# Patient Record
Sex: FEMALE | Race: WHITE | URBAN - METROPOLITAN AREA
[De-identification: names, ages, dates, MRNs, and addresses within clinical notes are randomized per-mention and may not be internally consistent; named-entity substitution may affect disease eponyms.]

---

## 2019-07-24 PROBLEM — E78.00 PURE HYPERCHOLESTEROLEMIA: Status: ACTIVE | Noted: 2018-05-03

## 2019-11-15 PROBLEM — M25.562 ACUTE PAIN OF LEFT KNEE: Status: ACTIVE | Noted: 2019-11-15

## 2020-07-15 PROBLEM — C80.1 MALIGNANT NEOPLASM (HCC): Status: ACTIVE | Noted: 2020-07-15

## 2020-07-15 PROBLEM — E78.5 HYPERLIPIDEMIA: Status: ACTIVE | Noted: 2020-04-15

## 2020-07-15 PROBLEM — E55.9 VITAMIN D DEFICIENCY: Status: ACTIVE | Noted: 2020-04-15

## 2020-07-15 PROBLEM — C54.1 ENDOMETRIAL CARCINOMA (HCC): Status: ACTIVE | Noted: 2020-04-15

## 2020-07-15 PROBLEM — C64.9 RENAL CELL CARCINOMA (HCC): Status: ACTIVE | Noted: 2020-04-15

## 2020-07-15 PROBLEM — E87.6 HYPOKALEMIA: Status: ACTIVE | Noted: 2020-04-15

## 2020-07-15 PROBLEM — Z90.710 HX OF HYSTERECTOMY: Status: ACTIVE | Noted: 2020-07-15

## 2020-07-15 PROBLEM — E87.20 METABOLIC ACIDOSIS: Status: ACTIVE | Noted: 2020-04-15

## 2020-07-15 PROBLEM — K94.13 COLOSTOMY AND ENTEROSTOMY MALFUNCTION (HCC): Status: ACTIVE | Noted: 2020-07-15

## 2020-07-15 PROBLEM — N18.9 ANEMIA IN CHRONIC KIDNEY DISEASE: Status: ACTIVE | Noted: 2020-04-15

## 2020-07-15 PROBLEM — C18.9 CARCINOMA OF COLON (HCC): Status: ACTIVE | Noted: 2020-04-15

## 2020-07-15 PROBLEM — D63.1 ANEMIA IN CHRONIC KIDNEY DISEASE: Status: ACTIVE | Noted: 2020-04-15

## 2020-07-15 PROBLEM — K94.03 COLOSTOMY AND ENTEROSTOMY MALFUNCTION (HCC): Status: ACTIVE | Noted: 2020-07-15

## 2021-01-29 PROBLEM — M85.80 OSTEOPENIA: Status: ACTIVE | Noted: 2020-04-15

## 2021-07-30 PROBLEM — K94.13 COLOSTOMY AND ENTEROSTOMY MALFUNCTION (HCC): Status: RESOLVED | Noted: 2020-07-15 | Resolved: 2021-07-30

## 2021-07-30 PROBLEM — K94.03 COLOSTOMY AND ENTEROSTOMY MALFUNCTION (HCC): Status: RESOLVED | Noted: 2020-07-15 | Resolved: 2021-07-30

## 2021-07-30 PROBLEM — N18.32 STAGE 3B CHRONIC KIDNEY DISEASE (HCC): Status: ACTIVE | Noted: 2020-04-15

## 2021-07-30 PROBLEM — C64.9 RENAL CELL CARCINOMA (HCC): Status: RESOLVED | Noted: 2020-04-15 | Resolved: 2021-07-30

## 2021-07-30 PROBLEM — C18.9 CARCINOMA OF COLON (HCC): Status: RESOLVED | Noted: 2020-04-15 | Resolved: 2021-07-30

## 2021-07-30 PROBLEM — C64.2 MALIGNANT NEOPLASM OF LEFT KIDNEY, EXCEPT RENAL PELVIS (HCC): Status: ACTIVE | Noted: 2020-04-15

## 2021-07-30 PROBLEM — C64.2 MALIGNANT NEOPLASM OF LEFT KIDNEY, EXCEPT RENAL PELVIS (HCC): Status: RESOLVED | Noted: 2020-04-15 | Resolved: 2021-07-30

## 2022-03-01 PROBLEM — H92.09 OTALGIA: Status: ACTIVE | Noted: 2022-03-01

## 2022-03-01 PROBLEM — H90.3 SENSORINEURAL HEARING LOSS, BILATERAL: Status: ACTIVE | Noted: 2022-03-01

## 2022-03-02 PROBLEM — E46 PROTEIN-CALORIE MALNUTRITION, UNSPECIFIED SEVERITY (HCC): Status: ACTIVE | Noted: 2022-03-02

## 2023-07-05 ENCOUNTER — OFFICE VISIT (OUTPATIENT)
Dept: NEPHROLOGY | Facility: CLINIC | Age: 88
End: 2023-07-05
Payer: MEDICARE

## 2023-07-05 VITALS — DIASTOLIC BLOOD PRESSURE: 54 MMHG | SYSTOLIC BLOOD PRESSURE: 96 MMHG | WEIGHT: 83.25 LBS | BODY MASS INDEX: 16 KG/M2

## 2023-07-05 DIAGNOSIS — Z90.5 H/O LEFT NEPHRECTOMY: ICD-10-CM

## 2023-07-05 DIAGNOSIS — N18.30 STAGE 3 CHRONIC KIDNEY DISEASE, UNSPECIFIED WHETHER STAGE 3A OR 3B CKD (HCC): Primary | ICD-10-CM

## 2023-07-05 PROCEDURE — 99204 OFFICE O/P NEW MOD 45 MIN: CPT | Performed by: INTERNAL MEDICINE

## 2024-01-29 ENCOUNTER — HOSPITAL ENCOUNTER (INPATIENT)
Facility: HOSPITAL | Age: 89
LOS: 4 days | Discharge: HOME HEALTH | End: 2024-02-02
Attending: EMERGENCY MEDICINE | Admitting: HOSPITALIST
Payer: MEDICARE

## 2024-01-29 ENCOUNTER — APPOINTMENT (OUTPATIENT)
Dept: ULTRASOUND IMAGING | Facility: HOSPITAL | Age: 89
End: 2024-01-29
Attending: EMERGENCY MEDICINE
Payer: MEDICARE

## 2024-01-29 ENCOUNTER — HOSPITAL ENCOUNTER (INPATIENT)
Facility: HOSPITAL | Age: 89
LOS: 4 days | Discharge: HOME | End: 2024-02-02
Attending: EMERGENCY MEDICINE | Admitting: HOSPITALIST
Payer: MEDICARE

## 2024-01-29 DIAGNOSIS — E87.0 HYPERNATREMIA: ICD-10-CM

## 2024-01-29 DIAGNOSIS — N17.9 AKI (ACUTE KIDNEY INJURY) (HCC): Primary | ICD-10-CM

## 2024-01-29 LAB
ALBUMIN SERPL-MCNC: 2.8 G/DL (ref 3.4–5)
ALBUMIN/GLOB SERPL: 0.7 {RATIO} (ref 1–2)
ALP LIVER SERPL-CCNC: 48 U/L
ANION GAP SERPL CALC-SCNC: 10 MMOL/L (ref 0–18)
AST SERPL-CCNC: 20 U/L (ref 15–37)
BASOPHILS # BLD AUTO: 0.01 X10(3) UL (ref 0–0.2)
BASOPHILS NFR BLD AUTO: 0.1 %
BILIRUB SERPL-MCNC: 0.2 MG/DL (ref 0.1–2)
BUN BLD-MCNC: 82 MG/DL (ref 9–23)
CALCIUM BLD-MCNC: 8.2 MG/DL (ref 8.5–10.1)
CHLORIDE SERPL-SCNC: 122 MMOL/L (ref 98–112)
CO2 SERPL-SCNC: 18 MMOL/L (ref 21–32)
CREAT BLD-MCNC: 4.51 MG/DL
EGFRCR SERPLBLD CKD-EPI 2021: 9 ML/MIN/1.73M2 (ref 60–?)
EOSINOPHIL # BLD AUTO: 0.09 X10(3) UL (ref 0–0.7)
EOSINOPHIL NFR BLD AUTO: 1.2 %
ERYTHROCYTE [DISTWIDTH] IN BLOOD BY AUTOMATED COUNT: 17.9 %
GLOBULIN PLAS-MCNC: 4 G/DL (ref 2.8–4.4)
GLUCOSE BLD-MCNC: 78 MG/DL (ref 70–99)
HCT VFR BLD AUTO: 26.9 %
HGB BLD-MCNC: 8.3 G/DL
IMM GRANULOCYTES # BLD AUTO: 0.05 X10(3) UL (ref 0–1)
IMM GRANULOCYTES NFR BLD: 0.7 %
LYMPHOCYTES # BLD AUTO: 1.67 X10(3) UL (ref 1–4)
LYMPHOCYTES NFR BLD AUTO: 22.5 %
MCH RBC QN AUTO: 26.9 PG (ref 26–34)
MCHC RBC AUTO-ENTMCNC: 30.9 G/DL (ref 31–37)
MCV RBC AUTO: 87.1 FL
MONOCYTES # BLD AUTO: 0.79 X10(3) UL (ref 0.1–1)
MONOCYTES NFR BLD AUTO: 10.6 %
NEUTROPHILS # BLD AUTO: 4.82 X10 (3) UL (ref 1.5–7.7)
NEUTROPHILS # BLD AUTO: 4.82 X10(3) UL (ref 1.5–7.7)
NEUTROPHILS NFR BLD AUTO: 64.9 %
OSMOLALITY SERPL CALC.SUM OF ELEC: 334 MOSM/KG (ref 275–295)
PLATELET # BLD AUTO: 414 10(3)UL (ref 150–450)
POTASSIUM SERPL-SCNC: 3.6 MMOL/L (ref 3.5–5.1)
PROT SERPL-MCNC: 6.8 G/DL (ref 6.4–8.2)
RBC # BLD AUTO: 3.09 X10(6)UL
SODIUM SERPL-SCNC: 150 MMOL/L (ref 136–145)
WBC # BLD AUTO: 7.4 X10(3) UL (ref 4–11)

## 2024-01-29 PROCEDURE — 76770 US EXAM ABDO BACK WALL COMP: CPT | Performed by: EMERGENCY MEDICINE

## 2024-01-29 RX ORDER — MELATONIN
3 NIGHTLY PRN
Status: DISCONTINUED | OUTPATIENT
Start: 2024-01-29 | End: 2024-02-02

## 2024-01-29 RX ORDER — PANTOPRAZOLE SODIUM 40 MG/1
40 TABLET, DELAYED RELEASE ORAL
Status: DISCONTINUED | OUTPATIENT
Start: 2024-01-30 | End: 2024-02-02

## 2024-01-29 RX ORDER — ONDANSETRON 2 MG/ML
4 INJECTION INTRAMUSCULAR; INTRAVENOUS EVERY 6 HOURS PRN
Status: DISCONTINUED | OUTPATIENT
Start: 2024-01-29 | End: 2024-02-02

## 2024-01-29 RX ORDER — POTASSIUM CHLORIDE 20 MEQ/1
20 TABLET, EXTENDED RELEASE ORAL ONCE
Status: COMPLETED | OUTPATIENT
Start: 2024-01-29 | End: 2024-01-29

## 2024-01-29 RX ORDER — ASPIRIN 81 MG/1
81 TABLET, CHEWABLE ORAL DAILY
Status: DISCONTINUED | OUTPATIENT
Start: 2024-01-30 | End: 2024-02-02

## 2024-01-29 RX ORDER — DILTIAZEM HYDROCHLORIDE 120 MG/1
120 CAPSULE, EXTENDED RELEASE ORAL DAILY
Status: DISCONTINUED | OUTPATIENT
Start: 2024-01-30 | End: 2024-02-02

## 2024-01-29 RX ORDER — AMIODARONE HYDROCHLORIDE 200 MG/1
200 TABLET ORAL DAILY
COMMUNITY

## 2024-01-29 RX ORDER — HYDRALAZINE HYDROCHLORIDE 20 MG/ML
10 INJECTION INTRAMUSCULAR; INTRAVENOUS EVERY 6 HOURS PRN
Status: DISCONTINUED | OUTPATIENT
Start: 2024-01-29 | End: 2024-02-02

## 2024-01-29 RX ORDER — AMIODARONE HYDROCHLORIDE 100 MG/1
100 TABLET ORAL DAILY
Status: DISCONTINUED | OUTPATIENT
Start: 2024-01-30 | End: 2024-02-02

## 2024-01-29 RX ORDER — HYDROCHLOROTHIAZIDE 25 MG/1
25 TABLET ORAL DAILY
Status: ON HOLD | COMMUNITY
End: 2024-01-30

## 2024-01-29 RX ORDER — ACETAMINOPHEN 500 MG
500 TABLET ORAL EVERY 4 HOURS PRN
Status: DISCONTINUED | OUTPATIENT
Start: 2024-01-29 | End: 2024-02-02

## 2024-01-29 RX ORDER — METOCLOPRAMIDE HYDROCHLORIDE 5 MG/ML
5 INJECTION INTRAMUSCULAR; INTRAVENOUS EVERY 8 HOURS PRN
Status: DISCONTINUED | OUTPATIENT
Start: 2024-01-29 | End: 2024-02-02

## 2024-01-29 RX ORDER — DILTIAZEM HYDROCHLORIDE 120 MG/1
120 CAPSULE, EXTENDED RELEASE ORAL DAILY
COMMUNITY

## 2024-01-29 NOTE — ED INITIAL ASSESSMENT (HPI)
Pt to ER with walker accompanied by daughter in law. States had labs drawn today at 1000 with Creatinine level 4.19. Pt has hx of kidney disease, has 1 kidney. States generalized fatigue recently. Denies any pain at this time.

## 2024-01-30 ENCOUNTER — APPOINTMENT (OUTPATIENT)
Dept: CT IMAGING | Facility: HOSPITAL | Age: 89
End: 2024-01-30
Attending: PHYSICIAN ASSISTANT
Payer: MEDICARE

## 2024-01-30 LAB
ALBUMIN SERPL-MCNC: 2.2 G/DL (ref 3.4–5)
ALBUMIN/GLOB SERPL: 0.7 {RATIO} (ref 1–2)
ALP LIVER SERPL-CCNC: 41 U/L
ANION GAP SERPL CALC-SCNC: 11 MMOL/L (ref 0–18)
AST SERPL-CCNC: 13 U/L (ref 15–37)
BASOPHILS # BLD AUTO: 0.02 X10(3) UL (ref 0–0.2)
BASOPHILS NFR BLD AUTO: 0.3 %
BILIRUB SERPL-MCNC: 0.2 MG/DL (ref 0.1–2)
BILIRUB UR QL STRIP.AUTO: NEGATIVE
BUN BLD-MCNC: 76 MG/DL (ref 9–23)
CALCIUM BLD-MCNC: 7.5 MG/DL (ref 8.5–10.1)
CHLORIDE SERPL-SCNC: 118 MMOL/L (ref 98–112)
CLARITY UR REFRACT.AUTO: CLEAR
CO2 SERPL-SCNC: 18 MMOL/L (ref 21–32)
COLOR UR AUTO: COLORLESS
CREAT BLD-MCNC: 4.03 MG/DL
CREAT UR-SCNC: 32 MG/DL
DEPRECATED HBV CORE AB SER IA-ACNC: 42.8 NG/ML
EGFRCR SERPLBLD CKD-EPI 2021: 10 ML/MIN/1.73M2 (ref 60–?)
EOSINOPHIL # BLD AUTO: 0.16 X10(3) UL (ref 0–0.7)
EOSINOPHIL NFR BLD AUTO: 2.1 %
ERYTHROCYTE [DISTWIDTH] IN BLOOD BY AUTOMATED COUNT: 17.9 %
GLOBULIN PLAS-MCNC: 3 G/DL (ref 2.8–4.4)
GLUCOSE BLD-MCNC: 105 MG/DL (ref 70–99)
GLUCOSE UR STRIP.AUTO-MCNC: NORMAL MG/DL
HCT VFR BLD AUTO: 21.6 %
HGB BLD-MCNC: 7.1 G/DL
IMM GRANULOCYTES # BLD AUTO: 0.04 X10(3) UL (ref 0–1)
IMM GRANULOCYTES NFR BLD: 0.5 %
IRON SATN MFR SERPL: 16 %
IRON SERPL-MCNC: 42 UG/DL
KETONES UR STRIP.AUTO-MCNC: NEGATIVE MG/DL
LEUKOCYTE ESTERASE UR QL STRIP.AUTO: NEGATIVE
LYMPHOCYTES # BLD AUTO: 1.78 X10(3) UL (ref 1–4)
LYMPHOCYTES NFR BLD AUTO: 23.8 %
MAGNESIUM SERPL-MCNC: 1 MG/DL (ref 1.6–2.6)
MAGNESIUM SERPL-MCNC: 1 MG/DL (ref 1.6–2.6)
MCH RBC QN AUTO: 27.1 PG (ref 26–34)
MCHC RBC AUTO-ENTMCNC: 32.9 G/DL (ref 31–37)
MCV RBC AUTO: 82.4 FL
MONOCYTES # BLD AUTO: 0.79 X10(3) UL (ref 0.1–1)
MONOCYTES NFR BLD AUTO: 10.6 %
NEUTROPHILS # BLD AUTO: 4.68 X10 (3) UL (ref 1.5–7.7)
NEUTROPHILS # BLD AUTO: 4.68 X10(3) UL (ref 1.5–7.7)
NEUTROPHILS NFR BLD AUTO: 62.7 %
NITRITE UR QL STRIP.AUTO: NEGATIVE
OSMOLALITY SERPL CALC.SUM OF ELEC: 327 MOSM/KG (ref 275–295)
OSMOLALITY UR: 213 MOSM/KG (ref 300–1300)
PH UR STRIP.AUTO: 5 [PH] (ref 5–8)
PHOSPHATE SERPL-MCNC: 5 MG/DL (ref 2.5–4.9)
PLATELET # BLD AUTO: 326 10(3)UL (ref 150–450)
POTASSIUM SERPL-SCNC: 2.9 MMOL/L (ref 3.5–5.1)
PROT SERPL-MCNC: 5.2 G/DL (ref 6.4–8.2)
PROT UR STRIP.AUTO-MCNC: NEGATIVE MG/DL
RBC # BLD AUTO: 2.62 X10(6)UL
RBC UR QL AUTO: NEGATIVE
SODIUM SERPL-SCNC: 147 MMOL/L (ref 136–145)
SODIUM SERPL-SCNC: 31 MMOL/L
SP GR UR STRIP.AUTO: 1.01 (ref 1–1.03)
TIBC SERPL-MCNC: 265 UG/DL (ref 240–450)
TRANSFERRIN SERPL-MCNC: 178 MG/DL (ref 200–360)
UROBILINOGEN UR STRIP.AUTO-MCNC: NORMAL MG/DL
WBC # BLD AUTO: 7.5 X10(3) UL (ref 4–11)

## 2024-01-30 PROCEDURE — 74176 CT ABD & PELVIS W/O CONTRAST: CPT | Performed by: PHYSICIAN ASSISTANT

## 2024-01-30 PROCEDURE — 99223 1ST HOSP IP/OBS HIGH 75: CPT | Performed by: INTERNAL MEDICINE

## 2024-01-30 RX ORDER — RUFINAMIDE 40 MG/ML
1 SUSPENSION ORAL DAILY
Status: DISCONTINUED | OUTPATIENT
Start: 2024-01-30 | End: 2024-02-02

## 2024-01-30 RX ORDER — FUROSEMIDE 20 MG/1
20 TABLET ORAL DAILY
COMMUNITY
Start: 2023-06-25 | End: 2024-02-02

## 2024-01-30 RX ORDER — POTASSIUM CHLORIDE 29.8 MG/ML
40 INJECTION INTRAVENOUS ONCE
Status: DISCONTINUED | OUTPATIENT
Start: 2024-01-30 | End: 2024-01-30 | Stop reason: SDUPTHER

## 2024-01-30 RX ORDER — MAGNESIUM SULFATE 1 G/100ML
1 INJECTION INTRAVENOUS ONCE
Status: COMPLETED | OUTPATIENT
Start: 2024-01-30 | End: 2024-01-30

## 2024-01-30 NOTE — CONSULTS
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     Patient presented with abnormal labs including ZOHREH, hypernatremia, metabolic acidosis.      Serum creat 4.51 > 4.03 (was 1.55 on 23)    UA 24: negative    Natalie/bladder US 24:  Minimal right-sided hydronephrosis. Left-sided nephrectomy.  Bladder is empty.     Nephrology following - ZOHREH likely prerenal azotemia given modest PO intake, ostomy losses, recent addition of furosemide.       Urology was consulted.    No dysuria or gross hematuria.  Voiding ok.  No nausea, vomiting, fever, or chills. Some lower back discomfort.      Daughter-in-law at bedside.    History:  Past Medical History:   Diagnosis Date    Atrial fibrillation (HCC)     Colon cancer (HCC)     Colon cancer (HCC)     Endometrial cancer (HCC)     UTERINE, DX ABOUT 30 YEARS    Endometrial cancer (HCC)     UTERINE, DX ABOUT 30 YEARS    Kidney disease     Bishop syndrome      Past Surgical History:   Procedure Laterality Date    COLECTOMY      NEPHRECTOMY Left      Family History   Problem Relation Age of Onset    Heart Disorder Father     Heart Disorder Mother     Heart Disorder Sister     Breast Cancer Sister     Diabetes Brother     Breast Cancer Sister     Cancer Sister     Breast Cancer Sister       reports that she has never smoked. She has never used smokeless tobacco. She reports current alcohol use. She reports that she does not use  drugs.    Allergies:  Allergies   Allergen Reactions    Ciprofloxacin RASH    Penicillins RASH       Medications:    Current Facility-Administered Medications:     amiodarone (Pacerone) tab 100 mg, 100 mg, Oral, Daily    apixaban (Eliquis) tab 2.5 mg, 2.5 mg, Oral, BID    aspirin chewable tab 81 mg, 81 mg, Oral, Daily    dilTIAZem ER (Dilacor XR) 24 hr cap 120 mg, 120 mg, Oral, Daily    pantoprazole (Protonix) DR tab 40 mg, 40 mg, Oral, Before breakfast    acetaminophen (Tylenol Extra Strength) tab 500 mg, 500 mg, Oral, Q4H PRN    melatonin tab 3 mg, 3 mg, Oral, Nightly PRN    ondansetron (Zofran) 4 MG/2ML injection 4 mg, 4 mg, Intravenous, Q6H PRN    metoclopramide (Reglan) 5 mg/mL injection 5 mg, 5 mg, Intravenous, Q8H PRN    sodium bicarbonate 50 mEq in dextrose 5% 1,050 mL infusion, 50 mEq, Intravenous, Continuous    hydrALAzine (Apresoline) 20 mg/mL injection 10 mg, 10 mg, Intravenous, Q6H PRN    Review of Systems:  Pertinent items are noted in HPI.    Physical Exam:  /58 (BP Location: Right arm)   Pulse 62   Temp 97.4 °F (36.3 °C) (Oral)   Resp 16   Ht 5' (1.524 m)   Wt 85 lb (38.6 kg)   SpO2 98%   BMI 16.60 kg/m²   GENERAL: the patient is resting in bed in no acute distress.    HEENT: Unremarkable.  NECK: Supple.   LUNGS: non-labored respirations  ABDOMEN: The abdomen is soft and nontender without rebound or guarding.    BACK: Without CVA tenderness.     Laboratory Data:  Lab Results   Component Value Date    WBC 7.5 01/30/2024    HGB 7.1 01/30/2024    HCT 21.6 01/30/2024    .0 01/30/2024    CREATSERUM 4.03 01/30/2024    BUN 76 01/30/2024     01/30/2024    K 2.9 01/30/2024     01/30/2024    CO2 18.0 01/30/2024     01/30/2024    CA 7.5 01/30/2024    ALB 2.2 01/30/2024    ALKPHO 41 01/30/2024    BILT 0.2 01/30/2024    TP 5.2 01/30/2024    AST 13 01/30/2024    ALT  01/30/2024      Comment:      Due to  backorder we are temporarily unable to offer hospital-based  ALT testing at [facility].   If urgently needed, please order ALT test code [id number].   The new order will need a new venipuncture and will be sent to [facility] for testing.   The expected turnaround time will be within 24 hours.     MG 1.0 01/30/2024    PHOS 5.0 01/30/2024            Imaging:    US KIDNEY/BLADDER (CPT=76770)    Result Date: 1/29/2024  CONCLUSION:  Minimal right-sided hydronephrosis.  Left-sided nephrectomy.   LOCATION:  [id number]     Dictated by (CST): Stromberg, LeRoy, MD on 1/29/2024 at 8:45 PM     Finalized by (CST): Stromberg, LeRoy, MD on 1/29/2024 at 8:46 PM       Impression:  Patient Active Problem List   Diagnosis    DJD (degenerative joint disease), cervical    Asymptomatic postmenopausal status    Benign essential hypertension    Chronic kidney disease, stage III (moderate) (HCC)    Chronic rhinitis    Disorder of bone and cartilage    Esophageal reflux    Generalized osteoarthrosis, involving multiple sites    Malignant neoplasm of uterus (HCC)    Other vitamin B12 deficiency anemia    Pure hypercholesterolemia    Renal failure    Acute pain of left knee    Vitamin D deficiency    Metabolic acidosis    Malignant neoplasm (HCC)    Hypokalemia    Hyperlipidemia    Endometrial carcinoma (HCC)    Anemia in chronic kidney disease    Hx of hysterectomy    Osteopenia    Stage 3b chronic kidney disease (HCC)    Otalgia    Sensorineural hearing loss, bilateral    ZOHREH (acute kidney injury) (HCC)    Hypernatremia       ZOHREH/CKD  H/O nephrectomy 20 years ago for RCC  Renal/bladder US: Minimal right-sided hydronephrosis. Left-sided nephrectomy.  Bladder is empty.  Afebrile  No leukocytosis  Serum creat 4.51 > 4.03 (was 1.55 on 6/25/23)  UA 1/30/24: negative    Recommendations:  Check abdominal/pelvic CT scan without contrast for further evaluation.  Nephrology consultation appreciated    Above discussed with patient, daughter-in-law, nurse  Will update Dr. Willett    Thank you for allowing me to  participate in the care of your patient.    Daksha Chou PA-C  [facility] [place] of Urology  1/30/2024  9:49 AM

## 2024-01-30 NOTE — H&P
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  She went to establish care with her new PCP and when her labs resulted she was found to have a creatinine of 4.19.  She has been feeling slightly weak overall she has been okay.  Does report feeling lightheaded.  Given the abnormal labs she was asked to go to the ER.  In the ER she had acute renal injury, hypernatremia and metabolic acidosis.  Vitals are stable.  She was started on IV fluids and admitted for further evaluation and treatment.    Past Medical History:   Diagnosis Date    Atrial fibrillation (HCC)     Colon cancer (HCC) 2003    Colon cancer (HCC)     Endometrial cancer (HCC)     UTERINE, DX ABOUT 30 YEARS    Endometrial cancer (HCC)     UTERINE, DX ABOUT 30 YEARS    Kidney disease     Bishop syndrome       Past Surgical History:   Procedure Laterality Date    COLECTOMY      NEPHRECTOMY Left         ALL:  Allergies   Allergen Reactions    Ciprofloxacin RASH    Penicillins RASH        No current outpatient medications on file.       Social History     Tobacco Use    Smoking status: Never    Smokeless tobacco: Never   Substance Use Topics    Alcohol use: Yes        Fam Hx  Family History   Problem Relation Age of Onset    Heart Disorder Father     Heart Disorder Mother     Heart Disorder Sister     Breast Cancer Sister     Diabetes Brother     Breast Cancer Sister     Cancer Sister     Breast Cancer Sister        Review of Systems  Comprehensive ROS reviewed and negative except for what is stated in HPI.       OBJECTIVE:  /51 (BP Location: Right arm)   Pulse 71   Temp 97.4 °F (36.3 °C) (Oral)   Resp 16   Ht 5' (1.524 m)   Wt 85 lb (38.6 kg)   SpO2 98%   BMI 16.60 kg/m²   General:  Alert, no distress, appears stated age.    Head:  Normocephalic, without obvious abnormality, atraumatic.   Eyes:  Sclera anicteric, No conjunctival pallor, EOMs intact.    Nose: Nares normal. Septum midline. Mucosa normal. No drainage.   Throat: Lips, mucosa, and tongue normal. Teeth and gums normal.   Neck: Supple, symmetrical, trachea midline, no cervical or supraclavicular lymph adenopathy, thyroid: no enlargment/tenderness/nodules appreciated   Lungs:   Clear to auscultation bilaterally. Normal effort   Chest wall:  No tenderness or deformity.   Heart:  Regular rate and rhythm, S1, S2 normal, no murmur, rub or gallop appreciated   Abdomen:   Soft, non-tender. Bowel sounds normal. No masses,  No organomegaly. Non distended   Extremities: Extremities normal, atraumatic, no cyanosis or edema.   Skin: Skin color, texture, turgor normal. No rashes or lesions.    Neurologic: Normal strength, no focal deficit appreciated     Data Review:    LABS:   Lab Results   Component Value Date    WBC 7.5 01/30/2024    HGB 7.1 01/30/2024    HCT 21.6 01/30/2024    .0 01/30/2024    CREATSERUM 4.03 01/30/2024    BUN 76 01/30/2024     01/30/2024    K 2.9 01/30/2024     01/30/2024    CO2 18.0 01/30/2024     01/30/2024    CA 7.5 01/30/2024    ALB 2.2 01/30/2024    ALKPHO 41 01/30/2024    BILT 0.2 01/30/2024    TP 5.2 01/30/2024    AST 13 01/30/2024    ALT  01/30/2024      Comment:      Due to  backorder we are temporarily unable to offer hospital-based ALT testing at [facility].   If urgently needed, please order ALT test code [id number].   The new order will need a new venipuncture and will be sent to [facility] for testing.   The expected turnaround time will be within 24 hours.     MG 1.0 01/30/2024    PHOS  5.0 01/30/2024       CXR: All imaging personally reviewed.      Radiology: US KIDNEY/BLADDER (CPT=76770)    Result Date: 1/29/2024  PROCEDURE:  US KIDNEY/BLADDER (CPT=76770)  COMPARISON:  None.  INDICATIONS:  s/p left nephrectomy remotely  TECHNIQUE:  Transabdominal gray scale ultrasound imaging of the bilateral kidneys and bladder was performed.  Routine technique was utilized.   PATIENT STATED HISTORY: (As transcribed by Technologist)     FINDINGS:   RIGHT KIDNEY MEASUREMENTS:  10.4 x 3.9 x 4.2 cm ECHOGENICITY:  Normal. HYDRONEPHROSIS:  Minimal right-sided hydronephrosis. CYSTS/STONES/MASSES:  None.  LEFT KIDNEY Surgically absent.  BLADDER:  Bladder is empty and not well assessed.  (Patient voided right before the examination). OTHER:  Negative.            CONCLUSION:  Minimal right-sided hydronephrosis.  Left-sided nephrectomy.   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    Acute Renal failure on CKD3  Hypernatremia   - likely prerenal from poor po intake and increased ostomy losses  - recent lasix addition, will dc  - cont IVF per nephrology  - monitor I/Os, Cr fxn    Atrial fibrillation  - amio, diltiazem  - eliquis    Hx Bishop  - Colon/uterine ca s/p resection  - Colostomy    FEN: regular diet, PT/OT  Proph: SCDs, eliquis    Outpatient records or previous hospital records reviewed.   [facility] hospitalist to continue to follow patient while in house      Aggie Iqbal MD  [facility]  Hospitalist  Message over xiao qu wu you/[employer]  Pager: [phone]        **Certification      PHYSICIAN Certification of Need for Inpatient  Hospitalization - Initial Certification    Patient will require inpatient services that will reasonably be expected to span two midnight's based on the clinical documentation in H+P.   Based on patients current state of illness, I anticipate that, after discharge, patient will require TBD.

## 2024-01-30 NOTE — PLAN OF CARE
Pt is alert to self, disoriented to year, situation, and place. Forgetful. Room air, O2 sating WNL. Tele-NSR/SB when sleeping. Orthostatic vitals (+). Pt reports dizziness with ambulation. Step Daughter and son at bedside and updated on POC. Urology consulted. Urine sent. LLQ ostomy. Pt reported pain in L hip which is chronic arthritis pain, declined pain medications. Pt denied shortness of breath and nausea. Critical K and Mg, Mds paged, replaced per verbal orders. Pt updated on POC, all questions and concerns addressed, pt verbalized understanding. Safety precautions in place, no further needs at this time.    Problem: Patient/Family Goals  Goal: Patient/Family Long Term Goal  Description: Patient's Long Term Goal: Discharge with adequate resources      Interventions:  - Identify barriers to discharge w/pt and caregiver  - Include patient/family/discharge partner in discharge planning  - Arrange for needed discharge resources and transportation as appropriate  - Identify discharge learning needs   - Consider post-discharge preferences of patient/family/discharge partner  - Assess patient's ability to be responsible for managing their own health  - Refer to Case Management Department for coordinating discharge planning if the patient needs post-hospital services  - See additional Care Plan goals for specific interventions  Outcome: Progressing  Goal: Patient/Family Short Term Goal  Description: Patient's Short Term Goal:  1/30: remain safe    Interventions:   - bed alarm  - medications per MAR  - See additional Care Plan goals for specific interventions  Outcome: Progressing     Problem: SAFETY ADULT - FALL  Goal: Free from fall injury  Description: INTERVENTIONS:  - Assess pt frequently for physical needs  - Identify cognitive and physical deficits and behaviors that affect risk of falls.  - [place] fall precautions as indicated by assessment.  - Educate pt/family on patient safety including physical  limitations  - Instruct pt to call for assistance with activity based on assessment  - Modify environment to reduce risk of injury  - Provide assistive devices as appropriate  - Consider OT/PT consult to assist with strengthening/mobility  - Encourage toileting schedule  Outcome: Progressing

## 2024-01-30 NOTE — DIETARY NOTE
[facility]  NUTRITION ASSESSMENT    Pt does not meet malnutrition criteria at this time.    NUTRITION INTERVENTION:    Meal and Snacks - Continue Regular Diet as tolerated; monitor patient po intake. Encourage adequate po of appropriate diet.  Medical Food Supplements - RD added Magic Cup butter pecan BID. Rationale/use for oral supplements discussed.  Vitamin and Mineral Supplements - Recommend adding Multivitamin with minerals    PATIENT STATUS: 89 year old female admitted on 1/29 presents with ZOHREH. Pt screened d/t low BMI. Visited pt at bedside with family member at bedside. Pt reports her appetite is pretty good but does note she has never been a big eater. Eats 3 meals/day and snacks sometimes. She reports having  nausea a couple days ago which has since resolved. No issues with her colostomy. No chewing or swallowing difficulties and NKFA. Pt does report taking daily multivitamin and drinks [employer] ONS daily/every other day at home. Offered ONS during admit and pt agreeable to try Magic Cup butter pecan. Continued to encourage PO and ONS intake. All questions answered at this time.    PMH: Atrial fibrillation, Colon cancer s/p colostomy, Endometrial cancer, Kidney disease, and Bishop syndrome.    ANTHROPOMETRICS:  Ht: 152.4 cm (5')  Wt: 38.6 kg (85 lb).   BMI: Body mass index is 16.6 kg/m².  IBW: 45.5 kg    WEIGHT HISTORY: Per chart, pt with ~17 lb wt loss x 2.5 years (wt stable over past 6 months).  Patient Weight(s) for the past 336 hrs:   Weight   01/29/24 2256 38.6 kg (85 lb)   01/29/24 1722 38.6 kg (85 lb)       Wt Readings from Last 10 Encounters:   01/29/24 38.6 kg (85 lb)   07/05/23 37.8 kg (83 lb 4 oz)   03/02/22 43.5 kg (96 lb)   07/30/21 46.3 kg (102 lb)   05/06/21 46.3 kg (102 lb)   04/29/21 46.3 kg (102 lb)   01/29/21 46.4 kg (102 lb 3.2 oz)   09/02/20 42.6 kg (94 lb)   08/03/20 42.6 kg (94 lb)   07/29/20 43 kg (94 lb 12.8 oz)        NUTRITION:  Diet:       Procedures    Regular/General  diet Is Patient on Accuchecks? No        Percent Meals Eaten (last 3 days)       Date/Time Percent Meals Eaten (%)    01/30/24 1000 100 %    01/30/24 1300 75 %            Food Allergies: No  Cultural/Ethnic/Church Preferences Addressed: Yes    GI SYSTEM REVIEW: WNL; +colostomy  Skin/Wounds: WNL    NUTRITION RELATED PHYSICAL FINDINGS:     1. Body Fat/Muscle Mass:  pt with age-related sarcopenia     2. Fluid Accumulation: none per RN documentation     NUTRITION PRESCRIPTION: 38.6 kg  Calories: 8504-5145 calories/day (30-35 kcal/kg)  Protein: 46-58 grams protein/day (1.2-1.5 gm/kg)  Fluid: ~1 ml/kcal or per MD discretion    NUTRITION DIAGNOSIS/PROBLEM:  Underweight related to physiological causes as evidenced by low BMI    MONITOR AND EVALUATE/NUTRITION GOALS:  PO intake of 75% of meals TID - New  PO intake of 75% of oral nutrition supplement/s - New  Gradual weight gain of at least .5 lbs per week - New      MEDICATIONS:  Protonix  Gtt: Na bicarb    LABS:  Na 147, K+ 2.9, Mg 1, Phos 5    Pt is at Moderate nutrition risk    Joan Borden RD, LDN, CNSC  Clinical Dietitian  Spectra: [phone]

## 2024-01-30 NOTE — PHYSICAL THERAPY NOTE
PHYSICAL THERAPY EVALUATION - INPATIENT     Room Number: 516/516-A  Evaluation Date: 1/30/2024  Type of Evaluation: Initial  Physician Order: PT Eval and Treat    Presenting Problem: ZOHREH  Co-Morbidities : L kidney nephrectomy, kidney disease, atrial fibrillation  Reason for Therapy: Mobility Dysfunction and Discharge Planning      ASSESSMENT   Pt is a 89 year old female admitted on 1/29/2024 for acute kidney injury. Functional outcome measures completed include [facility].  The AM-PAC '6-Clicks' Inpatient Basic Mobility Short Form was completed and this patient is demonstrating a Approx Degree of Impairment: 46.58%  degree of impairment in mobility. Research supports that patients with this level of impairment may benefit from HHPT.  PT Discharge Recommendations: Home with home health PT      PLAN  Patient has been evaluated and presents with no skilled Physical Therapy needs at this time.  Patient discharged from Physical Therapy services.  Please re-order if a new functional limitation presents during this admission.    GOALS  Patient was able to achieve the following goals ...    Patient was able to transfer Safely with supervision   Patient able to ambulate on level surfaces Safely with supervision         HOME SITUATION  Type of Home: Independent living facility   Home Layout:  (pt lives on 2nd level, takes elevator)  Stairs to Enter : 0             Lives With: Daughter  Drives: No (Son or daughter in law drives pt)  Patient Owned Equipment: Rolling walker  Patient Regularly Uses: Glasses    Prior Level of [place]: Pt reports she performs functional tasks at home independently. Pt ambulates with a walker. Pt lives with daughter who has down syndrome. Pt's son present and states pt is at times forgetful to take medication and is considering transition to assisted living.     SUBJECTIVE  \"I take a lot of walks\"       OBJECTIVE  Precautions: Bed/chair alarm  Fall Risk: Standard fall risk    WEIGHT BEARING  RESTRICTION  Weight Bearing Restriction: None                PAIN ASSESSMENT  Rating: Unable to rate  Location: low back  Management Techniques: Activity promotion;Body mechanics;Relaxation;Repositioning    COGNITION  Overall Cognitive Status:  WFL - within functional limits  Memory:  pt's son reports declining memory  Pt able to state she was in the hospital but did not know why she was here and did not know the month or year     RANGE OF MOTION AND STRENGTH ASSESSMENT  Upper extremity ROM and strength are within functional limits     Lower extremity ROM is within functional limits     Lower extremity strength is within functional limits       BALANCE  Static Sitting: Fair +  Dynamic Sitting: Fair +  Static Standing: Fair -  Dynamic Standing: Fair -    ADDITIONAL TESTS                                    ACTIVITY TOLERANCE   Pt reports slight dizziness when sitting EOB, vitals stable. RN notified.                       O2 WALK       NEUROLOGICAL FINDINGS                        AM-PAC '6-Clicks' INPATIENT SHORT FORM - BASIC MOBILITY  How much difficulty does the patient currently have...  Patient Difficulty: Turning over in bed (including adjusting bedclothes, sheets and blankets)?: A Little   Patient Difficulty: Sitting down on and standing up from a chair with arms (e.g., wheelchair, bedside commode, etc.): A Little   Patient Difficulty: Moving from lying on back to sitting on the side of the bed?: A Little   How much help from another person does the patient currently need...   Help from Another: Moving to and from a bed to a chair (including a wheelchair)?: A Little   Help from Another: Need to walk in hospital room?: A Little   Help from Another: Climbing 3-5 steps with a railing?: A Little       AM-PAC Score:  Raw Score: 18   Approx Degree of Impairment: 46.58%   Standardized Score (AM-PAC Scale): 43.63   CMS Modifier (G-Code): CK    FUNCTIONAL ABILITY STATUS  Gait Assessment   Functional Mobility/Gait  Assessment  Gait Assistance: Supervision  Distance (ft): 150  Assistive Device: Rolling walker  Pattern: Within Functional Limits (decreased gait speed)    Skilled Therapy Provided Per RNosmani for therapy. Pt received in supine and agreeable for PT session.    Bed Mobility:  Rolling: NT  Supine to sit: supervision   Sit to supine: supervision     Transfer Mobility:  Sit to stand: supervision   Stand to sit: supervision  Gait = supervision with RW    Therapist's comments: Pt educated on role of therapy, goals for session, safety, and fall prevention. Pt and son in agreement with POC.     Exercise/Education Provided:  Bed mobility  Body mechanics  Energy conservation  Functional activity tolerated  Gait training  Posture  ROM  Strengthening    Patient End of Session: In bed;Needs met;Call light within reach;RN aware of session/findings;All patient questions and concerns addressed;Alarm set;Family present    Patient Evaluation Complexity Level:  History Moderate - 1 or 2 personal factors and/or co-morbidities   Examination of body systems Low - addressing 1-2 elements   Clinical Presentation Low - Stable   Clinical Decision Making Low Complexity       PT Session Time: 23 minutes  Gait Training: 15 minutes

## 2024-01-30 NOTE — CONSULTS
[facility]  Report of Consultation    Emmie Loza Patient Status:  Inpatient    1932 MRN [id number]   Location [facility] 5NW-A Attending Farida, Yuki Marcial*   Hosp Day # 1 PCP Janell Powell MD       Assessment / Plan:    1) ZOHREH- likely due to prerenal azotemia given modest PO intake, ostomy losses and recent addition of furosemide (was not on this when I saw her ). US findings (mild hydro) unlikely to be clinically significant. 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  Very pleasant 89-year-old female whom I have seen in the office who presents with abnormal labs including acute kidney injury, hypernatremia metabolic acidosis.  She has some component of dementia and does not know why she was sent to the hospital.  She denies any specific complaints; no nausea vomiting fevers chills cough shortness of breath or other usual symptoms.  No recent signs of infection.    History:  Past Medical History:   Diagnosis Date    Atrial fibrillation (HCC)     Colon cancer (HCC)     Colon cancer (HCC)     Endometrial cancer (HCC)     UTERINE, DX ABOUT 30 YEARS    Endometrial cancer (HCC)     UTERINE, DX ABOUT 30 YEARS    Kidney disease     Bishop syndrome      Past Surgical History:   Procedure Laterality Date    COLECTOMY      NEPHRECTOMY Left      Family History   Problem Relation Age of Onset    Heart Disorder Father     Heart Disorder Mother     Heart Disorder Sister     Breast Cancer Sister      Diabetes Brother     Breast Cancer Sister     Cancer Sister     Breast Cancer Sister      Denies family history of kidney disease.    reports that she has never smoked. She has never used smokeless tobacco. She reports current alcohol use. She reports that she does not use drugs.    Allergies:  Allergies   Allergen Reactions    Ciprofloxacin RASH    Penicillins RASH       Medications:    Current Facility-Administered Medications:     amiodarone (Pacerone) tab 100 mg, 100 mg, Oral, Daily    apixaban (Eliquis) tab 2.5 mg, 2.5 mg, Oral, BID    aspirin chewable tab 81 mg, 81 mg, Oral, Daily    dilTIAZem ER (Dilacor XR) 24 hr cap 120 mg, 120 mg, Oral, Daily    pantoprazole (Protonix) DR tab 40 mg, 40 mg, Oral, Before breakfast    acetaminophen (Tylenol Extra Strength) tab 500 mg, 500 mg, Oral, Q4H PRN    melatonin tab 3 mg, 3 mg, Oral, Nightly PRN    ondansetron (Zofran) 4 MG/2ML injection 4 mg, 4 mg, Intravenous, Q6H PRN    metoclopramide (Reglan) 5 mg/mL injection 5 mg, 5 mg, Intravenous, Q8H PRN    sodium bicarbonate 50 mEq in dextrose 5% 1,050 mL infusion, 50 mEq, Intravenous, Continuous    hydrALAzine (Apresoline) 20 mg/mL injection 10 mg, 10 mg, Intravenous, Q6H PRN  No current outpatient medications on file.       Review of Systems:  Please see HPI for pertinent positives. 10 point review of systems otherwise reviewed and negative.     Physical Exam:  /58 (BP Location: Right arm)   Pulse 62   Temp 97.4 °F (36.3 °C) (Oral)   Resp 16   Ht 5' (1.524 m)   Wt 85 lb (38.6 kg)   SpO2 98%   BMI 16.60 kg/m²   Temp (24hrs), Av.8 °F (36.6 °C), Min:97.4 °F (36.3 °C), Max:98.2 °F (36.8 °C)       Intake/Output Summary (Last 24 hours) at 2024 0842  Last data filed at 2024 0832  Gross per 24 hour   Intake 1850 ml   Output 1050 ml   Net 800 ml     Wt Readings from Last 3 Encounters:   24 85 lb (38.6 kg)   23 83 lb 4 oz (37.8 kg)   22 96 lb (43.5 kg)     General: awake alert  HEENT:  No scleral icterus, MMM  Neck: Supple, no MARIA FERNANDA or thyromegaly  Cardiac: Regular rate and rhythm, S1, S2 normal, no murmur, rub, or gallop  Lungs: Decreased breath sounds at the bases bilaterally.   Abdomen: Soft, non-tender. + bowel sounds, no palpable organomegaly  Extremities: Without clubbing, cyanosis; no edema  Neurologic: Cranial nerves grossly intact, moving all extremities  Skin: Warm and dry, no rashes      Laboratory Data:  Lab Results   Component Value Date    WBC 7.5 01/30/2024    HGB 7.1 01/30/2024    HCT 21.6 01/30/2024    .0 01/30/2024    CREATSERUM 4.51 01/29/2024    BUN 82 01/29/2024     01/29/2024    K 3.6 01/29/2024     01/29/2024    CO2 18.0 01/29/2024    GLU 78 01/29/2024    CA 8.2 01/29/2024    ALB 2.8 01/29/2024    ALKPHO 48 01/29/2024    BILT 0.2 01/29/2024    TP 6.8 01/29/2024    AST 20 01/29/2024    ALT  01/29/2024      Comment:      Due to  backorder we are temporarily unable to offer hospital-based ALT testing at [facility].   If urgently needed, please order ALT test code [id number].   The new order will need a new venipuncture and will be sent to [facility] for testing.   The expected turnaround time will be within 24 hours.        Imaging:  All imaging studies reviewed.      Thank you for allowing me to participate in the care of your patient.    Fabiana Miller MD  1/30/2024  8:42 AM

## 2024-01-30 NOTE — PROGRESS NOTES
NURSING ADMISSION NOTE      Patient admitted via Cart  Oriented to room.  Safety precautions initiated.  Bed in low position.  Call light in reach.    Patient is alert and oriented x 4, afebrile, denies pain. On RA, no shortness of breath. NSR while awake, SB while sleeping, hx of Afib, on Eliquis. Regular diet, no dysphagia. Colostomy bag, patient empties herself. Voids, continent. Ambulates with SBA and a walker, PT/OT eval pending. On D5 with sodium bicarbonate at 100 mL/Hr. Patient aware of current POC, agreeable.

## 2024-01-30 NOTE — ED PROVIDER NOTES
Patient Seen in: [facility] Emergency Department      History     Chief Complaint   Patient presents with    Abnormal Labs     abnormal labs kidney failure, daughter in law states abnormal creatinine 4.19- per PCP     Stated Complaint: abnormal labs kidney failure, daughter in law states abnormal creatinine 4.19- *    Subjective:   HPI    Sent by PCP for abnormal labs.    Medical history as noted below.  Notable for hypertension on numerous medications, history of renal cell carcinoma status post left nephrectomy.    Labs were done, essentially as a matter routine and she was found to have a significant ZOHREH and hyponatremia.    Patient herself reports normal p.o. intake though family notes that she might have been missing some meals.  She does report feeling a bit lightheaded.    Does not endorse any urinary symptoms.  No fevers or chills.  No back pain or abdominal pain    Objective:   Past Medical History:   Diagnosis Date    Atrial fibrillation (HCC)     Colon cancer (HCC) 2003    Colon cancer (HCC)     Endometrial cancer (HCC)     UTERINE, DX ABOUT 30 YEARS    Endometrial cancer (HCC)     UTERINE, DX ABOUT 30 YEARS    Kidney disease     Bishop syndrome               Past Surgical History:   Procedure Laterality Date    COLECTOMY      NEPHRECTOMY Left                 Social History     Socioeconomic History    Marital status:    Tobacco Use    Smoking status: Never    Smokeless tobacco: Never   Vaping Use    Vaping Use: Never used   Substance and Sexual Activity    Alcohol use: Yes    Drug use: Never              Review of Systems    Positive for stated complaint: abnormal labs kidney failure, daughter in law states abnormal creatinine 4.19- *  Other systems are as noted in HPI.  Constitutional and vital signs reviewed.      All other systems reviewed and negative except as noted above.    Physical Exam     ED Triage Vitals   BP 01/29/24 1721 (!) 176/67   Pulse 01/29/24 1721 96   Resp 01/29/24 1722  18   Temp 01/29/24 1722 98.2 °F (36.8 °C)   Temp src 01/29/24 1722 Temporal   SpO2 01/29/24 1722 95 %   O2 Device 01/29/24 1722 None (Room air)       Current:/60   Pulse 66   Temp 98.2 °F (36.8 °C) (Temporal)   Resp 18   Ht 152.4 cm (5')   Wt 38.6 kg   SpO2 100%   BMI 16.60 kg/m²         Physical Exam    Physical Exam   Constitutional: Awake, alert, well appearing  Head: Normocephalic and atraumatic.   Eyes: Conjunctivae are normal. Pupils are equal, round, and reactive to light.   Neck: Normal range of motion. Neck supple.   Cardiovascular: Normal rate, regular rhythm  Pulmonary/Chest: Normal effort.  No accessory muscle use.  No clubbing, no cyanosis.  Abdominal: Soft. Bowel sounds are normal.   Neurological: Pt is alert and oriented to person, place, and time. no cranial nerve deficits  Skin: Skin is warm and dry.        ED Course     Labs Reviewed   COMP METABOLIC PANEL (14) - Abnormal; Notable for the following components:       Result Value    Sodium 150 (*)     Chloride 122 (*)     CO2 18.0 (*)     BUN 82 (*)     Creatinine 4.51 (*)     Calcium, Total 8.2 (*)     Calculated Osmolality 334 (*)     eGFR-Cr 9 (*)     Alkaline Phosphatase 48 (*)     Albumin 2.8 (*)     A/G Ratio 0.7 (*)     All other components within normal limits   CBC W/ DIFFERENTIAL - Abnormal; Notable for the following components:    RBC 3.09 (*)     HGB 8.3 (*)     HCT 26.9 (*)     MCHC 30.9 (*)     All other components within normal limits   CBC WITH DIFFERENTIAL WITH PLATELET    Narrative:     The following orders were created for panel order CBC With Differential With Platelet.  Procedure                               Abnormality         Status                     ---------                               -----------         ------                     CBC W/ DIFFERENTIAL[[id number]]          Abnormal            Final result                 Please view results for these tests on the individual orders.   URINALYSIS, ROUTINE    SODIUM, URINE, RANDOM   OSMOLALITY, URINE   CREATININE, URINE, RANDOM   RAINBOW DRAW BLUE             Blood reviewed.  Numerous issues.  ZOHREH, elevated BUN, low bicarb, hyponatremia.    Medications   sodium chloride 0.9 % IV bolus 500 mL (500 mL Intravenous New Bag 1/29/24 1925)   sodium bicarbonate 50 mEq in dextrose 5% 1,050 mL infusion (has no administration in time range)   potassium chloride (K-Dur) tab 20 mEq (20 mEq Oral Given 1/29/24 1925)              MDM          Differential diagnoses considered: ZOHREH due to volume depletion, less likely obstruction, less likely intrinsic kidney issues.    Patient appears nearly euvolemic on exam perhaps little bit dry.  Discussed with Dr. Ríos.  Medications as noted above.  Suggest maintenance fluids of D5 with 1 amp of sodium bicarb.  This has been ordered.  As well as oral potassium.      Patient will be admitted primarily to the [facility] hospitalist.    Care has been discussed with the admitting physician.        *Discussion of ongoing management of this patient's care included: Admitting physician, nephrology  *Comorbidities contributing to the complexity of decision making: n/a  *External charts reviewed: n/a  *Additional sources of history: n/a    Shared decision making was done by: patient, myself.    Admission disposition: 1/29/2024  7:52 PM                                        Medical Decision Making      Disposition and Plan     Clinical Impression:  1. ZOHREH (acute kidney injury) (HCC)    2. Hypernatremia         Disposition:  Admit  1/29/2024  7:52 pm    Follow-up:  No follow-up provider specified.        Medications Prescribed:  Current Discharge Medication List                            Hospital Problems       Present on Admission  Date Reviewed: 7/5/2023            ICD-10-CM Noted POA    * (Principal) ZOHREH (acute kidney injury) (HCC) N17.9 1/29/2024 Unknown

## 2024-01-31 PROBLEM — E83.42 HYPOMAGNESEMIA: Status: ACTIVE | Noted: 2024-01-31

## 2024-01-31 LAB
ANION GAP SERPL CALC-SCNC: 8 MMOL/L (ref 0–18)
BUN BLD-MCNC: 62 MG/DL (ref 9–23)
CALCIUM BLD-MCNC: 7.8 MG/DL (ref 8.5–10.1)
CHLORIDE SERPL-SCNC: 115 MMOL/L (ref 98–112)
CO2 SERPL-SCNC: 21 MMOL/L (ref 21–32)
CREAT BLD-MCNC: 3.31 MG/DL
EGFRCR SERPLBLD CKD-EPI 2021: 13 ML/MIN/1.73M2 (ref 60–?)
ERYTHROCYTE [DISTWIDTH] IN BLOOD BY AUTOMATED COUNT: 18.2 %
GLUCOSE BLD-MCNC: 96 MG/DL (ref 70–99)
HCT VFR BLD AUTO: 22.9 %
HGB BLD-MCNC: 7.3 G/DL
MCH RBC QN AUTO: 26.7 PG (ref 26–34)
MCHC RBC AUTO-ENTMCNC: 31.9 G/DL (ref 31–37)
MCV RBC AUTO: 83.9 FL
OSMOLALITY SERPL CALC.SUM OF ELEC: 315 MOSM/KG (ref 275–295)
PLATELET # BLD AUTO: 342 10(3)UL (ref 150–450)
POTASSIUM SERPL-SCNC: 3.4 MMOL/L (ref 3.5–5.1)
POTASSIUM SERPL-SCNC: 4.2 MMOL/L (ref 3.5–5.1)
RBC # BLD AUTO: 2.73 X10(6)UL
SODIUM SERPL-SCNC: 144 MMOL/L (ref 136–145)
WBC # BLD AUTO: 8.5 X10(3) UL (ref 4–11)

## 2024-01-31 PROCEDURE — 99233 SBSQ HOSP IP/OBS HIGH 50: CPT | Performed by: INTERNAL MEDICINE

## 2024-01-31 RX ORDER — POTASSIUM CHLORIDE 20 MEQ/1
20 TABLET, EXTENDED RELEASE ORAL ONCE
Status: COMPLETED | OUTPATIENT
Start: 2024-01-31 | End: 2024-01-31

## 2024-01-31 NOTE — PROGRESS NOTES
[facility]  Nephrology Progress Note    Emmie Loza Attending:  Yuki Iqbal*       Assessment and Plan:    1) ZOHREH- likely due to prerenal azotemia given modest PO intake, ostomy losses and recent addition of furosemide (was not on this when I saw her ). US OK- ho hydro by CT. 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 Possible DC Thurs      Subjective:  Awake alert in good spirits sitting up eating breakfast. Recognized me (sort of)    Physical Exam:   /70 (BP Location: Left arm)   Pulse 58   Temp 97.7 °F (36.5 °C) (Oral)   Resp 16   Ht 5' (1.524 m)   Wt 89 lb 11.6 oz (40.7 kg)   SpO2 99%   BMI 17.52 kg/m²   Temp (24hrs), Av.8 °F (36.6 °C), Min:97.5 °F (36.4 °C), Max:98.2 °F (36.8 °C)       Intake/Output Summary (Last 24 hours) at 2024 1058  Last data filed at 2024 1900  Gross per 24 hour   Intake 1452 ml   Output 250 ml   Net 1202 ml     Wt Readings from Last 3 Encounters:   24 89 lb 11.6 oz (40.7 kg)   23 83 lb 4 oz (37.8 kg)   22 96 lb (43.5 kg)     General: awake alert  HEENT: No scleral icterus, MMM  Neck: Supple, no MARIA FERNANDA or thyromegaly  Cardiac: Regular rate and rhythm, S1, S2 normal, no murmur or tub  Lungs: Decreased BS at bases bilaterally   Abdomen: Soft, non-tender. + bowel sounds, no palpable organomegaly  Extremities: Without clubbing, cyanosis; no edema  Neurologic: Cranial nerves grossly intact, moving all extremities  Skin: Warm and dry, no rashes       Labs:   Lab Results    Component Value Date    WBC 8.5 01/31/2024    HGB 7.3 01/31/2024    HCT 22.9 01/31/2024    .0 01/31/2024    CREATSERUM 3.31 01/31/2024    BUN 62 01/31/2024     01/31/2024    K 3.4 01/31/2024     01/31/2024    CO2 21.0 01/31/2024    GLU 96 01/31/2024    CA 7.8 01/31/2024       Imaging:  All imaging studies reviewed.    Meds:   Current Facility-Administered Medications   Medication Dose Route Frequency    multivitamin (Centrum) chewable tab (Adult) 1 tablet  1 tablet Oral Daily    amiodarone (Pacerone) tab 100 mg  100 mg Oral Daily    apixaban (Eliquis) tab 2.5 mg  2.5 mg Oral BID    aspirin chewable tab 81 mg  81 mg Oral Daily    dilTIAZem ER (Dilacor XR) 24 hr cap 120 mg  120 mg Oral Daily    pantoprazole (Protonix) DR tab 40 mg  40 mg Oral Before breakfast    acetaminophen (Tylenol Extra Strength) tab 500 mg  500 mg Oral Q4H PRN    melatonin tab 3 mg  3 mg Oral Nightly PRN    ondansetron (Zofran) 4 MG/2ML injection 4 mg  4 mg Intravenous Q6H PRN    metoclopramide (Reglan) 5 mg/mL injection 5 mg  5 mg Intravenous Q8H PRN    sodium bicarbonate 50 mEq in dextrose 5% 1,050 mL infusion  50 mEq Intravenous Continuous    hydrALAzine (Apresoline) 20 mg/mL injection 10 mg  10 mg Intravenous Q6H PRN         Questions/concerns were discussed with patient and/or family by bedside.          Fabiana Miller MD  1/31/2024  10:58 AM

## 2024-01-31 NOTE — PLAN OF CARE
Patient alert and oriented x 2-3 overnight, confused at times, impulsive. Afebirle, no c/o pain. On RA, NSR to SB, on Eliquis. Regular diet, poor appetite. Colostomy, soft to loose BM, empties an cleans herself. Voids frequently. Ambulates with 1 person assist and a walker. Continuous Na bicarb 50 mEq in D5.     Problem: Patient/Family Goals  Goal: Patient/Family Long Term Goal  Description: Patient's Long Term Goal: Discharge with adequate resources      Interventions:  - Identify barriers to discharge w/pt and caregiver  - Include patient/family/discharge partner in discharge planning  - Arrange for needed discharge resources and transportation as appropriate  - Identify discharge learning needs   - Consider post-discharge preferences of patient/family/discharge partner  - Assess patient's ability to be responsible for managing their own health  - Refer to Case Management Department for coordinating discharge planning if the patient needs post-hospital services  - See additional Care Plan goals for specific interventions  Outcome: Progressing  Goal: Patient/Family Short Term Goal  Description: Patient's Short Term Goal:  1/30: remain safe    Interventions:   - bed alarm  - medications per MAR  - See additional Care Plan goals for specific interventions  Outcome: Progressing     Problem: SAFETY ADULT - FALL  Goal: Free from fall injury  Description: INTERVENTIONS:  - Assess pt frequently for physical needs  - Identify cognitive and physical deficits and behaviors that affect risk of falls.  - [place] fall precautions as indicated by assessment.  - Educate pt/family on patient safety including physical limitations  - Instruct pt to call for assistance with activity based on assessment  - Modify environment to reduce risk of injury  - Provide assistive devices as appropriate  - Consider OT/PT consult to assist with strengthening/mobility  - Encourage toileting schedule  Outcome: Progressing     Problem:  Delirium  Goal: Minimize duration of delirium  Description: Interventions:  - Encourage use of hearing aids, eye glasses  - Promote highest level of mobility daily  - Provide frequent reorientation  - Promote wakefulness i.e. lights on, blinds open  - Promote sleep, encourage patient's normal rest cycle i.e. lights off, TV off, minimize noise and interruptions  - Encourage family to assist in orientation and promotion of home routines  Outcome: Progressing

## 2024-01-31 NOTE — CDS QUERY
DOCUMENTATION CLARIFICATION FORM    Dear Dr. Iqbal  Clinical information, provided below, indicates a BMI of BMI 16.60 kg/m²  Can you please further clarify in the medical record the diagnosis associated with these findings:    (  x ) Underweight  (   ) Other condition (please specify)  (   ) None of the above / Not applicable    Documentation from the Medical Record:   Risk Factors: Advanced age, HTN, history of renal cell carcinoma status post left nephrectomy, Colon cancer s/p colostomy, Endometrial cancer, Kidneydisease, and Bishop syndrome.  Clinical Indicators:   >Ht: 152.4 cm (5')   Wt: 38.6 kg (85 lb).   BMI: Body mass index is 16.6 kg/m².  >01/30/24 Dietary note   Pt reports her appetite is pretty good but does note she has never been a big eater. Eats 3 meals/day and snacks sometimes. She reports having nausea a couple days ago which has since resolved. No issues with her colostomy. No chewing or swallowing difficulties and NKFA.  Wt Readings from Last 10 Encounters:  01/29/24 38.6 kg (85 lb)  07/05/23 37.8 kg (83 lb 4 oz)  03/02/22 43.5 kg (96 lb)  07/30/21 46.3 kg (102 lb)  05/06/21 46.3 kg (102 lb)  04/29/21 46.3 kg (102 lb)  01/29/21 46.4 kg (102 lb 3.2 oz)  09/02/20 42.6 kg (94 lb)  08/03/20 42.6 kg (94 lb)  07/29/20 43 kg (94 lb 12.8 oz)    Treatment: Dietician consult, Continue Regular Diet as tolerated, added Magic Cup butter pecan BID, adding Multivitamin with minerals              For questions regarding this query, please contact Clinical : Susan Leonard RN/BSN @[phone]  Thank you    THIS FORM IS A PERMANENT PART OF THE MEDICAL RECORD

## 2024-01-31 NOTE — PROGRESS NOTES
[facility]  Hospitalist Progress Note                                                                     [facility] [place]        Emmie Loza  6/19/1932    SUBJECTIVE:  Patient seen and examined.  Family bedside.  Feeling better, making urine.  Denies CP/SOB.  NAD.       OBJECTIVE:  Temp:  [97.7 °F (36.5 °C)-98.3 °F (36.8 °C)] 98.3 °F (36.8 °C)  Pulse:  [58-71] 64  Resp:  [16-18] 16  BP: (133-159)/(54-70) 133/68  SpO2:  [97 %-100 %] 99 %  Exam  Gen: No acute distress, alert and oriented x3, no focal neurologic deficits  Pulm: Lungs clear bilaterally, normal respiratory effort  CV: Heart with regular rate and rhythm, no murmur.  Normal PMI.    Abd: Abdomen soft, nontender, nondistended, no organomegaly, bowel sounds present  MSK: Full range of motion in extremities, no clubbing, no cyanosis  Skin: no rashes or lesions    Labs:   Recent Labs   Lab 01/29/24 1730 01/30/24  0738 01/31/24  0736   WBC 7.4 7.5 8.5   HGB 8.3* 7.1* 7.3*   MCV 87.1 82.4 83.9   .0 326.0 342.0       Recent Labs   Lab 01/29/24 1730 01/30/24  0738 01/31/24  0736   * 147* 144   K 3.6 2.9* 3.4*   * 118* 115*   CO2 18.0* 18.0* 21.0   BUN 82* 76* 62*   CREATSERUM 4.51* 4.03* 3.31*   CA 8.2* 7.5* 7.8*   MG 1.0* 1.0*  --    PHOS  --  5.0*  --    GLU 78 105* 96       Recent Labs   Lab 01/29/24 1730 01/30/24  0738   AST 20 13*   ALB 2.8* 2.2*       No results for input(s): \"PGLU\" in the last 168 hours.    Meds:   Scheduled:    potassium chloride  20 mEq Oral Once    multivitamin  1 tablet Oral Daily    amiodarone  100 mg Oral Daily    apixaban  2.5 mg Oral BID    aspirin  81 mg Oral Daily    dilTIAZem ER  120 mg Oral Daily    pantoprazole  40 mg Oral Before breakfast     Continuous Infusions:    sodium bicarbonate in D5W infusion 50 mEq (01/31/24 1115)     PRN: acetaminophen, melatonin, ondansetron, metoclopramide, hydrALAzine    Assessment/Plan:  Principal Problem:    ZOHREH  (acute kidney injury) (HCC)  Active Problems:    Hypernatremia    Hypomagnesemia    89 year old female with PMH sig for hypertension, atrial fibrillation on Eliquis, chronic anemia, CKD 3, RCC status post nephrectomy, 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     Acute Renal failure on CKD3  Hypernatremia        - likely prerenal from poor po intake and increased ostomy losses  - recent lasix addition, will dc  - IVF per nephrology  - monitor I/Os, Cr fxn    R kidney collecting system prominence  - due to solitary kidney  - urology f/u      Atrial fibrillation  - amio, diltiazem  - eliquis     Hx Bishop  - Colon/uterine ca s/p resection  - Colostomy     FEN: regular diet, PT/OT  Proph: SCDs, eliquis     Outpatient records or previous hospital records reviewed.   [facility] hospitalist to continue to follow patient while in house     Dispo - hopefully dc tomorrow      Aggie Iqbal MD  [facility]  Hospitalist  Message over [employer]  Pager: [phone]

## 2024-01-31 NOTE — OCCUPATIONAL THERAPY NOTE
OCCUPATIONAL THERAPY EVALUATION - INPATIENT    Room Number: 516/516-A  Evaluation Date: 1/31/2024     Type of Evaluation: Initial  Presenting Problem: ZOHREH    Physician Order: IP Consult to Occupational Therapy  Reason for Therapy:  ADL/IADL Dysfunction and Discharge Planning    History: Patient is a 89 year old female admitted on 1/29/2024 with Presenting Problem: ZOHREH.  Co-Morbidities : L kidney nephrectomy, kidney disease, atrial fibrillation    ASSESSMENT   Patient met all OT goals at or close to baseline level. Patient reports no further questions/concerns at this time.     The AM-PAC ' '6-Clicks' Inpatient Daily Activity Short Form was completed and this patient is demonstrating an Approx Degree of Impairment: 0% in activities of daily living. Research supports that patients with this level of impairment often benefit from discharge home.       OT Discharge Recommendations: Home  OT Device Recommendations: None    WEIGHT BEARING RESTRICTION  Weight Bearing Restriction: None                Recommendations for nursing staff:   Transfers: MOD I with RW  Toileting location: Toilet    EVALUATION SESSION:  Patient at start of session: semi-supine in bed  FUNCTIONAL TRANSFER ASSESSMENT  Sit to Stand: Edge of Bed  Edge of Bed: Modified Independent  Toilet Transfer: Modified Independent    BED MOBILITY  Supine to Sit : Independent  Scooting: IND    BALANCE ASSESSMENT  Static Sitting: Independent  Sitting Bilateral: Independent  Static Standing: Independent  Standing Unilateral: Independent    FUNCTIONAL ADL ASSESSMENT  LB Dressing Seated: Independent  LB Dressing Standing: Independent  Toileting Seated: Independent      ACTIVITY TOLERANCE: WFL                         O2 SATURATIONS       COGNITION  Overall Cognitive Status:  WFL - within functional limits  COGNITION ASSESSMENTS       Upper Extremity:   ROM: within functional limits   Strength: is within functional limits   Coordination:  Gross motor: WFL  Fine motor:  WFL  Sensation: Light touch:  intact    EDUCATION PROVIDED  Patient : Role of Occupational Therapy; Plan of Care  Patient's Response to Education: Verbalized Understanding    Equipment used: RW  Demonstrates functional use    Therapist comments: Pt is pleasant and cooperative throughout session.       Patient End of Session: In bed;Needs met;Call light within reach;RN aware of session/findings;All patient questions and concerns addressed    OCCUPATIONAL PROFILE    HOME SITUATION  Type of Home: Independent living facility  Home Layout:  (pt lives on 2nd level, takes elevator)  Lives With: Daughter    Toilet and Equipment: Standard height toilet;Comfort height toilet  Shower/Tub and Equipment: Walk-in shower;Shower chair;Grab bar       Occupation/Status: read, watches TV, does puzzles  Hand Dominance: Right  Drives: No (Son or daughter in law drives pt)  Patient Regularly Uses: Glasses    Prior Level of Function: Pt is IND with BADLs, IADLs. Pt's dtr supports pt as needed and pt assists dtr with things as well as needed.     SUBJECTIVE  \"I love to walk.\"    PAIN ASSESSMENT  Ratin  Location: pt denies       OBJECTIVE  Precautions: Bed/chair alarm  Fall Risk: Standard fall risk    WEIGHT BEARING RESTRICTION  Weight Bearing Restriction: None                AM-PAC ‘6-Clicks’ Inpatient Daily Activity Short Form  -   Putting on and taking off regular lower body clothing?: None  -   Bathing (including washing, rinsing, drying)?: None  -   Toileting, which includes using toilet, bedpan or urinal? : None  -   Putting on and taking off regular upper body clothing?: None  -   Taking care of personal grooming such as brushing teeth?: None  -   Eating meals?: None    AM-PAC Score:  Score: 24  Approx Degree of Impairment: 0%  Standardized Score (AM-PAC Scale): 57.54      ADDITIONAL TESTS     NEUROLOGICAL FINDINGS        PLAN   Patient has been evaluated and presents with no skilled Occupational Therapy needs at this time.   Patient discharged from Occupational Therapy services.  Please re-order if a new functional limitation presents during this admission.      Patient Evaluation Complexity Level:   Occupational Profile/Medical History LOW - Brief history including review of medical or therapy records    Specific performance deficits impacting engagement in ADL/IADL LOW  1 - 3 performance deficits    Client Assessment/Performance Deficits MODERATE - Comorbidities and min to mod modifications of tasks    Clinical Decision Making LOW - Analysis of occupational profile, problem-focused assessments, limited treatment options    Overall Complexity LOW     OT Session Time: 16 minutes  Self-Care Home Management: 8 minutes  Therapeutic Activity: 6 minutes

## 2024-01-31 NOTE — CM/SW NOTE
01/31/24 1300   CM/SW Referral Data   Referral Source Social Work (self-referral)   Reason for Referral Discharge planning   Informant Patient;EMR   Patient Info   Patient's Current Mental Status at Time of Assessment Alert;Oriented   Patient's Home Environment Independent Living   Patient lives with Daughter   Discharge Needs   Anticipated D/C needs Home health care     HOME SITUATION  Type of Home: Independent living facility   Home Layout:  (pt lives on 2nd level, takes elevator)  Stairs to Enter : 0     Lives With: Daughter  Drives: No (Son or daughter in law drives pt)  Patient Owned Equipment: Rolling walker  Patient Regularly Uses: Glasses     Prior Level of [place]: Pt reports she performs functional tasks at home independently. Pt ambulates with a walker. Pt lives with daughter who has down syndrome. Pt's son present and states pt is at times forgetful to take medication and is considering transition to assisted living.   (Per PT evaluation)      Patient is a 89 y/o female who admitted with Hypernatremia and ZOHREH. PT recommending , OT recommending home.    Met with patient, who was alert and oriented, to discuss discharge planning. She has lived at [facility] for about 6 months. Her daughter (Olga) lives with her, she has down syndrome. They usually eat breakfast/lunch in their apartment and go to dining center for dinner. She usually uses a RW to ambulate. She does not drive, her daughter-in-law (Nathalie) usually provides transport. She has 4 adult children. Discussed PT rec of , she was agreeable with [facility].    Sent referral in aidin. Await response.       SW/CM to remain available for support and/or discharge planning.    Addendum 4:25pm: Spoke with Mare from [facility] who confirmed they are able to accept patient. Reserved in aidin.         Elysia Silva FAUSTINO  Discharge Planner  [phone]

## 2024-01-31 NOTE — PROGRESS NOTES
[facility]  Urology Progress Note    Emmie Loza Patient Status:  Inpatient    1932 MRN [id number]   Location [facility] 5NW-A Attending Yuki Iqbal*   Hosp Day # 2 PCP Janell Powell MD     Subjective:  Emmie Loza is a(n) 89 year old female.    Current complaints: No pain.  Voiding ok.  No dysuria or hematuria.     Objective:  General appearance: alert, appears stated age, and cooperative  Blood pressure 149/70, pulse 58, temperature 97.7 °F (36.5 °C), temperature source Oral, resp. rate 16, height 5' (1.524 m), weight 89 lb 11.6 oz (40.7 kg), SpO2 99%.  Lungs: non-labored respirations  Abdomen: soft, non-tender       No results found for this visit on 24.     CT ABDOMEN+PELVIS KIDNEYSTONE 2D RNDR(NO IV,NO ORAL)(CPT=74176)    Result Date: 2024  CONCLUSION:   1. There is no evidence of hydronephrosis in the solitary right kidney.  2. No acute abdominal pelvic process.  3. Left peristomal hernia containing colon and periumbilical hernia containing anterior wall of transverse colon without obstruction.    LOCATION:  [facility]   Dictated by (CST): Troy Newsome MD on 2024 at 1:04 PM     Finalized by (CST): Troy Newsome MD on 2024 at 1:17 PM       US KIDNEY/BLADDER (CPT=76770)    Result Date: 2024  CONCLUSION:  Minimal right-sided hydronephrosis.  Left-sided nephrectomy.   LOCATION:  [id number]     Dictated by (CST): Stromberg, LeRoy, MD on 2024 at 8:45 PM     Finalized by (CST): Stromberg, LeRoy, MD on 2024 at 8:46 PM         Assessment:    ZOHREH/CKD  H/O nephrectomy 20 years ago for RCC  Renal/bladder US 24: Minimal right-sided hydronephrosis. Left-sided nephrectomy.  Bladder is empty.  CT A/P without contrast 24: There is no hydronephrosis involving the right kidney.  The right kidney collecting system is mildly prominent but this is likely due to being a solitary kidney.  There is no evidence of obstruction   Afebrile  No leukocytosis  Serum  creat 4.51 > 4.03 (was 1.55 on 6/25/23)  Repeat BMP pending  UA 1/30/24: negative    Plan:    No acute urological intervention needed  Defer to nephrology management.    Will sign off; please call urology if any questions/concerns arise during this admission.      Above discussed with patient, nurse    Daksha Chou PA-C  [facility]  Department of Urology  1/31/2024  7:58 AM

## 2024-01-31 NOTE — PLAN OF CARE
1/31 AM: Pt is A/O x 3-4, hard of hearing, family at bedside. Lung sounds are clear, on room air. BP and HR are WNL, Controlled Afib to NSR. Voids in bathroom, colostomy. Fluids discontinued. Potassium replaced. Up SBA with walker.       Problem: Patient/Family Goals  Goal: Patient/Family Long Term Goal  Description: Patient's Long Term Goal: Discharge with adequate resources      Interventions:  - Identify barriers to discharge w/pt and caregiver  - Include patient/family/discharge partner in discharge planning  - Arrange for needed discharge resources and transportation as appropriate  - Identify discharge learning needs   - Consider post-discharge preferences of patient/family/discharge partner  - Assess patient's ability to be responsible for managing their own health  - Refer to Case Management Department for coordinating discharge planning if the patient needs post-hospital services  - See additional Care Plan goals for specific interventions  Outcome: Progressing  Goal: Patient/Family Short Term Goal  Description: Patient's Short Term Goal:  1/30: remain safe    Interventions:   - bed alarm  - medications per MAR  - See additional Care Plan goals for specific interventions  Outcome: Progressing

## 2024-02-01 PROBLEM — N17.0 ATN (ACUTE TUBULAR NECROSIS): Status: ACTIVE | Noted: 2024-02-01

## 2024-02-01 PROBLEM — N17.0 ATN (ACUTE TUBULAR NECROSIS) (HCC): Status: ACTIVE | Noted: 2024-02-01

## 2024-02-01 PROBLEM — E86.0 DEHYDRATION: Status: ACTIVE | Noted: 2024-02-01

## 2024-02-01 LAB
ANION GAP SERPL CALC-SCNC: 6 MMOL/L (ref 0–18)
BUN BLD-MCNC: 55 MG/DL (ref 9–23)
CALCIUM BLD-MCNC: 8 MG/DL (ref 8.5–10.1)
CHLORIDE SERPL-SCNC: 118 MMOL/L (ref 98–112)
CO2 SERPL-SCNC: 17 MMOL/L (ref 21–32)
CREAT BLD-MCNC: 3.19 MG/DL
EGFRCR SERPLBLD CKD-EPI 2021: 13 ML/MIN/1.73M2 (ref 60–?)
GLUCOSE BLD-MCNC: 94 MG/DL (ref 70–99)
MAGNESIUM SERPL-MCNC: 1.1 MG/DL (ref 1.6–2.6)
OSMOLALITY SERPL CALC.SUM OF ELEC: 307 MOSM/KG (ref 275–295)
PHOSPHATE SERPL-MCNC: 3.4 MG/DL (ref 2.5–4.9)
POTASSIUM SERPL-SCNC: 4.2 MMOL/L (ref 3.5–5.1)
SODIUM SERPL-SCNC: 141 MMOL/L (ref 136–145)

## 2024-02-01 RX ORDER — MAGNESIUM SULFATE HEPTAHYDRATE 40 MG/ML
2 INJECTION, SOLUTION INTRAVENOUS ONCE
Status: COMPLETED | OUTPATIENT
Start: 2024-02-01 | End: 2024-02-01

## 2024-02-01 NOTE — PROGRESS NOTES
[facility]  Nephrology Progress Note    Emmie Loza Attending:  Yuki Iqbal*       Assessment and Plan:    1) ZOHREH- likely due to prerenal azotemia given modest PO intake, ostomy losses and recent addition of furosemide (was not on this when I saw her ). US OK- ho hydro by CT. 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 Possible DC home Friday.       Subjective:  Awake alert in good spirits sitting up eating breakfast.     Physical Exam:   /82 (BP Location: Left arm)   Pulse 72   Temp 98 °F (36.7 °C) (Oral)   Resp 19   Ht 5' (1.524 m)   Wt 89 lb 11.6 oz (40.7 kg)   SpO2 95%   BMI 17.52 kg/m²   Temp (24hrs), Av.3 °F (36.8 °C), Min:98 °F (36.7 °C), Max:98.8 °F (37.1 °C)     No intake or output data in the 24 hours ending 24 0857    Wt Readings from Last 3 Encounters:   24 89 lb 11.6 oz (40.7 kg)   23 83 lb 4 oz (37.8 kg)   22 96 lb (43.5 kg)     General: awake alert  HEENT: No scleral icterus, MMM  Neck: Supple, no MARIA FERNANDA or thyromegaly  Cardiac: Regular rate and rhythm, S1, S2 normal, no murmur or tub  Lungs: Decreased BS at bases bilaterally   Abdomen: Soft, non-tender. + bowel sounds, no palpable organomegaly  Extremities: Without clubbing, cyanosis; no edema  Neurologic: Cranial nerves grossly intact, moving all extremities  Skin: Warm and dry, no rashes       Labs:   Lab Results   Component Value Date    K 4.2 2024        Imaging:  All imaging studies reviewed.    Meds:           Questions/concerns were discussed with patient and/or family by bedside.          Fabiana Miller MD  2/1/2024  857 AM

## 2024-02-01 NOTE — PLAN OF CARE
Pt A&O X 3. Can be forgetful. Room air. NSR on Tele. Eliquis. Colostomy. Sodium bicarb gtt. Denies any pain. Regular diet. Takes pills whole. Up SBA with walker. Possible discharge in AM. No further needs at this time.     Problem: Patient/Family Goals  Goal: Patient/Family Long Term Goal  Description: Patient's Long Term Goal: Discharge with adequate resources      Interventions:  - Identify barriers to discharge w/pt and caregiver  - Include patient/family/discharge partner in discharge planning  - Arrange for needed discharge resources and transportation as appropriate  - Identify discharge learning needs   - Consider post-discharge preferences of patient/family/discharge partner  - Assess patient's ability to be responsible for managing their own health  - Refer to Case Management Department for coordinating discharge planning if the patient needs post-hospital services  - See additional Care Plan goals for specific interventions  Outcome: Progressing  Goal: Patient/Family Short Term Goal  Description: Patient's Short Term Goal:  1/30: remain safe    Interventions:   - bed alarm  - medications per MAR  - See additional Care Plan goals for specific interventions  Outcome: Progressing     Problem: SAFETY ADULT - FALL  Goal: Free from fall injury  Description: INTERVENTIONS:  - Assess pt frequently for physical needs  - Identify cognitive and physical deficits and behaviors that affect risk of falls.  - [place] fall precautions as indicated by assessment.  - Educate pt/family on patient safety including physical limitations  - Instruct pt to call for assistance with activity based on assessment  - Modify environment to reduce risk of injury  - Provide assistive devices as appropriate  - Consider OT/PT consult to assist with strengthening/mobility  - Encourage toileting schedule  Outcome: Progressing     Problem: Delirium  Goal: Minimize duration of delirium  Description: Interventions:  - Encourage use of hearing  aids, eye glasses  - Promote highest level of mobility daily  - Provide frequent reorientation  - Promote wakefulness i.e. lights on, blinds open  - Promote sleep, encourage patient's normal rest cycle i.e. lights off, TV off, minimize noise and interruptions  - Encourage family to assist in orientation and promotion of home routines  Outcome: Progressing

## 2024-02-01 NOTE — PROGRESS NOTES
[facility]  Nephrology Progress Note    Emmie Loza Attending:  Yuki Iqbal*       Assessment and Plan:    1) ZOHREH- likely due to prerenal azotemia given modest PO intake, ostomy losses and recent addition of furosemide (was not on this when I saw her ). US OK- ho hydro by CT. 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    Physical Exam:   /82 (BP Location: Left arm)   Pulse 72   Temp 98 °F (36.7 °C) (Oral)   Resp 19   Ht 5' (1.524 m)   Wt 89 lb 11.6 oz (40.7 kg)   SpO2 95%   BMI 17.52 kg/m²   Temp (24hrs), Av.3 °F (36.8 °C), Min:98 °F (36.7 °C), Max:98.8 °F (37.1 °C)     No intake or output data in the 24 hours ending 24 1002    Wt Readings from Last 3 Encounters:   24 89 lb 11.6 oz (40.7 kg)   23 83 lb 4 oz (37.8 kg)   22 96 lb (43.5 kg)     General: awake alert  HEENT: No scleral icterus, MMM  Neck: Supple, no MARIA FERNANDA or thyromegaly  Cardiac: Regular rate and rhythm, S1, S2 normal, no murmur or tub  Lungs: Decreased BS at bases bilaterally   Abdomen: Soft, non-tender. + bowel sounds, no palpable organomegaly  Extremities: Without clubbing, cyanosis; no edema  Neurologic: Cranial nerves grossly intact, moving all extremities  Skin: Warm and dry, no rashes       Labs:   Lab Results   Component Value Date    CREATSERUM 3.19 2024    BUN 55 2024      02/01/2024    K 4.2 02/01/2024     02/01/2024    CO2 17.0 02/01/2024    GLU 94 02/01/2024    CA 8.0 02/01/2024    MG 1.1 02/01/2024    PHOS 3.4 02/01/2024       Imaging:  All imaging studies reviewed.    Meds:           Questions/concerns were discussed with patient and/or family by bedside.          Fabiana Miller MD  2/1/2024  1002 AM

## 2024-02-01 NOTE — PLAN OF CARE
2/1 AM: Pt is A/O x 3-4, hard of hearing, family at bedside. Lung sounds are clear, on room air. BP and HR are WNL, Controlled Afib to NSR. Voids in bathroom, colostomy. Sodium Bicarb going at 83 mL/hr. Magnesium replaced. Up SBA with walker.       Problem: Patient/Family Goals  Goal: Patient/Family Long Term Goal  Description: Patient's Long Term Goal: Discharge with adequate resources      Interventions:  - Identify barriers to discharge w/pt and caregiver  - Include patient/family/discharge partner in discharge planning  - Arrange for needed discharge resources and transportation as appropriate  - Identify discharge learning needs   - Consider post-discharge preferences of patient/family/discharge partner  - Assess patient's ability to be responsible for managing their own health  - Refer to Case Management Department for coordinating discharge planning if the patient needs post-hospital services  - See additional Care Plan goals for specific interventions  Outcome: Progressing  Goal: Patient/Family Short Term Goal  Description: Patient's Short Term Goal:  1/30: remain safe  2/1: Replace electrolytes     Interventions:   - bed alarm  - medications per MAR  - See additional Care Plan goals for specific interventions  Outcome: Progressing

## 2024-02-01 NOTE — PROGRESS NOTES
[facility]  Hospitalist Progress Note                                                                     [facility]        Emmie Loza  6/19/1932    SUBJECTIVE:  Patient seen and examined.  Feeling better, making urine.  Denies CP/SOB.  NAD.       OBJECTIVE:  Temp:  [98 °F (36.7 °C)-98.8 °F (37.1 °C)] 98.2 °F (36.8 °C)  Pulse:  [63-72] 63  Resp:  [16-19] 19  BP: (133-155)/(53-89) 144/72  SpO2:  [95 %-100 %] 100 %  Exam  Gen: No acute distress, alert and oriented x3, no focal neurologic deficits  Pulm: Lungs clear bilaterally, normal respiratory effort  CV: Heart with regular rate and rhythm, no murmur.  Normal PMI.    Abd: Abdomen soft, nontender, nondistended, no organomegaly, bowel sounds present  MSK: Full range of motion in extremities, no clubbing, no cyanosis  Skin: no rashes or lesions    Labs:   Recent Labs   Lab 01/29/24 1730 01/30/24  0738 01/31/24  0736   WBC 7.4 7.5 8.5   HGB 8.3* 7.1* 7.3*   MCV 87.1 82.4 83.9   .0 326.0 342.0       Recent Labs   Lab 01/29/24 1730 01/30/24  0738 01/31/24  0736 01/31/24  1308 02/01/24  0800   * 147* 144  --  141   K 3.6 2.9* 3.4* 4.2 4.2   * 118* 115*  --  118*   CO2 18.0* 18.0* 21.0  --  17.0*   BUN 82* 76* 62*  --  55*   CREATSERUM 4.51* 4.03* 3.31*  --  3.19*   CA 8.2* 7.5* 7.8*  --  8.0*   MG 1.0* 1.0*  --   --  1.1*   PHOS  --  5.0*  --   --  3.4   GLU 78 105* 96  --  94       Recent Labs   Lab 01/29/24 1730 01/30/24  0738   AST 20 13*   ALB 2.8* 2.2*       No results for input(s): \"PGLU\" in the last 168 hours.    Meds:   Scheduled:    iron sucrose  200 mg Intravenous Daily    multivitamin  1 tablet Oral Daily    amiodarone  100 mg Oral Daily    apixaban  2.5 mg Oral BID    aspirin  81 mg Oral Daily    dilTIAZem ER  120 mg Oral Daily    pantoprazole  40 mg Oral Before breakfast     Continuous Infusions:    sodium bicarbonate in D5W infusion 83 mL/hr at 02/01/24 1015     PRN:  acetaminophen, melatonin, ondansetron, metoclopramide, hydrALAzine    Assessment/Plan:  Principal Problem:    ZOHREH (acute kidney injury) (HCC)  Active Problems:    Hypernatremia    Hypomagnesemia    Dehydration    ATN (acute tubular necrosis) (HCC)    89 year old female with PMH sig for hypertension, atrial fibrillation on Eliquis, chronic anemia, CKD 3, RCC status post nephrectomy, 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     Acute Renal failure on CKD3  Hypernatremia        - likely prerenal from poor po intake and increased ostomy losses  - recent lasix addition, will dc  - IVF per nephrology - resume bicarb infusion today   - monitor I/Os, Cr fxn    R kidney collecting system prominence  - due to solitary kidney  - urology f/u      Atrial fibrillation  - amio, diltiazem  - eliquis     Hx Bishop  - Colon/uterine ca s/p resection  - Colostomy     FEN: regular diet, PT/OT  Proph: SCDs, eliquis     Outpatient records or previous hospital records reviewed.   [facility] hospitalist to continue to follow patient while in house     Dispo - hopefully dc tomorrow pending renal recovery      Aggie Iqbal MD  [facility]  Hospitalist  Message over [email or web address]  Pager: [phone]

## 2024-02-02 VITALS
HEIGHT: 60 IN | BODY MASS INDEX: 18.14 KG/M2 | TEMPERATURE: 98 F | OXYGEN SATURATION: 98 % | SYSTOLIC BLOOD PRESSURE: 131 MMHG | WEIGHT: 92.38 LBS | RESPIRATION RATE: 16 BRPM | DIASTOLIC BLOOD PRESSURE: 53 MMHG | HEART RATE: 76 BPM

## 2024-02-02 LAB
ANION GAP SERPL CALC-SCNC: 8 MMOL/L (ref 0–18)
BUN BLD-MCNC: 58 MG/DL (ref 9–23)
CALCIUM BLD-MCNC: 8.4 MG/DL (ref 8.5–10.1)
CHLORIDE SERPL-SCNC: 112 MMOL/L (ref 98–112)
CO2 SERPL-SCNC: 22 MMOL/L (ref 21–32)
CREAT BLD-MCNC: 3.09 MG/DL
EGFRCR SERPLBLD CKD-EPI 2021: 14 ML/MIN/1.73M2 (ref 60–?)
GLUCOSE BLD-MCNC: 100 MG/DL (ref 70–99)
MAGNESIUM SERPL-MCNC: 1.8 MG/DL (ref 1.6–2.6)
OSMOLALITY SERPL CALC.SUM OF ELEC: 310 MOSM/KG (ref 275–295)
POTASSIUM SERPL-SCNC: 3.5 MMOL/L (ref 3.5–5.1)
SODIUM SERPL-SCNC: 142 MMOL/L (ref 136–145)

## 2024-02-02 RX ORDER — FENOFIBRATE 48 MG/1
48 TABLET, COATED ORAL DAILY
COMMUNITY

## 2024-02-02 NOTE — CM/SW NOTE
Informed Dev REAL of anticipated discharge today. Will send AVS once available.       SW/CM to remain available for support and/or discharge planning.    SUDHEER Garcia  Discharge Planner  [phone]

## 2024-02-02 NOTE — DISCHARGE SUMMARY
[facility] Hospitalist Discharge Summary     Patient ID:  Emmie Loza  89 year old  6/19/1932    Admit date: 1/29/2024    Discharge date and time: 02/02/24     Attending Physician: Yuki Iqbal*     Primary Care Physician: Janell Powell MD     Discharge Diagnoses: Hypernatremia [E87.0]  ZOHREH (acute kidney injury) (HCC) [N17.9]    Please note that only IHP DMG and EMG patients enrolled in the Medicare ACO, BCBS ACO and [facility] HMOs will be handled by the hospitals Care Management team.  For all other patients, please follow usual protocol for discharge care transition.    Discharge Condition: stable    Disposition:  sofía kiran     Important Follow up:  - PCP within 2 weeks       Follow-up Information       Fernando Bunch, DO Follow up in 2 week(s).    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  She went to establish care with her new PCP and when her labs resulted she was found to have a creatinine of 4.19.  She has been feeling slightly weak overall she has been okay.  Does report feeling lightheaded.  Given the abnormal labs she was asked to go to the ER.  In the ER she had acute renal injury, hypernatremia and metabolic acidosis.  Vitals are stable.  She was started on IV fluids and admitted for further evaluation and treatment.    Acute Renal failure on CKD3  Hypernatremia        - likely prerenal from lasix, poor po intake and increased ostomy losses  - recent lasix addition, will discontinue on dc  - IVF per nephrology - s/p bicarb infusion - likely some ATN so  recovery will be follow as OP   - monitor I/Os, Cr fxn     R kidney collecting system prominence  - due to solitary kidney  - urology f/u       Atrial fibrillation  - amio, diltiazem  - eliquis     Hx Bishop  - Colon/uterine ca s/p resection  - Colostomy      Consults: IP CONSULT TO HOSPITALIST  IP CONSULT TO NEPHROLOGY  IP CONSULT TO UROLOGY  IP CONSULT TO SOCIAL WORK    Operative Procedures:        Patient instructions:      I as the attending physician reconciled the current and discharge medications on day of discharge.     Current Discharge Medication List        CONTINUE these medications which have NOT CHANGED    Details   amiodarone 100 MG Oral Tab Take 1 tablet (100 mg total) by mouth daily.      apixaban 2.5 MG Oral Tab Take 1 tablet (2.5 mg total) by mouth 2 (two) times daily.      dilTIAZem  MG Oral Capsule SR 24 Hr Take 1 capsule (120 mg total) by mouth daily.      FENOFIBRATE 54 MG Oral Tab TAKE ONE TABLET BY MOUTH ONE TIME DAILY      PANTOPRAZOLE 40 MG Oral Tab EC TAKE ONE TABLET BY MOUTH ONE TIME DAILY      Calcium Carbonate-Vitamin D (OYSTER SHELL CALCIUM/D) 500-200 MG-UNIT Oral Tab Take 1 tablet by mouth daily.      aspirin 81 MG Oral Tab Take by mouth.      cyanocobalamin 1000 MCG Oral Tab Take by mouth.      Ferrous Sulfate 325 (65 Fe) MG Oral Tab Take by mouth.      Multiple Vitamin (MULTIVITAMINS OR)       acetaminophen 500 MG Oral Tab Take by mouth.           STOP taking these medications       furosemide 20 MG Oral Tab        POTASSIUM CHLORIDE ER 10 MEQ Oral Tab CR              Activity: activity as tolerated  Diet: regular diet  Wound Care: as directed  Code Status: No Order      Discharge Exam:     General: no acute distress, alert and oriented x 3  Heart: RRR  Lungs: clear bilaterally, no active wheezing  Abdomen: nontender, nondistended, intact BS  Extremities: no pedal edema   Neuro: CN inact, no focal deficits      Total time coordinating care for discharge: Greater than 30  minutes    Aggie Iqbal MD  [facility]

## 2024-02-02 NOTE — PROGRESS NOTES
NURSING DISCHARGE NOTE    Discharged Nursing home via Wheelchair.  Accompanied by Family member  Belongings Taken by patient/family.     Patient discharged in stable condition to [facility].   IV removed. Tele removed.   Paperwork reviewed with patient and daughter in law at the bedside.

## 2024-02-02 NOTE — PROGRESS NOTES
[facility]  Nephrology Progress Note    Emmie Loza Attending:  Yuki Iqbal*       Assessment and Plan:    1) ZOHREH- likely due to prerenal azotemia given modest PO intake, ostomy losses and recent addition of furosemide (was not on this when I saw her ). US OK- ho hydro by CT. 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       Subjective:  Awake alert in good spirits sitting up eating breakfast.    Physical Exam:   /57   Pulse 98   Temp 98.4 °F (36.9 °C) (Oral)   Resp 16   Ht 5' (1.524 m)   Wt 92 lb 6 oz (41.9 kg)   SpO2 96%   BMI 18.04 kg/m²   Temp (24hrs), Av.4 °F (36.9 °C), Min:98.2 °F (36.8 °C), Max:98.6 °F (37 °C)       Intake/Output Summary (Last 24 hours) at 2024 0924  Last data filed at 2024 0544  Gross per 24 hour   Intake 996 ml   Output --   Net 996 ml       Wt Readings from Last 3 Encounters:   24 92 lb 6 oz (41.9 kg)   23 83 lb 4 oz (37.8 kg)   22 96 lb (43.5 kg)     General: awake alert  HEENT: No scleral icterus, MMM  Neck: Supple, no MARIA FERNANDA or thyromegaly  Cardiac: Regular rate and rhythm, S1, S2 normal, no murmur or tub  Lungs: Decreased BS at bases bilaterally   Abdomen: Soft, non-tender. + bowel sounds, no palpable organomegaly  Extremities: Without clubbing, cyanosis; no edema  Neurologic: Cranial nerves grossly intact, moving all extremities  Skin: Warm and dry, no rashes       Labs:   Lab Results   Component Value Date    CREATSERUM 3.09  02/02/2024    BUN 58 02/02/2024     02/02/2024    K 3.5 02/02/2024     02/02/2024    CO2 22.0 02/02/2024     02/02/2024    CA 8.4 02/02/2024    MG 1.8 02/02/2024       Imaging:  All imaging studies reviewed.    Meds:           Questions/concerns were discussed with patient and/or family by bedside.          Fabiana Miller MD  2/2/2024  924 AM

## 2024-02-02 NOTE — PLAN OF CARE
Assumed care at 0730. No acute distress noted.   Pt A&Ox3-4. Can be confused at times.   Room air.   NSR/SB on tele.   Continent of bladder.   Colostomy- bag changed yesterday.   Regular diet. OOB for breakfast.  Ambulatory x1 w/ walker. BRP.   Meds per MAR.  Daughter updated on POC. Probable discharge today, back to [place]. SW following.   Safety precautions in place.    Needs met.     Problem: Patient/Family Goals  Goal: Patient/Family Long Term Goal  Description: Patient's Long Term Goal: Discharge with adequate resources      Interventions:  - Identify barriers to discharge w/pt and caregiver  - Include patient/family/discharge partner in discharge planning  - Arrange for needed discharge resources and transportation as appropriate  - Identify discharge learning needs   - Consider post-discharge preferences of patient/family/discharge partner  - Assess patient's ability to be responsible for managing their own health  - Refer to Case Management Department for coordinating discharge planning if the patient needs post-hospital services  - See additional Care Plan goals for specific interventions  Outcome: Progressing  Goal: Patient/Family Short Term Goal  Description: Patient's Short Term Goal:  1/30: remain safe  2/1: Replace electrolytes     Interventions:   - bed alarm  - medications per MAR  - See additional Care Plan goals for specific interventions  Outcome: Progressing     Problem: SAFETY ADULT - FALL  Goal: Free from fall injury  Description: INTERVENTIONS:  - Assess pt frequently for physical needs  - Identify cognitive and physical deficits and behaviors that affect risk of falls.  - [place] fall precautions as indicated by assessment.  - Educate pt/family on patient safety including physical limitations  - Instruct pt to call for assistance with activity based on assessment  - Modify environment to reduce risk of injury  - Provide assistive devices as appropriate  - Consider OT/PT consult to assist  with strengthening/mobility  - Encourage toileting schedule  Outcome: Progressing     Problem: Delirium  Goal: Minimize duration of delirium  Description: Interventions:  - Encourage use of hearing aids, eye glasses  - Promote highest level of mobility daily  - Provide frequent reorientation  - Promote wakefulness i.e. lights on, blinds open  - Promote sleep, encourage patient's normal rest cycle i.e. lights off, TV off, minimize noise and interruptions  - Encourage family to assist in orientation and promotion of home routines  Outcome: Progressing

## 2024-02-02 NOTE — PLAN OF CARE
Pt Aox3-4. Bilateral HA, glasses.Forgetful. VSS,afebrile. Spo2 >90% on RA. Afib/NSR on tele, eliquis. BM per colostomy. Up x1 w walker. IVF. Pt updated on poc. Denies pain. Call light within reach, no further needs at this time.     Report given to oncoming RN about @0100    Problem: Patient/Family Goals  Goal: Patient/Family Long Term Goal  Description: Patient's Long Term Goal: Discharge with adequate resources      Interventions:  - Identify barriers to discharge w/pt and caregiver  - Include patient/family/discharge partner in discharge planning  - Arrange for needed discharge resources and transportation as appropriate  - Identify discharge learning needs   - Consider post-discharge preferences of patient/family/discharge partner  - Assess patient's ability to be responsible for managing their own health  - Refer to Case Management Department for coordinating discharge planning if the patient needs post-hospital services  - See additional Care Plan goals for specific interventions  Outcome: Progressing  Goal: Patient/Family Short Term Goal  Description: Patient's Short Term Goal:  1/30: remain safe  2/1: Replace electrolytes     Interventions:   - bed alarm  - medications per MAR  - See additional Care Plan goals for specific interventions  Outcome: Progressing     Problem: SAFETY ADULT - FALL  Goal: Free from fall injury  Description: INTERVENTIONS:  - Assess pt frequently for physical needs  - Identify cognitive and physical deficits and behaviors that affect risk of falls.  - [place] fall precautions as indicated by assessment.  - Educate pt/family on patient safety including physical limitations  - Instruct pt to call for assistance with activity based on assessment  - Modify environment to reduce risk of injury  - Provide assistive devices as appropriate  - Consider OT/PT consult to assist with strengthening/mobility  - Encourage toileting schedule  Outcome: Progressing     Problem: Delirium  Goal:  Minimize duration of delirium  Description: Interventions:  - Encourage use of hearing aids, eye glasses  - Promote highest level of mobility daily  - Provide frequent reorientation  - Promote wakefulness i.e. lights on, blinds open  - Promote sleep, encourage patient's normal rest cycle i.e. lights off, TV off, minimize noise and interruptions  - Encourage family to assist in orientation and promotion of home routines  Outcome: Progressing

## 2024-02-13 ENCOUNTER — APPOINTMENT (OUTPATIENT)
Dept: GENERAL RADIOLOGY | Facility: HOSPITAL | Age: 89
End: 2024-02-13
Attending: EMERGENCY MEDICINE
Payer: MEDICARE

## 2024-02-13 ENCOUNTER — HOSPITAL ENCOUNTER (EMERGENCY)
Facility: HOSPITAL | Age: 89
Discharge: HOME | End: 2024-02-13
Attending: EMERGENCY MEDICINE
Payer: MEDICARE

## 2024-02-13 VITALS
WEIGHT: 98 LBS | DIASTOLIC BLOOD PRESSURE: 73 MMHG | OXYGEN SATURATION: 98 % | HEIGHT: 60 IN | TEMPERATURE: 97 F | RESPIRATION RATE: 26 BRPM | BODY MASS INDEX: 19.24 KG/M2 | SYSTOLIC BLOOD PRESSURE: 148 MMHG | HEART RATE: 73 BPM

## 2024-02-13 DIAGNOSIS — R60.0 PEDAL EDEMA: ICD-10-CM

## 2024-02-13 DIAGNOSIS — N18.9 ACUTE RENAL FAILURE SUPERIMPOSED ON CHRONIC KIDNEY DISEASE, UNSPECIFIED ACUTE RENAL FAILURE TYPE, UNSPECIFIED CKD STAGE (HCC): Primary | ICD-10-CM

## 2024-02-13 DIAGNOSIS — N17.9 ACUTE RENAL FAILURE SUPERIMPOSED ON CHRONIC KIDNEY DISEASE, UNSPECIFIED ACUTE RENAL FAILURE TYPE, UNSPECIFIED CKD STAGE (HCC): Primary | ICD-10-CM

## 2024-02-13 LAB
ALBUMIN SERPL-MCNC: 2.6 G/DL (ref 3.4–5)
ALBUMIN/GLOB SERPL: 0.7 {RATIO} (ref 1–2)
ALP LIVER SERPL-CCNC: 71 U/L
ALT SERPL-CCNC: 16 U/L
ANION GAP SERPL CALC-SCNC: 10 MMOL/L (ref 0–18)
AST SERPL-CCNC: 13 U/L (ref 15–37)
BASOPHILS # BLD AUTO: 0.04 X10(3) UL (ref 0–0.2)
BASOPHILS NFR BLD AUTO: 0.5 %
BILIRUB SERPL-MCNC: 0.2 MG/DL (ref 0.1–2)
BUN BLD-MCNC: 60 MG/DL (ref 9–23)
CALCIUM BLD-MCNC: 7.7 MG/DL (ref 8.5–10.1)
CHLORIDE SERPL-SCNC: 119 MMOL/L (ref 98–112)
CO2 SERPL-SCNC: 16 MMOL/L (ref 21–32)
CREAT BLD-MCNC: 3.44 MG/DL
EGFRCR SERPLBLD CKD-EPI 2021: 12 ML/MIN/1.73M2 (ref 60–?)
EOSINOPHIL # BLD AUTO: 0.2 X10(3) UL (ref 0–0.7)
EOSINOPHIL NFR BLD AUTO: 2.7 %
ERYTHROCYTE [DISTWIDTH] IN BLOOD BY AUTOMATED COUNT: 20 %
GLOBULIN PLAS-MCNC: 4 G/DL (ref 2.8–4.4)
GLUCOSE BLD-MCNC: 128 MG/DL (ref 70–99)
HCT VFR BLD AUTO: 25.1 %
HGB BLD-MCNC: 7.3 G/DL
IMM GRANULOCYTES # BLD AUTO: 0.04 X10(3) UL (ref 0–1)
IMM GRANULOCYTES NFR BLD: 0.5 %
LYMPHOCYTES # BLD AUTO: 1.52 X10(3) UL (ref 1–4)
LYMPHOCYTES NFR BLD AUTO: 20.4 %
MCH RBC QN AUTO: 27.2 PG (ref 26–34)
MCHC RBC AUTO-ENTMCNC: 29.1 G/DL (ref 31–37)
MCV RBC AUTO: 93.7 FL
MONOCYTES # BLD AUTO: 0.78 X10(3) UL (ref 0.1–1)
MONOCYTES NFR BLD AUTO: 10.5 %
NEUTROPHILS # BLD AUTO: 4.88 X10 (3) UL (ref 1.5–7.7)
NEUTROPHILS # BLD AUTO: 4.88 X10(3) UL (ref 1.5–7.7)
NEUTROPHILS NFR BLD AUTO: 65.4 %
NT-PROBNP SERPL-MCNC: 5885 PG/ML (ref ?–450)
OSMOLALITY SERPL CALC.SUM OF ELEC: 319 MOSM/KG (ref 275–295)
PLATELET # BLD AUTO: 327 10(3)UL (ref 150–450)
POTASSIUM SERPL-SCNC: 4.2 MMOL/L (ref 3.5–5.1)
PROT SERPL-MCNC: 6.6 G/DL (ref 6.4–8.2)
RBC # BLD AUTO: 2.68 X10(6)UL
SODIUM SERPL-SCNC: 145 MMOL/L (ref 136–145)
WBC # BLD AUTO: 7.5 X10(3) UL (ref 4–11)

## 2024-02-13 PROCEDURE — 71045 X-RAY EXAM CHEST 1 VIEW: CPT | Performed by: EMERGENCY MEDICINE

## 2024-02-13 PROCEDURE — 93005 ELECTROCARDIOGRAM TRACING: CPT

## 2024-02-13 PROCEDURE — 99284 EMERGENCY DEPT VISIT MOD MDM: CPT

## 2024-02-13 PROCEDURE — 85025 COMPLETE CBC W/AUTO DIFF WBC: CPT | Performed by: EMERGENCY MEDICINE

## 2024-02-13 PROCEDURE — 80053 COMPREHEN METABOLIC PANEL: CPT

## 2024-02-13 PROCEDURE — 83880 ASSAY OF NATRIURETIC PEPTIDE: CPT | Performed by: EMERGENCY MEDICINE

## 2024-02-13 PROCEDURE — 36415 COLL VENOUS BLD VENIPUNCTURE: CPT

## 2024-02-13 PROCEDURE — 99285 EMERGENCY DEPT VISIT HI MDM: CPT

## 2024-02-13 PROCEDURE — 85025 COMPLETE CBC W/AUTO DIFF WBC: CPT

## 2024-02-13 PROCEDURE — 80053 COMPREHEN METABOLIC PANEL: CPT | Performed by: EMERGENCY MEDICINE

## 2024-02-13 PROCEDURE — 93010 ELECTROCARDIOGRAM REPORT: CPT

## 2024-02-13 RX ORDER — FUROSEMIDE 40 MG/1
40 TABLET ORAL 2 TIMES DAILY
COMMUNITY

## 2024-02-13 NOTE — ED INITIAL ASSESSMENT (HPI)
Pt arrives ambulatory, recently dx with ZOHREH-discharged on 2/2. Pt has bloodwork drawn today, noted GFR of 12.2 and creatinine of 3.40. Pt also having BLE swelling x3 days. Had US done today for this. A&O x3

## 2024-02-14 ENCOUNTER — VIRTUAL PHONE E/M (OUTPATIENT)
Dept: NEPHROLOGY | Facility: CLINIC | Age: 89
End: 2024-02-14
Payer: MEDICARE

## 2024-02-14 DIAGNOSIS — N18.30 STAGE 3 CHRONIC KIDNEY DISEASE, UNSPECIFIED WHETHER STAGE 3A OR 3B CKD (HCC): ICD-10-CM

## 2024-02-14 DIAGNOSIS — N17.9 AKI (ACUTE KIDNEY INJURY) (HCC): Primary | ICD-10-CM

## 2024-02-14 LAB
ATRIAL RATE: 76 BPM
P-R INTERVAL: 128 MS
Q-T INTERVAL: 444 MS
QRS DURATION: 66 MS
QTC CALCULATION (BEZET): 499 MS
R AXIS: 35 DEGREES
T AXIS: 48 DEGREES
VENTRICULAR RATE: 76 BPM

## 2024-02-14 PROCEDURE — 99214 OFFICE O/P EST MOD 30 MIN: CPT | Performed by: INTERNAL MEDICINE

## 2024-02-14 PROCEDURE — 1111F DSCHRG MED/CURRENT MED MERGE: CPT | Performed by: INTERNAL MEDICINE

## 2024-02-14 NOTE — DISCHARGE INSTRUCTIONS
Elevate the legs follow-up with Dr. Miller in the office this week.  This was discussed with Dr. mcallister

## 2024-02-14 NOTE — ED PROVIDER NOTES
Patient Seen in: [facility] Emergency Department      History     Chief Complaint   Patient presents with    Abnormal Labs     Stated Complaint: seen 10 days ago for elevated creatinine; back for ongoing  symptoms.    Subjective:   HPI    89-year-old female here for abnormal labs the patient was hospitalized couple weeks ago with increased swelling in the legs and had a creatinine of 4.0 when she was discharged 3.09 patient was seen by new physician started back on her Lasix 4 times a week and because of increased swelling in the legs and had labs today which showed a creatinine 3.44 the patient otherwise feels well time she has some mild fatigue but no shortness of breath chest pain she has had intermittent swelling in her legs and it did increase somewhat recently.    Objective:   Past Medical History:   Diagnosis Date    Atrial fibrillation (HCC)     Colon cancer (HCC) 2003    Colon cancer (HCC)     Congestive heart disease (HCC)     Endometrial cancer (HCC)     UTERINE, DX ABOUT 30 YEARS    Endometrial cancer (HCC)     UTERINE, DX ABOUT 30 YEARS    Kidney disease     Bishop syndrome     Renal disorder               Past Surgical History:   Procedure Laterality Date    COLECTOMY      NEPHRECTOMY Left                 Social History     Socioeconomic History    Marital status:    Tobacco Use    Smoking status: Never    Smokeless tobacco: Never   Vaping Use    Vaping Use: Never used   Substance and Sexual Activity    Alcohol use: Yes    Drug use: Never     Social Determinants of Health     Food Insecurity: No Food Insecurity (1/29/2024)    Food Insecurity     Food Insecurity: Never true   Transportation Needs: No Transportation Needs (1/29/2024)    Transportation Needs     Lack of Transportation: No   Housing Stability: Low Risk  (1/29/2024)    Housing Stability     Housing Instability: No              Review of Systems    Positive for stated complaint: seen 10 days ago for elevated creatinine; back for  ongoing  symptoms.  Other systems are as noted in HPI.  Constitutional and vital signs reviewed.      All other systems reviewed and negative except as noted above.    Physical Exam     ED Triage Vitals [02/13/24 1645]   BP (!) 164/64   Pulse 71   Resp 18   Temp 96.7 °F (35.9 °C)   Temp src Temporal   SpO2 99 %   O2 Device None (Room air)       Current:/74   Pulse 68   Temp 96.7 °F (35.9 °C) (Temporal)   Resp 23   Ht 152.4 cm (5')   Wt 44.5 kg   SpO2 100%   BMI 19.14 kg/m²         Physical Exam    The patient is alert orient x 3 no acute distress HEENT exam within normal limits neck there is no lymphadenopathy or JVD lungs are clear cardiovascular exam shows regular rate and rhythm without murmurs abdomen soft and nontender extremities there is 2+ pitting edema neurologic exam is normal skin is no rash.    ED Course     Labs Reviewed   COMP METABOLIC PANEL (14) - Abnormal; Notable for the following components:       Result Value    Glucose 128 (*)     Chloride 119 (*)     CO2 16.0 (*)     BUN 60 (*)     Creatinine 3.44 (*)     Calcium, Total 7.7 (*)     Calculated Osmolality 319 (*)     eGFR-Cr 12 (*)     AST 13 (*)     Albumin 2.6 (*)     A/G Ratio 0.7 (*)     All other components within normal limits   PRO BETA NATRIURETIC PEPTIDE - Abnormal; Notable for the following components:    Pro-Beta Natriuretic Peptide 5,885 (*)     All other components within normal limits   CBC W/ DIFFERENTIAL - Abnormal; Notable for the following components:    RBC 2.68 (*)     HGB 7.3 (*)     HCT 25.1 (*)     MCHC 29.1 (*)     All other components within normal limits   CBC WITH DIFFERENTIAL WITH PLATELET    Narrative:     The following orders were created for panel order CBC With Differential With Platelet.  Procedure                               Abnormality         Status                     ---------                               -----------         ------                     CBC W/ DIFFERENTIAL[[id number]]           Abnormal            Final result                 Please view results for these tests on the individual orders.   RAINBOW DRAW LAVENDER   RAINBOW DRAW LIGHT GREEN     EKG    Rate, intervals and axes as noted on EKG Report.  Rate: 76  Rhythm: Sinus Rhythm  Reading: Sinus rhythm with PACs otherwise normal EKG           Abnormal Labs Reviewed   COMP METABOLIC PANEL (14) - Abnormal; Notable for the following components:       Result Value    Glucose 128 (*)     Chloride 119 (*)     CO2 16.0 (*)     BUN 60 (*)     Creatinine 3.44 (*)     Calcium, Total 7.7 (*)     Calculated Osmolality 319 (*)     eGFR-Cr 12 (*)     AST 13 (*)     Albumin 2.6 (*)     A/G Ratio 0.7 (*)     All other components within normal limits   PRO BETA NATRIURETIC PEPTIDE - Abnormal; Notable for the following components:    Pro-Beta Natriuretic Peptide 5,885 (*)     All other components within normal limits   CBC W/ DIFFERENTIAL - Abnormal; Notable for the following components:    RBC 2.68 (*)     HGB 7.3 (*)     HCT 25.1 (*)     MCHC 29.1 (*)     All other components within normal limits       Hemoglobin 7.3 is unchanged creatinine went from 3.09-3.44     XR CHEST AP PORTABLE  (CPT=71045)    Result Date: 2/13/2024  CONCLUSION:  No lobar pneumonia or overt congestive failure.  Mild scarring/atelectasis in the lower lungs.   LOCATION:  [facility]      Dictated by (CST): Brandon Tobin MD on 2/13/2024 at 9:07 PM     Finalized by (CST): Brandon Tobin MD on 2/13/2024 at 9:09 PM       Images independently reviewed there is no CHF         MDM      Initial differential diagnosis considered but not limited to includes DVT CHF venous stasis dermatitis dependent edema worsening renal insufficiency    Patient has slight worsening renal insufficiency but otherwise appears well Case was discussed with nephrology on call for Dr. Miller.  Dr. mcallister felt the patient could go home to follow-up in the office this week.  No change for medication at this point until  evaluated by Dr. Miller                                   Medical Decision Making      Disposition and Plan     Clinical Impression:  1. Acute renal failure superimposed on chronic kidney disease, unspecified acute renal failure type, unspecified CKD stage  (HCC)    2. Pedal edema         Disposition:  Discharge  2/13/2024 10:14 pm    Follow-up:  Fabiana Miller MD  [address] [place] [address] [place] [id number]  [phone]    Follow up in 2 day(s)            Medications Prescribed:  Current Discharge Medication List

## 2024-02-15 RX ORDER — TORSEMIDE 10 MG/1
10 TABLET ORAL DAILY
Qty: 30 TABLET | Refills: 11 | Status: SHIPPED | OUTPATIENT
Start: 2024-02-15

## 2024-02-15 NOTE — PROGRESS NOTES
Nephrology Progress Note      ASSESSMENT/PLAN:      1) ZOHREH- admitted with Cr approx 5 mg/dl presumably due to modest PO intake / ostomy losses; no hydro by CT. UA bland. No other clear insult. 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 May need EPO- follow    7) Edema- onset 6/23, started on furosemide. EF 60% with mild-mod TR / AI + mild MR; RV WNL. Transition to torsemide 10 mg daily    D/W daughter / son by phone       HPI:   Emmie Loza is a 89 year old female who presents for follow-up of No chief complaint on file.      Pt presents for f/u ZOHREH / CKD with recent hospitalization. Feeling pretty well, getting around but has BANKS above baseline. No nausea / vomiting / abd pain / CP, etc. No med changes.     HISTORY:  Past Medical History:   Diagnosis Date    Atrial fibrillation (HCC)     Colon cancer (HCC) 2003    Colon cancer (HCC)     Congestive heart disease (HCC)     Endometrial cancer (HCC)     UTERINE, DX ABOUT 30 YEARS    Endometrial cancer (HCC)     UTERINE, DX ABOUT 30 YEARS    Kidney disease     Bishop syndrome     Renal disorder       Past Surgical History:   Procedure Laterality Date    COLECTOMY      NEPHRECTOMY Left       Family History   Problem Relation Age of Onset    Heart Disorder Father     Heart Disorder Mother     Heart Disorder Sister     Breast Cancer Sister     Diabetes Brother     Breast Cancer Sister     Cancer Sister     Breast Cancer Sister       Social History:   Social History     Socioeconomic History    Marital status:    Tobacco Use    Smoking status: Never    Smokeless tobacco: Never   Vaping Use    Vaping Use: Never used   Substance and Sexual  Activity    Alcohol use: Yes    Drug use: Never     Social Determinants of Health     Food Insecurity: No Food Insecurity (1/29/2024)    Food Insecurity     Food Insecurity: Never true   Transportation Needs: No Transportation Needs (1/29/2024)    Transportation Needs     Lack of Transportation: No   Housing Stability: Low Risk  (1/29/2024)    Housing Stability     Housing Instability: No        Medications (Active prior to today's visit):  Current Outpatient Medications   Medication Sig Dispense Refill    torsemide 10 MG Oral Tab Take 1 tablet (10 mg total) by mouth daily. 30 tablet 11    furosemide 40 MG Oral Tab Take 1 tablet (40 mg total) by mouth 2 (two) times daily. Taken 4x/week      fenofibrate 48 MG Oral Tab Take 1 tablet (48 mg total) by mouth daily.      amiodarone 200 MG Oral Tab Take 1 tablet (200 mg total) by mouth daily.      apixaban 2.5 MG Oral Tab Take 1 tablet (2.5 mg total) by mouth 2 (two) times daily.      dilTIAZem  MG Oral Capsule SR 24 Hr Take 1 capsule (120 mg total) by mouth daily.      PANTOPRAZOLE 40 MG Oral Tab EC TAKE ONE TABLET BY MOUTH ONE TIME DAILY 90 tablet 0    Calcium Carbonate-Vitamin D (OYSTER SHELL CALCIUM/D) 500-200 MG-UNIT Oral Tab Take 1 tablet by mouth daily.      aspirin 81 MG Oral Tab Take by mouth.      cyanocobalamin 1000 MCG Oral Tab Take by mouth.      Ferrous Sulfate 325 (65 Fe) MG Oral Tab Take by mouth.      Multiple Vitamin (MULTIVITAMINS OR)       acetaminophen 500 MG Oral Tab Take by mouth.         Allergies:  Allergies   Allergen Reactions    Ciprofloxacin RASH    Penicillins RASH       ROS:     Denies fever/chills  Denies wt loss/gain  Denies HA or visual changes  Denies CP or palpitations  Denies SOB/cough/hemoptysis  Denies abd or flank pain  Denies N/V/D  Denies change in urinary habits or gross hematuria  Denies LE edema  Denies skin rashes/myalgias/arthralgias      PHYSICAL EXAM:   There were no vitals taken for this visit.  Wt Readings from Last  3 Encounters:   02/13/24 98 lb (44.5 kg)   02/02/24 92 lb 6 oz (41.9 kg)   07/05/23 83 lb 4 oz (37.8 kg)     General: Alert and oriented in no apparent distress.  HEENT: No scleral icterus, MMM  Neck: Supple, no MARIA FERNANDA or thyromegaly  Cardiac: Regular rate and rhythm, S1, S2 normal, no murmur or rub  Lungs: Clear without wheezes, rales, rhonchi.    Abdomen: Soft, non-tender. + bowel sounds, no palpable organomegaly  Extremities: Without clubbing, cyanosis or edema.  Neurologic: Alert and oriented, normal affect, cranial nerves grossly intact, moving all extremities  Skin: Warm and dry, no rashes      Fabiana Miller MD  2/15/2024  7:28 AM

## 2024-02-22 ENCOUNTER — MED REC SCAN ONLY (OUTPATIENT)
Dept: NEPHROLOGY | Facility: CLINIC | Age: 89
End: 2024-02-22

## 2024-02-23 ENCOUNTER — PATIENT MESSAGE (OUTPATIENT)
Dept: NEPHROLOGY | Facility: CLINIC | Age: 89
End: 2024-02-23

## 2024-02-23 ENCOUNTER — MED REC SCAN ONLY (OUTPATIENT)
Dept: NEPHROLOGY | Facility: CLINIC | Age: 89
End: 2024-02-23

## 2024-02-23 DIAGNOSIS — D63.1 ANEMIA DUE TO STAGE 4 CHRONIC KIDNEY DISEASE (HCC): ICD-10-CM

## 2024-02-23 DIAGNOSIS — N18.4 ANEMIA DUE TO STAGE 4 CHRONIC KIDNEY DISEASE (HCC): ICD-10-CM

## 2024-02-23 DIAGNOSIS — N18.4 CKD (CHRONIC KIDNEY DISEASE) STAGE 4, GFR 15-29 ML/MIN (HCC): Primary | ICD-10-CM

## 2024-02-27 ENCOUNTER — LAB ENCOUNTER (OUTPATIENT)
Dept: LAB | Age: 89
End: 2024-02-27
Attending: INTERNAL MEDICINE
Payer: MEDICARE

## 2024-02-27 ENCOUNTER — TELEPHONE (OUTPATIENT)
Dept: NEPHROLOGY | Facility: CLINIC | Age: 89
End: 2024-02-27

## 2024-02-27 ENCOUNTER — NURSE ONLY (OUTPATIENT)
Dept: NEPHROLOGY | Facility: CLINIC | Age: 89
End: 2024-02-27
Payer: MEDICARE

## 2024-02-27 VITALS — SYSTOLIC BLOOD PRESSURE: 116 MMHG | DIASTOLIC BLOOD PRESSURE: 48 MMHG | WEIGHT: 89.25 LBS | BODY MASS INDEX: 17 KG/M2

## 2024-02-27 DIAGNOSIS — N18.30 STAGE 3 CHRONIC KIDNEY DISEASE, UNSPECIFIED WHETHER STAGE 3A OR 3B CKD (HCC): ICD-10-CM

## 2024-02-27 DIAGNOSIS — N18.5 ANEMIA DUE TO STAGE 5 CHRONIC KIDNEY DISEASE, NOT ON CHRONIC DIALYSIS (HCC): ICD-10-CM

## 2024-02-27 DIAGNOSIS — N18.5 STAGE 5 CHRONIC KIDNEY DISEASE NOT ON CHRONIC DIALYSIS (HCC): Primary | ICD-10-CM

## 2024-02-27 DIAGNOSIS — N17.9 AKI (ACUTE KIDNEY INJURY) (HCC): ICD-10-CM

## 2024-02-27 DIAGNOSIS — D63.1 ANEMIA DUE TO STAGE 5 CHRONIC KIDNEY DISEASE, NOT ON CHRONIC DIALYSIS (HCC): ICD-10-CM

## 2024-02-27 LAB
ANION GAP SERPL CALC-SCNC: 10 MMOL/L (ref 0–18)
BASOPHILS # BLD AUTO: 0.06 X10(3) UL (ref 0–0.2)
BASOPHILS NFR BLD AUTO: 0.6 %
BUN BLD-MCNC: 57 MG/DL (ref 9–23)
CALCIUM BLD-MCNC: 7.4 MG/DL (ref 8.5–10.1)
CHLORIDE SERPL-SCNC: 120 MMOL/L (ref 98–112)
CO2 SERPL-SCNC: 14 MMOL/L (ref 21–32)
CREAT BLD-MCNC: 3.57 MG/DL
EGFRCR SERPLBLD CKD-EPI 2021: 12 ML/MIN/1.73M2 (ref 60–?)
EOSINOPHIL # BLD AUTO: 0.16 X10(3) UL (ref 0–0.7)
EOSINOPHIL NFR BLD AUTO: 1.5 %
ERYTHROCYTE [DISTWIDTH] IN BLOOD BY AUTOMATED COUNT: 22.4 %
FASTING STATUS PATIENT QL REPORTED: NO
GLUCOSE BLD-MCNC: 78 MG/DL (ref 70–99)
HCT VFR BLD AUTO: 25.8 %
HGB BLD-MCNC: 8.1 G/DL
IMM GRANULOCYTES # BLD AUTO: 0.05 X10(3) UL (ref 0–1)
IMM GRANULOCYTES NFR BLD: 0.5 %
LYMPHOCYTES # BLD AUTO: 1.52 X10(3) UL (ref 1–4)
LYMPHOCYTES NFR BLD AUTO: 14.5 %
MCH RBC QN AUTO: 28.3 PG (ref 26–34)
MCHC RBC AUTO-ENTMCNC: 31.4 G/DL (ref 31–37)
MCV RBC AUTO: 90.2 FL
MONOCYTES # BLD AUTO: 1.06 X10(3) UL (ref 0.1–1)
MONOCYTES NFR BLD AUTO: 10.1 %
NEUTROPHILS # BLD AUTO: 7.65 X10 (3) UL (ref 1.5–7.7)
NEUTROPHILS # BLD AUTO: 7.65 X10(3) UL (ref 1.5–7.7)
NEUTROPHILS NFR BLD AUTO: 72.8 %
OSMOLALITY SERPL CALC.SUM OF ELEC: 313 MOSM/KG (ref 275–295)
PLATELET # BLD AUTO: 309 10(3)UL (ref 150–450)
PLATELET MORPHOLOGY: NORMAL
POTASSIUM SERPL-SCNC: 2.9 MMOL/L (ref 3.5–5.1)
RBC # BLD AUTO: 2.86 X10(6)UL
SODIUM SERPL-SCNC: 144 MMOL/L (ref 136–145)
WBC # BLD AUTO: 10.5 X10(3) UL (ref 4–11)

## 2024-02-27 PROCEDURE — 36415 COLL VENOUS BLD VENIPUNCTURE: CPT

## 2024-02-27 PROCEDURE — 83516 IMMUNOASSAY NONANTIBODY: CPT

## 2024-02-27 PROCEDURE — 86334 IMMUNOFIX E-PHORESIS SERUM: CPT

## 2024-02-27 PROCEDURE — 83521 IG LIGHT CHAINS FREE EACH: CPT

## 2024-02-27 PROCEDURE — 96372 THER/PROPH/DIAG INJ SC/IM: CPT | Performed by: INTERNAL MEDICINE

## 2024-02-27 PROCEDURE — 80048 BASIC METABOLIC PNL TOTAL CA: CPT

## 2024-02-27 PROCEDURE — 84165 PROTEIN E-PHORESIS SERUM: CPT

## 2024-02-27 PROCEDURE — 86037 ANCA TITER EACH ANTIBODY: CPT

## 2024-02-27 PROCEDURE — 85025 COMPLETE CBC W/AUTO DIFF WBC: CPT

## 2024-02-27 PROCEDURE — 86160 COMPLEMENT ANTIGEN: CPT

## 2024-02-27 RX ORDER — TORSEMIDE 20 MG/1
20 TABLET ORAL DAILY
Qty: 30 TABLET | Refills: 3 | Status: SHIPPED | OUTPATIENT
Start: 2024-02-27

## 2024-02-28 LAB
ANTI-MPO ANTIBODIES: <0.2 UNITS
ANTI-PR3 ANTIBODIES: <0.2 UNITS
C3 SERPL-MCNC: 123 MG/DL (ref 90–180)
C4 SERPL-MCNC: 29.4 MG/DL (ref 10–40)

## 2024-03-07 LAB
ALBUMIN SERPL ELPH-MCNC: 3.04 G/DL (ref 3.75–5.21)
ALBUMIN/GLOB SERPL: 1.03 {RATIO} (ref 1–2)
ALPHA1 GLOB SERPL ELPH-MCNC: 0.42 G/DL (ref 0.19–0.46)
ALPHA2 GLOB SERPL ELPH-MCNC: 0.99 G/DL (ref 0.48–1.05)
B-GLOBULIN SERPL ELPH-MCNC: 0.64 G/DL (ref 0.68–1.23)
GAMMA GLOB SERPL ELPH-MCNC: 0.91 G/DL (ref 0.62–1.7)
KAPPA LC FREE SER-MCNC: 10.3 MG/DL (ref 0.33–1.94)
KAPPA LC FREE/LAMBDA FREE SER NEPH: 1.81 {RATIO} (ref 0.26–1.65)
LAMBDA LC FREE SERPL-MCNC: 5.68 MG/DL (ref 0.57–2.63)
PROT SERPL-MCNC: 6 G/DL (ref 5.7–8.2)

## 2024-03-26 ENCOUNTER — APPOINTMENT (OUTPATIENT)
Dept: CT IMAGING | Facility: HOSPITAL | Age: 89
End: 2024-03-26
Attending: EMERGENCY MEDICINE
Payer: MEDICARE

## 2024-03-26 ENCOUNTER — HOSPITAL ENCOUNTER (OUTPATIENT)
Facility: HOSPITAL | Age: 89
Setting detail: OBSERVATION
LOS: 1 days | Discharge: HOME | End: 2024-03-27
Attending: EMERGENCY MEDICINE | Admitting: INTERNAL MEDICINE
Payer: MEDICARE

## 2024-03-26 DIAGNOSIS — R10.84 ABDOMINAL PAIN, GENERALIZED: ICD-10-CM

## 2024-03-26 DIAGNOSIS — N18.9 CHRONIC KIDNEY DISEASE, UNSPECIFIED CKD STAGE: ICD-10-CM

## 2024-03-26 DIAGNOSIS — R19.00 ABDOMINAL MASS, UNSPECIFIED ABDOMINAL LOCATION: Primary | ICD-10-CM

## 2024-03-26 DIAGNOSIS — N18.5 STAGE 5 CHRONIC KIDNEY DISEASE NOT ON CHRONIC DIALYSIS (HCC): ICD-10-CM

## 2024-03-26 DIAGNOSIS — D63.1 ANEMIA DUE TO STAGE 5 CHRONIC KIDNEY DISEASE, NOT ON CHRONIC DIALYSIS (HCC): ICD-10-CM

## 2024-03-26 DIAGNOSIS — N18.5 ANEMIA DUE TO STAGE 5 CHRONIC KIDNEY DISEASE, NOT ON CHRONIC DIALYSIS (HCC): ICD-10-CM

## 2024-03-26 PROBLEM — D64.9 ANEMIA: Status: ACTIVE | Noted: 2024-03-26

## 2024-03-26 PROBLEM — N17.9 ACUTE KIDNEY INJURY (HCC): Status: ACTIVE | Noted: 2024-03-26

## 2024-03-26 PROBLEM — R79.89 AZOTEMIA: Status: ACTIVE | Noted: 2024-03-26

## 2024-03-26 PROBLEM — I48.91 ATRIAL FIBRILLATION (HCC): Status: ACTIVE | Noted: 2023-06-23

## 2024-03-26 PROBLEM — I10 HYPERTENSION: Status: ACTIVE | Noted: 2023-06-25

## 2024-03-26 PROBLEM — R73.9 HYPERGLYCEMIA: Status: ACTIVE | Noted: 2024-03-26

## 2024-03-26 PROBLEM — E78.2 HYPERLIPIDEMIA, MIXED: Status: ACTIVE | Noted: 2020-04-15

## 2024-03-26 PROBLEM — N17.9 ACUTE KIDNEY INJURY: Status: ACTIVE | Noted: 2024-03-26

## 2024-03-26 PROBLEM — N18.32 CHRONIC KIDNEY DISEASE, STAGE 3B (HCC): Status: ACTIVE | Noted: 2020-04-15

## 2024-03-26 PROBLEM — M17.12 PRIMARY OSTEOARTHRITIS OF LEFT KNEE: Status: ACTIVE | Noted: 2024-01-24

## 2024-03-26 PROBLEM — R60.0 BILATERAL LEG EDEMA: Status: ACTIVE | Noted: 2023-06-22

## 2024-03-26 LAB
ALBUMIN SERPL-MCNC: 2.9 G/DL (ref 3.4–5)
ALBUMIN/GLOB SERPL: 0.7 {RATIO} (ref 1–2)
ALP LIVER SERPL-CCNC: 92 U/L
ALT SERPL-CCNC: 19 U/L
ANION GAP SERPL CALC-SCNC: 12 MMOL/L (ref 0–18)
AST SERPL-CCNC: 22 U/L (ref 15–37)
BASOPHILS # BLD AUTO: 0.04 X10(3) UL (ref 0–0.2)
BASOPHILS NFR BLD AUTO: 0.6 %
BILIRUB SERPL-MCNC: 0.2 MG/DL (ref 0.1–2)
BILIRUB UR QL STRIP.AUTO: NEGATIVE
BUN BLD-MCNC: 76 MG/DL (ref 9–23)
CALCIUM BLD-MCNC: 8 MG/DL (ref 8.5–10.1)
CHLORIDE SERPL-SCNC: 117 MMOL/L (ref 98–112)
CLARITY UR REFRACT.AUTO: CLEAR
CO2 SERPL-SCNC: 14 MMOL/L (ref 21–32)
CREAT BLD-MCNC: 3.62 MG/DL
EGFRCR SERPLBLD CKD-EPI 2021: 11 ML/MIN/1.73M2 (ref 60–?)
EOSINOPHIL # BLD AUTO: 0.11 X10(3) UL (ref 0–0.7)
EOSINOPHIL NFR BLD AUTO: 1.7 %
ERYTHROCYTE [DISTWIDTH] IN BLOOD BY AUTOMATED COUNT: 19.5 %
GLOBULIN PLAS-MCNC: 4 G/DL (ref 2.8–4.4)
GLUCOSE BLD-MCNC: 105 MG/DL (ref 70–99)
GLUCOSE UR STRIP.AUTO-MCNC: NORMAL MG/DL
HCT VFR BLD AUTO: 35.1 %
HGB BLD-MCNC: 10.6 G/DL
HYALINE CASTS #/AREA URNS AUTO: PRESENT /LPF
IMM GRANULOCYTES # BLD AUTO: 0.04 X10(3) UL (ref 0–1)
IMM GRANULOCYTES NFR BLD: 0.6 %
KETONES UR STRIP.AUTO-MCNC: NEGATIVE MG/DL
LEUKOCYTE ESTERASE UR QL STRIP.AUTO: 250
LIPASE SERPL-CCNC: 76 U/L (ref 13–75)
LYMPHOCYTES # BLD AUTO: 1.51 X10(3) UL (ref 1–4)
LYMPHOCYTES NFR BLD AUTO: 23.1 %
MCH RBC QN AUTO: 28.5 PG (ref 26–34)
MCHC RBC AUTO-ENTMCNC: 30.2 G/DL (ref 31–37)
MCV RBC AUTO: 94.4 FL
MONOCYTES # BLD AUTO: 0.49 X10(3) UL (ref 0.1–1)
MONOCYTES NFR BLD AUTO: 7.5 %
NEUTROPHILS # BLD AUTO: 4.35 X10 (3) UL (ref 1.5–7.7)
NEUTROPHILS # BLD AUTO: 4.35 X10(3) UL (ref 1.5–7.7)
NEUTROPHILS NFR BLD AUTO: 66.5 %
NITRITE UR QL STRIP.AUTO: NEGATIVE
OSMOLALITY SERPL CALC.SUM OF ELEC: 319 MOSM/KG (ref 275–295)
PH UR STRIP.AUTO: 5 [PH] (ref 5–8)
PLATELET # BLD AUTO: 297 10(3)UL (ref 150–450)
POTASSIUM SERPL-SCNC: 3.5 MMOL/L (ref 3.5–5.1)
PROT SERPL-MCNC: 6.9 G/DL (ref 6.4–8.2)
RBC # BLD AUTO: 3.72 X10(6)UL
RBC UR QL AUTO: NEGATIVE
SODIUM SERPL-SCNC: 143 MMOL/L (ref 136–145)
SP GR UR STRIP.AUTO: 1.01 (ref 1–1.03)
UROBILINOGEN UR STRIP.AUTO-MCNC: NORMAL MG/DL
WBC # BLD AUTO: 6.5 X10(3) UL (ref 4–11)

## 2024-03-26 PROCEDURE — 85025 COMPLETE CBC W/AUTO DIFF WBC: CPT | Performed by: EMERGENCY MEDICINE

## 2024-03-26 PROCEDURE — 99285 EMERGENCY DEPT VISIT HI MDM: CPT

## 2024-03-26 PROCEDURE — 74176 CT ABD & PELVIS W/O CONTRAST: CPT | Performed by: EMERGENCY MEDICINE

## 2024-03-26 PROCEDURE — 81001 URINALYSIS AUTO W/SCOPE: CPT | Performed by: EMERGENCY MEDICINE

## 2024-03-26 PROCEDURE — 80053 COMPREHEN METABOLIC PANEL: CPT | Performed by: EMERGENCY MEDICINE

## 2024-03-26 PROCEDURE — 83690 ASSAY OF LIPASE: CPT | Performed by: EMERGENCY MEDICINE

## 2024-03-26 PROCEDURE — 96360 HYDRATION IV INFUSION INIT: CPT

## 2024-03-26 RX ORDER — SODIUM CHLORIDE 9 MG/ML
INJECTION, SOLUTION INTRAVENOUS CONTINUOUS
Status: DISCONTINUED | OUTPATIENT
Start: 2024-03-26 | End: 2024-03-26

## 2024-03-26 RX ORDER — ONDANSETRON 2 MG/ML
4 INJECTION INTRAMUSCULAR; INTRAVENOUS EVERY 4 HOURS PRN
Status: DISCONTINUED | OUTPATIENT
Start: 2024-03-26 | End: 2024-03-26

## 2024-03-26 RX ORDER — POTASSIUM CHLORIDE 14.9 MG/ML
20 INJECTION INTRAVENOUS ONCE
Status: COMPLETED | OUTPATIENT
Start: 2024-03-26 | End: 2024-03-26

## 2024-03-26 RX ORDER — HYDROMORPHONE HYDROCHLORIDE 1 MG/ML
0.5 INJECTION, SOLUTION INTRAMUSCULAR; INTRAVENOUS; SUBCUTANEOUS EVERY 30 MIN PRN
Status: DISCONTINUED | OUTPATIENT
Start: 2024-03-26 | End: 2024-03-26

## 2024-03-26 RX ORDER — TORSEMIDE 20 MG/1
20 TABLET ORAL DAILY
Status: DISCONTINUED | OUTPATIENT
Start: 2024-03-27 | End: 2024-03-27

## 2024-03-26 RX ORDER — HEPARIN SODIUM 5000 [USP'U]/ML
5000 INJECTION, SOLUTION INTRAVENOUS; SUBCUTANEOUS EVERY 12 HOURS SCHEDULED
Status: DISCONTINUED | OUTPATIENT
Start: 2024-03-27 | End: 2024-03-27

## 2024-03-26 RX ORDER — DILTIAZEM HYDROCHLORIDE 120 MG/1
120 CAPSULE, EXTENDED RELEASE ORAL DAILY
Status: DISCONTINUED | OUTPATIENT
Start: 2024-03-27 | End: 2024-03-27

## 2024-03-26 RX ORDER — ONDANSETRON 2 MG/ML
4 INJECTION INTRAMUSCULAR; INTRAVENOUS EVERY 6 HOURS PRN
Status: DISCONTINUED | OUTPATIENT
Start: 2024-03-26 | End: 2024-03-27

## 2024-03-26 RX ORDER — AMIODARONE HYDROCHLORIDE 100 MG/1
200 TABLET ORAL DAILY
Status: DISCONTINUED | OUTPATIENT
Start: 2024-03-27 | End: 2024-03-27

## 2024-03-26 RX ORDER — ACETAMINOPHEN 500 MG
500 TABLET ORAL EVERY 4 HOURS PRN
Status: DISCONTINUED | OUTPATIENT
Start: 2024-03-26 | End: 2024-03-27

## 2024-03-26 NOTE — ED INITIAL ASSESSMENT (HPI)
Patient A&Ox4 here with daughter sent by [facility] for ostomy concerns. Patient has had ostomy for 21 years, per their visit it has an asymmetrical growth, is bloody, and patient has a weight loss. Hx of teresa syndrome. Patient was sent to have a specialist evaluate ostomy.  Patient denies any pain.

## 2024-03-26 NOTE — ED PROVIDER NOTES
Patient Seen in: [facility] Emergency Department      History     Chief Complaint   Patient presents with    Other     Ostomy redness, growths, bloody     Stated Complaint: Ostomy issue- sent over from GI    Subjective:   HPI    Patient is a 89-year-old female who history obtained primarily from daughter as well as patient.  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  Patient had some abnormalities on the ostomy site noticed couple months ago.  Ostomy has been in for about 21 years according to the daughter.  Patient followed up with GI today was found to have asymmetric swelling masses on the ostomy concerning for malignancy and was sent to the emergency department for further evaluation and admission.  Patient states he does have some mild abdominal discomfort, no vomiting, no significant weight loss over the past few months.  Remainder of review of systems negative.    Objective:   Past Medical History:   Diagnosis Date    Arthritis     Atrial fibrillation (HCC)     Back pain     Colon cancer (HCC) 2003    Colon cancer (HCC)     Congestive heart disease (HCC)     Easy bruising     Endometrial cancer (HCC)     UTERINE, DX ABOUT 30 YEARS    Endometrial cancer (HCC)     UTERINE, DX ABOUT 30 YEARS    Hearing loss     Heart palpitations 6/23    Afib    History of cardiac murmur 6/23    AFib    Kidney disease     Kidney failure     Leg swelling Feb 2024    Bishop syndrome     Pain in joints     Renal disorder     Wears glasses     Weight loss 4/23              Past Surgical History:   Procedure Laterality Date    COLECTOMY      COLONOSCOPY      NEPHRECTOMY Left     PART REMOVAL COLON W END COLOSTOMY                  Social History     Socioeconomic History    Marital status:    Tobacco Use    Smoking status: Never    Smokeless tobacco: Never   Vaping Use    Vaping Use: Never used   Substance and Sexual Activity    Alcohol use: Not Currently    Drug use: Never     Social  Determinants of Health     Food Insecurity: No Food Insecurity (3/26/2024)    Food Insecurity     Food Insecurity: Never true   Transportation Needs: No Transportation Needs (3/26/2024)    Transportation Needs     Lack of Transportation: No   Housing Stability: Low Risk  (3/26/2024)    Housing Stability     Housing Instability: No              Review of Systems    Positive for stated complaint: Ostomy issue- sent over from GI  Other systems are as noted in HPI.  Constitutional and vital signs reviewed.      All other systems reviewed and negative except as noted above.    Physical Exam     ED Triage Vitals [03/26/24 1315]   /55   Pulse 78   Resp 16   Temp 96.8 °F (36 °C)   Temp src Temporal   SpO2 100 %   O2 Device None (Room air)       Current:/59 (BP Location: Right arm)   Pulse 67   Temp 97.6 °F (36.4 °C) (Oral)   Resp 16   Ht 152.4 cm (5')   Wt 37.7 kg   SpO2 100%   BMI 16.23 kg/m²         Physical Exam  GENERAL: Patient resting on the cart in no acute distress.  HEENT: Extraocular muscles intact, pupils equal round reactive to light, neck supple, no meningismus.  LUNGS: Lungs clear to auscultation bilaterally.  CARDIOVASCULAR: + S1-S2, regular rate and rhythm, no murmurs.  BACK: No CVA tenderness, no midline bony tenderness.  ABDOMEN: + Bowel sounds, soft, mild tenderness periumbilical and around the ostomy site.  Abnormal masses on the ostomy, picture at the beginning of this chart., nondistended.  No rebound, no guarding, no hepatosplenomegaly.  EXTREMITIES: Full range of motion, no tenderness, good capillary refill.  SKIN: No rash, good turgor.  NEURO: Patient answers questions appropriately.  No focal deficits appreciated.           ED Course     Labs Reviewed   COMP METABOLIC PANEL (14) - Abnormal; Notable for the following components:       Result Value    Glucose 105 (*)     Chloride 117 (*)     CO2 14.0 (*)     BUN 76 (*)     Creatinine 3.62 (*)     Calcium, Total 8.0 (*)      Calculated Osmolality 319 (*)     eGFR-Cr 11 (*)     Albumin 2.9 (*)     A/G Ratio 0.7 (*)     All other components within normal limits   LIPASE - Abnormal; Notable for the following components:    Lipase 76 (*)     All other components within normal limits   URINALYSIS, ROUTINE - Abnormal; Notable for the following components:    Protein Urine Trace (*)     Leukocyte Esterase Urine 250 (*)     WBC Urine 11-20 (*)     Bacteria Urine Rare (*)     Squamous Epi. Cells Few (*)     Hyaline Casts Present (*)     All other components within normal limits   CBC W/ DIFFERENTIAL - Abnormal; Notable for the following components:    RBC 3.72 (*)     HGB 10.6 (*)     MCHC 30.2 (*)     All other components within normal limits   CBC WITH DIFFERENTIAL WITH PLATELET    Narrative:     The following orders were created for panel order CBC With Differential With Platelet.  Procedure                               Abnormality         Status                     ---------                               -----------         ------                     CBC W/ DIFFERENTIAL[[id number]]          Abnormal            Final result                 Please view results for these tests on the individual orders.             CT abdomen pelvis1. Large peristomal hernia again identified containing nonobstructed loop of transverse colon.   2. Periumbilical ventral abdominal wall hernia is also again noted containing the anterior wall of the mid transverse colon as well as some omental fat.    3. New transverse process fracture deformities on the left at L4 and L5.  Correlate with history for any recent trauma.  The osseous structures are demineralized.   4. There is a 3 mm noncalcified subpleural left lower lobe lung nodule.  Surveillance imaging recommended given the patient's history of colon cancer.   5. There are other incidental findings noted as described above.       Independent reviewed by myself, no free air         MDM      Picture is attached at  the beginning of this chart.  Patient was stable during her stay in the emergency department.  Patient does have chronic renal failure.  Patient CT showed  parastomal hernia nonobstructed loop of colon.  Patient will be admitted for further evaluation.  Did speak with the hospitalist.  I did speak with general surgery Dr. Gilbert.  I did consider colon cancer, renal failure, anemia.  Admission disposition: 3/26/2024  8:50 PM                                        Medical Decision Making      Disposition and Plan     Clinical Impression:  1. Abdominal mass, unspecified abdominal location    2. Abdominal pain, generalized    3. Chronic kidney disease, unspecified CKD stage         Disposition:  Admit  3/26/2024  8:50 pm    Follow-up:  No follow-up provider specified.        Medications Prescribed:  Current Discharge Medication List                            Hospital Problems       Present on Admission  Date Reviewed: 3/26/2024            ICD-10-CM Noted POA    * (Principal) Abdominal mass, unspecified abdominal location R19.00 3/26/2024 Unknown    Abdominal pain, generalized R10.84 3/26/2024 Unknown    Acute kidney injury (HCC) N17.9 3/26/2024 Yes    Anemia D64.9 3/26/2024 Yes    Azotemia R79.89 3/26/2024 Yes    Chronic kidney disease, unspecified CKD stage N18.9 3/26/2024 Unknown    Hyperglycemia R73.9 3/26/2024 Yes

## 2024-03-26 NOTE — ED QUICK NOTES
Orders for admission, patient is aware of plan and ready to go upstairs. Any questions, please call ED RN Patricia at extension [phone].     Patient Covid vaccination status: Fully vaccinated     COVID Test Ordered in ED: None    COVID Suspicion at Admission: N/A    Running Infusions:  None    Mental Status/LOC at time of transport: A/Ox4, family at bedside     Other pertinent information:   CIWA score: N/A   NIH score:  N/A

## 2024-03-27 VITALS
WEIGHT: 83.13 LBS | HEIGHT: 60 IN | DIASTOLIC BLOOD PRESSURE: 49 MMHG | RESPIRATION RATE: 16 BRPM | BODY MASS INDEX: 16.32 KG/M2 | OXYGEN SATURATION: 100 % | TEMPERATURE: 99 F | HEART RATE: 56 BPM | SYSTOLIC BLOOD PRESSURE: 130 MMHG

## 2024-03-27 LAB
ANION GAP SERPL CALC-SCNC: 12 MMOL/L (ref 0–18)
BASOPHILS # BLD AUTO: 0.05 X10(3) UL (ref 0–0.2)
BASOPHILS NFR BLD AUTO: 0.7 %
BUN BLD-MCNC: 70 MG/DL (ref 9–23)
CALCIUM BLD-MCNC: 7.7 MG/DL (ref 8.5–10.1)
CHLORIDE SERPL-SCNC: 125 MMOL/L (ref 98–112)
CO2 SERPL-SCNC: 10 MMOL/L (ref 21–32)
CREAT BLD-MCNC: 3.32 MG/DL
EGFRCR SERPLBLD CKD-EPI 2021: 13 ML/MIN/1.73M2 (ref 60–?)
EOSINOPHIL # BLD AUTO: 0.22 X10(3) UL (ref 0–0.7)
EOSINOPHIL NFR BLD AUTO: 3.2 %
ERYTHROCYTE [DISTWIDTH] IN BLOOD BY AUTOMATED COUNT: 19.9 %
GLUCOSE BLD-MCNC: 83 MG/DL (ref 70–99)
HCT VFR BLD AUTO: 30.4 %
HGB BLD-MCNC: 9.1 G/DL
IMM GRANULOCYTES # BLD AUTO: 0.02 X10(3) UL (ref 0–1)
IMM GRANULOCYTES NFR BLD: 0.3 %
LYMPHOCYTES # BLD AUTO: 1.78 X10(3) UL (ref 1–4)
LYMPHOCYTES NFR BLD AUTO: 25.7 %
MCH RBC QN AUTO: 28.5 PG (ref 26–34)
MCHC RBC AUTO-ENTMCNC: 29.9 G/DL (ref 31–37)
MCV RBC AUTO: 95.3 FL
MONOCYTES # BLD AUTO: 0.62 X10(3) UL (ref 0.1–1)
MONOCYTES NFR BLD AUTO: 9 %
NEUTROPHILS # BLD AUTO: 4.23 X10 (3) UL (ref 1.5–7.7)
NEUTROPHILS # BLD AUTO: 4.23 X10(3) UL (ref 1.5–7.7)
NEUTROPHILS NFR BLD AUTO: 61.1 %
OSMOLALITY SERPL CALC.SUM OF ELEC: 324 MOSM/KG (ref 275–295)
PLATELET # BLD AUTO: 259 10(3)UL (ref 150–450)
POTASSIUM SERPL-SCNC: 3.7 MMOL/L (ref 3.5–5.1)
POTASSIUM SERPL-SCNC: 3.7 MMOL/L (ref 3.5–5.1)
RBC # BLD AUTO: 3.19 X10(6)UL
SODIUM SERPL-SCNC: 147 MMOL/L (ref 136–145)
WBC # BLD AUTO: 6.9 X10(3) UL (ref 4–11)

## 2024-03-27 PROCEDURE — 84132 ASSAY OF SERUM POTASSIUM: CPT | Performed by: HOSPITALIST

## 2024-03-27 PROCEDURE — 80048 BASIC METABOLIC PNL TOTAL CA: CPT | Performed by: HOSPITALIST

## 2024-03-27 PROCEDURE — 85025 COMPLETE CBC W/AUTO DIFF WBC: CPT | Performed by: HOSPITALIST

## 2024-03-27 RX ORDER — SODIUM CHLORIDE 9 MG/ML
INJECTION, SOLUTION INTRAVENOUS CONTINUOUS
Status: DISCONTINUED | OUTPATIENT
Start: 2024-03-27 | End: 2024-03-27

## 2024-03-27 RX ORDER — POTASSIUM CHLORIDE 14.9 MG/ML
20 INJECTION INTRAVENOUS ONCE
Status: COMPLETED | OUTPATIENT
Start: 2024-03-27 | End: 2024-03-27

## 2024-03-27 RX ORDER — AMIODARONE HYDROCHLORIDE 100 MG/1
100 TABLET ORAL DAILY
Status: DISCONTINUED | OUTPATIENT
Start: 2024-03-27 | End: 2024-03-27

## 2024-03-27 NOTE — CM/SW NOTE
03/27/24 1100   CM/SW Referral Data   Referral Source Social Work (self-referral)   Reason for Referral Discharge planning   Informant Patient;Other  (Daughter-in-law Nathalie)   Patient Info   Patient's Current Mental Status at Time of Assessment Alert;Oriented   Patient's Home Environment Independent Living  ([place])   Patient lives with   (Daughter who has Gregorio Syndrome)   Patient Status Prior to Admission   Independent with ADLs and Mobility No   Pt. requires assistance with Housework;Driving;Meals   Services in place prior to admission DME/Supplies at home   Type of DME/Supplies Rollator Walker     Sw met with pt and her d-in-law Nathalie to assess for dc needs.  Pt admitted due to growths around her colostomy site.  Pt will need surgery but needs to be off Eliquis.  Plan is to dc home today and return next Wednesday for surgery.    Pt is normally ambulatory with her walker.   Pt had HHC but it recently ended.    No dc needs at this time. Daughter-in-law acknowledged that pt may have needs after her next admission following surgery.    Maeve Castillo LCSW  /Discharge Planner

## 2024-03-27 NOTE — DIETARY NOTE
[facility]   part of [facility]   CLINICAL NUTRITION    Emmie Loza     Admitting diagnosis:  Abdominal pain, generalized [R10.84]  Abdominal mass, unspecified abdominal location [R19.00]  Chronic kidney disease, unspecified CKD stage [N18.9]    Ht: 152.4 cm (5')  Wt: 37.7 kg (83 lb 1.6 oz).   Body mass index is 16.23 kg/m².  IBW: 45kg    Wt Readings from Last 20 Encounters:   03/26/24 37.7 kg (83 lb 1.6 oz)   03/26/24 39 kg (86 lb)   02/27/24 40.5 kg (89 lb 4 oz)   02/13/24 44.5 kg (98 lb)   02/02/24 41.9 kg (92 lb 6 oz)   07/05/23 37.8 kg (83 lb 4 oz)     Labs/Meds reviewed    Diet:       Procedures    Regular/General diet Is Patient on Accuchecks? No     Percent Meals Eaten (last 3 days)       None          Pt chart reviewed d/t low BMI.  89 year old female presented with intermittent bleeding from her colostomy. Per MD, probable recurrent colon cancer involving portion of colon at colostomy site and large parastomal hernia noted. Plan for subtotal colectomy.  Patient reports good appetite at this time. Just finished lunch - about 50%. Quantity at meals has been normal. No intolerances.  Nursing notes reports   intake for last meal.  Tolerating po diet without diarrhea, emesis, or constipation.   No significant weight changes noted per family member. Per EPIC down about 6# (6.7%) x 1 month.     Patient is at low nutrition risk at this time.    Please consult if patient status changes or nutrition issues arise.    Sunni Pacheco RD, LDN  Clinical Nutrition  [phone]

## 2024-03-27 NOTE — H&P (VIEW-ONLY)
[facility]  Report of Consultation    Emmie Loza Patient Status:  Inpatient    1932 MRN [id number]   Location [facility] 3NW-A Attending Herminia Hirsch MD   Hosp Day # 1 PCP Carmen Tapia MD     Reason for Consultation:  Colon mass    History of Present Illness:  Emmie Loza is a a(n) 89 year old female.  Patient has a 2-month history of intermittent bleeding from her colostomy.  She was seen by gastroenterology yesterday whom recommended for her to be admitted to the hospital for further evaluation and treatment after assessment of her colostomy.  She has developed growths on the end colostomy which appear to be consistent with malignancy.  She has a history of left hemicolectomy and creation of transverse colostomy a few years ago for colon cancer.  She has a history of Bishop syndrome.  She has had no bleeding in the past several weeks from this.  She is on Eliquis.  She is currently stable.  She has no active acute issues.    History:  Past Medical History:   Diagnosis Date    Arthritis     Atrial fibrillation (HCC)     Back pain     Colon cancer (HCC)     Colon cancer (HCC)     Congestive heart disease (HCC)     Easy bruising     Endometrial cancer (HCC)     UTERINE, DX ABOUT 30 YEARS    Endometrial cancer (HCC)     UTERINE, DX ABOUT 30 YEARS    Hearing loss     Heart palpitations     Afib    History of cardiac murmur     AFib    Kidney disease     Kidney failure     Leg swelling 2024    Bishop syndrome     Pain in joints     Renal disorder     Wears glasses     Weight loss      Past Surgical History:   Procedure Laterality Date    COLECTOMY      COLONOSCOPY      NEPHRECTOMY Left     PART REMOVAL COLON W END COLOSTOMY       Family History   Problem Relation Age of Onset    Heart Disorder Father     Heart Disorder Mother     Colon Cancer Mother         70    Heart Disorder Sister     Breast Cancer Sister     Diabetes Brother     Breast Cancer Sister     Cancer Sister      Breast Cancer Sister     Breast Cancer Daughter       reports that she has never smoked. She has never used smokeless tobacco. She reports that she does not currently use alcohol. She reports that she does not use drugs.C.    Allergies:  Allergies   Allergen Reactions    Ciprofloxacin RASH and HIVES    Penicillins RASH       Medications:    Current Facility-Administered Medications:     sodium chloride 0.9% infusion, , Intravenous, Continuous    potassium chloride 20 mEq/100mL IVPB premix 20 mEq, 20 mEq, Intravenous, Once    amiodarone (Pacerone) tab 100 mg, 100 mg, Oral, Daily    dilTIAZem ER (Dilacor XR) 24 hr cap 120 mg, 120 mg, Oral, Daily    torsemide (Demadex) tab 20 mg, 20 mg, Oral, Daily    heparin (Porcine) 5000 UNIT/ML injection 5,000 Units, 5,000 Units, Subcutaneous, 2 times per day    acetaminophen (Tylenol Extra Strength) tab 500 mg, 500 mg, Oral, Q4H PRN    ondansetron (Zofran) 4 MG/2ML injection 4 mg, 4 mg, Intravenous, Q6H PRN    Review of Systems:    GENERAL HEALTH: otherwise feels well, no weight loss, no fever or chills  SKIN: denies any unusual skin rashes or jaundice  HEENT: denies nasal congestion, sinus pain or sore throat; hearing loss negative  RESPIRATORY: denies shortness of breath, wheezing or cough   CARDIOVASCULAR: denies chest pain or BANKS; no palpitations   GI: denies nausea, vomiting, constipation, diarrhea; no rectal bleeding; no heartburn  GENITAL/: no dysuria, urgency or frequency, no tea colored urine  MUSCULOSKELETAL: no joint complaints upper or lower extremities  HEMATOLOGY: denies hx anemia; denies bruising or excessive bleeding    Physical Exam:  Blood pressure 117/58, pulse 56, temperature 97.7 °F (36.5 °C), temperature source Oral, resp. rate 16, height 5' (1.524 m), weight 83 lb 1.6 oz (37.7 kg), SpO2 100%.    General: Alert, orientated x3.  Cooperative.  No apparent distress.  HEENT: Exam is unremarkable.  Without scleral icterus.  Mucous membranes are moist. Oropharynx  is clear.  Lungs: Clear to auscultation bilaterally.  Cardiac: Regular rate and rhythm. No murmur.  Abdomen: Soft, nondistended nontender.  Colostomy noted to be markedly abnormal with malignant growth on end of colostomy invading the skin.  Extremities:  No lower extremity edema noted.  Without clubbing or cyanosis.    Skin: Normal texture and turgor.  Neurologic: Cranial nerves are grossly intact.  Motor strength and sensory examination is grossly normal.  No focal neurologic deficit.    Laboratory Data:  Lab Results   Component Value Date    WBC 6.9 03/27/2024    HGB 9.1 03/27/2024    HCT 30.4 03/27/2024    .0 03/27/2024    CREATSERUM 3.32 03/27/2024    BUN 70 03/27/2024     03/27/2024    K 3.7 03/27/2024    K 3.7 03/27/2024     03/27/2024    CO2 10.0 03/27/2024    GLU 83 03/27/2024    CA 7.7 03/27/2024    ALB 2.9 03/26/2024    ALKPHO 92 03/26/2024    BILT 0.2 03/26/2024    TP 6.9 03/26/2024    AST 22 03/26/2024    ALT 19 03/26/2024    LIP 76 03/26/2024       CT scan independently reviewed and interpreted    Impression and Plan:  Probable recurrent colon cancer involving portion of colon at colostomy site.  Large parastomal hernia noted.  I have recommended subtotal colectomy in light of her recurrent cancer and Bishop syndrome.  This would include an ileostomy.  The rationale risk benefits and alternatives were discussed with her and her daughter.  She is currently clinically stable and on Eliquis.  Will discharge home and plan on bringing back next week Wednesday off Eliquis to perform her semi-elective surgery.  May discharge home today.  All their questions were answered.  They voiced understanding      Renny Gilbert MD  3/27/2024  10:20 AM

## 2024-03-27 NOTE — PROGRESS NOTES
NURSING DISCHARGE NOTE    Discharged Home via Wheelchair.  Accompanied by Family member  Belongings Taken by patient/family.      Pt discharged with daughter in law, pt updated on care plan - will be back for surgery next Wednesday.  Will stop taking eliquis on Monday in prep for surgery.  All other questions and concerns addressed.

## 2024-03-27 NOTE — PROGRESS NOTES
A&Ox4. Forgetful at times VSS. RA. .  GI: Abdomen soft, nondistended.   Denies nausea.  : Voids.  Pain controlled with PRN pain medications  Up with one assist  Drains: colostomy to LLQ  Diet: NPO sips with meds, ice chips  IVF running per order.  All appropriate safety measures in place. Patient updated on plan of care. All questions and concerns addressed.

## 2024-03-27 NOTE — CM/SW NOTE
Patient failed Inpatient criteria. Second level of review completed and supports Observation. UR committee in agreement. Discussed with Dr Magaña, approves.   Jia SIM RN, 03/27/24, 12:45 PM

## 2024-03-27 NOTE — CONSULTS
[facility]  Report of Consultation    Emmie Loza Patient Status:  Inpatient    1932 MRN [id number]   Location [facility] 3NW-A Attending Herminia Hirsch MD   Hosp Day # 1 PCP Carmen Tapia MD     Reason for Consultation:  Colon mass    History of Present Illness:  Emmie Loza is a a(n) 89 year old female.  Patient has a 2-month history of intermittent bleeding from her colostomy.  She was seen by gastroenterology yesterday whom recommended for her to be admitted to the hospital for further evaluation and treatment after assessment of her colostomy.  She has developed growths on the end colostomy which appear to be consistent with malignancy.  She has a history of left hemicolectomy and creation of transverse colostomy a few years ago for colon cancer.  She has a history of Bishop syndrome.  She has had no bleeding in the past several weeks from this.  She is on Eliquis.  She is currently stable.  She has no active acute issues.    History:  Past Medical History:   Diagnosis Date    Arthritis     Atrial fibrillation (HCC)     Back pain     Colon cancer (HCC)     Colon cancer (HCC)     Congestive heart disease (HCC)     Easy bruising     Endometrial cancer (HCC)     UTERINE, DX ABOUT 30 YEARS    Endometrial cancer (HCC)     UTERINE, DX ABOUT 30 YEARS    Hearing loss     Heart palpitations     Afib    History of cardiac murmur     AFib    Kidney disease     Kidney failure     Leg swelling 2024    Bishop syndrome     Pain in joints     Renal disorder     Wears glasses     Weight loss      Past Surgical History:   Procedure Laterality Date    COLECTOMY      COLONOSCOPY      NEPHRECTOMY Left     PART REMOVAL COLON W END COLOSTOMY       Family History   Problem Relation Age of Onset    Heart Disorder Father     Heart Disorder Mother     Colon Cancer Mother         70    Heart Disorder Sister     Breast Cancer Sister     Diabetes Brother     Breast Cancer Sister     Cancer Sister      Breast Cancer Sister     Breast Cancer Daughter       reports that she has never smoked. She has never used smokeless tobacco. She reports that she does not currently use alcohol. She reports that she does not use drugs.C.    Allergies:  Allergies   Allergen Reactions    Ciprofloxacin RASH and HIVES    Penicillins RASH       Medications:    Current Facility-Administered Medications:     sodium chloride 0.9% infusion, , Intravenous, Continuous    potassium chloride 20 mEq/100mL IVPB premix 20 mEq, 20 mEq, Intravenous, Once    amiodarone (Pacerone) tab 100 mg, 100 mg, Oral, Daily    dilTIAZem ER (Dilacor XR) 24 hr cap 120 mg, 120 mg, Oral, Daily    torsemide (Demadex) tab 20 mg, 20 mg, Oral, Daily    heparin (Porcine) 5000 UNIT/ML injection 5,000 Units, 5,000 Units, Subcutaneous, 2 times per day    acetaminophen (Tylenol Extra Strength) tab 500 mg, 500 mg, Oral, Q4H PRN    ondansetron (Zofran) 4 MG/2ML injection 4 mg, 4 mg, Intravenous, Q6H PRN    Review of Systems:    GENERAL HEALTH: otherwise feels well, no weight loss, no fever or chills  SKIN: denies any unusual skin rashes or jaundice  HEENT: denies nasal congestion, sinus pain or sore throat; hearing loss negative  RESPIRATORY: denies shortness of breath, wheezing or cough   CARDIOVASCULAR: denies chest pain or BANKS; no palpitations   GI: denies nausea, vomiting, constipation, diarrhea; no rectal bleeding; no heartburn  GENITAL/: no dysuria, urgency or frequency, no tea colored urine  MUSCULOSKELETAL: no joint complaints upper or lower extremities  HEMATOLOGY: denies hx anemia; denies bruising or excessive bleeding    Physical Exam:  Blood pressure 117/58, pulse 56, temperature 97.7 °F (36.5 °C), temperature source Oral, resp. rate 16, height 5' (1.524 m), weight 83 lb 1.6 oz (37.7 kg), SpO2 100%.    General: Alert, orientated x3.  Cooperative.  No apparent distress.  HEENT: Exam is unremarkable.  Without scleral icterus.  Mucous membranes are moist. Oropharynx  is clear.  Lungs: Clear to auscultation bilaterally.  Cardiac: Regular rate and rhythm. No murmur.  Abdomen: Soft, nondistended nontender.  Colostomy noted to be markedly abnormal with malignant growth on end of colostomy invading the skin.  Extremities:  No lower extremity edema noted.  Without clubbing or cyanosis.    Skin: Normal texture and turgor.  Neurologic: Cranial nerves are grossly intact.  Motor strength and sensory examination is grossly normal.  No focal neurologic deficit.    Laboratory Data:  Lab Results   Component Value Date    WBC 6.9 03/27/2024    HGB 9.1 03/27/2024    HCT 30.4 03/27/2024    .0 03/27/2024    CREATSERUM 3.32 03/27/2024    BUN 70 03/27/2024     03/27/2024    K 3.7 03/27/2024    K 3.7 03/27/2024     03/27/2024    CO2 10.0 03/27/2024    GLU 83 03/27/2024    CA 7.7 03/27/2024    ALB 2.9 03/26/2024    ALKPHO 92 03/26/2024    BILT 0.2 03/26/2024    TP 6.9 03/26/2024    AST 22 03/26/2024    ALT 19 03/26/2024    LIP 76 03/26/2024       CT scan independently reviewed and interpreted    Impression and Plan:  Probable recurrent colon cancer involving portion of colon at colostomy site.  Large parastomal hernia noted.  I have recommended subtotal colectomy in light of her recurrent cancer and Bishop syndrome.  This would include an ileostomy.  The rationale risk benefits and alternatives were discussed with her and her daughter.  She is currently clinically stable and on Eliquis.  Will discharge home and plan on bringing back next week Wednesday off Eliquis to perform her semi-elective surgery.  May discharge home today.  All their questions were answered.  They voiced understanding      Renny Gilbert MD  3/27/2024  10:20 AM

## 2024-03-27 NOTE — DISCHARGE INSTRUCTIONS
HOLD eliquis starting Monday 4/1, plan for surgery on Wednesday 4/3.    Surgery scheduler to call you with preop instructions and time.

## 2024-03-27 NOTE — H&P
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 Concern for recurrence at stoma. Was seen by GI and was noted to have asym swelling/masses on the ostomy concerning for malignancy and was sent to the ED for eval and admission. Pt states she has had abdominal discomfort, mild, no nausea or vomiting, no wt loss, no fevers or chills. CT a/p showed parastomal hernia, non obstr loops of colon. Routine labs showed creatinine 3.6, hgb 10.6, Surgery was consulted and pt was admitted for further care and management.     Past Medical History:  Past Medical History:   Diagnosis Date    Arthritis     Atrial fibrillation (HCC)     Back pain     Colon cancer (HCC)     Colon cancer (HCC)     Congestive heart disease (HCC)     Easy bruising     Endometrial cancer (HCC)     UTERINE, DX ABOUT 30 YEARS    Endometrial cancer (HCC)     UTERINE, DX ABOUT 30 YEARS    Hearing loss     Heart palpitations     Afib    History of cardiac murmur     AFib    Kidney disease     Kidney failure     Leg swelling 2024    Bishop syndrome     Pain in joints     Renal disorder     Wears glasses     Weight loss         Past Surgical History:   Past Surgical History:   Procedure Laterality Date    COLECTOMY      COLONOSCOPY      NEPHRECTOMY Left     PART REMOVAL COLON W END COLOSTOMY         Social History:  reports that she has never smoked. She has never used smokeless tobacco. She reports that she does not currently use alcohol. She reports that she does not use drugs.    Family  History:   Family History   Problem Relation Age of Onset    Heart Disorder Father     Heart Disorder Mother     Colon Cancer Mother         70    Heart Disorder Sister     Breast Cancer Sister     Diabetes Brother     Breast Cancer Sister     Cancer Sister     Breast Cancer Sister     Breast Cancer Daughter         Allergies:   Allergies   Allergen Reactions    Ciprofloxacin RASH and HIVES    Penicillins RASH       Medications:    Current Facility-Administered Medications on File Prior to Encounter   Medication Dose Route Frequency Provider Last Rate Last Admin    [COMPLETED] magnesium sulfate in sterile water for injection 2 g/50mL IVPB premix 2 g  2 g Intravenous Once Fabiana Miller MD 50 mL/hr at 02/01/24 1030 2 g at 02/01/24 1030    [COMPLETED] potassium chloride (K-Dur) tab 20 mEq  20 mEq Oral Once Yuki Iqbal MD   20 mEq at 01/31/24 1000    [COMPLETED] potassium chloride (K-Dur) tab 20 mEq  20 mEq Oral Once Fabiana Miller MD   20 mEq at 01/31/24 1832    [COMPLETED] magnesium sulfate in dextrose 5% 1 g/100mL infusion premix 1 g  1 g Intravenous Once Yuki Iqbal  mL/hr at 01/30/24 1048 1 g at 01/30/24 1048    [COMPLETED] potassium chloride 40 mEq in 250mL sodium chloride 0.9% IVPB premix  40 mEq Intravenous Once Fabiana Miller MD 62.5 mL/hr at 01/30/24 1313 40 mEq at 01/30/24 1313    [COMPLETED] sodium chloride 0.9 % IV bolus 500 mL  500 mL Intravenous Once Josie Tirado MD   Stopped at 01/29/24 2038    [COMPLETED] potassium chloride (K-Dur) tab 20 mEq  20 mEq Oral Once Josie Tirado MD   20 mEq at 01/29/24 1925     Current Outpatient Medications on File Prior to Encounter   Medication Sig Dispense Refill    amiodarone 100 MG Oral Tab Take 1 tablet (100 mg total) by mouth daily.      torsemide 20 MG Oral Tab Take 1 tablet (20 mg total) by mouth daily. 30 tablet 3    apixaban 2.5 MG Oral Tab Take 1 tablet (2.5 mg total) by mouth 2 (two) times daily.      dilTIAZem  MG  Oral Capsule SR 24 Hr Take 1 capsule (120 mg total) by mouth daily.      PANTOPRAZOLE 40 MG Oral Tab EC TAKE ONE TABLET BY MOUTH ONE TIME DAILY (Patient not taking: Reported on 3/26/2024) 90 tablet 0    Calcium Carbonate-Vitamin D (OYSTER SHELL CALCIUM/D) 500-200 MG-UNIT Oral Tab Take 1 tablet by mouth daily.      aspirin 81 MG Oral Tab Take by mouth.      cyanocobalamin 1000 MCG Oral Tab Take by mouth.      Ferrous Sulfate 325 (65 Fe) MG Oral Tab Take by mouth. (Patient not taking: Reported on 3/26/2024)      Multiple Vitamin (MULTIVITAMINS OR)       acetaminophen 500 MG Oral Tab Take by mouth.         Review of Systems:   A comprehensive 14 point review of systems was completed.    Pertinent positives and negatives noted in the HPI.    Physical Exam:    /58 (BP Location: Right arm)   Pulse 56   Temp 97.7 °F (36.5 °C) (Oral)   Resp 16   Ht 5' (1.524 m)   Wt 83 lb 1.6 oz (37.7 kg)   SpO2 100%   BMI 16.23 kg/m²   General: No acute distress. Alert and oriented x 3.  HEENT: Normocephalic atraumatic. Moist mucous membranes. EOM-I. PERRLA. Anicteric.  Neck: No lymphadenopathy. No JVD. No carotid bruits.  Respiratory: Clear to auscultation bilaterally. No wheezes. No rhonchi.  Cardiovascular: S1, S2. Regular rate and rhythm. No murmurs, rubs or gallops. Equal pulses.   Chest and Back: No tenderness or deformity.  Abdomen: Soft, nontender, nondistended.  Positive bowel sounds. No rebound, guarding or organomegaly.  Neurologic: No focal neurological deficits. CNII-XII grossly intact.  Musculoskeletal: Moves all extremities.  Extremities: No edema or cyanosis.  Integument: No rashes or lesions.   Psychiatric: Appropriate mood and affect.      Diagnostic Data:      Labs:  Recent Labs   Lab 03/26/24  1725 03/27/24  0510   WBC 6.5 6.9   HGB 10.6* 9.1*   MCV 94.4 95.3   .0 259.0       Recent Labs   Lab 03/26/24  1725 03/27/24  0510   * 83   BUN 76* 70*   CREATSERUM 3.62* 3.32*   CA 8.0* 7.7*   ALB 2.9*   --     147*   K 3.5 3.7  3.7   * 125*   CO2 14.0* 10.0*   ALKPHO 92  --    AST 22  --    ALT 19  --    BILT 0.2  --    TP 6.9  --        Estimated Creatinine Clearance: 6.6 mL/min (A) (based on SCr of 3.32 mg/dL (H)).    No results for input(s): \"PTP\", \"INR\" in the last 168 hours.    COVID-19 Lab Results    COVID-19  No results found for: \"COVID19\"    Pro-Calcitonin  No results for input(s): \"PCT\" in the last 168 hours.    Cardiac  No results for input(s): \"TROP\", \"PBNP\" in the last 168 hours.    Creatinine Kinase  No results for input(s): \"CK\" in the last 168 hours.    Inflammatory Markers  No results for input(s): \"CRP\", \"BRANDYN\", \"LDH\", \"DDIMER\" in the last 168 hours.    No results for input(s): \"TROP\", \"TROPHS\", \"CK\" in the last 168 hours.    Imaging: Imaging data reviewed in Epic.      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    Bloody output and growths at stomal site - c/f malignancy  Bishop syndrome with hx of colon ca s/p colostomy  -admit  -pain control  -CT reviewed  -Surgery consulted >> apprec recs  -supportive care  -hold eliquis in case intervention is needed    Anemia - of chronic disease  -monitor bloody output from stoma  -recheck labs in the morning  -transfuse to maintain hgb >7    Afib on eliquis  -eliquis on hold    HTN  -resume home meds    DL  -statin    GERD  -PPI    CKD III  -consider nephro consult  -monitor renal function        Quality:  DVT Prophylaxis: heparin, holding eliquis  CODE status: full code  Huizar: no  If COVID testing is negative, may discontinue isolation: yes     Plan of care discussed with patient and all questions answered.        Herminia Hirsch MD  [facility]  Pager [phone]  Answering Service number: [phone]            **Certification      PHYSICIAN Certification of  Need for Inpatient Hospitalization - Initial Certification    Patient will require inpatient services that will reasonably be expected to span two midnight's based on the clinical documentation in H+P.   Based on patients current state of illness, I anticipate that, after discharge, patient will require TBD.

## 2024-03-28 RX ORDER — MAGNESIUM OXIDE 400 MG (241.3 MG MAGNESIUM) TABLET
325 TABLET
Status: ON HOLD | COMMUNITY

## 2024-03-28 RX ORDER — POTASSIUM CHLORIDE 1.5 G/1.58G
20 POWDER, FOR SOLUTION ORAL DAILY
Status: ON HOLD | COMMUNITY
End: 2024-04-03 | Stop reason: CLARIF

## 2024-03-28 RX ORDER — SODIUM CHLORIDE, SODIUM LACTATE, POTASSIUM CHLORIDE, CALCIUM CHLORIDE 600; 310; 30; 20 MG/100ML; MG/100ML; MG/100ML; MG/100ML
INJECTION, SOLUTION INTRAVENOUS CONTINUOUS
Status: CANCELLED | OUTPATIENT
Start: 2024-03-28

## 2024-03-29 ENCOUNTER — ANESTHESIA EVENT (OUTPATIENT)
Dept: SURGERY | Facility: HOSPITAL | Age: 89
End: 2024-03-29
Payer: MEDICARE

## 2024-04-03 ENCOUNTER — HOSPITAL ENCOUNTER (INPATIENT)
Facility: HOSPITAL | Age: 89
LOS: 5 days | Discharge: NURSING, REHAB OR LONG-TERM CARE | End: 2024-04-08
Attending: SURGERY | Admitting: SURGERY

## 2024-04-03 ENCOUNTER — HOSPITAL ENCOUNTER (INPATIENT)
Facility: HOSPITAL | Age: 89
LOS: 5 days | Discharge: NURSING, REHAB OR LONG-TERM CARE | DRG: 329 | End: 2024-04-08
Attending: SURGERY | Admitting: SURGERY

## 2024-04-03 ENCOUNTER — ANESTHESIA (OUTPATIENT)
Dept: SURGERY | Facility: HOSPITAL | Age: 89
End: 2024-04-03
Payer: MEDICARE

## 2024-04-03 DIAGNOSIS — C18.9 COLON CANCER (HCC): Primary | ICD-10-CM

## 2024-04-03 PROBLEM — Z90.710 HX OF HYSTERECTOMY: Status: RESOLVED | Noted: 2020-07-15 | Resolved: 2024-04-03

## 2024-04-03 PROBLEM — N18.4 CKD (CHRONIC KIDNEY DISEASE) STAGE 4, GFR 15-29 ML/MIN (HCC): Status: ACTIVE | Noted: 2024-03-26

## 2024-04-03 PROBLEM — R57.9 SHOCK (HCC): Status: ACTIVE | Noted: 2024-04-03

## 2024-04-03 LAB
ALBUMIN SERPL-MCNC: 2.3 G/DL (ref 3.4–5)
ALBUMIN/GLOB SERPL: 0.8 {RATIO} (ref 1–2)
ALP LIVER SERPL-CCNC: 73 U/L
ALT SERPL-CCNC: 18 U/L
ANION GAP SERPL CALC-SCNC: 15 MMOL/L (ref 0–18)
AST SERPL-CCNC: 20 U/L (ref 15–37)
BASE EXCESS BLD CALC-SCNC: -13 MMOL/L
BASE EXCESS BLD CALC-SCNC: -18 MMOL/L
BASE EXCESS BLDA CALC-SCNC: -23 MMOL/L (ref ?–30)
BASOPHILS # BLD AUTO: 0.01 X10(3) UL (ref 0–0.2)
BASOPHILS NFR BLD AUTO: 0.1 %
BILIRUB SERPL-MCNC: 0.1 MG/DL (ref 0.1–2)
BUN BLD-MCNC: 65 MG/DL (ref 9–23)
CA-I BLD-SCNC: 1.02 MMOL/L (ref 1.12–1.32)
CA-I BLD-SCNC: 1.08 MMOL/L (ref 1.12–1.32)
CA-I BLDA-SCNC: 1.14 MMOL/L (ref 1.12–1.32)
CALCIUM BLD-MCNC: 7.2 MG/DL (ref 8.5–10.1)
CHLORIDE SERPL-SCNC: 118 MMOL/L (ref 98–112)
CO2 BLD-SCNC: 12 MMOL/L (ref 22–32)
CO2 BLD-SCNC: 15 MMOL/L (ref 22–32)
CO2 BLDA-SCNC: 9 MMOL/L (ref 22–32)
CO2 SERPL-SCNC: 15 MMOL/L (ref 21–32)
CREAT BLD-MCNC: 4.31 MG/DL
EGFRCR SERPLBLD CKD-EPI 2021: 9 ML/MIN/1.73M2 (ref 60–?)
EOSINOPHIL # BLD AUTO: 0.01 X10(3) UL (ref 0–0.7)
EOSINOPHIL NFR BLD AUTO: 0.1 %
ERYTHROCYTE [DISTWIDTH] IN BLOOD BY AUTOMATED COUNT: 19 %
GLOBULIN PLAS-MCNC: 2.8 G/DL (ref 2.8–4.4)
GLUCOSE BLD-MCNC: 183 MG/DL (ref 70–99)
GLUCOSE BLD-MCNC: 211 MG/DL (ref 70–99)
GLUCOSE BLD-MCNC: 213 MG/DL (ref 70–99)
GLUCOSE BLDA-MCNC: 204 MG/DL (ref 70–99)
HCO3 BLD-SCNC: 11.1 MEQ/L
HCO3 BLD-SCNC: 13.9 MEQ/L
HCO3 BLDA-SCNC: 7.9 MEQ/L (ref 22–26)
HCT VFR BLD AUTO: 23.7 %
HCT VFR BLD CALC: 22 %
HCT VFR BLD CALC: 24 %
HCT VFR BLDA CALC: 25 %
HGB BLD-MCNC: 7.6 G/DL
IMM GRANULOCYTES # BLD AUTO: 0.05 X10(3) UL (ref 0–1)
IMM GRANULOCYTES NFR BLD: 0.5 %
INR BLD: 1.19 (ref 0.8–1.2)
LYMPHOCYTES # BLD AUTO: 0.2 X10(3) UL (ref 1–4)
LYMPHOCYTES NFR BLD AUTO: 2 %
MCH RBC QN AUTO: 28.6 PG (ref 26–34)
MCHC RBC AUTO-ENTMCNC: 32.1 G/DL (ref 31–37)
MCV RBC AUTO: 89.1 FL
MONOCYTES # BLD AUTO: 0.03 X10(3) UL (ref 0.1–1)
MONOCYTES NFR BLD AUTO: 0.3 %
MRSA DNA SPEC QL NAA+PROBE: NEGATIVE
NEUTROPHILS # BLD AUTO: 9.59 X10 (3) UL (ref 1.5–7.7)
NEUTROPHILS # BLD AUTO: 9.59 X10(3) UL (ref 1.5–7.7)
NEUTROPHILS NFR BLD AUTO: 97 %
OSMOLALITY SERPL CALC.SUM OF ELEC: 329 MOSM/KG (ref 275–295)
PCO2 BLD: 31.5 MMHG
PCO2 BLD: 32.7 MMHG
PCO2 BLDA: 28.9 MMHG (ref 35–45)
PH BLD: 7.14 [PH]
PH BLD: 7.25 [PH]
PH BLDA: 7.04 [PH] (ref 7.35–7.45)
PLATELET # BLD AUTO: 208 10(3)UL (ref 150–450)
PO2 BLD: 375 MMHG
PO2 BLD: 378 MMHG
PO2 BLDA: 299 MMHG (ref 80–105)
POTASSIUM BLD-SCNC: 2.8 MMOL/L (ref 3.6–5.1)
POTASSIUM BLD-SCNC: 2.8 MMOL/L (ref 3.6–5.1)
POTASSIUM SERPL-SCNC: 3.2 MMOL/L (ref 3.5–5.1)
POTASSIUM SERPL-SCNC: 4.2 MMOL/L (ref 3.5–5.1)
PROT SERPL-MCNC: 5.1 G/DL (ref 6.4–8.2)
PROTHROMBIN TIME: 15.1 SECONDS (ref 11.6–14.8)
RBC # BLD AUTO: 2.66 X10(6)UL
SAO2 % BLD: 100 %
SAO2 % BLD: 100 %
SAO2 % BLDA: 100 % (ref 92–100)
SODIUM BLD-SCNC: 143 MMOL/L (ref 136–145)
SODIUM BLD-SCNC: 144 MMOL/L (ref 136–145)
SODIUM BLDA-SCNC: 142 MMOL/L (ref 136–145)
SODIUM BLDA-SCNC: 2.8 MMOL/L (ref 3.6–5.1)
SODIUM SERPL-SCNC: 148 MMOL/L (ref 136–145)
WBC # BLD AUTO: 9.9 X10(3) UL (ref 4–11)

## 2024-04-03 PROCEDURE — 0DBG0ZZ EXCISION OF LEFT LARGE INTESTINE, OPEN APPROACH: ICD-10-PCS | Performed by: SURGERY

## 2024-04-03 PROCEDURE — 99223 1ST HOSP IP/OBS HIGH 75: CPT | Performed by: INTERNAL MEDICINE

## 2024-04-03 PROCEDURE — 0DN80ZZ RELEASE SMALL INTESTINE, OPEN APPROACH: ICD-10-PCS | Performed by: SURGERY

## 2024-04-03 PROCEDURE — 0D1L074 BYPASS TRANSVERSE COLON TO CUTANEOUS WITH AUTOLOGOUS TISSUE SUBSTITUTE, OPEN APPROACH: ICD-10-PCS | Performed by: SURGERY

## 2024-04-03 PROCEDURE — 99291 CRITICAL CARE FIRST HOUR: CPT | Performed by: INTERNAL MEDICINE

## 2024-04-03 RX ORDER — POLYETHYLENE GLYCOL 3350 17 G/17G
17 POWDER, FOR SOLUTION ORAL DAILY PRN
Status: DISCONTINUED | OUTPATIENT
Start: 2024-04-03 | End: 2024-04-08

## 2024-04-03 RX ORDER — LABETALOL HYDROCHLORIDE 5 MG/ML
5 INJECTION, SOLUTION INTRAVENOUS EVERY 5 MIN PRN
Status: ACTIVE | OUTPATIENT
Start: 2024-04-03 | End: 2024-04-03

## 2024-04-03 RX ORDER — DIPHENHYDRAMINE HYDROCHLORIDE 50 MG/ML
12.5 INJECTION INTRAMUSCULAR; INTRAVENOUS AS NEEDED
Status: ACTIVE | OUTPATIENT
Start: 2024-04-03 | End: 2024-04-03

## 2024-04-03 RX ORDER — HYDROMORPHONE HYDROCHLORIDE 1 MG/ML
0.2 INJECTION, SOLUTION INTRAMUSCULAR; INTRAVENOUS; SUBCUTANEOUS EVERY 5 MIN PRN
Status: ACTIVE | OUTPATIENT
Start: 2024-04-03 | End: 2024-04-03

## 2024-04-03 RX ORDER — OXYCODONE HYDROCHLORIDE 5 MG/1
5 TABLET ORAL EVERY 4 HOURS PRN
Status: DISCONTINUED | OUTPATIENT
Start: 2024-04-03 | End: 2024-04-08

## 2024-04-03 RX ORDER — SENNOSIDES 8.6 MG
17.2 TABLET ORAL NIGHTLY PRN
Status: DISCONTINUED | OUTPATIENT
Start: 2024-04-03 | End: 2024-04-08

## 2024-04-03 RX ORDER — ENEMA 19; 7 G/133ML; G/133ML
1 ENEMA RECTAL ONCE AS NEEDED
Status: DISCONTINUED | OUTPATIENT
Start: 2024-04-03 | End: 2024-04-08

## 2024-04-03 RX ORDER — POTASSIUM CHLORIDE 750 MG/1
20 TABLET, EXTENDED RELEASE ORAL DAILY
Status: ON HOLD | COMMUNITY

## 2024-04-03 RX ORDER — BISACODYL 10 MG
10 SUPPOSITORY, RECTAL RECTAL
Status: DISCONTINUED | OUTPATIENT
Start: 2024-04-03 | End: 2024-04-08

## 2024-04-03 RX ORDER — MEPERIDINE HYDROCHLORIDE 25 MG/ML
12.5 INJECTION INTRAMUSCULAR; INTRAVENOUS; SUBCUTANEOUS AS NEEDED
Status: DISCONTINUED | OUTPATIENT
Start: 2024-04-03 | End: 2024-04-08

## 2024-04-03 RX ORDER — FAMOTIDINE 20 MG/1
20 TABLET, FILM COATED ORAL DAILY
Status: DISCONTINUED | OUTPATIENT
Start: 2024-04-03 | End: 2024-04-08

## 2024-04-03 RX ORDER — HYDROMORPHONE HYDROCHLORIDE 1 MG/ML
0.2 INJECTION, SOLUTION INTRAMUSCULAR; INTRAVENOUS; SUBCUTANEOUS EVERY 2 HOUR PRN
Status: DISCONTINUED | OUTPATIENT
Start: 2024-04-03 | End: 2024-04-08

## 2024-04-03 RX ORDER — OXYCODONE HYDROCHLORIDE 5 MG/1
2.5 TABLET ORAL EVERY 4 HOURS PRN
Status: DISCONTINUED | OUTPATIENT
Start: 2024-04-03 | End: 2024-04-08

## 2024-04-03 RX ORDER — METRONIDAZOLE 500 MG/100ML
500 INJECTION, SOLUTION INTRAVENOUS ONCE
Status: COMPLETED | OUTPATIENT
Start: 2024-04-03 | End: 2024-04-03

## 2024-04-03 RX ORDER — SODIUM CHLORIDE, SODIUM LACTATE, POTASSIUM CHLORIDE, CALCIUM CHLORIDE 600; 310; 30; 20 MG/100ML; MG/100ML; MG/100ML; MG/100ML
INJECTION, SOLUTION INTRAVENOUS CONTINUOUS
Status: DISCONTINUED | OUTPATIENT
Start: 2024-04-03 | End: 2024-04-03

## 2024-04-03 RX ORDER — HYDROMORPHONE HYDROCHLORIDE 1 MG/ML
0.4 INJECTION, SOLUTION INTRAMUSCULAR; INTRAVENOUS; SUBCUTANEOUS EVERY 2 HOUR PRN
Status: DISCONTINUED | OUTPATIENT
Start: 2024-04-03 | End: 2024-04-08

## 2024-04-03 RX ORDER — METOCLOPRAMIDE HYDROCHLORIDE 5 MG/ML
10 INJECTION INTRAMUSCULAR; INTRAVENOUS EVERY 8 HOURS PRN
Status: DISCONTINUED | OUTPATIENT
Start: 2024-04-03 | End: 2024-04-08

## 2024-04-03 RX ORDER — ENOXAPARIN SODIUM 100 MG/ML
40 INJECTION SUBCUTANEOUS DAILY
Status: DISCONTINUED | OUTPATIENT
Start: 2024-04-03 | End: 2024-04-03

## 2024-04-03 RX ORDER — SODIUM CHLORIDE 9 MG/ML
INJECTION, SOLUTION INTRAVENOUS CONTINUOUS
Status: DISCONTINUED | OUTPATIENT
Start: 2024-04-03 | End: 2024-04-03

## 2024-04-03 RX ORDER — HYDROMORPHONE HYDROCHLORIDE 1 MG/ML
0.4 INJECTION, SOLUTION INTRAMUSCULAR; INTRAVENOUS; SUBCUTANEOUS EVERY 5 MIN PRN
Status: ACTIVE | OUTPATIENT
Start: 2024-04-03 | End: 2024-04-03

## 2024-04-03 RX ORDER — METOCLOPRAMIDE HYDROCHLORIDE 5 MG/ML
10 INJECTION INTRAMUSCULAR; INTRAVENOUS EVERY 8 HOURS PRN
Status: DISCONTINUED | OUTPATIENT
Start: 2024-04-03 | End: 2024-04-03

## 2024-04-03 RX ORDER — GLYCOPYRROLATE 0.2 MG/ML
INJECTION, SOLUTION INTRAMUSCULAR; INTRAVENOUS AS NEEDED
Status: DISCONTINUED | OUTPATIENT
Start: 2024-04-03 | End: 2024-04-03 | Stop reason: SURG

## 2024-04-03 RX ORDER — ACETAMINOPHEN 500 MG
500 TABLET ORAL ONCE
Status: DISCONTINUED | OUTPATIENT
Start: 2024-04-03 | End: 2024-04-03 | Stop reason: HOSPADM

## 2024-04-03 RX ORDER — ROCURONIUM BROMIDE 10 MG/ML
INJECTION, SOLUTION INTRAVENOUS AS NEEDED
Status: DISCONTINUED | OUTPATIENT
Start: 2024-04-03 | End: 2024-04-03 | Stop reason: SURG

## 2024-04-03 RX ORDER — METOCLOPRAMIDE HYDROCHLORIDE 5 MG/ML
INJECTION INTRAMUSCULAR; INTRAVENOUS AS NEEDED
Status: DISCONTINUED | OUTPATIENT
Start: 2024-04-03 | End: 2024-04-03 | Stop reason: SURG

## 2024-04-03 RX ORDER — ONDANSETRON 2 MG/ML
4 INJECTION INTRAMUSCULAR; INTRAVENOUS EVERY 6 HOURS PRN
Status: DISCONTINUED | OUTPATIENT
Start: 2024-04-03 | End: 2024-04-08

## 2024-04-03 RX ORDER — ACETAMINOPHEN 10 MG/ML
INJECTION, SOLUTION INTRAVENOUS AS NEEDED
Status: DISCONTINUED | OUTPATIENT
Start: 2024-04-03 | End: 2024-04-03 | Stop reason: SURG

## 2024-04-03 RX ORDER — BUPIVACAINE HYDROCHLORIDE 5 MG/ML
INJECTION, SOLUTION EPIDURAL; INTRACAUDAL AS NEEDED
Status: DISCONTINUED | OUTPATIENT
Start: 2024-04-03 | End: 2024-04-03 | Stop reason: HOSPADM

## 2024-04-03 RX ORDER — LIDOCAINE HYDROCHLORIDE 10 MG/ML
INJECTION, SOLUTION INFILTRATION; PERINEURAL AS NEEDED
Status: DISCONTINUED | OUTPATIENT
Start: 2024-04-03 | End: 2024-04-03 | Stop reason: HOSPADM

## 2024-04-03 RX ORDER — LIDOCAINE HYDROCHLORIDE ANHYDROUS AND DEXTROSE MONOHYDRATE .8; 5 G/100ML; G/100ML
INJECTION, SOLUTION INTRAVENOUS CONTINUOUS PRN
Status: DISCONTINUED | OUTPATIENT
Start: 2024-04-03 | End: 2024-04-03 | Stop reason: SURG

## 2024-04-03 RX ORDER — HEPARIN SODIUM 5000 [USP'U]/ML
5000 INJECTION, SOLUTION INTRAVENOUS; SUBCUTANEOUS EVERY 12 HOURS
Status: DISCONTINUED | OUTPATIENT
Start: 2024-04-03 | End: 2024-04-08

## 2024-04-03 RX ORDER — NALOXONE HYDROCHLORIDE 0.4 MG/ML
80 INJECTION, SOLUTION INTRAMUSCULAR; INTRAVENOUS; SUBCUTANEOUS AS NEEDED
Status: ACTIVE | OUTPATIENT
Start: 2024-04-03 | End: 2024-04-03

## 2024-04-03 RX ORDER — TRANEXAMIC ACID 10 MG/ML
INJECTION, SOLUTION INTRAVENOUS AS NEEDED
Status: DISCONTINUED | OUTPATIENT
Start: 2024-04-03 | End: 2024-04-03 | Stop reason: SURG

## 2024-04-03 RX ORDER — FAMOTIDINE 10 MG/ML
20 INJECTION, SOLUTION INTRAVENOUS DAILY
Status: DISCONTINUED | OUTPATIENT
Start: 2024-04-03 | End: 2024-04-08

## 2024-04-03 RX ORDER — HYDROMORPHONE HYDROCHLORIDE 1 MG/ML
0.6 INJECTION, SOLUTION INTRAMUSCULAR; INTRAVENOUS; SUBCUTANEOUS EVERY 5 MIN PRN
Status: ACTIVE | OUTPATIENT
Start: 2024-04-03 | End: 2024-04-03

## 2024-04-03 RX ORDER — HEPARIN SODIUM 5000 [USP'U]/ML
5000 INJECTION, SOLUTION INTRAVENOUS; SUBCUTANEOUS ONCE
Status: COMPLETED | OUTPATIENT
Start: 2024-04-03 | End: 2024-04-03

## 2024-04-03 RX ORDER — ONDANSETRON 2 MG/ML
4 INJECTION INTRAMUSCULAR; INTRAVENOUS EVERY 6 HOURS PRN
Status: DISCONTINUED | OUTPATIENT
Start: 2024-04-03 | End: 2024-04-03

## 2024-04-03 RX ADMIN — TRANEXAMIC ACID 1000 MG: 10 INJECTION, SOLUTION INTRAVENOUS at 08:46:00

## 2024-04-03 RX ADMIN — ACETAMINOPHEN 1000 MG: 10 INJECTION, SOLUTION INTRAVENOUS at 09:11:00

## 2024-04-03 RX ADMIN — METOCLOPRAMIDE HYDROCHLORIDE 10 MG: 5 INJECTION INTRAMUSCULAR; INTRAVENOUS at 07:39:00

## 2024-04-03 RX ADMIN — SODIUM CHLORIDE: 9 INJECTION, SOLUTION INTRAVENOUS at 09:49:00

## 2024-04-03 RX ADMIN — SODIUM CHLORIDE: 9 INJECTION, SOLUTION INTRAVENOUS at 08:24:00

## 2024-04-03 RX ADMIN — LIDOCAINE HYDROCHLORIDE ANHYDROUS AND DEXTROSE MONOHYDRATE 2 MG/KG/HR: .8; 5 INJECTION, SOLUTION INTRAVENOUS at 07:46:00

## 2024-04-03 RX ADMIN — ROCURONIUM BROMIDE 30 MG: 10 INJECTION, SOLUTION INTRAVENOUS at 07:45:00

## 2024-04-03 RX ADMIN — LIDOCAINE HYDROCHLORIDE ANHYDROUS AND DEXTROSE MONOHYDRATE 56 MG: .8; 5 INJECTION, SOLUTION INTRAVENOUS at 07:45:00

## 2024-04-03 RX ADMIN — SODIUM CHLORIDE: 9 INJECTION, SOLUTION INTRAVENOUS at 07:33:00

## 2024-04-03 RX ADMIN — ROCURONIUM BROMIDE 10 MG: 10 INJECTION, SOLUTION INTRAVENOUS at 09:00:00

## 2024-04-03 RX ADMIN — Medication 50 ML: at 09:40:00

## 2024-04-03 RX ADMIN — Medication 50 ML: at 09:02:00

## 2024-04-03 RX ADMIN — METRONIDAZOLE 500 MG: 500 INJECTION, SOLUTION INTRAVENOUS at 07:49:00

## 2024-04-03 RX ADMIN — GLYCOPYRROLATE 0.3 MG: 0.2 INJECTION, SOLUTION INTRAMUSCULAR; INTRAVENOUS at 08:18:00

## 2024-04-03 RX ADMIN — Medication 50 ML: at 09:20:00

## 2024-04-03 NOTE — ANESTHESIA PROCEDURE NOTES
Arterial Line    Date/Time: 4/3/2024 7:53 AM    Performed by: Jermain Mandel MD  Authorized by: Jermain Mandel MD    General Information and Staff    Procedure Start:  4/3/2024 7:53 AM  Procedure End:  4/3/2024 7:53 AM  Anesthesiologist:  Jermain Mandel MD  Performed By:  Anesthesiologist  Patient Location:  OR  Indication: continuous blood pressure monitoring and blood sampling needed    Site Identification: surface landmarks    Preanesthetic Checklist: 2 patient identifiers, IV checked, risks and benefits discussed, monitors and equipment checked, pre-op evaluation, timeout performed, anesthesia consent and sterile technique used    Procedure Details    Catheter Size:  20 G  Catheter Length:  1 and 3/4 inch  Catheter Type:  Arrow  Seldinger Technique?: Yes    Laterality:  Left  Site:  Radial artery  Site Prep: chlorhexidine    Line Secured:  Tape and Tegaderm    Assessment    Events: patient tolerated procedure well with no complications      Medications  4/3/2024 7:53 AM      Additional Comments

## 2024-04-03 NOTE — ANESTHESIA PROCEDURE NOTES
Airway  Date/Time: 4/3/2024 7:45 AM  Urgency: elective    Airway not difficult    General Information and Staff    Patient location during procedure: OR  Anesthesiologist: Jermain Mandel MD  Performed: anesthesiologist   Performed by: Jermain Mandel MD  Authorized by: Jermain Mandel MD      Indications and Patient Condition  Indications for airway management: anesthesia  Spontaneous Ventilation: absent  Sedation level: deep  Preoxygenated: yes  Patient position: sniffing  Mask difficulty assessment: 1 - vent by mask    Final Airway Details  Final airway type: endotracheal airway      Successful airway: ETT  Cuffed: yes   Successful intubation technique: direct laryngoscopy  Endotracheal tube insertion site: oral  Blade: Zari  Blade size: #3  ETT size (mm): 7.0    Cormack-Lehane Classification: grade IIA - partial view of glottis  Placement verified by: capnometry   Cuff volume (mL): 8  Measured from: lips  ETT to lips (cm): 22  Number of attempts at approach: 1  Number of other approaches attempted: 0

## 2024-04-03 NOTE — PLAN OF CARE
Assumed care of patient after RN report. Patient alert and oriented x3-4, forgetful. On RA. SR on monitor. Levo titrated off see MAR. [place] removed. Abdomen soft, tender. PRN for pain and nausea post op. NG to LIS with bile output. Colostomy to LLQ. Huiazr with yellow clear urine output. Renal c/s. Family updated on POC. See flowsheet for full assessment.

## 2024-04-03 NOTE — CONSULTS
Pulmonary / Critical Care H&P/Consult       NAME[facility] Sunita Lo[facility] - MRN: [id number] - Age: 89 year old - :  1932    Date of Admission: 4/3/2024  5:39 AM  Admission Diagnosis: COLON CANCER    Reason for consult: icu management       History of Present Illness: 89 yo F with h/o colon ca, prior colostomy. Noted to have intermittent bleeding. Noted to have growth c/w malignancy. She is now s/p ex lap with takedown of colostomy and partial colon resection, new colostomy placement. Postop hypotensive, on levo, admitted to icu. Pt reports pain     Past Medical History:   Diagnosis Date    Arthritis     Atrial fibrillation (HCC)     Back pain     CKD (chronic kidney disease)     and S/P nephrectomy    Colon cancer (HCC)     Congestive heart disease (HCC)     Easy bruising     Endometrial cancer (HCC)     UTERINE, DX ABOUT 30 YEARS    Hearing impairment     AIDS    Heart palpitations     Afib    High blood pressure     Kidney failure     Leg swelling 2024    Bishop syndrome     hereditary colorectal cancer    Muscle weakness     WALKER    Pain in joints     Wears glasses     Weight loss       Past Surgical History:   Procedure Laterality Date    COLECTOMY      NEPHRECTOMY Left     PART REMOVAL COLON W END COLOSTOMY          Allergies:  Allergies   Allergen Reactions    Ciprofloxacin RASH and HIVES    Penicillins RASH       Social History:  Social History     Socioeconomic History    Marital status:      Spouse name: Not on file    Number of children: Not on file    Years of education: Not on file    Highest education level: Not on file   Occupational History    Not on file   Tobacco Use    Smoking status: Never    Smokeless tobacco: Never   Vaping Use    Vaping Use: Never used   Substance and Sexual Activity    Alcohol use: Not Currently    Drug use: Never    Sexual activity: Not on file   Other Topics Concern    Not on file   Social History Narrative    Not on file     Social  Determinants of Health     Financial Resource Strain: Not on file   Food Insecurity: No Food Insecurity (3/26/2024)    Food Insecurity     Food Insecurity: Never true   Transportation Needs: No Transportation Needs (3/26/2024)    Transportation Needs     Lack of Transportation: No   Physical Activity: Not on file   Stress: Not on file   Social Connections: Not on file   Housing Stability: Low Risk  (3/26/2024)    Housing Stability     Housing Instability: No     Housing Instability Emergency: Not on file          Family History:  Family History   Problem Relation Age of Onset    Heart Disorder Father     Heart Disorder Mother     Colon Cancer Mother         70    Heart Disorder Sister     Breast Cancer Sister     Diabetes Brother     Breast Cancer Sister     Cancer Sister     Breast Cancer Sister     Breast Cancer Daughter         Home Medications:  Current Facility-Administered Medications for the 4/3/24 encounter (Hospital Encounter)   Medication Dose Route Frequency Provider Last Rate Last Admin    darbepoetin chris (Aranesp) 300 MCG/0.6ML injection 300 mcg  300 mcg Subcutaneous Once Herminia Hirsch MD        [COMPLETED] darbepoetin chris (Aranesp) 300 MCG/0.6ML injection 300 mcg  300 mcg Subcutaneous Once Fabiana Miller MD   300 mcg at 02/27/24 1343     Outpatient Medications Marked as Taking for the 4/3/24 encounter (Hospital Encounter)   Medication Sig Dispense Refill    potassium chloride 10 MEQ Oral Tab CR Take 2 tablets (20 mEq total) by mouth daily.      Glucosamine-Chondroit-Biofl-Mn (GLUCOSAMINE CHONDROIT,BIOFLAV, OR) Take 1 tablet by mouth daily.      ferrous sulfate 325 (65 FE) MG Oral Tab EC Take 1 tablet (325 mg total) by mouth daily with breakfast.      amiodarone 100 MG Oral Tab Take 1 tablet (100 mg total) by mouth daily.      torsemide 20 MG Oral Tab Take 1 tablet (20 mg total) by mouth daily. 30 tablet 3    apixaban 2.5 MG Oral Tab Take 1 tablet (2.5 mg total) by mouth 2 (two) times daily.       dilTIAZem  MG Oral Capsule SR 24 Hr Take 1 capsule (120 mg total) by mouth daily.      Calcium Carbonate-Vitamin D (OYSTER SHELL CALCIUM/D) 500-200 MG-UNIT Oral Tab Take 1 tablet by mouth daily.      aspirin 81 MG Oral Tab Take 1 tablet (81 mg total) by mouth daily.      cyanocobalamin 1000 MCG Oral Tab Take 100 mcg by mouth daily.      Multiple Vitamin (MULTIVITAMINS OR) Take 1 tablet by mouth daily.      acetaminophen 500 MG Oral Tab Take 1 tablet (500 mg total) by mouth in the morning and 1 tablet (500 mg total) before bedtime.         Scheduled Medication:   cefOXitin  2 g Intravenous Q8H    enoxaparin  40 mg Subcutaneous Daily    famotidine  20 mg Oral BID    Or    famotidine  20 mg Intravenous BID     Continuous Infusing Medication:   sodium chloride Stopped (04/03/24 0942)    lactated ringers      sodium chloride      norepinephrine 3 mcg/min (04/03/24 1046)     PRN Medication:oxyCODONE **OR** oxyCODONE, HYDROmorphone **OR** HYDROmorphone, polyethylene glycol (PEG 3350), sennosides, bisacodyl, fleet enema, ondansetron, metoclopramide, atropine, labetalol, naloxone, diphenhydrAMINE, meperidine, HYDROmorphone **OR** HYDROmorphone **OR** HYDROmorphone, fentaNYL **OR** fentaNYL     REVIEW OF SYSTEMS:   Reviewed and negative except per HPI    OBJECTIVE:  Vitals:    03/28/24 1510 04/03/24 0613 04/03/24 1030   BP:  126/53    Pulse:  70 71   Resp:  16 19   Temp:  98.2 °F (36.8 °C) 97.5 °F (36.4 °C)   TempSrc:  Temporal Temporal   SpO2:  100% 100%   Weight: 83 lb (37.6 kg) 82 lb 1.5 oz (37.2 kg)    Height: 5' (1.524 m) 5' (1.524 m)      O2: 3L nc               Wt Readings from Last 3 Encounters:   04/03/24 82 lb 1.5 oz (37.2 kg)   03/26/24 83 lb 1.6 oz (37.7 kg)   03/26/24 86 lb (39 kg)         Intake/Output Summary (Last 24 hours) at 4/3/2024 1057  Last data filed at 4/3/2024 0949  Gross per 24 hour   Intake 1400 ml   Output 300 ml   Net 1100 ml       /53   Pulse 71   Temp 97.5 °F (36.4 °C) (Temporal)    Resp 19   Ht 5' (1.524 m)   Wt 82 lb 1.5 oz (37.2 kg)   SpO2 100%   BMI 16.03 kg/m²     General Appearance:    Alert, cooperative, no distress, appears stated age   Head:    Normocephalic, without obvious abnormality, atraumatic   Eyes:    PERRL, conjunctiva/corneas clear   Neck:   Supple, symmetrical, trachea midline, no adenopathy;        thyroid:  No enlargement/tenderness/nodules; no carotid    bruit or JVD   Back:     Symmetric, no curvature, ROM normal, no CVA tenderness   Lungs:     Clear to auscultation bilaterally, respirations unlabored   Chest wall:    No tenderness or deformity   Heart:    Regular rate and rhythm, S1 and S2 normal, no murmur, rub   or gallop   Abdomen:     Soft, postop changes   Extremities:   Extremities normal, atraumatic, no cyanosis or edema   Pulses:   2+ and symmetric all extremities   Skin:   Skin color, texture, turgor normal, no rashes or lesions       No results for input(s): \"WBC\", \"HGB\", \"HCT\", \"PLT\" in the last 168 hours.  No results for input(s): \"INR\" in the last 168 hours.    No results for input(s): \"NA\", \"K\", \"CL\", \"CO2\", \"BUN\", \"CREATSERUM\", \"GFRAA\", \"GFRNAA\", \"GLU\", \"CA\", \"AST\", \"ALT\", \"ALKPHO\", \"BILT\", \"ALB\", \"TP\" in the last 168 hours.      CREATININE (no units)   Date Value   02/05/2020 1.81   07/15/2019 1.8     Creatinine (mg/dL)   Date Value   03/27/2024 3.32 (H)   03/26/2024 3.62 (H)   02/27/2024 3.57 (H)   03/02/2022 1.49 (H)   07/27/2021 1.47 (H)   01/29/2021 1.29 (H)   ]        ASSESSMENT/PLAN:    Shock: suspect due to fluid shifting, pain meds / anesthesia. Doubt sepsis   - IVFs  - wean pressors as able, on low dose levo   H/o colon ca: with prior colostomy, now s/p partial colon resection with colostomy   - postop management per surgery   H/o a fib  - was on eliquis, on hold due to recent gi bleeding and now surgery. Resume per surgery   HTN:   - hold home meds   Fen: diet per gi   Prophy: lmwh  Dispo: full code. Icu monitoring.     Critical Care Time  greater than: 30 Minutes            López Ocampo M.D.  [facility]  Pulmonary / Critical care  4/3/2024

## 2024-04-03 NOTE — INTERVAL H&P NOTE
Pre-op Diagnosis: COLON CANCER    The above referenced H&P was reviewed by Renny Gilbert MD on 4/3/2024, the patient was examined and no significant changes have occurred in the patient's condition since the H&P was performed.  I discussed with the patient and/or legal representative the potential benefits, risks and side effects of this procedure; the likelihood of the patient achieving goals; and potential problems that might occur during recuperation.  I discussed reasonable alternatives to the procedure, including risks, benefits and side effects related to the alternatives and risks related to not receiving this procedure.  We will proceed with procedure as planned.

## 2024-04-03 NOTE — BRIEF OP NOTE
Pre-Operative Diagnosis: COLON CANCER     Post-Operative Diagnosis: * No post-op diagnosis entered *      Procedure Performed:   Exp lap. Lysis adhesions, partial colon resectiom, takedown of colostomy and creation of colostomy    Surgeon(s) and Role:     * Renny Gilbert MD - Primary    Assistant(s):  Surgical Assistant.: Klaudia Rojas, JOSE     Surgical Findings: see dictation     Specimen: portion of colon     Estimated Blood Loss: No data recorded    Dictation Number:      Renny Gilbert MD  4/3/2024  9:46 AM

## 2024-04-03 NOTE — ANESTHESIA PREPROCEDURE EVALUATION
PRE-OP EVALUATION    Patient Name: Sunita Loza    Admit Diagnosis: COLON CANCER    Pre-op Diagnosis: COLON CANCER    EXPLORATORY LAPAROTOMY SUBTOTAL COLECTOMY WITH ILEOSTOMY CREATION    Anesthesia Procedure: EXPLORATORY LAPAROTOMY SUBTOTAL COLECTOMY WITH ILEOSTOMY CREATION    Surgeon(s) and Role:     * Renny Gilbert MD - Primary    Pre-op vitals reviewed.  Temp: 98.2 °F (36.8 °C)  Pulse: 70  Resp: 16  BP: 126/53  SpO2: 100 %  Body mass index is 7.42 kg/m².    Current medications reviewed.  Hospital Medications:   acetaminophen (Tylenol Extra Strength) tab 500 mg  500 mg Oral Once    heparin (Porcine) 5000 UNIT/ML injection 5,000 Units  5,000 Units Subcutaneous Once    cefTRIAXone (Rocephin) 2 g in D5W 100 mL IVPB-ADD  2 g Intravenous Once    metRONIDAZOLE in sodium chloride 0.79% (Flagyl) 5 mg/mL IVPB premix 500 mg  500 mg Intravenous Once    sodium chloride 0.9% infusion   Intravenous Continuous       Outpatient Medications:     Facility-Administered Medications Prior to Admission   Medication Dose Route Frequency Provider Last Rate Last Admin    darbepoetin chris (Aranesp) 300 MCG/0.6ML injection 300 mcg  300 mcg Subcutaneous Once Herminia Hirsch MD        [COMPLETED] darbepoetin chris (Aranesp) 300 MCG/0.6ML injection 300 mcg  300 mcg Subcutaneous Once Fabiana Miller MD   300 mcg at 02/27/24 1343     Medications Prior to Admission   Medication Sig Dispense Refill Last Dose    potassium chloride 20 MEQ Oral Powd Pack Take 20 mEq by mouth daily.   4/3/2024    Glucosamine-Chondroit-Biofl-Mn (GLUCOSAMINE CHONDROIT,BIOFLAV, OR) Take by mouth.   3/29/2024    ferrous sulfate 325 (65 FE) MG Oral Tab EC Take 1 tablet (325 mg total) by mouth daily with breakfast.   4/3/2024    amiodarone 100 MG Oral Tab Take 1 tablet (100 mg total) by mouth daily.   4/3/2024 at 0330    torsemide 20 MG Oral Tab Take 1 tablet (20 mg total) by mouth daily. 30 tablet 3 4/3/2024    apixaban 2.5 MG Oral Tab Take 1 tablet (2.5 mg total) by mouth  2 (two) times daily.   3/31/2024 at HS    dilTIAZem  MG Oral Capsule SR 24 Hr Take 1 capsule (120 mg total) by mouth daily.   4/3/2024    Calcium Carbonate-Vitamin D (OYSTER SHELL CALCIUM/D) 500-200 MG-UNIT Oral Tab Take 1 tablet by mouth daily.   3/29/2024    aspirin 81 MG Oral Tab Take by mouth.   3/29/2024    cyanocobalamin 1000 MCG Oral Tab Take by mouth.   3/29/2024    Multiple Vitamin (MULTIVITAMINS OR)    3/29/2024    acetaminophen 500 MG Oral Tab Take 1 tablet (500 mg total) by mouth in the morning and 1 tablet (500 mg total) before bedtime.   Past Week       Allergies: Ciprofloxacin and Penicillins      Anesthesia Evaluation    Patient summary reviewed.    Anesthetic Complications  (-) history of anesthetic complications         GI/Hepatic/Renal      (+) GERD       (+) chronic renal disease and CRI      (+) colon cancer             Cardiovascular      ECG reviewed.  Exercise tolerance: poor     MET: <=4      (+) hypertension       (-) past MI           (+) dysrhythmias and atrial fibrillation  (+) CHF (diastolic dysfxn, occ. peripheral edema)                Endo/Other      (-) diabetes         (+) anemia            (+) arthritis       Pulmonary    Negative pulmonary ROS.           (-) shortness of breath  (-) recent URI          Neuro/Psych    Negative neuro/psych ROS.                                  Past Surgical History:   Procedure Laterality Date    COLECTOMY      COLONOSCOPY      NEPHRECTOMY Left     PART REMOVAL COLON W END COLOSTOMY       Social History     Socioeconomic History    Marital status:    Tobacco Use    Smoking status: Never    Smokeless tobacco: Never   Vaping Use    Vaping Use: Never used   Substance and Sexual Activity    Alcohol use: Not Currently    Drug use: Never     History   Drug Use Unknown     Available pre-op labs reviewed.  Lab Results   Component Value Date    WBC 6.9 03/27/2024    RBC 3.19 (L) 03/27/2024    HGB 9.1 (L) 03/27/2024    HCT 30.4 (L) 03/27/2024     MCV 95.3 2024    MCH 28.5 2024    MCHC 29.9 (L) 2024    RDW 19.9 2024    .0 2024     Lab Results   Component Value Date     (H) 2024    K 3.7 2024    K 3.7 2024     (H) 2024    CO2 10.0 (LL) 2024    BUN 70 (H) 2024    CREATSERUM 3.32 (H) 2024    GLU 83 2024    CA 7.7 (L) 2024              Airway      Mallampati: I  Mouth opening: 3 FB  TM distance: 4 - 6 cm  Neck ROM: full Cardiovascular    Cardiovascular exam normal.  Rhythm: regular  Rate: normal  (-) murmur   Dental    Dentition appears grossly intact         Pulmonary    Pulmonary exam normal.  Breath sounds clear to auscultation bilaterally.        (-) wheezes  (-) rales     Other findings              Diagnostics:  [id number]  Sinus rhythm with short UT with premature supraventricular complexes   Otherwise normal ECG      Echo: 2023  -The left ventricle is normal in size. There is no left ventricular hypertrophy. Left ventricular systolic function is normal. EF = 60% (2D biplane) Grade II diastolic dysfunction (pseudonormal) is present.  -The right ventricle is normal in size. Right ventricular systolic function is normal.  -The left atrial size is mildly enlarged.  -There is mild mitral valve regurgitation.  -There is mild to moderate tricuspid valve regurgitation.  -There is mild to moderate aortic valve regurgitation.  -There is mild pulmonic valve regurgitation.        Stress Test: N/A     Coronary Angiogram:N/A        ASSESSMENT:  ? Afib  Edema  Dyspnea on exertion  HTN  Acute diastolic heart faiure     PLAN BY PROBLEM  Afib  -Converted twice while here with dilt gtt  -Continue dilt CD 120mg qday  -Continue apixaban 2.5mg bid  -Will start amio 200mg bid for 2 weeks, then 200mg qday      Edema  Dyspnea on exertion  Acute diastolic heart faiure  -Suspect diastolic heart failure  -CXR actually clear  -Switch to lasix 20mg qday. Discharge dose.      HTN  -Stop home amlopdine 5mg bid as can cause edema  -Start HCTZ 25mg   -BP better     Reasonable for discharge. Brief paroxysms of Afib are asymptomatic and now on AC. Will take days for amio to kick in fully.  Electronically signed by Luis Sotelo MD at 06/25/2023  7:42 AM CDT            Electronically signed by Luis Sotelo MD at 6/25/2023  7:42 AM       ASA: 4   Plan: general  NPO status verified and patient meets guidelines.  Patient has not taken beta blockers in last 24 hours.        Plan/risks discussed with: patient and child/children  Use of blood product(s) discussed with: patient and child/children            We discussed GA w/ETT and possible scratchy throat and rarely dental damage.  We discussed placement of arterial line.  We discussed possible PRBC transfusion.  We discussed possible ICU admission.  We discussed analgesic plan and PONV prophylaxis.  The patient's questions were answered and consent was attained.

## 2024-04-03 NOTE — CONSULTS
[facility] H&P/Consult note       CC: post op med management  Asked to see pt by dr Gilbert     PCP: Carmen Tapia MD    History of Present Illness: Patient is a 89 year old female with PMH sig for htn, pAF, bishop syndome with recurrent cancer, hx of colon resection, colostomy, recent GIB noted to have growth around stoma concerning for maligancy, here for recurrent colon surgery    Post op noted to be hypotensive and started on pressors, transferred to ICU. Main complain is feelign thirsty    PMH  Past Medical History:   Diagnosis Date    Arthritis     Atrial fibrillation (HCC)     Back pain     CKD (chronic kidney disease)     and S/P nephrectomy    Colon cancer (HCC) 2003    Congestive heart disease (HCC)     Easy bruising     Endometrial cancer (HCC)     UTERINE, DX ABOUT 30 YEARS    Hearing impairment     AIDS    Heart palpitations 6/23    Afib    High blood pressure     Kidney failure     Leg swelling Feb 2024    Bishop syndrome     hereditary colorectal cancer    Muscle weakness     WALKER    Pain in joints     Wears glasses     Weight loss 4/23        PSH  Past Surgical History:   Procedure Laterality Date    COLECTOMY      NEPHRECTOMY Left     PART REMOVAL COLON W END COLOSTOMY          ALL:  Allergies   Allergen Reactions    Ciprofloxacin RASH and HIVES    Penicillins RASH        Home Medications:  Current Facility-Administered Medications for the 4/3/24 encounter (Hospital Encounter)   Medication Dose Route Frequency Provider Last Rate Last Admin    darbepoetin chris (Aranesp) 300 MCG/0.6ML injection 300 mcg  300 mcg Subcutaneous Once Herminia Hirsch MD        [COMPLETED] darbepoetin chris (Aranesp) 300 MCG/0.6ML injection 300 mcg  300 mcg Subcutaneous Once Fabiana Miller MD   300 mcg at 02/27/24 1343     Outpatient Medications Marked as Taking for the 4/3/24 encounter (Hospital Encounter)   Medication Sig Dispense Refill    potassium chloride 10 MEQ Oral Tab CR Take 2 tablets (20 mEq total) by  mouth daily.      Glucosamine-Chondroit-Biofl-Mn (GLUCOSAMINE CHONDROIT,BIOFLAV, OR) Take 1 tablet by mouth daily.      ferrous sulfate 325 (65 FE) MG Oral Tab EC Take 1 tablet (325 mg total) by mouth daily with breakfast.      amiodarone 100 MG Oral Tab Take 1 tablet (100 mg total) by mouth daily.      torsemide 20 MG Oral Tab Take 1 tablet (20 mg total) by mouth daily. 30 tablet 3    apixaban 2.5 MG Oral Tab Take 1 tablet (2.5 mg total) by mouth 2 (two) times daily.      dilTIAZem  MG Oral Capsule SR 24 Hr Take 1 capsule (120 mg total) by mouth daily.      Calcium Carbonate-Vitamin D (OYSTER SHELL CALCIUM/D) 500-200 MG-UNIT Oral Tab Take 1 tablet by mouth daily.      aspirin 81 MG Oral Tab Take 1 tablet (81 mg total) by mouth daily.      cyanocobalamin 1000 MCG Oral Tab Take 100 mcg by mouth daily.      Multiple Vitamin (MULTIVITAMINS OR) Take 1 tablet by mouth daily.      acetaminophen 500 MG Oral Tab Take 1 tablet (500 mg total) by mouth in the morning and 1 tablet (500 mg total) before bedtime.           Soc Hx  Social History     Tobacco Use    Smoking status: Never    Smokeless tobacco: Never   Substance Use Topics    Alcohol use: Not Currently        Fam Hx  Family History   Problem Relation Age of Onset    Heart Disorder Father     Heart Disorder Mother     Colon Cancer Mother         70    Heart Disorder Sister     Breast Cancer Sister     Diabetes Brother     Breast Cancer Sister     Cancer Sister     Breast Cancer Sister     Breast Cancer Daughter        Review of Systems  Comprehensive ROS reviewed and negative except for what's stated above.  Including negative for chest pain, shortness of breath, syncope.       OBJECTIVE:  /54 (BP Location: Left arm)   Pulse 71   Temp 97.6 °F (36.4 °C) (Temporal)   Resp 19   Ht 5' (1.524 m)   Wt 82 lb 1.5 oz (37.2 kg)   SpO2 100%   BMI 16.03 kg/m²    Nad  Rrr   Ctab anteriory  Abd with mid incsion, colostomy  No edema    Diagnostic Data:     CBC/Chem  Recent Labs   Lab 04/03/24  1140   WBC 9.9   HGB 7.6*   MCV 89.1   .0   INR 1.19       Recent Labs   Lab 04/03/24  1140   *   K 3.2*   *   CO2 15.0*   BUN 65*   CREATSERUM 4.31*   *   CA 7.2*       Recent Labs   Lab 04/03/24  1140   ALT 18   AST 20   ALB 2.3*       No results for input(s): \"TROP\" in the last 168 hours.         Radiology: CT ABDOMEN+PELVIS(CPT=74176)    Result Date: 3/26/2024  PROCEDURE:  CT ABDOMEN+PELVIS (CPT=74176)  COMPARISON:  [facility] , CT, CT ABDOMEN+PELVIS KIDNEYSTONE 2D RNDR(NO IV,NO ORAL)(CPT=74176), 1/30/2024, 12:17 PM.  INDICATIONS:  Ostomy issue- sent over from GI, ostomy mass, abdominal pain, h/o colon ca  TECHNIQUE:  Unenhanced multislice CT scanning was performed from the dome of the diaphragm to the pubic symphysis.  Dose reduction techniques were used. Dose information is transmitted to the ACR (American College of Radiology) NRDR (National Radiology Data Registry) which includes the Dose Index Registry.  PATIENT STATED HISTORY: (As transcribed by Technologist)  History of colon cancer.    FINDINGS:  LIVER:  Stable 7 mm hypoattenuating lesion within the left lobe of the liver no new hepatic lesion identified.  No hepatic enlargement. BILIARY:  Status post cholecystectomy.  No ductal dilatation. PANCREAS:  No lesion, fluid collection, ductal dilatation, or atrophy.  SPLEEN:  No enlargement or focal lesion.  KIDNEYS:  Status post left nephrectomy.  Parenchymal calcifications seen in the midpole the right kidney.  No right hydronephrosis.  No obstructing ureteral calculi. ADRENALS:  No mass or enlargement.  AORTA/VASCULAR:    There is atherosclerotic calcified plaque within the abdominal aorta, bilateral common iliac, bilateral internal iliac, bilateral external iliac, bilateral common femoral, bilateral superficial femoral and bilateral profunda femoral artery branches.  There is no evidence for aortic aneurysm.  RETROPERITONEUM:  No mass or  adenopathy.  BOWEL/MESENTERY:  /mesentery there are postoperative changes of abdominal perineal resection.  The cecum is positioned very low within the pelvis in the region previously occupied by the rectum.  There are few fluid-filled mildly distended loops of small  bowel seen in the left pelvis.  The focal mechanical obstruction is not identified.  There is a left lower quadrant colostomy.  There is a large peristomal hernia containing a nonobstructed segment of the distal transverse colon.  The hernia measures approximately 9.0 x 3.3 x 6.3 cm.  There is also a periumbilical hernia containing the anterior wall of the mid transverse colon.  There is no colonic obstruction at this site either.  There is also some omental fat herniated adjacent to the mid transverse colon. ABDOMINAL WALL:  Abdominal wall please see above URINARY BLADDER:  No visible focal wall thickening, lesion, or calculus.  PELVIC NODES:  No adenopathy.  PELVIC ORGANS:  Status post hysterectomy. BONES:  There are acute appearing fractures involving the left transverse processes at L4 and L5.  The fractures were not present on the previous CT from 1/30/2024.  Osseous structures are demineralized.  Severe disc space narrowing noted at L3-4 and moderate disc space narrowing noted at L1-2, L4-5 and L5-S1.  There is facet arthropathy in the lumbar spine.  There is bilateral SI joint arthritic change. LUNG BASES:  Fibrotic changes are seen in the periphery of the lingula, right middle lobe and both lower lobes.  There is a 3 mm noncalcified left lower lung nodule which was present previously on the CT from 1/30/2024. OTHER:  Negative.             CONCLUSION:  1. Large peristomal hernia again identified containing nonobstructed loop of transverse colon. 2. Periumbilical ventral abdominal wall hernia is also again noted containing the anterior wall of the mid transverse colon as well as some omental fat.  3. New transverse process fracture deformities on  the left at L4 and L5.  Correlate with history for any recent trauma.  The osseous structures are demineralized. 4. There is a 3 mm noncalcified subpleural left lower lobe lung nodule.  Surveillance imaging recommended given the patient's history of colon cancer. 5. There are other incidental findings noted as described above.    LOCATION:  [facility]   Dictated by (CST): Abhinav Bergeron MD on 3/26/2024 at 7:36 PM     Finalized by (CST): Abhinav Bergeron MD on 3/26/2024 at 7:59 PM          ASSESSMENT / PLAN:     Patient is a 89 year old female with PMH sig for htn, pAF, teresa syndome with recurrent cancer, hx of colon resection, colostomy, recent GIB noted to have growth around stoma concerning for maligancy, here for recurrent colon surgery    Impression    -colon cancer sp ex lap, TIFFANIE, partial colon resection, takedown of colostomy and creation of colostomy 04/03  -post op hypotension / shock  -post op pain    -metabolic acidosis  -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 Likely from fluid shifts, pain meds / anesthesia  -now weaned off levo    *HTN hx   -hold meds    *pAF  -tele  -off eliquis due to recent gib  -hold cardizem due to low bp  -resume amiodarone when able to take po    *ZOHREH on CKD 3   -hx of CKD 3 with baseline Cr < 2 per prior notes but Cr recently has been in 3s and may have progressed to CKD 4  -Cr now in 4s. Will have renal see  -hold diuretics    Chronic conditions  Home meds when able      Sdcs      Further recommendations pending patient's clinical course. [facility] hospitalist to continue to follow patient while in house    Patient and/or patient's family given opportunity to ask questions and note understanding and agreeing with therapeutic plan as outlined    [facility] Hospitalist  [phone]  Answering Service: [phone]

## 2024-04-03 NOTE — ANESTHESIA POSTPROCEDURE EVALUATION
[facility]    Sunita Loza Patient Status:  Inpatient   Age/Gender 89 year old female MRN [id number]   Location [facility] 4SW-A Attending Renny Gilbert MD   Hosp Day # 0 PCP Carmen Tapia MD       Anesthesia Post-op Note    EXPLORATORY LAPAROTOMY, LYSIS OF ADHESIONS, PARTIAL COLON RESECTION WITH TAKEDOWN OF COLOSTOMY AND CREATION OF  COLOSTOMY    Procedure Summary       Date: 04/03/24 Room / Location:  MAIN OR 11 / [facility] MAIN OR    Anesthesia Start: 0733 Anesthesia Stop: 1005    Procedure: EXPLORATORY LAPAROTOMY, LYSIS OF ADHESIONS, PARTIAL COLON RESECTION WITH TAKEDOWN OF COLOSTOMY AND CREATION OF  COLOSTOMY (Abdomen) Diagnosis: (COLON CANCER)    Surgeons: Renny Gilbert MD Anesthesiologist: Jermain Mandel MD    Anesthesia Type: general ASA Status: 4            Anesthesia Type: general    Vitals Value Taken Time   /53 04/03/24 1055   Temp 97.5 °F (36.4 °C) 04/03/24 1030   Pulse 76 04/03/24 1055   Resp 13 04/03/24 1055   SpO2 100 % 04/03/24 1053   Vitals shown include unfiled device data.    Patient Location: ICU    Anesthesia Type: general    Airway Patency: patent and extubated    Postop Pain Control: inadequate, being treated    Mental Status: preanesthetic baseline    Nausea/Vomiting: none    Cardiopulmonary/Hydration status: stable euvolemic    Complications: no apparent anesthesia related complications    Postop vital signs: stable    Dental Exam: Unchanged from Preop    Sign out given to ICU staff.

## 2024-04-03 NOTE — OPERATIVE REPORT
[facility]                                                         Operative Note    Sunita Loza Location: [facility] Perioperative   CSN [id number] MRN [id number]   Admission Date 4/3/2024 Procedure Date 4/3/2024   Attending Physician Renny Gilbert MD Procedure Physician Renny Gilbert MD     Pre-Operative Diagnosis: COLON CANCER     Post-Operative Diagnosis: COLON CANCER    Procedure Performed: EXPLORATORY LAPAROTOMY, LYSIS OF ADHESIONS, PARTIAL COLON RESECTION WITH TAKEDOWN OF COLOSTOMY AND CREATION OF  COLOSTOMY, mobilization of the splenic flexure    Surgeon: Renny Gilbert MD     Assistant(s):  Surgical Assistant.: Klaudia Rojas, JOSE    The surgical Assistant was necessary for this procedure.  The assistant was involved in patient positioning, instrument exchange, improving exposure optimizing visualization of patient's safety, and closure.  The duties of the assistant allowed for reduced risk of the performance of this procedure and care of this patient         Anesthesia: General       Complications: none       Estimated Blood Loss: Blood Output: 50 mL (4/3/2024  9:42 AM)              Operative Summary: Patient was taken to the operating placed in a supine position she was prepped and draped in usual fashion after being placed under general anesthesia.  Midline incision was reopened with a scalpel and dissection was carried down to the fascia.  The fascia was incised and the abdomen was entered.  There were moderate adhesions present between the small bowel colon and anterior abdominal wall.  Approximately 40 minutes was used to lyse these adhesions to gain entry to the abdominal cavity and left upper quadrant.  There was significant adhesive disease present which required time to lyse them to visualize the colon as it exited the abdominal cavity on the left side.  Colon was quite redundant and the colostomy was made with mid left colon.  Colon was divided as it exited out of the abdominal cavity with the CAROLANN  80 stapling device.  The left colon was then mobilized laterally reflecting an needed to and around the splenic flexure to the mid transverse colon.  My original plan was to take out the remainder of the colon but due to its redundancy in the number of adhesions I felt it would be safer to do a partial colon resection back to the mid transverse colon and remove the colostomy where the recurrent tumor was present.  Mesenteric vasculature supplying this abnormal segment of colon was ligated with the LigaSure energy device.  Segment of colon was sent to pathology.  He was able to swing the transverse colon down to the existing colostomy site where it would be matured.  This time hemostasis achieved.  Bilateral transabdominal plane blocks were performed by infusing 20 cc of a mixture of half percent Marcaine 2 and 1% Xylocaine in the transabdominal plane bilaterally.  At this time the colostomy was assessed.  There was a large fungating tumor involving the colon in this location.  I did take a rim of skin around it however there is no growth into the skin or soft tissues.  Colostomy was removed in its entirety all the way down to the fascia.  There was a parastomal hernia present.  The fascial layer was reapproximated both medial and laterally leaving an opening for a colostomy creation.  Proximal colon was then brought up through this.  Abdomen was closed at the fascial layer with a running #1 looped PDS suture followed by closure of the skin with staples.  Lap counts and needle counts were correct prior to this maneuver.  Gowns and gloves were also changed as well as instruments prior to closure.  Skin at the colostomy site was partially reapproximated to make this work.  Colostomy was then matured in the standard fashion with interrupted 3-0 Vicryl sutures.  Dressings were placed.  The patient was awoken taken recovery in satisfactory condition.  There are no complications during the case        Renny Gilbert  MD  4/3/2024  10:00 AM

## 2024-04-04 PROBLEM — R79.0 DECREASED FERRITIN: Status: ACTIVE | Noted: 2024-04-04

## 2024-04-04 LAB
ANION GAP SERPL CALC-SCNC: 12 MMOL/L (ref 0–18)
BASOPHILS # BLD AUTO: 0.03 X10(3) UL (ref 0–0.2)
BASOPHILS NFR BLD AUTO: 0.2 %
BUN BLD-MCNC: 59 MG/DL (ref 9–23)
CALCIUM BLD-MCNC: 7.1 MG/DL (ref 8.5–10.1)
CHLORIDE SERPL-SCNC: 118 MMOL/L (ref 98–112)
CO2 SERPL-SCNC: 20 MMOL/L (ref 21–32)
CREAT BLD-MCNC: 4.13 MG/DL
DEPRECATED HBV CORE AB SER IA-ACNC: 139.6 NG/ML
EGFRCR SERPLBLD CKD-EPI 2021: 10 ML/MIN/1.73M2 (ref 60–?)
EOSINOPHIL # BLD AUTO: 0 X10(3) UL (ref 0–0.7)
EOSINOPHIL NFR BLD AUTO: 0 %
ERYTHROCYTE [DISTWIDTH] IN BLOOD BY AUTOMATED COUNT: 19 %
GLUCOSE BLD-MCNC: 109 MG/DL (ref 70–99)
HCT VFR BLD AUTO: 23.3 %
HGB BLD-MCNC: 7.7 G/DL
IMM GRANULOCYTES # BLD AUTO: 0.08 X10(3) UL (ref 0–1)
IMM GRANULOCYTES NFR BLD: 0.6 %
IRON SATN MFR SERPL: 7 %
IRON SERPL-MCNC: 13 UG/DL
LYMPHOCYTES # BLD AUTO: 0.66 X10(3) UL (ref 1–4)
LYMPHOCYTES NFR BLD AUTO: 5.3 %
MCH RBC QN AUTO: 28.4 PG (ref 26–34)
MCHC RBC AUTO-ENTMCNC: 33 G/DL (ref 31–37)
MCV RBC AUTO: 86 FL
MONOCYTES # BLD AUTO: 0.62 X10(3) UL (ref 0.1–1)
MONOCYTES NFR BLD AUTO: 5 %
NEUTROPHILS # BLD AUTO: 11.06 X10 (3) UL (ref 1.5–7.7)
NEUTROPHILS # BLD AUTO: 11.06 X10(3) UL (ref 1.5–7.7)
NEUTROPHILS NFR BLD AUTO: 88.9 %
OSMOLALITY SERPL CALC.SUM OF ELEC: 327 MOSM/KG (ref 275–295)
PLATELET # BLD AUTO: 202 10(3)UL (ref 150–450)
POTASSIUM SERPL-SCNC: 3.6 MMOL/L (ref 3.5–5.1)
RBC # BLD AUTO: 2.71 X10(6)UL
SODIUM SERPL-SCNC: 150 MMOL/L (ref 136–145)
TIBC SERPL-MCNC: 180 UG/DL (ref 240–450)
TRANSFERRIN SERPL-MCNC: 121 MG/DL (ref 200–360)
WBC # BLD AUTO: 12.5 X10(3) UL (ref 4–11)

## 2024-04-04 PROCEDURE — 99233 SBSQ HOSP IP/OBS HIGH 50: CPT | Performed by: INTERNAL MEDICINE

## 2024-04-04 RX ORDER — POTASSIUM CHLORIDE 14.9 MG/ML
20 INJECTION INTRAVENOUS ONCE
Status: COMPLETED | OUTPATIENT
Start: 2024-04-04 | End: 2024-04-04

## 2024-04-04 RX ORDER — ACETAMINOPHEN 10 MG/ML
15 INJECTION, SOLUTION INTRAVENOUS EVERY 6 HOURS PRN
Status: DISCONTINUED | OUTPATIENT
Start: 2024-04-04 | End: 2024-04-08

## 2024-04-04 NOTE — PLAN OF CARE
Received patient resting in the bed, alert, oriented, follows all commands, moves all extremities equally, complains of pain mild/discomfort, refused pain medications, said will call nurse if needs anything. Complains of dry mouth, asking for water, mouth swabs provided. Ng to LIS with small amount of bile color drainage. Abdomen soft with positive bowel sound, stoma pink, warm with some serosanguinous drainage. Plan of care explained.     During the night patient got confused, said she is at home, knows her name, follows all commands, denies she had surgery, pulled ng out around 3:30 am, [facility] notified.     Problem: GASTROINTESTINAL - ADULT  Goal: Minimal or absence of nausea and vomiting  Description: INTERVENTIONS:  - Maintain adequate hydration with IV or PO as ordered and tolerated  - Nasogastric tube to low intermittent suction as ordered  - Evaluate effectiveness of ordered antiemetic medications  - Provide nonpharmacologic comfort measures as appropriate  - Advance diet as tolerated, if ordered  - Obtain nutritional consult as needed  - Evaluate fluid balance  Outcome: Progressing     Problem: GENITOURINARY - ADULT  Goal: Absence of urinary retention  Description: INTERVENTIONS:  - Assess patient’s ability to void and empty bladder  - Monitor intake/output and perform bladder scan as needed  - Follow urinary retention protocol/standard of care  - Consider collaborating with pharmacy to review patient's medication profile  - Implement strategies to promote bladder emptying  Outcome: Progressing     Problem: METABOLIC/FLUID AND ELECTROLYTES - ADULT  Goal: Glucose maintained within prescribed range  Description: INTERVENTIONS:  - Monitor Blood Glucose as ordered  - Assess for signs and symptoms of hyperglycemia and hypoglycemia  - Administer ordered medications to maintain glucose within target range  - Assess barriers to adequate nutritional intake and initiate nutrition consult as needed  - Instruct  patient on self management of diabetes  Outcome: Progressing     Problem: SKIN/TISSUE INTEGRITY - ADULT  Goal: Skin integrity remains intact  Description: INTERVENTIONS  - Assess and document risk factors for pressure ulcer development  - Assess and document skin integrity  - Monitor for areas of redness and/or skin breakdown  - Initiate interventions, skin care algorithm/standards of care as needed  Outcome: Progressing

## 2024-04-04 NOTE — PROGRESS NOTES
[facility] Progress Note     Sunita Loza Patient Status:  Inpatient    1932 MRN [id number]   [facility] 3NW-A Attending Renny Glibert MD   Hosp Day # 1 PCP Carmen Tapia MD     Chief Complaint:  follow up     Subjective:   S:  sitting up in bed, was transferred out of ICU. Off pressors. Pain minimal. Huizar in place with yellow transparent urine. No n/v/f/c. Family at bedside       Objective:   Vital signs:  Temp:  [98.1 °F (36.7 °C)-99.4 °F (37.4 °C)] 98.1 °F (36.7 °C)  Pulse:  [] 81  Resp:  [10-23] 20  BP: (126-167)/() 167/81  SpO2:  [94 %-100 %] 100 %    Wt Readings from Last 6 Encounters:   24 87 lb 8.4 oz (39.7 kg)   24 83 lb 1.6 oz (37.7 kg)   24 86 lb (39 kg)   24 89 lb 4 oz (40.5 kg)   24 98 lb (44.5 kg)   24 92 lb 6 oz (41.9 kg)         Physical Exam:    Gen: No acute distress, alert and oriented , no accessory m use, [place]  Pulm: Lungs clear, ok respiratory effort  CV: Heart with regular rate and rhythm, no edema  Abd: Abdomen soft, minimally tender, nondistended, bowel sounds hypoactive, no peritoneal signs  MSK:   no clubbing, no cyanosis  Skin: no visible rashes    Psych:   appropriate affect, answering questions ok    Results:   Diagnostic Data:      Labs:       Recent Labs   Lab 24  1140 24  0356   WBC 9.9 12.5*   HGB 7.6* 7.7*   MCV 89.1 86.0   .0 202.0   INR 1.19  --        Recent Labs   Lab 24  1140 24  1638 24  0356   *  --  150*   K 3.2* 4.2 3.6   *  --  118*   CO2 15.0*  --  20.0*   BUN 65*  --  59*   CREATSERUM 4.31*  --  4.13*   CA 7.2*  --  7.1*   *  --  109*       Recent Labs   Lab 24  1140   ALT 18   AST 20   ALB 2.3*        Inflammatory Markers  Recent Labs   Lab 24  0356   BRANDYN 139.6       Imaging: Imaging data reviewed in Epic.    Medications:    famotidine  20 mg Oral Daily    Or    famotidine  20 mg Intravenous Daily     heparin  5,000 Units Subcutaneous q12h      sodium bicarbonate in sterile water infusion 75 mEq (04/04/24 1040)     acetaminophen, oxyCODONE **OR** oxyCODONE, HYDROmorphone **OR** HYDROmorphone, polyethylene glycol (PEG 3350), sennosides, bisacodyl, fleet enema, ondansetron, metoclopramide, meperidine          ASSESSMENT / PLAN:      Patient is a 89 year old female with PMH sig for htn, pAF, teresa syndome with recurrent cancer, hx of colon resection, colostomy, recent GIB noted to have growth around stoma concerning for maligancy, here for recurrent colon surgery     Impression     -colon cancer sp ex lap, TIFFANIE, partial colon resection, takedown of colostomy and creation of colostomy 04/03  -post op hypotension / shock-resolved  -post op pain     -metabolic acidosis  -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 Likely from fluid shifts, pain meds / anesthesia  -now weaned off levo     *HTN hx   -hold meds     *pAF  -tele  -off eliquis due to recent gib  -hold cardizem due to low bp  -resume amiodarone when able to take po     *ZOHREH on CKD 3   Metabolic acidosis  hypernatremia  -hx of CKD 3 with baseline Cr < 2 per prior notes but Cr recently has been in 3s   -Cr now in 4s. Renal on, appreciate  -hold diuretics  -ivf per renal     Chronic conditions  Home meds when able        Scds    Dw pt, family           Thank You,  Sosa Nair MD    [facility]  Internal Medicine  Answering Service number: [phone]    Supplementary Documentation:

## 2024-04-04 NOTE — PROGRESS NOTES
[facility]  Nephrology Progress Note    Sunita Loza Attending:  Renny Gilbert MD       Assessment and Plan:    1) ZOHREH- due to recent diuresis + sean-op fluid shifts / hypotension- improving. PLAN- adjust IVF (hypotonic bicarb gtt)     2) CKD 3- baseline Cr 1.5 mg/dl -3 in part due to remote nephrectomy > 20 yrs ago     3) s/p exp lap / LOS / colon resection / ostomy takedown / creation of colostomy 4/3 (Dr. 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)    7) Anemia- due to CKD +/- malignancy s/p recent IV Fe + EPO    8) Chronic edema- onset  per family; EF 60% with mild-mod TR / AI + mild MR; RV WNL- non issue currently       Subjective:  Awake some confusion very chatty off pressors no c/o other than mild abd discomfort    Physical Exam:   /62 (BP Location: Right arm)   Pulse 84   Temp 99.3 °F (37.4 °C) (Temporal)   Resp 20   Ht 5' (1.524 m)   Wt 87 lb 8.4 oz (39.7 kg)   SpO2 98%   BMI 17.09 kg/m²   Temp (24hrs), Av.6 °F (37 °C), Min:97.5 °F (36.4 °C), Max:99.4 °F (37.4 °C)       Intake/Output Summary (Last 24 hours) at 2024 0940  Last data filed at 2024 0522  Gross per 24 hour   Intake 2504 ml   Output 1310 ml   Net 1194 ml     Wt Readings from Last 3 Encounters:   24 87 lb 8.4 oz (39.7 kg)   24 83 lb 1.6 oz (37.7 kg)   24 86 lb (39 kg)     General: awake alert  HEENT: No scleral icterus, MMM  Neck: Supple, no MARIA FERNANDA or thyromegaly  Cardiac: Regular rate and rhythm, S1, S2 normal, no murmur or tub  Lungs: Decreased BS at bases bilaterally   Abdomen: Soft, non-tender. + bowel sounds, no palpable organomegaly  Extremities: Without clubbing, cyanosis; no edema  Neurologic: Cranial nerves grossly intact, moving all extremities  Skin: Warm and dry, no rashes       Labs:   Lab Results   Component Value Date    WBC 12.5 2024     HGB 7.7 04/04/2024    HCT 23.3 04/04/2024    .0 04/04/2024    CREATSERUM 4.13 04/04/2024    BUN 59 04/04/2024     04/04/2024    K 3.6 04/04/2024     04/04/2024    CO2 20.0 04/04/2024     04/04/2024    CA 7.1 04/04/2024    ALB 2.3 04/03/2024    ALKPHO 73 04/03/2024    BILT 0.1 04/03/2024    TP 5.1 04/03/2024    AST 20 04/03/2024    ALT 18 04/03/2024    INR 1.19 04/03/2024    PTP 15.1 04/03/2024       Imaging:  All imaging studies reviewed.    Meds:   Current Facility-Administered Medications   Medication Dose Route Frequency    oxyCODONE immediate release tab 2.5 mg  2.5 mg Oral Q4H PRN    Or    oxyCODONE immediate release tab 5 mg  5 mg Oral Q4H PRN    HYDROmorphone (Dilaudid) 1 MG/ML injection 0.2 mg  0.2 mg Intravenous Q2H PRN    Or    HYDROmorphone (Dilaudid) 1 MG/ML injection 0.4 mg  0.4 mg Intravenous Q2H PRN    polyethylene glycol (PEG 3350) (Miralax) 17 g oral packet 17 g  17 g Oral Daily PRN    sennosides (Senokot) tab 17.2 mg  17.2 mg Oral Nightly PRN    bisacodyl (Dulcolax) 10 MG rectal suppository 10 mg  10 mg Rectal Daily PRN    fleet enema (Fleet) 7-19 GM/118ML rectal enema 133 mL  1 enema Rectal Once PRN    ondansetron (Zofran) 4 MG/2ML injection 4 mg  4 mg Intravenous Q6H PRN    metoclopramide (Reglan) 5 mg/mL injection 10 mg  10 mg Intravenous Q8H PRN    famotidine (Pepcid) tab 20 mg  20 mg Oral Daily    Or    famotidine (Pepcid) 20 mg/2mL injection 20 mg  20 mg Intravenous Daily    meperidine (Demerol) 25 MG/ML injection 12.5 mg  12.5 mg Intravenous PRN    heparin (Porcine) 5000 UNIT/ML injection 5,000 Units  5,000 Units Subcutaneous q12h    sodium bicarbonate 100 mEq in sterile water 1,100 mL infusion  100 mEq Intravenous Continuous         Questions/concerns were discussed with patient and/or family by bedside.          Fabiana Miller MD  4/4/2024  9:40 AM

## 2024-04-04 NOTE — PLAN OF CARE
Assumed care of patient after RN report. Patient alert, restless and confused. States name and month, year. Disoriented to situation and place. Reoriented. Delirium precautions. On RA. SR on monitor. Abdomen soft, tender. +BS. Midline incision C/D/I. LLQ colostomy with serous drainage. Huizar with clear yellow urine. Report given to SCU RN. Patient transferred with all belongings, hearing aids and glasses. Family updated on POC. See flowsheet for full assessment.

## 2024-04-04 NOTE — CONSULTS
[facility]  Report of Consultation    Sunita Loza Patient Status:  Inpatient    1932 MRN [id number]   Location [facility] 4SW-A Attending Renny Gilbert MD   Hosp Day # 0 PCP Carmen Tapia MD       Assessment / Plan:    1) ZOHREH- in part due to recent diuresis + sean-op fluid shifts / hypotension. 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 EF 60% with mild-mod TR / AI + mild MR; RV WNL. Non-issue currently        Reason for Consultation:  ZOHREH / CKD 3 / met acidosis    History of Present Illness:  Sunita Loza is a a(n) 89 year old female. Dictation to follow    History:  Past Medical History:   Diagnosis Date    Arthritis     Atrial fibrillation (HCC)     Back pain     CKD (chronic kidney disease)     and S/P nephrectomy    Colon cancer (HCC)     Congestive heart disease (HCC)     Easy bruising     Endometrial cancer (HCC)     UTERINE, DX ABOUT 30 YEARS    Hearing impairment     AIDS    Heart palpitations     Afib    High blood pressure     Kidney failure     Leg swelling 2024    Bishop syndrome     hereditary colorectal cancer    Muscle weakness     WALKER    Pain in joints     Wears glasses     Weight loss      Past Surgical History:   Procedure Laterality Date    COLECTOMY      NEPHRECTOMY Left     PART REMOVAL COLON W END COLOSTOMY       Family History   Problem Relation Age of Onset    Heart Disorder Father     Heart Disorder Mother     Colon Cancer Mother         70    Heart Disorder Sister     Breast Cancer Sister     Diabetes Brother     Breast Cancer Sister     Cancer Sister     Breast  Cancer Sister     Breast Cancer Daughter      Denies family history of kidney disease.    reports that she has never smoked. She has never used smokeless tobacco. She reports that she does not currently use alcohol. She reports that she does not use drugs.    Allergies:  Allergies   Allergen Reactions    Ciprofloxacin RASH and HIVES    Penicillins RASH       Medications:    Current Facility-Administered Medications:     cefOXitin (Mefoxin) 2 g in sodium chloride 0.9% 100 mL IVPB-MBP, 2 g, Intravenous, Q8H    oxyCODONE immediate release tab 2.5 mg, 2.5 mg, Oral, Q4H PRN **OR** oxyCODONE immediate release tab 5 mg, 5 mg, Oral, Q4H PRN    HYDROmorphone (Dilaudid) 1 MG/ML injection 0.2 mg, 0.2 mg, Intravenous, Q2H PRN **OR** HYDROmorphone (Dilaudid) 1 MG/ML injection 0.4 mg, 0.4 mg, Intravenous, Q2H PRN    polyethylene glycol (PEG 3350) (Miralax) 17 g oral packet 17 g, 17 g, Oral, Daily PRN    sennosides (Senokot) tab 17.2 mg, 17.2 mg, Oral, Nightly PRN    bisacodyl (Dulcolax) 10 MG rectal suppository 10 mg, 10 mg, Rectal, Daily PRN    fleet enema (Fleet) 7-19 GM/118ML rectal enema 133 mL, 1 enema, Rectal, Once PRN    ondansetron (Zofran) 4 MG/2ML injection 4 mg, 4 mg, Intravenous, Q6H PRN    metoclopramide (Reglan) 5 mg/mL injection 10 mg, 10 mg, Intravenous, Q8H PRN    famotidine (Pepcid) tab 20 mg, 20 mg, Oral, Daily **OR** famotidine (Pepcid) 20 mg/2mL injection 20 mg, 20 mg, Intravenous, Daily    meperidine (Demerol) 25 MG/ML injection 12.5 mg, 12.5 mg, Intravenous, PRN    norepinephrine (Levophed) 4 mg/250mL infusion premix, 0.5-30 mcg/min, Intravenous, Continuous    heparin (Porcine) 5000 UNIT/ML injection 5,000 Units, 5,000 Units, Subcutaneous, q12h    sodium bicarbonate 100 mEq in sterile water 1,100 mL infusion, 100 mEq, Intravenous, Continuous  No current outpatient medications on file.       Review of Systems:  Please see HPI for pertinent positives. 10 point review of systems otherwise reviewed and  negative.     Physical Exam:  /50   Pulse 76   Temp 98.3 °F (36.8 °C) (Temporal)   Resp 17   Ht 5' (1.524 m)   Wt 82 lb 1.5 oz (37.2 kg)   SpO2 99%   BMI 16.03 kg/m²   Temp (24hrs), Av.9 °F (36.6 °C), Min:97.5 °F (36.4 °C), Max:98.3 °F (36.8 °C)       Intake/Output Summary (Last 24 hours) at 4/3/2024 1914  Last data filed at 4/3/2024 1847  Gross per 24 hour   Intake 2430 ml   Output 850 ml   Net 1580 ml     Wt Readings from Last 3 Encounters:   24 82 lb 1.5 oz (37.2 kg)   24 83 lb 1.6 oz (37.7 kg)   24 86 lb (39 kg)     General: awake mild confusion  HEENT: No scleral icterus, MMM  Neck: Supple, no MARIA FERNANDA or thyromegaly  Cardiac: Regular rate and rhythm, S1, S2 normal, no murmur, rub, or gallop  Lungs: Decreased breath sounds at the bases bilaterally.   Abdomen: Soft, non-tender. + bowel sounds, no palpable organomegaly  Extremities: Without clubbing, cyanosis; no edema  Neurologic: Cranial nerves grossly intact, moving all extremities  Skin: Warm and dry, no rashes      Laboratory Data:  Lab Results   Component Value Date    WBC 9.9 2024    HGB 7.6 2024    HCT 23.7 2024    .0 2024    CREATSERUM 4.31 2024    BUN 65 2024     2024    K 4.2 2024     2024    CO2 15.0 2024     2024    CA 7.2 2024    ALB 2.3 2024    ALKPHO 73 2024    BILT 0.1 2024    TP 5.1 2024    AST 20 2024    ALT 18 2024    INR 1.19 2024    PTP 15.1 2024       Imaging:  All imaging studies reviewed.      Thank you for allowing me to participate in the care of your patient.    Fabiana Miller MD  4/3/2024  7:14 PM

## 2024-04-04 NOTE — PROGRESS NOTES
[facility]    Sunita Loza Patient Status:  Inpatient    1932 MRN [id number]   Location [facility] 4SW-A Attending Renny Gilbert MD   Hosp Day # 1 PCP Carmen Tapia MD     Pulm / Critical Care Progress Note     S: pt reports some mild postop pain       Scheduled Medication:   famotidine  20 mg Oral Daily    Or    famotidine  20 mg Intravenous Daily    heparin  5,000 Units Subcutaneous q12h     Continuous Infusing Medication:   norepinephrine Stopped (24 1300)    sodium bicarbonate in sterile water infusion 100 mEq (24 0619)     PRN Medication:oxyCODONE **OR** oxyCODONE, HYDROmorphone **OR** HYDROmorphone, polyethylene glycol (PEG 3350), sennosides, bisacodyl, fleet enema, ondansetron, metoclopramide, meperidine       OBJECTIVE:  Vitals:    24 0400 24 0500 24 0600 24 0700   BP: 143/58 151/60 155/45 (!) 166/59   BP Location: Right arm      Pulse: 84 88 100 87   Resp: 16 15 17 16   Temp: 99.1 °F (37.3 °C)      TempSrc: Temporal      SpO2: 97% 96% 99% 99%   Weight: 87 lb 8.4 oz (39.7 kg)      Height:            O2: ra      Wt Readings from Last 3 Encounters:   24 87 lb 8.4 oz (39.7 kg)   24 83 lb 1.6 oz (37.7 kg)   24 86 lb (39 kg)        I/O last 3 completed shifts:  In: 3404 [I.V.:2654; IV PIGGYBACK:750]  Out: 1310 [Urine:1250; Emesis/NG output:10; Blood:50]  No intake/output data recorded.     Physical Exam:   General: alert, cooperative,  No respiratory distress.   Head: Normocephalic, without obvious abnormality, atraumatic.   Lungs: ctab   Chest wall: No tenderness or deformity.   Heart: Regular rate and rhythm, normal S1S2, no murmur.   Abdomen: soft, postop changes, dec bs    Extremity: no edema       Recent Labs   Lab 24  1140 24  0356   WBC 9.9 12.5*   HGB 7.6* 7.7*   HCT 23.7* 23.3*   .0 202.0     Recent Labs   Lab 24  1140   INR 1.19         Recent Labs   Lab 24  1140 24  1638 24  0356    *  --  150*   K 3.2* 4.2 3.6   *  --  118*   CO2 15.0*  --  20.0*   BUN 65*  --  59*   CREATSERUM 4.31*  --  4.13*   *  --  109*   CA 7.2*  --  7.1*   AST 20  --   --    ALT 18  --   --    ALKPHO 73  --   --    BILT 0.1  --   --    ALB 2.3*  --   --    TP 5.1*  --   --        CREATININE (no units)   Date Value   02/05/2020 1.81   07/15/2019 1.8     Creatinine (mg/dL)   Date Value   04/04/2024 4.13 (H)   04/03/2024 4.31 (H)   03/27/2024 3.32 (H)   03/02/2022 1.49 (H)   07/27/2021 1.47 (H)   01/29/2021 1.29 (H)   ]       ASSESSMENT/PLAN:    Shock: suspect due to fluid shifting, pain meds / anesthesia. Doubt sepsis   - IVFs  - off pressors  H/o colon ca: with prior colostomy, now s/p partial colon resection with colostomy   - postop management per surgery   H/o a fib  - was on eliquis, on hold due to recent gi bleeding and now surgery. Resume per surgery   HTN:   - hold home meds   Ckd: cr recent baseline seems 3-4, cr seems near baseline  - monitor sCr and  UOP  Fen: diet per gi   Prophy:hep sq  Dispo: full code. Ok to floor. Will follow in ICU     López Ocampo M.D.  [facility]  Pulmonary / Critical care  4/4/2024  7:09 AM

## 2024-04-04 NOTE — DIETARY NOTE
[facility]   part of [facility]  NUTRITION ASSESSMENT    Pt does not meet malnutrition criteria at this time.    NUTRITION INTERVENTION:    Meal and Snacks - ADAT per Surg; monitor patient po intake. Encourage adequate po of appropriate diet.  Medical Food Supplements - RD to add Ensure Plus HP chocolate/strawberry daily once diet advanced. Rationale/use for oral supplements discussed.  Vitamin and Mineral Supplements - Recommend adding Multivitamin with minerals once diet advanced.  Coordination of Nutrition Care - Recommend GI/Surgery consult for nutrition support/diet advancement     PATIENT STATUS: 89 year old female admitted on 4/3 presents s/p ex lap, lysis of adhesions, partial colon resection, takedown of colostomy and creation of new colostomy. Pt removed NGT this AM. Pt screened d/t low BMI. Pt known to writer from previous admit in January and noted RD last saw pt 3/27/24. Visited pt at bedside. She reports her appetite has been normal with no changes; not a big eater at baseline. Weight has remained stable since Jan, fluctuating between 83-92 lbs over the past 9 months per chart review. She reports sometimes drinking ONS at home but not consistently. Reports having nausea sometimes but not often and no issues with BM's via colostomy PTA. Per surgery, plan to keep NPO until pt has output from colostomy. No chewing or swallowing difficulties and NKFA. Offered ONS once diet advanced and pt agreeable to chocolate or strawberry Ensure. All questions answered at this time.    PMH: Arthritis, Atrial fibrillation, CKD, Colon cancer, Congestive heart disease, Endometrial cancer, High blood pressure, Kidney failure, Bishop syndrome    ANTHROPOMETRICS:  Ht: 152.4 cm (5')  Wt: 39.7 kg (87 lb 8.4 oz).   BMI: Body mass index is 17.09 kg/m².  IBW: 45.5 kg    WEIGHT HISTORY:   Patient Weight(s) for the past 336 hrs:   Weight   04/04/24 0400 39.7 kg (87 lb 8.4 oz)   04/03/24 0613 37.2 kg (82 lb 1.5 oz)    03/28/24 1510 37.6 kg (83 lb)       Wt Readings from Last 10 Encounters:   04/04/24 39.7 kg (87 lb 8.4 oz)   03/26/24 37.7 kg (83 lb 1.6 oz)   03/26/24 39 kg (86 lb)   02/27/24 40.5 kg (89 lb 4 oz)   02/13/24 44.5 kg (98 lb)   02/02/24 41.9 kg (92 lb 6 oz)   07/05/23 37.8 kg (83 lb 4 oz)   03/02/22 43.5 kg (96 lb)   07/30/21 46.3 kg (102 lb)   05/06/21 46.3 kg (102 lb)        NUTRITION:  Diet:       Procedures    NPO        Percent Meals Eaten (last 3 days)       None            Food Allergies: No  Cultural/Ethnic/Sabianist Preferences Addressed: Yes    GI SYSTEM REVIEW:  +colostomy  Skin/Wounds: WNL    NUTRITION RELATED PHYSICAL FINDINGS:     1. Body Fat/Muscle Mass:  age related sarcopenia noted     2. Fluid Accumulation: lower extremity edema     NUTRITION PRESCRIPTION: 39.7 kg  Calories: 1672-1631 calories/day (30-35 kcal/kg)  Protein: 48-60 grams protein/day (1.2-1.5 gm/kg)  Fluid: ~1 ml/kcal or per MD discretion    NUTRITION DIAGNOSIS/PROBLEM:  Inadequate oral intake related to inability to take or tolerate as evidenced by need for NPO status    MONITOR AND EVALUATE/NUTRITION GOALS:  Weight stable within 1 to 2 lbs during admission - New  Return to PO intake or advance diet in 24-48 hrs - New      MEDICATIONS:  Pepcid  Gtt: NaBicarb at 75 ml/hr    LABS:  Na 150    Pt is at High nutrition risk    Joan Borden, RD, LDN, CNSC  Clinical Dietitian  Spectra: [id number]

## 2024-04-05 LAB
ANION GAP SERPL CALC-SCNC: 16 MMOL/L (ref 0–18)
ANTIBODY SCREEN: NEGATIVE
BASOPHILS # BLD AUTO: 0.03 X10(3) UL (ref 0–0.2)
BASOPHILS NFR BLD AUTO: 0.2 %
BUN BLD-MCNC: 53 MG/DL (ref 9–23)
CALCIUM BLD-MCNC: 7.1 MG/DL (ref 8.5–10.1)
CHLORIDE SERPL-SCNC: 109 MMOL/L (ref 98–112)
CO2 SERPL-SCNC: 19 MMOL/L (ref 21–32)
CREAT BLD-MCNC: 3.23 MG/DL
EGFRCR SERPLBLD CKD-EPI 2021: 13 ML/MIN/1.73M2 (ref 60–?)
EOSINOPHIL # BLD AUTO: 0 X10(3) UL (ref 0–0.7)
EOSINOPHIL NFR BLD AUTO: 0 %
ERYTHROCYTE [DISTWIDTH] IN BLOOD BY AUTOMATED COUNT: 19.2 %
GLUCOSE BLD-MCNC: 115 MG/DL (ref 70–99)
GLUCOSE BLD-MCNC: 144 MG/DL (ref 70–99)
GLUCOSE BLD-MCNC: 254 MG/DL (ref 70–99)
GLUCOSE BLD-MCNC: 64 MG/DL (ref 70–99)
GLUCOSE BLD-MCNC: 76 MG/DL (ref 70–99)
GLUCOSE BLD-MCNC: 90 MG/DL (ref 70–99)
HCT VFR BLD AUTO: 22.4 %
HGB BLD-MCNC: 10 G/DL
HGB BLD-MCNC: 6.9 G/DL
IMM GRANULOCYTES # BLD AUTO: 0.08 X10(3) UL (ref 0–1)
IMM GRANULOCYTES NFR BLD: 0.6 %
LYMPHOCYTES # BLD AUTO: 1.2 X10(3) UL (ref 1–4)
LYMPHOCYTES NFR BLD AUTO: 9.3 %
MAGNESIUM SERPL-MCNC: 1 MG/DL (ref 1.6–2.6)
MCH RBC QN AUTO: 27.7 PG (ref 26–34)
MCHC RBC AUTO-ENTMCNC: 30.8 G/DL (ref 31–37)
MCV RBC AUTO: 90 FL
MONOCYTES # BLD AUTO: 1.18 X10(3) UL (ref 0.1–1)
MONOCYTES NFR BLD AUTO: 9.1 %
NEUTROPHILS # BLD AUTO: 10.48 X10 (3) UL (ref 1.5–7.7)
NEUTROPHILS # BLD AUTO: 10.48 X10(3) UL (ref 1.5–7.7)
NEUTROPHILS NFR BLD AUTO: 80.8 %
OSMOLALITY SERPL CALC.SUM OF ELEC: 310 MOSM/KG (ref 275–295)
PLATELET # BLD AUTO: 173 10(3)UL (ref 150–450)
POTASSIUM SERPL-SCNC: 3.4 MMOL/L (ref 3.5–5.1)
POTASSIUM SERPL-SCNC: 3.7 MMOL/L (ref 3.5–5.1)
RBC # BLD AUTO: 2.49 X10(6)UL
RH BLOOD TYPE: POSITIVE
SODIUM SERPL-SCNC: 144 MMOL/L (ref 136–145)
WBC # BLD AUTO: 13 X10(3) UL (ref 4–11)

## 2024-04-05 PROCEDURE — 3E033XZ INTRODUCTION OF VASOPRESSOR INTO PERIPHERAL VEIN, PERCUTANEOUS APPROACH: ICD-10-PCS | Performed by: PHYSICIAN ASSISTANT

## 2024-04-05 PROCEDURE — 30233N1 TRANSFUSION OF NONAUTOLOGOUS RED BLOOD CELLS INTO PERIPHERAL VEIN, PERCUTANEOUS APPROACH: ICD-10-PCS | Performed by: PHYSICIAN ASSISTANT

## 2024-04-05 PROCEDURE — 99233 SBSQ HOSP IP/OBS HIGH 50: CPT | Performed by: INTERNAL MEDICINE

## 2024-04-05 RX ORDER — POTASSIUM CHLORIDE 14.9 MG/ML
20 INJECTION INTRAVENOUS ONCE
Status: COMPLETED | OUTPATIENT
Start: 2024-04-05 | End: 2024-04-05

## 2024-04-05 RX ORDER — AMIODARONE HYDROCHLORIDE 100 MG/1
100 TABLET ORAL DAILY
Status: DISCONTINUED | OUTPATIENT
Start: 2024-04-05 | End: 2024-04-08

## 2024-04-05 RX ORDER — SODIUM CHLORIDE 9 MG/ML
INJECTION, SOLUTION INTRAVENOUS ONCE
Status: COMPLETED | OUTPATIENT
Start: 2024-04-05 | End: 2024-04-05

## 2024-04-05 RX ORDER — NICOTINE POLACRILEX 4 MG
30 LOZENGE BUCCAL
Status: DISCONTINUED | OUTPATIENT
Start: 2024-04-05 | End: 2024-04-08

## 2024-04-05 RX ORDER — NICOTINE POLACRILEX 4 MG
15 LOZENGE BUCCAL
Status: DISCONTINUED | OUTPATIENT
Start: 2024-04-05 | End: 2024-04-08

## 2024-04-05 RX ORDER — DEXTROSE MONOHYDRATE 25 G/50ML
50 INJECTION, SOLUTION INTRAVENOUS
Status: DISCONTINUED | OUTPATIENT
Start: 2024-04-05 | End: 2024-04-08

## 2024-04-05 NOTE — PROGRESS NOTES
[facility] Progress Note     Sunita Loza Patient Status:  Inpatient    1932 MRN [id number]   [facility] 3NW-A Attending Renny Gilbert MD   Hosp Day # 2 PCP Carmen Tapia MD     Chief Complaint:  follow up     Subjective:   S:  sitting up in chair, some agitation/confusion overnight.  Tolerating CLD. No sob/n/v/f/c. Family at bedside       Objective:   Vital signs:  Temp:  [98.1 °F (36.7 °C)-98.5 °F (36.9 °C)] 98.3 °F (36.8 °C)  Pulse:  [75-86] 75  Resp:  [16-20] 16  BP: (136-167)/(48-93) 139/54  SpO2:  [95 %-100 %] 97 %    Wt Readings from Last 6 Encounters:   24 87 lb 8.4 oz (39.7 kg)   24 83 lb 1.6 oz (37.7 kg)   24 86 lb (39 kg)   24 89 lb 4 oz (40.5 kg)   24 98 lb (44.5 kg)   24 92 lb 6 oz (41.9 kg)         Physical Exam:    Gen: No acute distress, alert and oriented , no accessory m use, [place]  Pulm: Lungs clear, ok respiratory effort  CV: Heart with regular rate and rhythm, no edema  Abd: Abdomen soft, minimally tender, nondistended, bowel sounds hypoactive, no peritoneal signs  MSK:   no clubbing, no cyanosis  Skin: no visible rashes    Psych:   appropriate affect, answering questions ok    Results:   Diagnostic Data:      Labs:       Recent Labs   Lab 24  1140 24  0356 24  0507   WBC 9.9 12.5* 13.0*   HGB 7.6* 7.7* 6.9*   MCV 89.1 86.0 90.0   .0 202.0 173.0   INR 1.19  --   --        Recent Labs   Lab 24  1140 24  1638 24  0356 24  0507   *  --  150* 144   K 3.2* 4.2 3.6 3.4*   *  --  118* 109   CO2 15.0*  --  20.0* 19.0*   BUN 65*  --  59* 53*   CREATSERUM 4.31*  --  4.13* 3.23*   CA 7.2*  --  7.1* 7.1*   MG  --   --   --  1.0*   *  --  109* 64*       Recent Labs   Lab 24  1140   ALT 18   AST 20   ALB 2.3*        Inflammatory Markers  Recent Labs   Lab 24  0356   BRANDYN 139.6          Medications:    potassium chloride  20 mEq Intravenous Once     sodium chloride   Intravenous Once    iron sucrose  200 mg Intravenous Daily    epoetin chris  10,000 Units Subcutaneous Weekly    famotidine  20 mg Oral Daily    Or    famotidine  20 mg Intravenous Daily    heparin  5,000 Units Subcutaneous q12h      sodium bicarbonate in sterile water infusion Stopped (04/05/24 1005)     glucose **OR** glucose **OR** glucose-vitamin C **OR** dextrose **OR** glucose **OR** glucose **OR** glucose-vitamin C, acetaminophen, oxyCODONE **OR** oxyCODONE, HYDROmorphone **OR** HYDROmorphone, polyethylene glycol (PEG 3350), sennosides, bisacodyl, fleet enema, ondansetron, metoclopramide, meperidine          ASSESSMENT / PLAN:      Patient is a 89 year old female with PMH sig for htn, pAF, teresa syndome with recurrent cancer, hx of colon resection, colostomy, recent GIB noted to have growth around stoma concerning for maligancy, here for recurrent colon surgery     Impression     -colon cancer sp ex lap, TIFFANIE, partial colon resection, takedown of colostomy and creation of colostomy 04/03  -post op hypotension / shock-resolved  -post op pain     -metabolic acidosis  -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 Likely from fluid shifts, pain meds / anesthesia  -now weaned off levo     *HTN hx   -hold meds. Bp ok for now     *pAF  -tele  -off eliquis due to recent gib  -hold cardizem due to low bp  -resume amiodarone when able to take po     *ZOHREH on CKD 3 --> improving  Metabolic acidosis  Hypernatremia--> resolved  -hx of CKD 3 with baseline Cr < 2 per prior notes but Cr recently has been in 3s   -Cr now in 4s. Renal on, appreciate  -hold diuretics  -ivf per renal    Hypokalemia, hypomagnesemia- repletion per renal    Acute on chronic anemia- 1U PRBC today     Chronic conditions  Home meds when able        Scds    Dw pt, family   dw RN        Thank  Sosa Arguelles MD    [facility] Emergencyist  Internal Medicine  Answering Service number: [phone]    Supplementary Documentation:

## 2024-04-05 NOTE — PLAN OF CARE
Received pt at 1930. Pt is A&O x 2; family at bedside during shift handoff which included information that pt became very confused and pulled lines and tubes out prior night. Pt again became very confused after family had left for the evening. Despite several attempts to reorient pt, she remained agitated and upset  about her disabled daughter that she is a caregiver for at home. Pt was able to be connected with family via telephone and decision was made by staff to move pt to a working bed with bed alarm as well as moving pt to room closer to nurses station for her safety. Another son of pt arrived at 2200 to stay overnight with pt d/t anxiety without pt present in room. Pt is on RA, VSS, NPO w/ ice chips. Pt has colostomy and plascencia at present; both received HS site care.  Pt denies pain. IV access is patent infusing 0.9 with 75 meq sodium bicarb @ 75 ml/hr. NOC safety plan in place; bed in low position, alarm on, family at bedside, call light and personal items within reach, pt encouraged to call staff for assistance.

## 2024-04-05 NOTE — CDS QUERY
Dear Doctor Joanie,    Can shock be further specified    [  ] Hypovolemic Shock  [ x ] Other shock (please specify): _multifactorial, anesthesia/ pain med related likely___________      Clinical Indicators: 89 Y/O F- s/p ex lap with takedown of colostomy and partial colon resection, new colostomy placement.     SOFA score 7  MAP: 49> 59>67  Cr- 4.31    4/3 Hospitalist PN- shock   -post operative. Likely from fluid shifts, pain meds / anesthesia  -now weaned off levo    4/3 pulmonary PN- Postop hypotensive, on levo, admitted to icu.   Shock: suspect due to fluid shifting, pain meds / anesthesia. Doubt sepsis   - IVFs - wean pressors as able, on low dose levo       Risks: hypotension, anesthesia, pain meds    Treatment: levophed gtt MAP greater than or equal to 60, IVF bolus, IVF, ICU monitoring, pulmonary consult, PRBC       Use of terms such as suspected, possible, or probable (associated with a specific diagnosis that is being evaluated, monitored, or treated as if it exists) are acceptable and can be coded in the inpatient setting, when documented at the time of discharge.     Please add any additional documentation to your progress note and continue to document this through discharge.      For questions, please contact Clinical : Amy Sotelo -[phone]. Thank You.    THIS FORM IS A PERMANENT PART OF THE MEDICAL RECORD

## 2024-04-05 NOTE — CONGREGATE LIVING REVIEW
Pending sale to [facility] Authorization    The [facility] Review Committee has reviewed this case and the patient IS APPROVED for discharge to a facility for Short Term Skilled once the following procedure is followed:     - The physician discharge instructions (contained within the SHANTE note for SNF) must inlcude the below appropriate and approved COVID instructions to the facility    For questions regarding CLRC approval process, please contact the CM assigned to the case.  For questions regarding RN discharge workflow, please contact the unit Clinical Leader.

## 2024-04-05 NOTE — OCCUPATIONAL THERAPY NOTE
OCCUPATIONAL THERAPY EVALUATION - INPATIENT     Room Number: 314/314-A  Evaluation Date: 4/5/2024  Type of Evaluation: Initial  Presenting Problem: s/p partial colon resection, takedown colostomy/colostomy creation 4/3/24    Physician Order: IP Consult to Occupational Therapy  Reason for Therapy: ADL/IADL Dysfunction and Discharge Planning    OCCUPATIONAL THERAPY ASSESSMENT   Patient is currently functioning below baseline with lower body dressing, bed mobility, transfers, stating sitting balance, dynamic sitting balance, maintaining seated position, and energy conservation strategies. Prior to admission, patient's baseline is MOD I/Supervision with BADLs and functional mobility.  Patient is requiring moderate assist as a result of the following impairments: decreased functional reach, decreased endurance, impaired standing balance, and cognitive deficits (orientation, problem solving). Occupational Therapy will continue to follow for duration of hospitalization.    Patient will benefit from continued skilled OT Services to promote return to prior level of function and safety with continuous assistance and gradual rehabilitative therapy       History Related to Current Admission: Patient is a 89 year old female admitted on 4/3/2024 with Presenting Problem: s/p partial colon resection, takedown colostomy/colostomy creation 4/3/24. Co-Morbidities : colon CA, Afib, back pain, CKD s/p nephrectomy, CHF, endometrial CA    WEIGHT BEARING RESTRICTION  Weight Bearing Restriction: None                Recommendations for nursing staff:   Transfers: one person with RW  Toileting location: bedside commode or bed level    EVALUATION SESSION:  Patient Start of Session: Semi-supine in bed  FUNCTIONAL TRANSFER ASSESSMENT  Sit to Stand: Edge of Bed  Edge of Bed: Moderate Assist    BED MOBILITY  Supine to Sit : Minimal Assist  Sit to Supine (OT): Not Tested  Scooting: MAX A    BALANCE ASSESSMENT  Static Sitting: Supervision  Sitting  Unilateral: Contact Guard Assist  Static Standing: Minimal Assist  Standing Unilateral: Minimal Assist    FUNCTIONAL ADL ASSESSMENT       ACTIVITY TOLERANCE: sitting EOB approx 4 minutes in preparation for functional transfer training.                         O2 SATURATIONS       COGNITION  Orientation Level:  oriented to person, disoriented to place, and disoriented to time  Following Commands:  follows one step commands with increased time and follows one step commands with repetition    Upper Extremity   ROM: within functional limits   Strength: within functional limits   Coordination  Gross motor: WFL  Fine motor: WFL  Sensation: Light touch:  intact    EDUCATION PROVIDED  Patient: Role of Occupational Therapy; Plan of Care; Posture/Positioning; Proper Body Mechanics  Patient's Response to Education: Verbalized Understanding; Requires Further Education    Equipment used: RW  Demonstrates functional use, Would benefit from additional trial      Therapist comments: Pt is pleasant and cooperative throughout session.     Patient End of Session: In bed;Needs met;Call light within reach;RN aware of session/findings;All patient questions and concerns addressed;Family present    OCCUPATIONAL PROFILE    HOME SITUATION  Type of Home: Independent living facility  Home Layout: One level  Lives With: Daughter (daughter has Gregorio syndrome)    Toilet and Equipment: Standard height toilet;Comfort height toilet  Shower/Tub and Equipment: Walk-in shower;Shower chair;Grab bar       Occupation/Status: reads, puzzles, TV  Hand Dominance: Right  Drives: No  Patient Regularly Uses: Glasses    Prior Level of Function: Per PT Eval:  Pt lives with daughter at [facility]. Pts daughter has Downs syndrome and patient provides supervision for daughter. Pt ambulates with RW occasionally when outside her apartment. Pt independent with ADL and mobility. Pts family notes patient has been more forgetful and have been talking about transition  to [facility].     SUBJECTIVE   \"My home.\" Re; where are we currently?    PAIN ASSESSMENT  [place]  Location: denies       OBJECTIVE  Precautions: Bed/chair alarm;Hard of hearing;Abdominal protective strategies  Fall Risk: High fall risk      ASSESSMENTS    AM-PAC ‘6-Clicks’ Inpatient Daily Activity Short Form  -   Putting on and taking off regular lower body clothing?: A Lot  -   Bathing (including washing, rinsing, drying)?: A Lot  -   Toileting, which includes using toilet, bedpan or urinal? : A Lot  -   Putting on and taking off regular upper body clothing?: A Little  -   Taking care of personal grooming such as brushing teeth?: None  -   Eating meals?: None    AM-PAC Score:  Score: 17  Approx Degree of Impairment: 50.11%  Standardized Score (AM-PAC Scale): 37.26    ADDITIONAL TESTS     NEUROLOGICAL FINDINGS      COGNITION ASSESSMENTS       PLAN  OT Treatment Plan: Balance activities;Energy conservation/work simplification techniques;ADL training;IADL training;Functional transfer training;UE strengthening/ROM;Endurance training;Patient/Family education;Patient/Family training;Equipment eval/education;Compensatory technique education;Continued evaluation  Rehab Potential : Good  Frequency: 3x/week  Number of Visits to Meet Established Goals: 3    ADL Goals   Patient will perform grooming: with supervision  Patient will perform upper body dressing:  with supervision  Patient will perform lower body dressing:  with supervision  Patient will perform toileting: with supervision    Functional Transfer Goals  Patient will transfer from supine to sit:  with supervision  Patient will transfer from sit to stand:  with supervision  Patient will transfer to bedside commode:  with supervision  Patient will transfer to toilet:  with supervision    UE Exercise Program Goal  Patient will be supervision with bilateral AROM HEP (home exercise program).    Additional Goals  Pt will tolerate standing for 3 minutes utilizing AD as needed  in preparation to perform grooming ADLs at sink level.    Patient Evaluation Complexity Level:   Occupational Profile/Medical History LOW - Brief history including review of medical or therapy records    Specific performance deficits impacting engagement in ADL/IADL LOW  1 - 3 performance deficits    Client Assessment/Performance Deficits MODERATE - Comorbidities and min to mod modifications of tasks    Clinical Decision Making LOW - Analysis of occupational profile, problem-focused assessments, limited treatment options    Overall Complexity LOW     OT Session Time: 15 minutes  Therapeutic Activity: 12 minutes

## 2024-04-05 NOTE — PHYSICAL THERAPY NOTE
PHYSICAL THERAPY EVALUATION - INPATIENT     Room Number: 314/314-A  Evaluation Date: 4/5/2024  Type of Evaluation: Initial  Physician Order: PT Eval and Treat    Presenting Problem: s/p partial colon resection, takedown colostomy/colostomy creation 4/3/24  Co-Morbidities : colon CA, Afib, back pain, CKD s/p nephrectomy, CHF, endometrial CA  Reason for Therapy: Mobility Dysfunction and Discharge Planning    PHYSICAL THERAPY MEDICAL/SOCIAL HISTORY  History related to current admission: Patient is a 89 year old female admitted on 4/3/2024 from home for elective exploratory laparotomy, partial colon resection, colostomy takedown, and colostomy creation. Pt hypotensive on levo postoperatively.     Prior Level of [place]: Pt lives with daughter at [facility]. Pts daughter has Downs syndrome and patient provides supervision for daughter. Pt ambulates with RW occasionally when outside her apartment. Pt independent with ADL and mobility. Pts family notes patient has been more forgetful and have been talking about transition to [facility].     PHYSICAL THERAPY ASSESSMENT   Patient is currently functioning below baseline with bed mobility, transfers, gait, and standing prolonged periods.  Prior to admission, patient's baseline is MOD I.  Patient is requiring moderate assist as a result of the following impairments: decreased functional strength, pain, impaired standing balance, and impaired motor planning.  Physical Therapy will continue to follow for duration of hospitalization.    Patient will benefit from continued skilled PT Services to promote return to prior level of function and safety with continuous assistance and gradual rehabilitative therapy .    PLAN  PT Treatment Plan: Bed mobility;Endurance;Energy conservation;Patient education;Gait training  Rehab Potential : Fair  Frequency (Obs): 3-5x/week  Number of Visits to Meet Established Goals: 5      CURRENT GOALS    Goal #1 Patient is able to demonstrate supine  - sit EOB @ level: supervision     Goal #2 Patient is able to demonstrate transfers EOB to/from [facility] at assistance level: minimum assistance     Goal #3 Patient is able to ambulate 75 feet with assist device: walker - rolling at assistance level: minimum assistance     Goal #4    Goal #5    Goal #6    Goal Comments: Goals established on 2024      HOME SITUATION  Type of Home: Independent living facility   Home Layout: One level                Lives With: Daughter (daughter has Gregorio syndrome)  Drives: No  Patient Owned Equipment: Rolling walker  Patient Regularly Uses: Glasses      SUBJECTIVE  \"Wow I didn't think I would be this weak.\"       OBJECTIVE  Precautions: Bed/chair alarm;Hard of hearing;Abdominal protective strategies  Fall Risk: High fall risk    WEIGHT BEARING RESTRICTION  Weight Bearing Restriction: None                PAIN ASSESSMENT  Rating: Unable to rate  Location: abdomen  Management Techniques: Activity promotion;Repositioning    COGNITION  Orientation Level:  oriented to person and disoriented to time  Memory:  decreased recall of recent events and decreased short term memory  Following Commands:  follows one step commands with repetition  Initiation: cues to initiate tasks  Motor Planning: impaired  Safety Judgement:  decreased awareness of need for assistance and decreased awareness of need for safety  Awareness of Errors:  decreased awareness of errors     RANGE OF MOTION AND STRENGTH ASSESSMENT  Upper extremity ROM and strength are within functional limits     Lower extremity ROM is within functional limits     Lower extremity strength is within functional limits       BALANCE  Static Sitting: Good  Dynamic Sitting: Good  Static Standing: Poor +  Dynamic Standing: Poor    ADDITIONAL TESTS                                    ACTIVITY TOLERANCE         BP sittin/62  BP: 111/54        Patient Position: Standing    BP sitting in chair: 104/81    O2 WALK       NEUROLOGICAL FINDINGS                         AM-PAC '6-Clicks' INPATIENT SHORT FORM - BASIC MOBILITY  How much difficulty does the patient currently have...  Patient Difficulty: Turning over in bed (including adjusting bedclothes, sheets and blankets)?: A Little   Patient Difficulty: Sitting down on and standing up from a chair with arms (e.g., wheelchair, bedside commode, etc.): A Lot   Patient Difficulty: Moving from lying on back to sitting on the side of the bed?: A Lot   How much help from another person does the patient currently need...   Help from Another: Moving to and from a bed to a chair (including a wheelchair)?: A Lot   Help from Another: Need to walk in hospital room?: A Lot   Help from Another: Climbing 3-5 steps with a railing?: Total       AM-PAC Score:  Raw Score: 12   Approx Degree of Impairment: 68.66%   Standardized Score (AM-PAC Scale): 35.33   CMS Modifier (G-Code): CL    FUNCTIONAL ABILITY STATUS  Gait Assessment   Functional Mobility/Gait Assessment  Gait Assistance: Moderate assistance  Distance (ft): 5  Assistive Device: Rolling walker    Skilled Therapy Provided     Bed Mobility:  Rolling: MIN  Supine to sit: MIN   Sit to supine: NT     Transfer Mobility:  Sit to stand: MOD   Stand to sit: MOD  Gait = MOD    There-Act:  VC for UE placement and rolling for bed mobility.   MIN assist for initiation and support of trunk.   Assist with scooting.   VC for midline positioning.   VC for safe transfer set up.   MOD assist for balance with sit-stand.   Retropulsion in standing noted.   VC for anterior weightshift and posture.   Pt transferred to bedside chair with RW.   MOD assist for balance/walker management.   VC for posture and anterior weightshift.     Therapist's Comments: Pt notes dizziness during session. See activity tolerance for BP values.     Exercise/Education Provided:  Bed mobility  Energy conservation  Functional activity tolerated  Gait training  Posture  Strengthening  Lower therapeutic exercise:  Ankle  pumps  Transfer training    Patient End of Session: Up in chair;Needs met;Call light within reach;RN aware of session/findings;All patient questions and concerns addressed;Family present      Patient Evaluation Complexity Level:  History High - 3 or more personal factors and/or co-morbidities   Examination of body systems Moderate - addressing a total of 3 or more elements   Clinical Presentation Moderate - Evolving   Clinical Decision Making Moderate - Evolving       PT Session Time: 25 minutes  Gait Training:  minutes  Therapeutic Activity: 15 minutes  Neuromuscular Re-education:  minutes  Therapeutic Exercise:  minutes

## 2024-04-05 NOTE — PLAN OF CARE
Alert and oriented to person and place. Family at bedside helpful in reorientation. Following commands. NSR on tele. Ambulates with 1 assist and walker, worked with PT. Huizar catheter removed, voiding in bathroom. Ostomy intact with gas and small amount of liquid stool. Stoma intact but swollen, surgery PA aware. Midline incision with aquacel dressing. Denies pain and nausea. Advanced to clear liquids. Son and daughter at bedside and updated on plan of care.    1215: Blood transfusion initiated.

## 2024-04-05 NOTE — PROGRESS NOTES
[facility]  Progress Note    Sunita Loza Patient Status:  Inpatient    1932 MRN [id number]   Location [facility] 3NW-A Attending Renny Gilbert MD   Hosp Day # 2 PCP Carmen Tapia MD     Subjective:    Patient still with some confusion this morning  Sons x 2 at bedside  Gas and scant liquid stool in bag    Objective/Physical Exam:    Vital Signs:  Blood pressure 139/54, pulse 77, temperature 98.3 °F (36.8 °C), temperature source Oral, resp. rate 16, height 5' (1.524 m), weight 87 lb 8.4 oz (39.7 kg), SpO2 95%.    General:  Alert, orientated to self.  Cooperative.  No apparent distress.  Abdomen:  aquacel intact, stoma with gas/stool present, soft, non distended, minimal tenderness present  Labs:  Reviewed    Lab Results   Component Value Date    WBC 13.0 2024    HGB 6.9 2024    HCT 22.4 2024    .0 2024    CREATSERUM 3.23 2024    BUN 53 2024     2024    K 3.4 2024     2024    CO2 19.0 2024    GLU 64 2024    CA 7.1 2024    MG 1.0 2024    PGLU 254 2024       Problem List:  Patient Active Problem List   Diagnosis    DJD (degenerative joint disease), cervical    Asymptomatic postmenopausal status    Benign essential HTN    Chronic kidney disease, stage III (moderate) (HCC)    Chronic rhinitis    Disorder of bone and cartilage    Esophageal reflux    Generalized osteoarthrosis, involving multiple sites    Malignant neoplasm of uterus (HCC)    Other vitamin B12 deficiency anemia    Pure hypercholesterolemia    Renal failure    Acute pain of left knee    Vitamin D deficiency    Metabolic acidosis    Malignant neoplasm (HCC)    Hypokalemia    Hyperlipidemia, mixed    Endometrial carcinoma (HCC)    Anemia in chronic kidney disease    Osteopenia    Chronic kidney disease, stage 3b (HCC)    Otalgia    Sensorineural hearing loss, bilateral    ZOHREH (acute kidney injury) (HCC)    Hypernatremia     Hypomagnesemia    Dehydration    ATN (acute tubular necrosis) (HCC)    Atrial fibrillation (HCC)    Bilateral leg edema    Hypertension    Primary osteoarthritis of left knee    Abdominal mass, unspecified abdominal location    Anemia    Acute kidney injury (HCC)    Azotemia    Hyperglycemia    Abdominal pain, generalized    CKD (chronic kidney disease) stage 4, GFR 15-29 ml/min (HCC)    Shock (HCC)    Decreased ferritin       Impression:     89 y/o S/P  EXPLORATORY LAPAROTOMY, LYSIS OF ADHESIONS, PARTIAL COLON RESECTION WITH TAKEDOWN OF COLOSTOMY AND CREATION OF  COLOSTOMY, mobilization of the splenic flexure   Hgb 6.9   Liquid stool/gas per stoma    Plan:    Will transfuse 1 unit PRBC  Limit narcotics as may contribute to confusion, IV tylenol ordered  Fluid per medical team  Will start clears  OT/PT   Ok to erich plascencia from surgical perspective  All questions answered    CARLOTA Payne  [facility]  General Surgery  4/5/2024

## 2024-04-05 NOTE — CM/SW NOTE
04/05/24 1400   CM/SW Referral Data   Referral Source Social Work (self-referral)   Reason for Referral Discharge planning   Informant Patient;Clinical Staff Member;Daughter;Other   Medical Hx   Does patient have an established PCP? Yes   Patient Info   Patient's Current Mental Status at Time of Assessment Alert   Patient's Home Environment Independent Living   Number of Levels in Home 1   Patient lives with Daughter  (hx Down Syndrome)   Patient Status Prior to Admission   Independent with ADLs and Mobility Yes   Services in place prior to admission Home Health Care;DME/Supplies at home   Discharge Needs   Anticipated D/C needs Subacute rehab   Services Requested   Submitted to [facility] Yes   PASRR Level 1 Submitted Yes   Choice of Post-Acute Provider   Informed patient of right to choose their preferred provider Yes   List of appropriate post-acute services provided to patient/family with quality data No - Declined list   Patient/family choice [facility]     Pt discussed with RN, anticipated therapy need is gradual therapy. SW met with pt, son, and DIL to discuss dc planning needs. Pt is a 89 y/o female admitted for s/p ex lap w/ partial resection, lysis of adhesions, takedown and creation of new colostomy.    Family reports pt lives at [facility], with her special needs daughter. Family reports strong support, and are alternating supervising sister while pt admitted. Family interested in pt dc to [place] for [facility]. [facility] approved. SW sent referral via [facility], confirmed they can accept. PASRR done.     MARY reviewed Medicare criteria for GT to be covered. Family also had concerns regarding their sister being able to stay with pt or visit pt while she is at [facility]. MARY relayed concerns to [place] liaison, requested to follow up with family.    SW/PORSHA to remain available for dc planning, and/or additional need for support.    Freddie Sheffield, SUDHEER  Discharge Planner  [id number]

## 2024-04-05 NOTE — PROGRESS NOTES
[facility]  Nephrology Progress Note    Sunita Loza Attending:  Renny Gilbert MD       Assessment and Plan:    1) ZOHREH- due to recent diuresis + sean-op fluid shifts / hypotension- improving. PLAN- adjust IVF (hypotonic bicarb gtt)     2) CKD 3- baseline Cr 1.5 mg/dl -3 in part due to remote nephrectomy > 20 yrs ago     3) s/p exp lap / LOS / colon resection / ostomy takedown / creation of colostomy 4/3 (Dr. 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 Fe stores noted -> IV FE + EPO     8) Chronic edema- onset  per family; EF 60% with mild-mod TR / AI + mild MR; RV WNL- non issue currently       Subjective:  Awake some confusion very chatty off pressors no c/o other than mild abd discomfort    Physical Exam:   /54 (BP Location: Left arm)   Pulse 77   Temp 98.3 °F (36.8 °C) (Oral)   Resp 16   Ht 5' (1.524 m)   Wt 87 lb 8.4 oz (39.7 kg)   SpO2 95%   BMI 17.09 kg/m²   Temp (24hrs), Av.3 °F (36.8 °C), Min:98.1 °F (36.7 °C), Max:98.5 °F (36.9 °C)       Intake/Output Summary (Last 24 hours) at 2024 0944  Last data filed at 2024 0729  Gross per 24 hour   Intake 1119 ml   Output 875 ml   Net 244 ml     Wt Readings from Last 3 Encounters:   24 87 lb 8.4 oz (39.7 kg)   24 83 lb 1.6 oz (37.7 kg)   24 86 lb (39 kg)     General: awake alert  HEENT: No scleral icterus, MMM  Neck: Supple, no MARIA FERNANDA or thyromegaly  Cardiac: Regular rate and rhythm, S1, S2 normal, no murmur or tub  Lungs: Decreased BS at bases bilaterally   Abdomen: Soft, non-tender. + bowel sounds, no palpable organomegaly  Extremities: Without clubbing, cyanosis; no edema  Neurologic: Cranial nerves grossly intact, moving all extremities  Skin: Warm and dry, no rashes       Labs:   Lab Results   Component Value Date    WBC 13.0  04/05/2024    HGB 6.9 04/05/2024    HCT 22.4 04/05/2024    .0 04/05/2024    CREATSERUM 3.23 04/05/2024    BUN 53 04/05/2024     04/05/2024    K 3.4 04/05/2024     04/05/2024    CO2 19.0 04/05/2024    GLU 64 04/05/2024    CA 7.1 04/05/2024    MG 1.0 04/05/2024    PGLU 115 04/05/2024       Imaging:  All imaging studies reviewed.    Meds:           Questions/concerns were discussed with patient and/or family by bedside.          Fabiana Miller MD  4/5/2024  944 AM

## 2024-04-06 LAB
ANION GAP SERPL CALC-SCNC: 7 MMOL/L (ref 0–18)
BUN BLD-MCNC: 41 MG/DL (ref 9–23)
CALCIUM BLD-MCNC: 7.6 MG/DL (ref 8.5–10.1)
CHLORIDE SERPL-SCNC: 108 MMOL/L (ref 98–112)
CO2 SERPL-SCNC: 28 MMOL/L (ref 21–32)
CREAT BLD-MCNC: 2.7 MG/DL
EGFRCR SERPLBLD CKD-EPI 2021: 16 ML/MIN/1.73M2 (ref 60–?)
ERYTHROCYTE [DISTWIDTH] IN BLOOD BY AUTOMATED COUNT: 18.7 %
GLUCOSE BLD-MCNC: 157 MG/DL (ref 70–99)
GLUCOSE BLD-MCNC: 91 MG/DL (ref 70–99)
GLUCOSE BLD-MCNC: 92 MG/DL (ref 70–99)
GLUCOSE BLD-MCNC: 94 MG/DL (ref 70–99)
HCT VFR BLD AUTO: 27.7 %
HGB BLD-MCNC: 9.1 G/DL
MAGNESIUM SERPL-MCNC: 2.4 MG/DL (ref 1.6–2.6)
MCH RBC QN AUTO: 28.6 PG (ref 26–34)
MCHC RBC AUTO-ENTMCNC: 32.9 G/DL (ref 31–37)
MCV RBC AUTO: 87.1 FL
OSMOLALITY SERPL CALC.SUM OF ELEC: 306 MOSM/KG (ref 275–295)
PLATELET # BLD AUTO: 150 10(3)UL (ref 150–450)
POTASSIUM SERPL-SCNC: 3.3 MMOL/L (ref 3.5–5.1)
RBC # BLD AUTO: 3.18 X10(6)UL
SODIUM SERPL-SCNC: 143 MMOL/L (ref 136–145)
WBC # BLD AUTO: 9.1 X10(3) UL (ref 4–11)

## 2024-04-06 PROCEDURE — 99232 SBSQ HOSP IP/OBS MODERATE 35: CPT | Performed by: INTERNAL MEDICINE

## 2024-04-06 RX ORDER — SODIUM CHLORIDE, SODIUM LACTATE, POTASSIUM CHLORIDE, CALCIUM CHLORIDE 600; 310; 30; 20 MG/100ML; MG/100ML; MG/100ML; MG/100ML
INJECTION, SOLUTION INTRAVENOUS CONTINUOUS
Status: DISCONTINUED | OUTPATIENT
Start: 2024-04-06 | End: 2024-04-08

## 2024-04-06 RX ORDER — POTASSIUM CHLORIDE 20 MEQ/1
40 TABLET, EXTENDED RELEASE ORAL ONCE
Status: COMPLETED | OUTPATIENT
Start: 2024-04-06 | End: 2024-04-06

## 2024-04-06 NOTE — PROGRESS NOTES
[facility] Progress Note     Sunita Loza Patient Status:  Inpatient    1932 MRN [id number]   Formerly [facility] 3NW-A Attending Renny Gilbert MD   Hosp Day # 3 PCP Carmen Tapia MD     Chief Complaint:  follow up     Subjective:   S:   sitting up in bed, no overnight events. Tolerating liquid diet. No n/v/f/c       Objective:   Vital signs:  Temp:  [97.5 °F (36.4 °C)-98.7 °F (37.1 °C)] 97.9 °F (36.6 °C)  Pulse:  [62-96] 62  Resp:  [14-19] 18  BP: (111-161)/(47-77) 144/77  SpO2:  [92 %-97 %] 96 %    Wt Readings from Last 6 Encounters:   24 87 lb 8.4 oz (39.7 kg)   24 83 lb 1.6 oz (37.7 kg)   24 86 lb (39 kg)   24 89 lb 4 oz (40.5 kg)   24 98 lb (44.5 kg)   24 92 lb 6 oz (41.9 kg)         Physical Exam:    Gen: No acute distress, alert and oriented , no accessory m use, [place]  Pulm: Lungs clear, ok respiratory effort  CV: Heart with regular rate and rhythm, no edema  Abd: Abdomen soft, minimally tender, nondistended, bowel sounds hypoactive, no peritoneal signs  MSK:   no clubbing, no cyanosis  Skin: no visible rashes    Psych:   appropriate affect, answering questions ok    Results:   Diagnostic Data:      Labs:       Recent Labs   Lab 24  1140 24  0356 24  0507 24  1617 24  0607   WBC 9.9 12.5* 13.0*  --  9.1   HGB 7.6* 7.7* 6.9* 10.0* 9.1*   MCV 89.1 86.0 90.0  --  87.1   .0 202.0 173.0  --  150.0   INR 1.19  --   --   --   --        Recent Labs   Lab 24  1140 24  1638 24  0356 24  0507 24  1617 24  0607   *  --  150* 144  --  143   K 3.2* 4.2 3.6 3.4* 3.7 3.3*   *  --  118* 109  --  108   CO2 15.0*  --  20.0* 19.0*  --  28.0   BUN 65*  --  59* 53*  --  41*   CREATSERUM 4.31*  --  4.13* 3.23*  --  2.70*   CA 7.2*  --  7.1* 7.1*  --  7.6*   MG  --   --   --  1.0*  --  2.4   *  --  109* 64*  --  92       Recent Labs   Lab 24  1140   ALT 18    AST 20   ALB 2.3*        Inflammatory Markers  Recent Labs   Lab 04/04/24  0356   BRANDYN 139.6          Medications:    iron sucrose  200 mg Intravenous Daily    epoetin chris  10,000 Units Subcutaneous Weekly    amiodarone  100 mg Oral Daily    famotidine  20 mg Oral Daily    Or    famotidine  20 mg Intravenous Daily    heparin  5,000 Units Subcutaneous q12h      lactated ringers       glucose **OR** glucose **OR** glucose-vitamin C **OR** dextrose **OR** glucose **OR** glucose **OR** glucose-vitamin C, acetaminophen, oxyCODONE **OR** oxyCODONE, HYDROmorphone **OR** HYDROmorphone, polyethylene glycol (PEG 3350), sennosides, bisacodyl, fleet enema, ondansetron, metoclopramide, meperidine          ASSESSMENT / PLAN:      Patient is a 89 year old female with PMH sig for htn, pAF, teresa syndome with recurrent cancer, hx of colon resection, colostomy, recent GIB noted to have growth around stoma concerning for maligancy, here for recurrent colon surgery     Impression     -colon cancer sp ex lap, TIFFANIE, partial colon resection, takedown of colostomy and creation of colostomy 04/03  -post op hypotension / shock-resolved  -post op pain     -metabolic acidosis  -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 Likely from fluid shifts, pain meds / anesthesia  -now weaned off levo     *HTN hx   -hold meds. Bp ok for now     *pAF  -tele  -off eliquis due to recent gib  -hold cardizem due to low bp  -resume amiodarone when able to take po     *ZOHREH on CKD 3 --> improving  Metabolic acidosis  Hypernatremia--> resolved  -hx of CKD 3 with baseline Cr < 2 per prior notes but Cr recently has been in 3s   -Cr now in 4s. Renal on, appreciate  -hold diuretics  -ivf per renal    Hypokalemia, hypomagnesemia- repletion per renal    Acute on chronic anemia- sp 1U PRBC with response     Chronic  conditions  Home meds when able        Scds    Dw pt, family   dw RN        Thank You,  Sosa Nair MD    [facility]  Internal Medicine  Answering Service number: [phone]    Supplementary Documentation:

## 2024-04-06 NOTE — PROGRESS NOTES
Two of the patient's sons were at the bedside at the beginning of the shift, and then a third brother came and he has remained at the bedside tonight. Patient did report to me that she had fallen asleep, and when she woke up she was unsure whether is was morning or night (the time was 08:20pm). Patient is calm and pleasant with her family present. She has been getting up with a walker and standby assistance to use the restroom, she is voiding. Ostomy output has been minimal. She is on a clear liquid diet. Her diet is being advanced by the surgeon. She is on IV fluids. Fall precautions maintained.

## 2024-04-06 NOTE — PROGRESS NOTES
[facility]  Progress Note    Sunita Loza Patient Status:  Inpatient    1932 MRN [id number]   Location [facility] 3NW-A Attending Renny Gilbert MD   Hosp Day # 3 PCP Carmen Tapia MD     Subjective:  Doing better this morning.  More lucid.  Tolerating clear liquids.  Ostomy output noted.    Objective/Physical Exam:  General: Alert, orientated x3.  Cooperative.  No apparent distress.  Vital Signs:  Blood pressure 144/77, pulse 62, temperature 97.9 °F (36.6 °C), temperature source Oral, resp. rate 18, height 5' (1.524 m), weight 87 lb 8.4 oz (39.7 kg), SpO2 96%.  HEENT: Exam is unremarkable.  Without scleral icterus.  Mucous membranes are moist. Oropharynx is clear.  Lungs: Clear to auscultation bilaterally.  Cardiac: Regular rate and rhythm. No murmur.  Abdomen: Soft, nondistended wound intact ostomy pink and moist with function  Extremities:  No lower extremity edema noted.  Without clubbing or cyanosis.    Skin: Normal texture and turgor.  Neurologic: Cranial nerves are grossly intact.  Motor strength and sensory examination is grossly normal.  No focal neurologic deficit.    Labs:  Lab Results   Component Value Date    WBC 9.1 2024    HGB 9.1 2024    HCT 27.7 2024    .0 2024    CREATSERUM 2.70 2024    BUN 41 2024     2024    K 3.3 2024     2024    CO2 28.0 2024    GLU 92 2024    CA 7.6 2024    MG 2.4 2024    PGLU 91 2024       Assessment/Plan:  Postop day #3 following partial colon resection and redo of ostomy.  Progressing well.  Will advance diet.    Renny Gilbert MD  2024  8:56 AM

## 2024-04-06 NOTE — PROGRESS NOTES
[facility]  Nephrology Progress Note    Sunita Loza Attending:  Renny Gilbert MD         Subjective:  No acute events      Current Facility-Administered Medications:     glucose (Dex4) 15 GM/59ML oral liquid 15 g, 15 g, Oral, Q15 Min PRN **OR** glucose (Glutose) 40% oral gel 15 g, 15 g, Oral, Q15 Min PRN **OR** glucose-vitamin C (Dex-4) chewable tab 4 tablet, 4 tablet, Oral, Q15 Min PRN **OR** dextrose 50% injection 50 mL, 50 mL, Intravenous, Q15 Min PRN **OR** glucose (Dex4) 15 GM/59ML oral liquid 30 g, 30 g, Oral, Q15 Min PRN **OR** glucose (Glutose) 40% oral gel 30 g, 30 g, Oral, Q15 Min PRN **OR** glucose-vitamin C (Dex-4) chewable tab 8 tablet, 8 tablet, Oral, Q15 Min PRN    iron sucrose (Venofer) 20 mg/mL injection 200 mg, 200 mg, Intravenous, Daily    epoetin chris (Epogen, Procrit) 23316 UNIT/ML injection 10,000 Units, 10,000 Units, Subcutaneous, Weekly    amiodarone (Pacerone) tab 100 mg, 100 mg, Oral, Daily    sodium bicarbonate 75 mEq in sterile water 1,000 mL infusion, 75 mEq, Intravenous, Continuous    acetaminophen (Ofirmev) 10 mg/mL infusion premix 600 mg, 15 mg/kg, Intravenous, Q6H PRN    oxyCODONE immediate release tab 2.5 mg, 2.5 mg, Oral, Q4H PRN **OR** oxyCODONE immediate release tab 5 mg, 5 mg, Oral, Q4H PRN    HYDROmorphone (Dilaudid) 1 MG/ML injection 0.2 mg, 0.2 mg, Intravenous, Q2H PRN **OR** HYDROmorphone (Dilaudid) 1 MG/ML injection 0.4 mg, 0.4 mg, Intravenous, Q2H PRN    polyethylene glycol (PEG 3350) (Miralax) 17 g oral packet 17 g, 17 g, Oral, Daily PRN    sennosides (Senokot) tab 17.2 mg, 17.2 mg, Oral, Nightly PRN    bisacodyl (Dulcolax) 10 MG rectal suppository 10 mg, 10 mg, Rectal, Daily PRN    fleet enema (Fleet) 7-19 GM/118ML rectal enema 133 mL, 1 enema, Rectal, Once PRN    ondansetron (Zofran) 4 MG/2ML injection 4 mg, 4 mg, Intravenous, Q6H PRN    metoclopramide (Reglan) 5 mg/mL injection 10 mg, 10 mg, Intravenous, Q8H PRN    famotidine (Pepcid) tab 20 mg, 20 mg, Oral,  Daily **OR** famotidine (Pepcid) 20 mg/2mL injection 20 mg, 20 mg, Intravenous, Daily    meperidine (Demerol) 25 MG/ML injection 12.5 mg, 12.5 mg, Intravenous, PRN    heparin (Porcine) 5000 UNIT/ML injection 5,000 Units, 5,000 Units, Subcutaneous, q12h      Physical Exam:   /77 (BP Location: Left arm)   Pulse 62   Temp 97.9 °F (36.6 °C) (Oral)   Resp 18   Ht 5' (1.524 m)   Wt 87 lb 8.4 oz (39.7 kg)   SpO2 96%   BMI 17.09 kg/m²   Temp (24hrs), Av.9 °F (36.6 °C), Min:97.5 °F (36.4 °C), Max:98.7 °F (37.1 °C)       Intake/Output Summary (Last 24 hours) at 2024 1006  Last data filed at 2024 0850  Gross per 24 hour   Intake 3286.25 ml   Output 975 ml   Net 2311.25 ml     Wt Readings from Last 3 Encounters:   24 87 lb 8.4 oz (39.7 kg)   24 83 lb 1.6 oz (37.7 kg)   24 86 lb (39 kg)     General: awake alert  HEENT: No scleral icterus, MMM  Neck: Supple, no MARIA FERNANDA or thyromegaly  Cardiac: Regular rate and rhythm, S1, S2 normal, no murmur or tub  Lungs: Decreased BS at bases bilaterally   Abdomen: Soft, non-tender. + bowel sounds, no palpable organomegaly  Extremities: Without clubbing, cyanosis; no edema  Neurologic: Cranial nerves grossly intact, moving all extremities  Skin: Warm and dry, no rashes     Recent Labs     24  1140 24  0356 24  0507 24  1617 24  0607   WBC 9.9 12.5* 13.0*  --  9.1   HGB 7.6* 7.7* 6.9* 10.0* 9.1*   MCV 89.1 86.0 90.0  --  87.1   .0 202.0 173.0  --  150.0   INR 1.19  --   --   --   --        Recent Labs     24  1140 24  1638 24  0356 24  0507 24  1617 24  0607   *  --  150* 144  --  143   K 3.2* 4.2 3.6 3.4* 3.7 3.3*   *  --  118* 109  --  108   CO2 15.0*  --  20.0* 19.0*  --  28.0   BUN 65*  --  59* 53*  --  41*   CREATSERUM 4.31*  --  4.13* 3.23*  --  2.70*   CA 7.2*  --  7.1* 7.1*  --  7.6*   MG  --   --   --  1.0*  --  2.4       Recent Labs     24  1140   ALT 18    AST 20   ALB 2.3*       Assessment and Plan:    1) ZOHREH- due to recent diuresis + sean-op fluid shifts / hypotension- improving. PLAN- adjust IVF (hypotonic bicarb gtt)     2) CKD 3- baseline Cr 1.5 mg/dl 2022-3 in part due to remote nephrectomy > 20 yrs ago     3) s/p exp lap / LOS / colon resection / ostomy takedown / creation of colostomy 4/3 (Dr. 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 Fe stores noted -> IV FE + EPO     8) Chronic edema- onset 6/23 per family; EF 60% with mild-mod TR / AI + mild MR; RV WNL- non issue currently     Dante Marques  4/6/2024

## 2024-04-07 PROBLEM — R60.9 CHRONIC EDEMA: Status: ACTIVE | Noted: 2024-04-07

## 2024-04-07 LAB
ANION GAP SERPL CALC-SCNC: 3 MMOL/L (ref 0–18)
BLOOD TYPE BARCODE: 5100
BUN BLD-MCNC: 31 MG/DL (ref 9–23)
CALCIUM BLD-MCNC: 8.3 MG/DL (ref 8.5–10.1)
CHLORIDE SERPL-SCNC: 112 MMOL/L (ref 98–112)
CO2 SERPL-SCNC: 30 MMOL/L (ref 21–32)
CREAT BLD-MCNC: 2.64 MG/DL
EGFRCR SERPLBLD CKD-EPI 2021: 17 ML/MIN/1.73M2 (ref 60–?)
ERYTHROCYTE [DISTWIDTH] IN BLOOD BY AUTOMATED COUNT: 18.7 %
GLUCOSE BLD-MCNC: 107 MG/DL (ref 70–99)
GLUCOSE BLD-MCNC: 114 MG/DL (ref 70–99)
GLUCOSE BLD-MCNC: 85 MG/DL (ref 70–99)
GLUCOSE BLD-MCNC: 91 MG/DL (ref 70–99)
GLUCOSE BLD-MCNC: 97 MG/DL (ref 70–99)
HCT VFR BLD AUTO: 27.6 %
HGB BLD-MCNC: 8.4 G/DL
MAGNESIUM SERPL-MCNC: 2.3 MG/DL (ref 1.6–2.6)
MCH RBC QN AUTO: 27.5 PG (ref 26–34)
MCHC RBC AUTO-ENTMCNC: 30.4 G/DL (ref 31–37)
MCV RBC AUTO: 90.5 FL
OSMOLALITY SERPL CALC.SUM OF ELEC: 306 MOSM/KG (ref 275–295)
PLATELET # BLD AUTO: 138 10(3)UL (ref 150–450)
POTASSIUM SERPL-SCNC: 3.8 MMOL/L (ref 3.5–5.1)
POTASSIUM SERPL-SCNC: 3.9 MMOL/L (ref 3.5–5.1)
RBC # BLD AUTO: 3.05 X10(6)UL
SODIUM SERPL-SCNC: 145 MMOL/L (ref 136–145)
UNIT VOLUME: 350 ML
WBC # BLD AUTO: 6.6 X10(3) UL (ref 4–11)

## 2024-04-07 PROCEDURE — 99232 SBSQ HOSP IP/OBS MODERATE 35: CPT | Performed by: INTERNAL MEDICINE

## 2024-04-07 NOTE — PLAN OF CARE
Pt A&Ox2-3, resting in bed.  Resp: RA,   Cardiac: NSR  GI/: LLQ colostomy, small output  Activity/Safety: up w/asst  Skin: colostomy site  Pain: none reported at this time  Pt, son and dtr updated on and agreeable to POC; IV fluids, ADAT, resume discharge planning tomorrow.

## 2024-04-07 NOTE — PROGRESS NOTES
[facility]  Progress Note    Sunita Loza Patient Status:  Inpatient    1932 MRN [id number]   Location [facility] 3NW-A Attending Renny Gilbert MD   Hosp Day # 4 PCP Carmen Tapia MD     Subjective:  Doing well.  Tolerating soft diet.  Some ambulation.  Family at bedside.  No further confusion    Objective/Physical Exam:  General: Alert, orientated x3.  Cooperative.  No apparent distress.  Vital Signs:  Blood pressure (!) 162/59, pulse 64, temperature 97.5 °F (36.4 °C), temperature source Oral, resp. rate 20, height 5' (1.524 m), weight 87 lb 8.4 oz (39.7 kg), SpO2 97%.  HEENT: Exam is unremarkable.  Without scleral icterus.  Mucous membranes are moist. Oropharynx is clear.  Lungs: Clear to auscultation bilaterally.  Cardiac: Regular rate and rhythm. No murmur.  Abdomen: Soft, nondistended ostomy pink and functioning  Extremities:  No lower extremity edema noted.  Without clubbing or cyanosis.    Skin: Normal texture and turgor.  Neurologic: Cranial nerves are grossly intact.  Motor strength and sensory examination is grossly normal.  No focal neurologic deficit.    Labs:  Lab Results   Component Value Date    WBC 6.6 2024    HGB 8.4 2024    HCT 27.6 2024    .0 2024    CREATSERUM 2.64 2024    BUN 31 2024     2024    K 3.9 2024     2024    CO2 30.0 2024    GLU 85 2024    CA 8.3 2024    MG 2.3 2024    PGLU 91 2024       Assessment/Plan:  Postop day #4 following partial colon resection.  Patient progressing well.  Probable home tomorrow    Renny Gilbert MD  2024  11:34 AM

## 2024-04-07 NOTE — PROGRESS NOTES
[facility]  Nephrology Progress Note    Sunita Loza Attending:  Renny Gilbert MD         Subjective:  No acute events      Current Facility-Administered Medications:     lactated ringers infusion, , Intravenous, Continuous    glucose (Dex4) 15 GM/59ML oral liquid 15 g, 15 g, Oral, Q15 Min PRN **OR** glucose (Glutose) 40% oral gel 15 g, 15 g, Oral, Q15 Min PRN **OR** glucose-vitamin C (Dex-4) chewable tab 4 tablet, 4 tablet, Oral, Q15 Min PRN **OR** dextrose 50% injection 50 mL, 50 mL, Intravenous, Q15 Min PRN **OR** glucose (Dex4) 15 GM/59ML oral liquid 30 g, 30 g, Oral, Q15 Min PRN **OR** glucose (Glutose) 40% oral gel 30 g, 30 g, Oral, Q15 Min PRN **OR** glucose-vitamin C (Dex-4) chewable tab 8 tablet, 8 tablet, Oral, Q15 Min PRN    iron sucrose (Venofer) 20 mg/mL injection 200 mg, 200 mg, Intravenous, Daily    epoetin chris (Epogen, Procrit) 64016 UNIT/ML injection 10,000 Units, 10,000 Units, Subcutaneous, Weekly    amiodarone (Pacerone) tab 100 mg, 100 mg, Oral, Daily    acetaminophen (Ofirmev) 10 mg/mL infusion premix 600 mg, 15 mg/kg, Intravenous, Q6H PRN    oxyCODONE immediate release tab 2.5 mg, 2.5 mg, Oral, Q4H PRN **OR** oxyCODONE immediate release tab 5 mg, 5 mg, Oral, Q4H PRN    HYDROmorphone (Dilaudid) 1 MG/ML injection 0.2 mg, 0.2 mg, Intravenous, Q2H PRN **OR** HYDROmorphone (Dilaudid) 1 MG/ML injection 0.4 mg, 0.4 mg, Intravenous, Q2H PRN    polyethylene glycol (PEG 3350) (Miralax) 17 g oral packet 17 g, 17 g, Oral, Daily PRN    sennosides (Senokot) tab 17.2 mg, 17.2 mg, Oral, Nightly PRN    bisacodyl (Dulcolax) 10 MG rectal suppository 10 mg, 10 mg, Rectal, Daily PRN    fleet enema (Fleet) 7-19 GM/118ML rectal enema 133 mL, 1 enema, Rectal, Once PRN    ondansetron (Zofran) 4 MG/2ML injection 4 mg, 4 mg, Intravenous, Q6H PRN    metoclopramide (Reglan) 5 mg/mL injection 10 mg, 10 mg, Intravenous, Q8H PRN    famotidine (Pepcid) tab 20 mg, 20 mg, Oral, Daily **OR** famotidine (Pepcid) 20 mg/2mL  injection 20 mg, 20 mg, Intravenous, Daily    meperidine (Demerol) 25 MG/ML injection 12.5 mg, 12.5 mg, Intravenous, PRN    heparin (Porcine) 5000 UNIT/ML injection 5,000 Units, 5,000 Units, Subcutaneous, q12h      Physical Exam:   /53 (BP Location: Left arm)   Pulse 52   Temp 97.8 °F (36.6 °C) (Oral)   Resp 18   Ht 5' (1.524 m)   Wt 87 lb 8.4 oz (39.7 kg)   SpO2 98%   BMI 17.09 kg/m²   Temp (24hrs), Av.8 °F (36.6 °C), Min:97.5 °F (36.4 °C), Max:98 °F (36.7 °C)       Intake/Output Summary (Last 24 hours) at 2024 0815  Last data filed at 2024 0737  Gross per 24 hour   Intake 975 ml   Output 1450 ml   Net -475 ml     Wt Readings from Last 3 Encounters:   24 87 lb 8.4 oz (39.7 kg)   24 83 lb 1.6 oz (37.7 kg)   24 86 lb (39 kg)     General: awake alert  HEENT: No scleral icterus, MMM  Neck: Supple, no MARIA FERNANDA or thyromegaly  Cardiac: Regular rate and rhythm, S1, S2 normal, no murmur or tub  Lungs: Decreased BS at bases bilaterally   Abdomen: Soft, non-tender. + bowel sounds, no palpable organomegaly  Extremities: Without clubbing, cyanosis; no edema  Neurologic: Cranial nerves grossly intact, moving all extremities  Skin: Warm and dry, no rashes     Recent Labs     24  0507 24  1617 24  0607 24  0614   WBC 13.0*  --  9.1 6.6   HGB 6.9* 10.0* 9.1* 8.4*   MCV 90.0  --  87.1 90.5   .0  --  150.0 138.0*       Recent Labs     24  0507 24  1617 24  0607 24  2350 24  0614     --  143  --  145   K 3.4* 3.7 3.3* 3.8 3.9     --  108  --  112   CO2 19.0*  --  28.0  --  30.0   BUN 53*  --  41*  --  31*   CREATSERUM 3.23*  --  2.70*  --  2.64*   CA 7.1*  --  7.6*  --  8.3*   MG 1.0*  --  2.4  --  2.3       No results for input(s): \"ALT\", \"AST\", \"ALB\", \"AMYLASE\", \"LIPASE\", \"LDH\" in the last 72 hours.    Invalid input(s): \"ALPHOS\", \"TBIL\", \"DBIL\", \"TPROT\"      Assessment and Plan:    1.ZOHREH- due to recent diuresis + sean-op  fluid shifts / hypotension- improving.   - monitor; fluids adjusted     2. CKD 3- baseline Cr 1.5 mg/dl 2022-3 in part due to remote nephrectomy > 20 yrs ago     3. s/p exp lap / LOS / colon resection / ostomy takedown / creation of colostomy 4/3 (Dr. Gilbert)    4. Recent onset AF on amio / cardizem / eliquis PTA    5. Anemia- due to CKD +/- malignancy. Fe stores noted -> IV FE + EPO     6. Chronic edema- onset 6/23 per family; EF 60% with mild-mod TR / AI + mild MR; RV WNL- non issue currently     Dante Marques  4/7/2024

## 2024-04-07 NOTE — PROGRESS NOTES
[facility] Progress Note     Sunita Loza Patient Status:  Inpatient    1932 MRN [id number]   [facility] 3NW-A Attending Renny Gilbert MD   Hosp Day # 4 PCP Carmen Tapia MD     Chief Complaint:  follow up     Subjective:   S:   sitting up in bed, no overnight events. Will order breakfast. No complaints    Objective:   Vital signs:  Temp:  [97.5 °F (36.4 °C)-98 °F (36.7 °C)] 97.5 °F (36.4 °C)  Pulse:  [52-79] 64  Resp:  [18-20] 20  BP: (119-162)/(50-63) 162/59  SpO2:  [95 %-98 %] 97 %    Wt Readings from Last 6 Encounters:   24 87 lb 8.4 oz (39.7 kg)   24 83 lb 1.6 oz (37.7 kg)   24 86 lb (39 kg)   24 89 lb 4 oz (40.5 kg)   24 98 lb (44.5 kg)   24 92 lb 6 oz (41.9 kg)         Physical Exam:    Gen: No acute distress, alert and oriented , no accessory m use, [place]  Pulm: Lungs clear, ok respiratory effort  CV: Heart with regular rate and rhythm, no edema  Abd: Abdomen soft, minimally tender, nondistended, bowel sounds hypoactive, no peritoneal signs  MSK:   no clubbing, no cyanosis  Skin: no visible rashes    Psych:   appropriate affect, answering questions ok    Results:   Diagnostic Data:      Labs:       Recent Labs   Lab 24  1140 24  0356 24  0507 24  1617 24  0607 24  0614   WBC 9.9 12.5* 13.0*  --  9.1 6.6   HGB 7.6* 7.7* 6.9* 10.0* 9.1* 8.4*   MCV 89.1 86.0 90.0  --  87.1 90.5   .0 202.0 173.0  --  150.0 138.0*   INR 1.19  --   --   --   --   --        Recent Labs   Lab 24  1140 24  1638 24  0356 24  0507 24  1617 24  0607 24  2350 24  0614   *  --  150* 144  --  143  --  145   K 3.2*   < > 3.6 3.4* 3.7 3.3* 3.8 3.9   *  --  118* 109  --  108  --  112   CO2 15.0*  --  20.0* 19.0*  --  28.0  --  30.0   BUN 65*  --  59* 53*  --  41*  --  31*   CREATSERUM 4.31*  --  4.13* 3.23*  --  2.70*  --  2.64*   CA 7.2*  --  7.1* 7.1*   --  7.6*  --  8.3*   MG  --   --   --  1.0*  --  2.4  --  2.3   *  --  109* 64*  --  92  --  85    < > = values in this interval not displayed.       Recent Labs   Lab 04/03/24  1140   ALT 18   AST 20   ALB 2.3*        Inflammatory Markers  Recent Labs   Lab 04/04/24  0356   BRANDYN 139.6          Medications:    iron sucrose  200 mg Intravenous Daily    epoetin chris  10,000 Units Subcutaneous Weekly    amiodarone  100 mg Oral Daily    famotidine  20 mg Oral Daily    Or    famotidine  20 mg Intravenous Daily    heparin  5,000 Units Subcutaneous q12h      lactated ringers 75 mL/hr at 04/06/24 1020     glucose **OR** glucose **OR** glucose-vitamin C **OR** dextrose **OR** glucose **OR** glucose **OR** glucose-vitamin C, acetaminophen, oxyCODONE **OR** oxyCODONE, HYDROmorphone **OR** HYDROmorphone, polyethylene glycol (PEG 3350), sennosides, bisacodyl, fleet enema, ondansetron, metoclopramide, meperidine          ASSESSMENT / PLAN:      Patient is a 89 year old female with PMH sig for htn, pAF, teresa syndome with recurrent cancer, hx of colon resection, colostomy, recent GIB noted to have growth around stoma concerning for maligancy, here for recurrent colon surgery     Impression     -colon cancer sp ex lap, TIFFANIE, partial colon resection, takedown of colostomy and creation of colostomy 04/03  -post op hypotension / shock-resolved  -post op pain     -metabolic acidosis  -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 Likely from fluid shifts, pain meds / anesthesia  -now weaned off levo     *HTN hx   -hold meds. Bp ok for now     *pAF  -tele  -off eliquis due to recent gib  -hold cardizem due to low bp  -resume amiodarone when able to take po     *ZOHREH on CKD 3 --> improving  Metabolic acidosis  Hypernatremia--> resolved  -hx of CKD 3 with baseline Cr < 2 per prior notes but Cr  recently has been in 3s   -  Renal on, appreciate  -hold diuretics  -ivf per renal    Hypokalemia, hypomagnesemia- repletion per renal    Acute on chronic anemia- sp 1U PRBC with response     Chronic conditions  Home meds when able        Scds    Dw pt,    dw RN Myron    Dispo: pending Cr        Thank You,  Sosa Nair MD    [facility] Hospitalist  Internal Medicine  Answering Service number: [phone]    Supplementary Documentation:

## 2024-04-07 NOTE — PROGRESS NOTES
The patient was concerned about her purse and cell phone at the start of the shift. She thought that they were lost. The patient and I called her daughter-in-law, Nathalie, and she reported that she had taken them home with her. The patient is forgetful and at times confused. Per the dayshift RN the patient placed her hearing aides on her lunch tray and they were almost not noticed. Her hearing aides were returned to the . The patient was also repeating that she is going to be discharged Sunday. The plan is for discharge possibly on Monday or Tuesday pending her kidney function and toleration of her diet. Patient is able to be reoriented, but she does not call for assistance when getting up from her recliner or bed. The PCT and RN have demonstrated the call light and how to use it each time we are present in the room.Fall precautions maintained. Patient is tolerating her diet and having small amounts of output from her stoma. Family is going to visit Sunday.

## 2024-04-07 NOTE — PROGRESS NOTES
Alert and Oriented x 2-3  Forgetful.  VSS   Afebrile.  Denies nausea or emesis.  Tolerating soft diet well.  Denies pain.  Note abdomen with midline incision and adquacel dressing in place.   Note colostomy bag intact draining small amount brown liquid stool and passing gas.  Stoma pink and enlarged/swollen and Dr Gilbert is aware,.  IVF continues per orders.  Up to chair and bathroom with assist and patient tolerated it well.       Negative

## 2024-04-08 VITALS
SYSTOLIC BLOOD PRESSURE: 159 MMHG | BODY MASS INDEX: 17.18 KG/M2 | TEMPERATURE: 98 F | HEART RATE: 83 BPM | RESPIRATION RATE: 18 BRPM | OXYGEN SATURATION: 98 % | DIASTOLIC BLOOD PRESSURE: 57 MMHG | HEIGHT: 60 IN | WEIGHT: 87.5 LBS

## 2024-04-08 LAB
ANION GAP SERPL CALC-SCNC: 6 MMOL/L (ref 0–18)
BUN BLD-MCNC: 25 MG/DL (ref 9–23)
CALCIUM BLD-MCNC: 8.4 MG/DL (ref 8.5–10.1)
CHLORIDE SERPL-SCNC: 113 MMOL/L (ref 98–112)
CO2 SERPL-SCNC: 29 MMOL/L (ref 21–32)
CREAT BLD-MCNC: 2.16 MG/DL
EGFRCR SERPLBLD CKD-EPI 2021: 21 ML/MIN/1.73M2 (ref 60–?)
ERYTHROCYTE [DISTWIDTH] IN BLOOD BY AUTOMATED COUNT: 18.4 %
GLUCOSE BLD-MCNC: 84 MG/DL (ref 70–99)
GLUCOSE BLD-MCNC: 87 MG/DL (ref 70–99)
GLUCOSE BLD-MCNC: 87 MG/DL (ref 70–99)
HCT VFR BLD AUTO: 28.9 %
HGB BLD-MCNC: 8.9 G/DL
MAGNESIUM SERPL-MCNC: 2 MG/DL (ref 1.6–2.6)
MCH RBC QN AUTO: 28.4 PG (ref 26–34)
MCHC RBC AUTO-ENTMCNC: 30.8 G/DL (ref 31–37)
MCV RBC AUTO: 92.3 FL
OSMOLALITY SERPL CALC.SUM OF ELEC: 310 MOSM/KG (ref 275–295)
PLATELET # BLD AUTO: 139 10(3)UL (ref 150–450)
POTASSIUM SERPL-SCNC: 3.7 MMOL/L (ref 3.5–5.1)
RBC # BLD AUTO: 3.13 X10(6)UL
SODIUM SERPL-SCNC: 148 MMOL/L (ref 136–145)
WBC # BLD AUTO: 6 X10(3) UL (ref 4–11)

## 2024-04-08 PROCEDURE — 99233 SBSQ HOSP IP/OBS HIGH 50: CPT | Performed by: INTERNAL MEDICINE

## 2024-04-08 RX ORDER — DILTIAZEM HYDROCHLORIDE 120 MG/1
120 CAPSULE, EXTENDED RELEASE ORAL DAILY
Status: DISCONTINUED | OUTPATIENT
Start: 2024-04-08 | End: 2024-04-08

## 2024-04-08 NOTE — DISCHARGE SUMMARY
[facility] Discharge Summary    Patient ID  Sunita Loza  [id number]  89 year old  6/19/1932    Admit date: 4/3/2024    Discharge date:  04/08/24    Attending: Renny Gilbert MD     Primary Care Physician: Carmen Tapia MD      Reason for admission: scheduled surgery    Discharge condition: stable    Disposition: home    Important follow up:  -PCP within 7 days  -specialists:               Discharge med list     Medication List        ASK your doctor about these medications      acetaminophen 500 MG Tabs  Commonly known as: Tylenol Extra Strength     amiodarone 100 MG Tabs  Commonly known as: Pacerone     apixaban 2.5 MG Tabs  Commonly known as: Eliquis     aspirin 81 MG Tabs     cyanocobalamin 1000 MCG Tabs     dilTIAZem  MG Cp24  Commonly known as: Dilacor XR     ferrous sulfate 325 (65 FE) MG Tbec     GLUCOSAMINE CHONDROIT(BIOFLAV) OR     MULTIVITAMINS OR     Oyster Shell Calcium/D 500-200 MG-UNIT Tabs     potassium chloride 10 MEQ Tbcr  Commonly known as: K-Dur     torsemide 20 MG Tabs  Commonly known as: Demadex  Take 1 tablet (20 mg total) by mouth daily.              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  INDICATIONS:   Ostomy issue- sent over from GI, ostomy mass, abdominal pain, h/o colon ca  TECHNIQUE:  Unenhanced multislice CT scanning was performed from the dome of the diaphragm to the pubic symphysis.  Dose reduction techniques were used. Dose information is transmitted to the ACR (American College of Radiology) NRDR (National Radiology Data Registry) which includes the Dose Index Registry.  PATIENT STATED HISTORY: (As transcribed by Technologist)  History of colon cancer.    FINDINGS:  LIVER:  Stable 7 mm hypoattenuating lesion within the left lobe of the liver no new hepatic lesion identified.  No hepatic enlargement. BILIARY:  Status post cholecystectomy.  No ductal dilatation. PANCREAS:  No lesion, fluid collection, ductal dilatation, or atrophy.  SPLEEN:  No enlargement or focal lesion.  KIDNEYS:  Status post left nephrectomy.  Parenchymal calcifications seen in the midpole the right kidney.  No right hydronephrosis.  No obstructing ureteral calculi. ADRENALS:  No mass or enlargement.  AORTA/VASCULAR:    There is atherosclerotic calcified plaque within the abdominal aorta, bilateral common iliac, bilateral internal iliac, bilateral external iliac, bilateral common femoral, bilateral superficial femoral and bilateral profunda femoral artery branches.  There is no evidence for aortic aneurysm.  RETROPERITONEUM:  No mass or adenopathy.  BOWEL/MESENTERY:  /mesentery there are postoperative changes of abdominal perineal resection.  The cecum is positioned very low within the pelvis in the region previously occupied by the rectum.  There are few fluid-filled mildly distended loops of small  bowel seen in the left pelvis.  The focal mechanical obstruction is not identified.  There is a left lower quadrant colostomy.  There is a large peristomal hernia containing a nonobstructed segment of the distal transverse colon.  The hernia measures approximately 9.0 x 3.3 x 6.3 cm.  There is also a periumbilical hernia containing the  anterior wall of the mid transverse colon.  There is no colonic obstruction at this site either.  There is also some omental fat herniated adjacent to the mid transverse colon. ABDOMINAL WALL:  Abdominal wall please see above URINARY BLADDER:  No visible focal wall thickening, lesion, or calculus.  PELVIC NODES:  No adenopathy.  PELVIC ORGANS:  Status post hysterectomy. BONES:  There are acute appearing fractures involving the left transverse processes at L4 and L5.  The fractures were not present on the previous CT from 1/30/2024.  Osseous structures are demineralized.  Severe disc space narrowing noted at L3-4 and moderate disc space narrowing noted at L1-2, L4-5 and L5-S1.  There is facet arthropathy in the lumbar spine.  There is bilateral SI joint arthritic change. LUNG BASES:  Fibrotic changes are seen in the periphery of the lingula, right middle lobe and both lower lobes.  There is a 3 mm noncalcified left lower lung nodule which was present previously on the CT from 1/30/2024. OTHER:  Negative.             CONCLUSION:  1. Large peristomal hernia again identified containing nonobstructed loop of transverse colon. 2. Periumbilical ventral abdominal wall hernia is also again noted containing the anterior wall of the mid transverse colon as well as some omental fat.  3. New transverse process fracture deformities on the left at L4 and L5.  Correlate with history for any recent trauma.  The osseous structures are demineralized. 4. There is a 3 mm noncalcified subpleural left lower lobe lung nodule.  Surveillance imaging recommended given the patient's history of colon cancer. 5. There are other incidental findings noted as described above.    LOCATION:  [facility]   Dictated by (CST): Abhinav Bergeron MD on 3/26/2024 at 7:36 PM     Finalized by (CST): Abhinav Bergeron MD on 3/26/2024 at 7:59 PM         Operative reports:  Procedure(s) (LRB):  EXPLORATORY LAPAROTOMY, LYSIS OF ADHESIONS, PARTIAL COLON RESECTION  WITH TAKEDOWN OF COLOSTOMY AND CREATION OF  COLOSTOMY (N/A)    Hospital course:    Patient is a 89 year old female with PMH sig for htn, pAF, teresa syndome with recurrent cancer, hx of colon resection, colostomy, recent GIB noted to have growth around stoma concerning for maligancy, here for recurrent colon surgery     *shock -resolved  -post operative, briefly on pressors. Likely from fluid shifts, pain meds / anesthesia  -now htn     *HTN   -  resume cardizem     *pAF  -tele  -off eliquis due to recent gib  -resume amiodarone       *ZOHREH on CKD 3 --> improving  Metabolic acidosis  Hypernatremia--> resolved  -hx of CKD 3 with baseline Cr < 2 per prior notes but Cr recently has been in 3s   -  Renal saw - resume diuretics at home     Hypokalemia, hypomagnesemia- repletion per renal     Acute on chronic anemia- sp 1U PRBC with response           Day of discharge exam:  Vitals:    04/08/24 1044   BP:    Pulse: 55   Resp:    Temp:    Tolerated diet. No nv  Family at   Dw surgery - ok to resume aspirin / elqius    No acute distress, alert and oriented   Lungs clear  Heart regular  Abdomen benign    Total time coordinating care 32 min      Patient and/or family had opportunity to ask questions and expressed understanding and agreement with therapeutic plan as outlined         [facility] Hospitalist  [phone]  Answering Service: [phone]

## 2024-04-08 NOTE — CM/SW NOTE
Pt has been cleared for dc by all MDs.  Pt will go to [facility].  Coordinated dc time with May from [facility].  ALCON to call report to [phone].  Aguila Shearer is scheduled to  pt at 3:30pm today.  Pt/family aware and agreeable to the cost of the medicar.  PCS form completed.  Pt/family aware of pt's dc time.

## 2024-04-08 NOTE — CONSULTS
[facility]  Inpatient Ostomy Care Contact Note    Sunita Loza Patient Status:  Inpatient    1932 MRN [id number]   Location [facility] 3NW-A Attending Renny Gilbert MD   Hosp Day # 5 PCP Carmen Tapia MD     Attempted to see patient for follow up ostomy  care session. Patient declined to do the ostomy teachings and education at this time. Family in the room states \" she has another ostomy before this one for a long time ,she's been taking care of it \".Offered patient some education materials for the ostomy but she refused.RN aware.    Possible discharge today to [place] per RN.    Please call me at [phone]  if you have any questions about this consultation and plan of care.      Thank you,    Virginia Arce RN BSN [facility]  [address] [address]  [phone]  Spectralink: [phone]

## 2024-04-08 NOTE — PROGRESS NOTES
[facility]  Progress Note    Sunita Loza Patient Status:  Inpatient    1932 MRN [id number]   Location [facility] 3NW-A Attending Renny Gilbert MD   Hosp Day # 5 PCP Carmen Tapia MD     Subjective:    Patient tolerating low fiber today  Denies any new complaints    Objective/Physical Exam:    Vital Signs:  Blood pressure (!) 175/75, pulse 60, temperature 97.5 °F (36.4 °C), temperature source Oral, resp. rate 18, height 5' (1.524 m), weight 87 lb 8.4 oz (39.7 kg), SpO2 97%.    General:  Alert, orientated to self  Cooperative.  No apparent distress.  Abdomen:  aquacel intact, stoma pink and viable, stool/gas output, soft, minimal pain     Labs:  Reviewed    Lab Results   Component Value Date    WBC 6.0 2024    HGB 8.9 2024    HCT 28.9 2024    .0 2024    CREATSERUM 2.16 2024    BUN 25 2024     2024    K 3.7 2024     2024    CO2 29.0 2024    GLU 87 2024    CA 8.4 2024    MG 2.0 2024    PGLU 87 2024     Problem List:  Patient Active Problem List   Diagnosis    DJD (degenerative joint disease), cervical    Asymptomatic postmenopausal status    Benign essential HTN    Chronic kidney disease, stage III (moderate) (HCC)    Chronic rhinitis    Disorder of bone and cartilage    Esophageal reflux    Generalized osteoarthrosis, involving multiple sites    Malignant neoplasm of uterus (HCC)    Other vitamin B12 deficiency anemia    Pure hypercholesterolemia    Renal failure    Acute pain of left knee    Vitamin D deficiency    Metabolic acidosis    Malignant neoplasm (HCC)    Hypokalemia    Hyperlipidemia, mixed    Endometrial carcinoma (HCC)    Anemia in chronic kidney disease    Osteopenia    Chronic kidney disease, stage 3b (HCC)    Otalgia    Sensorineural hearing loss, bilateral    ZOHREH (acute kidney injury) ([facility])    Hypernatremia    Hypomagnesemia    Dehydration    ATN (acute tubular necrosis) ([facility])     Atrial fibrillation (HCC)    Bilateral leg edema    Hypertension    Primary osteoarthritis of left knee    Abdominal mass, unspecified abdominal location    Anemia    Acute kidney injury (HCC)    Azotemia    Hyperglycemia    Abdominal pain, generalized    CKD (chronic kidney disease) stage 4, GFR 15-29 ml/min (HCC)    Shock (HCC)    Decreased ferritin    Chronic edema       Impression:     89 y/o S/P:  EXPLORATORY LAPAROTOMY, LYSIS OF ADHESIONS, PARTIAL COLON RESECTION WITH TAKEDOWN OF COLOSTOMY AND CREATION OF  COLOSTOMY, mobilization of the splenic flexure   Tolerating low fiber diet  Good ostomy output,     Plan:    Remove aquacel, clean incision with CHG  Reviewed postop restrictions  Ok for dc today  F/u in office in 1 week  All questions answered    CARLOTA Payne  [facility]  General Surgery  4/8/2024

## 2024-04-08 NOTE — DIETARY NOTE
[facility]   part of [facility]  NUTRITION ASSESSMENT    Pt does not meet malnutrition criteria at this time.    NUTRITION INTERVENTION:    Meal and Snacks - Monitor patient po intake. Encourage adequate po of appropriate diet. Low Fiber/Soft diet.  Medical Food Supplements - Chetan or Strawberry Ensure Plus High Protein at breakfast. Rationale/use for oral supplements discussed.  Nutrition Education- Provided handout on Colostomy Nutrition Therapy.     PATIENT STATUS:     4/8- Diet advanced to Low Fiber/Soft on 4/6. Pt stated she is tolerating diet. Recorded PO intakes:%. Denied any N/V. + output from colostomy. Provided handout on Colostomy Nutrition Therapy. Pt's son present at time of visit and stated they are familiar with a low fiber diet and had no nutrition related questions at this time. Plans are for pt to discharge to SNF today.     4/4-89 year old female admitted on 4/3 presents s/p ex lap, lysis of adhesions, partial colon resection, takedown of colostomy and creation of new colostomy. Pt removed NGT this AM. Pt screened d/t low BMI. Pt known to writer from previous admit in January and noted RD last saw pt 3/27/24. Visited pt at bedside. She reports her appetite has been normal with no changes; not a big eater at baseline. Weight has remained stable since Jan, fluctuating between 83-92 lbs over the past 9 months per chart review. She reports sometimes drinking ONS at home but not consistently. Reports having nausea sometimes but not often and no issues with BM's via colostomy PTA. Per surgery, plan to keep NPO until pt has output from colostomy. No chewing or swallowing difficulties and NKFA. Offered ONS once diet advanced and pt agreeable to chocolate or strawberry Ensure. All questions answered at this time.    PMH: Arthritis, Atrial fibrillation, CKD, Colon cancer, Congestive heart disease, Endometrial cancer, High blood pressure, Kidney failure, Bishop syndrome    ANTHROPOMETRICS:  Ht:  152.4 cm (5')  Wt: 39.7 kg (87 lb 8.4 oz).   BMI: Body mass index is 17.09 kg/m².  IBW: 45.5 kg    WEIGHT HISTORY:   Patient Weight(s) for the past 336 hrs:   Weight   04/04/24 0400 39.7 kg (87 lb 8.4 oz)   04/03/24 0613 37.2 kg (82 lb 1.5 oz)   03/28/24 1510 37.6 kg (83 lb)       Wt Readings from Last 10 Encounters:   04/04/24 39.7 kg (87 lb 8.4 oz)   03/26/24 37.7 kg (83 lb 1.6 oz)   03/26/24 39 kg (86 lb)   02/27/24 40.5 kg (89 lb 4 oz)   02/13/24 44.5 kg (98 lb)   02/02/24 41.9 kg (92 lb 6 oz)   07/05/23 37.8 kg (83 lb 4 oz)   03/02/22 43.5 kg (96 lb)   07/30/21 46.3 kg (102 lb)   05/06/21 46.3 kg (102 lb)        NUTRITION:  Diet:       Procedures    Low Fiber/Soft diet Low Fiber/Soft; Is Patient on Accuchecks? Yes        Percent Meals Eaten (last 3 days)       Date/Time Percent Meals Eaten (%)    04/05/24 2150 0 %    04/05/24 2345 0 %    04/06/24 0530 0 %    04/06/24 1204 50 %    04/06/24 2228 0 %    04/07/24 0018 0 %    04/07/24 0510 0 %    04/07/24 1442 50 %    04/07/24 2059 100 %    04/08/24 0856 50 %    04/08/24 1200 240 %          Food Allergies: No  Cultural/Ethnic/Caodaism Preferences Addressed: Yes    GI SYSTEM REVIEW:  +colostomy  Skin/Wounds: no pressure ulcers per RN documentation    NUTRITION RELATED PHYSICAL FINDINGS:     1. Body Fat/Muscle Mass:  age related sarcopenia noted     2. Fluid Accumulation: lower extremity edema     NUTRITION PRESCRIPTION: 39.7 kg-87 lb (4/4)  Calories: 1741-4909 calories/day (30-35 kcal/kg)  Protein: 48-60 grams protein/day (1.2-1.5 gm/kg)  Fluid: ~1 ml/kcal or per MD discretion    NUTRITION DIAGNOSIS/PROBLEM:  Inadequate oral intake related to inability to take or tolerate as evidenced by documented/reported insufficient oral intake and previous NPO status.    MONITOR AND EVALUATE/NUTRITION GOALS:  PO intake of 75% of meals TID - New  PO intake of 75% of oral nutrition supplement/s - New  Weight stable within 1 to 2 lbs during admission - Ongoing  Return to PO intake  or advance diet in 24-48 hrs - Resolved      MEDICATIONS:  Pepcid    LABS:  Glu:87, Na++:148, K+:3.7, BUN:25, Cr:2.16, GFR:21    Pt is at Moderate nutrition risk    Ju Vazquez MS, RD, LDN  Clinical Dietitian  Ext:[phone]

## 2024-04-08 NOTE — PROGRESS NOTES
NURSING DISCHARGE NOTE    Discharged Home via Ambulance.  Accompanied by Family member  Belongings Taken by patient/family.    Patient given discharge instructions. Instructed to make follow-up appointments with surgery, nephrology, and PCP in 1 week. Patient and family verbalized understanding. IV removed and intact. Report given to Ananth Méndez.

## 2024-04-08 NOTE — PROGRESS NOTES
[facility]  Nephrology Progress Note    Sunita Loza Attending:  Renny Gilbert MD       Assessment and Plan:    1) ZOHREH- due to recent diuresis + sean-op fluid shifts / hypotension- improving daily    2) CKD 3- baseline Cr 1.5 mg/dl -3 in part due to remote nephrectomy > 20 yrs ago     3) s/p exp lap / LOS / colon resection / ostomy takedown / creation of colostomy 4/3 (Dr. 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 Fe stores noted -> IV FE + EPO     8) Chronic edema- onset  per family; EF 60% with mild-mod TR / AI + mild MR; RV WNL. Resume loop diuretic    D/W son at bedside. DC planning to SNF      Subjective:  Awake comfortable looks great    Physical Exam:   BP (!) 175/75 (BP Location: Right leg)   Pulse 60   Temp 97.5 °F (36.4 °C) (Oral)   Resp 18   Ht 5' (1.524 m)   Wt 87 lb 8.4 oz (39.7 kg)   SpO2 97%   BMI 17.09 kg/m²   Temp (24hrs), Av.7 °F (36.5 °C), Min:97.4 °F (36.3 °C), Max:98 °F (36.7 °C)       Intake/Output Summary (Last 24 hours) at 2024 0905  Last data filed at 2024 0856  Gross per 24 hour   Intake 2270 ml   Output 650 ml   Net 1620 ml     Wt Readings from Last 3 Encounters:   24 87 lb 8.4 oz (39.7 kg)   24 83 lb 1.6 oz (37.7 kg)   24 86 lb (39 kg)     General: awake alert  HEENT: No scleral icterus, MMM  Neck: Supple, no MARIA FERNANDA or thyromegaly  Cardiac: Regular rate and rhythm, S1, S2 normal, no murmur or tub  Lungs: Decreased BS at bases bilaterally   Abdomen: Soft, non-tender. + bowel sounds, no palpable organomegaly  Extremities: Without clubbing, cyanosis; no edema  Neurologic: Cranial nerves grossly intact, moving all extremities  Skin: Warm and dry, no rashes       Labs:   Lab Results   Component Value Date    WBC 6.0 2024    HGB 8.9 2024    HCT 28.9  04/08/2024    .0 04/08/2024    CREATSERUM 2.16 04/08/2024    BUN 25 04/08/2024     04/08/2024    K 3.7 04/08/2024     04/08/2024    CO2 29.0 04/08/2024    GLU 87 04/08/2024    CA 8.4 04/08/2024    MG 2.0 04/08/2024    PGLU 87 04/08/2024       Imaging:  All imaging studies reviewed.    Meds:           Questions/concerns were discussed with patient and/or family by bedside.          Fabiana Miller MD  4/8/2024  905 AM

## 2024-04-08 NOTE — PHYSICAL THERAPY NOTE
PHYSICAL THERAPY TREATMENT NOTE - INPATIENT    Room Number: 314/314-A     Session: 1     Number of Visits to Meet Established Goals: 5    Presenting Problem: s/p partial colon resection, takedown colostomy/colostomy creation 4/3/24  Co-Morbidities : colon CA, Afib, back pain, CKD s/p nephrectomy, CHF, endometrial CA    ASSESSMENT   Patient demonstrates good  progress this session, goals  remain in progress.    Patient continues to function below baseline with gait.  Contributing factors to remaining limitations include decreased functional strength and decreased endurance/aerobic capacity.  Next session anticipate patient to progress bed mobility, transfers, and gait.  Physical Therapy will continue to follow patient for duration of hospitalization.    Patient continues to benefit from continued skilled PT services: to promote return to prior level of function and safety with continuous assistance and gradual rehabilitative therapy .    PLAN  PT Treatment Plan: Bed mobility;Endurance;Energy conservation;Patient education;Gait training  Rehab Potential : Fair  Frequency (Obs): 3-5x/week    CURRENT GOALS       Goal #1 Patient is able to demonstrate supine - sit EOB @ level: supervision      Goal #2 Patient is able to demonstrate transfers EOB to/from C at assistance level: minimum assistance      Goal #3 Patient is able to ambulate 75 feet with assist device: walker - rolling at assistance level: minimum assistance      Goal #4     Goal #5     Goal #6     Goal Comments: Goals established on 4/5/2024 4/8/2024 all goals ongoing     SUBJECTIVE  \"This is a different clip than I'm used to\" referring to colostomy bag       OBJECTIVE  Precautions: Bed/chair alarm;Hard of hearing;Abdominal protective strategies    WEIGHT BEARING RESTRICTION  Weight Bearing Restriction: None                PAIN ASSESSMENT   Rating: Unable to rate  Location: L knee, and  around colostomy  Management Techniques: Activity promotion;Body  mechanics;Repositioning    BALANCE                                                                                                                       Static Sitting: Fair +  Dynamic Sitting: Fair +           Static Standing: Fair -  Dynamic Standing: Poor +    ACTIVITY TOLERANCE                         O2 WALK         AM-PAC '6-Clicks' INPATIENT SHORT FORM - BASIC MOBILITY  How much difficulty does the patient currently have...  Patient Difficulty: Turning over in bed (including adjusting bedclothes, sheets and blankets)?: A Little   Patient Difficulty: Sitting down on and standing up from a chair with arms (e.g., wheelchair, bedside commode, etc.): A Little   Patient Difficulty: Moving from lying on back to sitting on the side of the bed?: A Little   How much help from another person does the patient currently need...   Help from Another: Moving to and from a bed to a chair (including a wheelchair)?: A Little   Help from Another: Need to walk in hospital room?: A Little   Help from Another: Climbing 3-5 steps with a railing?: A Lot       AM-PAC Score:  Raw Score: 17   Approx Degree of Impairment: 50.57%   Standardized Score (AM-PAC Scale): 42.13   CMS Modifier (G-Code): CK    FUNCTIONAL ABILITY STATUS  Gait Assessment   Functional Mobility/Gait Assessment  Gait Assistance: Minimum assistance  Distance (ft): 15,100  Assistive Device: Rolling walker  Pattern: Shuffle    Skilled Therapy Provided  RN consulted prior to session   Pt presents in semi sup  Therapist cued pt for sequencing for t/f to EOB   Toilet t/f CGA to low toilet, pt CGA for sean care- increased time for PCT to assist c emptying colostomy bag. Pt able to endorse that she is able to complete task, however, clip on bag is different from pt's norm.   SBA at sink for hand hygiene   Pt gait trained as noted.   Pt left in chair, needs   Bed Mobility:  Rolling: nt   Supine<>Sit: CGA    Sit<>Supine: nt      Transfer Mobility:  Sit<>Stand: CGA     Stand<>Sit: CGA    Gait: min A     Therapist's Comments: pt reports mild LH c mobility. Pt wheeled back to room.         THERAPEUTIC EXERCISES  Lower Extremity Alternating marching  Ankle pumps  Knee extension  LAQ     Upper Extremity      Position Sitting     Repetitions   10-20   Sets   1     Patient End of Session: Up in chair;Needs met;Call light within reach;RN aware of session/findings;All patient questions and concerns addressed;Alarm set    PT Session Time: 30 minutes  Gait Training: 10 minutes  Therapeutic Activity: 10 minutes  Therapeutic Exercise: 10 minutes   Neuromuscular Re-education:  minutes

## 2024-04-08 NOTE — PLAN OF CARE
No acute changes in the patient's status overnight. Anticipating patient to discharge later today once social work has rehab set up for her.

## 2024-04-15 ENCOUNTER — PATIENT MESSAGE (OUTPATIENT)
Dept: NEPHROLOGY | Facility: CLINIC | Age: 89
End: 2024-04-15

## 2024-04-18 ENCOUNTER — MED REC SCAN ONLY (OUTPATIENT)
Dept: NEPHROLOGY | Facility: CLINIC | Age: 89
End: 2024-04-18

## 2024-04-22 ENCOUNTER — APPOINTMENT (OUTPATIENT)
Dept: GENERAL RADIOLOGY | Facility: HOSPITAL | Age: 89
End: 2024-04-22
Payer: MEDICARE

## 2024-04-22 ENCOUNTER — HOSPITAL ENCOUNTER (INPATIENT)
Facility: HOSPITAL | Age: 89
LOS: 5 days | Discharge: HOME HEALTH | End: 2024-04-29
Attending: EMERGENCY MEDICINE | Admitting: INTERNAL MEDICINE
Payer: MEDICARE

## 2024-04-22 ENCOUNTER — APPOINTMENT (OUTPATIENT)
Dept: CT IMAGING | Facility: HOSPITAL | Age: 89
End: 2024-04-22
Attending: EMERGENCY MEDICINE
Payer: MEDICARE

## 2024-04-22 DIAGNOSIS — L03.311 CELLULITIS OF ABDOMINAL WALL: Primary | ICD-10-CM

## 2024-04-22 DIAGNOSIS — N17.9 ACUTE RENAL INJURY (HCC): ICD-10-CM

## 2024-04-22 DIAGNOSIS — N18.5 STAGE 5 CHRONIC KIDNEY DISEASE NOT ON CHRONIC DIALYSIS (HCC): ICD-10-CM

## 2024-04-22 LAB
ALBUMIN SERPL-MCNC: 2.2 G/DL (ref 3.4–5)
ALBUMIN/GLOB SERPL: 0.6 {RATIO} (ref 1–2)
ALP LIVER SERPL-CCNC: 79 U/L
ALT SERPL-CCNC: 14 U/L
ANION GAP SERPL CALC-SCNC: 8 MMOL/L (ref 0–18)
AST SERPL-CCNC: 15 U/L (ref 15–37)
BASOPHILS # BLD AUTO: 0.02 X10(3) UL (ref 0–0.2)
BASOPHILS NFR BLD AUTO: 0.2 %
BILIRUB SERPL-MCNC: 0.3 MG/DL (ref 0.1–2)
BILIRUB UR QL STRIP.AUTO: NEGATIVE
BUN BLD-MCNC: 42 MG/DL (ref 9–23)
CALCIUM BLD-MCNC: 8 MG/DL (ref 8.5–10.1)
CHLORIDE SERPL-SCNC: 111 MMOL/L (ref 98–112)
CLARITY UR REFRACT.AUTO: CLEAR
CO2 SERPL-SCNC: 19 MMOL/L (ref 21–32)
COLOR UR AUTO: YELLOW
CREAT BLD-MCNC: 3.8 MG/DL
EGFRCR SERPLBLD CKD-EPI 2021: 11 ML/MIN/1.73M2 (ref 60–?)
EOSINOPHIL # BLD AUTO: 0.02 X10(3) UL (ref 0–0.7)
EOSINOPHIL NFR BLD AUTO: 0.2 %
ERYTHROCYTE [DISTWIDTH] IN BLOOD BY AUTOMATED COUNT: 18.5 %
GLOBULIN PLAS-MCNC: 3.7 G/DL (ref 2.8–4.4)
GLUCOSE BLD-MCNC: 101 MG/DL (ref 70–99)
GLUCOSE UR STRIP.AUTO-MCNC: NORMAL MG/DL
HCT VFR BLD AUTO: 31.5 %
HGB BLD-MCNC: 9.7 G/DL
IMM GRANULOCYTES # BLD AUTO: 0.05 X10(3) UL (ref 0–1)
IMM GRANULOCYTES NFR BLD: 0.4 %
KETONES UR STRIP.AUTO-MCNC: NEGATIVE MG/DL
LACTATE SERPL-SCNC: 0.9 MMOL/L (ref 0.4–2)
LEUKOCYTE ESTERASE UR QL STRIP.AUTO: 250
LIPASE SERPL-CCNC: 39 U/L (ref 13–75)
LYMPHOCYTES # BLD AUTO: 0.72 X10(3) UL (ref 1–4)
LYMPHOCYTES NFR BLD AUTO: 6.4 %
MCH RBC QN AUTO: 29 PG (ref 26–34)
MCHC RBC AUTO-ENTMCNC: 30.8 G/DL (ref 31–37)
MCV RBC AUTO: 94.3 FL
MONOCYTES # BLD AUTO: 1.05 X10(3) UL (ref 0.1–1)
MONOCYTES NFR BLD AUTO: 9.3 %
NEUTROPHILS # BLD AUTO: 9.42 X10 (3) UL (ref 1.5–7.7)
NEUTROPHILS # BLD AUTO: 9.42 X10(3) UL (ref 1.5–7.7)
NEUTROPHILS NFR BLD AUTO: 83.5 %
NITRITE UR QL STRIP.AUTO: NEGATIVE
OSMOLALITY SERPL CALC.SUM OF ELEC: 297 MOSM/KG (ref 275–295)
PH UR STRIP.AUTO: 5 [PH] (ref 5–8)
PLATELET # BLD AUTO: 226 10(3)UL (ref 150–450)
POTASSIUM SERPL-SCNC: 4.5 MMOL/L (ref 3.5–5.1)
PROT SERPL-MCNC: 5.9 G/DL (ref 6.4–8.2)
RBC # BLD AUTO: 3.34 X10(6)UL
RBC UR QL AUTO: NEGATIVE
SODIUM SERPL-SCNC: 138 MMOL/L (ref 136–145)
SP GR UR STRIP.AUTO: 1.01 (ref 1–1.03)
UROBILINOGEN UR STRIP.AUTO-MCNC: NORMAL MG/DL
WBC # BLD AUTO: 11.3 X10(3) UL (ref 4–11)

## 2024-04-22 PROCEDURE — 74176 CT ABD & PELVIS W/O CONTRAST: CPT | Performed by: EMERGENCY MEDICINE

## 2024-04-22 PROCEDURE — 71045 X-RAY EXAM CHEST 1 VIEW: CPT | Performed by: EMERGENCY MEDICINE

## 2024-04-22 RX ORDER — ACETAMINOPHEN 500 MG
1000 TABLET ORAL ONCE
Status: DISCONTINUED | OUTPATIENT
Start: 2024-04-22 | End: 2024-04-22

## 2024-04-22 RX ORDER — OMEPRAZOLE 20 MG/1
20 CAPSULE, DELAYED RELEASE ORAL
COMMUNITY

## 2024-04-22 RX ORDER — ACETAMINOPHEN 650 MG/1
650 SUPPOSITORY RECTAL ONCE
Status: DISCONTINUED | OUTPATIENT
Start: 2024-04-22 | End: 2024-04-29

## 2024-04-23 ENCOUNTER — APPOINTMENT (OUTPATIENT)
Dept: CT IMAGING | Facility: HOSPITAL | Age: 89
End: 2024-04-23
Attending: PHYSICIAN ASSISTANT
Payer: MEDICARE

## 2024-04-23 PROBLEM — N17.9 ACUTE RENAL INJURY (HCC): Status: ACTIVE | Noted: 2024-04-23

## 2024-04-23 PROBLEM — N17.9 ACUTE RENAL INJURY: Status: ACTIVE | Noted: 2024-04-23

## 2024-04-23 LAB
ATRIAL RATE: 93 BPM
CREAT UR-SCNC: 77 MG/DL
INR BLD: 1.43 (ref 0.8–1.2)
P AXIS: 62 DEGREES
P-R INTERVAL: 108 MS
PROTHROMBIN TIME: 17.5 SECONDS (ref 11.6–14.8)
Q-T INTERVAL: 398 MS
QRS DURATION: 66 MS
QTC CALCULATION (BEZET): 494 MS
R AXIS: 54 DEGREES
SARS-COV-2 RNA RESP QL NAA+PROBE: NOT DETECTED
SODIUM SERPL-SCNC: 45 MMOL/L
T AXIS: 80 DEGREES
URATE SERPL-MCNC: 10.4 MG/DL
VENTRICULAR RATE: 93 BPM

## 2024-04-23 PROCEDURE — 99222 1ST HOSP IP/OBS MODERATE 55: CPT | Performed by: INTERNAL MEDICINE

## 2024-04-23 PROCEDURE — 49406 IMAGE CATH FLUID PERI/RETRO: CPT | Performed by: PHYSICIAN ASSISTANT

## 2024-04-23 PROCEDURE — 0J983ZZ DRAINAGE OF ABDOMEN SUBCUTANEOUS TISSUE AND FASCIA, PERCUTANEOUS APPROACH: ICD-10-PCS | Performed by: RADIOLOGY

## 2024-04-23 RX ORDER — SODIUM CHLORIDE 9 MG/ML
INJECTION, SOLUTION INTRAVENOUS CONTINUOUS
Status: DISCONTINUED | OUTPATIENT
Start: 2024-04-23 | End: 2024-04-23

## 2024-04-23 RX ORDER — HYDROCODONE BITARTRATE AND ACETAMINOPHEN 5; 325 MG/1; MG/1
1 TABLET ORAL EVERY 4 HOURS PRN
Status: DISCONTINUED | OUTPATIENT
Start: 2024-04-23 | End: 2024-04-29

## 2024-04-23 RX ORDER — ACETAMINOPHEN 500 MG
500 TABLET ORAL EVERY 4 HOURS PRN
Status: DISCONTINUED | OUTPATIENT
Start: 2024-04-23 | End: 2024-04-29

## 2024-04-23 RX ORDER — SODIUM CHLORIDE 9 MG/ML
INJECTION, SOLUTION INTRAVENOUS CONTINUOUS
Status: DISCONTINUED | OUTPATIENT
Start: 2024-04-23 | End: 2024-04-23 | Stop reason: HOSPADM

## 2024-04-23 RX ORDER — HEPARIN SODIUM 5000 [USP'U]/ML
5000 INJECTION, SOLUTION INTRAVENOUS; SUBCUTANEOUS EVERY 8 HOURS SCHEDULED
Status: DISCONTINUED | OUTPATIENT
Start: 2024-04-23 | End: 2024-04-24

## 2024-04-23 RX ORDER — ACETAMINOPHEN 325 MG/1
650 TABLET ORAL EVERY 4 HOURS PRN
Status: DISCONTINUED | OUTPATIENT
Start: 2024-04-23 | End: 2024-04-29

## 2024-04-23 RX ORDER — MELATONIN
3 NIGHTLY PRN
Status: DISCONTINUED | OUTPATIENT
Start: 2024-04-23 | End: 2024-04-29

## 2024-04-23 RX ORDER — HYDROCODONE BITARTRATE AND ACETAMINOPHEN 5; 325 MG/1; MG/1
2 TABLET ORAL EVERY 4 HOURS PRN
Status: DISCONTINUED | OUTPATIENT
Start: 2024-04-23 | End: 2024-04-29

## 2024-04-23 RX ORDER — METOCLOPRAMIDE HYDROCHLORIDE 5 MG/ML
5 INJECTION INTRAMUSCULAR; INTRAVENOUS EVERY 8 HOURS PRN
Status: DISCONTINUED | OUTPATIENT
Start: 2024-04-23 | End: 2024-04-29

## 2024-04-23 RX ORDER — ONDANSETRON 2 MG/ML
4 INJECTION INTRAMUSCULAR; INTRAVENOUS EVERY 6 HOURS PRN
Status: DISCONTINUED | OUTPATIENT
Start: 2024-04-23 | End: 2024-04-29

## 2024-04-23 NOTE — PLAN OF CARE
A&Ox4 - [place] (hearing aids left at [facility]). VSS. RA. . Denies chest pain and SOB.   GI: Abdomen soft, nondistended. Passing gas. No belching reported by patient.   Denies nausea.   : Voids.   Pain controlled with PRN pain medications.   Up with standby assist/walker   Drains: L ostomy in place with gas present/stool   Incisions: ML abdominal incision with staples from 04/03 visit   Diet:NPO - plan for CT drain abscess    IVF running per order. All appropriate safety measures in place. All questions and concerns addressed.

## 2024-04-23 NOTE — H&P
.CC:   Chief Complaint   Patient presents with    Postop/Procedure Problem    Post-Op        PCP: Carmen Tapia MD    History of Present Illness: Patient is a 89 year old female with PMH sig for teresa's syndrome, colon cancer s/p ex lap/abdelrahman, partial colon resection, redo colostomy 4/3 who presented with some increased swelling and pain near the ostomy. No fevers/chills. Bowels have been moving in ostomy. No sob or urinary complaints.       PMH  Past Medical History:    Arthritis    Atrial fibrillation (HCC)    Back pain    CKD (chronic kidney disease)    and S/P nephrectomy    Colon cancer (HCC)    Congestive heart disease (HCC)    Easy bruising    Endometrial cancer (HCC)    UTERINE, DX ABOUT 30 YEARS    Hearing impairment    AIDS    Heart palpitations    Afib    High blood pressure    Kidney failure    Leg swelling    Teresa syndrome    hereditary colorectal cancer    Muscle weakness    WALKER    Pain in joints    Wears glasses    Weight loss        PSH  Past Surgical History:   Procedure Laterality Date    Colectomy      Nephrectomy Left     Part removal colon w end colostomy          ALL:  Allergies   Allergen Reactions    Ciprofloxacin RASH and HIVES    Penicillins RASH        Home Medications:  Outpatient Medications Marked as Taking for the 4/22/24 encounter (Hospital Encounter)   Medication Sig Dispense Refill    omeprazole 20 MG Oral Capsule Delayed Release Take 1 capsule (20 mg total) by mouth every morning before breakfast.      potassium chloride 10 MEQ Oral Tab CR Take 2 tablets (20 mEq total) by mouth daily.      Glucosamine-Chondroit-Biofl-Mn (GLUCOSAMINE CHONDROIT,BIOFLAV, OR) Take 1 tablet by mouth daily.      ferrous sulfate 325 (65 FE) MG Oral Tab EC Take 1 tablet (325 mg total) by mouth daily with breakfast.      amiodarone 100 MG Oral Tab Take 1 tablet (100 mg total) by mouth daily.      torsemide 20 MG Oral Tab Take 1 tablet (20 mg total) by mouth daily. 30 tablet 3    apixaban 2.5 MG Oral  Tab Take 1 tablet (2.5 mg total) by mouth 2 (two) times daily.      dilTIAZem  MG Oral Capsule SR 24 Hr Take 1 capsule (120 mg total) by mouth daily.      Calcium Carbonate-Vitamin D (OYSTER SHELL CALCIUM/D) 500-200 MG-UNIT Oral Tab Take 1 tablet by mouth daily.      aspirin 81 MG Oral Tab Take 1 tablet (81 mg total) by mouth daily.      cyanocobalamin 1000 MCG Oral Tab Take 100 mcg by mouth daily.      Multiple Vitamin (MULTIVITAMINS OR) Take 1 tablet by mouth daily.           Soc Hx  Social History     Tobacco Use    Smoking status: Never    Smokeless tobacco: Never   Substance Use Topics    Alcohol use: Not Currently        Fam Hx  Family History   Problem Relation Age of Onset    Heart Disorder Father     Heart Disorder Mother     Colon Cancer Mother         70    Heart Disorder Sister     Breast Cancer Sister     Diabetes Brother     Breast Cancer Sister     Cancer Sister     Breast Cancer Sister     Breast Cancer Daughter        Review of Systems  Comprehensive ROS reviewed and negative except for what's stated above.       OBJECTIVE:  /58 (BP Location: Right arm)   Pulse 81   Temp 98.4 °F (36.9 °C) (Oral)   Resp 18   Ht 5' (1.524 m)   Wt 92 lb 14.4 oz (42.1 kg)   SpO2 100%   BMI 18.14 kg/m²     Gen- NAD, appears stated age  HEENT- NCAT, anicteric sclera, MMM, OP clear  Lymph- no cervical LAD  CV- RRR no murmurs. No HARSHA  Lungs- CTAB, good respiratory effort  Abd- soft, ostomy in place with ttp and bulge lateral to ostomy.  Derm- no rashes  MSK- good muscle strength and tone, no joint swelling  Neuro- A&OX3, no focal deficits      Diagnostic Data:    CBC/Chem  Recent Labs   Lab 04/22/24  1140 04/22/24 2045   WBC 10.3 11.3*   HGB 10.7* 9.7*   MCV 95.6 94.3   .0 226.0       Recent Labs   Lab 04/22/24  1140 04/22/24 2045    138   K 4.3 4.5    111   CO2 20.0* 19.0*   BUN 41* 42*   CREATSERUM 3.25* 3.80*   GLU 75 101*   CA 8.1* 8.0*       Recent Labs   Lab 04/22/24 2045   ALT  14   AST 15   ALB 2.2*       No results for input(s): \"TROP\" in the last 168 hours.    Additional Diagnostics: personally reviewed EKG- nsr, no st/t abn      Radiology: CT ABDOMEN+PELVIS(CPT=74176)    Result Date: 4/22/2024  PROCEDURE:  CT ABDOMEN+PELVIS (CPT=74176)  COMPARISON:  [facility] , CT, CT ABDOMEN+PELVIS(CPT=74176), 3/26/2024, 6:58 PM.  INDICATIONS:  wound check-post abdominal surgery, redness to incision site  TECHNIQUE:  Unenhanced multislice CT scanning was performed from the dome of the diaphragm to the pubic symphysis.  Dose reduction techniques were used. Dose information is transmitted to the ACR (American College of Radiology) NRDR (National Radiology Data Registry) which includes the Dose Index Registry.  PATIENT STATED HISTORY: (As transcribed by Technologist)  POST ABD SURGERY, REDNESS TO INCISION SITE.    FINDINGS:  LIVER:  No enlargement, atrophy, abnormal density, or significant focal lesion.  BILIARY:  Cholecystectomy.  Normal caliber of the bile ducts. PANCREAS:  No lesion, fluid collection, ductal dilatation, or atrophy.  SPLEEN:  No enlargement or focal lesion.  KIDNEYS:  Normal right kidney.  The left kidney is absent. ADRENALS:  No mass or enlargement.  AORTA/VASCULAR:    Extensive calcified atherosclerosis without aneurysm. RETROPERITONEUM:  No mass or adenopathy.  BOWEL/MESENTERY:  Partial colectomy with left mid abdominal ostomy.  No evidence of bowel obstruction.  There is a collection of fluid and gas within the subcutaneous tissues at the ostomy site measuring 8.1 x 5.3 x 8.5 cm. ABDOMINAL WALL:  No mass or hernia.  URINARY BLADDER:  No visible focal wall thickening, lesion, or calculus.  PELVIC NODES:  No adenopathy.  PELVIC ORGANS:  Absent or atrophic. BONES:  No bony lesion or fracture.  Osteoarthritis of the hips and spine.  LUNG BASES:  No visible pulmonary or pleural disease.  OTHER:  Negative.             CONCLUSION:   Encapsulated collection of fluid and gas within the  subcutaneous tissues at the left mid abdominal ostomy site (8.1 x 5.3 x 8.5 cm), possibly a sterile or infected postoperative collection.   LOCATION:  [facility]   Dictated by (CST): Davis Estrada MD on 4/22/2024 at 10:10 PM     Finalized by (CST): Davis Estrada MD on 4/22/2024 at 10:20 PM       XR CHEST AP PORTABLE  (CPT=71045)    Result Date: 4/22/2024            PROCEDURE:  XR CHEST AP PORTABLE  (CPT=71045)  TECHNIQUE:  AP chest radiograph was obtained.  COMPARISON:  [facility] , XR, XR CHEST AP PORTABLE  (CPT=71045), 2/13/2024, 8:58 PM.  INDICATIONS:  wound check-post abdominal surgery, redness to incision site  PATIENT STATED HISTORY: (As transcribed by Technologist)  Patient shared she has abdominal pains.    FINDINGS: Cardiac silhouette is mildly prominent.  Pulmonary vasculature are unremarkable. No consolidation, pleural effusion or pneumothorax. IMPRESSION: Unremarkable portable chest radiograph.   LOCATION:  [place] [id number]      Dictated by (CST): Chris Vela MD on 4/22/2024 at 9:23 PM     Finalized by (CST): Chris Vela MD on 4/22/2024 at 9:23 PM         Available outpatient records reviewed--    ASSESSMENT / PLAN:     89-year-old woman with history of ex lap, partial colon resection, TIFFANIE, revision of colostomy recently who presented with abdominal discomfort and swelling.  Noted on scan to have fluid collection near her ostomy.    Abdominal discomfort/fluid collection  - General surgery following  - Attempting IR drainage of fluid collection, will follow fluid cultures as well as blood cultures  - IV fluids and IV ceftriaxone for now      ZOHREH on CKD 3. Baseline Cr ~1.5 previously but had increased to 4 range during last admit and improved to 2.16 on dc  - Nephrology consulted  - IV fluids and will trend creatinine    SCDs and subcutaneous heparin as feasible      Annette Walker MD  [facility] Hospitalist  Pager [phone]  Answering Service number: [phone]

## 2024-04-23 NOTE — CONSULTS
[facility]  Report of Consultation    Sunita Loza Patient Status:  Observation    1932 MRN [id number]   Location [facility] 3NW-A Attending Annette Walker MD   Hosp Day # 0 PCP Carmen Tapia MD     Reason for Consultation:  ZOHREH/CKD    History of Present Illness:  Sunita Loza is a a(n) 89 year old female with hx of CKD stg 3, recent  bowel surgery- s/p exp-lap, revision of colostomy on 4/3/24 (colon path c/w adenocarcinoma) presents back to the hospital for evaluation of pain/swelling near the ostomy. Patient denies any cp/sob  No n/v  PO intake marginal  Denies any urinary problems  Denies large ostomy ouput    Cr ~ 2 at time of DC on   Cr up to 3.25 on admit    She is on fluids      History:  Past Medical History:    Arthritis    Atrial fibrillation (HCC)    Back pain    CKD (chronic kidney disease)    and S/P nephrectomy    Colon cancer (HCC)    Congestive heart disease (HCC)    Easy bruising    Endometrial cancer (HCC)    UTERINE, DX ABOUT 30 YEARS    Hearing impairment    AIDS    Heart palpitations    Afib    High blood pressure    Kidney failure    Leg swelling    Bishop syndrome    hereditary colorectal cancer    Muscle weakness    WALKER    Pain in joints    Wears glasses    Weight loss     Past Surgical History:   Procedure Laterality Date    Colectomy      Nephrectomy Left     Part removal colon w end colostomy       Family History   Problem Relation Age of Onset    Heart Disorder Father     Heart Disorder Mother     Colon Cancer Mother         70    Heart Disorder Sister     Breast Cancer Sister     Diabetes Brother     Breast Cancer Sister     Cancer Sister     Breast Cancer Sister     Breast Cancer Daughter       reports that she has never smoked. She has never used smokeless tobacco. She reports that she does not currently use alcohol. She reports that she does not use drugs.    Allergies:  Allergies   Allergen Reactions    Ciprofloxacin RASH and HIVES    Penicillins RASH        Current Medications:    Current Facility-Administered Medications:     sodium chloride 0.9% infusion, , Intravenous, Continuous    [Held by provider] heparin (Porcine) 5000 UNIT/ML injection 5,000 Units, 5,000 Units, Subcutaneous, Q8H DK    acetaminophen (Tylenol Extra Strength) tab 500 mg, 500 mg, Oral, Q4H PRN    acetaminophen (Tylenol) tab 650 mg, 650 mg, Oral, Q4H PRN **OR** HYDROcodone-acetaminophen (Norco) 5-325 MG per tab 1 tablet, 1 tablet, Oral, Q4H PRN **OR** HYDROcodone-acetaminophen (Norco) 5-325 MG per tab 2 tablet, 2 tablet, Oral, Q4H PRN    ondansetron (Zofran) 4 MG/2ML injection 4 mg, 4 mg, Intravenous, Q6H PRN    metoclopramide (Reglan) 5 mg/mL injection 5 mg, 5 mg, Intravenous, Q8H PRN    melatonin tab 3 mg, 3 mg, Oral, Nightly PRN    cefTRIAXone (Rocephin) 1 g in D5W 100 mL IVPB-ADD, 1 g, Intravenous, Q24H    acetaminophen (Tylenol) rectal suppository 650 mg, 650 mg, Rectal, Once  Home Medications:  No current outpatient medications on file.       Review of Systems:  See HPI; A total of 12 systems reviewed and otherwise unremarkable.    Physical Exam:  Vital signs: Blood pressure 142/58, pulse 81, temperature 98.4 °F (36.9 °C), temperature source Oral, resp. rate 18, height 5' (1.524 m), weight 92 lb 14.4 oz (42.1 kg), SpO2 100%.  General: No acute distress. Alert and oriented x 3.  HEENT: Moist mucous membranes. EOM-I. PERRL  Neck: No lymphadenopathy.  No JVD. No carotid bruits.  Respiratory: Clear to auscultation bilaterally.  No wheezes. No rhonchi.  Cardiovascular: S1, S2.  Regular rate and rhythm.  No murmurs. Equal pulses   Abdomen: Soft, nontender, nondistended.  Positive bowel sounds. No rebound tenderness   Ostomy bag dry; + swelling/pain to palp  Neurologic: No focal neurological deficits.   Musculoskeletal: Full range of motion of all extremities.  No swelling noted.   Integument: No lesions. No erythema.  Psychiatric: Appropriate mood and affect.    Laboratory:  Lab Results    Component Value Date    WBC 11.3 04/22/2024    HGB 9.7 04/22/2024    HCT 31.5 04/22/2024    .0 04/22/2024    CREATSERUM 3.80 04/22/2024    BUN 42 04/22/2024     04/22/2024    K 4.5 04/22/2024     04/22/2024    CO2 19.0 04/22/2024     04/22/2024    CA 8.0 04/22/2024    ALB 2.2 04/22/2024    ALKPHO 79 04/22/2024    BILT 0.3 04/22/2024    TP 5.9 04/22/2024    AST 15 04/22/2024    ALT 14 04/22/2024    INR 1.43 04/23/2024    PTP 17.5 04/23/2024    LIP 39 04/22/2024     Lab Results   Component Value Date    COLORUR Yellow 04/22/2024    CLARITY Clear 04/22/2024    SPECGRAVITY 1.013 04/22/2024    GLUUR Normal 04/22/2024    BILUR Negative 04/22/2024    KETUR Negative 04/22/2024    BLOODURINE Negative 04/22/2024    PHURINE 5.0 04/22/2024    PROUR Trace 04/22/2024    UROBILINOGEN Normal 04/22/2024    NITRITE Negative 04/22/2024    LEUUR 250 04/22/2024       BUN (mg/dL)   Date Value   04/22/2024 42 (H)   04/22/2024 41 (H)   04/08/2024 25 (H)     Blood Urea Nitrogen (mg/dL)   Date Value   03/02/2022 23.0 (H)   07/27/2021 23.0 (H)   01/29/2021 19.0     CREATININE (no units)   Date Value   02/05/2020 1.81   07/15/2019 1.8     Creatinine (mg/dL)   Date Value   04/22/2024 3.80 (H)   04/22/2024 3.25 (H)   04/08/2024 2.16 (H)   03/02/2022 1.49 (H)   07/27/2021 1.47 (H)   01/29/2021 1.29 (H)         Imaging:  Reviewed ; CT abdomen shows encapsulated fluid/gas within subcutaneous tissue at the lift mid abdominal ostomy       Impression:  ZOHREH/CKD- baseline Cr ~ 1.5. ZOHREH likely pre-renal etiology. Patient w/ marginal PO intake;   - check urine chem  - agree w/ fludis; adjusted  - hold diuretics  S/p exp lap w/ bowel resection and ostomy revision 4/3  Noted to have sean-ostomy abscess/infection  - per surgery; abx - plan for drainage  Anemia - chronic disease; monitor  Hx of AF- AC held for procedure; stable       Thank you for allowing me to participate in this patient's care.  Please feel free to call me with  any questions or concerns.    Dante Marques MD  4/23/2024

## 2024-04-23 NOTE — H&P
[facility]   part of [facility]   History & Physical    Sunita Loza Patient Status:  Observation    1932 MRN [id number]   Location [facility] 3NW-A Attending Annette Walker MD   Hosp Day # 0 PCP Carmen Tapia MD     Admitting Diagnosis:   Abscess subcutaneous    History of Present Illness:   89 yo F subQ abscess in region of stoma in LLQ    History   Past Medical History:  Past Medical History:    Arthritis    Atrial fibrillation (HCC)    Back pain    CKD (chronic kidney disease)    and S/P nephrectomy    Colon cancer (HCC)    Congestive heart disease (HCC)    Easy bruising    Endometrial cancer (HCC)    UTERINE, DX ABOUT 30 YEARS    Hearing impairment    AIDS    Heart palpitations    Afib    High blood pressure    Kidney failure    Leg swelling    Bishop syndrome    hereditary colorectal cancer    Muscle weakness    WALKER    Pain in joints    Wears glasses    Weight loss       Past Surgical History:  Past Surgical History:   Procedure Laterality Date    Colectomy      Nephrectomy Left     Part removal colon w end colostomy         Social History:  Social History     Tobacco Use    Smoking status: Never    Smokeless tobacco: Never   Substance Use Topics    Alcohol use: Not Currently        Family History:  Family History   Problem Relation Age of Onset    Heart Disorder Father     Heart Disorder Mother     Colon Cancer Mother         70    Heart Disorder Sister     Breast Cancer Sister     Diabetes Brother     Breast Cancer Sister     Cancer Sister     Breast Cancer Sister     Breast Cancer Daughter        Allergies/Medications:   Allergies:  Allergies   Allergen Reactions    Ciprofloxacin RASH and HIVES    Penicillins RASH       Medications:  No current outpatient medications on file.    Physical Exam & Review of Systems:   Physical Exam:    /49   Pulse 96   Temp 98.4 °F (36.9 °C) (Oral)   Resp 13   Ht 60\"   Wt 92 lb 14.4 oz   SpO2 95%   BMI 18.14 kg/m²     General:  NAD  Neck: No JVD  Lungs: CTA bilat  Heart: RRR, S1, S2  Abdomen: Soft, NT/ND, BS+x4  Extremities: Warm, dry, no LE edema bilat  Pulses: 2+ bilat DP    Results:   Labs:  Recent Labs   Lab 04/22/24  1140 04/22/24 2045   RBC 3.60* 3.34*   HGB 10.7* 9.7*   HCT 34.4* 31.5*   MCV 95.6 94.3   MCH 29.7 29.0   MCHC 31.1 30.8*   RDW 18.7 18.5   NEPRELIM 7.82* 9.42*   WBC 10.3 11.3*   .0 226.0     Recent Labs   Lab 04/23/24  0924   PTP 17.5*   INR 1.43*     Recent Labs   Lab 04/22/24  1140 04/22/24 2045   GLU 75 101*   BUN 41* 42*   CREATSERUM 3.25* 3.80*   CA 8.1* 8.0*    138   K 4.3 4.5    111   CO2 20.0* 19.0*       Assessment/Plan:   Impression: 89 yo F large LLQ abscess subQ    I have discussed with the patient and/or legal representative the potential benefits, risks, and side effects of this procedure, the likelihood of the patient achieving goals; and the potential problems that might occur during recuperation.  I discussed reasonable alternatives to the procedure, including risks, benefits and side effects related to the alternatives, and risks related to not receiving this procedure.      Recommendations: CT guided drain placement    Uday Jackson MD  4/23/2024  3:14 PM

## 2024-04-23 NOTE — IMAGING NOTE
Sunita is here for image guided abdominal drain placement.    Informed consent obtained by Dr Jackson. Positioned pt on scanner. Obtained biopsy. Specimen sent to Pathology.      Presently denies pain. Tolerated procedure well. Please see flowsheets for vital signs and/or additional information.    No sedation, telephone SBAR report given to Hilario RONDON. Transported pt to  321 accompanied by transport tech.

## 2024-04-23 NOTE — CM/SW NOTE
Pt is a 89 yo female admitted for cellulitis of the abdominal wall.  Pt has a fluid-filled sack which she will have drained.  Pt to get a CT drain abscess today.  Pt came from [facility] where she was for DAWNA.  Pt went to [facility] after her last admission about 2 weeks ago.  According to pt's son Arron, pt was scheduled to be dc'd from [facility] on Tuesday 4/22 but then was sent to [facility] once the abscess was discovered.  Pt usually lives at [facility].  PT/OT to see pt to determine if they think pt still needs DAWNA or if she can return to independent living.  Uncertain if pt will need any kind of wound care.  SW to follow pt for dc plan.     04/23/24 1300   CM/SW Referral Data   Referral Source Social Work (self-referral)   Reason for Referral Discharge planning   Informant Son   Readmission Assessment   Pt's living situation prior to admission? Subacute rehab   Was full assessment completed by SW/CM on prior admission? Yes   Was the recommended discharge plan achieved? Yes   Was pt. discharged w/out services? No   Patient Info   Patient's Home Environment Independent Living   Patient lives with Alone   Discharge Needs   Anticipated D/C needs To be determined

## 2024-04-23 NOTE — ED PROVIDER NOTES
Patient Seen in: [facility] Emergency Department      History     Chief Complaint   Patient presents with    Postop/Procedure Problem    Post-Op     Stated Complaint: wound check-post abdominal surgery    Subjective:   HPI    89-year-old female presenting emerged part for wound check.  She had an ostomy creation back on 4 3 she has been noticing slight increase in pain redness and fevers at home prompting her visit here.  She is still having output from the ostomy she denies any other complaints.  She denies exacerbating leaving factors or associated symptoms    Objective:   Past Medical History:    Arthritis    Atrial fibrillation (HCC)    Back pain    CKD (chronic kidney disease)    and S/P nephrectomy    Colon cancer (HCC)    Congestive heart disease (HCC)    Easy bruising    Endometrial cancer (HCC)    UTERINE, DX ABOUT 30 YEARS    Hearing impairment    AIDS    Heart palpitations    Afib    High blood pressure    Kidney failure    Leg swelling    Bishop syndrome    hereditary colorectal cancer    Muscle weakness    WALKER    Pain in joints    Wears glasses    Weight loss              Past Surgical History:   Procedure Laterality Date    Colectomy      Nephrectomy Left     Part removal colon w end colostomy                  Social History     Socioeconomic History    Marital status:    Tobacco Use    Smoking status: Never    Smokeless tobacco: Never   Vaping Use    Vaping status: Never Used   Substance and Sexual Activity    Alcohol use: Not Currently    Drug use: Never     Social Determinants of Health     Food Insecurity: No Food Insecurity (4/3/2024)    Food Insecurity     Food Insecurity: Never true   Transportation Needs: No Transportation Needs (4/3/2024)    Transportation Needs     Lack of Transportation: No   Housing Stability: Low Risk  (4/3/2024)    Housing Stability     Housing Instability: No              Review of Systems    Positive for stated complaint: wound check-post abdominal  surgery  Other systems are as noted in HPI.  Constitutional and vital signs reviewed.      All other systems reviewed and negative except as noted above.    Physical Exam     ED Triage Vitals [04/22/24 2036]   /60   Pulse 94   Resp 16   Temp 100.2 °F (37.9 °C)   Temp src Oral   SpO2 100 %   O2 Device None (Room air)       Current:/65   Pulse 87   Temp 100.2 °F (37.9 °C) (Oral)   Resp 17   Ht 152.4 cm (5')   Wt 43.1 kg   SpO2 100%   BMI 18.55 kg/m²         Physical Exam  Awake alert patient appears no distress HEENT exam normal lungs are clear cardiovascular exam abdomen lungs are clear cardiovascular exam regular rhythm abdomen ostomy site in place with surrounding erythema along the left lower lateral portion of the abdominal wall no subcutaneous emphysema or crepitance it is tender to palpation extremities no clubbing cyanosis or edema no other breaks in the skin noted back exam is normal no focal neurologic deficits       ED Course     Labs Reviewed   COMP METABOLIC PANEL (14) - Abnormal; Notable for the following components:       Result Value    Glucose 101 (*)     CO2 19.0 (*)     BUN 42 (*)     Creatinine 3.80 (*)     Calcium, Total 8.0 (*)     Calculated Osmolality 297 (*)     eGFR-Cr 11 (*)     Total Protein 5.9 (*)     Albumin 2.2 (*)     A/G Ratio 0.6 (*)     All other components within normal limits   URINALYSIS WITH CULTURE REFLEX - Abnormal; Notable for the following components:    Protein Urine Trace (*)     Leukocyte Esterase Urine 250 (*)     WBC Urine 21-50 (*)     Bacteria Urine Rare (*)     Squamous Epi. Cells Few (*)     All other components within normal limits   CBC W/ DIFFERENTIAL - Abnormal; Notable for the following components:    WBC 11.3 (*)     RBC 3.34 (*)     HGB 9.7 (*)     HCT 31.5 (*)     MCHC 30.8 (*)     Neutrophil Absolute Prelim 9.42 (*)     Neutrophil Absolute 9.42 (*)     Lymphocyte Absolute 0.72 (*)     Monocyte Absolute 1.05 (*)     All other components  within normal limits   LACTIC ACID, PLASMA - Normal   LIPASE - Normal   CBC WITH DIFFERENTIAL WITH PLATELET    Narrative:     The following orders were created for panel order CBC With Differential With Platelet.  Procedure                               Abnormality         Status                     ---------                               -----------         ------                     CBC W/ DIFFERENTIAL[[id number]]          Abnormal            Final result                 Please view results for these tests on the individual orders.   RAINBOW DRAW BLUE   RAINBOW DRAW GOLD   BLOOD CULTURE   BLOOD CULTURE   URINE CULTURE, ROUTINE   RAPID SARS-COV-2 BY PCR     EKG    Rate, intervals and axes as noted on EKG Report.  Rate: 93  Rhythm: Sinus Rhythm  Reading: No areas of acute ST segment elevation or depression                 Differential diagnosis includes abscess, sepsis, hernia incarceration         MDM        Admission disposition: 4/22/2024 10:47 PM                                        Medical Decision Making  89-year-old female presenting emerged part for possible cellulitis IV established cardiac monitor shows a sinus rhythm pulse ox shows no signs of hypoxia CBC shows elevated white cell counts the metabolic panel shows a slight worsening the patient's renal function independent interpretation by ED physician CT scan shows fluid collection along the ostomy site.  Along the ostomy site there is some erythema I am concerned about cellulitis patient was ordered Rocephin in the interim patient was discussed with general surgery and hospitalist and patient will be admitted at this time lactic acid within acceptable limits showing no signs of sepsis she has had no signs of hemodynamic compromise    Problems Addressed:  Cellulitis of abdominal wall: acute illness or injury    Amount and/or Complexity of Data Reviewed  Labs: ordered. Decision-making details documented in ED Course.  Radiology: ordered and independent  interpretation performed. Decision-making details documented in ED Course.  ECG/medicine tests: ordered and independent interpretation performed. Decision-making details documented in ED Course.    Risk  Decision regarding hospitalization.        Disposition and Plan     Clinical Impression:  1. Cellulitis of abdominal wall    2. Acute renal injury (HCC)         Disposition:  Admit  4/22/2024 10:47 pm    Follow-up:  No follow-up provider specified.        Medications Prescribed:  Current Discharge Medication List                            Hospital Problems       Present on Admission  Date Reviewed: 4/22/2024            ICD-10-CM Noted POA    * (Principal) Cellulitis of abdominal wall L03.311 4/22/2024 Unknown

## 2024-04-23 NOTE — CONSULTS
[facility]  Report of Consultation    Sunita Loza Patient Status:  Observation    1932 MRN [id number]   Location [facility] 3NW-A Attending Annette Walker MD   Hosp Day # 0 PCP Carmen Tapia MD     24    Reason for Consultation:    Surgical Consultation     CC:   Chief Complaint   Patient presents with    Postop/Procedure Problem    Post-Op        History of Present Illness:    Sunita Loza is a a(n) 89 year old female. Patient is s/p ex lap, partial colon resection, revision of  colostomy on 4-3-24 path consistent with adenocarcinoma. She was evaluated in the office for post op visit on 4-15-23, doing well. Path was discussed with her at that time.   She reports tolerating diet,having blood ostomy output, though noticed swelling near her ostomy.   CT scan obtained revealing fluid/air collection lateral to her stoma in subcutaneous tissue  Patient admitted, general surgery consulted.   Last dose of eliquis yesterday      Past Medical History:    Past Medical History:    Arthritis    Atrial fibrillation (HCC)    Back pain    CKD (chronic kidney disease)    and S/P nephrectomy    Colon cancer (HCC)    Congestive heart disease (HCC)    Easy bruising    Endometrial cancer (HCC)    UTERINE, DX ABOUT 30 YEARS    Hearing impairment    AIDS    Heart palpitations    Afib    High blood pressure    Kidney failure    Leg swelling    Bishop syndrome    hereditary colorectal cancer    Muscle weakness    WALKER    Pain in joints    Wears glasses    Weight loss       Past Surgical History:    Past Surgical History:   Procedure Laterality Date    Colectomy      Nephrectomy Left     Part removal colon w end colostomy         Family History:    Family History   Problem Relation Age of Onset    Heart Disorder Father     Heart Disorder Mother     Colon Cancer Mother         70    Heart Disorder Sister     Breast Cancer Sister     Diabetes Brother     Breast Cancer Sister     Cancer Sister     Breast Cancer  Sister     Breast Cancer Daughter        Social History:     reports that she has never smoked. She has never used smokeless tobacco. She reports that she does not currently use alcohol. She reports that she does not use drugs.    Allergies:    Allergies   Allergen Reactions    Ciprofloxacin RASH and HIVES    Penicillins RASH       Current Medications:      Current Facility-Administered Medications:     sodium chloride 0.9% infusion, , Intravenous, Continuous    heparin (Porcine) 5000 UNIT/ML injection 5,000 Units, 5,000 Units, Subcutaneous, Q8H DK    acetaminophen (Tylenol Extra Strength) tab 500 mg, 500 mg, Oral, Q4H PRN    acetaminophen (Tylenol) tab 650 mg, 650 mg, Oral, Q4H PRN **OR** HYDROcodone-acetaminophen (Norco) 5-325 MG per tab 1 tablet, 1 tablet, Oral, Q4H PRN **OR** HYDROcodone-acetaminophen (Norco) 5-325 MG per tab 2 tablet, 2 tablet, Oral, Q4H PRN    ondansetron (Zofran) 4 MG/2ML injection 4 mg, 4 mg, Intravenous, Q6H PRN    metoclopramide (Reglan) 5 mg/mL injection 5 mg, 5 mg, Intravenous, Q8H PRN    melatonin tab 3 mg, 3 mg, Oral, Nightly PRN    cefTRIAXone (Rocephin) 1 g in D5W 100 mL IVPB-ADD, 1 g, Intravenous, Q24H    acetaminophen (Tylenol) rectal suppository 650 mg, 650 mg, Rectal, Once    Home Medications:    Current Facility-Administered Medications on File Prior to Encounter   Medication Dose Route Frequency Provider Last Rate Last Admin    [COMPLETED] potassium chloride (K-Dur) tab 40 mEq  40 mEq Oral Once Renny Gilbert MD   40 mEq at 04/06/24 1956    [COMPLETED] potassium chloride 20 mEq/100mL IVPB premix 20 mEq  20 mEq Intravenous Once Sosa Nair MD 50 mL/hr at 04/05/24 1009 20 mEq at 04/05/24 1009    [COMPLETED] magnesium sulfate 4 g/100mL IVPB premix 4 g  4 g Intravenous Once Sosa Nair MD 50 mL/hr at 04/05/24 0733 4 g at 04/05/24 0733    [COMPLETED] sodium chloride 0.9% infusion   Intravenous Once Jolene Brennan PA 10 mL/hr at 04/05/24 0910 New Bag at  24 0910    [COMPLETED] iron sucrose (Venofer) 20 mg/mL injection 200 mg  200 mg Intravenous Daily Fabiana Miller MD   200 mg at 24 1005    [COMPLETED] potassium chloride 20 mEq/100mL IVPB premix 20 mEq  20 mEq Intravenous Once Fabiana Miller MD 50 mL/hr at 24 1020 20 mEq at 24 1020    [COMPLETED] heparin (Porcine) 5000 UNIT/ML injection 5,000 Units  5,000 Units Subcutaneous Once Renny Gilbert MD   5,000 Units at 24 0625    [COMPLETED] cefTRIAXone (Rocephin) 2 g in D5W 100 mL IVPB-ADD  2 g Intravenous Once Renny Gilbert MD   2 g at 24 0741    [COMPLETED] metRONIDAZOLE in sodium chloride 0.79% (Flagyl) 5 mg/mL IVPB premix 500 mg  500 mg Intravenous Once Renny Gilbert MD   500 mg at 24 0749    [COMPLETED] cefOXitin (Mefoxin) 2 g in sodium chloride 0.9% 100 mL IVPB-MBP  2 g Intravenous Q8H Renny Gilbert  mL/hr at 24 1901 2 g at 24 1901    [] atropine 0.1 MG/ML injection 0.5 mg  0.5 mg Intravenous PRN Jermain Mandel MD        [] labetalol (Trandate) 5 mg/mL injection 5 mg  5 mg Intravenous Q5 Min PRN Jermain Mandel MD        [] naloxone (Narcan) 0.4 MG/ML injection 80 mcg  80 mcg Intravenous PRN Jermain Mandel MD        [] diphenhydrAMINE (Benadryl) 50 mg/mL  injection 12.5 mg  12.5 mg Intravenous PRN Jermain Mandel MD        [] HYDROmorphone (Dilaudid) 1 MG/ML injection 0.2 mg  0.2 mg Intravenous Q5 Min PRN Jermain Mandel MD        Or    [] HYDROmorphone (Dilaudid) 1 MG/ML injection 0.4 mg  0.4 mg Intravenous Q5 Min PRN Jermain Mandel MD        Or    [] HYDROmorphone (Dilaudid) 1 MG/ML injection 0.6 mg  0.6 mg Intravenous Q5 Min PRN Jermain Mandel MD        [] fentaNYL (Sublimaze) 50 mcg/mL injection 25 mcg  25 mcg Intravenous Q5 Min PRN Jermain Mandel MD        Or    [] fentaNYL (Sublimaze) 50 mcg/mL injection 50 mcg  50 mcg Intravenous Q5 Min PRN Jermain Mandel,  MD        [COMPLETED] potassium chloride 20 mEq/100mL IVPB premix 20 mEq  20 mEq Intravenous Once Chon Chaves MD   Stopped at 03/27/24 1000    [COMPLETED] potassium chloride 20 mEq/100mL IVPB premix 20 mEq  20 mEq Intravenous Once Chon Chaves MD   Stopped at 03/26/24 2300    [COMPLETED] magnesium sulfate in sterile water for injection 2 g/50mL IVPB premix 2 g  2 g Intravenous Once Fabiana Miller MD 50 mL/hr at 02/01/24 1030 2 g at 02/01/24 1030    [COMPLETED] potassium chloride (K-Dur) tab 20 mEq  20 mEq Oral Once Yuki Iqbal MD   20 mEq at 01/31/24 1000    [COMPLETED] potassium chloride (K-Dur) tab 20 mEq  20 mEq Oral Once Fabiana Miller MD   20 mEq at 01/31/24 1832    [COMPLETED] magnesium sulfate in dextrose 5% 1 g/100mL infusion premix 1 g  1 g Intravenous Once Yuki Iqbal  mL/hr at 01/30/24 1048 1 g at 01/30/24 1048    [COMPLETED] potassium chloride 40 mEq in 250mL sodium chloride 0.9% IVPB premix  40 mEq Intravenous Once Fabiana Miller MD 62.5 mL/hr at 01/30/24 1313 40 mEq at 01/30/24 1313    [COMPLETED] sodium chloride 0.9 % IV bolus 500 mL  500 mL Intravenous Once Josie Tirado MD   Stopped at 01/29/24 2038    [COMPLETED] potassium chloride (K-Dur) tab 20 mEq  20 mEq Oral Once Josie Tirado MD   20 mEq at 01/29/24 1925     Current Outpatient Medications on File Prior to Encounter   Medication Sig Dispense Refill    omeprazole 20 MG Oral Capsule Delayed Release Take 1 capsule (20 mg total) by mouth every morning before breakfast.      potassium chloride 10 MEQ Oral Tab CR Take 2 tablets (20 mEq total) by mouth daily.      Glucosamine-Chondroit-Biofl-Mn (GLUCOSAMINE CHONDROIT,BIOFLAV, OR) Take 1 tablet by mouth daily.      ferrous sulfate 325 (65 FE) MG Oral Tab EC Take 1 tablet (325 mg total) by mouth daily with breakfast.      amiodarone 100 MG Oral Tab Take 1 tablet (100 mg total) by mouth daily.      torsemide 20 MG Oral Tab Take 1 tablet (20 mg total) by mouth  daily. 30 tablet 3    apixaban 2.5 MG Oral Tab Take 1 tablet (2.5 mg total) by mouth 2 (two) times daily.      dilTIAZem  MG Oral Capsule SR 24 Hr Take 1 capsule (120 mg total) by mouth daily.      Calcium Carbonate-Vitamin D (OYSTER SHELL CALCIUM/D) 500-200 MG-UNIT Oral Tab Take 1 tablet by mouth daily.      aspirin 81 MG Oral Tab Take 1 tablet (81 mg total) by mouth daily.      cyanocobalamin 1000 MCG Oral Tab Take 100 mcg by mouth daily.      Multiple Vitamin (MULTIVITAMINS OR) Take 1 tablet by mouth daily.      acetaminophen 500 MG Oral Tab Take 1 tablet (500 mg total) by mouth in the morning and 1 tablet (500 mg total) before bedtime.         Review of Systems:    10 point review performed pertinent positives and negatives per history of present illness.    Physical Exam:    Blood pressure 142/58, pulse 81, temperature 98.4 °F (36.9 °C), temperature source Oral, resp. rate 18, height 5' (1.524 m), weight 92 lb 14.4 oz (42.1 kg), SpO2 100%.    GENERAL: Alert and oriented to self well developed, well nourished female, in no apparent distress    SKIN: anicteric    RESPIRATORY: non labored breathing    CARDIOVASCULAR: RRR    ABDOMEN: stoma pink, stool in appliance with gas, staples in midline, lateral to stoma is large soft mass present consistent with CT findings, no surrounding cellulitis noted, no erythema present     LYMPHATIC: no lymphadenopathy    EXTREMITIES: no cyanosis, clubbing or edema    .    Laboratory Data:  Recent Labs   Lab 04/22/24  1140 04/22/24 2045   WBC 10.3 11.3*   HGB 10.7* 9.7*   MCV 95.6 94.3   .0 226.0       Recent Labs   Lab 04/22/24  1140 04/22/24 2045    138   K 4.3 4.5    111   CO2 20.0* 19.0*   BUN 41* 42*   CREATSERUM 3.25* 3.80*   GLU 75 101*   CA 8.1* 8.0*       Recent Labs   Lab 04/22/24 2045   ALT 14   AST 15   ALB 2.2*       No results for input(s): \"TROP\" in the last 168 hours.          Radiology:    CT ABDOMEN+PELVIS(CPT=74176)    Result Date:  4/22/2024  PROCEDURE:  CT ABDOMEN+PELVIS (CPT=74176)  COMPARISON:  [facility] , CT, CT ABDOMEN+PELVIS(CPT=74176), 3/26/2024, 6:58 PM.  INDICATIONS:  wound check-post abdominal surgery, redness to incision site  TECHNIQUE:  Unenhanced multislice CT scanning was performed from the dome of the diaphragm to the pubic symphysis.  Dose reduction techniques were used. Dose information is transmitted to the ACR (American College of Radiology) NRDR (National Radiology Data Registry) which includes the Dose Index Registry.  PATIENT STATED HISTORY: (As transcribed by Technologist)  POST ABD SURGERY, REDNESS TO INCISION SITE.    FINDINGS:  LIVER:  No enlargement, atrophy, abnormal density, or significant focal lesion.  BILIARY:  Cholecystectomy.  Normal caliber of the bile ducts. PANCREAS:  No lesion, fluid collection, ductal dilatation, or atrophy.  SPLEEN:  No enlargement or focal lesion.  KIDNEYS:  Normal right kidney.  The left kidney is absent. ADRENALS:  No mass or enlargement.  AORTA/VASCULAR:    Extensive calcified atherosclerosis without aneurysm. RETROPERITONEUM:  No mass or adenopathy.  BOWEL/MESENTERY:  Partial colectomy with left mid abdominal ostomy.  No evidence of bowel obstruction.  There is a collection of fluid and gas within the subcutaneous tissues at the ostomy site measuring 8.1 x 5.3 x 8.5 cm. ABDOMINAL WALL:  No mass or hernia.  URINARY BLADDER:  No visible focal wall thickening, lesion, or calculus.  PELVIC NODES:  No adenopathy.  PELVIC ORGANS:  Absent or atrophic. BONES:  No bony lesion or fracture.  Osteoarthritis of the hips and spine.  LUNG BASES:  No visible pulmonary or pleural disease.  OTHER:  Negative.             CONCLUSION:   Encapsulated collection of fluid and gas within the subcutaneous tissues at the left mid abdominal ostomy site (8.1 x 5.3 x 8.5 cm), possibly a sterile or infected postoperative collection.   LOCATION:  [place]   Dictated by (CST): Davis Estrada MD on 4/22/2024 at 10:10 PM      Finalized by (CST): Davis Estrada MD on 4/22/2024 at 10:20 PM       XR CHEST AP PORTABLE  (CPT=71045)    Result Date: 4/22/2024            PROCEDURE:  XR CHEST AP PORTABLE  (CPT=71045)  TECHNIQUE:  AP chest radiograph was obtained.  COMPARISON:  [facility] , XR, XR CHEST AP PORTABLE  (CPT=71045), 2/13/2024, 8:58 PM.  INDICATIONS:  wound check-post abdominal surgery, redness to incision site  PATIENT STATED HISTORY: (As transcribed by Technologist)  Patient shared she has abdominal pains.    FINDINGS: Cardiac silhouette is mildly prominent.  Pulmonary vasculature are unremarkable. No consolidation, pleural effusion or pneumothorax. IMPRESSION: Unremarkable portable chest radiograph.   LOCATION:  [place] [id number]      Dictated by (CST): Chris Vela MD on 4/22/2024 at 9:23 PM     Finalized by (CST): Chris Vela MD on 4/22/2024 at 9:23 PM       CT ABDOMEN+PELVIS(CPT=74176)    Result Date: 3/26/2024  PROCEDURE:  CT ABDOMEN+PELVIS (CPT=74176)  COMPARISON:  [facility] , CT, CT ABDOMEN+PELVIS KIDNEYSTONE 2D RNDR(NO IV,NO ORAL)(CPT=74176), 1/30/2024, 12:17 PM.  INDICATIONS:  Ostomy issue- sent over from GI, ostomy mass, abdominal pain, h/o colon ca  TECHNIQUE:  Unenhanced multislice CT scanning was performed from the dome of the diaphragm to the pubic symphysis.  Dose reduction techniques were used. Dose information is transmitted to the ACR (American College of Radiology) NRDR (National Radiology Data Registry) which includes the Dose Index Registry.  PATIENT STATED HISTORY: (As transcribed by Technologist)  History of colon cancer.    FINDINGS:  LIVER:  Stable 7 mm hypoattenuating lesion within the left lobe of the liver no new hepatic lesion identified.  No hepatic enlargement. BILIARY:  Status post cholecystectomy.  No ductal dilatation. PANCREAS:  No lesion, fluid collection, ductal dilatation, or atrophy.  SPLEEN:  No enlargement or focal lesion.  KIDNEYS:  Status post left nephrectomy.  Parenchymal calcifications seen in  the midpole the right kidney.  No right hydronephrosis.  No obstructing ureteral calculi. ADRENALS:  No mass or enlargement.  AORTA/VASCULAR:    There is atherosclerotic calcified plaque within the abdominal aorta, bilateral common iliac, bilateral internal iliac, bilateral external iliac, bilateral common femoral, bilateral superficial femoral and bilateral profunda femoral artery branches.  There is no evidence for aortic aneurysm.  RETROPERITONEUM:  No mass or adenopathy.  BOWEL/MESENTERY:  /mesentery there are postoperative changes of abdominal perineal resection.  The cecum is positioned very low within the pelvis in the region previously occupied by the rectum.  There are few fluid-filled mildly distended loops of small  bowel seen in the left pelvis.  The focal mechanical obstruction is not identified.  There is a left lower quadrant colostomy.  There is a large peristomal hernia containing a nonobstructed segment of the distal transverse colon.  The hernia measures approximately 9.0 x 3.3 x 6.3 cm.  There is also a periumbilical hernia containing the anterior wall of the mid transverse colon.  There is no colonic obstruction at this site either.  There is also some omental fat herniated adjacent to the mid transverse colon. ABDOMINAL WALL:  Abdominal wall please see above URINARY BLADDER:  No visible focal wall thickening, lesion, or calculus.  PELVIC NODES:  No adenopathy.  PELVIC ORGANS:  Status post hysterectomy. BONES:  There are acute appearing fractures involving the left transverse processes at L4 and L5.  The fractures were not present on the previous CT from 1/30/2024.  Osseous structures are demineralized.  Severe disc space narrowing noted at L3-4 and moderate disc space narrowing noted at L1-2, L4-5 and L5-S1.  There is facet arthropathy in the lumbar spine.  There is bilateral SI joint arthritic change. LUNG BASES:  Fibrotic changes are seen in the periphery of the lingula, right middle lobe and  both lower lobes.  There is a 3 mm noncalcified left lower lung nodule which was present previously on the CT from 1/30/2024. OTHER:  Negative.             CONCLUSION:  1. Large peristomal hernia again identified containing nonobstructed loop of transverse colon. 2. Periumbilical ventral abdominal wall hernia is also again noted containing the anterior wall of the mid transverse colon as well as some omental fat.  3. New transverse process fracture deformities on the left at L4 and L5.  Correlate with history for any recent trauma.  The osseous structures are demineralized. 4. There is a 3 mm noncalcified subpleural left lower lobe lung nodule.  Surveillance imaging recommended given the patient's history of colon cancer. 5. There are other incidental findings noted as described above.    LOCATION:  [facility]   Dictated by (CST): Abhinav Bergeron MD on 3/26/2024 at 7:36 PM     Finalized by (CST): Abhinav Bergeron MD on 3/26/2024 at 7:59 PM          Problem List:    Patient Active Problem List   Diagnosis    DJD (degenerative joint disease), cervical    Asymptomatic postmenopausal status    Benign essential HTN    Chronic kidney disease, stage III (moderate) (HCC)    Chronic rhinitis    Disorder of bone and cartilage    Esophageal reflux    Generalized osteoarthrosis, involving multiple sites    Malignant neoplasm of uterus (HCC)    Other vitamin B12 deficiency anemia    Pure hypercholesterolemia    Renal failure    Acute pain of left knee    Vitamin D deficiency    Metabolic acidosis    Malignant neoplasm (HCC)    Hypokalemia    Hyperlipidemia, mixed    Endometrial carcinoma (HCC)    Anemia in chronic kidney disease    Osteopenia    Chronic kidney disease, stage 3b (HCC)    Otalgia    Sensorineural hearing loss, bilateral    ZOHREH (acute kidney injury) (HCC)    Hypernatremia    Hypomagnesemia    Dehydration    ATN (acute tubular necrosis) (HCC)    Atrial fibrillation (HCC)    Bilateral leg edema    Hypertension     Primary osteoarthritis of left knee    Abdominal mass, unspecified abdominal location    Anemia    Acute kidney injury (HCC)    Azotemia    Hyperglycemia    Abdominal pain, generalized    CKD (chronic kidney disease) stage 4, GFR 15-29 ml/min (HCC)    Shock (HCC)    Decreased ferritin    Chronic edema    Cellulitis of abdominal wall    Acute renal injury (HCC)       Impression:    89 y/o S/P ex lap, partial colon resection, TIFFANIE, revision of colostomy with subcutaneous fluid collection  CT scan as noted above  T max 100.2  Wbc 11  ZOHREH on CKD: cr 3.8 renal following at last hospitalization     Plan:    NPO  Hold subcutaneous heparin and eliquis  IV fluids  IV antibiotics  Consult renal  Reviewed with patient recommendations for IR drainage of fluid collection  Consider clears following IR drain placement  All questions answered      CARLOTA Payne  [facility]  General Surgery  4/23/2024

## 2024-04-23 NOTE — PROCEDURES
[facility]   part of [facility]  Procedure Note    Sunita Loza Patient Status:  Observation    1932 MRN [id number]   Location [facility] 3NW-A Attending Annette Walker MD   Hosp Day # 0 PCP Carmen Tapia MD     Procedure: CT drain placement LLQ    Pre-Procedure Diagnosis:  Abscess    Post-Procedure Diagnosis: Same    Anesthesia:  Local and Sedation    Findings:  10f APD placed to collection in LLQ abd wall    Specimens: 20 ml pus     Blood Loss:  Minimal    Tourniquet Time: None  Complications:  None  Drains:  As above    Secondary Diagnosis:  None    Uday Jackson MD  2024

## 2024-04-23 NOTE — PLAN OF CARE
Patient admitted via cart from ER.   Oriented to room.   Safety precautions initiated.   Bed in low position.  Call light in reach.

## 2024-04-23 NOTE — ED QUICK NOTES
Orders for admission, patient is aware of plan and ready to go upstairs. Any questions, please call ED RN jone at extension a pod nurses station.     Patient Covid vaccination status: Fully vaccinated     COVID Test Ordered in ED: Rapid SARS-CoV-2 by PCR    COVID Suspicion at Admission: N/A    Running Infusions:  None    Mental Status/LOC at time of transport: dementia. Ostomy bag. Infection to left side of abd.    Other pertinent information:   CIWA score: N/A   NIH score:  N/A

## 2024-04-23 NOTE — PLAN OF CARE
A&Ox3-4, forgetful at times. VSS. RA. Denies chest pain and SOB.   GI: Abdomen soft, nondistended. Passing gas and stool into ostomy.   Denies nausea.   : Voids via purewick.   Pt states she gets up with a rolling walker at [place].   Incisions: Midline with 3 staples on top is CDI and CB. No redness or swelling.   Diet: NPO  IVF running per order.   All appropriate safety measures in place. All questions and concerns addressed.     Upon admission, pt states she wears hearing aids but they are not in her ears right now. Pt doesn't know if they were left at [place] or if a nurse has them.     This assigned RN and PCT did skin check upon pts arrival. No wounds seen. Slight redness on bottom of heels. Pillow put under pts legs.   -- A swollen and red area around pts ostomy site is noted. Pt says pain is a 5/10.

## 2024-04-23 NOTE — IMAGING NOTE
Received order for abscess drainage and chart reviewed. Spoke with Hilario RONDON and pt has been kept NPO. Pt is forgetful at times but Hilario will assess ability to comprehend procedure prior to coming to unit. She will obtain PT/INR. Case to be reviewed with Dr Jackson.

## 2024-04-24 LAB
ANION GAP SERPL CALC-SCNC: 7 MMOL/L (ref 0–18)
BASOPHILS # BLD AUTO: 0.04 X10(3) UL (ref 0–0.2)
BASOPHILS NFR BLD AUTO: 0.5 %
BUN BLD-MCNC: 35 MG/DL (ref 9–23)
CALCIUM BLD-MCNC: 7.8 MG/DL (ref 8.5–10.1)
CHLORIDE SERPL-SCNC: 116 MMOL/L (ref 98–112)
CO2 SERPL-SCNC: 21 MMOL/L (ref 21–32)
CREAT BLD-MCNC: 2.88 MG/DL
EGFRCR SERPLBLD CKD-EPI 2021: 15 ML/MIN/1.73M2 (ref 60–?)
EOSINOPHIL # BLD AUTO: 0.36 X10(3) UL (ref 0–0.7)
EOSINOPHIL NFR BLD AUTO: 4.2 %
ERYTHROCYTE [DISTWIDTH] IN BLOOD BY AUTOMATED COUNT: 18.6 %
GLUCOSE BLD-MCNC: 68 MG/DL (ref 70–99)
HCT VFR BLD AUTO: 31.4 %
HGB BLD-MCNC: 9.2 G/DL
IMM GRANULOCYTES # BLD AUTO: 0.08 X10(3) UL (ref 0–1)
IMM GRANULOCYTES NFR BLD: 0.9 %
LYMPHOCYTES # BLD AUTO: 0.69 X10(3) UL (ref 1–4)
LYMPHOCYTES NFR BLD AUTO: 8 %
MAGNESIUM SERPL-MCNC: 1.3 MG/DL (ref 1.6–2.6)
MCH RBC QN AUTO: 28.8 PG (ref 26–34)
MCHC RBC AUTO-ENTMCNC: 29.3 G/DL (ref 31–37)
MCV RBC AUTO: 98.1 FL
MONOCYTES # BLD AUTO: 0.65 X10(3) UL (ref 0.1–1)
MONOCYTES NFR BLD AUTO: 7.6 %
NEUTROPHILS # BLD AUTO: 6.77 X10 (3) UL (ref 1.5–7.7)
NEUTROPHILS # BLD AUTO: 6.77 X10(3) UL (ref 1.5–7.7)
NEUTROPHILS NFR BLD AUTO: 78.8 %
OSMOLALITY SERPL CALC.SUM OF ELEC: 304 MOSM/KG (ref 275–295)
PLATELET # BLD AUTO: 217 10(3)UL (ref 150–450)
POTASSIUM SERPL-SCNC: 3.2 MMOL/L (ref 3.5–5.1)
RBC # BLD AUTO: 3.2 X10(6)UL
SODIUM SERPL-SCNC: 144 MMOL/L (ref 136–145)
WBC # BLD AUTO: 8.6 X10(3) UL (ref 4–11)

## 2024-04-24 RX ORDER — PANTOPRAZOLE SODIUM 20 MG/1
20 TABLET, DELAYED RELEASE ORAL
Status: DISCONTINUED | OUTPATIENT
Start: 2024-04-24 | End: 2024-04-29

## 2024-04-24 RX ORDER — ASPIRIN 81 MG/1
81 TABLET ORAL DAILY
Status: DISCONTINUED | OUTPATIENT
Start: 2024-04-24 | End: 2024-04-29

## 2024-04-24 RX ORDER — POTASSIUM CHLORIDE 20 MEQ/1
40 TABLET, EXTENDED RELEASE ORAL EVERY 4 HOURS
Status: COMPLETED | OUTPATIENT
Start: 2024-04-24 | End: 2024-04-24

## 2024-04-24 RX ORDER — AMIODARONE HYDROCHLORIDE 100 MG/1
100 TABLET ORAL DAILY
Status: DISCONTINUED | OUTPATIENT
Start: 2024-04-24 | End: 2024-04-29

## 2024-04-24 RX ORDER — MAGNESIUM SULFATE HEPTAHYDRATE 40 MG/ML
2 INJECTION, SOLUTION INTRAVENOUS ONCE
Status: COMPLETED | OUTPATIENT
Start: 2024-04-24 | End: 2024-04-24

## 2024-04-24 RX ORDER — DILTIAZEM HYDROCHLORIDE 120 MG/1
120 CAPSULE, EXTENDED RELEASE ORAL DAILY
Status: DISCONTINUED | OUTPATIENT
Start: 2024-04-24 | End: 2024-04-29

## 2024-04-24 RX ORDER — MAGNESIUM OXIDE 400 MG (241.3 MG MAGNESIUM) TABLET
325 TABLET
Status: DISCONTINUED | OUTPATIENT
Start: 2024-04-24 | End: 2024-04-29

## 2024-04-24 NOTE — PHYSICAL THERAPY NOTE
PHYSICAL THERAPY EVALUATION - INPATIENT     Room Number: 321/321-A  Evaluation Date: 4/24/2024  Type of Evaluation: Initial  Physician Order: PT Eval and Treat    Presenting Problem: Cellulitis of abdominal wall/sean-ostomy infection s/p drain placement  Co-Morbidities : Colon CA s/p exploratory lap with bowel resection and ostomy revision 4/3/24, CKDIII, OA,  Reason for Therapy: Mobility Dysfunction and Discharge Planning    PHYSICAL THERAPY ASSESSMENT   Patient is currently functioning near baseline with bed mobility, transfers, and gait.  Prior to admission, patient's baseline is modified independent ambulation with a walker.  Patient is requiring stand-by assist to contact guard assist as a result of the following impairments: decreased functional strength, decreased endurance/aerobic capacity, pain, impaired standing balance, decreased muscular endurance, and impaired integumentary integrity/wound and drain .  Physical therapy will continue to follow for duration of hospitalization.    Patient will benefit from continued skilled PT Services at discharge to promote functional independence in home.  Anticipate patient will return home with home health PT/OT.    PLAN  PT Treatment Plan: Bed mobility;Endurance;Patient education;Gait training;Strengthening;Transfer training;Balance training  Rehab Potential : Good  Frequency (Obs): 3-5x/week  Number of Visits to Meet Established Goals: 3      CURRENT GOALS    Goal #1 Patient is able to demonstrate supine - sit EOB @ level: modified independent     Goal #2 Patient is able to demonstrate transfers Sit to/from Stand at assistance level: modified independent     Goal #3 Patient is able to ambulate 150 feet with assist device: walker - rolling at assistance level: modified independent     Goal #4    Goal #5    Goal #6    Goal Comments: Goals established on 4/24/2024      PHYSICAL THERAPY MEDICAL/SOCIAL HISTORY  History related to current admission: Patient is a 89 year  old female who presented to the ED 4/22/2024 from [facility] for wound redness and fevers.  Pt diagnosed with cellulitis of abdominal wall and TAYE.  Pt is s/p CT guided drain placement on 4/23.    Recent Admits:   4/3/24-4/8/34 for planned exploratory laparotomy with bowel resection and ostomy revision on 4/3/24      HOME SITUATION  Type of Home: Independent living facility ([place])   Home Layout: One level;Elevator                Lives With: Daughter (Olga, who has Down's Syndrome, but is high functioning)  Drives: No  Patient Owned Equipment: Rolling walker  Patient Regularly Uses: Glasses;Hearing aides    Prior Level of [place]: Pt reports she typically ambulates independently in her apartment but uses a walker for longer distances.  Pt is independent with ADL's.    SUBJECTIVE  \"I haven't gotten up yet.\"      OBJECTIVE  Precautions: Drain(s);Hard of hearing  Fall Risk: Standard fall risk    WEIGHT BEARING RESTRICTION  Weight Bearing Restriction: None                PAIN ASSESSMENT  Rating:  (\"low, no pain, just discomfort\")  Location: abdomen, drain site       COGNITION  Overall Cognitive Status:  WNL    RANGE OF MOTION AND STRENGTH ASSESSMENT  Upper extremity ROM and strength are within functional limits     Lower extremity ROM is within functional limits     Lower extremity strength is within functional limits       BALANCE  Static Sitting: Good  Dynamic Sitting: Good  Static Standing: Fair  Dynamic Standing: Fair -         ACTIVITY TOLERANCE  Pulse: 67  Heart Rate Source: Monitor     BP: 152/59  BP Location: Left arm  BP Method: Automatic  Patient Position: Sitting    O2 WALK  Oxygen Therapy  SPO2% on Room Air at Rest: 100          AM-PAC '6-Clicks' INPATIENT SHORT FORM - BASIC MOBILITY  How much difficulty does the patient currently have...  Patient Difficulty: Turning over in bed (including adjusting bedclothes, sheets and blankets)?: A Little   Patient Difficulty: Sitting down on and standing up  from a chair with arms (e.g., wheelchair, bedside commode, etc.): A Little   Patient Difficulty: Moving from lying on back to sitting on the side of the bed?: A Little   How much help from another person does the patient currently need...   Help from Another: Moving to and from a bed to a chair (including a wheelchair)?: A Little   Help from Another: Need to walk in hospital room?: A Little   Help from Another: Climbing 3-5 steps with a railing?: A Little       AM-PAC Score:  Raw Score: 18   Approx Degree of Impairment: 46.58%   Standardized Score (AM-PAC Scale): 43.63   CMS Modifier (G-Code): CK    FUNCTIONAL ABILITY STATUS  Gait Assessment   Functional Mobility/Gait Assessment  Gait Assistance: Contact guard assist (intermittent)  Distance (ft): 200  Assistive Device: Rolling walker  Pattern: Within Functional Limits    Skilled Therapy Provided     Bed Mobility:  Rolling: SBA  Supine to sit: SBA   Sit to supine: NT     Transfer Mobility:  Sit to stand: CGA   Stand to sit: CGA  Gait = CGA    Therapeutic Activity:  Pt doffed/donned brief with Min A.  Pt completed sit<>stand to/from toilet with cues for sequencing and use of grab bar for safety.  Pt completed ostomy and hygiene care with SBA.  Pt washed hands at sink with SBA.    Gait training:  Pt required verbal cues for RW management.  Pt required SBA for straight aways, CGA for safety with turns as pt has a tendency to lift and carry the walker.    Exercise/Education Provided:  Bed mobility  Functional activity tolerated  Gait training  Strengthening  Transfer training    Patient End of Session: Up in chair;Needs met;Call light within reach;RN aware of session/findings;All patient questions and concerns addressed;Alarm set      Patient Evaluation Complexity Level:  History Moderate - 1 or 2 personal factors and/or co-morbidities   Examination of body systems Moderate - addressing a total of 3 or more elements   Clinical Presentation Moderate - Evolving   Clinical  Decision Making Moderate - Evolving       PT Session Time: 45 minutes  Gait Training: 10 minutes  Therapeutic Activity: 15 minutes

## 2024-04-24 NOTE — PROGRESS NOTES
[facility] Progress Note     Sunita Loza Patient Status:  Observation    1932 MRN [id number]   [facility] 3NW-A Attending Sosa Nair, *   Hosp Day # 0 PCP Carmen Tapia MD     Chief Complaint:  follow up abdominal subcutaneous fluid collection    Subjective:   S:  no current complaint. PT at bedside. Pain manageable. No cp/sob/n/v/f/c. Ostomy bag with stool, brown mustard color    Review of Systems:   10 point ROS completed and was negative, except for pertinent positive and negatives stated in subjective.    Objective:   Vital signs:  Temp:  [97.3 °F (36.3 °C)-98.3 °F (36.8 °C)] 97.3 °F (36.3 °C)  Pulse:  [55-96] 67  Resp:  [13-18] 18  BP: (111-162)/(47-93) 152/59  SpO2:  [92 %-100 %] 100 %    Wt Readings from Last 6 Encounters:   24 92 lb 14.4 oz (42.1 kg)   24 87 lb 8.4 oz (39.7 kg)   24 83 lb 1.6 oz (37.7 kg)   24 86 lb (39 kg)   24 89 lb 4 oz (40.5 kg)   24 98 lb (44.5 kg)         Physical Exam:    Gen: No acute distress, alert and oriented , no accessory m use, [place]  Pulm: Lungs clear, ok respiratory effort  CV: Heart with regular rate and rhythm, no edema  Abd: Abdomen soft, slightly tender near drain site, nondistended, bowel sounds present, no peritoneal, signs. Drain with serosanguinous fluid  MSK:   no clubbing, no cyanosis  Skin: no visible rashes    Psych:   appropriate affect,   answering questions ok    Results:   Diagnostic Data:      Labs:       Recent Labs   Lab 24  1140 24  2045 24  0924 24  0824   WBC 10.3 11.3*  --  8.6   HGB 10.7* 9.7*  --  9.2*   MCV 95.6 94.3  --  98.1   .0 226.0  --  217.0   INR  --   --  1.43*  --        Recent Labs   Lab 24  1140 24  2045 24  0824    138 144   K 4.3 4.5 3.2*    111 116*   CO2 20.0* 19.0* 21.0   BUN 41* 42* 35*   CREATSERUM 3.25* 3.80* 2.88*   CA 8.1* 8.0* 7.8*   MG  --   --  1.3*   GLU 75 101* 68*        Recent Labs   Lab 04/22/24 2045   ALT 14   AST 15   ALB 2.2*          COVID-19  Lab Results   Component Value Date    COVID19 Not Detected 04/22/2024          Imaging: Imaging data reviewed in Epic.    Medications:    amiodarone  100 mg Oral Daily    apixaban  2.5 mg Oral BID    aspirin  81 mg Oral Daily    dilTIAZem ER  120 mg Oral Daily    ferrous sulfate  325 mg Oral Daily with breakfast    pantoprazole  20 mg Oral QAM AC    cefTRIAXone  1 g Intravenous Q24H    acetaminophen  650 mg Rectal Once      sodium bicarbonate in 0.45% NS infusion 75 mEq (04/24/24 8803)       acetaminophen    acetaminophen **OR** HYDROcodone-acetaminophen **OR** HYDROcodone-acetaminophen    ondansetron    metoclopramide    melatonin          ASSESSMENT / PLAN:      89-year-old woman with history of ex lap, partial colon resection, TIFFANIE, revision of colostomy recently who presented with abdominal discomfort and swelling.  Noted on scan to have fluid collection near her ostomy.     Abdominal discomfort/fluid collection sp IR drain placement, in setting of  Hx ex lap, partial colon resection, TIFFANIE, revision of colostomy  Hx Bishop syndrome with hx renal/colon/uterine ca  - General surgery following  - sp IR drain placement, monitor  fluid cultures as well as blood cultures  - IV fluids and IV ceftriaxone for now        ZOHREH on CKD 3. Baseline Cr ~1.5 previously but had increased to 4 range during last admit and improved to 2.16 on dc  - Nephrology on, appreciate.  IVF per renal      Hypokalemia, hypomagenesemia-repletion per renal, monitor     stable chronic illnesses:  pAfib- home meds. AC if ok with surgery  Chronic anemia     SCDs  , eliquis if ok with surgery    Dw pt and RN     Thank You,  Sosa Nair MD    [facility]  Internal Medicine  Answering Service number: [phone]    Supplementary Documentation:

## 2024-04-24 NOTE — CONSULTS
[facility]  Progress Note    Sunita Loza Patient Status:  Observation    1932 MRN [id number]   [facility] 3NW-A Attending Annette Walker MD   Hosp Day # 0 PCP Carmen Tapia MD        No acute events  S/p drain placement      Current Facility-Administered Medications:     [Held by provider] heparin (Porcine) 5000 UNIT/ML injection 5,000 Units, 5,000 Units, Subcutaneous, Q8H DK    acetaminophen (Tylenol Extra Strength) tab 500 mg, 500 mg, Oral, Q4H PRN    acetaminophen (Tylenol) tab 650 mg, 650 mg, Oral, Q4H PRN **OR** HYDROcodone-acetaminophen (Norco) 5-325 MG per tab 1 tablet, 1 tablet, Oral, Q4H PRN **OR** HYDROcodone-acetaminophen (Norco) 5-325 MG per tab 2 tablet, 2 tablet, Oral, Q4H PRN    ondansetron (Zofran) 4 MG/2ML injection 4 mg, 4 mg, Intravenous, Q6H PRN    metoclopramide (Reglan) 5 mg/mL injection 5 mg, 5 mg, Intravenous, Q8H PRN    melatonin tab 3 mg, 3 mg, Oral, Nightly PRN    cefTRIAXone (Rocephin) 1 g in D5W 100 mL IVPB-ADD, 1 g, Intravenous, Q24H    sodium bicarbonate 75 mEq in sodium chloride 0.45% 1,075 mL infusion, 75 mEq, Intravenous, Continuous    acetaminophen (Tylenol) rectal suppository 650 mg, 650 mg, Rectal, Once         Review of Systems:  See HPI; A total of 12 systems reviewed and otherwise unremarkable.    Physical Exam:  Vital signs: Blood pressure 139/58, pulse 59, temperature 97.3 °F (36.3 °C), temperature source Oral, resp. rate 18, height 5' (1.524 m), weight 92 lb 14.4 oz (42.1 kg), SpO2 100%.  General: No acute distress. Alert and oriented x 3.  HEENT: Moist mucous membranes. EOM-I. PERRL  Neck: No lymphadenopathy.  No JVD. No carotid bruits.  Respiratory: Clear to auscultation bilaterally.  No wheezes. No rhonchi.  Cardiovascular: S1, S2.  Regular rate and rhythm.  No murmurs. Equal pulses   Abdomen: Soft, nontender, nondistended.  Positive bowel sounds. No rebound tenderness   Ostomy bag with liquid stool; drain noted.   Neurologic: No focal  neurological deficits.   Musculoskeletal: Full range of motion of all extremities.  No swelling noted.   Integument: No lesions. No erythema.  Psychiatric: Appropriate mood and affect.    Recent Labs     04/22/24  1140 04/22/24 2045 04/23/24 0924 04/24/24 0824   WBC 10.3 11.3*  --  8.6   HGB 10.7* 9.7*  --  9.2*   MCV 95.6 94.3  --  98.1   .0 226.0  --  217.0   INR  --   --  1.43*  --        Recent Labs     04/22/24  1140 04/22/24 2045 04/24/24 0824    138 144   K 4.3 4.5 3.2*    111 116*   CO2 20.0* 19.0* 21.0   BUN 41* 42* 35*   CREATSERUM 3.25* 3.80* 2.88*   CA 8.1* 8.0* 7.8*   MG  --   --  1.3*       Recent Labs     04/22/24 2045   ALT 14   AST 15   ALB 2.2*       Imaging:  Reviewed ; CT abdomen shows encapsulated fluid/gas within subcutaneous tissue at the lift mid abdominal ostomy       Impression:  ZOHREH/CKD- baseline Cr ~ 1.5. ZOHREH likely pre-renal etiology. Patient w/ marginal PO intake;    - await labs  - hold diuretics; on fluids   S/p exp lap w/ bowel resection and ostomy revision 4/3  Noted to have sean-ostomy abscess/infection  - per surgery; abx - s/p drain placement 4/23  Anemia - chronic disease; monitor  Hx of AF- AC held for procedure; stable       Thank you for allowing me to participate in this patient's care.  Please feel free to call me with any questions or concerns.    Dante Marques MD  4/24/2024

## 2024-04-24 NOTE — PLAN OF CARE
A&Ox3-4, forgetful. VSS. RA. . Denies chest pain and SOB.   GI: Abdomen soft, nondistended. Passing gas and stool into ostomy.   Denies nausea.   : Voids via purewick. Clear yellow urine.   Pain controlled with heat packs. No pain meds given at this time.   Resting in bed.   Drains: L IR drain to bulb suction with dark red/serosang output. Flush with 10cc and aspirate Q 12 hrs. Done around 2000.   Redness and swelling has significantly improved upon admitting pt yesterday. Tender to touch.   Diet: Tolerating clears.   IVF running per order with IV abx.   All appropriate safety measures in place. All questions and concerns addressed.

## 2024-04-24 NOTE — PLAN OF CARE
A&Ox3-4 forgetful at times. VSS. RA. . Denies chest pain and SOB.   GI: Abdomen soft, nondistended. Both gas and stool present in ostomy bag.    Denies nausea.   : Voids. Pure-wick catheter in place draining clear yellow urine.   Pain controlled with PRN pain medications.   Up with standby assist/walker   Drains: IR drain - flushed 0830 with 10cc, pulled back 10cc.   Incisions: ostomy from recent 04/03/24 procedure   Diet: Clear liquid diet - tolerating well   IVF running per order. All appropriate safety measures in place. All questions and concerns addressed.

## 2024-04-24 NOTE — DISCHARGE INSTRUCTIONS
Sometimes managing your health at home requires assistance.  The [facility] team has recognized your preference to use [facility] "[employer]. They can be reach by phone at [phone]. The fax number for your reference is [phone]. . A representative from the home health agency will contact you or your family to schedule your first visit.      *Flush IR drain with 10 ml Normal saline and aspirate back twice a day .Record amount of IR drainage and bring log when you see   *May change IR drain dressing with transparent dressing as needed  *May shower but cover IR drain site with transparent dressing (Saran WRAP or Press -n- Seal can be used as well) and tape  *Wash hands before and after flushing and emptying drain  *Keep bulb suction on IR drain at all times  *Discard output from IR drain in bathroom and flush (Use own bathroom )    *Should be on Contact Isolation until IR drain is out and antibiotics completed*    *Colostomy Care as instructed before*    *FOR ANY QUESTIONS OR CONCERNS ,PLEASE CALL M.D.*

## 2024-04-24 NOTE — CM/SW NOTE
SW called and spoke with patient's son Arron via phone. He questioned if patient would be able to return to her independent living apartment at [place]. SW confirmed that PT is recommending patient to return there with Home Health services. Arron stated he is agreeable to patient returning to the independent living with [facility].     SW reserved [facility] and updated AVS.     SW will continue to follow for plan of care changes and remain available for any additional DC needs or concerns.     Karey Merida MSW, LSW  Discharge Planner   [id number]

## 2024-04-24 NOTE — PROGRESS NOTES
[facility]   part of [facility]  Progress Note      Sunita Loza Patient Status:  Inpatient    1932 MRN [id number]   Location [facility] 3NW-A Attending Sosa Nair, *   Hosp Day # 0 PCP Carmen Tapia MD       Subjective:   Doing well, no drain related complaints    Objective:   General: NAD  Resp: Non-labored breathing  Abd: Abd is non-distended  Drain: NTTP at drain insertion site.Dressing is C/D/I      Vital Signs:  Blood pressure 140/57, pulse 66, temperature 97.3 °F (36.3 °C), temperature source Oral, resp. rate 18, height 60\", weight 92 lb 14.4 oz, SpO2 100%.    Input/Output:    Intake/Output Summary (Last 24 hours) at 2024 1614  Last data filed at 2024 1203  Gross per 24 hour   Intake 1318.5 ml   Output 904 ml   Net 414.5 ml     24 hour drain output: 200    Results:   Labs:  Lab Results   Component Value Date    WBC 8.6 2024    RBC 3.20 2024    HGB 9.2 2024    HCT 31.4 2024    MCV 98.1 2024    MCH 28.8 2024    MCHC 29.3 2024    RDW 18.6 2024    .0 2024       Microbiology:  Pending    Assessment and Plan:   89 y/o female with LLQ abd wall collection post-op, s/p drain placement on .  - Continue current drain management  - CT Abscessogram when output < 10 cc/day  - Consider drain removal if output is < 10 cc/day x 2 days and no fistulous communication.      Paulette Sotelo MD  2024  4:14 PM

## 2024-04-24 NOTE — PROGRESS NOTES
[facility]  Progress Note    Sunita Loza Patient Status:  Observation    1932 MRN [id number]   Location [facility] 3NW-A Attending Sosa Nair, *   Hosp Day # 0 PCP Carmen Tapia MD     Subjective:    Ir drain placed yesterday  Cx pending  Tolerating liquids    Objective/Physical Exam:    Vital Signs:  Blood pressure 152/59, pulse 67, temperature 97.3 °F (36.3 °C), temperature source Oral, resp. rate 18, height 5' (1.524 m), weight 92 lb 14.4 oz (42.1 kg), SpO2 100%.    General:  Alert, orientated x3.  Cooperative.  No apparent distress.    Lungs:  Non labored breathing    Cardiac:  Regular rate and rhythm.     Abdomen:  ostomy with stool output, soft, IR drain in place with ss output     Extremities:  No lower extremity edema noted.  Without clubbing or cyanosis.        Labs:  Reviewed    Lab Results   Component Value Date    WBC 8.6 2024    HGB 9.2 2024    HCT 31.4 2024    .0 2024    CREATSERUM 2.88 2024    BUN 35 2024     2024    K 3.2 2024     2024    CO2 21.0 2024    GLU 68 2024    CA 7.8 2024    MG 1.3 2024       Xray:  Reviewed    Problem List:  Patient Active Problem List   Diagnosis    DJD (degenerative joint disease), cervical    Asymptomatic postmenopausal status    Benign essential HTN    Chronic kidney disease, stage III (moderate) (HCC)    Chronic rhinitis    Disorder of bone and cartilage    Esophageal reflux    Generalized osteoarthrosis, involving multiple sites    Malignant neoplasm of uterus (HCC)    Other vitamin B12 deficiency anemia    Pure hypercholesterolemia    Renal failure    Acute pain of left knee    Vitamin D deficiency    Metabolic acidosis    Malignant neoplasm (HCC)    Hypokalemia    Hyperlipidemia, mixed    Endometrial carcinoma (HCC)    Anemia in chronic kidney disease    Osteopenia    Chronic kidney disease, stage 3b (HCC)    Otalgia    Sensorineural  hearing loss, bilateral    ZOHREH (acute kidney injury) (HCC)    Hypernatremia    Hypomagnesemia    Dehydration    ATN (acute tubular necrosis) (HCC)    Atrial fibrillation (HCC)    Bilateral leg edema    Hypertension    Primary osteoarthritis of left knee    Abdominal mass, unspecified abdominal location    Anemia    Acute kidney injury (HCC)    Azotemia    Hyperglycemia    Abdominal pain, generalized    CKD (chronic kidney disease) stage 4, GFR 15-29 ml/min (HCC)    Shock (HCC)    Decreased ferritin    Chronic edema    Cellulitis of abdominal wall    Acute renal injury (HCC)           Impression:     89 y/o S/P ex lap, partial colon resection, TIFFANIE, revision of colostomy with subcutaneous fluid collection  S/P IR drain placement  Tolerating diet  Denies any pain     Plan:    Advance diet as tolerated  Encourage ambulation/IS  Continue IV antibiotics  Possible home tomorrow  All questions answered      CARLOTA Payne  [facility]  General Surgery  4/24/2024

## 2024-04-24 NOTE — CM/SW NOTE
SW was informed by PT that they are recommending that patient return to [facility] with home health services instead of returning to [facility].     SW sent referral for HH services to [facility] who patient has a history of using. SW is awaiting response via [employer] and will discuss DC planning with patient once response is received.     SW will continue to follow for plan of care changes and remain available for any additional DC needs or concerns.     Karey Merida MSW, LSW  Discharge Planner   [id number]

## 2024-04-25 LAB
ANION GAP SERPL CALC-SCNC: 8 MMOL/L (ref 0–18)
BUN BLD-MCNC: 30 MG/DL (ref 9–23)
CALCIUM BLD-MCNC: 8 MG/DL (ref 8.5–10.1)
CHLORIDE SERPL-SCNC: 114 MMOL/L (ref 98–112)
CO2 SERPL-SCNC: 21 MMOL/L (ref 21–32)
CREAT BLD-MCNC: 2.42 MG/DL
EGFRCR SERPLBLD CKD-EPI 2021: 18 ML/MIN/1.73M2 (ref 60–?)
ERYTHROCYTE [DISTWIDTH] IN BLOOD BY AUTOMATED COUNT: 18.7 %
GLUCOSE BLD-MCNC: 84 MG/DL (ref 70–99)
HCT VFR BLD AUTO: 32.4 %
HGB BLD-MCNC: 9.8 G/DL
MAGNESIUM SERPL-MCNC: 2.1 MG/DL (ref 1.6–2.6)
MCH RBC QN AUTO: 28.9 PG (ref 26–34)
MCHC RBC AUTO-ENTMCNC: 30.2 G/DL (ref 31–37)
MCV RBC AUTO: 95.6 FL
OSMOLALITY SERPL CALC.SUM OF ELEC: 301 MOSM/KG (ref 275–295)
PLATELET # BLD AUTO: 246 10(3)UL (ref 150–450)
POTASSIUM SERPL-SCNC: 3.8 MMOL/L (ref 3.5–5.1)
RBC # BLD AUTO: 3.39 X10(6)UL
SODIUM SERPL-SCNC: 143 MMOL/L (ref 136–145)
WBC # BLD AUTO: 7 X10(3) UL (ref 4–11)

## 2024-04-25 RX ORDER — TORSEMIDE 10 MG/1
10 TABLET ORAL DAILY
Qty: 30 TABLET | Refills: 0 | Status: SHIPPED | OUTPATIENT
Start: 2024-04-25 | End: 2024-04-29

## 2024-04-25 RX ORDER — POTASSIUM CHLORIDE 20 MEQ/1
20 TABLET, EXTENDED RELEASE ORAL ONCE
Status: COMPLETED | OUTPATIENT
Start: 2024-04-25 | End: 2024-04-25

## 2024-04-25 NOTE — CONSULTS
[facility]  Progress Note    Sunita Loza Patient Status:  Observation    1932 MRN [id number]   [facility] 3NW-A Attending Annette Walker MD   Hosp Day # 1 PCP Carmen Tapia MD        No acute events         Current Facility-Administered Medications:     potassium chloride (Klor-Con M20) tab 20 mEq, 20 mEq, Oral, Once    amiodarone (Pacerone) tab 100 mg, 100 mg, Oral, Daily    apixaban (Eliquis) tab 2.5 mg, 2.5 mg, Oral, BID    aspirin DR tab 81 mg, 81 mg, Oral, Daily    dilTIAZem ER (Dilacor XR) 24 hr cap 120 mg, 120 mg, Oral, Daily    ferrous sulfate DR tab 325 mg, 325 mg, Oral, Daily with breakfast    pantoprazole (Protonix) DR tab 20 mg, 20 mg, Oral, QAM AC    sodium bicarbonate 75 mEq in sodium chloride 0.45% 1,075 mL infusion, 75 mEq, Intravenous, Continuous    acetaminophen (Tylenol Extra Strength) tab 500 mg, 500 mg, Oral, Q4H PRN    acetaminophen (Tylenol) tab 650 mg, 650 mg, Oral, Q4H PRN **OR** HYDROcodone-acetaminophen (Norco) 5-325 MG per tab 1 tablet, 1 tablet, Oral, Q4H PRN **OR** HYDROcodone-acetaminophen (Norco) 5-325 MG per tab 2 tablet, 2 tablet, Oral, Q4H PRN    ondansetron (Zofran) 4 MG/2ML injection 4 mg, 4 mg, Intravenous, Q6H PRN    metoclopramide (Reglan) 5 mg/mL injection 5 mg, 5 mg, Intravenous, Q8H PRN    melatonin tab 3 mg, 3 mg, Oral, Nightly PRN    cefTRIAXone (Rocephin) 1 g in D5W 100 mL IVPB-ADD, 1 g, Intravenous, Q24H    acetaminophen (Tylenol) rectal suppository 650 mg, 650 mg, Rectal, Once         Review of Systems:  See HPI; A total of 12 systems reviewed and otherwise unremarkable.    Physical Exam:  Vital signs: Blood pressure 152/66, pulse 62, temperature 97.1 °F (36.2 °C), temperature source Oral, resp. rate 18, height 5' (1.524 m), weight 92 lb 14.4 oz (42.1 kg), SpO2 100%.  General: No acute distress. Alert and oriented x 3.  HEENT: Moist mucous membranes. EOM-I. PERRL  Neck: No lymphadenopathy.  No JVD. No carotid bruits.  Respiratory: Clear  to auscultation bilaterally.  No wheezes. No rhonchi.  Cardiovascular: S1, S2.  Regular rate and rhythm.  No murmurs. Equal pulses   Abdomen: Soft, nontender, nondistended.  Positive bowel sounds. No rebound tenderness   Ostomy bag with liquid stool; drain noted.   Neurologic: No focal neurological deficits.   Musculoskeletal: Full range of motion of all extremities.  No swelling noted.   Integument: No lesions. No erythema.  Psychiatric: Appropriate mood and affect.  Recent Labs     04/22/24  1140 04/22/24 2045 04/23/24 0924 04/24/24  0824 04/25/24  0752   WBC 10.3 11.3*  --  8.6 7.0   HGB 10.7* 9.7*  --  9.2* 9.8*   MCV 95.6 94.3  --  98.1 95.6   .0 226.0  --  217.0 246.0   INR  --   --  1.43*  --   --        Recent Labs     04/22/24  1140 04/22/24 2045 04/24/24  0824 04/25/24  0752    138 144 143   K 4.3 4.5 3.2* 3.8    111 116* 114*   CO2 20.0* 19.0* 21.0 21.0   BUN 41* 42* 35* 30*   CREATSERUM 3.25* 3.80* 2.88* 2.42*   CA 8.1* 8.0* 7.8* 8.0*   MG  --   --  1.3* 2.1       Recent Labs     04/22/24 2045   ALT 14   AST 15   ALB 2.2*       Imaging:  Reviewed ; CT abdomen shows encapsulated fluid/gas within subcutaneous tissue at the lift mid abdominal ostomy       Impression:  ZOHREH/CKD- baseline Cr ~ 1.5. ZOHREH likely pre-renal etiology. Patient w/ marginal PO intake;   Improving   - continue to hold diuretics; encourage PO intake   S/p exp lap w/ bowel resection and ostomy revision 4/3  Noted to have sean-ostomy abscess/infection  - per surgery; abx - s/p drain placement 4/23  Anemia - chronic disease; monitor  Hx of AF- AC held for procedure; stable       Thank you for allowing me to participate in this patient's care.  Please feel free to call me with any questions or concerns.    Dante Marques MD  4/25/2024

## 2024-04-25 NOTE — PLAN OF CARE
Problem: Patient/Family Goals  Goal: Patient/Family Long Term Goal  Description: Patient's Long Term Goal: Discharge    Interventions:  - Tolerate pain, tolerate regular diet, return of ADLs  - See additional Care Plan goals for specific interventions  Outcome: Progressing  Goal: Patient/Family Short Term Goal  Description: Patient's Short Term Goal: Comfort     Interventions:   - Cluster care, NPO, ostomy care  - See additional Care Plan goals for specific interventions  Outcome: Progressing     Problem: PAIN - ADULT  Goal: Verbalizes/displays adequate comfort level or patient's stated pain goal  Description: INTERVENTIONS:  - Encourage pt to monitor pain and request assistance  - Assess pain using appropriate pain scale  - Administer analgesics based on type and severity of pain and evaluate response  - Implement non-pharmacological measures as appropriate and evaluate response  - Consider cultural and social influences on pain and pain management  - Manage/alleviate anxiety  - Utilize distraction and/or relaxation techniques  - Monitor for opioid side effects  - Notify MD/LIP if interventions unsuccessful or patient reports new pain  - Anticipate increased pain with activity and pre-medicate as appropriate  Outcome: Progressing     Problem: RISK FOR INFECTION - ADULT  Goal: Absence of fever/infection during anticipated neutropenic period  Description: INTERVENTIONS  - Monitor WBC  - Administer growth factors as ordered  - Implement neutropenic guidelines  Outcome: Progressing     Problem: SAFETY ADULT - FALL  Goal: Free from fall injury  Description: INTERVENTIONS:  - Assess pt frequently for physical needs  - Identify cognitive and physical deficits and behaviors that affect risk of falls.  - [place] fall precautions as indicated by assessment.  - Educate pt/family on patient safety including physical limitations  - Instruct pt to call for assistance with activity based on assessment  - Modify environment to  reduce risk of injury  - Provide assistive devices as appropriate  - Consider OT/PT consult to assist with strengthening/mobility  - Encourage toileting schedule  Outcome: Progressing     Problem: DISCHARGE PLANNING  Goal: Discharge to home or other facility with appropriate resources  Description: INTERVENTIONS:  - Identify barriers to discharge w/pt and caregiver  - Include patient/family/discharge partner in discharge planning  - Arrange for needed discharge resources and transportation as appropriate  - Identify discharge learning needs (meds, wound care, etc)  - Arrange for interpreters to assist at discharge as needed  - Consider post-discharge preferences of patient/family/discharge partner  - Complete POLST form as appropriate  - Assess patient's ability to be responsible for managing their own health  - Refer to Case Management Department for coordinating discharge planning if the patient needs post-hospital services based on physician/LIP order or complex needs related to functional status, cognitive ability or social support system  Outcome: Progressing   Alert and orient x 4 , on room air , vitals stable , abdomen soft , tolerated diet well . Colostomy draining  liquid stool , dry and intact . IR drain irrigated well . Dressing dry and intact . Lt side of abdomen tender and hard . Feels like cyst , denies any pain medicine , voids well . Up in chair . Plan of care discussed , answered all questions , verbalized understanding. Will continue to monitor     Physical therapy

## 2024-04-25 NOTE — PROGRESS NOTES
Patient alert and oriented x 4 - forgetful at times. Saturating on room air, denies chest pain and shortness of breath. Denies pain and nausea. Abdomen soft, nontender. Ostomy bag in place, stool and gas output. Voiding in restroom without difficulty. Up with 1x assist and a walker. IR drain flushed and aspirated, Cloudy pink drainage. IV fluids infusing as ordered, IV antibiotics.

## 2024-04-25 NOTE — CDS QUERY
Called patient and left a VM to schedule physical/ medication check.    Melissa Dhillon       DOCUMENTATION CLARIFICATION QUERY  Dear Jolene VAN ,   Using your clinical judgement please specify the etiology of   the Abdominal subcutaneous fluid collection/abscess  [ X   ] The Abdominal subcutaneous fluid collection/abscess is associated with/due to previous abdominal surgery   [    ] The Abdominal subcutaneous fluid collection/abscess is not associated with/due to previous abdominal surgery  [    ] Unable to clinically determine if the Abdominal subcutaneous fluid collection/abscess is associated with/due to previous abdominal surgery  [    ] Other (please specify): ____________________________________    CLINICAL INDICATORS:   -4/22/24 CT Abd: CONCLUSION:  Encapsulated collection of fluid and gas within the subcutaneous  tissues at the left mid abdominal ostomy site (8.1 x 5.3 x 8.5 cm), possibly a  sterile or infected postoperative collection.  -4/22 ED: CT scan shows fluid collection along the ostomy site.  -4/23 IR: Impression:   large LLQ abscess subQ  -4/23 Procedure/CT drain abscess: INDICATIONS:  Postoperative abscess, abdominal wall  Procedure:  CT drain placement LLQ  Pre and Post-Procedure Diagnosis:  Abscess  -An aspiration of 20 cc of purulent material was performed  -4/24 Surgical service note: S/P ex lap, partial colon resection, TIFFANIE, revision of colostomy with  subcutaneous fluid collection S/P IR drain placement  -4/24 IR:  LLQ abd wall collection post-op, s/p drain placement on 4/23.    RISK FACTORS: Recent ex lap, lysis of adhesions, partial colon resection, takedown of colostomy and creation of new colostomy on 4/3. Bishop syndrome with hx renal/colon/uterine ca    TREATMENT : 4/23/24 IR drainage of Abscess LLQ abdominal wall            Use of terms such as suspected, possible, or probable (associated with a specific diagnosis that is being evaluated, monitored, or treated as if it exists) are acceptable and can be coded in the inpatient setting, when documented at the time of  discharge.     Please add any additional documentation to your progress note and continue to document this through discharge.  Dee Dee Doherty RN, BSN, CWOCN [facility]   [phone]                                             THIS FORM IS A PART OF THE PERMANENT MEDICAL RECORD

## 2024-04-25 NOTE — PROGRESS NOTES
[facility] Progress Note     Sunita Loza Patient Status:  Observation    1932 MRN [id number]   [facility] 3NW-A Attending Sosa Nair, *   Hosp Day # 1 PCP Carmen Tapia MD     Chief Complaint:  follow up abdominal subcutaneous fluid collection    Subjective:   S:  no current complaint. Son at bedside. Pain manageable. No cp/sob/n/v/f/c. Ostomy bag with stool, brown mustard color       Objective:   Vital signs:  Temp:  [97.1 °F (36.2 °C)-97.5 °F (36.4 °C)] 97.1 °F (36.2 °C)  Pulse:  [58-74] 62  Resp:  [18] 18  BP: (118-152)/(54-69) 152/66  SpO2:  [99 %-100 %] 100 %    Wt Readings from Last 6 Encounters:   24 92 lb 14.4 oz (42.1 kg)   24 87 lb 8.4 oz (39.7 kg)   24 83 lb 1.6 oz (37.7 kg)   24 86 lb (39 kg)   24 89 lb 4 oz (40.5 kg)   24 98 lb (44.5 kg)         Physical Exam:    Gen: No acute distress, alert and oriented , no accessory m use, [place]  Pulm: Lungs clear, ok respiratory effort  CV: Heart with regular rate and rhythm, no edema  Abd: Abdomen soft, slightly tender near drain site, nondistended, bowel sounds present, no peritoneal, signs. Drain with serosanguinous fluid  MSK:   no clubbing, no cyanosis  Skin: no visible rashes    Psych:   appropriate affect,   answering questions ok    Results:   Diagnostic Data:      Labs:       Recent Labs   Lab 24  1140 24  2045 24  0924 24  0824 24  0752   WBC 10.3 11.3*  --  8.6 7.0   HGB 10.7* 9.7*  --  9.2* 9.8*   MCV 95.6 94.3  --  98.1 95.6   .0 226.0  --  217.0 246.0   INR  --   --  1.43*  --   --        Recent Labs   Lab 24  1140 2424 24  0752    138 144 143   K 4.3 4.5 3.2* 3.8    111 116* 114*   CO2 20.0* 19.0* 21.0 21.0   BUN 41* 42* 35* 30*   CREATSERUM 3.25* 3.80* 2.88* 2.42*   CA 8.1* 8.0* 7.8* 8.0*   MG  --   --  1.3* 2.1   GLU 75 101* 68* 84       Recent Labs   Lab 24    ALT 14   AST 15   ALB 2.2*          COVID-19  Lab Results   Component Value Date    COVID19 Not Detected 04/22/2024          Imaging: Imaging data reviewed in Epic.    Medications:    amiodarone  100 mg Oral Daily    apixaban  2.5 mg Oral BID    aspirin  81 mg Oral Daily    dilTIAZem ER  120 mg Oral Daily    ferrous sulfate  325 mg Oral Daily with breakfast    pantoprazole  20 mg Oral QAM AC    cefTRIAXone  1 g Intravenous Q24H    acetaminophen  650 mg Rectal Once      sodium bicarbonate in 0.45% NS infusion 75 mEq (04/24/24 2142)       acetaminophen    acetaminophen **OR** HYDROcodone-acetaminophen **OR** HYDROcodone-acetaminophen    ondansetron    metoclopramide    melatonin          ASSESSMENT / PLAN:      89-year-old woman with history of ex lap, partial colon resection, TIFFANIE, revision of colostomy recently who presented with abdominal discomfort and swelling.  Noted on scan to have fluid collection near her ostomy.     Abdominal discomfort/fluid collection sp IR drain placement, in setting of  Hx ex lap, partial colon resection, TIFFANIE, revision of colostomy  Hx Bishop syndrome with hx renal/colon/uterine ca  - General surgery following  - sp IR drain placement, monitor  fluid cultures as well as blood cultures  - IV fluids and IV ceftriaxone for now        ZOHREH on CKD 3. Baseline Cr ~1.5 previously but had increased to 4 range during last admit and improved to 2.16 on dc. improving  - Nephrology on, appreciate.  IVF per renal      Hypokalemia, hypomagenesemia-repletion per renal, monitor     stable chronic illnesses:  pAfib- home meds. Eliquis- ok with surgery  Chronic anemia     SCDs  , eliquis     Dw pt, son, and RN Sven     Thank You,  Sosa Nair MD    [facility]  Answering Service number: [phone]    Supplementary Documentation:

## 2024-04-26 ENCOUNTER — APPOINTMENT (OUTPATIENT)
Dept: CV DIAGNOSTICS | Facility: HOSPITAL | Age: 89
End: 2024-04-26
Attending: INTERNAL MEDICINE
Payer: MEDICARE

## 2024-04-26 LAB
ANION GAP SERPL CALC-SCNC: 5 MMOL/L (ref 0–18)
BUN BLD-MCNC: 31 MG/DL (ref 9–23)
CALCIUM BLD-MCNC: 8.1 MG/DL (ref 8.5–10.1)
CHLORIDE SERPL-SCNC: 118 MMOL/L (ref 98–112)
CO2 SERPL-SCNC: 20 MMOL/L (ref 21–32)
CREAT BLD-MCNC: 2.2 MG/DL
EGFRCR SERPLBLD CKD-EPI 2021: 21 ML/MIN/1.73M2 (ref 60–?)
GLUCOSE BLD-MCNC: 89 MG/DL (ref 70–99)
MAGNESIUM SERPL-MCNC: 1.9 MG/DL (ref 1.6–2.6)
OSMOLALITY SERPL CALC.SUM OF ELEC: 302 MOSM/KG (ref 275–295)
POTASSIUM SERPL-SCNC: 4.8 MMOL/L (ref 3.5–5.1)
POTASSIUM SERPL-SCNC: 4.8 MMOL/L (ref 3.5–5.1)
SODIUM SERPL-SCNC: 143 MMOL/L (ref 136–145)

## 2024-04-26 PROCEDURE — 93306 TTE W/DOPPLER COMPLETE: CPT | Performed by: INTERNAL MEDICINE

## 2024-04-26 RX ORDER — SODIUM BICARBONATE 325 MG/1
650 TABLET ORAL 2 TIMES DAILY
Status: DISCONTINUED | OUTPATIENT
Start: 2024-04-26 | End: 2024-04-29

## 2024-04-26 RX ORDER — HYDRALAZINE HYDROCHLORIDE 20 MG/ML
10 INJECTION INTRAMUSCULAR; INTRAVENOUS EVERY 6 HOURS PRN
Status: DISCONTINUED | OUTPATIENT
Start: 2024-04-26 | End: 2024-04-29

## 2024-04-26 NOTE — PROGRESS NOTES
[facility]  Progress Note    Sunita Loza Patient Status:  Observation    1932 MRN [id number]   [facility] 3NW-A Attending Annette Walker MD   Hosp Day # 2 PCP Carmen Tapia MD        No acute events         Current Facility-Administered Medications:     Vancomycin: PHARMACY DOSING, 1 each, Intravenous, See Admin Instructions (RX holding)    amiodarone (Pacerone) tab 100 mg, 100 mg, Oral, Daily    apixaban (Eliquis) tab 2.5 mg, 2.5 mg, Oral, BID    aspirin DR tab 81 mg, 81 mg, Oral, Daily    dilTIAZem ER (Dilacor XR) 24 hr cap 120 mg, 120 mg, Oral, Daily    ferrous sulfate DR tab 325 mg, 325 mg, Oral, Daily with breakfast    pantoprazole (Protonix) DR tab 20 mg, 20 mg, Oral, QAM AC    sodium bicarbonate 75 mEq in sodium chloride 0.45% 1,075 mL infusion, 75 mEq, Intravenous, Continuous    acetaminophen (Tylenol Extra Strength) tab 500 mg, 500 mg, Oral, Q4H PRN    acetaminophen (Tylenol) tab 650 mg, 650 mg, Oral, Q4H PRN **OR** HYDROcodone-acetaminophen (Norco) 5-325 MG per tab 1 tablet, 1 tablet, Oral, Q4H PRN **OR** HYDROcodone-acetaminophen (Norco) 5-325 MG per tab 2 tablet, 2 tablet, Oral, Q4H PRN    ondansetron (Zofran) 4 MG/2ML injection 4 mg, 4 mg, Intravenous, Q6H PRN    metoclopramide (Reglan) 5 mg/mL injection 5 mg, 5 mg, Intravenous, Q8H PRN    melatonin tab 3 mg, 3 mg, Oral, Nightly PRN    cefTRIAXone (Rocephin) 1 g in D5W 100 mL IVPB-ADD, 1 g, Intravenous, Q24H    acetaminophen (Tylenol) rectal suppository 650 mg, 650 mg, Rectal, Once         Review of Systems:  See HPI; A total of 12 systems reviewed and otherwise unremarkable.    Physical Exam:  Vital signs: Blood pressure (!) 168/64, pulse 66, temperature 97.5 °F (36.4 °C), temperature source Oral, resp. rate 18, height 5' (1.524 m), weight 92 lb 14.4 oz (42.1 kg), SpO2 98%.  General: No acute distress. Alert and oriented x 3.  HEENT: Moist mucous membranes. EOM-I. PERRL  Neck: No lymphadenopathy.  No JVD. No carotid  bruits.  Respiratory: Clear to auscultation bilaterally.  No wheezes. No rhonchi.  Cardiovascular: S1, S2.  Regular rate and rhythm.  No murmurs. Equal pulses   Abdomen: Soft, nontender, nondistended.  Positive bowel sounds. No rebound tenderness   Ostomy bag with liquid stool; drain noted.   Neurologic: No focal neurological deficits.   Musculoskeletal: Full range of motion of all extremities.  No swelling noted.   Integument: No lesions. No erythema.  Psychiatric: Appropriate mood and affect.    Recent Labs     04/24/24  0824 04/25/24  0752   WBC 8.6 7.0   HGB 9.2* 9.8*   MCV 98.1 95.6   .0 246.0       Recent Labs     04/24/24  0824 04/25/24 0752 04/26/24  0551    143 143   K 3.2* 3.8 4.8  4.8   * 114* 118*   CO2 21.0 21.0 20.0*   BUN 35* 30* 31*   CREATSERUM 2.88* 2.42* 2.20*   CA 7.8* 8.0* 8.1*   MG 1.3* 2.1 1.9       No results for input(s): \"ALT\", \"AST\", \"ALB\", \"AMYLASE\", \"LIPASE\", \"LDH\" in the last 72 hours.    Invalid input(s): \"ALPHOS\", \"TBIL\", \"DBIL\", \"TPROT\"      Imaging:  Reviewed ; CT abdomen shows encapsulated fluid/gas within subcutaneous tissue at the lift mid abdominal ostomy       Impression:  ZOHREH/CKD- baseline Cr ~ 1.5. ZOHREH likely pre-renal etiology. Patient w/ marginal PO intake;   Improving   - continue to hold diuretics; encourage PO intake   S/p exp lap w/ bowel resection and ostomy revision 4/3  Noted to have sean-ostomy abscess/infection  - per surgery; abx - s/p drain placement 4/23  Anemia - chronic disease; monitor  Hx of AF- AC held for procedure; stable   Acidosis- related to ostomy losses; will continue on bicarb- add oral regimen as well  UTI- on abx      Thank you for allowing me to participate in this patient's care.  Please feel free to call me with any questions or concerns.    Dante Marques MD  4/26/2024

## 2024-04-26 NOTE — CONSULTS
[facility]  Report of Inpatient Ostomy Consultation     Sunita Loza Patient Status:  Inpatient    1932 MRN [id number]   Location [facility] 3NW-A 321/321-A Attending Sosa Nair, *   Hosp Day # 2 PCP Carmen Tapia MD       REASON FOR CONSULT:  Leaking ostomy pouch- changed 3x last night       History of Present Illness:   Sunita Loza is a a(n) 89 year old female.Patient presents with a colostomy.Pt underwent surgery on 4/3/24 for partial colon resection with takedown of colostomy and creation of colostomy.Denies pain on the abdomen/ ostomy site at this time.RN in the room.    History:  Past Medical History:    Arthritis    Atrial fibrillation (HCC)    Back pain    CKD (chronic kidney disease)    and S/P nephrectomy    Colon cancer (HCC)    Congestive heart disease (HCC)    Easy bruising    Endometrial cancer (HCC)    UTERINE, DX ABOUT 30 YEARS    Hearing impairment    AIDS    Heart palpitations    Afib    High blood pressure    Kidney failure    Leg swelling    Bishop syndrome    hereditary colorectal cancer    Muscle weakness    WALKER    Pain in joints    Wears glasses    Weight loss     Past Surgical History:   Procedure Laterality Date    Colectomy      Nephrectomy Left     Part removal colon w end colostomy        Social History     Socioeconomic History    Marital status:    Tobacco Use    Smoking status: Never    Smokeless tobacco: Never   Vaping Use    Vaping status: Never Used   Substance and Sexual Activity    Alcohol use: Not Currently    Drug use: Never     Social Determinants of Health     Food Insecurity: No Food Insecurity (2024)    Food Insecurity     Food Insecurity: Never true   Transportation Needs: No Transportation Needs (2024)    Transportation Needs     Lack of Transportation: No   Housing Stability: Low Risk  (2024)    Housing Stability     Housing Instability: No       ASSESSMENT:    Stoma location: Q  Stoma type: colostomy   Stoma  color: pink  Stoma condition: edematous  Stoma diameter: 1 3/4 \"   Ostomy output: liquid stool  Stoma height: adequate  Peristomal skin: intact, noted induration/thickened skin, hard area  3 o'clock of the stoma.RN aware.  Pouching system:one piece  Patient able to care for stoma: Yes      Ostomy Care Recommendations:   Pouch/Appliance change with a frequency of  3- 7 days  using products: Spencer ileostomy/colostomy pouch #8931 and Instapage adapt barrier ring #8805- use half ring apply from 12 o'clock to   6 o' clock.      Thank you for allowing me to participate in the care of your patient.  Time Spent: 40 minutes.    Thank you.    Virginia Arce RN  Wound/Ostomy care pager [phone]  [facility] [phone]  4/26/2024  9:53 AM

## 2024-04-26 NOTE — PROGRESS NOTES
Patient alert and oriented x 3-4, room air, vital signs stable, denies chest pain and shortness of breath. Denies pain and nausea. Tolerating diet. Ostomy bag needed to be changed x3 overnight, leaking and patient ripped bag. Microbiology resulted in MRSA in urine and tissue sample, MD notified.

## 2024-04-26 NOTE — PHYSICAL THERAPY NOTE
Attempted to see pt for f/u therapy session. Pt currently occupied with ECHO at this time. Will re-attempt to see pt when available and as long as she remains medically appropriate. RN aware.

## 2024-04-26 NOTE — PLAN OF CARE
Problem: Patient/Family Goals  Goal: Patient/Family Long Term Goal  Description: Patient's Long Term Goal: Discharge    Interventions:  - Tolerate pain, tolerate regular diet, return of ADLs  - See additional Care Plan goals for specific interventions  Outcome: Progressing  Goal: Patient/Family Short Term Goal  Description: Patient's Short Term Goal: Comfort     Interventions:   - Cluster care, NPO, ostomy care  - See additional Care Plan goals for specific interventions  Outcome: Progressing     Problem: PAIN - ADULT  Goal: Verbalizes/displays adequate comfort level or patient's stated pain goal  Description: INTERVENTIONS:  - Encourage pt to monitor pain and request assistance  - Assess pain using appropriate pain scale  - Administer analgesics based on type and severity of pain and evaluate response  - Implement non-pharmacological measures as appropriate and evaluate response  - Consider cultural and social influences on pain and pain management  - Manage/alleviate anxiety  - Utilize distraction and/or relaxation techniques  - Monitor for opioid side effects  - Notify MD/LIP if interventions unsuccessful or patient reports new pain  - Anticipate increased pain with activity and pre-medicate as appropriate  Outcome: Progressing     Problem: RISK FOR INFECTION - ADULT  Goal: Absence of fever/infection during anticipated neutropenic period  Description: INTERVENTIONS  - Monitor WBC  - Administer growth factors as ordered  - Implement neutropenic guidelines  Outcome: Progressing     Problem: SAFETY ADULT - FALL  Goal: Free from fall injury  Description: INTERVENTIONS:  - Assess pt frequently for physical needs  - Identify cognitive and physical deficits and behaviors that affect risk of falls.  - [place] fall precautions as indicated by assessment.  - Educate pt/family on patient safety including physical limitations  - Instruct pt to call for assistance with activity based on assessment  - Modify environment to  reduce risk of injury  - Provide assistive devices as appropriate  - Consider OT/PT consult to assist with strengthening/mobility  - Encourage toileting schedule  Outcome: Progressing     Problem: DISCHARGE PLANNING  Goal: Discharge to home or other facility with appropriate resources  Description: INTERVENTIONS:  - Identify barriers to discharge w/pt and caregiver  - Include patient/family/discharge partner in discharge planning  - Arrange for needed discharge resources and transportation as appropriate  - Identify discharge learning needs (meds, wound care, etc)  - Arrange for interpreters to assist at discharge as needed  - Consider post-discharge preferences of patient/family/discharge partner  - Complete POLST form as appropriate  - Assess patient's ability to be responsible for managing their own health  - Refer to Case Management Department for coordinating discharge planning if the patient needs post-hospital services based on physician/LIP order or complex needs related to functional status, cognitive ability or social support system  Outcome: Progressing  The patient is alert and oriented x 4, and her vital signs are stable. She denies having any pain, and she is tolerating her diet with no complaint of nausea. Colostomy pouch changed by Ostomy RN this morning, no leakage noted after change.

## 2024-04-26 NOTE — PROGRESS NOTES
[facility] Progress Note     Sunita Gunns Patient Status:  Observation    1932 MRN [id number]   Formerly [facility] 3NW-A Attending Sosa Nair, *   Hosp Day # 2 PCP Carmen Tapia MD     Chief Complaint:  follow up abdominal subcutaneous fluid collection    Subjective:   S:  no current complaint. No issues overnight.       Objective:   Vital signs:  Temp:  [97.4 °F (36.3 °C)-97.9 °F (36.6 °C)] 97.5 °F (36.4 °C)  Pulse:  [62-76] 66  Resp:  [18] 18  BP: (145-183)/(60-79) 168/64  SpO2:  [98 %-100 %] 98 %    Wt Readings from Last 6 Encounters:   24 92 lb 14.4 oz (42.1 kg)   24 87 lb 8.4 oz (39.7 kg)   24 83 lb 1.6 oz (37.7 kg)   24 86 lb (39 kg)   24 89 lb 4 oz (40.5 kg)   24 98 lb (44.5 kg)         Physical Exam:    Gen: No acute distress, alert and oriented , no accessory m use, [place]  Pulm: Lungs clear, ok respiratory effort  CV: Heart with regular rate and rhythm, no edema  Abd: Abdomen soft, slightly tender near drain site, nondistended, bowel sounds present, no peritoneal, signs. Drain with serosanguinous fluid  MSK:   no clubbing, no cyanosis  Skin: no visible rashes    Psych:   appropriate affect,   answering questions ok    Results:   Diagnostic Data:      Labs:       Recent Labs   Lab 24  1140 24  0924 24  0824 24  0752   WBC 10.3 11.3*  --  8.6 7.0   HGB 10.7* 9.7*  --  9.2* 9.8*   MCV 95.6 94.3  --  98.1 95.6   .0 226.0  --  217.0 246.0   INR  --   --  1.43*  --   --        Recent Labs   Lab 24  1140 04/2424 24  0752 24  0551    138 144 143 143   K 4.3 4.5 3.2* 3.8 4.8  4.8    111 116* 114* 118*   CO2 20.0* 19.0* 21.0 21.0 20.0*   BUN 41* 42* 35* 30* 31*   CREATSERUM 3.25* 3.80* 2.88* 2.42* 2.20*   CA 8.1* 8.0* 7.8* 8.0* 8.1*   MG  --   --  1.3* 2.1 1.9   GLU 75 101* 68* 84 89       Recent Labs   Lab 24   ALT 14   AST  15   ALB 2.2*          COVID-19  Lab Results   Component Value Date    COVID19 Not Detected 04/22/2024          Medications:    Vancomycin IV  1 each Intravenous See Admin Instructions (RX holding)    sodium bicarbonate  650 mg Oral BID    amiodarone  100 mg Oral Daily    apixaban  2.5 mg Oral BID    aspirin  81 mg Oral Daily    dilTIAZem ER  120 mg Oral Daily    ferrous sulfate  325 mg Oral Daily with breakfast    pantoprazole  20 mg Oral QAM AC    cefTRIAXone  1 g Intravenous Q24H    acetaminophen  650 mg Rectal Once      sodium bicarbonate in 0.45% NS infusion 75 mEq (04/25/24 1843)       acetaminophen    acetaminophen **OR** HYDROcodone-acetaminophen **OR** HYDROcodone-acetaminophen    ondansetron    metoclopramide    melatonin          ASSESSMENT / PLAN:      89-year-old woman with history of ex lap, partial colon resection, TIFFANIE, revision of colostomy recently who presented with abdominal discomfort and swelling.  Noted on scan to have fluid collection near her ostomy.     Abdominal discomfort/fluid collection sp IR drain placement, in setting of  Hx ex lap, partial colon resection, TIFFANIE, revision of colostomy  Hx Bishop syndrome with hx renal/colon/uterine ca  - General surgery following  - sp IR drain placement, monitor  fluid cultures as well as blood cultures. Fluid cx with MRSA  - IV fluids and IV ceftriaxone . IV vanco added. ID consulted, appreciate        ZOHREH on CKD 3. Baseline Cr ~1.5 previously but had increased to 4 range during last admit and improved to 2.16 on dc. improving  - Nephrology on, appreciate.  IVF per renal      Hypokalemia, hypomagenesemia-repletion per renal, monitor     stable chronic illnesses:  pAfib- home meds. Eliquis- ok with surgery  Chronic anemia     SCDs  , alan Garcia pt and ALCON Avila     Thank You,  Sosa Nair MD    [facility] Hospitalist  Internal Medicine  Answering Service number: [phone]    Supplementary Documentation:

## 2024-04-26 NOTE — VASCULAR ACCESS
Verified with  whether discharge is anticipated over weekend, Per MD no discharge anticipated and ok to place line on Monday, Order received from MD to change PICC line to Midline. ALCON Avila updated of the above plan. Per ALCON Avila Ok to place PICC or Midline per  from Nephrology.

## 2024-04-26 NOTE — PROGRESS NOTES
[facility]  Progress Note    Sunita Loza Patient Status:  Inpatient    1932 MRN [id number]   Location [facility] 3NW-A Attending Sosa Nair, *   Hosp Day # 2 PCP Carmen Tapia MD     Subjective:  Patient is feeling well.  Tolerating diet.  Ostomy working well.  Drain has serous drainage.    Objective/Physical Exam:  General: Alert, orientated x3.  Cooperative.  No apparent distress.  Vital Signs:  Blood pressure 152/60, pulse 72, temperature 97.9 °F (36.6 °C), temperature source Oral, resp. rate 18, height 5' (1.524 m), weight 92 lb 14.4 oz (42.1 kg), SpO2 100%.  HEENT: Exam is unremarkable.  Without scleral icterus.  Mucous membranes are moist. Oropharynx is clear.  Lungs: Clear to auscultation bilaterally.  Cardiac: Regular rate and rhythm. No murmur.  Abdomen: Soft, nondistended minimally tender  Extremities:  No lower extremity edema noted.  Without clubbing or cyanosis.    Skin: Normal texture and turgor.  Neurologic: Cranial nerves are grossly intact.  Motor strength and sensory examination is grossly normal.  No focal neurologic deficit.    Labs:  Lab Results   Component Value Date    CREATSERUM 2.20 2024    BUN 31 2024     2024    K 4.8 2024    K 4.8 2024     2024    CO2 20.0 2024    GLU 89 2024    CA 8.1 2024    MG 1.9 2024       Assessment/Plan:  Good clinical progress following drain placement for subcutaneous abscess following colostomy creation.  Cultures noted.  ID has been consulted by hospitalist.  Family states there is discussion regarding long-term IV antibiotics.  Patient may be discharged home when okay with other services.  I will remove her drain next week in the office.  We will follow peripherally while she is in the hospital as there is nothing for us to currently add.  These call us back if needed.    Renny Gilbert MD  2024  5:08 PM

## 2024-04-26 NOTE — CONSULTS
[facility]   part of [facility] Infectious Disease Consult    Sunita Loza Patient Status:  Inpatient    1932 MRN [id number]   Location [facility] 3NW-A Attending Sosa Nair, *   Hosp Day # 2 PCP Carmen Tapia MD     Reason for Consultation:  To help with infection and treatment, requested by Dr. Nair,     History of Present Illness:  Sunita Loza is a 89 year old female admitted to [facility] with some increased pain and swelling around colostomy site.  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 She followed up with Surgery on 4/15, path consistent of Focal Intramucosal adenocarcinoma,   Noted swelling lateral to Colostomy in NH, area got erythematous and painful and she was sent to ER,   No fever, chills,   CT on admission with a fluid collection in Subcut area, S/P IR drain placement,   She is started on IV Vanc and Ceftriaxone, ID is now asked to help with infection and treatment,     History:  Past Medical History:    Arthritis    Atrial fibrillation (HCC)    Back pain    CKD (chronic kidney disease)    and S/P nephrectomy    Colon cancer (HCC)    Congestive heart disease (HCC)    Easy bruising    Endometrial cancer (HCC)    UTERINE, DX ABOUT 30 YEARS    Hearing impairment    AIDS    Heart palpitations    Afib    High blood pressure    Kidney failure    Leg swelling    Bishop syndrome    hereditary colorectal cancer    Muscle weakness    WALKER    Pain in joints    Wears glasses    Weight loss     Past Surgical History:   Procedure Laterality Date    Colectomy      Nephrectomy Left     Part removal colon w end colostomy       Family History   Problem Relation Age of Onset    Heart  Disorder Father     Heart Disorder Mother     Colon Cancer Mother         70    Heart Disorder Sister     Breast Cancer Sister     Diabetes Brother     Breast Cancer Sister     Cancer Sister     Breast Cancer Sister     Breast Cancer Daughter       reports that she has never smoked. She has never used smokeless tobacco. She reports that she does not currently use alcohol. She reports that she does not use drugs.    Allergies:  Allergies   Allergen Reactions    Ciprofloxacin RASH and HIVES    Penicillins RASH       Medications:    Current Facility-Administered Medications:     Vancomycin: PHARMACY DOSING, 1 each, Intravenous, See Admin Instructions (RX holding)    sodium bicarbonate tab 650 mg, 650 mg, Oral, BID    amiodarone (Pacerone) tab 100 mg, 100 mg, Oral, Daily    apixaban (Eliquis) tab 2.5 mg, 2.5 mg, Oral, BID    aspirin DR tab 81 mg, 81 mg, Oral, Daily    dilTIAZem ER (Dilacor XR) 24 hr cap 120 mg, 120 mg, Oral, Daily    ferrous sulfate DR tab 325 mg, 325 mg, Oral, Daily with breakfast    pantoprazole (Protonix) DR tab 20 mg, 20 mg, Oral, QAM AC    sodium bicarbonate 75 mEq in sodium chloride 0.45% 1,075 mL infusion, 75 mEq, Intravenous, Continuous    acetaminophen (Tylenol Extra Strength) tab 500 mg, 500 mg, Oral, Q4H PRN    acetaminophen (Tylenol) tab 650 mg, 650 mg, Oral, Q4H PRN **OR** HYDROcodone-acetaminophen (Norco) 5-325 MG per tab 1 tablet, 1 tablet, Oral, Q4H PRN **OR** HYDROcodone-acetaminophen (Norco) 5-325 MG per tab 2 tablet, 2 tablet, Oral, Q4H PRN    ondansetron (Zofran) 4 MG/2ML injection 4 mg, 4 mg, Intravenous, Q6H PRN    metoclopramide (Reglan) 5 mg/mL injection 5 mg, 5 mg, Intravenous, Q8H PRN    melatonin tab 3 mg, 3 mg, Oral, Nightly PRN    cefTRIAXone (Rocephin) 1 g in D5W 100 mL IVPB-ADD, 1 g, Intravenous, Q24H    acetaminophen (Tylenol) rectal suppository 650 mg, 650 mg, Rectal, Once    Review of Systems:   Constitutional: Negative for anorexia, chills, fatigue, fevers, malaise,  night sweats and weight loss.  Eyes: Negative for visual disturbance, irritation and redness.  Ears, nose, mouth, throat, and face:  hearing loss, tinnitus, nasal congestion, snoring, sore throat, hoarseness and voice change.  Respiratory: Negative for cough, sputum, hemoptysis, chest pain, wheezing, dyspnea on exertion, or stridor.  Cardiovascular: Negative for chest pain, palpitations, irregular heart beats, syncope, fatigue, orthopnea, paroxysmal nocturnal dyspnea, lower extremity edema.  Gastrointestinal: Negative for dysphagia, odynophagia, reflux symptoms, nausea, vomiting, change in bowel habits, diarrhea, constipation and abdominal pain.  Integument/breast: Negative for rash, skin lesions, and pruritus.  Hematologic/lymphatic: Negative for easy bruising, bleeding, and lymphadenopathy.  Musculoskeletal: Negative for myalgias, arthralgias, muscle weakness.  Neurological: Negative for headaches, dizziness, seizures, memory problems, trouble swallowing, speech problems, gait problems and weakness.  Behavioral/Psych: Negative for active tobacco use.  Endocrine: No history of of diabetes, thyroid disorder.  All other review of systems are negative.    Vital signs in last 24 hours:  Patient Vitals for the past 24 hrs:   BP Temp Temp src Pulse Resp SpO2   04/26/24 0724 (!) 168/64 97.5 °F (36.4 °C) Oral 66 18 98 %   04/26/24 0519 155/60 97.4 °F (36.3 °C) Oral 62 -- 100 %   04/26/24 0300 (!) 183/79 97.5 °F (36.4 °C) Oral 66 18 100 %   04/25/24 2011 158/61 97.4 °F (36.3 °C) Oral 71 18 100 %   04/25/24 1732 145/60 97.9 °F (36.6 °C) Oral 76 18 100 %   04/25/24 1255 151/68 97.4 °F (36.3 °C) Oral 70 18 100 %       Physical Exam:   General:  No respiratory distress.   Head: Normocephalic, without obvious abnormality, atraumatic.   Eyes: Conjunctivae/corneas clear.  No scleral icterus.  No conjunctival     hemorrhage.   Nose: Nares normal.   Throat: Lips, mucosa, and tongue normal.  No thrush noted.   Neck: Soft, supple  neck; trachea midline, no adenopathy, no thyromegaly.   Lungs: CTAB, normal and equal bilateral chest rise   Chest wall: No tenderness or deformity.   Heart: Regular rate and rhythm, normal S1S2, no murmur.   Abdomen: soft, non-tender, non-distended, no masses, no guarding, no     rebound, positive BS.   Extremity: no edema, no cyanosis   Skin: No rashes or lesions.   Neurological: Alert, interactive, no focal deficits    Labs:  Lab Results   Component Value Date    CREATSERUM 2.20 04/26/2024    BUN 31 04/26/2024     04/26/2024    K 4.8 04/26/2024    K 4.8 04/26/2024     04/26/2024    CO2 20.0 04/26/2024    GLU 89 04/26/2024    CA 8.1 04/26/2024    MG 1.9 04/26/2024       Radiology:  CT- Encapsulated collection of fluid and gas within the subcutaneous tissues at the left mid abdominal ostomy site (8.1 x 5.3 x 8.5 cm), possibly a sterile or infected postoperative collection.       Cultures:  Reviewed,     Assessment and Plan:    1.  Post operative Subcutaneous Abscess after abdominal surgery: tolerating PO.   - S/P exploratory laparotomy, Lysis of adhesions, Partial colon resection, redo Colostomy on 4/03/24.   - Path with Adenocarcinoma, Seen by Surgery on 4/15.  - now admitted with abdominal wall swelling, CT with encapsulated collection in the sub cut tissues, ? Secondary infection of hematoma, drop of Hgb noted post operatively,   - S/P IR drainage on 4/23/24. Cul with MRSA, GRACE noted,   - UA is mildly abn, Cul with MRSA too,   - Blood cul are NGTD  - Concern for Bacteremia in the past ?? with + Urine cul which appears to be secondary infection,   - Will get TTE,  - Will need PICC line and IV abx as OP, possible need of 2-3 weeks of abx, also with subtotal colectomy related absorption,     2.    Hx of Presumed Bishop syndrome with Rectal and now with Colon Ca,   - S/P multiple surgeries in the past,   - Clean surgical site now, staples removed,     3.    Disposition: in house, an elderly female  admitted with post operative infection with abscess in abdominal Wall, S/P drainage by IR, Cul with MRSA, Urine Cul with same,   - Follow pending cul,   - TTE  - PICC line   - Continue IV Vanc, Pharm to dose, Can change to IV Dapto if midline is OK per Nephrology,   - DC IV Ceftriaxone,     Discussed with patient, Son at the bed side, all questions answered, further recommendations to follow, thanks,     Thank you for consulting [facility] for Sunita Loza.  If you have any questions or concerns please call [facility] Infectious Disease at [phone].     Tristan Garvey MD  4/26/2024  11:56 AM

## 2024-04-26 NOTE — CONSULTS
[facility] Pharmacy Dosing Service      Initial Pharmacokinetic Consult for Vancomycin Dosing     Suinta Loza is a 89 year old female who is being initiated on vancomycin therapy for  subdiaphragmatic abscess and Urine Cx w/ MRSA .  Pharmacy has been asked to dose vancomycin by Dr Nair.  The initial treatment and monitoring approach will be non-AUC strategy.        Weight and Temperature:    Wt Readings from Last 1 Encounters:   24 42.1 kg (92 lb 14.4 oz)        Temp Readings from Last 1 Encounters:   24 97.5 °F (36.4 °C) (Oral)      Labs:   Recent Labs   Lab 24  0824 24  0752 24  0551   CREATSERUM 2.88* 2.42* 2.20*      Estimated Creatinine Clearance: 11.1 mL/min (A) (based on SCr of 2.2 mg/dL (H)).     Recent Labs   Lab 24  2045 24  0824 24  0752   WBC 11.3* 8.6 7.0          The Pharmacokinetic Target is:    Trough/random 10-15 mg/L    Renal Dosing Considerations:    ZOHREH/ARF     Assessment/Plan:   Initial/Loading dose: Will receive 1000 mg IV (25 mg/kg, capped at 2250 mg) x 1 initial dose.      Maintenance dose: Pharmacy will dose vancomycin by level.    Monitorin) Plan for vancomycin random level to be obtained in approximately 48 hours w/ AM labs on .    2) Pharmacy will order SCr as clinically indicated to assess renal function.    3) Pharmacy will monitor for toxicity and efficacy, adjust vancomycin dose and/or frequency, and order vancomycin levels as appropriate per the Pharmacy and Therapeutics Committee approved protocol until discontinuation of the medication.       We appreciate the opportunity to assist in the care of this patient.     Yenni Maldonado, PharmD  2024  7:49 AM  [facility]  Pharmacy Extension: [phone]

## 2024-04-27 LAB
ANION GAP SERPL CALC-SCNC: 4 MMOL/L (ref 0–18)
BUN BLD-MCNC: 25 MG/DL (ref 9–23)
CALCIUM BLD-MCNC: 8.6 MG/DL (ref 8.5–10.1)
CHLORIDE SERPL-SCNC: 117 MMOL/L (ref 98–112)
CO2 SERPL-SCNC: 24 MMOL/L (ref 21–32)
CREAT BLD-MCNC: 1.96 MG/DL
CREAT BLD-MCNC: 1.96 MG/DL
CRP SERPL-MCNC: 3.73 MG/DL (ref ?–0.3)
EGFRCR SERPLBLD CKD-EPI 2021: 24 ML/MIN/1.73M2 (ref 60–?)
EGFRCR SERPLBLD CKD-EPI 2021: 24 ML/MIN/1.73M2 (ref 60–?)
GLUCOSE BLD-MCNC: 91 MG/DL (ref 70–99)
MAGNESIUM SERPL-MCNC: 1.7 MG/DL (ref 1.6–2.6)
OSMOLALITY SERPL CALC.SUM OF ELEC: 304 MOSM/KG (ref 275–295)
POTASSIUM SERPL-SCNC: 4.6 MMOL/L (ref 3.5–5.1)
SODIUM SERPL-SCNC: 145 MMOL/L (ref 136–145)

## 2024-04-27 RX ORDER — MAGNESIUM OXIDE 400 MG/1
400 TABLET ORAL ONCE
Status: COMPLETED | OUTPATIENT
Start: 2024-04-27 | End: 2024-04-27

## 2024-04-27 NOTE — PROGRESS NOTES
[facility]  Progress Note    Sunita Loza Patient Status:  Observation    1932 MRN [id number]   [facility] 3NW-A Attending Annette Walker MD   Hosp Day # 3 PCP Carmen Tapia MD        No acute events         Current Facility-Administered Medications:     Vancomycin: PHARMACY DOSING, 1 each, Intravenous, See Admin Instructions (RX holding)    sodium bicarbonate tab 650 mg, 650 mg, Oral, BID    hydrALAzine (Apresoline) 20 mg/mL injection 10 mg, 10 mg, Intravenous, Q6H PRN    amiodarone (Pacerone) tab 100 mg, 100 mg, Oral, Daily    apixaban (Eliquis) tab 2.5 mg, 2.5 mg, Oral, BID    aspirin DR tab 81 mg, 81 mg, Oral, Daily    dilTIAZem ER (Dilacor XR) 24 hr cap 120 mg, 120 mg, Oral, Daily    ferrous sulfate DR tab 325 mg, 325 mg, Oral, Daily with breakfast    pantoprazole (Protonix) DR tab 20 mg, 20 mg, Oral, QAM AC    sodium bicarbonate 75 mEq in sodium chloride 0.45% 1,075 mL infusion, 75 mEq, Intravenous, Continuous    acetaminophen (Tylenol Extra Strength) tab 500 mg, 500 mg, Oral, Q4H PRN    acetaminophen (Tylenol) tab 650 mg, 650 mg, Oral, Q4H PRN **OR** HYDROcodone-acetaminophen (Norco) 5-325 MG per tab 1 tablet, 1 tablet, Oral, Q4H PRN **OR** HYDROcodone-acetaminophen (Norco) 5-325 MG per tab 2 tablet, 2 tablet, Oral, Q4H PRN    ondansetron (Zofran) 4 MG/2ML injection 4 mg, 4 mg, Intravenous, Q6H PRN    metoclopramide (Reglan) 5 mg/mL injection 5 mg, 5 mg, Intravenous, Q8H PRN    melatonin tab 3 mg, 3 mg, Oral, Nightly PRN    acetaminophen (Tylenol) rectal suppository 650 mg, 650 mg, Rectal, Once         Review of Systems:  See HPI; A total of 12 systems reviewed and otherwise unremarkable.    Physical Exam:  Vital signs: Blood pressure 121/56, pulse 83, temperature 97.7 °F (36.5 °C), temperature source Oral, resp. rate 18, height 5' (1.524 m), weight 92 lb 14.4 oz (42.1 kg), SpO2 97%.  General: No acute distress. Alert and oriented x 3.  HEENT: Moist mucous membranes. EOM-I.  PERRL  Neck: No lymphadenopathy.  No JVD. No carotid bruits.  Respiratory: Clear to auscultation bilaterally.  No wheezes. No rhonchi.  Cardiovascular: S1, S2.  Regular rate and rhythm.  No murmurs. Equal pulses   Abdomen: Soft, nontender, nondistended.  Positive bowel sounds. No rebound tenderness   Ostomy bag with liquid stool; drain noted.   Neurologic: No focal neurological deficits.   Musculoskeletal: Full range of motion of all extremities.  No swelling noted.   Integument: No lesions. No erythema.  Psychiatric: Appropriate mood and affect.  Recent Labs     04/25/24  0752   WBC 7.0   HGB 9.8*   MCV 95.6   .0       Recent Labs     04/25/24  0752 04/26/24  0551 04/27/24  0538    143 145   K 3.8 4.8  4.8 4.6   * 118* 117*   CO2 21.0 20.0* 24.0   BUN 30* 31* 25*   CREATSERUM 2.42* 2.20* 1.96*  1.96*   CA 8.0* 8.1* 8.6   MG 2.1 1.9 1.7       No results for input(s): \"ALT\", \"AST\", \"ALB\", \"AMYLASE\", \"LIPASE\", \"LDH\" in the last 72 hours.    Invalid input(s): \"ALPHOS\", \"TBIL\", \"DBIL\", \"TPROT\"        Imaging:  Reviewed ; CT abdomen shows encapsulated fluid/gas within subcutaneous tissue at the lift mid abdominal ostomy       Impression:  ZOHREH/CKD- baseline Cr ~ 1.5. ZOHREH likely pre-renal etiology. Patient w/ marginal PO intake;   Improving   - continue to hold diuretics   S/p exp lap w/ bowel resection and ostomy revision 4/3  Noted to have sean-ostomy abscess/infection  - per surgery; abx - s/p drain placement 4/23  Anemia - chronic disease; monitor  Hx of AF- AC held for procedure; stable   Acidosis- related to ostomy losses; improved; continue bicarbonate   UTI- on abx      Thank you for allowing me to participate in this patient's care.  Please feel free to call me with any questions or concerns.    Dante Marques MD  4/27/2024

## 2024-04-27 NOTE — PROGRESS NOTES
[facility] Progress Note     Sunita COPE Fernandoyayos Patient Status:  Observation    1932 MRN [id number]   [facility] 3NW-A Attending Sosa Nair, *   Hosp Day # 3 PCP Carmen Tapia MD     Chief Complaint:  follow up abdominal subcutaneous fluid collection    Subjective:   S:  son and daughter at bedside. Spoke with another son over phone also. Pt denies complaints. No fevers.    Objective:   Vital signs:  Temp:  [97.7 °F (36.5 °C)-98 °F (36.7 °C)] 98 °F (36.7 °C)  Pulse:  [70-94] 94  Resp:  [16-18] 16  BP: (148-174)/(58-72) 157/72  SpO2:  [94 %-100 %] 99 %    Wt Readings from Last 6 Encounters:   24 92 lb 14.4 oz (42.1 kg)   24 87 lb 8.4 oz (39.7 kg)   24 83 lb 1.6 oz (37.7 kg)   24 86 lb (39 kg)   24 89 lb 4 oz (40.5 kg)   24 98 lb (44.5 kg)         Physical Exam:    Gen: No acute distress, alert and oriented , no accessory m use, [place]  Pulm: Lungs clear, ok respiratory effort  CV: Heart with regular rate and rhythm, no edema  Abd: Abdomen soft, slightly tender near drain site, nondistended, bowel sounds present, no peritoneal, signs. Drain with serosanguinous fluid  MSK:   no clubbing, no cyanosis  Skin: no visible rashes    Psych:   appropriate affect,   answering questions ok    Results:   Diagnostic Data:      Labs:       Recent Labs   Lab 24  1140 24  2045 24  0924 24  0824 24  0752   WBC 10.3 11.3*  --  8.6 7.0   HGB 10.7* 9.7*  --  9.2* 9.8*   MCV 95.6 94.3  --  98.1 95.6   .0 226.0  --  217.0 246.0   INR  --   --  1.43*  --   --        Recent Labs   Lab 24  2045 24  0824 24  0752 24  0551 24  0538    144 143 143 145   K 4.5 3.2* 3.8 4.8  4.8 4.6    116* 114* 118* 117*   CO2 19.0* 21.0 21.0 20.0* 24.0   BUN 42* 35* 30* 31* 25*   CREATSERUM 3.80* 2.88* 2.42* 2.20* 1.96*  1.96*   CA 8.0* 7.8* 8.0* 8.1* 8.6   MG  --  1.3* 2.1 1.9 1.7   * 68*  84 89 91       Recent Labs   Lab 04/22/24 2045   ALT 14   AST 15   ALB 2.2*          COVID-19  Lab Results   Component Value Date    COVID19 Not Detected 04/22/2024          Medications:    magnesium oxide  400 mg Oral Once    Vancomycin IV  1 each Intravenous See Admin Instructions (RX holding)    sodium bicarbonate  650 mg Oral BID    amiodarone  100 mg Oral Daily    apixaban  2.5 mg Oral BID    aspirin  81 mg Oral Daily    dilTIAZem ER  120 mg Oral Daily    ferrous sulfate  325 mg Oral Daily with breakfast    pantoprazole  20 mg Oral QAM AC    acetaminophen  650 mg Rectal Once      sodium bicarbonate in 0.45% NS infusion 75 mEq (04/27/24 0900)       hydrALAzine    acetaminophen    acetaminophen **OR** HYDROcodone-acetaminophen **OR** HYDROcodone-acetaminophen    ondansetron    metoclopramide    melatonin          ASSESSMENT / PLAN:      89-year-old woman with history of ex lap, partial colon resection, TIFFANIE, revision of colostomy recently who presented with abdominal discomfort and swelling.  Noted on scan to have fluid collection near her ostomy.     Abdominal discomfort/fluid collection sp IR drain placement, in setting of  Hx ex lap, partial colon resection, TIFFANIE, revision of colostomy  Hx Bishop syndrome with hx renal/colon/uterine ca  - General surgery following  - sp IR drain placement, monitor  fluid cultures as well as blood cultures. Fluid cx with MRSA  - on iv vanco, plan for picc line        ZOHREH on CKD 3. Baseline Cr ~1.5 previously but had increased to 4 range during last admit and improved to 2.16 on dc. improving  - Nephrology on, appreciate.  IVF per renal      Hypokalemia, hypomagenesemia-repletion per renal, monitor     stable chronic illnesses:  pAfib- home meds. Eliquis- ok with surgery  Chronic anemia     SCDs  , eliquis     Once picc line placed, would need arrangements to be made in accordance with family. ?return to skilled nursing

## 2024-04-27 NOTE — PHYSICAL THERAPY NOTE
PHYSICAL THERAPY TREATMENT NOTE - INPATIENT    Room Number: 321/321-A     Session: 1     Number of Visits to Meet Established Goals: 3    Presenting Problem: Cellulitis of abdominal wall/sean-ostomy infection s/p drain placement  Co-Morbidities : Colon CA s/p exploratory lap with bowel resection and ostomy revision 4/3/24, CKDIII, OA,    ASSESSMENT   Patient demonstrates good  progress this session, goals  remain in progress.    Patient continues to function near baseline with bed mobility, transfers, and gait.  Contributing factors to remaining limitations include pain and impaired standing balance.  Next session anticipate patient to progress bed mobility, transfers, and gait.  Physical Therapy will continue to follow patient for duration of hospitalization.    Patient continues to benefit from continued skilled PT services: at discharge to promote functional independence in home.  Anticipate patient will return home with home health PT.    PLAN  PT Treatment Plan: Bed mobility;Endurance;Patient education;Gait training;Strengthening;Transfer training;Balance training  Rehab Potential : Good  Frequency (Obs): 3-5x/week    CURRENT GOALS     Goal #1 Patient is able to demonstrate supine - sit EOB @ level: modified independent      Goal #2 Patient is able to demonstrate transfers Sit to/from Stand at assistance level: modified independent      Goal #3 Patient is able to ambulate 150 feet with assist device: walker - rolling at assistance level: modified independent      Goal #4     Goal #5     Goal #6     Goal Comments: Goals established on 4/24/2024 4/27/2024 all goals ongoing     SUBJECTIVE  \"I like to walk.\"    OBJECTIVE  Precautions: Drain(s);Hard of hearing    WEIGHT BEARING RESTRICTION  Weight Bearing Restriction: None                PAIN ASSESSMENT   Rating: Unable to rate  Location: surgical site  Management Techniques: Repositioning    BALANCE                                                                                                                        Static Sitting: Good  Dynamic Sitting: Good           Static Standing: Fair + (with RW)  Dynamic Standing: Fair (with RW)    ACTIVITY TOLERANCE                         O2 WALK         AM-PAC '6-Clicks' INPATIENT SHORT FORM - BASIC MOBILITY  How much difficulty does the patient currently have...  Patient Difficulty: Turning over in bed (including adjusting bedclothes, sheets and blankets)?: A Little   Patient Difficulty: Sitting down on and standing up from a chair with arms (e.g., wheelchair, bedside commode, etc.): A Little   Patient Difficulty: Moving from lying on back to sitting on the side of the bed?: A Little   How much help from another person does the patient currently need...   Help from Another: Moving to and from a bed to a chair (including a wheelchair)?: A Little   Help from Another: Need to walk in hospital room?: A Little   Help from Another: Climbing 3-5 steps with a railing?: A Little       AM-PAC Score:  Raw Score: 18   Approx Degree of Impairment: 46.58%   Standardized Score (AM-PAC Scale): 43.63   CMS Modifier (G-Code): CK    FUNCTIONAL ABILITY STATUS  Gait Assessment   Functional Mobility/Gait Assessment  Gait Assistance: Supervision  Distance (ft): 200  Assistive Device: Rolling walker  Pattern: Within Functional Limits    Skilled Therapy Provided    Bed Mobility:  Rolling: not tested   Supine<>Sit: not tested   Sit<>Supine: not tested     Transfer Mobility:  Sit<>Stand: supervision   Stand<>Sit: supervision   Gait: pt amb x 200 feet with RW with supervision, steady gait.    Therapist's Comments: per RN pt ok to be seen.pt received sitting in bedside chair and agreeable to therapy. Pt instructed in and completed B LE ex in sitting. Pt denies significant pain, dizziness and vitals monitored and stable. Pt amb in hallways with RW and returns to room and seated in bedside chair. Pt educated on activity rec, call don't fall, and left in chair,  RN is aware.      THERAPEUTIC EXERCISES  Lower Extremity Alternating marching  Ankle pumps  LAQ     Upper Extremity N/a     Position Sitting     Repetitions   10-20   Sets   1-2     Patient End of Session: Up in chair;Needs met;Call light within reach;RN aware of session/findings    PT Session Time: 30 minutes  Gait Training: 15 minutes  Therapeutic Exercise: 15 minutes

## 2024-04-27 NOTE — PLAN OF CARE
A&Ox4. VSS. RA.  GI: Abdomen soft, nondistended. Passing gas and stool through colostomy.  Denies nausea.  : Voids.  Pain controlled with PRN pain medications  Up with standby assist.  Drains: IR LLQ, flushed and aspirated.  Diet: soft/low fiber  IVF running per order.  All appropriate safety measures in place. All questions and concerns addressed.

## 2024-04-27 NOTE — PROGRESS NOTES
[facility]   part of [facility] Infectious Disease  Progress Note    Sunita Loza Patient Status:  Inpatient    1932 MRN [id number]   Location [facility] 3NW-A Attending Annette Walker MD   Hosp Day # 3 PCP Carmen Tapia MD     Subjective:  Patient seen/examined.  Clinical course reviewed.  Patient with IR drain in place from a fluid collection lateral to her colostomy.  Cultures with MRSA.  IV vancomycin ongoing.    Objective:  Blood pressure 121/56, pulse 83, temperature 97.7 °F (36.5 °C), temperature source Oral, resp. rate 18, height 5' (1.524 m), weight 92 lb 14.4 oz (42.1 kg), SpO2 97%.    Intake/Output:    Intake/Output Summary (Last 24 hours) at 2024 1437  Last data filed at 2024 1238  Gross per 24 hour   Intake 1090 ml   Output 699.5 ml   Net 390.5 ml       Physical Exam:  General: Awake, alert, non-tox, NAD.  HEENT:  Oropharynx clear, trachea ML.  Heart: RRR S1S2 no murmurs.  Lungs: Essentially CTA b/l, no rhonchi, rales, wheezes.  Abdomen: Soft, NT/ND.  BS present.  No organomegaly.  Extremity: No edema.  Neurological: No focal deficits.  Derm:  Warm, dry, free from rashes.    Lab Data Review:  Lab Results   Component Value Date    CREATSERUM 1.96 2024    CREATSERUM 1.96 2024    BUN 25 2024     2024    K 4.6 2024     2024    CO2 24.0 2024    GLU 91 2024    CA 8.6 2024    CRP 3.73 2024    MG 1.7 2024      Cultures:  Blood cultures negative  Urine + MRSA colonization  Drain culture MRSA 24    Radiology:  CONCLUSION:       Encapsulated collection of fluid and gas within the subcutaneous tissues at the left mid abdominal ostomy site (8.1 x 5.3 x 8.5 cm), possibly a sterile or infected postoperative collection.     Antibiotics Reviewed:  Vancomycin    Assessment and Plan:    Post-op subcutaneous abdominal wall abscess in this patient who is s/p exploratory lap with TIFFANIE  and re-do colostomy 4/3/24  - Path with adneocarcinoma, patient with a h/o Bishop syndrome  - s/p IR drainage 4/23/24, cultures with MRSA  - MRSA in urine as well  - Blood cultures negative  - 2D echo without vegetations  - IV vancomycin ongoing with 2-3 week course planned    2.  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  Disposition - inpatient.  Continue IV vancomycin as Rx.  Plan for at least 2-3 weeks of IV Rx pending clinical course.  Possible streamline to daptomycin if nephrology ok with midline placement.  Trending temps and WBCs.  Will follow.    Ada Escamilla DO, [facility] Infectious Disease  [phone]    4/27/2024  2:37 PM

## 2024-04-28 LAB
ANION GAP SERPL CALC-SCNC: 3 MMOL/L (ref 0–18)
BUN BLD-MCNC: 29 MG/DL (ref 9–23)
CALCIUM BLD-MCNC: 8.7 MG/DL (ref 8.5–10.1)
CHLORIDE SERPL-SCNC: 113 MMOL/L (ref 98–112)
CO2 SERPL-SCNC: 26 MMOL/L (ref 21–32)
CREAT BLD-MCNC: 2.05 MG/DL
CREAT BLD-MCNC: 2.05 MG/DL
EGFRCR SERPLBLD CKD-EPI 2021: 22 ML/MIN/1.73M2 (ref 60–?)
EGFRCR SERPLBLD CKD-EPI 2021: 22 ML/MIN/1.73M2 (ref 60–?)
GLUCOSE BLD-MCNC: 92 MG/DL (ref 70–99)
MAGNESIUM SERPL-MCNC: 1.6 MG/DL (ref 1.6–2.6)
OSMOLALITY SERPL CALC.SUM OF ELEC: 299 MOSM/KG (ref 275–295)
POTASSIUM SERPL-SCNC: 4.4 MMOL/L (ref 3.5–5.1)
SODIUM SERPL-SCNC: 142 MMOL/L (ref 136–145)
VANCOMYCIN SERPL-MCNC: 9.7 UG/ML (ref ?–40)

## 2024-04-28 RX ORDER — MAGNESIUM OXIDE 400 MG/1
400 TABLET ORAL ONCE
Status: COMPLETED | OUTPATIENT
Start: 2024-04-28 | End: 2024-04-28

## 2024-04-28 NOTE — PROGRESS NOTES
Patient alert and oriented x4. On room air. Denies shortness of breath and chest pain. Lungs sound clear bilaterally. Vital signs stable. Abdomen soft, non-distended. Midline incision clean dry intact. ABD pad changed. IR drain irrigated and aspirated. Ostomy intact. Tolerating diet. IV antibiotics infusing per order. Up with standby and walker. Up in the chair for breakfast. All questions and concerns addressed. All appropriate safety measures in place.

## 2024-04-28 NOTE — PROGRESS NOTES
[facility]  Progress Note    Sunita Loza Patient Status:  Observation    1932 MRN [id number]   [facility] 3NW-A Attending Annette Walker MD   Hosp Day # 4 PCP Carmen Tapia MD        No acute events         Current Facility-Administered Medications:     Vancomycin: PHARMACY DOSING, 1 each, Intravenous, See Admin Instructions (RX holding)    sodium bicarbonate tab 650 mg, 650 mg, Oral, BID    hydrALAzine (Apresoline) 20 mg/mL injection 10 mg, 10 mg, Intravenous, Q6H PRN    amiodarone (Pacerone) tab 100 mg, 100 mg, Oral, Daily    apixaban (Eliquis) tab 2.5 mg, 2.5 mg, Oral, BID    aspirin DR tab 81 mg, 81 mg, Oral, Daily    dilTIAZem ER (Dilacor XR) 24 hr cap 120 mg, 120 mg, Oral, Daily    ferrous sulfate DR tab 325 mg, 325 mg, Oral, Daily with breakfast    pantoprazole (Protonix) DR tab 20 mg, 20 mg, Oral, QAM AC    acetaminophen (Tylenol Extra Strength) tab 500 mg, 500 mg, Oral, Q4H PRN    acetaminophen (Tylenol) tab 650 mg, 650 mg, Oral, Q4H PRN **OR** HYDROcodone-acetaminophen (Norco) 5-325 MG per tab 1 tablet, 1 tablet, Oral, Q4H PRN **OR** HYDROcodone-acetaminophen (Norco) 5-325 MG per tab 2 tablet, 2 tablet, Oral, Q4H PRN    ondansetron (Zofran) 4 MG/2ML injection 4 mg, 4 mg, Intravenous, Q6H PRN    metoclopramide (Reglan) 5 mg/mL injection 5 mg, 5 mg, Intravenous, Q8H PRN    melatonin tab 3 mg, 3 mg, Oral, Nightly PRN    acetaminophen (Tylenol) rectal suppository 650 mg, 650 mg, Rectal, Once         Review of Systems:  See HPI; A total of 12 systems reviewed and otherwise unremarkable.    Physical Exam:  Vital signs: Blood pressure 112/55, pulse 95, temperature 98.2 °F (36.8 °C), temperature source Oral, resp. rate 16, height 5' (1.524 m), weight 92 lb 14.4 oz (42.1 kg), SpO2 98%.  General: No acute distress. Alert and oriented x 3.  HEENT: Moist mucous membranes. EOM-I. PERRL  Neck: No lymphadenopathy.  No JVD. No carotid bruits.  Respiratory: Clear to auscultation bilaterally.   No wheezes. No rhonchi.  Cardiovascular: S1, S2.  Regular rate and rhythm.  No murmurs. Equal pulses   Abdomen: Soft, nontender, nondistended.  Positive bowel sounds. No rebound tenderness   Ostomy bag with liquid stool; drain noted.   Neurologic: No focal neurological deficits.   Musculoskeletal: Full range of motion of all extremities.  No swelling noted.   Integument: No lesions. No erythema.  Psychiatric: Appropriate mood and affect.    No results for input(s): \"WBC\", \"HGB\", \"MCV\", \"PLT\", \"BAND\", \"INR\" in the last 72 hours.    Invalid input(s): \"LYM#\", \"MONO#\", \"BASOS#\", \"EOSIN#\"    Recent Labs     04/26/24  0551 04/27/24  0538 04/28/24  0641    145 142   K 4.8  4.8 4.6 4.4   * 117* 113*   CO2 20.0* 24.0 26.0   BUN 31* 25* 29*   CREATSERUM 2.20* 1.96*  1.96* 2.05*  2.05*   CA 8.1* 8.6 8.7   MG 1.9 1.7 1.6       No results for input(s): \"ALT\", \"AST\", \"ALB\", \"AMYLASE\", \"LIPASE\", \"LDH\" in the last 72 hours.    Invalid input(s): \"ALPHOS\", \"TBIL\", \"DBIL\", \"TPROT\"        Imaging:  Reviewed ; CT abdomen shows encapsulated fluid/gas within subcutaneous tissue at the lift mid abdominal ostomy       Impression:  ZOHREH/CKD- baseline Cr ~ 1.5. ZOHREH likely pre-renal etiology. Patient w/ marginal PO intake;   Improving   - continue to hold diuretics ; encourage PO intake   S/p exp lap w/ bowel resection and ostomy revision 4/3  Noted to have sean-ostomy abscess/infection  - per surgery; abx - s/p drain placement 4/23  Anemia - chronic disease; monitor  Hx of AF- AC held for procedure; stable   Acidosis- related to ostomy losses; improved; continue bicarbonate   UTI      Thank you for allowing me to participate in this patient's care.  Please feel free to call me with any questions or concerns.    Dante Marques MD  4/28/2024

## 2024-04-28 NOTE — PLAN OF CARE
Received pt at 1930. Pt is A&O x 4; follows commands, up to chair for most of evening. Pt is on RA, VSS, cardiac/thin liquid diet. Pt is here for f/u on cellulitis/fluid collection post lap/ostomy creation in recently in 2024.  Pt denies pain, voids, and ambulates with standby assist. Pt is on contact isolation for MRSA in urine and JOSEFINA drain fluid. IV access is patent currently . Shift assessment performed, HS meds given. NOC safety plan in place; bed in low position, call light and personal items within reach, pt encouraged to call staff for assistance.

## 2024-04-28 NOTE — PROGRESS NOTES
[facility] Progress Note     Sunita Gunns Patient Status:  Observation    1932 MRN [id number]   [facility] 3NW-A Attending Sosa Nair, *   Hosp Day # 4 PCP Carmen Tapia MD     Chief Complaint:  follow up abdominal subcutaneous fluid collection    Subjective:   S:  pt comfortable and sitting up in chair. Some redness, swelling, discomfort in area of IV after vanco infusion earlier.     Objective:   Vital signs:  Temp:  [97.7 °F (36.5 °C)-98.6 °F (37 °C)] 98.6 °F (37 °C)  Pulse:  [79-98] 98  Resp:  [16-18] 18  BP: (121-181)/(56-91) 136/63  SpO2:  [97 %-98 %] 98 %    Wt Readings from Last 6 Encounters:   24 92 lb 14.4 oz (42.1 kg)   24 87 lb 8.4 oz (39.7 kg)   24 83 lb 1.6 oz (37.7 kg)   24 86 lb (39 kg)   24 89 lb 4 oz (40.5 kg)   24 98 lb (44.5 kg)         Physical Exam:    Gen: No acute distress, alert and oriented , no accessory m use, [place]  Pulm: Lungs clear, ok respiratory effort  CV: Heart with regular rate and rhythm, no edema  Abd: Abdomen soft, slightly tender near drain site, nondistended, bowel sounds present, no peritoneal, signs. Drain with serosanguinous fluid  MSK:   no clubbing, no cyanosis  Skin: mild erythema with swelling around R forearm iv   Psych:   appropriate affect,   answering questions ok    Results:   Diagnostic Data:      Labs:       Recent Labs   Lab 24  1140 24  2045 24  0924 24  0824 24  0752   WBC 10.3 11.3*  --  8.6 7.0   HGB 10.7* 9.7*  --  9.2* 9.8*   MCV 95.6 94.3  --  98.1 95.6   .0 226.0  --  217.0 246.0   INR  --   --  1.43*  --   --        Recent Labs   Lab 24  0824 24  0752 24  0551 24  0538 24  0641    143 143 145 142   K 3.2* 3.8 4.8  4.8 4.6 4.4   * 114* 118* 117* 113*   CO2 21.0 21.0 20.0* 24.0 26.0   BUN 35* 30* 31* 25* 29*   CREATSERUM 2.88* 2.42* 2.20* 1.96*  1.96* 2.05*  2.05*   CA 7.8* 8.0*  8.1* 8.6 8.7   MG 1.3* 2.1 1.9 1.7 1.6   GLU 68* 84 89 91 92       Recent Labs   Lab 04/22/24 2045   ALT 14   AST 15   ALB 2.2*          COVID-19  Lab Results   Component Value Date    COVID19 Not Detected 04/22/2024          Medications:    magnesium oxide  400 mg Oral Once    Vancomycin IV  1 each Intravenous See Admin Instructions (RX holding)    sodium bicarbonate  650 mg Oral BID    amiodarone  100 mg Oral Daily    apixaban  2.5 mg Oral BID    aspirin  81 mg Oral Daily    dilTIAZem ER  120 mg Oral Daily    ferrous sulfate  325 mg Oral Daily with breakfast    pantoprazole  20 mg Oral QAM AC    acetaminophen  650 mg Rectal Once           hydrALAzine    acetaminophen    acetaminophen **OR** HYDROcodone-acetaminophen **OR** HYDROcodone-acetaminophen    ondansetron    metoclopramide    melatonin          ASSESSMENT / PLAN:      89-year-old woman with history of ex lap, partial colon resection, TIFFANIE, revision of colostomy recently who presented with abdominal discomfort and swelling.  Noted on scan to have fluid collection near her ostomy.     Abdominal discomfort/fluid collection sp IR drain placement, in setting of  Hx ex lap, partial colon resection, TIFFANIE, revision of colostomy  Hx Bishop syndrome with hx renal/colon/uterine ca  - General surgery following  - sp IR drain placement, monitor  fluid cultures as well as blood cultures. Fluid cx with MRSA  - on iv vanco, may consider dapto on dc. Will need midline placement        ZOHREH on CKD 3. Baseline Cr ~1.5 previously but had increased to 4 range during last admit and improved to 2.16 on dc. improving  - Nephrology on, appreciate.  IVF given per renal. Now just on bicarb for acidosis. Diuretics have been held throughout admission     Hypokalemia, hypomagenesemia-repletion per renal, monitor    stable chronic illnesses:  pAfib- home meds. Eliquis- ok with surgery  Chronic anemia     SCDs, eliquis     Await midline placement and arrangements after dc

## 2024-04-28 NOTE — PROGRESS NOTES
[facility] Pharmacy Dosing Service      Follow Up Pharmacokinetic Consult for Vancomycin Dosing     Sunita Loza is a 89 year old female who is receiving vancomycin therapy for intra-abdominal infection. Patient is on day 3 of vancomycin and is currently receiving 1000 mg IV per levels. The current treatment and monitoring approach is non-AUC strategy.        Weight and Temperature:    Wt Readings from Last 1 Encounters:   24 42.1 kg (92 lb 14.4 oz)         Temp Readings from Last 1 Encounters:   24 98.6 °F (37 °C) (Oral)      Labs:   Recent Labs   Lab 24  0551 24  0538 24  0641   CREATSERUM 2.20* 1.96*  1.96* 2.05*  2.05*      Estimated Creatinine Clearance: 11.9 mL/min (A) (based on SCr of 2.05 mg/dL (H)).     Recent Labs   Lab 24  2045 24  0824 24  0752   WBC 11.3* 8.6 7.0        Vancomycin Levels:  Lab Results   Component Value Date/Time    VANCR 9.7 2024 06:41 AM         The Pharmacokinetic Target is:    Trough/random 10-15 mg/L    Renal Dosing Considerations:    ZOHREH/CKD     Assessment/Plan:   Maintenance Regimen: Continue vancomycin per levels    Monitorin) Plan for vancomycin random level to be obtained in approximately 48 hours    2) Pharmacy will order SCr as clinically indicated to assess renal function.    3) Pharmacy will monitor for toxicity and efficacy, adjust vancomycin dose and/or frequency, and order vancomycin levels as appropriate per the Pharmacy and Therapeutics Committee approved protocol until discontinuation of the medication.       We appreciate the opportunity to assist in the care of this patient.     Rhina Fernandez, PharmD, BCPS, BCOP  Clinical Pharmacist  2024  8:35 AM  [facility]  Pharmacy Extension: [phone]

## 2024-04-28 NOTE — IMAGING NOTE
Ant abd wall/sean stomal abscess s/p perc drain 4/23  Outputs trending down.  50 and 10 ml per day last 2 days.  CT abscessogram when outputs are less than 10 ml per day for at least 2 days, can be as outpatient.  CPM.

## 2024-04-28 NOTE — PROGRESS NOTES
[facility]   part of [facility] Infectious Disease  Progress Note    Sunita Loza Patient Status:  Inpatient    1932 MRN [id number]   Location [facility] 3NW-A Attending Annette Walker MD   Hosp Day # 4 PCP Carmen Tapia MD     Subjective:  Patient seen/examined.  Clinical course reviewed.  I received a call today regarding some possible extravasation of vancomycin this a.m.  Presently no pain in RUE.    Objective:  Blood pressure 136/63, pulse 98, temperature 98.6 °F (37 °C), temperature source Oral, resp. rate 18, height 5' (1.524 m), weight 92 lb 14.4 oz (42.1 kg), SpO2 98%.    Intake/Output:    Intake/Output Summary (Last 24 hours) at 2024 1040  Last data filed at 2024 0845  Gross per 24 hour   Intake 2788 ml   Output 1418.5 ml   Net 1369.5 ml       Physical Exam:  General: Awake, alert, non-tox, NAD.  HEENT:  Oropharynx clear, trachea ML.  Heart: RRR S1S2 no murmurs.  Lungs: Essentially CTA b/l, no rhonchi, rales, wheezes.  Abdomen: Soft, NT/ND.  BS present.  No organomegaly.  Extremity: RUE with some swelling.  Neurological: No focal deficits.  Derm:  Warm, dry, free from rashes.    Lab Data Review:  Lab Results   Component Value Date    CREATSERUM 2.05 2024    CREATSERUM 2.05 2024    BUN 29 2024     2024    K 4.4 2024     2024    CO2 26.0 2024    GLU 92 2024    CA 8.7 2024    MG 1.6 2024      Cultures:  Blood cultures negative  Urine + MRSA colonization  Drain culture MRSA 24     Radiology:  CONCLUSION:       Encapsulated collection of fluid and gas within the subcutaneous tissues at the left mid abdominal ostomy site (8.1 x 5.3 x 8.5 cm), possibly a sterile or infected postoperative collection.      Antibiotics Reviewed:  Vancomycin     Assessment and Plan:     Post-op subcutaneous abdominal wall abscess in this patient who is s/p exploratory lap with TIFFANIE and re-do  colostomy 4/3/24  - Path with adneocarcinoma, patient with a h/o Bishop syndrome  - s/p IR drainage 4/23/24, cultures with MRSA  - MRSA in urine as well  - Blood cultures negative  - 2D echo without vegetations  - IV vancomycin ongoing with 2-3 week course post-drainage planned     2.  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  Disposition - inpatient.  Continue IV vancomycin as Rx.  Plan for at least 2-3 weeks of IV Rx pending clinical course.  Possible streamline to daptomycin if nephrology ok with midline placement.  Trending temps and WBCs.  Will follow.    Ada Escamilla DO, [facility] Infectious Disease  [phone]    4/28/2024  10:40 AM

## 2024-04-29 VITALS
DIASTOLIC BLOOD PRESSURE: 70 MMHG | HEIGHT: 60 IN | HEART RATE: 83 BPM | WEIGHT: 92.88 LBS | BODY MASS INDEX: 18.23 KG/M2 | RESPIRATION RATE: 16 BRPM | TEMPERATURE: 98 F | SYSTOLIC BLOOD PRESSURE: 143 MMHG | OXYGEN SATURATION: 98 %

## 2024-04-29 LAB
ANION GAP SERPL CALC-SCNC: 5 MMOL/L (ref 0–18)
BUN BLD-MCNC: 33 MG/DL (ref 9–23)
CALCIUM BLD-MCNC: 8.8 MG/DL (ref 8.5–10.1)
CHLORIDE SERPL-SCNC: 112 MMOL/L (ref 98–112)
CO2 SERPL-SCNC: 24 MMOL/L (ref 21–32)
CREAT BLD-MCNC: 2.16 MG/DL
CREAT BLD-MCNC: 2.16 MG/DL
EGFRCR SERPLBLD CKD-EPI 2021: 21 ML/MIN/1.73M2 (ref 60–?)
EGFRCR SERPLBLD CKD-EPI 2021: 21 ML/MIN/1.73M2 (ref 60–?)
GLUCOSE BLD-MCNC: 98 MG/DL (ref 70–99)
MAGNESIUM SERPL-MCNC: 1.6 MG/DL (ref 1.6–2.6)
OSMOLALITY SERPL CALC.SUM OF ELEC: 299 MOSM/KG (ref 275–295)
POTASSIUM SERPL-SCNC: 4.9 MMOL/L (ref 3.5–5.1)
SODIUM SERPL-SCNC: 141 MMOL/L (ref 136–145)

## 2024-04-29 RX ORDER — MAGNESIUM OXIDE 400 MG/1
400 TABLET ORAL ONCE
Status: COMPLETED | OUTPATIENT
Start: 2024-04-29 | End: 2024-04-29

## 2024-04-29 RX ORDER — DOXYCYCLINE HYCLATE 100 MG/1
100 CAPSULE ORAL 2 TIMES DAILY
Qty: 16 CAPSULE | Refills: 0 | Status: SHIPPED | OUTPATIENT
Start: 2024-04-29 | End: 2024-05-07

## 2024-04-29 RX ORDER — SODIUM BICARBONATE 650 MG/1
650 TABLET ORAL 2 TIMES DAILY
Qty: 60 TABLET | Refills: 0 | Status: SHIPPED | OUTPATIENT
Start: 2024-04-29

## 2024-04-29 NOTE — PROGRESS NOTES
Alert & oriented x4.Denies pain.Left colostomy and left IR drain intact.Up in chair . Up in room with standby assist.Plan of care discussed with patient.All questions and concerns addressed.

## 2024-04-29 NOTE — PROGRESS NOTES
[facility] Progress Note     Sunita Loza Patient Status:  Observation    1932 MRN [id number]   [facility] 3NW-A Attending Sosa Nair, *   Hosp Day # 5 PCP Carmen Tapia MD     Chief Complaint:  follow up abdominal subcutaneous fluid collection    Subjective:   S:  pt comfortable and sitting up in chair. Some redness, swelling, discomfort in area of IV after vanco infusion earlier.     Objective:   Vital signs:  Temp:  [98 °F (36.7 °C)-98.4 °F (36.9 °C)] 98.4 °F (36.9 °C)  Pulse:  [83-95] 88  Resp:  [16-18] 16  BP: (112-153)/(54-72) 148/61  SpO2:  [97 %-99 %] 99 %    Wt Readings from Last 6 Encounters:   24 92 lb 14.4 oz (42.1 kg)   24 87 lb 8.4 oz (39.7 kg)   24 83 lb 1.6 oz (37.7 kg)   24 86 lb (39 kg)   24 89 lb 4 oz (40.5 kg)   24 98 lb (44.5 kg)       {Results  Index  PMH  PSH  SocHx  FHx  Allergies  ProbList  Imaging  Cardio  Lab  Current Meds  Med   ExpDC :8307}  Physical Exam:    Gen: No acute distress, alert and oriented , no accessory m use, [place]  Pulm: Lungs clear, ok respiratory effort  CV: Heart with regular rate and rhythm, no edema  Abd: Abdomen soft, slightly tender near drain site, nondistended, bowel sounds present, no peritoneal, signs. Drain with serosanguinous fluid  MSK:   no clubbing, no cyanosis  Skin: mild erythema with swelling around R forearm iv   Psych:   appropriate affect,   answering questions ok    Results:   Diagnostic Data:      Labs:       Recent Labs   Lab 24  1140 24  2045 24  0924 24  0824 24  0752   WBC 10.3 11.3*  --  8.6 7.0   HGB 10.7* 9.7*  --  9.2* 9.8*   MCV 95.6 94.3  --  98.1 95.6   .0 226.0  --  217.0 246.0   INR  --   --  1.43*  --   --        Recent Labs   Lab 24  0752 24  0551 24  0538 24  0641 24  0526    143 145 142 141   K 3.8 4.8  4.8 4.6 4.4 4.9   * 118* 117* 113* 112   CO2  21.0 20.0* 24.0 26.0 24.0   BUN 30* 31* 25* 29* 33*   CREATSERUM 2.42* 2.20* 1.96*  1.96* 2.05*  2.05* 2.16*  2.16*   CA 8.0* 8.1* 8.6 8.7 8.8   MG 2.1 1.9 1.7 1.6 1.6   GLU 84 89 91 92 98       Recent Labs   Lab 04/22/24 2045   ALT 14   AST 15   ALB 2.2*          COVID-19  Lab Results   Component Value Date    COVID19 Not Detected 04/22/2024          Medications:    magnesium oxide  400 mg Oral Once    Vancomycin IV  1 each Intravenous See Admin Instructions (RX holding)    sodium bicarbonate  650 mg Oral BID    amiodarone  100 mg Oral Daily    apixaban  2.5 mg Oral BID    aspirin  81 mg Oral Daily    dilTIAZem ER  120 mg Oral Daily    ferrous sulfate  325 mg Oral Daily with breakfast    pantoprazole  20 mg Oral QAM AC    acetaminophen  650 mg Rectal Once           hydrALAzine    acetaminophen    acetaminophen **OR** HYDROcodone-acetaminophen **OR** HYDROcodone-acetaminophen    ondansetron    metoclopramide    melatonin          ASSESSMENT / PLAN:      89-year-old woman with history of ex lap, partial colon resection, TIFFANIE, revision of colostomy recently who presented with abdominal discomfort and swelling.  Noted on scan to have fluid collection near her ostomy.     Abdominal discomfort/fluid collection sp IR drain placement, in setting of  Hx ex lap, partial colon resection, TIFFANIE, revision of colostomy  Hx Bishop syndrome with hx renal/colon/uterine ca  - General surgery following  - sp IR drain placement, monitor  fluid cultures as well as blood cultures. Fluid cx with MRSA  - on iv vanco, may consider dapto on dc. Will need midline placement     ZOHREH on CKD 3. Baseline Cr ~1.5 previously but had increased to 4 range during last admit and improved to 2.16 on dc. improving  - Nephrology on, appreciate.  IVF given per renal. Now just on bicarb for acidosis. Diuretics have been held throughout admission     Hypokalemia, hypomagenesemia-repletion per renal, monitor    stable chronic illnesses:  pAfib- home meds.  Eliquis- ok with surgery  Chronic anemia     SCDs, eliquis     Await midline placement and arrangements after dc

## 2024-04-29 NOTE — PLAN OF CARE
Received pt at 1930. Pt is A&O x 4; follows commands, up to chair for most of evening. Pt is on RA, VSS, cardiac/thin liquid diet. Pt is here for f/u on cellulitis/fluid collection post lap/ostomy creation in recently in 2024.  Pt denies pain, voids, and ambulates with standby assist. Pt is on contact isolation for MRSA in urine and JOSEFINA drain fluid. IV access is patent currently . Shift assessment performed, HS meds given. NOC safety plan in place; bed in low position, call light and personal items within reach, pt encouraged to call staff for assistance.   POC:  Midline placement for outpatient IV ABTs  Discharge Monday

## 2024-04-29 NOTE — PLAN OF CARE
Problem: Patient/Family Goals  Goal: Patient/Family Long Term Goal  Description: Patient's Long Term Goal: Discharge    Interventions:  - Tolerate pain, tolerate regular diet, return of ADLs  - See additional Care Plan goals for specific interventions  Outcome: Progressing  Goal: Patient/Family Short Term Goal  Description: Patient's Short Term Goal: Comfort     Interventions:   - Cluster care, NPO, ostomy care  - See additional Care Plan goals for specific interventions  Outcome: Progressing     Problem: PAIN - ADULT  Goal: Verbalizes/displays adequate comfort level or patient's stated pain goal  Description: INTERVENTIONS:  - Encourage pt to monitor pain and request assistance  - Assess pain using appropriate pain scale  - Administer analgesics based on type and severity of pain and evaluate response  - Implement non-pharmacological measures as appropriate and evaluate response  - Consider cultural and social influences on pain and pain management  - Manage/alleviate anxiety  - Utilize distraction and/or relaxation techniques  - Monitor for opioid side effects  - Notify MD/LIP if interventions unsuccessful or patient reports new pain  - Anticipate increased pain with activity and pre-medicate as appropriate  Outcome: Progressing     Problem: RISK FOR INFECTION - ADULT  Goal: Absence of fever/infection during anticipated neutropenic period  Description: INTERVENTIONS  - Monitor WBC  - Administer growth factors as ordered  - Implement neutropenic guidelines  Outcome: Progressing     Problem: DISCHARGE PLANNING  Goal: Discharge to home or other facility with appropriate resources  Description: INTERVENTIONS:  - Identify barriers to discharge w/pt and caregiver  - Include patient/family/discharge partner in discharge planning  - Arrange for needed discharge resources and transportation as appropriate  - Identify discharge learning needs (meds, wound care, etc)  - Arrange for interpreters to assist at discharge as  needed  - Consider post-discharge preferences of patient/family/discharge partner  - Complete POLST form as appropriate  - Assess patient's ability to be responsible for managing their own health  - Refer to Case Management Department for coordinating discharge planning if the patient needs post-hospital services based on physician/LIP order or complex needs related to functional status, cognitive ability or social support system  Outcome: Progressing

## 2024-04-29 NOTE — PROGRESS NOTES
NURSING DISCHARGE NOTE    Discharged Home via Wheelchair.  Accompanied by Family member and Support staff  Belongings Taken by patient/family.    Verbal and written discharge instructions given to patient and her son Uday.Verbalized understanding Tarun JIMENEZ spoke with patient and her son via phone (on speakerphone)and concerns regarding MRSA Precautions addressed.Writing Rn showed patient how to flush/aspirate and empty IR drain ,dressing supplies,normal saline flushes and 2 colostomy bags given to patient to take home.    1608-noted left hand swollen which wasn't there earlier. notified.Kept left hand elevated on pillow    1720--left hand less swollen ,denies numbness,seen and assessed by Denise Perez.    To home in stable condition.

## 2024-04-29 NOTE — CM/SW NOTE
Pt will dc home today.  Pt will not need IV ABX at dc - she will go home on oral antibiotics.  Pt was set up with [facility].  Spoke with [facility] to let  them know pt will go home today - they will contact pt/family to begin HH services.

## 2024-04-29 NOTE — PROGRESS NOTES
[facility]  Progress Note    Sunita Loza Patient Status:  Observation    1932 MRN [id number]   [facility] 3NW-A Attending Annette Walker MD   Hosp Day # 5 PCP Carmen Tapia MD        No acute events         Current Facility-Administered Medications:     Vancomycin: PHARMACY DOSING, 1 each, Intravenous, See Admin Instructions (RX holding)    sodium bicarbonate tab 650 mg, 650 mg, Oral, BID    hydrALAzine (Apresoline) 20 mg/mL injection 10 mg, 10 mg, Intravenous, Q6H PRN    amiodarone (Pacerone) tab 100 mg, 100 mg, Oral, Daily    apixaban (Eliquis) tab 2.5 mg, 2.5 mg, Oral, BID    aspirin DR tab 81 mg, 81 mg, Oral, Daily    dilTIAZem ER (Dilacor XR) 24 hr cap 120 mg, 120 mg, Oral, Daily    ferrous sulfate DR tab 325 mg, 325 mg, Oral, Daily with breakfast    pantoprazole (Protonix) DR tab 20 mg, 20 mg, Oral, QAM AC    acetaminophen (Tylenol Extra Strength) tab 500 mg, 500 mg, Oral, Q4H PRN    acetaminophen (Tylenol) tab 650 mg, 650 mg, Oral, Q4H PRN **OR** HYDROcodone-acetaminophen (Norco) 5-325 MG per tab 1 tablet, 1 tablet, Oral, Q4H PRN **OR** HYDROcodone-acetaminophen (Norco) 5-325 MG per tab 2 tablet, 2 tablet, Oral, Q4H PRN    ondansetron (Zofran) 4 MG/2ML injection 4 mg, 4 mg, Intravenous, Q6H PRN    metoclopramide (Reglan) 5 mg/mL injection 5 mg, 5 mg, Intravenous, Q8H PRN    melatonin tab 3 mg, 3 mg, Oral, Nightly PRN    acetaminophen (Tylenol) rectal suppository 650 mg, 650 mg, Rectal, Once         Review of Systems:  See HPI; A total of 12 systems reviewed and otherwise unremarkable.    Physical Exam:  Vital signs: Blood pressure 148/61, pulse 88, temperature 98.4 °F (36.9 °C), temperature source Oral, resp. rate 16, height 5' (1.524 m), weight 92 lb 14.4 oz (42.1 kg), SpO2 99%.  General: No acute distress. Alert and oriented x 3.  HEENT: Moist mucous membranes. EOM-I. PERRL  Neck: No lymphadenopathy.  No JVD. No carotid bruits.  Respiratory: Clear to auscultation bilaterally.   No wheezes. No rhonchi.  Cardiovascular: S1, S2.  Regular rate and rhythm.  No murmurs. Equal pulses   Abdomen: Soft, nontender, nondistended.  Positive bowel sounds. No rebound tenderness   Ostomy bag with liquid stool; drain noted.   Neurologic: No focal neurological deficits.   Musculoskeletal: Full range of motion of all extremities.  No swelling noted.   Integument: No lesions. No erythema.  Psychiatric: Appropriate mood and affect.    No results for input(s): \"WBC\", \"HGB\", \"MCV\", \"PLT\", \"BAND\", \"INR\" in the last 72 hours.    Invalid input(s): \"LYM#\", \"MONO#\", \"BASOS#\", \"EOSIN#\"    Recent Labs     04/27/24  0538 04/28/24  0641 04/29/24  0526    142 141   K 4.6 4.4 4.9   * 113* 112   CO2 24.0 26.0 24.0   BUN 25* 29* 33*   CREATSERUM 1.96*  1.96* 2.05*  2.05* 2.16*  2.16*   CA 8.6 8.7 8.8   MG 1.7 1.6 1.6       No results for input(s): \"ALT\", \"AST\", \"ALB\", \"AMYLASE\", \"LIPASE\", \"LDH\" in the last 72 hours.    Invalid input(s): \"ALPHOS\", \"TBIL\", \"DBIL\", \"TPROT\"        Imaging:  Reviewed ; CT abdomen shows encapsulated fluid/gas within subcutaneous tissue at the lift mid abdominal ostomy       Impression:  ZOHREH/CKD- baseline Cr ~ 1.5. ZOHREH likely pre-renal etiology. Patient w/ marginal PO intake - improving   - continue to hold diuretics   S/p exp lap w/ bowel resection and ostomy revision 4/3  Noted to have sean-ostomy abscess/infection  - per surgery; abx - s/p drain placement 4/23  Anemia - chronic disease; monitor  Hx of AF- AC held for procedure; stable   Acidosis- related to ostomy losses; improved; continue bicarbonate   UTI    OK to dc from renal standpoint -will f/u in clinic in 2 wks    Thank you for allowing me to participate in this patient's care.  Please feel free to call me with any questions or concerns.    Dante Marques MD  4/29/2024

## 2024-04-29 NOTE — PROGRESS NOTES
[facility]   part of [facility] Infectious Disease  Progress Note    Sunita Loza Patient Status:  Inpatient    1932 MRN [id number]   Location [facility] 3NW-A Attending Annette Walker MD   Hosp Day # 5 PCP Carmen Tapia MD     Subjective:  Patient seen and examined in bedside chair. Son present at bedside. Reports feeling well today. Tolerating course of antibiotics. Drain remains in place. Otherwise no complaints.     Objective:  Blood pressure 148/61, pulse 88, temperature 98.4 °F (36.9 °C), temperature source Oral, resp. rate 16, height 5' (1.524 m), weight 92 lb 14.4 oz (42.1 kg), SpO2 99%.    Intake/Output:    Intake/Output Summary (Last 24 hours) at 2024 0948  Last data filed at 2024 0819  Gross per 24 hour   Intake 490 ml   Output 1375 ml   Net -885 ml       Physical Exam:  General: Awake, alert, non-tox, NAD.  HEENT:  Oropharynx clear, trachea ML.  Heart: RRR S1S2 no murmurs.  Lungs: Essentially CTA b/l, no rhonchi, rales, wheezes.  Abdomen: Soft, NT/ND.  BS present.  No organomegaly. +drain with scant serous output.  Extremity: No edema.  Neurological: No focal deficits.  Derm:  Warm and dry.    Lab Data Review:  Lab Results   Component Value Date    CREATSERUM 2.16 2024    CREATSERUM 2.16 2024    BUN 33 2024     2024    K 4.9 2024     2024    CO2 24.0 2024    GLU 98 2024    CA 8.8 2024    MG 1.6 2024        Cultures:  Hospital Encounter on 24   1. Blood Culture     Status: None (Preliminary result)    Collection Time: 24  2:46 PM    Specimen: Blood,peripheral   Result Value Ref Range    Blood Culture Result No Growth 2 Days N/A   2. Tissue Aerobic Culture     Status: Abnormal    Collection Time: 24  3:17 PM    Specimen: Subdiaphragmatic abscess; Tissue   Result Value Ref Range    Tissue Culture Result 4+ growth Staphylococcus aureus, MRSA (A) N/A    Tissue  Smear 1+ WBCs seen (A) N/A    Tissue Smear 1+ Gram positive cocci in pairs (A) N/A       Susceptibility    Staphylococcus aureus, MRSA -  (no method available)     Cefazolin  Resistant      Clindamycin <=0.25 Sensitive      Erythromycin <=0.25 Sensitive      Gentamicin <=0.5 Sensitive      Levofloxacin >=8 Resistant      Oxacillin >=4 Resistant      Tetracycline <=1 Sensitive      Trimethoprim/Sulfa <=10 Sensitive      Vancomycin 1 Sensitive    3. Anaerobic Culture     Status: None    Collection Time: 04/23/24  3:17 PM    Specimen: Subdiaphragmatic abscess; Tissue   Result Value Ref Range    Anaerobic Culture No Anaerobes isolated N/A   4. Urine Culture, Routine     Status: Abnormal    Collection Time: 04/22/24  8:45 PM    Specimen: Urine, straight cath   Result Value Ref Range    Urine Culture 10,000 - 50,000 CFU/ML Staphylococcus aureus, MRSA (A) N/A       Susceptibility    Staphylococcus aureus, MRSA -  (no method available)     Nitrofurantoin <=32 Sensitive      Levofloxacin >4 Resistant      Tetracycline <=2 Sensitive      Gentamicin <=1 Sensitive      Vancomycin 2 Sensitive      Oxacillin >2 Resistant      Trimethoprim/Sulfa <=10 Sensitive      Cefazolin  Resistant        Radiology:  CT DRAIN ABSCESS PERITONEAL (CPT=49406)    Result Date: 4/23/2024  CONCLUSION:  CT-guided abscess drainage was performed without complication.   LOCATION:  [facility]   Dictated by (CST): Uday Jackson MD on 4/23/2024 at 4:17 PM     Finalized by (CST): Uday Jackson MD on 4/23/2024 at 4:18 PM       CT ABDOMEN+PELVIS(CPT=74176)    Result Date: 4/22/2024  CONCLUSION:   Encapsulated collection of fluid and gas within the subcutaneous tissues at the left mid abdominal ostomy site (8.1 x 5.3 x 8.5 cm), possibly a sterile or infected postoperative collection.   LOCATION:  [facility]   Dictated by (CST): Davis Estrada MD on 4/22/2024 at 10:10 PM     Finalized by (CST): Davis Estrada MD on 4/22/2024 at 10:20 PM       XR CHEST AP PORTABLE   (CPT=71045)    Result Date: 4/22/2024  PROCEDURE:  XR CHEST AP PORTABLE  (CPT=71045)  TECHNIQUE:  AP chest radiograph was obtained.  COMPARISON:  [facility] , XR, XR CHEST AP PORTABLE  (CPT=71045), 2/13/2024, 8:58 PM.  INDICATIONS:  wound check-post abdominal surgery, redness to incision site  PATIENT STATED HISTORY: (As transcribed by Technologist)  Patient shared she has abdominal pains.    FINDINGS: Cardiac silhouette is mildly prominent.  Pulmonary vasculature are unremarkable. No consolidation, pleural effusion or pneumothorax. IMPRESSION: Unremarkable portable chest radiograph.   LOCATION:  [place] [id number]      Dictated by (CST): Chris Vela MD on 4/22/2024 at 9:23 PM     Finalized by (CST): Chris Vela MD on 4/22/2024 at 9:23 PM          Assessment and Plan:  1. 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  - Blood cultures from 4/22 and 4/26 remain negative to date.  - Urine culture, 4/22, isolating MRSA.   - S/p IR drainage on 4/23/24.  - Cultures isolating MRSA.  - TTE without vegetations.   - IV vancomycin, ongoing.     2. 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    3. Recs  - Continue IV vancomycin while inpatient. Plan to transition to PO doxycycline 100 mg twice daily through 5/7/24. Orders in med rec.   - F/u WBC and fever curve.  - Post-op care as per surgical team.  - Supportive care as per the primary team.  - Patient stable for discharge from an ID perspective pending clearance from other care teams.  - F/u with ID 10 days after discharge or sooner if necessary.   - Discussed plan of care with nursing.  - Discussed plan of care with patient and son at the bedside. All questions addressed and  understanding verbalized.  - Further recommendations pending clinical course.     Discussed case with ID attending/collaborating physician, Dr. Yuki Torrez, who is in agreement with the above plan of care.      Please note that this report has been produced using speech recognition software and may contain errors related to that system including, but not limited to, errors in grammar, punctuation, and spelling, as well as words and phrases that possibly may have been recognized inappropriately.  If there are any questions or concerns, contact the dictating provider for clarification.     The 21st Century Cures Act makes medical notes like these available to patients in the interest of transparency. Please be advised this is a medical document. Medical documents are intended to carry relevant information, facts as evident, and the clinical opinion of the practitioner. The medical note is intended as peer to peer communication and may appear blunt or direct. It is written in medical language and may contain abbreviations or verbiage that are unfamiliar.     If you have any questions or concerns please call [facility] Infectious Disease at [phone].     Bhanu Grady, APRN    4/29/2024  9:48 AM

## 2024-04-30 ENCOUNTER — PATIENT MESSAGE (OUTPATIENT)
Dept: NEPHROLOGY | Facility: CLINIC | Age: 89
End: 2024-04-30

## 2024-04-30 DIAGNOSIS — N17.9 AKI (ACUTE KIDNEY INJURY) (HCC): Primary | ICD-10-CM

## 2024-04-30 DIAGNOSIS — N18.4 ANEMIA DUE TO STAGE 4 CHRONIC KIDNEY DISEASE (HCC): ICD-10-CM

## 2024-04-30 DIAGNOSIS — D63.1 ANEMIA DUE TO STAGE 4 CHRONIC KIDNEY DISEASE (HCC): ICD-10-CM

## 2024-04-30 DIAGNOSIS — N18.4 CKD (CHRONIC KIDNEY DISEASE) STAGE 4, GFR 15-29 ML/MIN (HCC): ICD-10-CM

## 2024-04-30 NOTE — TELEPHONE ENCOUNTER
From: Sunita Loza  To: Fabiana Miller  Sent: 4/30/2024 9:16 AM CDT  Subject: Sunita Veliakelly Loza was released from the hospital yesterday and told to make a follow up appointment. The first available is June 27. I've got to be honest, I don't feel comfortable waiting 2 months to follow up especially when she has been taken off Torosemide and is experiencing swelling in the left hand and both legs again. Please advise.

## 2024-05-02 NOTE — DISCHARGE SUMMARY
.[facility] Discharge Summary    Patient ID:  Sunita Loza  [id number]  89 year old  6/19/1932    Admit date: 4/22/2024  Discharge date and time: 4/29/2024  Attending Physician:Annette Walker MD  Primary Care Physician: Carmen Tapia MD     Admit Dx: Cellulitis of abdominal wall [L03.311]  Acute renal injury (HCC) [N17.9]    Primary Diagnosis at discharge from Hospital: Other: abdominal abscess; no TCM follow-up needed     Secondary Diagnoses:  Zohreh on ckd 3  Paf  Chronic anemia    Risk of readmission: Sunita Loza has Moderate Risk of readmission after discharge from the hospital.    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  Noted on scan to have fluid collection near her ostomy.    Patient with general surgery and IR drain was placed.  Fluid cultures grew out MRSA.  Patient was seen by infectious disease and was given IV vancomycin ultimately once MRSA cultures came back.  Echo neg for vegetations. Patient was switched to doxycycline on dc. Patient was also seen by nephrology for ZOHREH on CKD 3.  IV fluids were given and able to be stopped.  Discharged on bicarb for acidosis.  Other medical issues were stable.    Day of discharge Exam  Vitals:    04/29/24 1150   BP: 143/70   Pulse: 83   Resp: 16   Temp: 98.3 °F (36.8 °C)     Exam on day of discharge:  Gen: No acute distress, alert and oriented  CV: RRR, +s1/s2  Lungs: CTAB, good respiratory effort  Abdomen: s/nt/nd  Ext: Moves all 4 extremities, no c/c/e  Neuro: CN  Intact, no focal deficits    Discharge meds     Medication List        START taking these medications      doxycycline 100 MG Caps  Commonly known as: Vibramycin  Take 1 capsule (100 mg total) by mouth 2 (two) times daily for 8 days.     sodium bicarbonate 650 MG Tabs  Take 1 tablet (650 mg total) by mouth 2 (two) times daily.            CONTINUE taking these medications      acetaminophen 500 MG Tabs  Commonly known as: Tylenol Extra Strength     amiodarone 100 MG Tabs  Commonly known as: Pacerone     apixaban 2.5 MG Tabs  Commonly known as: Eliquis     aspirin 81 MG Tabs     cyanocobalamin 1000 MCG Tabs     dilTIAZem  MG Cp24  Commonly known as: Dilacor XR     ferrous sulfate 325 (65 FE) MG Tbec     GLUCOSAMINE CHONDROIT(BIOFLAV) OR     MULTIVITAMINS OR     omeprazole 20 MG Cpdr  Commonly known as: PriLOSEC     Oyster Shell Calcium/D 500-200 MG-UNIT Tabs     potassium chloride 10 MEQ Tbcr  Commonly known as: Klor-Con M10            STOP taking these medications      torsemide 20 MG Tabs  Commonly known as: Demadex               Where to Get Your Medications        These medications were sent to [id number] - [place], [place] - [facility] [facility] [phone], [phone]  [facility] [address]      Phone: [phone]   doxycycline 100 MG Caps  sodium bicarbonate 650 MG Tabs       Consults: IP CONSULT TO GENERAL SURGERY  IP CONSULT TO NEPHROLOGY  IP CONSULT TO SOCIAL WORK  CONSULT TO WOUND OSTOMY  Radiology: CARD ECHO 2D DOPPLER (CPT=93306)    Result Date: 2024  Transthoracic Echocardiogram Name:Sunita Loza Date: 2024 :  1932 Ht:  (60in)  BP: 148 / 58 MRN:  [id number]    Age:  89years    Wt:  (92lb)  HR: 80bpm Loc:  [facility]       Gndr: F          BSA: 1.34m^2 Sonographer: Arturo DOUGLAS AE PE Ordering:    Tristan Garvey Consulting:  Tristan Garvey ---------------------------------------------------------------------------- History/Indications:   Rule out endocarditis. Cellulitis.  ---------------------------------------------------------------------------- Procedure information:  A transthoracic complete 2D study was performed. Additional evaluation included M-mode, complete spectral Doppler, and color Doppler. Patient room number: 321.  Patient status:  Inpatient.  Location: Bedside.    This was a routine study. Transthoracic echocardiography for ventricular function evaluation, assessment of valvular function, and endocarditis evaluation. Image quality was adequate. ECG rhythm:   Normal sinus ---------------------------------------------------------------------------- Conclusions: 1. Left ventricle: The cavity size was normal. Wall thickness was normal.    Systolic function was normal. The estimated ejection fraction was 65-70%.    Doppler parameters are consistent with abnormal left ventricular    relaxation - grade 1 diastolic dysfunction. 2. Right ventricle: Systolic pressure was mildly increased. 3. Mitral valve: There was mild regurgitation. 4. Pulmonary arteries: Systolic pressure was estimated to be 41mm Hg.    Estimated pulmonary artery diastolic pressure was 12mm Hg. 5. Pericardium, extracardiac: There was no pericardial effusion. Impressions:  No previous study was available for comparison. What appears to be artifact is noted on the apical views of both the tricuspid and mitral valves. No vegetations are identified by this good quality transthoracic echocardiogram; however, this can not \"rule out\" endocaritis. Correlation suggested. * ---------------------------------------------------------------------------- * Findings: Left ventricle:  The cavity size was normal. Wall thickness was normal. Systolic function was normal. The estimated ejection fraction was 65-70%. No diagnostic evidence for diffuse regional wall motion abnormalities. Doppler parameters are consistent with abnormal left ventricular relaxation - grade 1 diastolic dysfunction. Left atrium:  The atrium was normal in  size. Right ventricle:  The cavity size was normal. Systolic function was normal. Systolic pressure was mildly increased. Right atrium:  The atrium was normal in size. Mitral valve:  The annulus was mildly calcified. Leaflet separation was normal.  Doppler:  Transvalvular velocity was within the normal range. There was no evidence for stenosis. There was mild regurgitation. Aortic valve:   The valve was trileaflet. The leaflets were mildly thickened and mildly calcified. Cusp separation was normal.  Doppler:  Transvalvular velocity was within the normal range. There was no evidence for stenosis. There was no significant regurgitation. Tricuspid valve:  The valve is structurally normal. Leaflet separation was normal.  Doppler:  Transvalvular velocity was within the normal range. There was no evidence for stenosis. There was mild regurgitation. Pulmonic valve:   The valve is structurally normal. Cusp separation was normal.  Doppler:  Transvalvular velocity was within the normal range. There was no evidence for stenosis. There was mild regurgitation. Pericardium:   There was no pericardial effusion. Aorta: Aortic root: The aortic root was normal-sized. Ascending aorta: The ascending aorta was normal. Pulmonary arteries: Systolic pressure was estimated to be 41mm Hg. Estimated pulmonary artery diastolic pressure was 12mm Hg. Systemic veins: Inferior vena cava: The IVC was normally collapsible and normal-sized. ---------------------------------------------------------------------------- Measurements  Left ventricle                    Value        Ref  IVS thickness, ED, PLAX           0.7   cm     0.6 -                                                 0.9  LV ID, ED, PLAX                   4.5   cm     3.8 -                                                 5.2  LV ID, ES, PLAX                   2.5   cm     2.2 -                                                 3.5  LV PW thickness, ED, PLAX         0.7   cm     0.6 -                                                  0.9  IVS/LV PW ratio, ED, PLAX         1.03         --------  LV PW/LV ID ratio, ED, PLAX       0.15         --------  LV ejection fraction          (H) 77    %      54 - 74  IVS thickness, ED, MM         (H) 2.8   cm     0.6 -                                                 0.9  LV e', lateral                (L) 5.6   cm/sec >=10.0  LV E/e', lateral              (H) 15           <=13  LV e', medial                 (L) 6.4   cm/sec >=7.0  LV E/e', medial                   13           --------  LV e', average                    6.0   cm/sec --------  LV E/e', average                  14           <=14  Ascending aorta                   Value        Ref  Ascending aorta ID, A-P, ED       3.4   cm     1.9 -                                                 3.5  Left atrium                       Value        Ref  LA ID, A-P, ES                    3.1   cm     2.7 -                                                 3.8  LA volume, ES, 1-p A4C            44    ml     22 - 52  Mitral valve                      Value        Ref  Mitral E-wave peak velocity       0.86  m/sec  --------  Mitral A-wave peak velocity       1.34  m/sec  --------  Mitral peak gradient, D           3     mm Hg  --------  Mitral E/A ratio, peak            0.6          --------  Pulmonary artery                  Value        Ref  PA pressure, S, DP                41    mm Hg  --------  PA pressure, ED, DP               12    mm Hg  --------  Tricuspid valve                   Value        Ref  Tricuspid regurg peak             2.45  m/sec  <=2.8  velocity  Tricuspid peak RV-RA gradient     33    mm Hg  --------  Systemic veins                    Value        Ref  Estimated CVP                     8     mm Hg  --------  Right ventricle                   Value        Ref  RV pressure, S, DP                41    mm Hg  --------  Pulmonic valve                    Value        Ref  Pulmonic regurg velocity, ED       0.99  m/sec  --------  Pulmonic regurg gradient, ED      3     mm Hg  -------- Legend: (L)  and  (H)  jun values outside specified reference range. ---------------------------------------------------------------------------- Prepared and electronically signed by Julien Sung MD 04/26/2024 15:08     CT DRAIN ABSCESS PERITONEAL (CPT=49406)    Result Date: 4/23/2024  PROCEDURE:  CT DRAIN ABSCESS PERITONEAL (CPT=49406)  COMPARISON:  None.  INDICATIONS:  Postoperative abscess, abdominal wall  DESCRIPTION OF PROCEDURE:  Informed consent was obtained.  The risks of the procedure, including but not limited to bleeding, infection, or organ/nerve injury were discussed with the patient who elected to proceed. An IV was checked and maintained by the  radiology nurse.  Preliminary computed tomography was performed.  A suitable skin entry site was marked.  The skin was then sterilely cleansed and draped in standard fashion.   Maximum sterile barrier technique was used for the procedure by all operators.  In accordance with CT protocols and the ALARA principle, radiation dose reduction techniques were utilized for this examination.  Dose information is transmitted to the ACR (American College of Radiology) NRDR (National Radiology Data Registry) which includes the Dose Index Registry.  Limited computed tomography again demonstrated the fluid collection within the left lower quadrant subcutaneous fat.  The preliminary scan is otherwise unchanged from prior exams.  After the administration of local anesthesia, a 18g Esquivel needle was advanced under CT guidance into the subcutaneous fluid collection. Through this access, an Amplatz wire was passed into the collection and serial dilation was performed up to a 10 Georgian self-retaining pigtail catheter. The pigtail was string fixed within the abscess cavity. An aspiration of 20 cc of purulent material was performed. This was sent for testing. CT completion images confirmed successful  placement of the percutaneous drain.  The catheter was flushed and left to suction bulb drainage.  The catheter was secured to the skin with nonabsorbable suture and a sterile dressing.  The patient tolerated the procedure well.  There were no immediate procedure-related complications.    Total dose length product:  97.6 mGy cm            CONCLUSION:  CT-guided abscess drainage was performed without complication.   LOCATION:  [facility]   Dictated by (CST): Uday Jackson MD on 4/23/2024 at 4:17 PM     Finalized by (CST): Uday Jackson MD on 4/23/2024 at 4:18 PM       CT ABDOMEN+PELVIS(CPT=74176)    Result Date: 4/22/2024  PROCEDURE:  CT ABDOMEN+PELVIS (CPT=74176)  COMPARISON:  [facility]AY [facility]AGUNA[facility], CT ABDOMEN+PELVIS(CPT=74176), 3/26/2024, 6:58 PM.  INDICATIONS:  wound check-post abdominal surgery, redness to incision site  TECHNIQUE:  Unenhanced multislice CT scanning was performed from the dome of the diaphragm to the pubic symphysis.  Dose reduction techniques were used. Dose information is transmitted to the ACR (American College of Radiology) NRDR (National Radiology Data Registry) which includes the Dose Index Registry.  PATIENT STATED HISTORY: (As transcribed by Technologist)  POST ABD SURGERY, REDNESS TO INCISION SITE.    FINDINGS:  LIVER:  No enlargement, atrophy, abnormal density, or significant focal lesion.  BILIARY:  Cholecystectomy.  Normal caliber of the bile ducts. PANCREAS:  No lesion, fluid collection, ductal dilatation, or atrophy.  SPLEEN:  No enlargement or focal lesion.  KIDNEYS:  Normal right kidney.  The left kidney is absent. ADRENALS:  No mass or enlargement.  AORTA/VASCULAR:    Extensive calcified atherosclerosis without aneurysm. RETROPERITONEUM:  No mass or adenopathy.  BOWEL/MESENTERY:  Partial colectomy with left mid abdominal ostomy.  No evidence of bowel obstruction.  There is a collection of fluid and gas within the subcutaneous tissues at the ostomy site measuring 8.1 x 5.3 x 8.5 cm. ABDOMINAL WALL:   No mass or hernia.  URINARY BLADDER:  No visible focal wall thickening, lesion, or calculus.  PELVIC NODES:  No adenopathy.  PELVIC ORGANS:  Absent or atrophic. BONES:  No bony lesion or fracture.  Osteoarthritis of the hips and spine.  LUNG BASES:  No visible pulmonary or pleural disease.  OTHER:  Negative.             CONCLUSION:   Encapsulated collection of fluid and gas within the subcutaneous tissues at the left mid abdominal ostomy site (8.1 x 5.3 x 8.5 cm), possibly a sterile or infected postoperative collection.   LOCATION:  [place]   Dictated by (CST): Davis Estrada MD on 4/22/2024 at 10:10 PM     Finalized by (CST): Davis Estrada MD on 4/22/2024 at 10:20 PM       XR CHEST AP PORTABLE  (CPT=71045)    Result Date: 4/22/2024            PROCEDURE:  XR CHEST AP PORTABLE  (CPT=71045)  TECHNIQUE:  AP chest radiograph was obtained.  COMPARISON:  [facility] , ANNA, XR CHEST AP PORTABLE  (CPT=71045), 2/13/2024, 8:58 PM.  INDICATIONS:  wound check-post abdominal surgery, redness to incision site  PATIENT STATED HISTORY: (As transcribed by Technologist)  Patient shared she has abdominal pains.    FINDINGS: Cardiac silhouette is mildly prominent.  Pulmonary vasculature are unremarkable. No consolidation, pleural effusion or pneumothorax. IMPRESSION: Unremarkable portable chest radiograph.   LOCATION:  [id number]      Dictated by (CST): Chris eVla MD on 4/22/2024 at 9:23 PM     Finalized by (CST): Chris Vela MD on 4/22/2024 at 9:23 PM       Operative Procedures:   Activity: activity as tolerated  Diet: regular diet  Wound Care: as directed  Code Status: Full Code  O2: none  Total Time Coordinating Care: 35 minutes Patient had opportunity to ask questions and state understand and agree with therapeutic plan as outlined    I reconciled current and discharge medications on day of discharge.

## 2024-05-21 ENCOUNTER — TELEPHONE (OUTPATIENT)
Dept: NEPHROLOGY | Facility: CLINIC | Age: 89
End: 2024-05-21

## 2024-05-21 NOTE — TELEPHONE ENCOUNTER
The office of Dr. Tapia called to inform you that Dr. Tapia is holding the torsemide until the patient has blood work completed on 5/28 due to renal function being high. Dr. Tapia also wanted to let you know that Sunita had vancomycin while admitted in the hospital 4/22/24.

## 2024-05-23 ENCOUNTER — NURSE ONLY (OUTPATIENT)
Dept: NEPHROLOGY | Facility: CLINIC | Age: 89
End: 2024-05-23

## 2024-05-23 ENCOUNTER — LAB ENCOUNTER (OUTPATIENT)
Dept: LAB | Age: 89
End: 2024-05-23
Attending: INTERNAL MEDICINE

## 2024-05-23 VITALS — WEIGHT: 81 LBS | SYSTOLIC BLOOD PRESSURE: 90 MMHG | DIASTOLIC BLOOD PRESSURE: 58 MMHG | BODY MASS INDEX: 16 KG/M2

## 2024-05-23 DIAGNOSIS — N18.4 CKD (CHRONIC KIDNEY DISEASE) STAGE 4, GFR 15-29 ML/MIN (HCC): ICD-10-CM

## 2024-05-23 DIAGNOSIS — D63.1 ANEMIA DUE TO STAGE 4 CHRONIC KIDNEY DISEASE (HCC): ICD-10-CM

## 2024-05-23 DIAGNOSIS — N18.4 ANEMIA DUE TO STAGE 4 CHRONIC KIDNEY DISEASE (HCC): ICD-10-CM

## 2024-05-23 DIAGNOSIS — N18.4 CKD (CHRONIC KIDNEY DISEASE) STAGE 4, GFR 15-29 ML/MIN (HCC): Primary | ICD-10-CM

## 2024-05-23 LAB
ANION GAP SERPL CALC-SCNC: 8 MMOL/L (ref 0–18)
BASOPHILS # BLD AUTO: 0.04 X10(3) UL (ref 0–0.2)
BASOPHILS NFR BLD AUTO: 0.4 %
BUN BLD-MCNC: 84 MG/DL (ref 9–23)
CALCIUM BLD-MCNC: 7.4 MG/DL (ref 8.5–10.1)
CHLORIDE SERPL-SCNC: 113 MMOL/L (ref 98–112)
CO2 SERPL-SCNC: 18 MMOL/L (ref 21–32)
CREAT BLD-MCNC: 3.69 MG/DL
EGFRCR SERPLBLD CKD-EPI 2021: 11 ML/MIN/1.73M2 (ref 60–?)
EOSINOPHIL # BLD AUTO: 0.38 X10(3) UL (ref 0–0.7)
EOSINOPHIL NFR BLD AUTO: 4 %
ERYTHROCYTE [DISTWIDTH] IN BLOOD BY AUTOMATED COUNT: 19.1 %
FASTING STATUS PATIENT QL REPORTED: YES
GLUCOSE BLD-MCNC: 94 MG/DL (ref 70–99)
HCT VFR BLD AUTO: 31.4 %
HGB BLD-MCNC: 9.9 G/DL
IMM GRANULOCYTES # BLD AUTO: 0.09 X10(3) UL (ref 0–1)
IMM GRANULOCYTES NFR BLD: 1 %
LYMPHOCYTES # BLD AUTO: 1.87 X10(3) UL (ref 1–4)
LYMPHOCYTES NFR BLD AUTO: 19.9 %
MCH RBC QN AUTO: 29.8 PG (ref 26–34)
MCHC RBC AUTO-ENTMCNC: 31.5 G/DL (ref 31–37)
MCV RBC AUTO: 94.6 FL
MONOCYTES # BLD AUTO: 0.84 X10(3) UL (ref 0.1–1)
MONOCYTES NFR BLD AUTO: 8.9 %
NEUTROPHILS # BLD AUTO: 6.2 X10 (3) UL (ref 1.5–7.7)
NEUTROPHILS # BLD AUTO: 6.2 X10(3) UL (ref 1.5–7.7)
NEUTROPHILS NFR BLD AUTO: 65.8 %
OSMOLALITY SERPL CALC.SUM OF ELEC: 313 MOSM/KG (ref 275–295)
PLATELET # BLD AUTO: 316 10(3)UL (ref 150–450)
POTASSIUM SERPL-SCNC: 4.2 MMOL/L (ref 3.5–5.1)
RBC # BLD AUTO: 3.32 X10(6)UL
SODIUM SERPL-SCNC: 139 MMOL/L (ref 136–145)
WBC # BLD AUTO: 9.4 X10(3) UL (ref 4–11)

## 2024-05-23 PROCEDURE — 36415 COLL VENOUS BLD VENIPUNCTURE: CPT

## 2024-05-23 PROCEDURE — 80048 BASIC METABOLIC PNL TOTAL CA: CPT

## 2024-05-23 PROCEDURE — 96372 THER/PROPH/DIAG INJ SC/IM: CPT | Performed by: INTERNAL MEDICINE

## 2024-05-23 PROCEDURE — 85025 COMPLETE CBC W/AUTO DIFF WBC: CPT

## 2024-05-24 ENCOUNTER — APPOINTMENT (OUTPATIENT)
Dept: CT IMAGING | Facility: HOSPITAL | Age: 89
DRG: 371 | End: 2024-05-24
Attending: EMERGENCY MEDICINE
Payer: MEDICARE

## 2024-05-24 ENCOUNTER — HOSPITAL ENCOUNTER (INPATIENT)
Facility: HOSPITAL | Age: 89
LOS: 9 days | Discharge: HOME | DRG: 371 | End: 2024-06-02
Attending: EMERGENCY MEDICINE | Admitting: INTERNAL MEDICINE
Payer: MEDICARE

## 2024-05-24 DIAGNOSIS — E86.0 DEHYDRATION: Primary | ICD-10-CM

## 2024-05-24 DIAGNOSIS — R55 SYNCOPE, NEAR: ICD-10-CM

## 2024-05-24 DIAGNOSIS — N17.9 AKI (ACUTE KIDNEY INJURY) (HCC): ICD-10-CM

## 2024-05-24 PROBLEM — D72.829 LEUKOCYTOSIS: Status: ACTIVE | Noted: 2024-05-24

## 2024-05-24 LAB
ALBUMIN SERPL-MCNC: 2.8 G/DL (ref 3.4–5)
ALBUMIN/GLOB SERPL: 0.6 {RATIO} (ref 1–2)
ALP LIVER SERPL-CCNC: 103 U/L
ALT SERPL-CCNC: 24 U/L
ANION GAP SERPL CALC-SCNC: 10 MMOL/L (ref 0–18)
AST SERPL-CCNC: 23 U/L (ref 15–37)
BASOPHILS # BLD AUTO: 0.06 X10(3) UL (ref 0–0.2)
BASOPHILS NFR BLD AUTO: 0.4 %
BILIRUB SERPL-MCNC: 0.2 MG/DL (ref 0.1–2)
BILIRUB UR QL STRIP.AUTO: NEGATIVE
BUN BLD-MCNC: 82 MG/DL (ref 9–23)
CALCIUM BLD-MCNC: 7.5 MG/DL (ref 8.5–10.1)
CHLORIDE SERPL-SCNC: 112 MMOL/L (ref 98–112)
CO2 SERPL-SCNC: 18 MMOL/L (ref 21–32)
COLOR UR AUTO: YELLOW
CREAT BLD-MCNC: 4.1 MG/DL
EGFRCR SERPLBLD CKD-EPI 2021: 10 ML/MIN/1.73M2 (ref 60–?)
EOSINOPHIL # BLD AUTO: 0.12 X10(3) UL (ref 0–0.7)
EOSINOPHIL NFR BLD AUTO: 0.7 %
ERYTHROCYTE [DISTWIDTH] IN BLOOD BY AUTOMATED COUNT: 19 %
GLOBULIN PLAS-MCNC: 4.5 G/DL (ref 2.8–4.4)
GLUCOSE BLD-MCNC: 100 MG/DL (ref 70–99)
GLUCOSE UR STRIP.AUTO-MCNC: NORMAL MG/DL
HCT VFR BLD AUTO: 31 %
HGB BLD-MCNC: 10.1 G/DL
IMM GRANULOCYTES # BLD AUTO: 0.12 X10(3) UL (ref 0–1)
IMM GRANULOCYTES NFR BLD: 0.7 %
KETONES UR STRIP.AUTO-MCNC: NEGATIVE MG/DL
LEUKOCYTE ESTERASE UR QL STRIP.AUTO: 500
LYMPHOCYTES # BLD AUTO: 1.32 X10(3) UL (ref 1–4)
LYMPHOCYTES NFR BLD AUTO: 8.1 %
MCH RBC QN AUTO: 30.1 PG (ref 26–34)
MCHC RBC AUTO-ENTMCNC: 32.6 G/DL (ref 31–37)
MCV RBC AUTO: 92.3 FL
MONOCYTES # BLD AUTO: 1.68 X10(3) UL (ref 0.1–1)
MONOCYTES NFR BLD AUTO: 10.3 %
NEUTROPHILS # BLD AUTO: 13.01 X10 (3) UL (ref 1.5–7.7)
NEUTROPHILS # BLD AUTO: 13.01 X10(3) UL (ref 1.5–7.7)
NEUTROPHILS NFR BLD AUTO: 79.8 %
NITRITE UR QL STRIP.AUTO: NEGATIVE
OSMOLALITY SERPL CALC.SUM OF ELEC: 315 MOSM/KG (ref 275–295)
PH UR STRIP.AUTO: 5.5 [PH] (ref 5–8)
PLATELET # BLD AUTO: 277 10(3)UL (ref 150–450)
POTASSIUM SERPL-SCNC: 4.2 MMOL/L (ref 3.5–5.1)
PROT SERPL-MCNC: 7.3 G/DL (ref 6.4–8.2)
PROT UR STRIP.AUTO-MCNC: 70 MG/DL
RBC # BLD AUTO: 3.36 X10(6)UL
SODIUM SERPL-SCNC: 140 MMOL/L (ref 136–145)
SP GR UR STRIP.AUTO: 1.02 (ref 1–1.03)
TROPONIN I SERPL HS-MCNC: 11 NG/L
UROBILINOGEN UR STRIP.AUTO-MCNC: NORMAL MG/DL
WBC # BLD AUTO: 16.3 X10(3) UL (ref 4–11)
WBC #/AREA URNS AUTO: >50 /HPF
WBC CLUMPS UR QL AUTO: PRESENT /HPF

## 2024-05-24 PROCEDURE — 70450 CT HEAD/BRAIN W/O DYE: CPT | Performed by: EMERGENCY MEDICINE

## 2024-05-24 RX ORDER — MAGNESIUM OXIDE 400 MG (241.3 MG MAGNESIUM) TABLET
325 TABLET
Status: DISCONTINUED | OUTPATIENT
Start: 2024-05-25 | End: 2024-06-02

## 2024-05-24 RX ORDER — DILTIAZEM HYDROCHLORIDE 120 MG/1
120 CAPSULE, EXTENDED RELEASE ORAL DAILY
Status: DISCONTINUED | OUTPATIENT
Start: 2024-05-25 | End: 2024-06-02

## 2024-05-24 RX ORDER — PANTOPRAZOLE SODIUM 20 MG/1
20 TABLET, DELAYED RELEASE ORAL
Status: DISCONTINUED | OUTPATIENT
Start: 2024-05-25 | End: 2024-06-02

## 2024-05-24 RX ORDER — AMIODARONE HYDROCHLORIDE 100 MG/1
100 TABLET ORAL DAILY
Status: DISCONTINUED | OUTPATIENT
Start: 2024-05-25 | End: 2024-06-02

## 2024-05-24 RX ORDER — ACETAMINOPHEN 500 MG
500 TABLET ORAL EVERY 4 HOURS PRN
Status: DISCONTINUED | OUTPATIENT
Start: 2024-05-24 | End: 2024-05-31

## 2024-05-24 RX ORDER — UBIDECARENONE 75 MG
100 CAPSULE ORAL DAILY
Status: DISCONTINUED | OUTPATIENT
Start: 2024-05-25 | End: 2024-06-02

## 2024-05-24 RX ORDER — CALCIUM CARBONATE-CHOLECALCIFEROL TAB 250 MG-125 UNIT 250-125 MG-UNIT
2 TAB ORAL DAILY
Status: DISCONTINUED | OUTPATIENT
Start: 2024-05-25 | End: 2024-06-02

## 2024-05-24 RX ORDER — ASPIRIN 81 MG/1
81 TABLET ORAL DAILY
Status: DISCONTINUED | OUTPATIENT
Start: 2024-05-25 | End: 2024-06-02

## 2024-05-24 NOTE — ED PROVIDER NOTES
Patient Seen in: [facility] Emergency Department      History     Chief Complaint   Patient presents with    Dizziness     Stated Complaint: Pt from nursing home c/o dizziness/lightheaded x 2 days    Subjective:   HPI    89-year-old woman here for dizziness, intermittent lightheadedness, weight loss of 8 pounds over the past week.  Son is at bedside, states that home health advised patient to get checked out in the ER as her blood pressure seem to be low earlier today unclear what the values were.  Patient denies any specific complaints any recent chest pain difficulty breathing leg swelling fevers vomiting diarrhea.  States she does feel intermittently lightheaded, mainly at rest over the past 2 days.  Denies any other complaints.  Son at bedside reports she has had little oral intake recently.  Patient has a recent history of partial colon resection has an ostomy she denies any change in ostomy output any increased output any bleeding in the ostomy.  She has a solitary kidney, 1 removed for renal cell cancer.  No other complaints or concerns at this time.      Objective:   No pertinent past medical history.            No pertinent past surgical history.              No pertinent social history.            Review of Systems    Positive for stated complaint: Pt from nursing home c/o dizziness/lightheaded x 2 days  Other systems are as noted in HPI.  Constitutional and vital signs reviewed.      All other systems reviewed and negative except as noted above.    Physical Exam     ED Triage Vitals [05/24/24 1542]   /62   Pulse 96   Resp 14   Temp 98 °F (36.7 °C)   Temp src Oral   SpO2 100 %   O2 Device None (Room air)       Current Vitals:   Vital Signs  BP: 93/78  Pulse: 102  Resp: 19  Temp: 98 °F (36.7 °C)  Temp src: Oral  MAP (mmHg): 85    Oxygen Therapy  SpO2: 100 %  O2 Device: None (Room air)            Physical Exam        Physical Exam  Vitals signs and nursing note reviewed.   General: Elderly woman  sitting up in bed in no acute distress  Head: Normocephalic and atraumatic.   HEENT:  Mucous membranes are somewhat dry  Cardiovascular:  Normal rate and regular rhythm.  No Edema  Pulmonary:  Pulmonary effort is normal.  Normal breath sounds. No wheezing, rhonchi or rales.   Abdominal: Soft nontender nondistended, normal bowel sounds, no guarding no rebound tenderness  Skin: Warm and dry  Neurological: Awake alert, speech is normal      ED Course     Labs Reviewed   COMP METABOLIC PANEL (14) - Abnormal; Notable for the following components:       Result Value    Glucose 100 (*)     CO2 18.0 (*)     BUN 82 (*)     Creatinine 4.10 (*)     Calcium, Total 7.5 (*)     Calculated Osmolality 315 (*)     eGFR-Cr 10 (*)     Albumin 2.8 (*)     Globulin  4.5 (*)     A/G Ratio 0.6 (*)     All other components within normal limits   CBC W/ DIFFERENTIAL - Abnormal; Notable for the following components:    WBC 16.3 (*)     RBC 3.36 (*)     HGB 10.1 (*)     HCT 31.0 (*)     Neutrophil Absolute Prelim 13.01 (*)     Neutrophil Absolute 13.01 (*)     Monocyte Absolute 1.68 (*)     All other components within normal limits   TROPONIN I HIGH SENSITIVITY - Normal   CBC WITH DIFFERENTIAL WITH PLATELET    Narrative:     The following orders were created for panel order CBC With Differential With Platelet.  Procedure                               Abnormality         Status                     ---------                               -----------         ------                     CBC W/ DIFFERENTIAL[[id number]]          Abnormal            Final result                 Please view results for these tests on the individual orders.   URINALYSIS WITH CULTURE REFLEX   URINALYSIS WITH CULTURE REFLEX     EKG    Rate, intervals and axes as noted on EKG Report.  Rate: 84  Rhythm: Sinus Rhythm  Reading: No acute ischemic changes                 CT BRAIN OR HEAD (57691)    Result Date: 5/24/2024  PROCEDURE:  CT BRAIN OR HEAD (28282)  COMPARISON:  None.   INDICATIONS:  Pt from nursing home c/o dizziness/lightheaded x 2 days  TECHNIQUE:  Noncontrast CT scanning is performed through the brain. Dose reduction techniques were used. Dose information is transmitted to the ACR (American College of Radiology) NRDR (National Radiology Data Registry) which includes the Dose Index Registry.  PATIENT STATED HISTORY: (As transcribed by Technologist)  Dizziness, lightheadedness    FINDINGS:  There is cerebral atrophy with symmetric prominence of the ventricles. There are patchy areas of low attenuation in the periventricular and deep white matter which are nonspecific but most consistent with small vessel ischemic changes. There is no evidence of hemorrhage, mass, midline shift, or extra-axial fluid collection.  The visualized paranasal sinuses show no significant findings. No evidence of depressed skull fracture.            CONCLUSION: 1. No acute intracranial findings 2. Cerebral atrophy with chronic microvascular ischemic changes.    LOCATION:  [facility]   Dictated by (CST): Deepak Caldwell MD on 2024 at 5:34 PM     Finalized by (CST): Deepak Caldwell MD on 2024 at 5:35 PM       CARD ECHO 2D DOPPLER (CPT=93306)    Result Date: 2024  Transthoracic Echocardiogram Name:Sunita Loza Date: 2024 :  1932 Ht:  (60in)  BP: 148 / 58 MRN:  [id number]    Age:  89years    Wt:  (92lb)  HR: 80bpm Loc:  [facility]       Gndr: F          BSA: 1.34m^2 Sonographer: Arturo DOUGLAS AE, PE Ordering:    Tristan Garvey Consulting:  Tristan Garvey ---------------------------------------------------------------------------- History/Indications:   Rule out endocarditis. Cellulitis. ---------------------------------------------------------------------------- Procedure information:  A transthoracic complete 2D study was performed. Additional evaluation included M-mode, complete spectral Doppler, and color Doppler. Patient room number: 321.  Patient status:  Inpatient.  Location: Bedside.     This was a routine study. Transthoracic echocardiography for ventricular function evaluation, assessment of valvular function, and endocarditis evaluation. Image quality was adequate. ECG rhythm:   Normal sinus ---------------------------------------------------------------------------- Conclusions: 1. Left ventricle: The cavity size was normal. Wall thickness was normal.    Systolic function was normal. The estimated ejection fraction was 65-70%.    Doppler parameters are consistent with abnormal left ventricular    relaxation - grade 1 diastolic dysfunction. 2. Right ventricle: Systolic pressure was mildly increased. 3. Mitral valve: There was mild regurgitation. 4. Pulmonary arteries: Systolic pressure was estimated to be 41mm Hg.    Estimated pulmonary artery diastolic pressure was 12mm Hg. 5. Pericardium, extracardiac: There was no pericardial effusion. Impressions:  No previous study was available for comparison. What appears to be artifact is noted on the apical views of both the tricuspid and mitral valves. No vegetations are identified by this good quality transthoracic echocardiogram; however, this can not \"rule out\" endocaritis. Correlation suggested. * ---------------------------------------------------------------------------- * Findings: Left ventricle:  The cavity size was normal. Wall thickness was normal. Systolic function was normal. The estimated ejection fraction was 65-70%. No diagnostic evidence for diffuse regional wall motion abnormalities. Doppler parameters are consistent with abnormal left ventricular relaxation - grade 1 diastolic dysfunction. Left atrium:  The atrium was normal in size. Right ventricle:  The cavity size was normal. Systolic function was normal. Systolic pressure was mildly increased. Right atrium:  The atrium was normal in size. Mitral valve:  The annulus was mildly calcified. Leaflet separation was normal.  Doppler:  Transvalvular velocity was within the normal range.  There was no evidence for stenosis. There was mild regurgitation. Aortic valve:   The valve was trileaflet. The leaflets were mildly thickened and mildly calcified. Cusp separation was normal.  Doppler:  Transvalvular velocity was within the normal range. There was no evidence for stenosis. There was no significant regurgitation. Tricuspid valve:  The valve is structurally normal. Leaflet separation was normal.  Doppler:  Transvalvular velocity was within the normal range. There was no evidence for stenosis. There was mild regurgitation. Pulmonic valve:   The valve is structurally normal. Cusp separation was normal.  Doppler:  Transvalvular velocity was within the normal range. There was no evidence for stenosis. There was mild regurgitation. Pericardium:   There was no pericardial effusion. Aorta: Aortic root: The aortic root was normal-sized. Ascending aorta: The ascending aorta was normal. Pulmonary arteries: Systolic pressure was estimated to be 41mm Hg. Estimated pulmonary artery diastolic pressure was 12mm Hg. Systemic veins: Inferior vena cava: The IVC was normally collapsible and normal-sized. ---------------------------------------------------------------------------- Measurements  Left ventricle                    Value        Ref  IVS thickness, ED, PLAX           0.7   cm     0.6 -                                                 0.9  LV ID, ED, PLAX                   4.5   cm     3.8 -                                                 5.2  LV ID, ES, PLAX                   2.5   cm     2.2 -                                                 3.5  LV PW thickness, ED, PLAX         0.7   cm     0.6 -                                                 0.9  IVS/LV PW ratio, ED, PLAX         1.03         --------  LV PW/LV ID ratio, ED, PLAX       0.15         --------  LV ejection fraction          (H) 77    %      54 - 74  IVS thickness, ED, MM         (H) 2.8   cm     0.6 -                                                  0.9  LV e', lateral                (L) 5.6   cm/sec >=10.0  LV E/e', lateral              (H) 15           <=13  LV e', medial                 (L) 6.4   cm/sec >=7.0  LV E/e', medial                   13           --------  LV e', average                    6.0   cm/sec --------  LV E/e', average                  14           <=14  Ascending aorta                   Value        Ref  Ascending aorta ID, A-P, ED       3.4   cm     1.9 -                                                 3.5  Left atrium                       Value        Ref  LA ID, A-P, ES                    3.1   cm     2.7 -                                                 3.8  LA volume, ES, 1-p A4C            44    ml     22 - 52  Mitral valve                      Value        Ref  Mitral E-wave peak velocity       0.86  m/sec  --------  Mitral A-wave peak velocity       1.34  m/sec  --------  Mitral peak gradient, D           3     mm Hg  --------  Mitral E/A ratio, peak            0.6          --------  Pulmonary artery                  Value        Ref  PA pressure, S, DP                41    mm Hg  --------  PA pressure, ED, DP               12    mm Hg  --------  Tricuspid valve                   Value        Ref  Tricuspid regurg peak             2.45  m/sec  <=2.8  velocity  Tricuspid peak RV-RA gradient     33    mm Hg  --------  Systemic veins                    Value        Ref  Estimated CVP                     8     mm Hg  --------  Right ventricle                   Value        Ref  RV pressure, S, DP                41    mm Hg  --------  Pulmonic valve                    Value        Ref  Pulmonic regurg velocity, ED      0.99  m/sec  --------  Pulmonic regurg gradient, ED      3     mm Hg  -------- Legend: (L)  and  (H)  jun values outside specified reference range. ---------------------------------------------------------------------------- Prepared and electronically signed by Julien Sung MD 04/26/2024 15:08             MDM       This is a 89-year-old woman here for evaluation of generalized weakness, intermittent lightheadedness.  Differential includes dehydration cardiac dysrhythmia, near syncope, electrolyte abnormality.  Creatinine is 4.1 from baseline around 2.  CT of the head was performed shows no acute abnormalities, patient is afebrile hemodynamically stable.    I reviewed renal consult notes from 4/24, patient is intermittently on diuretics due to lower extremity edema.  Baseline creatinine around 2.  I independently viewed and interpreted the following imaging: CT head no acute intracranial hemorrhage    Case was discussed with nephrology Dr. Miller, agrees with plan for admission will place additional orders Case endorsed to [facility] hospitalist who agrees with plan for admission.  Admission disposition: 5/24/2024  6:04 PM                                        Medical Decision Making      Disposition and Plan     Clinical Impression:  1. Dehydration    2. Syncope, near    3. ZOHREH (acute kidney injury) ([facility])         Disposition:  Admit  5/24/2024  6:04 pm    Follow-up:  No follow-up provider specified.        Medications Prescribed:  Current Discharge Medication List                            Hospital Problems       Present on Admission  Date Reviewed: 4/22/2024            ICD-10-CM Noted POA    * (Principal) Dehydration E86.0 2/1/2024 Unknown    Acute renal failure (ARF) (HCC) N17.9 5/24/2024 Yes    Leukocytosis D72.829 5/24/2024 Yes

## 2024-05-24 NOTE — ED QUICK NOTES
Orders for admission, patient is aware of plan and ready to go upstairs. Any questions, please call ED RN Lakeshia at extension [phone].     Patient Covid vaccination status: Fully vaccinated     COVID Test Ordered in ED: None    COVID Suspicion at Admission: N/A    Running Infusions:    sodium bicarbonate in sterile water infusion          Mental Status/LOC at time of transport: A&Ox4 with intermittent mild confusion/memory lapses. Ambulatory with standby assist. Family at bedside.    Other pertinent information:   CIWA score: N/A   NIH score:  N/A

## 2024-05-24 NOTE — H&P
[facility] H&P/Consult note       CC:   Chief Complaint   Patient presents with    Dizziness        PCP: Janell Powell MD    History of Present Illness: Patient is a 89 year old female with PMH sig for HTN, PAF, teresa syndrome with recurrent cancer, hx of colon resection, colostomy, recent ex lap, TIFFANIE / parital colon resection and creation of colostomy in April, complicated by subcutaneous wall abscess (MRSA), finished abx 05/07, here for weakness, dizziness    Reports that she has had poor appetite but n/v. Complains of abd pain when palpated. No f/c. Denied any changes in stoma outpt at home but here has been having more liquid outpt in stoma and has had to change it in ER.   Was also on diuretic for LE edema but stopped    In ER found tohave worsening Cr, leukocyotsis.   She denid any recent f/c. No cough or sob.   Son at [facility]  Past Medical History:    Arthritis    Atrial fibrillation (HCC)    Back pain    CKD (chronic kidney disease)    and S/P nephrectomy    Colon cancer (HCC)    Congestive heart disease (HCC)    Easy bruising    Endometrial cancer (HCC)    UTERINE, DX ABOUT 30 YEARS    Hearing impairment    AIDS    Heart palpitations    Afib    High blood pressure    Kidney failure    Leg swelling    Teresa syndrome    hereditary colorectal cancer    Muscle weakness    WALKER    Pain in joints    Wears glasses    Weight loss        PSH  Past Surgical History:   Procedure Laterality Date    Colectomy      Nephrectomy Left     Part removal colon w end colostomy          ALL:  Allergies   Allergen Reactions    Ciprofloxacin RASH and HIVES    Penicillins RASH        Home Medications:  Current Facility-Administered Medications for the 5/24/24 encounter (Hospital Encounter)   Medication Dose Route Frequency Provider Last Rate Last Admin    [COMPLETED] darbepoetin chris (Aranesp) 300 MCG/0.6ML injection 300 mcg  300 mcg Subcutaneous Once Fabiana Miller MD   300 mcg at 05/23/24 4814     Outpatient  Medications Marked as Taking for the 5/24/24 encounter (Hospital Encounter)   Medication Sig Dispense Refill    sodium bicarbonate 650 MG Oral Tab Take 1 tablet (650 mg total) by mouth 2 (two) times daily. 60 tablet 0    omeprazole 20 MG Oral Capsule Delayed Release Take 1 capsule (20 mg total) by mouth every morning before breakfast.      potassium chloride 10 MEQ Oral Tab CR Take 2 tablets (20 mEq total) by mouth daily.      Glucosamine-Chondroit-Biofl-Mn (GLUCOSAMINE CHONDROIT,BIOFLAV, OR) Take 1 tablet by mouth daily.      ferrous sulfate 325 (65 FE) MG Oral Tab EC Take 1 tablet (325 mg total) by mouth daily with breakfast.      amiodarone 100 MG Oral Tab Take 1 tablet (100 mg total) by mouth daily.      apixaban 2.5 MG Oral Tab Take 1 tablet (2.5 mg total) by mouth 2 (two) times daily.      dilTIAZem  MG Oral Capsule SR 24 Hr Take 1 capsule (120 mg total) by mouth daily.      Calcium Carbonate-Vitamin D (OYSTER SHELL CALCIUM/D) 500-200 MG-UNIT Oral Tab Take 1 tablet by mouth daily.      aspirin 81 MG Oral Tab Take 1 tablet (81 mg total) by mouth daily.      cyanocobalamin 1000 MCG Oral Tab Take 100 mcg by mouth daily.      Multiple Vitamin (MULTIVITAMINS OR) Take 1 tablet by mouth daily.      acetaminophen 500 MG Oral Tab Take 1 tablet (500 mg total) by mouth in the morning and 1 tablet (500 mg total) before bedtime.           Soc Hx  Social History     Tobacco Use    Smoking status: Never    Smokeless tobacco: Never   Substance Use Topics    Alcohol use: Not Currently        Fam Hx  Family History   Problem Relation Age of Onset    Heart Disorder Father     Heart Disorder Mother     Colon Cancer Mother         70    Heart Disorder Sister     Breast Cancer Sister     Diabetes Brother     Breast Cancer Sister     Cancer Sister     Breast Cancer Sister     Breast Cancer Daughter        Review of Systems  Comprehensive ROS reviewed and negative except for what's stated above.         OBJECTIVE:  BP 93/78    Pulse 102   Temp 98 °F (36.7 °C) (Oral)   Resp 19   Ht 5' 4\" (1.626 m)   Wt 95 lb (43.1 kg)   SpO2 100%   BMI 16.31 kg/m²   General:  Alert, no distress, appears stated age.cachectic appearing   Head:  Normocephalic, without obvious abnormality, atraumatic.   Eyes:  Sclera anicteric, No conjunctival pallor, EOMs intact.    Throat: dry   Neck: Supple    Lungs:   Clear to auscultation bilaterally    Chest wall:  No tenderness or deformity.   Heart:  Regular rate and rhythm    Abdomen:   Soft, NT/ND,  colostomy with luiqid outpt   Extremities: Atraumatic, no cyanosis or edema.   Skin: No visible rashes or lesions.    Neurologic: Normal strength, no focal deficit appreciated  Cn 2-12 grossly normal, no drift, normal ftn     Diagnostic Data:    CBC/Chem  Recent Labs   Lab 05/23/24  0929 05/24/24  1557   WBC 9.4 16.3*   HGB 9.9* 10.1*   MCV 94.6 92.3   .0 277.0       Recent Labs   Lab 05/23/24  0929 05/24/24  1557    140   K 4.2 4.2   * 112   CO2 18.0* 18.0*   BUN 84* 82*   CREATSERUM 3.69* 4.10*   GLU 94 100*   CA 7.4* 7.5*       Recent Labs   Lab 05/24/24  1557   ALT 24   AST 23   ALB 2.8*       No results for input(s): \"TROP\" in the last 168 hours.         Radiology: CT BRAIN OR HEAD (53412)    Result Date: 5/24/2024  PROCEDURE:  CT BRAIN OR HEAD (94605)  COMPARISON:  None.  INDICATIONS:  Pt from nursing home c/o dizziness/lightheaded x 2 days  TECHNIQUE:  Noncontrast CT scanning is performed through the brain. Dose reduction techniques were used. Dose information is transmitted to the ACR (American College of Radiology) NRDR (National Radiology Data Registry) which includes the Dose Index Registry.  PATIENT STATED HISTORY: (As transcribed by Technologist)  Dizziness, lightheadedness    FINDINGS:  There is cerebral atrophy with symmetric prominence of the ventricles. There are patchy areas of low attenuation in the periventricular and deep white matter which are nonspecific but most consistent  with small vessel ischemic changes. There is no evidence of hemorrhage, mass, midline shift, or extra-axial fluid collection.  The visualized paranasal sinuses show no significant findings. No evidence of depressed skull fracture.            CONCLUSION: 1. No acute intracranial findings 2. Cerebral atrophy with chronic microvascular ischemic changes.    LOCATION:  [facility]   Dictated by (CST): Deepak Caldwell MD on 2024 at 5:34 PM     Finalized by (CST): Deepak Caldwell MD on 2024 at 5:35 PM       CARD ECHO 2D DOPPLER (CPT=93306)    Result Date: 2024  Transthoracic Echocardiogram Name:Sunita Loza Date: 2024 :  1932 Ht:  (60in)  BP: 148 / 58 MRN:  [id number]    Age:  89years    Wt:  (92lb)  HR: 80bpm Loc:  [facility]       Gndr: F          BSA: 1.34m^2 Sonographer: Arturo DOUGLAS AE, PE Ordering:    Tristan Garvey Consulting:  Tristan Garvey ---------------------------------------------------------------------------- History/Indications:   Rule out endocarditis. Cellulitis. ---------------------------------------------------------------------------- Procedure information:  A transthoracic complete 2D study was performed. Additional evaluation included M-mode, complete spectral Doppler, and color Doppler. Patient room number: 321.  Patient status:  Inpatient.  Location: Bedside.    This was a routine study. Transthoracic echocardiography for ventricular function evaluation, assessment of valvular function, and endocarditis evaluation. Image quality was adequate. ECG rhythm:   Normal sinus ---------------------------------------------------------------------------- Conclusions: 1. Left ventricle: The cavity size was normal. Wall thickness was normal.    Systolic function was normal. The estimated ejection fraction was 65-70%.    Doppler parameters are consistent with abnormal left ventricular    relaxation - grade 1 diastolic dysfunction. 2. Right ventricle: Systolic pressure was mildly increased. 3.  Mitral valve: There was mild regurgitation. 4. Pulmonary arteries: Systolic pressure was estimated to be 41mm Hg.    Estimated pulmonary artery diastolic pressure was 12mm Hg. 5. Pericardium, extracardiac: There was no pericardial effusion. Impressions:  No previous study was available for comparison. What appears to be artifact is noted on the apical views of both the tricuspid and mitral valves. No vegetations are identified by this good quality transthoracic echocardiogram; however, this can not \"rule out\" endocaritis. Correlation suggested. * ---------------------------------------------------------------------------- * Findings: Left ventricle:  The cavity size was normal. Wall thickness was normal. Systolic function was normal. The estimated ejection fraction was 65-70%. No diagnostic evidence for diffuse regional wall motion abnormalities. Doppler parameters are consistent with abnormal left ventricular relaxation - grade 1 diastolic dysfunction. Left atrium:  The atrium was normal in size. Right ventricle:  The cavity size was normal. Systolic function was normal. Systolic pressure was mildly increased. Right atrium:  The atrium was normal in size. Mitral valve:  The annulus was mildly calcified. Leaflet separation was normal.  Doppler:  Transvalvular velocity was within the normal range. There was no evidence for stenosis. There was mild regurgitation. Aortic valve:   The valve was trileaflet. The leaflets were mildly thickened and mildly calcified. Cusp separation was normal.  Doppler:  Transvalvular velocity was within the normal range. There was no evidence for stenosis. There was no significant regurgitation. Tricuspid valve:  The valve is structurally normal. Leaflet separation was normal.  Doppler:  Transvalvular velocity was within the normal range. There was no evidence for stenosis. There was mild regurgitation. Pulmonic valve:   The valve is structurally normal. Cusp separation was normal.   Doppler:  Transvalvular velocity was within the normal range. There was no evidence for stenosis. There was mild regurgitation. Pericardium:   There was no pericardial effusion. Aorta: Aortic root: The aortic root was normal-sized. Ascending aorta: The ascending aorta was normal. Pulmonary arteries: Systolic pressure was estimated to be 41mm Hg. Estimated pulmonary artery diastolic pressure was 12mm Hg. Systemic veins: Inferior vena cava: The IVC was normally collapsible and normal-sized. ---------------------------------------------------------------------------- Measurements  Left ventricle                    Value        Ref  IVS thickness, ED, PLAX           0.7   cm     0.6 -                                                 0.9  LV ID, ED, PLAX                   4.5   cm     3.8 -                                                 5.2  LV ID, ES, PLAX                   2.5   cm     2.2 -                                                 3.5  LV PW thickness, ED, PLAX         0.7   cm     0.6 -                                                 0.9  IVS/LV PW ratio, ED, PLAX         1.03         --------  LV PW/LV ID ratio, ED, PLAX       0.15         --------  LV ejection fraction          (H) 77    %      54 - 74  IVS thickness, ED, MM         (H) 2.8   cm     0.6 -                                                 0.9  LV e', lateral                (L) 5.6   cm/sec >=10.0  LV E/e', lateral              (H) 15           <=13  LV e', medial                 (L) 6.4   cm/sec >=7.0  LV E/e', medial                   13           --------  LV e', average                    6.0   cm/sec --------  LV E/e', average                  14           <=14  Ascending aorta                   Value        Ref  Ascending aorta ID, A-P, ED       3.4   cm     1.9 -                                                 3.5  Left atrium                       Value        Ref  LA ID, A-P, ES                    3.1   cm     2.7 -                                                  3.8  LA volume, ES, 1-p A4C            44    ml     22 - 52  Mitral valve                      Value        Ref  Mitral E-wave peak velocity       0.86  m/sec  --------  Mitral A-wave peak velocity       1.34  m/sec  --------  Mitral peak gradient, D           3     mm Hg  --------  Mitral E/A ratio, peak            0.6          --------  Pulmonary artery                  Value        Ref  PA pressure, S, DP                41    mm Hg  --------  PA pressure, ED, DP               12    mm Hg  --------  Tricuspid valve                   Value        Ref  Tricuspid regurg peak             2.45  m/sec  <=2.8  velocity  Tricuspid peak RV-RA gradient     33    mm Hg  --------  Systemic veins                    Value        Ref  Estimated CVP                     8     mm Hg  --------  Right ventricle                   Value        Ref  RV pressure, S, DP                41    mm Hg  --------  Pulmonic valve                    Value        Ref  Pulmonic regurg velocity, ED      0.99  m/sec  --------  Pulmonic regurg gradient, ED      3     mm Hg  -------- Legend: (L)  and  (H)  jun values outside specified reference range. ---------------------------------------------------------------------------- Prepared and electronically signed by Julien Sung MD 04/26/2024 15:08        ASSESSMENT / PLAN:     Patient is a 89 year old female with PMH sig for HTN, PAF, teresa syndrome with recurrent cancer, hx of colon resection, colostomy, recent ex lap, TIFFANIE / parital colon resection and creation of colostomy in April, complicated by subcutaneous wall abscess (MRSA), finished abx 05/07, here for weakness, dizziness      Impression     -weakness / leukocytosis  -dehydration  -diarrhea    -colon cancer sp ex lap, TIFFANIE, partial colon resection, takedown of colostomy and creation of colostomy 04/03 complicated by MRSA abdominal wall abscess (drained and treated with abx)      -metabolic acidosis  -ZOHREH on ckd  3-4  -RCC s/p nephrectomy     -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 Has had poor po intake and was also on diuretics  Cont IVFs  -check blood cx   -if worsening wbc or abd exam will obtain CT AP but at this time abd soft / nt /nd        *ZOHREH on CKD 3-4  -IVFs per renal    Chronic conditions  Home meds when able        Sdcs    Full code dw pt /son      Further recommendations pending patient's clinical course. [facility] hospitalist to continue to follow patient while in house    Patient and/or patient's family given opportunity to ask questions and note understanding and agreeing with therapeutic plan as outlined    [facility] Hospitalist  [phone]  Answering Service: [phone]

## 2024-05-24 NOTE — ED QUICK NOTES
Pt assisted to ambulate to the bathroom where she changed her colostomy bag as it had become somewhat detached and leaked a bit. Assisted back to bed and placed on monitor. Call light in reach. Family at bedside.

## 2024-05-24 NOTE — ED INITIAL ASSESSMENT (HPI)
Pt from assisted living facility c/o acute onset dizziness. Denies fall, +mild headache, -nausea.

## 2024-05-24 NOTE — PROGRESS NOTES
Nephrology Note    89 yo s/p recent exp lap / LOS / colon resection / ostomy takedown / creation of ostomy 4/3 (Dr. Gilbert) presents with dehydration / ZOHREH. Ostomy output \"stable\". Meds unchanged- loop diuretic held x 3-4 days due to ZOHREH at [facility]. PLAN- eval urine; IVF / bicarb gtt; re-assess maintenance diuretics.    Fabiana Miller MD  5/24/2024  654 PM

## 2024-05-25 LAB
ANION GAP SERPL CALC-SCNC: 9 MMOL/L (ref 0–18)
ATRIAL RATE: 84 BPM
BUN BLD-MCNC: 72 MG/DL (ref 9–23)
C DIFF GDH + TOXINS A+B STL QL IA.RAPID: DETECTED
C DIFF TOX B STL QL: POSITIVE
CALCIUM BLD-MCNC: 7 MG/DL (ref 8.5–10.1)
CHLORIDE SERPL-SCNC: 112 MMOL/L (ref 98–112)
CO2 SERPL-SCNC: 21 MMOL/L (ref 21–32)
CREAT BLD-MCNC: 3.62 MG/DL
EGFRCR SERPLBLD CKD-EPI 2021: 11 ML/MIN/1.73M2 (ref 60–?)
ERYTHROCYTE [DISTWIDTH] IN BLOOD BY AUTOMATED COUNT: 19 %
GLUCOSE BLD-MCNC: 88 MG/DL (ref 70–99)
HCT VFR BLD AUTO: 27.2 %
HGB BLD-MCNC: 8.5 G/DL
MAGNESIUM SERPL-MCNC: 1 MG/DL (ref 1.6–2.6)
MCH RBC QN AUTO: 29.3 PG (ref 26–34)
MCHC RBC AUTO-ENTMCNC: 31.3 G/DL (ref 31–37)
MCV RBC AUTO: 93.8 FL
OSMOLALITY SERPL CALC.SUM OF ELEC: 315 MOSM/KG (ref 275–295)
P AXIS: 30 DEGREES
P-R INTERVAL: 100 MS
PHOSPHATE SERPL-MCNC: 3.5 MG/DL (ref 2.5–4.9)
PLATELET # BLD AUTO: 241 10(3)UL (ref 150–450)
POTASSIUM SERPL-SCNC: 3.5 MMOL/L (ref 3.5–5.1)
Q-T INTERVAL: 408 MS
QRS DURATION: 76 MS
QTC CALCULATION (BEZET): 482 MS
R AXIS: 52 DEGREES
RBC # BLD AUTO: 2.9 X10(6)UL
SODIUM SERPL-SCNC: 142 MMOL/L (ref 136–145)
T AXIS: 66 DEGREES
VENTRICULAR RATE: 84 BPM
WBC # BLD AUTO: 12.7 X10(3) UL (ref 4–11)

## 2024-05-25 PROCEDURE — 99223 1ST HOSP IP/OBS HIGH 75: CPT | Performed by: INTERNAL MEDICINE

## 2024-05-25 RX ORDER — MAGNESIUM SULFATE HEPTAHYDRATE 40 MG/ML
2 INJECTION, SOLUTION INTRAVENOUS ONCE
Status: COMPLETED | OUTPATIENT
Start: 2024-05-25 | End: 2024-05-25

## 2024-05-25 RX ORDER — VANCOMYCIN HYDROCHLORIDE 125 MG/1
125 CAPSULE ORAL 4 TIMES DAILY
Status: DISCONTINUED | OUTPATIENT
Start: 2024-05-25 | End: 2024-06-02

## 2024-05-25 NOTE — PROGRESS NOTES
NURSING ADMISSION NOTE      Patient admitted via Ambulance  Oriented to room 3619.   Safety precautions initiated.  Bed in low position.  Call light in reach.    Received pt to unit around 2030. A&O X4, RA, VSS, NSR with frequent PACs on tele. Noncardiac electrolyte replacement protocol. Lab draw. PIV in L AC flushed and capped. Dressing CDI. Sodium bicarb gtt running at 83 mL/hr. IV rocephin Q24. On eliquis. Regular diet, pills whole with thin, one at a time. Larger pills broken in half. Continent, SBA to bathroom. Colostomy to LLQ with thin brown/green output. States output is much thinner than normal, r/o cdiff. Contact iso. Denies pain. Plan for nephro to see in AM.     Pt family would like to be contacted when doctors round if family is not present. Instructed to call sherri Frye [phone].

## 2024-05-25 NOTE — PLAN OF CARE
Pt A&Ox3? Very [id number] at times   RA;VSS  Tele- NSR  Eliquis  L Colostomy   Mg replaced   PO Vanco (+) CDIFF  IV rocephin   Bicarb gtt   Tolerating diet   Staff will continue to monitor        Problem: METABOLIC/FLUID AND ELECTROLYTES - ADULT  Goal: Electrolytes maintained within normal limits  Description: INTERVENTIONS:  - Monitor labs and rhythm and assess patient for signs and symptoms of electrolyte imbalances  - Administer electrolyte replacement as ordered  - Monitor response to electrolyte replacements, including rhythm and repeat lab results as appropriate  - Fluid restriction as ordered  - Instruct patient on fluid and nutrition restrictions as appropriate  Outcome: Progressing  Goal: Hemodynamic stability and optimal renal function maintained  Description: INTERVENTIONS:  - Monitor labs and assess for signs and symptoms of volume excess or deficit  - Monitor intake, output and patient weight  - Monitor urine specific gravity, serum osmolarity and serum sodium as indicated or ordered  - Monitor response to interventions for patient's volume status, including labs, urine output, blood pressure (other measures as available)  - Encourage oral intake as appropriate  - Instruct patient on fluid and nutrition restrictions as appropriate  Outcome: Progressing     Problem: RISK FOR INFECTION - ADULT  Goal: Absence of fever/infection during anticipated neutropenic period  Description: INTERVENTIONS  - Monitor WBC  - Administer growth factors as ordered  - Implement neutropenic guidelines  Outcome: Progressing

## 2024-05-25 NOTE — ED QUICK NOTES
Patient ambulated to bathroom. Minimal assist. Urine specimen provided. Cloudy and bright neon yellow color. Patient emptied her own colostomy bag. Reminded patient to keep her left arm straight in order to receive liter NS bolus.

## 2024-05-25 NOTE — PROGRESS NOTES
[facility]   part of [facility]    Progress Note     Sunita Loza Patient Status:  Inpatient    1932 MRN [id number]   Location [facility] 3NE-A Attending Chon Chaves MD   Hosp Day # 1 PCP Janell Powell MD     Follow up for: The primary encounter diagnosis was Dehydration. Diagnoses of Syncope, near and ZOHREH (acute kidney injury) (HCC) were also pertinent to this visit.      Interval History/Subjective:     GERI overnight  Afebrile, HDS  Cr improving     No new or worsening symptoms, hopefuol to go home soon    Vital signs:  Temp:  [98 °F (36.7 °C)-98.2 °F (36.8 °C)] 98 °F (36.7 °C)  Pulse:  [] 72  Resp:  [12-20] 18  BP: ()/(50-82) 132/71  SpO2:  [94 %-100 %] 100 %    Physical Exam:    General: NAD, Comfortable, Nontoxic   Respiratory: CTAB; reg resp rate & effort, no wheezes/crackles  Cardiovascular: S1, S2. Regular rate and rhythm. No murmurs appreciated  Abdomen: Soft, NTND, no guarding/rebound; s/p ostomy with liquid output   Neurologic: No focal neurological deficits.   Extremities: No edema.   Skin: Dry, no rashes, ulcers or lesions     Diagnostic Data:      Labs:  Recent Labs   Lab 24  0929 24  1557   WBC 9.4 16.3*   HGB 9.9* 10.1*   MCV 94.6 92.3   .0 277.0       Recent Labs   Lab 24  0929 24  1557   GLU 94 100*   BUN 84* 82*   CREATSERUM 3.69* 4.10*   CA 7.4* 7.5*   ALB  --  2.8*    140   K 4.2 4.2   * 112   CO2 18.0* 18.0*   ALKPHO  --  103   AST  --  23   ALT  --  24   BILT  --  0.2   TP  --  7.3       No results for input(s): \"PTP\", \"INR\" in the last 168 hours.    No results for input(s): \"TROP\", \"CK\" in the last 168 hours.         Imaging: Imaging data reviewed in Epic.    Medications:    cefTRIAXone  1 g Intravenous Q24H    amiodarone  100 mg Oral Daily    apixaban  2.5 mg Oral BID    aspirin  81 mg Oral Daily    calcium carbonate-vitamin D  2 tablet Oral Daily    cyanocobalamin  100 mcg Oral Daily    dilTIAZem ER  120 mg  Oral Daily    ferrous sulfate  325 mg Oral Daily with breakfast    pantoprazole  20 mg Oral QAM AC       ASSESSMENT / PLAN:     Sunita Loza Is a a 89 year old female who presented with weakness, ZOHREH on CKD3 likely due to dehydration secondary to C Difficile colitis    Problem List / Diagnoses    ZOHREH on CKD3  C Difficile Colitis  Abnormal UA  HTN  pAF    Plan    ZOHREH on CKD3 -- improving  -- 3.7 on admissio, peaked at 4.1 5/24, now 3.6 s/p IVF  -- likely prerenal due to worsening ostomy output  -- cont IVF, goal 1:1 to match ostomy output   -- renaly dose all meds, avoid nephrotoxic agents     C Difficile Colitis  -- C Diff PCR positive, start empiric vancomycin QID  -- continue for minimum 7 days following completion of antibiotics (below)    Abnormal UA  -- negative nitrities & pt without urinary symptoms, but started on rocephin given concomitant ZOHREH & patient's underlying dementia making her an unreliable historian  -- f/u urine cultures, pending    HTN  pAF  -- resume home meds, eliquis for AC   -- tele     DVT Mechanical Prophylaxis:   SCDs,    DVT Pharmacologic Prophylaxis   Medication    apixaban (Eliquis) tab 2.5 mg         DVT Pharmacologic prophylaxis: Aspirin 81 mg    Code Status: Full Code    Dispo: inpatient; ALEXANDRU 1-2 days pending continued clinical improvement, nephrology clearance     Plan of care discussed with patient and/or family at bedside.    GABINO Chaves MD  [facility]   [phone]      This note was created using voice recognition technology. It may include inadvertent transcriptional errors. Any such errors should be contextually interpreted and should not be taken to alter the content or the meaning.     Note to Patient: The 21st Century Cures Act makes medical notes like these available to patients in the interest of transparency. However, be advised this is a medical document. It is intended as peer to peer communication. It is written in medical language and may contain  abbreviations or verbiage that are unfamiliar. It may appear blunt or direct. Medical documents are intended to carry relevant information, facts as evident, and the clinical opinion of the practitioner and not intended to be the primary source of your information.  Please refer directly to myself or clinical staff for information regarding plan of care.

## 2024-05-25 NOTE — PLAN OF CARE
Problem: METABOLIC/FLUID AND ELECTROLYTES - ADULT  Goal: Electrolytes maintained within normal limits  Description: INTERVENTIONS:  - Monitor labs and rhythm and assess patient for signs and symptoms of electrolyte imbalances  - Administer electrolyte replacement as ordered  - Monitor response to electrolyte replacements, including rhythm and repeat lab results as appropriate  - Fluid restriction as ordered  - Instruct patient on fluid and nutrition restrictions as appropriate  Outcome: Progressing  Goal: Hemodynamic stability and optimal renal function maintained  Description: INTERVENTIONS:  - Monitor labs and assess for signs and symptoms of volume excess or deficit  - Monitor intake, output and patient weight  - Monitor urine specific gravity, serum osmolarity and serum sodium as indicated or ordered  - Monitor response to interventions for patient's volume status, including labs, urine output, blood pressure (other measures as available)  - Encourage oral intake as appropriate  - Instruct patient on fluid and nutrition restrictions as appropriate  Outcome: Progressing     Problem: RISK FOR INFECTION - ADULT  Goal: Absence of fever/infection during anticipated neutropenic period  Description: INTERVENTIONS  - Monitor WBC  - Administer growth factors as ordered  - Implement neutropenic guidelines  Outcome: Progressing

## 2024-05-25 NOTE — SPIRITUAL CARE NOTE
Spiritual Care Visit Note    Patient Name: Sunita Loza Date of Spiritual Care Visit: 24   : 1932 Primary Dx: Dehydration       Referred By:      Spiritual Care Taxonomy:    Intended Effects: Establish rapport and connectedness    Methods: Collaborate with care team member;Offer support    Interventions: Active listening;Assist someone with Advance Directives;Identify supportive relationship(s);Explain  role;Prayer for healing    Visit Type/Summary:     - PoA: Other: Provided education regarding PoA for Healthcare. Left PoA information with patient for review.  RN stated that patient would be able to make decisions for PoA at this time.  Patient wished to speak to her family and make a decision about completing PoA.  RN informed.  Provided prayer as patient requested.  Will provided Roman Catholic communion also.    remains available for follow up.    Spiritual Care support can be requested via an Epic consult. For urgent/immediate needs, please contact the On Call  at: Edward: ext [phone]

## 2024-05-25 NOTE — CONSULTS
[facility]  Report of Consultation    Sunita Loza Patient Status:  Inpatient    1932 MRN [id number]   Location [facility] 3NE-A Attending Tree Li MD   Hosp Day # 1 PCP Janell Powell MD       Assessment / Plan:    1) ZOHREH- due to diuresis / poor PO intake / ostomy losses. UA c/w UTI. PLAN- adjust IVF; expect recovery    2) CKD 3- baseline Cr 1.5 mg/dl 2022-3 in part due to remote nephrectomy > 20 yrs ago     3) UTI- culture pending; empiric CTX     4) s/p exp lap / LOS / colon resection / ostomy takedown / creation of colostomy 4/3 (Dr. 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 Fe stores noted -> IV FE + EPO      9) Chronic edema- onset  per family; EF 60% with mild-mod TR / AI + mild MR; RV WNL. Hold diuretic with above    Will d/w daughter.        Reason for Consultation:  ZOHREH / CKD 3    History of Present Illness:  Sunita Loza is a a(n) 89 year old female.  Very pleasant 89-year-old female with a history of hypertension, PAF, recent bowel surgery as above, presents with worsening kidney function.  No changes in ostomy output.  Intake has been relatively poor.  Denies any nausea or vomiting fevers chills cough shortness of breath or other usual symptoms.  Diuretic was discontinued several days ago when her renal function was noted to be worsening.  Found to have leukocytosis and urinary tract infection.  She has no specific complaints other than fatigue.    History:  Past Medical History:    Arthritis    Atrial fibrillation (HCC)    Back pain    CKD (chronic kidney disease)    and S/P nephrectomy    Colon cancer (HCC)    Congestive heart disease (HCC)    Easy bruising    Endometrial cancer (HCC)    UTERINE, DX ABOUT 30 YEARS    Hearing impairment    AIDS    Heart palpitations    Afib    High blood  pressure    Kidney failure    Leg swelling    Bishop syndrome    hereditary colorectal cancer    Muscle weakness    WALKER    Pain in joints    Wears glasses    Weight loss     Past Surgical History:   Procedure Laterality Date    Colectomy      Nephrectomy Left     Part removal colon w end colostomy       Family History   Problem Relation Age of Onset    Heart Disorder Father     Heart Disorder Mother     Colon Cancer Mother         70    Heart Disorder Sister     Breast Cancer Sister     Diabetes Brother     Breast Cancer Sister     Cancer Sister     Breast Cancer Sister     Breast Cancer Daughter      Denies family history of kidney disease.    reports that she has never smoked. She has never used smokeless tobacco. She reports that she does not currently use alcohol. She reports that she does not use drugs.    Allergies:  Allergies   Allergen Reactions    Ciprofloxacin RASH and HIVES    Penicillins RASH       Medications:    Current Facility-Administered Medications:     sodium bicarbonate 100 mEq in sterile water 1,100 mL infusion, 100 mEq, Intravenous, Continuous    amiodarone (Pacerone) tab 100 mg, 100 mg, Oral, Daily    apixaban (Eliquis) tab 2.5 mg, 2.5 mg, Oral, BID    aspirin DR tab 81 mg, 81 mg, Oral, Daily    calcium carbonate-vitamin D (Oyster Shell-D) 250-3.125 MG-MCG per tab 2 tablet, 2 tablet, Oral, Daily    cyanocobalamin (Vitamin B12) tab 100 mcg, 100 mcg, Oral, Daily    dilTIAZem ER (Dilacor XR) 24 hr cap 120 mg, 120 mg, Oral, Daily    ferrous sulfate DR tab 325 mg, 325 mg, Oral, Daily with breakfast    pantoprazole (Protonix) DR tab 20 mg, 20 mg, Oral, QAM AC    acetaminophen (Tylenol Extra Strength) tab 500 mg, 500 mg, Oral, Q4H PRN  No current outpatient medications on file.       Review of Systems:  Please see HPI for pertinent positives. 10 point review of systems otherwise reviewed and negative.     Physical Exam:  /69 (BP Location: Right arm)   Pulse 74   Temp 98.1 °F (36.7 °C)  (Oral)   Resp 18   Ht 5' (1.524 m)   Wt 81 lb 14.4 oz (37.1 kg)   SpO2 100%   BMI 15.99 kg/m²   Temp (24hrs), Av.1 °F (36.7 °C), Min:98 °F (36.7 °C), Max:98.2 °F (36.8 °C)       Intake/Output Summary (Last 24 hours) at 2024 0513  Last data filed at 2024  Gross per 24 hour   Intake 1000 ml   Output --   Net 1000 ml     Wt Readings from Last 3 Encounters:   24 81 lb 14.4 oz (37.1 kg)   24 81 lb (36.7 kg)   24 92 lb 14.4 oz (42.1 kg)     General: awake alert  HEENT: No scleral icterus, MMM  Neck: Supple, no MARIA FERNANDA or thyromegaly  Cardiac: Regular rate and rhythm, S1, S2 normal, no murmur, rub, or gallop  Lungs: Decreased breath sounds at the bases bilaterally.   Abdomen: Soft, non-tender. + bowel sounds, no palpable organomegaly  Extremities: Without clubbing, cyanosis; no edema  Neurologic: Cranial nerves grossly intact, moving all extremities  Skin: Warm and dry, no rashes      Laboratory Data:  Lab Results   Component Value Date    WBC 16.3 2024    HGB 10.1 2024    HCT 31.0 2024    .0 2024    CREATSERUM 4.10 2024    BUN 82 2024     2024    K 4.2 2024     2024    CO2 18.0 2024     2024    CA 7.5 2024    ALB 2.8 2024    ALKPHO 103 2024    BILT 0.2 2024    TP 7.3 2024    AST 23 2024    ALT 24 2024       Imaging:  All imaging studies reviewed.      Thank you for allowing me to participate in the care of your patient.    Fabiana Miller MD  2024  5:13 AM

## 2024-05-26 LAB
ANION GAP SERPL CALC-SCNC: 5 MMOL/L (ref 0–18)
BUN BLD-MCNC: 56 MG/DL (ref 9–23)
CALCIUM BLD-MCNC: 7 MG/DL (ref 8.5–10.1)
CHLORIDE SERPL-SCNC: 108 MMOL/L (ref 98–112)
CO2 SERPL-SCNC: 29 MMOL/L (ref 21–32)
CREAT BLD-MCNC: 2.79 MG/DL
EGFRCR SERPLBLD CKD-EPI 2021: 16 ML/MIN/1.73M2 (ref 60–?)
ERYTHROCYTE [DISTWIDTH] IN BLOOD BY AUTOMATED COUNT: 19 %
GLUCOSE BLD-MCNC: 95 MG/DL (ref 70–99)
HCT VFR BLD AUTO: 28.6 %
HGB BLD-MCNC: 8.9 G/DL
MAGNESIUM SERPL-MCNC: 1.6 MG/DL (ref 1.6–2.6)
MCH RBC QN AUTO: 29.3 PG (ref 26–34)
MCHC RBC AUTO-ENTMCNC: 31.1 G/DL (ref 31–37)
MCV RBC AUTO: 94.1 FL
OSMOLALITY SERPL CALC.SUM OF ELEC: 309 MOSM/KG (ref 275–295)
PLATELET # BLD AUTO: 231 10(3)UL (ref 150–450)
POTASSIUM SERPL-SCNC: 3.2 MMOL/L (ref 3.5–5.1)
RBC # BLD AUTO: 3.04 X10(6)UL
SODIUM SERPL-SCNC: 142 MMOL/L (ref 136–145)
WBC # BLD AUTO: 10.1 X10(3) UL (ref 4–11)

## 2024-05-26 PROCEDURE — 99233 SBSQ HOSP IP/OBS HIGH 50: CPT | Performed by: INTERNAL MEDICINE

## 2024-05-26 RX ORDER — MAGNESIUM SULFATE HEPTAHYDRATE 40 MG/ML
2 INJECTION, SOLUTION INTRAVENOUS ONCE
Status: COMPLETED | OUTPATIENT
Start: 2024-05-26 | End: 2024-05-26

## 2024-05-26 RX ORDER — MELATONIN
3 NIGHTLY
Status: DISCONTINUED | OUTPATIENT
Start: 2024-05-26 | End: 2024-06-02

## 2024-05-26 RX ORDER — SODIUM CHLORIDE 9 MG/ML
INJECTION, SOLUTION INTRAVENOUS CONTINUOUS
Status: DISCONTINUED | OUTPATIENT
Start: 2024-05-26 | End: 2024-05-28

## 2024-05-26 NOTE — PROGRESS NOTES
[facility]  Nephrology Progress Note    Sunita Loza Attending:  Chon Chaves MD       Assessment and Plan:    1) ZOHREH- due to diuresis / poor PO intake / ostomy losses. UA c/w UTI. Improving     2) CKD 3- baseline Cr 1.5 mg/dl -3 in part due to remote nephrectomy > 20 yrs ago      3) GNR UTI- culture pending; empiric CTX     4) s/p exp lap / LOS / colon resection / ostomy takedown / creation of colostomy 4/3 (Dr. 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 Fe stores noted -> IV FE + EPO      9) Chronic edema- onset  per family; EF 60% with mild-mod TR / AI + mild MR; RV WNL. Hold diuretic with above    10) Cdif colitis- on PO vanco    D/W daughter by phone yesterday.      Subjective:  Awake pleasant no c/o     Physical Exam:   /54 (BP Location: Right arm)   Pulse 67   Temp 97.8 °F (36.6 °C) (Oral)   Resp 11   Ht 5' (1.524 m)   Wt 81 lb 14.4 oz (37.1 kg)   SpO2 98%   BMI 15.99 kg/m²   Temp (24hrs), Av.9 °F (36.6 °C), Min:97.7 °F (36.5 °C), Max:98.2 °F (36.8 °C)       Intake/Output Summary (Last 24 hours) at 2024 0850  Last data filed at 2024 0500  Gross per 24 hour   Intake --   Output 5 ml   Net -5 ml     Wt Readings from Last 3 Encounters:   24 81 lb 14.4 oz (37.1 kg)   24 81 lb (36.7 kg)   24 92 lb 14.4 oz (42.1 kg)     General: awake   HEENT: No scleral icterus, MMM  Neck: Supple, no MARIA FERNNADA or thyromegaly  Cardiac: Regular rate and rhythm, S1, S2 normal, no murmur or tub  Lungs: Decreased BS at bases bilaterally   Abdomen: Soft, non-tender. + bowel sounds, no palpable organomegaly  Extremities: Without clubbing, cyanosis; no edema  Neurologic: Cranial nerves grossly intact, moving all extremities  Skin: Warm and dry, no rashes       Labs:        Imaging:  All imaging studies  reviewed.    Meds:   Current Facility-Administered Medications   Medication Dose Route Frequency    melatonin tab 3 mg  3 mg Oral Nightly    cefTRIAXone (Rocephin) 1 g in D5W 100 mL IVPB-ADD  1 g Intravenous Q24H    vancomycin (Vancocin) cap 125 mg  125 mg Oral QID    sodium bicarbonate 100 mEq in sterile water 1,100 mL infusion  100 mEq Intravenous Continuous    amiodarone (Pacerone) tab 100 mg  100 mg Oral Daily    apixaban (Eliquis) tab 2.5 mg  2.5 mg Oral BID    aspirin DR tab 81 mg  81 mg Oral Daily    calcium carbonate-vitamin D (Oyster Shell-D) 250-3.125 MG-MCG per tab 2 tablet  2 tablet Oral Daily    cyanocobalamin (Vitamin B12) tab 100 mcg  100 mcg Oral Daily    dilTIAZem ER (Dilacor XR) 24 hr cap 120 mg  120 mg Oral Daily    ferrous sulfate DR tab 325 mg  325 mg Oral Daily with breakfast    pantoprazole (Protonix) DR tab 20 mg  20 mg Oral QAM AC    acetaminophen (Tylenol Extra Strength) tab 500 mg  500 mg Oral Q4H PRN         Questions/concerns were discussed with patient and/or family by bedside.          Fabiana Miller MD  5/26/2024  8:50 AM

## 2024-05-26 NOTE — PLAN OF CARE
Assumed care at 0730.  Patient is alert and oriented x4.  Room air  NSR on tele.  Non cardiac electrolyte protocol  Eliquis for VTE  LLQ colostomy, self care, pt changed apparatus this AM.  Regular diet.  States has some pain in her left knee, declines need for meds, heat packs provided.  Standby assist to bathroom.   Magnesium replaced today.   Will continue current plan of care.   Problem: METABOLIC/FLUID AND ELECTROLYTES - ADULT  Goal: Electrolytes maintained within normal limits  Description: INTERVENTIONS:  - Monitor labs and rhythm and assess patient for signs and symptoms of electrolyte imbalances  - Administer electrolyte replacement as ordered  - Monitor response to electrolyte replacements, including rhythm and repeat lab results as appropriate  - Fluid restriction as ordered  - Instruct patient on fluid and nutrition restrictions as appropriate  Outcome: Progressing  Goal: Hemodynamic stability and optimal renal function maintained  Description: INTERVENTIONS:  - Monitor labs and assess for signs and symptoms of volume excess or deficit  - Monitor intake, output and patient weight  - Monitor urine specific gravity, serum osmolarity and serum sodium as indicated or ordered  - Monitor response to interventions for patient's volume status, including labs, urine output, blood pressure (other measures as available)  - Encourage oral intake as appropriate  - Instruct patient on fluid and nutrition restrictions as appropriate  Outcome: Progressing     Problem: RISK FOR INFECTION - ADULT  Goal: Absence of fever/infection during anticipated neutropenic period  Description: INTERVENTIONS  - Monitor WBC  - Administer growth factors as ordered  - Implement neutropenic guidelines  Outcome: Progressing

## 2024-05-26 NOTE — PROGRESS NOTES
[facility]   part of [facility]    Progress Note     Sunita Loza Patient Status:  Inpatient    1932 MRN [id number]   Location [facility] 3NE-A Attending Chon Chaves MD   Hosp Day # 2 PCP Janell Powell MD     Follow up for: The primary encounter diagnosis was Dehydration. Diagnoses of Syncope, near and ZOHREH (acute kidney injury) (HCC) were also pertinent to this visit.      Interval History/Subjective:     GERI overnight  Afebrile, HDS  Labs pending draw    No new or worsening symptoms. Ostomy still with liquid output     Vital signs:  Temp:  [97.7 °F (36.5 °C)-98.2 °F (36.8 °C)] 97.8 °F (36.6 °C)  Pulse:  [57-94] 57  Resp:  [9-24] 12  BP: (106-138)/(50-67) 138/58  SpO2:  [97 %-100 %] 98 %    Physical Exam:    General: NAD, Comfortable, Nontoxic   Respiratory: CTAB; reg resp rate & effort, no wheezes/crackles  Cardiovascular: S1, S2. Regular rate and rhythm. No murmurs appreciated  Abdomen: Soft, NTND, no guarding/rebound; s/p ostomy with liquid output   Neurologic: No focal neurological deficits.   Extremities: No edema.   Skin: Dry, no rashes, ulcers or lesions     Diagnostic Data:      Labs:  Recent Labs   Lab 24  0929 24  1557 24  0753   WBC 9.4 16.3* 12.7*   HGB 9.9* 10.1* 8.5*   MCV 94.6 92.3 93.8   .0 277.0 241.0       Recent Labs   Lab 24  0929 24  1557 24  0753   GLU 94 100* 88   BUN 84* 82* 72*   CREATSERUM 3.69* 4.10* 3.62*   CA 7.4* 7.5* 7.0*   ALB  --  2.8*  --     140 142   K 4.2 4.2 3.5   * 112 112   CO2 18.0* 18.0* 21.0   ALKPHO  --  103  --    AST  --  23  --    ALT  --  24  --    BILT  --  0.2  --    TP  --  7.3  --        No results for input(s): \"PTP\", \"INR\" in the last 168 hours.    No results for input(s): \"TROP\", \"CK\" in the last 168 hours.         Imaging: Imaging data reviewed in Epic.    Medications:    melatonin  3 mg Oral Nightly    cefTRIAXone  1 g Intravenous Q24H    vancomycin  125 mg Oral QID     amiodarone  100 mg Oral Daily    apixaban  2.5 mg Oral BID    aspirin  81 mg Oral Daily    calcium carbonate-vitamin D  2 tablet Oral Daily    cyanocobalamin  100 mcg Oral Daily    dilTIAZem ER  120 mg Oral Daily    ferrous sulfate  325 mg Oral Daily with breakfast    pantoprazole  20 mg Oral QAM AC       ASSESSMENT / PLAN:     Sunita Loza Is a a 89 year old female who presented with weakness, ZOHREH on CKD3 likely due to dehydration secondary to C Difficile colitis    Problem List / Diagnoses    ZOHREH on CKD3  C Difficile Colitis  Abnormal UA  HTN  pAF    Plan    ZOHREH on CKD3 -- improving  -- 3.7 on admissio, peaked at 4.1 5/24, now 3.6 s/p IVF, repeat 5/26 pending   -- likely prerenal due to worsening ostomy output  -- cont IVF, goal 1:1 to match ostomy output   -- maintain strict I&Os  -- renaly dose all meds, avoid nephrotoxic agents     C Difficile Colitis  -- C Diff PCR positive, start empiric vancomycin QID  -- continue for minimum 7 days following completion of antibiotics (below)    Abnormal UA  -- negative nitrities & pt without urinary symptoms, but started on rocephin given concomitant ZOHREH & patient's underlying dementia making her an unreliable historian  -- f/u urine cultures, GNRs, speciation & sensitivities pending     HTN  pAF  -- resume home meds, eliquis for AC   -- tele     DVT Mechanical Prophylaxis:   SCDs,    DVT Pharmacologic Prophylaxis   Medication    apixaban (Eliquis) tab 2.5 mg         DVT Pharmacologic prophylaxis: Aspirin 81 mg    Code Status: Full Code    Dispo: inpatient; ALEXANDRU 1-2 days pending continued clinical improvement, nephrology clearance     Plan of care discussed with patient and/or family at bedside.    GABINO Chaves MD  [facility]   [phone]      This note was created using voice recognition technology. It may include inadvertent transcriptional errors. Any such errors should be contextually interpreted and should not be taken to alter the content or the meaning.      Note to Patient: The 21st Century Cures Act makes medical notes like these available to patients in the interest of transparency. However, be advised this is a medical document. It is intended as peer to peer communication. It is written in medical language and may contain abbreviations or verbiage that are unfamiliar. It may appear blunt or direct. Medical documents are intended to carry relevant information, facts as evident, and the clinical opinion of the practitioner and not intended to be the primary source of your information.  Please refer directly to myself or clinical staff for information regarding plan of care.

## 2024-05-26 NOTE — SPIRITUAL CARE NOTE
Spiritual Care Visit Note    Patient Name: Sunita Loza Date of Spiritual Care Visit: 24   : 1932 Primary Dx: Dehydration       Referred By: Referral From: Patient    Spiritual Care Taxonomy:    Intended Effects: Karen affirmation    Methods: Collaborate with care team member;Offer spiritual/Samaritan support    Interventions: [place]    Visit Type/Summary:     - Spiritual Care: Consulted with RN prior to visit. Provided support for Patient's spiritual/Samaritan requests. Provided Communion and verified NPO status. Offered prayer.  remains available for follow up.    Spiritual Care support can be requested via an Epic consult. For urgent/immediate needs, please contact the On Call  at: Edward: ext [phone]

## 2024-05-26 NOTE — PLAN OF CARE
Assumed pt care at 1930  Aox4, RA, VSS  Tele-NSR, ST, Afib  Enteric/Contact PLUS Isolation-Cdiff and MRSA +  Reported pain-administered PRN Tylenol  Reported no n/v/d  Regular diet  Noncardiac electrolyte replacement protocol  Ambulatory  Continent- Colostomy bag in place  Safety precautions in place  Bed in lowest position and call light within reach  Updated with plan of care  All needs met at this time  Will continue plan of care            Problem: METABOLIC/FLUID AND ELECTROLYTES - ADULT  Goal: Electrolytes maintained within normal limits  Description: INTERVENTIONS:  - Monitor labs and rhythm and assess patient for signs and symptoms of electrolyte imbalances  - Administer electrolyte replacement as ordered  - Monitor response to electrolyte replacements, including rhythm and repeat lab results as appropriate  - Fluid restriction as ordered  - Instruct patient on fluid and nutrition restrictions as appropriate  Outcome: Progressing  Goal: Hemodynamic stability and optimal renal function maintained  Description: INTERVENTIONS:  - Monitor labs and assess for signs and symptoms of volume excess or deficit  - Monitor intake, output and patient weight  - Monitor urine specific gravity, serum osmolarity and serum sodium as indicated or ordered  - Monitor response to interventions for patient's volume status, including labs, urine output, blood pressure (other measures as available)  - Encourage oral intake as appropriate  - Instruct patient on fluid and nutrition restrictions as appropriate  Outcome: Progressing     Problem: RISK FOR INFECTION - ADULT  Goal: Absence of fever/infection during anticipated neutropenic period  Description: INTERVENTIONS  - Monitor WBC  - Administer growth factors as ordered  - Implement neutropenic guidelines  Outcome: Progressing

## 2024-05-27 LAB
ANION GAP SERPL CALC-SCNC: 5 MMOL/L (ref 0–18)
BUN BLD-MCNC: 43 MG/DL (ref 9–23)
CALCIUM BLD-MCNC: 7.4 MG/DL (ref 8.5–10.1)
CHLORIDE SERPL-SCNC: 114 MMOL/L (ref 98–112)
CO2 SERPL-SCNC: 25 MMOL/L (ref 21–32)
CREAT BLD-MCNC: 2.57 MG/DL
EGFRCR SERPLBLD CKD-EPI 2021: 17 ML/MIN/1.73M2 (ref 60–?)
GLUCOSE BLD-MCNC: 102 MG/DL (ref 70–99)
MAGNESIUM SERPL-MCNC: 2.2 MG/DL (ref 1.6–2.6)
OSMOLALITY SERPL CALC.SUM OF ELEC: 309 MOSM/KG (ref 275–295)
POTASSIUM SERPL-SCNC: 4.1 MMOL/L (ref 3.5–5.1)
POTASSIUM SERPL-SCNC: 4.1 MMOL/L (ref 3.5–5.1)
SODIUM SERPL-SCNC: 144 MMOL/L (ref 136–145)

## 2024-05-27 PROCEDURE — 99233 SBSQ HOSP IP/OBS HIGH 50: CPT | Performed by: INTERNAL MEDICINE

## 2024-05-27 NOTE — PLAN OF CARE
Assumed pt care at 1930  Aox4, RA, VSS  Tele-NSR, ST, Afib  Enteric/Contact PLUS Isolation-Cdiff and MRSA +  Reported pain  Reported no n/v/d  Regular diet  Noncardiac electrolyte replacement protocol  Ambulatory  Continent- Colostomy bag replaced by pt  Safety precautions in place  Bed in lowest position and call light within reach  Updated with plan of care  All needs met at this time  Will continue plan of care          Problem: METABOLIC/FLUID AND ELECTROLYTES - ADULT  Goal: Electrolytes maintained within normal limits  Description: INTERVENTIONS:  - Monitor labs and rhythm and assess patient for signs and symptoms of electrolyte imbalances  - Administer electrolyte replacement as ordered  - Monitor response to electrolyte replacements, including rhythm and repeat lab results as appropriate  - Fluid restriction as ordered  - Instruct patient on fluid and nutrition restrictions as appropriate  Outcome: Progressing  Goal: Hemodynamic stability and optimal renal function maintained  Description: INTERVENTIONS:  - Monitor labs and assess for signs and symptoms of volume excess or deficit  - Monitor intake, output and patient weight  - Monitor urine specific gravity, serum osmolarity and serum sodium as indicated or ordered  - Monitor response to interventions for patient's volume status, including labs, urine output, blood pressure (other measures as available)  - Encourage oral intake as appropriate  - Instruct patient on fluid and nutrition restrictions as appropriate  Outcome: Progressing     Problem: RISK FOR INFECTION - ADULT  Goal: Absence of fever/infection during anticipated neutropenic period  Description: INTERVENTIONS  - Monitor WBC  - Administer growth factors as ordered  - Implement neutropenic guidelines  Outcome: Progressing

## 2024-05-27 NOTE — PLAN OF CARE
Assumed care at 0730  A/O x 3 - disoriented to time, states its July 2021. At times disoriented to situation as well.   RA  NSR on tele, hx afib. Eliquis. PACs noted as well.  Denies pain  R forearm PIV infusing 0.9 @ 75 ml/hr  Non-cardiac EP, nephro following  Rocephin q24. Discontinued today.  Po Vanco for positive c.diff. colostomy with loose output.   Strict I/O implemented   Urine red - Dr. Anastacio ayala, already aware. No new orders.  Up SBA, walker for longer distances in hallway.  Urine cultures positive for ESBL today - Dr. Anastacio ayala. No new orders.  All needs met. Pt updated. Will continue with plan of care.    1705: report given to ALCON Gamez. Pt transferring to room 410.  1840: pt transferred.  Problem: METABOLIC/FLUID AND ELECTROLYTES - ADULT  Goal: Electrolytes maintained within normal limits  Description: INTERVENTIONS:  - Monitor labs and rhythm and assess patient for signs and symptoms of electrolyte imbalances  - Administer electrolyte replacement as ordered  - Monitor response to electrolyte replacements, including rhythm and repeat lab results as appropriate  - Fluid restriction as ordered  - Instruct patient on fluid and nutrition restrictions as appropriate  Outcome: Progressing  Goal: Hemodynamic stability and optimal renal function maintained  Description: INTERVENTIONS:  - Monitor labs and assess for signs and symptoms of volume excess or deficit  - Monitor intake, output and patient weight  - Monitor urine specific gravity, serum osmolarity and serum sodium as indicated or ordered  - Monitor response to interventions for patient's volume status, including labs, urine output, blood pressure (other measures as available)  - Encourage oral intake as appropriate  - Instruct patient on fluid and nutrition restrictions as appropriate  Outcome: Progressing     Problem: RISK FOR INFECTION - ADULT  Goal: Absence of fever/infection during anticipated neutropenic period  Description:  INTERVENTIONS  - Monitor WBC  - Administer growth factors as ordered  - Implement neutropenic guidelines  Outcome: Progressing

## 2024-05-27 NOTE — PROGRESS NOTES
[facility]   part of [facility]    Progress Note     Sunita Loza Patient Status:  Inpatient    1932 MRN [id number]   Location [facility] 3NE-A Attending Chon Chaves MD   Hosp Day # 3 PCP Janell Powell MD     Follow up for: The primary encounter diagnosis was Dehydration. Diagnoses of Syncope, near and ZOHREH (acute kidney injury) (HCC) were also pertinent to this visit.      Interval History/Subjective:     GERI overnight  Afebrile, HDS, Stool output not being recorded  Labs pending     Reports new hematuria this morning, no clots noted.  No new or worsening symptoms. Ostomy still with liquid output     Vital signs:  Temp:  [97.2 °F (36.2 °C)-97.9 °F (36.6 °C)] 97.2 °F (36.2 °C)  Pulse:  [] 67  Resp:  [11-18] 17  BP: (120-138)/(54-91) 135/91  SpO2:  [94 %-100 %] 99 %    Physical Exam:    General: NAD, Comfortable, Nontoxic   Respiratory: CTAB; reg resp rate & effort, no wheezes/crackles  Cardiovascular: S1, S2. Regular rate and rhythm. No murmurs appreciated  Abdomen: Soft, NTND, no guarding/rebound; s/p ostomy with liquid output   Neurologic: No focal neurological deficits.   Extremities: No edema.   Skin: Dry, no rashes, ulcers or lesions     Diagnostic Data:      Labs:  Recent Labs   Lab 24  0929 24  1557 24  0753 24  0941   WBC 9.4 16.3* 12.7* 10.1   HGB 9.9* 10.1* 8.5* 8.9*   MCV 94.6 92.3 93.8 94.1   .0 277.0 241.0 231.0       Recent Labs   Lab 24  1557 24  0753 24  0942   * 88 95   BUN 82* 72* 56*   CREATSERUM 4.10* 3.62* 2.79*   CA 7.5* 7.0* 7.0*   ALB 2.8*  --   --     142 142   K 4.2 3.5 3.2*    112 108   CO2 18.0* 21.0 29.0   ALKPHO 103  --   --    AST 23  --   --    ALT 24  --   --    BILT 0.2  --   --    TP 7.3  --   --        No results for input(s): \"PTP\", \"INR\" in the last 168 hours.    No results for input(s): \"TROP\", \"CK\" in the last 168 hours.         Imaging: Imaging data reviewed in  Epic.    Medications:    melatonin  3 mg Oral Nightly    cefTRIAXone  1 g Intravenous Q24H    vancomycin  125 mg Oral QID    amiodarone  100 mg Oral Daily    apixaban  2.5 mg Oral BID    aspirin  81 mg Oral Daily    calcium carbonate-vitamin D  2 tablet Oral Daily    cyanocobalamin  100 mcg Oral Daily    dilTIAZem ER  120 mg Oral Daily    ferrous sulfate  325 mg Oral Daily with breakfast    pantoprazole  20 mg Oral QAM AC       ASSESSMENT / PLAN:     Sunita Loza Is a a 89 year old female who presented with weakness, ZOHREH on CKD3 likely due to dehydration secondary to C Difficile colitis    Problem List / Diagnoses    ZOHREH on CKD3  C Difficile Colitis  Abnormal UA  HTN  pAF    Plan    ZOHREH on CKD3 -- improving  -- 3.7 on admissio, peaked at 4.1 5/24, now 3.6 s/p IVF, repeat 5/26 pending   -- likely prerenal due to worsening ostomy output  -- cont IVF, goal 1:1 to match ostomy output   -- maintain strict I&Os  -- renaly dose all meds, avoid nephrotoxic agents     C Difficile Colitis  -- C Diff PCR positive, start empiric vancomycin QID  -- continue for minimum 7 days following completion of antibiotics (below)    Abnormal UA  -- negative nitrities & pt without urinary symptoms, but started on rocephin given concomitant ZOHREH & patient's underlying dementia making her an unreliable historian  -- Urine cultures with ESBL  - Given lack of urinary symptoms, resolution of leukocytosis and improvement in renal function despite an adequate antimicrobial coverage, this likely represents colonization and not true UTI.    -Given her known C. difficile colitis and known resistances, will elect not to treat as risks likely outweigh benefits.  This was discussed with patient and her son, Uday, at bedside and all questions answered    Hematuria  - Noted on admission UA  -No clots, patient and family instructed to inform staff if she develops any urinary symptoms or retention, or notes clots  -Hemoglobin has been stable,  monitor    HTN  pAF  -- resume home meds, eliquis for AC   -- tele     DVT Mechanical Prophylaxis:   SCDs,    DVT Pharmacologic Prophylaxis   Medication    apixaban (Eliquis) tab 2.5 mg         DVT Pharmacologic prophylaxis: Aspirin 81 mg    Code Status: Full Code    Dispo: inpatient; ALEXANDRU 1-2 days pending continued clinical improvement, nephrology clearance     Plan of care discussed with patient and/or family at bedside.    GABINO Chaves MD  [facility]   [phone]      This note was created using voice recognition technology. It may include inadvertent transcriptional errors. Any such errors should be contextually interpreted and should not be taken to alter the content or the meaning.     Note to Patient: The 21st Century Cures Act makes medical notes like these available to patients in the interest of transparency. However, be advised this is a medical document. It is intended as peer to peer communication. It is written in medical language and may contain abbreviations or verbiage that are unfamiliar. It may appear blunt or direct. Medical documents are intended to carry relevant information, facts as evident, and the clinical opinion of the practitioner and not intended to be the primary source of your information.  Please refer directly to myself or clinical staff for information regarding plan of care.

## 2024-05-27 NOTE — PROGRESS NOTES
[facility]  Nephrology Progress Note    Sunita Loza Attending:  Chon Chaves MD       Assessment and Plan:    1) ZOHREH- due to diuresis / poor PO intake / ostomy losses. UA c/w UTI. Improving daily- continue IVF x 24 hrs     2) CKD 3- baseline Cr 1.5 mg/dl 2022-3 in part due to remote nephrectomy > 20 yrs ago      3) GNR UTI- culture pending; empiric CTX     4) s/p exp lap / LOS / colon resection / ostomy takedown / creation of colostomy 4/3 (Dr. 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 Fe stores noted -> IV FE + EPO      9) Chronic edema- onset  per family; EF 60% with mild-mod TR / AI + mild MR; RV WNL. Hold diuretic with above    10) Cdif colitis- on PO vanco      Subjective:  Awake pleasant no c/o     Physical Exam:   /65 (BP Location: Left arm)   Pulse 64   Temp 97.8 °F (36.6 °C) (Oral)   Resp 16   Ht 5' (1.524 m)   Wt 81 lb 14.4 oz (37.1 kg)   SpO2 100%   BMI 15.99 kg/m²   Temp (24hrs), Av.7 °F (36.5 °C), Min:97.2 °F (36.2 °C), Max:97.9 °F (36.6 °C)       Intake/Output Summary (Last 24 hours) at 2024 0757  Last data filed at 2024 0530  Gross per 24 hour   Intake --   Output 300 ml   Net -300 ml     Wt Readings from Last 3 Encounters:   24 81 lb 14.4 oz (37.1 kg)   24 81 lb (36.7 kg)   24 92 lb 14.4 oz (42.1 kg)     General: awake   HEENT: No scleral icterus, MMM  Neck: Supple, no MARIA FERNANDA or thyromegaly  Cardiac: Regular rate and rhythm, S1, S2 normal, no murmur or tub  Lungs: Decreased BS at bases bilaterally   Abdomen: Soft, non-tender. + bowel sounds, no palpable organomegaly  Extremities: Without clubbing, cyanosis; no edema  Neurologic: Cranial nerves grossly intact, moving all extremities  Skin: Warm and dry, no rashes       Labs:   Lab Results   Component Value Date    WBC  10.1 05/26/2024    HGB 8.9 05/26/2024    HCT 28.6 05/26/2024    .0 05/26/2024    CREATSERUM 2.57 05/27/2024    BUN 43 05/27/2024     05/27/2024    K 4.1 05/27/2024    K 4.1 05/27/2024     05/27/2024    CO2 25.0 05/27/2024     05/27/2024    CA 7.4 05/27/2024    MG 2.2 05/27/2024       Imaging:  All imaging studies reviewed.    Meds:   Current Facility-Administered Medications   Medication Dose Route Frequency    melatonin tab 3 mg  3 mg Oral Nightly    sodium chloride 0.9% infusion   Intravenous Continuous    cefTRIAXone (Rocephin) 1 g in D5W 100 mL IVPB-ADD  1 g Intravenous Q24H    vancomycin (Vancocin) cap 125 mg  125 mg Oral QID    amiodarone (Pacerone) tab 100 mg  100 mg Oral Daily    apixaban (Eliquis) tab 2.5 mg  2.5 mg Oral BID    aspirin DR tab 81 mg  81 mg Oral Daily    calcium carbonate-vitamin D (Oyster Shell-D) 250-3.125 MG-MCG per tab 2 tablet  2 tablet Oral Daily    cyanocobalamin (Vitamin B12) tab 100 mcg  100 mcg Oral Daily    dilTIAZem ER (Dilacor XR) 24 hr cap 120 mg  120 mg Oral Daily    ferrous sulfate DR tab 325 mg  325 mg Oral Daily with breakfast    pantoprazole (Protonix) DR tab 20 mg  20 mg Oral QAM AC    acetaminophen (Tylenol Extra Strength) tab 500 mg  500 mg Oral Q4H PRN         Questions/concerns were discussed with patient and/or family by bedside.          Fabiana Miller MD  5/27/2024  753 AM

## 2024-05-28 PROBLEM — I50.32 CHRONIC HEART FAILURE WITH PRESERVED EJECTION FRACTION (HCC): Status: ACTIVE | Noted: 2024-05-28

## 2024-05-28 LAB
ANION GAP SERPL CALC-SCNC: 5 MMOL/L (ref 0–18)
BASOPHILS # BLD AUTO: 0.05 X10(3) UL (ref 0–0.2)
BASOPHILS NFR BLD AUTO: 0.5 %
BUN BLD-MCNC: 31 MG/DL (ref 9–23)
CALCIUM BLD-MCNC: 8.4 MG/DL (ref 8.5–10.1)
CHLORIDE SERPL-SCNC: 117 MMOL/L (ref 98–112)
CO2 SERPL-SCNC: 22 MMOL/L (ref 21–32)
CREAT BLD-MCNC: 1.88 MG/DL
EGFRCR SERPLBLD CKD-EPI 2021: 25 ML/MIN/1.73M2 (ref 60–?)
EOSINOPHIL # BLD AUTO: 0.43 X10(3) UL (ref 0–0.7)
EOSINOPHIL NFR BLD AUTO: 4.6 %
ERYTHROCYTE [DISTWIDTH] IN BLOOD BY AUTOMATED COUNT: 19.3 %
GLUCOSE BLD-MCNC: 94 MG/DL (ref 70–99)
HCT VFR BLD AUTO: 28.8 %
HGB BLD-MCNC: 8.8 G/DL
IMM GRANULOCYTES # BLD AUTO: 0.2 X10(3) UL (ref 0–1)
IMM GRANULOCYTES NFR BLD: 2.1 %
LYMPHOCYTES # BLD AUTO: 1.23 X10(3) UL (ref 1–4)
LYMPHOCYTES NFR BLD AUTO: 13 %
MCH RBC QN AUTO: 30 PG (ref 26–34)
MCHC RBC AUTO-ENTMCNC: 30.6 G/DL (ref 31–37)
MCV RBC AUTO: 98.3 FL
MONOCYTES # BLD AUTO: 0.65 X10(3) UL (ref 0.1–1)
MONOCYTES NFR BLD AUTO: 6.9 %
NEUTROPHILS # BLD AUTO: 6.88 X10 (3) UL (ref 1.5–7.7)
NEUTROPHILS # BLD AUTO: 6.88 X10(3) UL (ref 1.5–7.7)
NEUTROPHILS NFR BLD AUTO: 72.9 %
OSMOLALITY SERPL CALC.SUM OF ELEC: 304 MOSM/KG (ref 275–295)
PLATELET # BLD AUTO: 229 10(3)UL (ref 150–450)
POTASSIUM SERPL-SCNC: 3.9 MMOL/L (ref 3.5–5.1)
RBC # BLD AUTO: 2.93 X10(6)UL
SODIUM SERPL-SCNC: 144 MMOL/L (ref 136–145)
WBC # BLD AUTO: 9.4 X10(3) UL (ref 4–11)

## 2024-05-28 PROCEDURE — 99233 SBSQ HOSP IP/OBS HIGH 50: CPT | Performed by: INTERNAL MEDICINE

## 2024-05-28 RX ORDER — POTASSIUM CHLORIDE 20 MEQ/1
20 TABLET, EXTENDED RELEASE ORAL ONCE
Status: COMPLETED | OUTPATIENT
Start: 2024-05-28 | End: 2024-05-28

## 2024-05-28 NOTE — PROGRESS NOTES
[facility]   part of [facility]    Progress Note     Sunita Loza Patient Status:  Inpatient    1932 MRN [id number]   Location [facility] 3NE-A Attending Chon Chaves MD   Hosp Day # 4 PCP Janell Powell MD     Follow up for: The primary encounter diagnosis was Dehydration. Diagnoses of Syncope, near and ZOHREH (acute kidney injury) (HCC) were also pertinent to this visit.      Interval History/Subjective:     GERI overnight  Afebrile, HDS  Creatinine continues to improve. 1.88 today   24-hour ostomy output approximately 400 mL  Hemoglobin stable    Feels well at baseline, in good spirits. Hopeful to go home soon     Vital signs:  Temp:  [97.4 °F (36.3 °C)-97.7 °F (36.5 °C)] 97.6 °F (36.4 °C)  Pulse:  [49-61] 56  Resp:  [16-18] 17  BP: (134-162)/(54-58) 157/54  SpO2:  [97 %-100 %] 99 %    Physical Exam:    General: NAD, Comfortable, Nontoxic   Respiratory: CTAB; reg resp rate & effort, no wheezes/crackles  Cardiovascular: S1, S2. Regular rate and rhythm. No murmurs appreciated  Abdomen: Soft, NTND, no guarding/rebound; s/p ostomy with soft, forming stools, improved from previous   Neurologic: No focal neurological deficits.   Extremities: No edema.   Skin: Dry, no rashes, ulcers or lesions     Diagnostic Data:      Labs:  Recent Labs   Lab 24  0929 24  1557 24  0753 24  0941 24  0712   WBC 9.4 16.3* 12.7* 10.1 9.4   HGB 9.9* 10.1* 8.5* 8.9* 8.8*   MCV 94.6 92.3 93.8 94.1 98.3   .0 277.0 241.0 231.0 229.0       Recent Labs   Lab 24  1557 24  0753 24  0942 24  0651 24  0712   *   < > 95 102* 94   BUN 82*   < > 56* 43* 31*   CREATSERUM 4.10*   < > 2.79* 2.57* 1.88*   CA 7.5*   < > 7.0* 7.4* 8.4*   ALB 2.8*  --   --   --   --       < > 142 144 144   K 4.2   < > 3.2* 4.1  4.1 3.9      < > 108 114* 117*   CO2 18.0*   < > 29.0 25.0 22.0   ALKPHO 103  --   --   --   --    AST 23  --   --   --   --    ALT 24  --    --   --   --    BILT 0.2  --   --   --   --    TP 7.3  --   --   --   --     < > = values in this interval not displayed.       No results for input(s): \"PTP\", \"INR\" in the last 168 hours.    No results for input(s): \"TROP\", \"CK\" in the last 168 hours.         Imaging: Imaging data reviewed in Epic.    Medications:    melatonin  3 mg Oral Nightly    vancomycin  125 mg Oral QID    amiodarone  100 mg Oral Daily    apixaban  2.5 mg Oral BID    aspirin  81 mg Oral Daily    calcium carbonate-vitamin D  2 tablet Oral Daily    cyanocobalamin  100 mcg Oral Daily    dilTIAZem ER  120 mg Oral Daily    ferrous sulfate  325 mg Oral Daily with breakfast    pantoprazole  20 mg Oral QAM AC       ASSESSMENT / PLAN:     Sunita Loza Is a a 89 year old female who presented with weakness, ZOHREH on CKD3 likely due to dehydration secondary to C Difficile colitis    Problem List / Diagnoses    ZOHREH on CKD3  C Difficile Colitis  Abnormal UA  HTN  pAF    Plan    ZOHREH on CKD3 -- improving  -- 3.7 on admission, peaked at 4.1 5/24, now down to 1.88   -- likely prerenal due to worsening ostomy output  -- IVF stopped 5/28, monitor, encouraged PO intake   -- maintain strict I&Os  -- renaly dose all meds, avoid nephrotoxic agents     C Difficile Colitis  -- C Diff PCR positive, start empiric vancomycin QID  -- continue for minimum 7 days following completion of antibiotics (below)    Abnormal UA  -- negative nitrities & pt without urinary symptoms, but started on rocephin given concomitant ZOHREH & patient's underlying dementia making her an unreliable historian  -- Urine cultures with ESBL  - Given lack of urinary symptoms, resolution of leukocytosis and improvement in renal function despite an adequate antimicrobial coverage, this likely represents colonization and not true UTI.    -Given her known C. difficile colitis and known resistances, will elect not to treat as risks likely outweigh benefits.  This was discussed with patient and her son, Uday,  at bedside and all questions answered    Hematuria  - Noted on admission UA  -No clots, patient and family instructed to inform staff if she develops any urinary symptoms or retention, or notes clots  -Hemoglobin has been stable, monitor    HTN  pAF  -- resume home meds, eliquis for AC   -- tele     DVT Mechanical Prophylaxis:   SCDs,    DVT Pharmacologic Prophylaxis   Medication    apixaban (Eliquis) tab 2.5 mg         DVT Pharmacologic prophylaxis: Aspirin 81 mg    Code Status: Full Code    Dispo: inpatient; ALEXANDRU 1-2 days pending continued clinical improvement, stable Cr off IVF    Plan of care discussed with patient and/or family at bedside.    GABINO Chaves MD  [facility]   [phone]      This note was created using voice recognition technology. It may include inadvertent transcriptional errors. Any such errors should be contextually interpreted and should not be taken to alter the content or the meaning.     Note to Patient: The 21st Century Cures Act makes medical notes like these available to patients in the interest of transparency. However, be advised this is a medical document. It is intended as peer to peer communication. It is written in medical language and may contain abbreviations or verbiage that are unfamiliar. It may appear blunt or direct. Medical documents are intended to carry relevant information, facts as evident, and the clinical opinion of the practitioner and not intended to be the primary source of your information.  Please refer directly to myself or clinical staff for information regarding plan of care.

## 2024-05-28 NOTE — PLAN OF CARE
Pt A/O x4. VSS. Afebrile. Pt denied pain throughout day. Medications admin per MAR. Pt ambulated safely to chair and back to bed w/ assist. Enteric/contact iso precautions maintained. Colostomy in place, CDI. Fall and safety precautions in place. Call light within reach.

## 2024-05-28 NOTE — PLAN OF CARE
Received care at 1800. VSS, remained afebrile. A/o x4, forgetful at times. IVF infusing. Colostomy bag leaked, changed bag. Fall precautions in place. Call light within reach.     Problem: METABOLIC/FLUID AND ELECTROLYTES - ADULT  Goal: Electrolytes maintained within normal limits  Description: INTERVENTIONS:  - Monitor labs and rhythm and assess patient for signs and symptoms of electrolyte imbalances  - Administer electrolyte replacement as ordered  - Monitor response to electrolyte replacements, including rhythm and repeat lab results as appropriate  - Fluid restriction as ordered  - Instruct patient on fluid and nutrition restrictions as appropriate  Outcome: Progressing  Goal: Hemodynamic stability and optimal renal function maintained  Description: INTERVENTIONS:  - Monitor labs and assess for signs and symptoms of volume excess or deficit  - Monitor intake, output and patient weight  - Monitor urine specific gravity, serum osmolarity and serum sodium as indicated or ordered  - Monitor response to interventions for patient's volume status, including labs, urine output, blood pressure (other measures as available)  - Encourage oral intake as appropriate  - Instruct patient on fluid and nutrition restrictions as appropriate  Outcome: Progressing     Problem: RISK FOR INFECTION - ADULT  Goal: Absence of fever/infection during anticipated neutropenic period  Description: INTERVENTIONS  - Monitor WBC  - Administer growth factors as ordered  - Implement neutropenic guidelines  Outcome: Progressing

## 2024-05-28 NOTE — PLAN OF CARE
A&Ox4, VSS and afebrile  Meds given as per MAR  0.9% NS @ 75ml/hr  No c/o pain or discomfort.   Pt has been emptying her colostomy bag.  Contact and safety precautions in place, bed at lowest position, bed alarm on, call light within pt's reach.     Problem: CARDIOVASCULAR - ADULT  Goal: Maintains optimal cardiac output and hemodynamic stability  Description: INTERVENTIONS:  - Monitor vital signs, rhythm, and trends  - Monitor for bleeding, hypotension and signs of decreased cardiac output  - Evaluate effectiveness of vasoactive medications to optimize hemodynamic stability  - Monitor arterial and/or venous puncture sites for bleeding and/or hematoma  - Assess quality of pulses, skin color and temperature  - Assess for signs of decreased coronary artery perfusion - ex. Angina  - Evaluate fluid balance, assess for edema, trend weights  Outcome: Progressing  Goal: Absence of cardiac arrhythmias or at baseline  Description: INTERVENTIONS:  - Continuous cardiac monitoring, monitor vital signs, obtain 12 lead EKG if indicated  - Evaluate effectiveness of antiarrhythmic and heart rate control medications as ordered  - Initiate emergency measures for life threatening arrhythmias  - Monitor electrolytes and administer replacement therapy as ordered  Outcome: Progressing     Problem: CARDIOVASCULAR - ADULT  Goal: Absence of cardiac arrhythmias or at baseline  Description: INTERVENTIONS:  - Continuous cardiac monitoring, monitor vital signs, obtain 12 lead EKG if indicated  - Evaluate effectiveness of antiarrhythmic and heart rate control medications as ordered  - Initiate emergency measures for life threatening arrhythmias  - Monitor electrolytes and administer replacement therapy as ordered  Outcome: Progressing

## 2024-05-29 LAB
ANION GAP SERPL CALC-SCNC: 5 MMOL/L (ref 0–18)
ANION GAP SERPL CALC-SCNC: 6 MMOL/L (ref 0–18)
BASOPHILS # BLD AUTO: 0.05 X10(3) UL (ref 0–0.2)
BASOPHILS NFR BLD AUTO: 0.4 %
BUN BLD-MCNC: 30 MG/DL (ref 9–23)
BUN BLD-MCNC: 32 MG/DL (ref 9–23)
CALCIUM BLD-MCNC: 8.8 MG/DL (ref 8.5–10.1)
CALCIUM BLD-MCNC: 8.9 MG/DL (ref 8.5–10.1)
CHLORIDE SERPL-SCNC: 116 MMOL/L (ref 98–112)
CHLORIDE SERPL-SCNC: 116 MMOL/L (ref 98–112)
CO2 SERPL-SCNC: 21 MMOL/L (ref 21–32)
CO2 SERPL-SCNC: 22 MMOL/L (ref 21–32)
CREAT BLD-MCNC: 2.12 MG/DL
CREAT BLD-MCNC: 2.18 MG/DL
EGFRCR SERPLBLD CKD-EPI 2021: 21 ML/MIN/1.73M2 (ref 60–?)
EGFRCR SERPLBLD CKD-EPI 2021: 22 ML/MIN/1.73M2 (ref 60–?)
EOSINOPHIL # BLD AUTO: 0.51 X10(3) UL (ref 0–0.7)
EOSINOPHIL NFR BLD AUTO: 4.1 %
ERYTHROCYTE [DISTWIDTH] IN BLOOD BY AUTOMATED COUNT: 19.4 %
GLUCOSE BLD-MCNC: 148 MG/DL (ref 70–99)
GLUCOSE BLD-MCNC: 87 MG/DL (ref 70–99)
HCT VFR BLD AUTO: 30.6 %
HGB BLD-MCNC: 9.2 G/DL
IMM GRANULOCYTES # BLD AUTO: 0.16 X10(3) UL (ref 0–1)
IMM GRANULOCYTES NFR BLD: 1.3 %
LYMPHOCYTES # BLD AUTO: 1.65 X10(3) UL (ref 1–4)
LYMPHOCYTES NFR BLD AUTO: 13.3 %
MCH RBC QN AUTO: 29.8 PG (ref 26–34)
MCHC RBC AUTO-ENTMCNC: 30.1 G/DL (ref 31–37)
MCV RBC AUTO: 99 FL
MONOCYTES # BLD AUTO: 0.81 X10(3) UL (ref 0.1–1)
MONOCYTES NFR BLD AUTO: 6.5 %
NEUTROPHILS # BLD AUTO: 9.19 X10 (3) UL (ref 1.5–7.7)
NEUTROPHILS # BLD AUTO: 9.19 X10(3) UL (ref 1.5–7.7)
NEUTROPHILS NFR BLD AUTO: 74.4 %
OSMOLALITY SERPL CALC.SUM OF ELEC: 302 MOSM/KG (ref 275–295)
OSMOLALITY SERPL CALC.SUM OF ELEC: 305 MOSM/KG (ref 275–295)
PLATELET # BLD AUTO: 243 10(3)UL (ref 150–450)
POTASSIUM SERPL-SCNC: 4.3 MMOL/L (ref 3.5–5.1)
POTASSIUM SERPL-SCNC: 4.7 MMOL/L (ref 3.5–5.1)
RBC # BLD AUTO: 3.09 X10(6)UL
SODIUM SERPL-SCNC: 143 MMOL/L (ref 136–145)
SODIUM SERPL-SCNC: 143 MMOL/L (ref 136–145)
WBC # BLD AUTO: 12.4 X10(3) UL (ref 4–11)

## 2024-05-29 PROCEDURE — 99233 SBSQ HOSP IP/OBS HIGH 50: CPT | Performed by: INTERNAL MEDICINE

## 2024-05-29 NOTE — DIETARY MALNUTRITION NOTE
[facility]   part of [facility]    NUTRITION ASSESSMENT    Pt meets severe malnutrition criteria at this time.    CRITERIA FOR MALNUTRITION DIAGNOSIS:  Criteria for severe malnutrition diagnosis: acute illness/injury related to wt loss greater than 5% in 1 month and energy intake less than 50% for greater than 5 days      NUTRITION INTERVENTION:    RD nutrition Care Plan- Encouraged increased PO intake and Initiated ONS (oral nutritional supplements)  Meal and Snacks - Monitor and encourage adequate PO intake.   Medical Food Supplements - Ensure Plus High Protein BID. Rationale/use for oral supplements discussed.      PATIENT STATUS: Poor PO and unintentional weight loss (dry + fluid) PTA    5/29/24 89 yo female. Admitted with dehydration.  Pt reported poor appetite and abdominal pain on admission. Was noted to have liquid stoma output. Cdiff and ESBL +.  Seen today for low BMI, weight loss noted since last admission on April 2024 - dry weight + fluid loss. Pt reports good appetite currently, received breakfast during interview. 100% PO intake documented on previous meals. Pt takes ONS daily at home, reports tolerating them and not noticing changes on ostomy output when taking them. Agreeable to getting ONS  during admission, recommended taking ONS after/between meals.   Pt reports having 3 meals per day PTA and facility providing dinner meal.    Pmhx - HTN, PAF, teresa syndrome with recurrent cancer, colon resenction s/p colostomy, recent ex lap.    ANTHROPOMETRICS:  Ht: 152.4 cm (5')  Wt: 37.1 kg (81 lb 14.4 oz).   BMI: Body mass index is 15.99 kg/m².  IBW: 47.7 kg (105lbs)      WEIGHT HISTORY:   Weight loss: Yes, Severe Wt loss of 11 lbs, 13%, over 4  weeks     Pt admitted dehydrated, some weight loss may be fluid related vs dry weight. Recommend obtaining patient's current weight after hydration, discussed with ALCON Hurley.     Wt Readings from Last 10 Encounters:   05/24/24 37.1 kg (81 lb 14.4 oz)    05/23/24 36.7 kg (81 lb)   04/23/24 42.1 kg (92 lb 14.4 oz)   04/04/24 39.7 kg (87 lb 8.4 oz)   03/26/24 37.7 kg (83 lb 1.6 oz)   03/26/24 39 kg (86 lb)   02/27/24 40.5 kg (89 lb 4 oz)   02/13/24 44.5 kg (98 lb)   02/02/24 41.9 kg (92 lb 6 oz)   07/05/23 37.8 kg (83 lb 4 oz)        NUTRITION:  Diet:       Procedures    Regular/General diet Is Patient on Accuchecks? No      Food Allergies: No  Cultural/Ethnic/Tenriism Preferences Addressed: Yes    Percent Meals Eaten (last 3 days)       Date/Time Percent Meals Eaten (%)    05/27/24 1317 100 %    05/27/24 1844 100 %    05/27/24 2021 0 %    05/28/24 1059 100 %    05/28/24 1230 100 %    05/28/24 1630 100 %            GI system review: WNL Colonostomy. Ostomy output over the past 24 hrs 200 mL   Skin and wounds: WNL    NUTRITION RELATED PHYSICAL FINDINGS:     1. Body Fat/Muscle Mass:  Age related sarcopenia noted.     2. Fluid Accumulation:     NUTRITION PRESCRIPTION: 37.1 kg  Calories: 1113 - 1484 calories/day (30-40 kcal/kg)  Protein: 45 - 56  grams protein/day (1.2-1.5 grams protein per kg)  Fluid: ~1 ml/kcal or per MD discretion    NUTRITION DIAGNOSIS/PROBLEM:  Malnutrition related to inability to take or tolerate as evidenced by documented/reported insufficient oral intake and documented/reported unintentional weight loss prior to admission.       MONITOR AND EVALUATE/NUTRITION GOALS:  PO intake of 75% of meals TID - New  PO intake of 75% of oral nutrition supplement/s - New  Gradual weight gain of at least .5 lbs per week - New      MEDICATIONS:  Calcium carbonate-Vitamin D, Vitamin B12, ferrous sulfate, pantoprazole, abx     LABS:  Reviewed    Pt is at Moderate nutrition risk

## 2024-05-29 NOTE — PLAN OF CARE
Pt A/O x4. VSS. Afebrile. Pt denied pain throughout day. Medications admin per MAR. Contact iso precautions maintained. Pt cleared for DC from neph standpoint. Pt's family at bedside, updated on POC, in agreement. Fall and safety precautions in place. Call light within reach.

## 2024-05-29 NOTE — PROGRESS NOTES
[facility]   part of [facility]    Progress Note     Sunita Loza Patient Status:  Inpatient    1932 MRN [id number]   Location [facility] 3NE-A Attending Chon Chaves MD   Hosp Day # 5 PCP Janell Powell MD     Follow up for: The primary encounter diagnosis was Dehydration. Diagnoses of Syncope, near and ZOHREH (acute kidney injury) (HCC) were also pertinent to this visit.      Interval History/Subjective:     GERI overnight  Afebrile, HDS  Creatinine 1.88->2.12 today  I/Os not being accurately recorded     Feels well at baseline, in good spirits. Hopeful to go home soon     Vital signs:  Temp:  [97.5 °F (36.4 °C)-98 °F (36.7 °C)] 98 °F (36.7 °C)  Pulse:  [53-63] 53  Resp:  [16-18] 16  BP: (153-155)/(51-60) 155/54  SpO2:  [97 %-100 %] 100 %    Physical Exam:    General: NAD, Comfortable, Nontoxic   Respiratory: CTAB; reg resp rate & effort, no wheezes/crackles  Cardiovascular: S1, S2. Regular rate and rhythm. No murmurs appreciated  Abdomen: Soft, NTND, no guarding/rebound; s/p ostomy with soft, forming stools, improved from previous   Neurologic: No focal neurological deficits.   Extremities: No edema.   Skin: Dry, no rashes, ulcers or lesions     Diagnostic Data:      Labs:  Recent Labs   Lab 24  1557 24  0753 24  0941 24  0712 24  0617   WBC 16.3* 12.7* 10.1 9.4 12.4*   HGB 10.1* 8.5* 8.9* 8.8* 9.2*   MCV 92.3 93.8 94.1 98.3 99.0   .0 241.0 231.0 229.0 243.0       Recent Labs   Lab 24  1557 24  0753 24  0651 24  0712 24  0617   *   < > 102* 94 87   BUN 82*   < > 43* 31* 32*   CREATSERUM 4.10*   < > 2.57* 1.88* 2.12*   CA 7.5*   < > 7.4* 8.4* 8.8   ALB 2.8*  --   --   --   --       < > 144 144 143   K 4.2   < > 4.1  4.1 3.9 4.3      < > 114* 117* 116*   CO2 18.0*   < > 25.0 22.0 22.0   ALKPHO 103  --   --   --   --    AST 23  --   --   --   --    ALT 24  --   --   --   --    BILT 0.2  --   --   --   --     TP 7.3  --   --   --   --     < > = values in this interval not displayed.       No results for input(s): \"PTP\", \"INR\" in the last 168 hours.    No results for input(s): \"TROP\", \"CK\" in the last 168 hours.         Imaging: Imaging data reviewed in Epic.    Medications:    melatonin  3 mg Oral Nightly    vancomycin  125 mg Oral QID    amiodarone  100 mg Oral Daily    apixaban  2.5 mg Oral BID    aspirin  81 mg Oral Daily    calcium carbonate-vitamin D  2 tablet Oral Daily    cyanocobalamin  100 mcg Oral Daily    dilTIAZem ER  120 mg Oral Daily    ferrous sulfate  325 mg Oral Daily with breakfast    pantoprazole  20 mg Oral QAM AC       ASSESSMENT / PLAN:     Sunita Loza Is a a 89 year old female who presented with weakness, ZOHREH on CKD3 likely due to dehydration secondary to C Difficile colitis    Problem List / Diagnoses    ZOHREH on CKD3  C Difficile Colitis  Abnormal UA  HTN  pAF    Plan    ZOHREH on CKD3 -- improving  -- 3.7 on admission, peaked at 4.1 5/24,   -- IVF stopped 5/28, creatinine baltazar from 1.88-2.12  -Repeat this afternoon-continue to encourage p.o. intake-- maintain strict I&Os  -- renaly dose all meds, avoid nephrotoxic agents     C Difficile Colitis  -- C Diff PCR positive, start empiric vancomycin QID  -- continue for minimum 7 days following completion of antibiotics (below)    Abnormal UA  -- negative nitrities & pt without urinary symptoms, but started on rocephin given concomitant ZOHREH & patient's underlying dementia making her an unreliable historian  -- Urine cultures with ESBL  - Given lack of urinary symptoms, resolution of leukocytosis and improvement in renal function despite an adequate antimicrobial coverage, this likely represents colonization and not true UTI.    -Given her known C. difficile colitis and known resistances, will elect not to treat as risks likely outweigh benefits.  This was discussed with patient and her son, Uday, at bedside and all questions answered    Hematuria  -  Noted on admission UA  -No clots, patient and family instructed to inform staff if she develops any urinary symptoms or retention, or notes clots  -Hemoglobin has been stable, monitor    HTN  pAF  -- resume home meds, eliquis for AC   -- tele     DVT Mechanical Prophylaxis:   SCDs,    DVT Pharmacologic Prophylaxis   Medication    apixaban (Eliquis) tab 2.5 mg         DVT Pharmacologic prophylaxis: Aspirin 81 mg    Code Status: Full Code    Dispo: inpatient; ALEXANDRU tomorrow pending stable creatinine, ability to match intake with GI and urinary fluid losses    Plan of care discussed with patient and/or family at bedside.    GABINO Chaves MD  [facility]   [phone]      This note was created using voice recognition technology. It may include inadvertent transcriptional errors. Any such errors should be contextually interpreted and should not be taken to alter the content or the meaning.     Note to Patient: The 21st Century Cures Act makes medical notes like these available to patients in the interest of transparency. However, be advised this is a medical document. It is intended as peer to peer communication. It is written in medical language and may contain abbreviations or verbiage that are unfamiliar. It may appear blunt or direct. Medical documents are intended to carry relevant information, facts as evident, and the clinical opinion of the practitioner and not intended to be the primary source of your information.  Please refer directly to myself or clinical staff for information regarding plan of care.

## 2024-05-29 NOTE — PROGRESS NOTES
[facility]  Nephrology Progress Note    Sunita Loza Attending:  Chon Chaves MD       Assessment and Plan:    1) ZOHREH- due to diuresis / poor PO intake / ostomy losses- much improved. Cr slightly higher today off IVF- follow     2) CKD 3- baseline Cr 1.5 mg/dl -3 in part due to remote nephrectomy > 20 yrs ago      3) Abnormal UA- agree Cx may represent colonization; dc abx tyree with Cdif     4) s/p exp lap / LOS / colon resection / ostomy takedown / creation of colostomy 4/3 (Dr. 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 Fe stores noted -> IV FE + EPO      9) Chronic edema- onset  per family; EF 60% with mild-mod TR / AI + mild MR; RV WNL. Hold diuretic with above    10) Cdif colitis- on PO vanco      Subjective:  Awake pleasant no c/o wants to go home    Physical Exam:   /54 (BP Location: Left arm)   Pulse 53   Temp 98 °F (36.7 °C) (Oral)   Resp 16   Ht 5' (1.524 m)   Wt 81 lb 14.4 oz (37.1 kg)   SpO2 100%   BMI 15.99 kg/m²   Temp (24hrs), Av.7 °F (36.5 °C), Min:97.5 °F (36.4 °C), Max:98 °F (36.7 °C)       Intake/Output Summary (Last 24 hours) at 2024 0914  Last data filed at 2024 0020  Gross per 24 hour   Intake 200 ml   Output 200 ml   Net 0 ml     Wt Readings from Last 3 Encounters:   24 81 lb 14.4 oz (37.1 kg)   24 81 lb (36.7 kg)   24 92 lb 14.4 oz (42.1 kg)     General: awake   HEENT: No scleral icterus, MMM  Neck: Supple, no MARIA FERNANDA or thyromegaly  Cardiac: Regular rate and rhythm, S1, S2 normal, no murmur or tub  Lungs: Decreased BS at bases bilaterally   Abdomen: Soft, non-tender. + bowel sounds, no palpable organomegaly  Extremities: Without clubbing, cyanosis; no edema  Neurologic: Cranial nerves grossly intact, moving all extremities  Skin: Warm and dry, no rashes        Labs:   Lab Results   Component Value Date    WBC 12.4 05/29/2024    HGB 9.2 05/29/2024    HCT 30.6 05/29/2024    .0 05/29/2024    CREATSERUM 2.12 05/29/2024    BUN 32 05/29/2024     05/29/2024    K 4.3 05/29/2024     05/29/2024    CO2 22.0 05/29/2024    GLU 87 05/29/2024    CA 8.8 05/29/2024       Imaging:  All imaging studies reviewed.    Meds:   Current Facility-Administered Medications   Medication Dose Route Frequency    melatonin tab 3 mg  3 mg Oral Nightly    vancomycin (Vancocin) cap 125 mg  125 mg Oral QID    amiodarone (Pacerone) tab 100 mg  100 mg Oral Daily    apixaban (Eliquis) tab 2.5 mg  2.5 mg Oral BID    aspirin DR tab 81 mg  81 mg Oral Daily    calcium carbonate-vitamin D (Oyster Shell-D) 250-3.125 MG-MCG per tab 2 tablet  2 tablet Oral Daily    cyanocobalamin (Vitamin B12) tab 100 mcg  100 mcg Oral Daily    dilTIAZem ER (Dilacor XR) 24 hr cap 120 mg  120 mg Oral Daily    ferrous sulfate DR tab 325 mg  325 mg Oral Daily with breakfast    pantoprazole (Protonix) DR tab 20 mg  20 mg Oral QAM AC    acetaminophen (Tylenol Extra Strength) tab 500 mg  500 mg Oral Q4H PRN         Questions/concerns were discussed with patient and/or family by bedside.          Fabiana Miller MD  5/29/2024  914 AM

## 2024-05-29 NOTE — CM/SW NOTE
05/29/24 1000   CM/SW Referral Data   Referral Source Social Work (self-referral)   Reason for Referral Discharge planning   Informant EMR;Other;Clinical Staff Member   Medical Hx   Does patient have an established PCP? Yes   Patient Info   Patient's Home Environment Independent Living  ([facility])   Patient Status Prior to Admission   Independent with ADLs and Mobility Yes   Discharge Needs   Anticipated D/C needs Assisted/Supported living;Transportation services  ([facility])     Pt discussed in rounds with RN. SW completed chart review. Pt is a 89 y/o female admitted for dehydration. Pt lives at [facility], no anticipated therapy needs for DC. SW left message for pt son to further discuss DC planning.    SW/CM to remain available for dc planning, and/or additional need for support.    SUDHEER Carranza  Discharge Planner  [id number]

## 2024-05-29 NOTE — PLAN OF CARE
Patient is alert and oriented, ambulatory to the bathroom. Frequent urination at night. Ostomy bag changed. No complaints overnight, vitals stable. Safety precautions in place, plan of care ongoing.     Problem: RISK FOR INFECTION - ADULT  Goal: Absence of fever/infection during anticipated neutropenic period  Description: INTERVENTIONS  - Monitor WBC  - Administer growth factors as ordered  - Implement neutropenic guidelines  Outcome: Progressing     Problem: CARDIOVASCULAR - ADULT  Goal: Maintains optimal cardiac output and hemodynamic stability  Description: INTERVENTIONS:  - Monitor vital signs, rhythm, and trends  - Monitor for bleeding, hypotension and signs of decreased cardiac output  - Evaluate effectiveness of vasoactive medications to optimize hemodynamic stability  - Monitor arterial and/or venous puncture sites for bleeding and/or hematoma  - Assess quality of pulses, skin color and temperature  - Assess for signs of decreased coronary artery perfusion - ex. Angina  - Evaluate fluid balance, assess for edema, trend weights  Outcome: Progressing  Goal: Absence of cardiac arrhythmias or at baseline  Description: INTERVENTIONS:  - Continuous cardiac monitoring, monitor vital signs, obtain 12 lead EKG if indicated  - Evaluate effectiveness of antiarrhythmic and heart rate control medications as ordered  - Initiate emergency measures for life threatening arrhythmias  - Monitor electrolytes and administer replacement therapy as ordered  Outcome: Progressing

## 2024-05-30 LAB
ANION GAP SERPL CALC-SCNC: 7 MMOL/L (ref 0–18)
BASOPHILS # BLD AUTO: 0.04 X10(3) UL (ref 0–0.2)
BASOPHILS NFR BLD AUTO: 0.3 %
BUN BLD-MCNC: 32 MG/DL (ref 9–23)
CALCIUM BLD-MCNC: 8.7 MG/DL (ref 8.5–10.1)
CHLORIDE SERPL-SCNC: 117 MMOL/L (ref 98–112)
CO2 SERPL-SCNC: 20 MMOL/L (ref 21–32)
CREAT BLD-MCNC: 2.33 MG/DL
EGFRCR SERPLBLD CKD-EPI 2021: 19 ML/MIN/1.73M2 (ref 60–?)
EOSINOPHIL # BLD AUTO: 0.45 X10(3) UL (ref 0–0.7)
EOSINOPHIL NFR BLD AUTO: 3.4 %
ERYTHROCYTE [DISTWIDTH] IN BLOOD BY AUTOMATED COUNT: 19.7 %
GLUCOSE BLD-MCNC: 91 MG/DL (ref 70–99)
HCT VFR BLD AUTO: 28.1 %
HGB BLD-MCNC: 8.7 G/DL
IMM GRANULOCYTES # BLD AUTO: 0.15 X10(3) UL (ref 0–1)
IMM GRANULOCYTES NFR BLD: 1.1 %
LYMPHOCYTES # BLD AUTO: 1.64 X10(3) UL (ref 1–4)
LYMPHOCYTES NFR BLD AUTO: 12.5 %
MCH RBC QN AUTO: 30.5 PG (ref 26–34)
MCHC RBC AUTO-ENTMCNC: 31 G/DL (ref 31–37)
MCV RBC AUTO: 98.6 FL
MONOCYTES # BLD AUTO: 0.88 X10(3) UL (ref 0.1–1)
MONOCYTES NFR BLD AUTO: 6.7 %
NEUTROPHILS # BLD AUTO: 9.98 X10 (3) UL (ref 1.5–7.7)
NEUTROPHILS # BLD AUTO: 9.98 X10(3) UL (ref 1.5–7.7)
NEUTROPHILS NFR BLD AUTO: 76 %
OSMOLALITY SERPL CALC.SUM OF ELEC: 304 MOSM/KG (ref 275–295)
PLATELET # BLD AUTO: 232 10(3)UL (ref 150–450)
POTASSIUM SERPL-SCNC: 4.3 MMOL/L (ref 3.5–5.1)
RBC # BLD AUTO: 2.85 X10(6)UL
SODIUM SERPL-SCNC: 144 MMOL/L (ref 136–145)
WBC # BLD AUTO: 13.1 X10(3) UL (ref 4–11)

## 2024-05-30 PROCEDURE — 99233 SBSQ HOSP IP/OBS HIGH 50: CPT | Performed by: INTERNAL MEDICINE

## 2024-05-30 RX ORDER — SODIUM CHLORIDE 9 MG/ML
INJECTION, SOLUTION INTRAVENOUS CONTINUOUS
Status: ACTIVE | OUTPATIENT
Start: 2024-05-30 | End: 2024-05-30

## 2024-05-30 NOTE — PROGRESS NOTES
[facility]  Nephrology Progress Note    Sunita Loza Attending:  Chon Chaves MD       Assessment and Plan:    1) ZOHREH- due to diuresis / poor PO intake / ostomy losses. Agree with IVF given rising Cr / modest PO intake     2) CKD 3- baseline Cr 1.5 mg/dl -3 in part due to remote nephrectomy > 20 yrs ago      3) Abnormal UA- agree Cx may represent colonization; dc abx tyree with Cdif     4) s/p exp lap / LOS / colon resection / ostomy takedown / creation of colostomy 4/3 (Dr. 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 Fe stores noted -> IV FE + EPO      9) Chronic edema- onset  per family; EF 60% with mild-mod TR / AI + mild MR; RV WNL.     10) Cdif colitis- on PO vanco    No maintenance diuretics on discharge; to use prn only. D/W son at bedside.       Subjective:  Awake pleasant no issues     Physical Exam:   /66 (BP Location: Left arm)   Pulse 73   Temp 98.5 °F (36.9 °C) (Oral)   Resp 16   Ht 5' (1.524 m)   Wt 81 lb 14.4 oz (37.1 kg)   SpO2 98%   BMI 15.99 kg/m²   Temp (24hrs), Av.2 °F (36.8 °C), Min:97.9 °F (36.6 °C), Max:98.5 °F (36.9 °C)       Intake/Output Summary (Last 24 hours) at 2024 0545  Last data filed at 2024 0315  Gross per 24 hour   Intake 560 ml   Output 850 ml   Net -290 ml     Wt Readings from Last 3 Encounters:   24 81 lb 14.4 oz (37.1 kg)   24 81 lb (36.7 kg)   24 92 lb 14.4 oz (42.1 kg)     General: awake   HEENT: No scleral icterus, MMM  Neck: Supple, no MARIA FERNANDA or thyromegaly  Cardiac: Regular rate and rhythm, S1, S2 normal, no murmur or tub  Lungs: Decreased BS at bases bilaterally   Abdomen: Soft, non-tender. + bowel sounds, no palpable organomegaly  Extremities: Without clubbing, cyanosis; no edema  Neurologic: Cranial nerves grossly intact, moving all  extremities  Skin: Warm and dry, no rashes       Labs:   Lab Results   Component Value Date    WBC 12.4 05/29/2024    HGB 9.2 05/29/2024    HCT 30.6 05/29/2024    .0 05/29/2024    CREATSERUM 2.18 05/29/2024    BUN 30 05/29/2024     05/29/2024    K 4.7 05/29/2024     05/29/2024    CO2 21.0 05/29/2024     05/29/2024    CA 8.9 05/29/2024       Imaging:  All imaging studies reviewed.    Meds:   Current Facility-Administered Medications   Medication Dose Route Frequency    melatonin tab 3 mg  3 mg Oral Nightly    vancomycin (Vancocin) cap 125 mg  125 mg Oral QID    amiodarone (Pacerone) tab 100 mg  100 mg Oral Daily    apixaban (Eliquis) tab 2.5 mg  2.5 mg Oral BID    aspirin DR tab 81 mg  81 mg Oral Daily    calcium carbonate-vitamin D (Oyster Shell-D) 250-3.125 MG-MCG per tab 2 tablet  2 tablet Oral Daily    cyanocobalamin (Vitamin B12) tab 100 mcg  100 mcg Oral Daily    dilTIAZem ER (Dilacor XR) 24 hr cap 120 mg  120 mg Oral Daily    ferrous sulfate DR tab 325 mg  325 mg Oral Daily with breakfast    pantoprazole (Protonix) DR tab 20 mg  20 mg Oral QAM AC    acetaminophen (Tylenol Extra Strength) tab 500 mg  500 mg Oral Q4H PRN         Questions/concerns were discussed with patient and/or family by bedside.          Fabiana Miller MD  5/30-/2024  545 AM

## 2024-05-30 NOTE — DISCHARGE INSTRUCTIONS
Sometimes managing your health at home requires assistance.  The [facility] team has recognized your preference to use [facility]. They can be reach by phone at [phone]. The fax number for your reference is [phone].  A representative from the home health agency will contact you or your family to schedule your first visit.

## 2024-05-30 NOTE — PLAN OF CARE
Patient is alert and oriented, ambulatory to the bathroom. No complaints of pain or SOB. Patient states she feels better and is ready to go home. All meds given per MAR. Safety precautions in place, call light within reach, plan of care ongoing.     Problem: RISK FOR INFECTION - ADULT  Goal: Absence of fever/infection during anticipated neutropenic period  Description: INTERVENTIONS  - Monitor WBC  - Administer growth factors as ordered  - Implement neutropenic guidelines  Outcome: Progressing     Problem: CARDIOVASCULAR - ADULT  Goal: Maintains optimal cardiac output and hemodynamic stability  Description: INTERVENTIONS:  - Monitor vital signs, rhythm, and trends  - Monitor for bleeding, hypotension and signs of decreased cardiac output  - Evaluate effectiveness of vasoactive medications to optimize hemodynamic stability  - Monitor arterial and/or venous puncture sites for bleeding and/or hematoma  - Assess quality of pulses, skin color and temperature  - Assess for signs of decreased coronary artery perfusion - ex. Angina  - Evaluate fluid balance, assess for edema, trend weights  Outcome: Progressing  Goal: Absence of cardiac arrhythmias or at baseline  Description: INTERVENTIONS:  - Continuous cardiac monitoring, monitor vital signs, obtain 12 lead EKG if indicated  - Evaluate effectiveness of antiarrhythmic and heart rate control medications as ordered  - Initiate emergency measures for life threatening arrhythmias  - Monitor electrolytes and administer replacement therapy as ordered  Outcome: Progressing

## 2024-05-30 NOTE — PLAN OF CARE
Patient today has had some issues with her ostomy appliance and we have placed new ones three times. Added barrier as well as powder and a wax ring to try and keep the bag adhered. Patient has had visitors today, also fluids were given today as well. Patient has been ambulating with stand by assist and doing ok. No complaints of pain this shift, belongings at bedside, call light in reach. Frequent rounding for patient needs.      Problem: METABOLIC/FLUID AND ELECTROLYTES - ADULT  Goal: Electrolytes maintained within normal limits  Description: INTERVENTIONS:  - Monitor labs and rhythm and assess patient for signs and symptoms of electrolyte imbalances  - Administer electrolyte replacement as ordered  - Monitor response to electrolyte replacements, including rhythm and repeat lab results as appropriate  - Fluid restriction as ordered  - Instruct patient on fluid and nutrition restrictions as appropriate  Outcome: Progressing     Problem: RISK FOR INFECTION - ADULT  Goal: Absence of fever/infection during anticipated neutropenic period  Description: INTERVENTIONS  - Monitor WBC  - Administer growth factors as ordered  - Implement neutropenic guidelines  Outcome: Progressing     Problem: GASTROINTESTINAL - ADULT  Goal: Maintains or returns to baseline bowel function  Description: INTERVENTIONS:  - Assess bowel function  - Maintain adequate hydration with IV or PO as ordered and tolerated  - Evaluate effectiveness of GI medications  - Encourage mobilization and activity  - Obtain nutritional consult as needed  - Establish a toileting routine/schedule  - Consider collaborating with pharmacy to review patient's medication profile  Outcome: Progressing

## 2024-05-30 NOTE — PROGRESS NOTES
[facility]   part of [facility]    Progress Note     Sunita Loza Patient Status:  Inpatient    1932 MRN [id number]   Location [facility] 3NE-A Attending Chon Chaves MD   Hosp Day # 6 PCP Janell Powell MD     Follow up for: The primary encounter diagnosis was Dehydration. Diagnoses of Syncope, near and ZOHREH (acute kidney injury) (HCC) were also pertinent to this visit.      Interval History/Subjective:     GERI overnight  Afebrile, HDS  Creatinine 1.88->2.12->2.33  I/Os not being accurately recorded     Feels well, wants to go home     Vital signs:  Temp:  [97.9 °F (36.6 °C)-98.5 °F (36.9 °C)] 98.5 °F (36.9 °C)  Pulse:  [55-73] 73  Resp:  [14-16] 16  BP: (138-169)/(57-66) 151/66  SpO2:  [97 %-100 %] 98 %    Physical Exam:    General: NAD, Comfortable, Nontoxic   Respiratory: CTAB; reg resp rate & effort, no wheezes/crackles  Cardiovascular: S1, S2. Regular rate and rhythm. No murmurs appreciated  Abdomen: Soft, NTND, no guarding/rebound; s/p ostomy with soft, forming stools, improved from previous   Neurologic: No focal neurological deficits.   Extremities: No edema.   Skin: Dry, no rashes, ulcers or lesions     Diagnostic Data:      Labs:  Recent Labs   Lab 24  0753 24  0941 24  0712 24  0617 24  0711   WBC 12.7* 10.1 9.4 12.4* 13.1*   HGB 8.5* 8.9* 8.8* 9.2* 8.7*   MCV 93.8 94.1 98.3 99.0 98.6   .0 231.0 229.0 243.0 232.0       Recent Labs   Lab 24  1557 24  0753 24  0712 24  0617 24  1453   *   < > 94 87 148*   BUN 82*   < > 31* 32* 30*   CREATSERUM 4.10*   < > 1.88* 2.12* 2.18*   CA 7.5*   < > 8.4* 8.8 8.9   ALB 2.8*  --   --   --   --       < > 144 143 143   K 4.2   < > 3.9 4.3 4.7      < > 117* 116* 116*   CO2 18.0*   < > 22.0 22.0 21.0   ALKPHO 103  --   --   --   --    AST 23  --   --   --   --    ALT 24  --   --   --   --    BILT 0.2  --   --   --   --    TP 7.3  --   --   --   --     < > =  values in this interval not displayed.       No results for input(s): \"PTP\", \"INR\" in the last 168 hours.    No results for input(s): \"TROP\", \"CK\" in the last 168 hours.         Imaging: Imaging data reviewed in Epic.    Medications:    melatonin  3 mg Oral Nightly    vancomycin  125 mg Oral QID    amiodarone  100 mg Oral Daily    apixaban  2.5 mg Oral BID    aspirin  81 mg Oral Daily    calcium carbonate-vitamin D  2 tablet Oral Daily    cyanocobalamin  100 mcg Oral Daily    dilTIAZem ER  120 mg Oral Daily    ferrous sulfate  325 mg Oral Daily with breakfast    pantoprazole  20 mg Oral QAM AC       ASSESSMENT / PLAN:     Sunita Loza Is a a 89 year old female who presented with weakness, ZOHREH on CKD3 likely due to dehydration secondary to C Difficile colitis    Problem List / Diagnoses    ZOHREH on CKD3  C Difficile Colitis  Abnormal UA  HTN  pAF    Plan    ZOHREH on CKD3 -- worsening   -- 3.7 on admission, peaked at 4.1 5/24,   -- IVF stopped 5/28, creatinine rising 1.88->2.12->2.33 off IVF   -- resume IVF @ 150cc hr until 1600, then stop & monitor   - continue to encourage p.o. intake  -- maintain strict I&Os  -- renaly dose all meds, avoid nephrotoxic agents     C Difficile Colitis  -- C Diff PCR positive, start empiric vancomycin QID  -- continue for minimum 7 days following completion of antibiotics (below)    Abnormal UA  -- negative nitrities & pt without urinary symptoms, but started on rocephin given concomitant ZOHREH & patient's underlying dementia making her an unreliable historian  -- Urine cultures with ESBL  - Given lack of urinary symptoms, resolution of leukocytosis and improvement in renal function despite an adequate antimicrobial coverage, this likely represents colonization and not true UTI.    -Given her known C. difficile colitis and known resistances, will elect not to treat as risks likely outweigh benefits.  This was discussed with patient and her son, Uday, at bedside and all questions  answered    Hematuria  - Noted on admission UA  -No clots, patient and family instructed to inform staff if she develops any urinary symptoms or retention, or notes clots  -Hemoglobin has been stable, monitor    HTN  pAF  -- resume home meds, eliquis for AC   -- tele     DVT Mechanical Prophylaxis:   SCDs,    DVT Pharmacologic Prophylaxis   Medication    apixaban (Eliquis) tab 2.5 mg         DVT Pharmacologic prophylaxis: Aspirin 81 mg    Code Status: Full Code    Dispo: inpatient; ALEXANDRU 1-2 days pending improvement in Cr   Plan of care discussed with patient and/or family at bedside.    GABINO Chaves MD  [facility]   [phone]      This note was created using voice recognition technology. It may include inadvertent transcriptional errors. Any such errors should be contextually interpreted and should not be taken to alter the content or the meaning.     Note to Patient: The 21st Century Cures Act makes medical notes like these available to patients in the interest of transparency. However, be advised this is a medical document. It is intended as peer to peer communication. It is written in medical language and may contain abbreviations or verbiage that are unfamiliar. It may appear blunt or direct. Medical documents are intended to carry relevant information, facts as evident, and the clinical opinion of the practitioner and not intended to be the primary source of your information.  Please refer directly to myself or clinical staff for information regarding plan of care.

## 2024-05-31 PROBLEM — Z90.5 SOLITARY KIDNEY, ACQUIRED: Status: ACTIVE | Noted: 2024-05-31

## 2024-05-31 LAB
ANION GAP SERPL CALC-SCNC: 7 MMOL/L (ref 0–18)
ANION GAP SERPL CALC-SCNC: 8 MMOL/L (ref 0–18)
BASOPHILS # BLD AUTO: 0.06 X10(3) UL (ref 0–0.2)
BASOPHILS NFR BLD AUTO: 0.4 %
BUN BLD-MCNC: 29 MG/DL (ref 9–23)
BUN BLD-MCNC: 32 MG/DL (ref 9–23)
CALCIUM BLD-MCNC: 8.4 MG/DL (ref 8.5–10.1)
CALCIUM BLD-MCNC: 8.5 MG/DL (ref 8.5–10.1)
CHLORIDE SERPL-SCNC: 119 MMOL/L (ref 98–112)
CHLORIDE SERPL-SCNC: 120 MMOL/L (ref 98–112)
CO2 SERPL-SCNC: 17 MMOL/L (ref 21–32)
CO2 SERPL-SCNC: 19 MMOL/L (ref 21–32)
CREAT BLD-MCNC: 2.04 MG/DL
CREAT BLD-MCNC: 2.11 MG/DL
EGFRCR SERPLBLD CKD-EPI 2021: 22 ML/MIN/1.73M2 (ref 60–?)
EGFRCR SERPLBLD CKD-EPI 2021: 23 ML/MIN/1.73M2 (ref 60–?)
EOSINOPHIL # BLD AUTO: 0.46 X10(3) UL (ref 0–0.7)
EOSINOPHIL NFR BLD AUTO: 3.4 %
ERYTHROCYTE [DISTWIDTH] IN BLOOD BY AUTOMATED COUNT: 20.2 %
GLUCOSE BLD-MCNC: 89 MG/DL (ref 70–99)
GLUCOSE BLD-MCNC: 91 MG/DL (ref 70–99)
HCT VFR BLD AUTO: 29.6 %
HGB BLD-MCNC: 8.9 G/DL
IMM GRANULOCYTES # BLD AUTO: 0.17 X10(3) UL (ref 0–1)
IMM GRANULOCYTES NFR BLD: 1.3 %
LYMPHOCYTES # BLD AUTO: 1.38 X10(3) UL (ref 1–4)
LYMPHOCYTES NFR BLD AUTO: 10.2 %
MCH RBC QN AUTO: 30.2 PG (ref 26–34)
MCHC RBC AUTO-ENTMCNC: 30.1 G/DL (ref 31–37)
MCV RBC AUTO: 100.3 FL
MONOCYTES # BLD AUTO: 1.08 X10(3) UL (ref 0.1–1)
MONOCYTES NFR BLD AUTO: 8 %
NEUTROPHILS # BLD AUTO: 10.34 X10 (3) UL (ref 1.5–7.7)
NEUTROPHILS # BLD AUTO: 10.34 X10(3) UL (ref 1.5–7.7)
NEUTROPHILS NFR BLD AUTO: 76.7 %
OSMOLALITY SERPL CALC.SUM OF ELEC: 305 MOSM/KG (ref 275–295)
OSMOLALITY SERPL CALC.SUM OF ELEC: 306 MOSM/KG (ref 275–295)
PLATELET # BLD AUTO: 212 10(3)UL (ref 150–450)
POTASSIUM SERPL-SCNC: 4.1 MMOL/L (ref 3.5–5.1)
POTASSIUM SERPL-SCNC: 4.7 MMOL/L (ref 3.5–5.1)
RBC # BLD AUTO: 2.95 X10(6)UL
SODIUM SERPL-SCNC: 145 MMOL/L (ref 136–145)
SODIUM SERPL-SCNC: 145 MMOL/L (ref 136–145)
WBC # BLD AUTO: 13.5 X10(3) UL (ref 4–11)

## 2024-05-31 PROCEDURE — 99233 SBSQ HOSP IP/OBS HIGH 50: CPT | Performed by: INTERNAL MEDICINE

## 2024-05-31 RX ORDER — VANCOMYCIN HYDROCHLORIDE 125 MG/1
125 CAPSULE ORAL 4 TIMES DAILY
Qty: 28 CAPSULE | Refills: 0 | Status: SHIPPED | OUTPATIENT
Start: 2024-05-31 | End: 2024-06-02

## 2024-05-31 RX ORDER — LABETALOL HYDROCHLORIDE 5 MG/ML
20 INJECTION, SOLUTION INTRAVENOUS EVERY 6 HOURS PRN
Status: DISCONTINUED | OUTPATIENT
Start: 2024-05-31 | End: 2024-06-02

## 2024-05-31 RX ORDER — ACETAMINOPHEN 500 MG
500 TABLET ORAL EVERY 4 HOURS PRN
Status: DISCONTINUED | OUTPATIENT
Start: 2024-05-31 | End: 2024-06-02

## 2024-05-31 RX ORDER — AMIODARONE HYDROCHLORIDE 200 MG/1
TABLET ORAL
Status: DISCONTINUED
Start: 2024-05-31 | End: 2024-05-31 | Stop reason: WASHOUT

## 2024-05-31 NOTE — PROGRESS NOTES
[facility]   part of [facility]    Progress Note     Sunita Loza Patient Status:  Inpatient    1932 MRN [id number]   Location [facility] 3NE-A Attending Chon Chaves MD   Hosp Day # 7 PCP Janell Powell MD     Follow up for: The primary encounter diagnosis was Dehydration. Diagnoses of Syncope, near and ZOHREH (acute kidney injury) (HCC) were also pertinent to this visit.      Interval History/Subjective:     GERI overnight  Afebrile, HDS  Cr improved today s/p 1L IVF; Only net +100 since yesterday    Feels well, wants to go home     Vital signs:  Temp:  [97.5 °F (36.4 °C)-98 °F (36.7 °C)] 98 °F (36.7 °C)  Pulse:  [54-63] 63  Resp:  [16] 16  BP: (155-179)/(53-77) 155/75  SpO2:  [98 %-100 %] 100 %    Physical Exam:    General: NAD, Comfortable, Nontoxic   Respiratory: CTAB; reg resp rate & effort, no wheezes/crackles  Cardiovascular: S1, S2. Regular rate and rhythm. No murmurs appreciated  Abdomen: Soft, NTND, no guarding/rebound; s/p ostomy with soft, forming stools, improved from previous   Neurologic: No focal neurological deficits.   Extremities: No edema.   Skin: Dry, no rashes, ulcers or lesions     Diagnostic Data:      Labs:  Recent Labs   Lab 24  0941 24  0712 24  0617 24  0711 24  0637   WBC 10.1 9.4 12.4* 13.1* 13.5*   HGB 8.9* 8.8* 9.2* 8.7* 8.9*   MCV 94.1 98.3 99.0 98.6 100.3*   .0 229.0 243.0 232.0 212.0       Recent Labs   Lab 24  1557 24  0753 24  1453 24  0711 24  0637   *   < > 148* 91 89   BUN 82*   < > 30* 32* 29*   CREATSERUM 4.10*   < > 2.18* 2.33* 2.04*   CA 7.5*   < > 8.9 8.7 8.4*   ALB 2.8*  --   --   --   --       < > 143 144 145   K 4.2   < > 4.7 4.3 4.1      < > 116* 117* 120*   CO2 18.0*   < > 21.0 20.0* 17.0*   ALKPHO 103  --   --   --   --    AST 23  --   --   --   --    ALT 24  --   --   --   --    BILT 0.2  --   --   --   --    TP 7.3  --   --   --   --     < > = values in  this interval not displayed.       No results for input(s): \"PTP\", \"INR\" in the last 168 hours.    No results for input(s): \"TROP\", \"CK\" in the last 168 hours.         Imaging: Imaging data reviewed in Epic.    Medications:    melatonin  3 mg Oral Nightly    vancomycin  125 mg Oral QID    amiodarone  100 mg Oral Daily    apixaban  2.5 mg Oral BID    aspirin  81 mg Oral Daily    calcium carbonate-vitamin D  2 tablet Oral Daily    cyanocobalamin  100 mcg Oral Daily    dilTIAZem ER  120 mg Oral Daily    ferrous sulfate  325 mg Oral Daily with breakfast    pantoprazole  20 mg Oral QAM AC       ASSESSMENT / PLAN:     Sunita Loza Is a a 89 year old female who presented with weakness, ZOHREH on CKD3 likely due to dehydration secondary to C Difficile colitis    Problem List / Diagnoses    ZOHREH on CKD3  C Difficile Colitis  Abnormal UA  HTN  pAF    Plan    ZOHREH on CKD3 -- worsening   -- 3.7 on admission, peaked at 4.1 5/24,   -- IVF stopped 5/28, creatinine rising 1.88->2.12->2.33 off IVF   -- IV 150cc x 8 hours yesterday w/ improvement in Cr to 2.04   -- 5/31: repeat 1600 BMP, if Cr stable to improved 24h off IVF, will plan to discharge with close PCP follow up  - continue to encourage p.o. intake  -- maintain strict I&Os  -- renaly dose all meds, avoid nephrotoxic agents     C Difficile Colitis  -- C Diff PCR positive, start empiric vancomycin QID  -- continue for minimum 7 days following completion of antibiotics (below)    Abnormal UA  -- negative nitrities & pt without urinary symptoms, but started on rocephin given concomitant ZOHREH & patient's underlying dementia making her an unreliable historian  -- Urine cultures with ESBL  - Given lack of urinary symptoms, resolution of leukocytosis and improvement in renal function despite an adequate antimicrobial coverage, this likely represents colonization and not true UTI.    -Given her known C. difficile colitis and known resistances, will elect not to treat as risks likely  outweigh benefits.  This was discussed with patient and her son, Uday, at bedside and all questions answered    Hematuria  - Noted on admission UA  -No clots, patient and family instructed to inform staff if she develops any urinary symptoms or retention, or notes clots  -Hemoglobin has been stable, monitor    HTN  -- resume home diltiazem  -- home torsemide on hold given above, hold for now given Cr   Post Rounds Addendum: BP uncontrolled, start iv labetalol prn SBP > 180 or DBP > 105; monitor, may require additioanl antihypertensives pending improvement in Cr    pAF  -- resume home meds, eliquis for AC   -- tele       DVT Mechanical Prophylaxis:   SCDs,    DVT Pharmacologic Prophylaxis   Medication    apixaban (Eliquis) tab 2.5 mg         DVT Pharmacologic prophylaxis: Aspirin 81 mg    Code Status: Full Code    Dispo: inpatient; ALEXANDRU 1-2 days pending improvement in Cr     Plan of care discussed with patient and/or family at bedside.    GABINO Chaves MD  [facility]   [phone]      This note was created using voice recognition technology. It may include inadvertent transcriptional errors. Any such errors should be contextually interpreted and should not be taken to alter the content or the meaning.     Note to Patient: The 21st Century Cures Act makes medical notes like these available to patients in the interest of transparency. However, be advised this is a medical document. It is intended as peer to peer communication. It is written in medical language and may contain abbreviations or verbiage that are unfamiliar. It may appear blunt or direct. Medical documents are intended to carry relevant information, facts as evident, and the clinical opinion of the practitioner and not intended to be the primary source of your information.  Please refer directly to myself or clinical staff for information regarding plan of care.

## 2024-05-31 NOTE — PROGRESS NOTES
[facility]   part of [facility]     Nephrology Progress Note    Sunita Loza Patient Status:  Inpatient    1932 MRN [id number]   Location [facility] 4NW-A Attending Chon Chaves MD   Hosp Day # 7 PCP Janell Powell MD       SUBJECTIVE:  No c/o this AM        Physical Exam:   /75 (BP Location: Right arm)   Pulse 63   Temp 98 °F (36.7 °C) (Oral)   Resp 16   Ht 5' (1.524 m)   Wt 81 lb 14.4 oz (37.1 kg)   SpO2 100%   BMI 15.99 kg/m²   Temp (24hrs), Av.7 °F (36.5 °C), Min:97.5 °F (36.4 °C), Max:98 °F (36.7 °C)       Intake/Output Summary (Last 24 hours) at 2024 1148  Last data filed at 2024 0620  Gross per 24 hour   Intake 77769 ml   Output --   Net 57042 ml     Last 3 Weights   24 81 lb 14.4 oz (37.1 kg)   24 1542 95 lb (43.1 kg)   24 1017 81 lb (36.7 kg)   24 0158 92 lb 14.4 oz (42.1 kg)   24 2036 95 lb (43.1 kg)   24 0400 87 lb 8.4 oz (39.7 kg)   24 0613 82 lb 1.5 oz (37.2 kg)   24 1510 83 lb (37.6 kg)   24 2133 83 lb 1.6 oz (37.7 kg)   24 2120 83 lb (37.6 kg)   24 1315 86 lb (39 kg)     General: Alert and oriented in no apparent distress.  HEENT: No scleral icterus, MMM  Neck: Supple, no MARIA FERNANDA or thyromegaly  Cardiac: Regular rate and rhythm, S1, S2 normal, no murmur or rub  Lungs: Clear without wheezes, rales, rhonchi.    Abdomen: Soft, non-tender. + bowel sounds, no palpable organomegaly  Extremities: Without clubbing, cyanosis or edema.  Neurologic: Alert and oriented, cranial nerves grossly intact, moving all extremities  Skin: Warm and dry, no rash        Labs:     Recent Labs   Lab 24  0941 24  0712 24  0617 24  0711 24  0637   WBC 10.1 9.4 12.4* 13.1* 13.5*   HGB 8.9* 8.8* 9.2* 8.7* 8.9*   MCV 94.1 98.3 99.0 98.6 100.3*   .0 229.0 243.0 232.0 212.0       Recent Labs   Lab 24  0753 24  0942 24  0651 24  0712 24  0617  05/29/24  1453 05/30/24  0711 05/31/24  0637    142 144 144 143 143 144 145   K 3.5 3.2* 4.1  4.1 3.9 4.3 4.7 4.3 4.1    108 114* 117* 116* 116* 117* 120*   CO2 21.0 29.0 25.0 22.0 22.0 21.0 20.0* 17.0*   BUN 72* 56* 43* 31* 32* 30* 32* 29*   CREATSERUM 3.62* 2.79* 2.57* 1.88* 2.12* 2.18* 2.33* 2.04*   CA 7.0* 7.0* 7.4* 8.4* 8.8 8.9 8.7 8.4*   MG 1.0* 1.6 2.2  --   --   --   --   --    PHOS 3.5  --   --   --   --   --   --   --    GLU 88 95 102* 94 87 148* 91 89       Recent Labs   Lab 05/24/24  1557   ALT 24   AST 23   ALB 2.8*       No results for input(s): \"PGLU\" in the last 168 hours.    Meds:   Current Facility-Administered Medications   Medication Dose Route Frequency    acetaminophen (Tylenol Extra Strength) tab 500 mg  500 mg Oral Q4H PRN    melatonin tab 3 mg  3 mg Oral Nightly    vancomycin (Vancocin) cap 125 mg  125 mg Oral QID    amiodarone (Pacerone) tab 100 mg  100 mg Oral Daily    apixaban (Eliquis) tab 2.5 mg  2.5 mg Oral BID    aspirin DR tab 81 mg  81 mg Oral Daily    calcium carbonate-vitamin D (Oyster Shell-D) 250-3.125 MG-MCG per tab 2 tablet  2 tablet Oral Daily    cyanocobalamin (Vitamin B12) tab 100 mcg  100 mcg Oral Daily    dilTIAZem ER (Dilacor XR) 24 hr cap 120 mg  120 mg Oral Daily    ferrous sulfate DR tab 325 mg  325 mg Oral Daily with breakfast    pantoprazole (Protonix) DR tab 20 mg  20 mg Oral QAM AC         Impression/Plan:      1) ZOHREH- due to diuresis / poor PO intake / ostomy losses. Creat sig better today after IVF, encourage po intake     2) CKD 3- baseline Cr 1.5 mg/dl 2022-3 in part due to remote nephrectomy > 20 yrs ago      3) Abnormal UA- agree Cx may represent colonization; dc abx tyree with Cdif     4) s/p exp lap / LOS / colon resection / ostomy takedown / creation of colostomy 4/3 (Dr. 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 Fe stores noted -> IV FE + EPO      9) Chronic edema- onset 6/23 per family; EF 60% with mild-mod TR / AI + mild MR; RV WNL.  Plan for prn diuretics moving forward- d/w pt and son     10) Cdif colitis- on PO vanco    Questions/concerns were discussed with patient and/or family by bedside.          Talha Hernandez MD  5/31/2024  11:48 AM

## 2024-05-31 NOTE — PLAN OF CARE
Pt A&Ox4, sometimes forgetful. VSS most of the day but /62 this afternoon ; physician informed and IV labetalol ordered and administered. Mild headache pain managed with PO acetaminophen. Pt ambulating to bathroom without assistance or mobility device. Colostomy bag draining large amount of liquid stool. All medications given per MAR. Nephrology and hospitalist continuing to monitor creatinine levels. Pt and family updated on and in agreement with POC and discharge plans. Call light within reach, isolation and safety precautions in place.     Problem: CARDIOVASCULAR - ADULT  Goal: Maintains optimal cardiac output and hemodynamic stability  Description: INTERVENTIONS:  - Monitor vital signs, rhythm, and trends  - Monitor for bleeding, hypotension and signs of decreased cardiac output  - Evaluate effectiveness of vasoactive medications to optimize hemodynamic stability  - Monitor arterial and/or venous puncture sites for bleeding and/or hematoma  - Assess quality of pulses, skin color and temperature  - Assess for signs of decreased coronary artery perfusion - ex. Angina  - Evaluate fluid balance, assess for edema, trend weights  Outcome: Progressing  Goal: Absence of cardiac arrhythmias or at baseline  Description: INTERVENTIONS:  - Continuous cardiac monitoring, monitor vital signs, obtain 12 lead EKG if indicated  - Evaluate effectiveness of antiarrhythmic and heart rate control medications as ordered  - Initiate emergency measures for life threatening arrhythmias  - Monitor electrolytes and administer replacement therapy as ordered  Outcome: Progressing     Problem: GASTROINTESTINAL - ADULT  Goal: Maintains or returns to baseline bowel function  Description: INTERVENTIONS:  - Assess bowel function  - Maintain adequate hydration with IV or PO as ordered and tolerated  - Evaluate effectiveness of GI medications  - Encourage mobilization and activity  - Obtain nutritional consult as needed  - Establish a  toileting routine/schedule  - Consider collaborating with pharmacy to review patient's medication profile  Outcome: Not Progressing     Problem: RISK FOR INFECTION - ADULT  Goal: Absence of fever/infection during anticipated neutropenic period  Description: INTERVENTIONS  - Monitor WBC  - Administer growth factors as ordered  - Implement neutropenic guidelines  Outcome: Progressing

## 2024-05-31 NOTE — PLAN OF CARE
A&Ox4  Pt's BP elevated, Dr. GANESH Trimble notified. Per Dr. Trimble, treat pt's headache and recheck BP again in the morning.   PRN Tylenol given for headache  Meds given as per MAR  Contact precautions and safety precautions in place, bed at lowest position, bed alarm on, and call light within pt's reach.       Plan: Discharge in 1-2 days pending labs    Problem: CARDIOVASCULAR - ADULT  Goal: Maintains optimal cardiac output and hemodynamic stability  Description: INTERVENTIONS:  - Monitor vital signs, rhythm, and trends  - Monitor for bleeding, hypotension and signs of decreased cardiac output  - Evaluate effectiveness of vasoactive medications to optimize hemodynamic stability  - Monitor arterial and/or venous puncture sites for bleeding and/or hematoma  - Assess quality of pulses, skin color and temperature  - Assess for signs of decreased coronary artery perfusion - ex. Angina  - Evaluate fluid balance, assess for edema, trend weights  Outcome: Progressing

## 2024-06-01 PROBLEM — I10 ESSENTIAL HYPERTENSION: Status: ACTIVE | Noted: 2023-06-25

## 2024-06-01 LAB
ANION GAP SERPL CALC-SCNC: 9 MMOL/L (ref 0–18)
BASOPHILS # BLD AUTO: 0.05 X10(3) UL (ref 0–0.2)
BASOPHILS NFR BLD AUTO: 0.4 %
BUN BLD-MCNC: 37 MG/DL (ref 9–23)
CALCIUM BLD-MCNC: 8.6 MG/DL (ref 8.5–10.1)
CHLORIDE SERPL-SCNC: 120 MMOL/L (ref 98–112)
CO2 SERPL-SCNC: 16 MMOL/L (ref 21–32)
CREAT BLD-MCNC: 2.14 MG/DL
EGFRCR SERPLBLD CKD-EPI 2021: 21 ML/MIN/1.73M2 (ref 60–?)
EOSINOPHIL # BLD AUTO: 0.52 X10(3) UL (ref 0–0.7)
EOSINOPHIL NFR BLD AUTO: 3.8 %
ERYTHROCYTE [DISTWIDTH] IN BLOOD BY AUTOMATED COUNT: 20.9 %
GLUCOSE BLD-MCNC: 89 MG/DL (ref 70–99)
HCT VFR BLD AUTO: 30.1 %
HGB BLD-MCNC: 8.8 G/DL
IMM GRANULOCYTES # BLD AUTO: 0.18 X10(3) UL (ref 0–1)
IMM GRANULOCYTES NFR BLD: 1.3 %
LYMPHOCYTES # BLD AUTO: 1.78 X10(3) UL (ref 1–4)
LYMPHOCYTES NFR BLD AUTO: 12.9 %
MCH RBC QN AUTO: 30.4 PG (ref 26–34)
MCHC RBC AUTO-ENTMCNC: 29.2 G/DL (ref 31–37)
MCV RBC AUTO: 104.2 FL
MONOCYTES # BLD AUTO: 1.2 X10(3) UL (ref 0.1–1)
MONOCYTES NFR BLD AUTO: 8.7 %
NEUTROPHILS # BLD AUTO: 10.05 X10 (3) UL (ref 1.5–7.7)
NEUTROPHILS # BLD AUTO: 10.05 X10(3) UL (ref 1.5–7.7)
NEUTROPHILS NFR BLD AUTO: 72.9 %
OSMOLALITY SERPL CALC.SUM OF ELEC: 308 MOSM/KG (ref 275–295)
PLATELET # BLD AUTO: 189 10(3)UL (ref 150–450)
POTASSIUM SERPL-SCNC: 4.6 MMOL/L (ref 3.5–5.1)
RBC # BLD AUTO: 2.89 X10(6)UL
SODIUM SERPL-SCNC: 145 MMOL/L (ref 136–145)
WBC # BLD AUTO: 13.8 X10(3) UL (ref 4–11)

## 2024-06-01 PROCEDURE — 99233 SBSQ HOSP IP/OBS HIGH 50: CPT | Performed by: INTERNAL MEDICINE

## 2024-06-01 RX ORDER — AMLODIPINE BESYLATE 5 MG/1
5 TABLET ORAL DAILY
Qty: 30 TABLET | Refills: 2 | Status: ON HOLD | OUTPATIENT
Start: 2024-06-01

## 2024-06-01 RX ORDER — HYDRALAZINE HYDROCHLORIDE 20 MG/ML
10 INJECTION INTRAMUSCULAR; INTRAVENOUS ONCE
Status: COMPLETED | OUTPATIENT
Start: 2024-06-01 | End: 2024-06-01

## 2024-06-01 RX ORDER — AMLODIPINE BESYLATE 5 MG/1
5 TABLET ORAL DAILY
Status: DISCONTINUED | OUTPATIENT
Start: 2024-06-01 | End: 2024-06-02

## 2024-06-01 NOTE — CONSULTS
[facility]   part of [facility] Infectious Disease  Report of Consultation    Sunita Loza Patient Status:  Inpatient    1932 MRN [id number]   Location [facility] 4NW-A Attending Yonis Camargo,    Hosp Day # 8 PCP Janell Powell MD     Date of Admission:  2024  Date of Consult:  2024    Reason for Consultation:  C.diff    History of Present Illness:  Sunita Loza is a a(n) 89 year old female being seen at your request regarding persistent c.diff colitis.  Patient has been hospitalized since 24.  She has a h/o Bishop syndrome with recurrent cancer diagnoses.  Patient is known to our service after treatment for recent subcutaneous MRSA abdominal wall infection.  She reported more liquid output from her ostomy than previous.    Patient was noted on admission to have ESBL klebsiella in her urine.  She was also noted to have c.diff positive 24.  During this hospital stay she has been on p.o. vancomycin QID.  Crea has been slowly stabilizing.  Patient was not treated for  isolate as it was felt to be more likely a contaminant.    Some loose stools continue.  We are asked to see and assist.    History:  Past Medical History:    Arthritis    Atrial fibrillation (HCC)    Back pain    CKD (chronic kidney disease)    and S/P nephrectomy    Colon cancer (HCC)    Congestive heart disease (HCC)    Easy bruising    Endometrial cancer (HCC)    UTERINE, DX ABOUT 30 YEARS    Hearing impairment    AIDS    Heart palpitations    Afib    High blood pressure    Kidney failure    Leg swelling    Bishop syndrome    hereditary colorectal cancer    Muscle weakness    WALKER    Pain in joints    Wears glasses    Weight loss     Past Surgical History:   Procedure Laterality Date    Colectomy      Nephrectomy Left     Part removal colon w end colostomy       Family History   Problem Relation Age of Onset    Heart Disorder Father     Heart Disorder Mother     Colon Cancer  Mother         70    Heart Disorder Sister     Breast Cancer Sister     Diabetes Brother     Breast Cancer Sister     Cancer Sister     Breast Cancer Sister     Breast Cancer Daughter       reports that she has never smoked. She has never used smokeless tobacco. She reports that she does not currently use alcohol. She reports that she does not use drugs.    Allergies:  Allergies   Allergen Reactions    Ciprofloxacin RASH and HIVES    Penicillins RASH       Medications:    Current Facility-Administered Medications:     amLODIPine (Norvasc) tab 5 mg, 5 mg, Oral, Daily    acetaminophen (Tylenol Extra Strength) tab 500 mg, 500 mg, Oral, Q4H PRN    labetalol (Trandate) 5 mg/mL injection 20 mg, 20 mg, Intravenous, Q6H PRN    melatonin tab 3 mg, 3 mg, Oral, Nightly    vancomycin (Vancocin) cap 125 mg, 125 mg, Oral, QID    amiodarone (Pacerone) tab 100 mg, 100 mg, Oral, Daily    apixaban (Eliquis) tab 2.5 mg, 2.5 mg, Oral, BID    aspirin DR tab 81 mg, 81 mg, Oral, Daily    calcium carbonate-vitamin D (Oyster Shell-D) 250-3.125 MG-MCG per tab 2 tablet, 2 tablet, Oral, Daily    cyanocobalamin (Vitamin B12) tab 100 mcg, 100 mcg, Oral, Daily    dilTIAZem ER (Dilacor XR) 24 hr cap 120 mg, 120 mg, Oral, Daily    ferrous sulfate DR tab 325 mg, 325 mg, Oral, Daily with breakfast    pantoprazole (Protonix) DR tab 20 mg, 20 mg, Oral, QAM AC    Review of Systems:    Constitutional:  No fevers, chills, diaphoresis, weight changes.   HEENT:  No visual changes, oral ulcers, sore throat, difficulty swallowing.   Respiratory: Negative for cough, sputum, hemoptysis, chest pain, wheezing, dyspnea on exertion, or stridor.   Cardiovascular: Negative for chest pain, palpitations, irregular heart beats.   Gastrointestinal:  No abdominal pain, nausea, vomiting, diarrhea, or constipation.   Genitourinary:  No dysuria, hematuria, urine urgency or frequency.   Integument/breast: Negative for rash, skin lesions, and pruritus.   Hematologic/lymphatic:  Negative for easy bruising, bleeding, and lymphadenopathy.   Musculoskeletal: Negative for myalgias, arthralgias, muscle weakness.   Neurological: No focal neurologic deficits, seizures, tremors.   Psych:  No h/o anxiety, depression, other psych d/o.   Endocrine: No history of of diabetes, thyroid disorder.    Remainder of 12 point review of systems otherwise negative.    Vital signs in last 24 hours:  Patient Vitals for the past 24 hrs:   BP Temp Temp src Pulse Resp SpO2   06/01/24 1256 135/63 97.9 °F (36.6 °C) Oral 78 18 96 %   06/01/24 0845 153/65 97.8 °F (36.6 °C) Oral 58 18 97 %   06/01/24 0442 (!) 166/68 -- -- 59 18 100 %   06/01/24 0415 -- -- -- 82 -- --   05/31/24 2344 (!) 161/59 -- -- 58 -- --   05/31/24 2300 (!) 165/68 98.2 °F (36.8 °C) Oral 63 17 99 %   05/31/24 2033 148/68 97.9 °F (36.6 °C) Oral 66 16 97 %   05/31/24 1954 158/61 98.2 °F (36.8 °C) Oral 70 18 99 %       Intake/Output:  No intake/output data recorded.    Physical Exam:   General: Awake, alert, non-tox and in NAD.   Head: Normocephalic, without obvious abnormality, atraumatic.   Eyes: Conjunctivae/corneas clear.  No scleral icterus.  No conjunctival     hemorrhage.   Nose: Nares normal.   Throat:  Oropharynx clear, MMs moist.   Neck: Trachea ML, no masses.   Lungs: CTA b/l no rhonchi, rales, wheezes.   Chest wall: No tenderness or deformity.   Heart: Regular rate and rhythm, normal S1S2, no murmurs.   Abdomen: Soft, ostomy with liquid stool.   Extremity: No edema.   Skin: No rashes or lesions.   Neurological: No focal neurologic deficits.    Lab Data Review:  Lab Results   Component Value Date    WBC 13.8 06/01/2024    HGB 8.8 06/01/2024    HCT 30.1 06/01/2024    .0 06/01/2024    CREATSERUM 2.14 06/01/2024    BUN 37 06/01/2024     06/01/2024    K 4.6 06/01/2024     06/01/2024    CO2 16.0 06/01/2024    GLU 89 06/01/2024    CA 8.6 06/01/2024        Cultures:   ESBL klebsiella in urine    C.diff positive    Assessment and  Plan:    Acute c.diff positive status with diarrhea on admission in this elderly patient with a h/o recent abdominal wall abscess and need for re-do colostomy in April 2024  - Patient s/p course of IV vancomycin for MRSA abdominal wall abscess  - C.diff PCR/EIA+ on 5/25/24  - p.o. vancomycin day #8 ongoing    2  Abnormal urine sediment with ESBL klebsiella on culture  - Patient had been on ceftriaxone earlier this hospital stay  - Was not treated aggressively for this in the absence of  symptoms and in light of acute c.diff    3.  April 2024 admission for post-op subcutaneous abdominal wall abscess s/p exploratory lap with TIFFANIE and re-do colostomy 4/3/24  - Path with adneocarcinoma, patient with a h/o Bishop syndrome  - s/p IR drainage 4/23/24, cultures with MRSA  - Blood cultures negative at that time  - 2D echo without vegetations  - IV vancomycin course complete     4.  ZOHREH on CKD due to the above  - Crea at 2.14, trending    5.  Disposition - inpatient.  Presently patient reports her ostomy output is consistent with baseline.  Continue p.o. vancomycin as Rx and plan for a tapering course of p.o. vancomycin at discharge given significant symptoms earlier this hospital stay.  Agree with deferring additional antibiotics for  isolate, no significant  symptoms.  D/w patient.  Will follow.    Ada Escamilla DO, [facility] Infectious Disease  [phone]    6/1/2024  2:50 PM

## 2024-06-01 NOTE — PLAN OF CARE
Pt A&Ox4, pleasant and cooperative but sometimes forgetful. Hospitalist informed of high SBP readings this morning; hydralazine and amlodipine ordered and administered. SBP decreased. Mild headache pain, pt declining medication.  Pt ambulating to bathroom standby without mobility device. Colostomy bag continuing to drain liquid stool, hospitalist updated. New consult placed to infectious disease, physician informed. Pt and family updated on and in agreement with POC and discharge plans. Call light within reach, isolation and safety precautions in place.    Problem: METABOLIC/FLUID AND ELECTROLYTES - ADULT  Goal: Electrolytes maintained within normal limits  Description: INTERVENTIONS:  - Monitor labs and rhythm and assess patient for signs and symptoms of electrolyte imbalances  - Administer electrolyte replacement as ordered  - Monitor response to electrolyte replacements, including rhythm and repeat lab results as appropriate  - Fluid restriction as ordered  - Instruct patient on fluid and nutrition restrictions as appropriate  Outcome: Progressing  Goal: Hemodynamic stability and optimal renal function maintained  Description: INTERVENTIONS:  - Monitor labs and assess for signs and symptoms of volume excess or deficit  - Monitor intake, output and patient weight  - Monitor urine specific gravity, serum osmolarity and serum sodium as indicated or ordered  - Monitor response to interventions for patient's volume status, including labs, urine output, blood pressure (other measures as available)  - Encourage oral intake as appropriate  - Instruct patient on fluid and nutrition restrictions as appropriate  Outcome: Progressing     Problem: CARDIOVASCULAR - ADULT  Goal: Maintains optimal cardiac output and hemodynamic stability  Description: INTERVENTIONS:  - Monitor vital signs, rhythm, and trends  - Monitor for bleeding, hypotension and signs of decreased cardiac output  - Evaluate effectiveness of vasoactive  medications to optimize hemodynamic stability  - Monitor arterial and/or venous puncture sites for bleeding and/or hematoma  - Assess quality of pulses, skin color and temperature  - Assess for signs of decreased coronary artery perfusion - ex. Angina  - Evaluate fluid balance, assess for edema, trend weights  Outcome: Progressing  Goal: Absence of cardiac arrhythmias or at baseline  Description: INTERVENTIONS:  - Continuous cardiac monitoring, monitor vital signs, obtain 12 lead EKG if indicated  - Evaluate effectiveness of antiarrhythmic and heart rate control medications as ordered  - Initiate emergency measures for life threatening arrhythmias  - Monitor electrolytes and administer replacement therapy as ordered  Outcome: Progressing     Problem: GASTROINTESTINAL - ADULT  Goal: Maintains or returns to baseline bowel function  Description: INTERVENTIONS:  - Assess bowel function  - Maintain adequate hydration with IV or PO as ordered and tolerated  - Evaluate effectiveness of GI medications  - Encourage mobilization and activity  - Obtain nutritional consult as needed  - Establish a toileting routine/schedule  - Consider collaborating with pharmacy to review patient's medication profile  Outcome: Not Progressing     Problem: RISK FOR INFECTION - ADULT  Goal: Absence of fever/infection during anticipated neutropenic period  Description: INTERVENTIONS  - Monitor WBC  - Administer growth factors as ordered  - Implement neutropenic guidelines  Outcome: Progressing

## 2024-06-01 NOTE — PROGRESS NOTES
[facility]   part of [facility]     Nephrology Progress Note    Sunita Loza Patient Status:  Inpatient    1932 MRN [id number]   Location [facility] 4NW-A Attending Chon Chaves MD   Hosp Day # 8 PCP Janell Powell MD       SUBJECTIVE:  No c/o this AM, hoping to go home- discharge held due to elevated BP        Physical Exam:   BP (!) 166/68 (BP Location: Right arm)   Pulse 59   Temp 98.2 °F (36.8 °C) (Oral)   Resp 18   Ht 5' (1.524 m)   Wt 81 lb 14.4 oz (37.1 kg)   SpO2 100%   BMI 15.99 kg/m²   Temp (24hrs), Av.1 °F (36.7 °C), Min:97.9 °F (36.6 °C), Max:98.2 °F (36.8 °C)       Intake/Output Summary (Last 24 hours) at 2024 0833  Last data filed at 2024  Gross per 24 hour   Intake 120 ml   Output --   Net 120 ml     Last 3 Weights   24 81 lb 14.4 oz (37.1 kg)   24 1542 95 lb (43.1 kg)   24 1017 81 lb (36.7 kg)   24 0158 92 lb 14.4 oz (42.1 kg)   24 203 95 lb (43.1 kg)   24 0400 87 lb 8.4 oz (39.7 kg)   24 0613 82 lb 1.5 oz (37.2 kg)   24 1510 83 lb (37.6 kg)   24 2133 83 lb 1.6 oz (37.7 kg)   24 2120 83 lb (37.6 kg)   24 1315 86 lb (39 kg)     General: Alert and oriented in no apparent distress.  HEENT: No scleral icterus, MMM  Neck: Supple, no MARIA FERNANDA or thyromegaly  Cardiac: Regular rate and rhythm, S1, S2 normal, no murmur or rub  Lungs: Clear without wheezes, rales, rhonchi.    Abdomen: Soft, non-tender. + bowel sounds, no palpable organomegaly  Extremities: Without clubbing, cyanosis or edema.  Neurologic: Alert and oriented, cranial nerves grossly intact, moving all extremities  Skin: Warm and dry, no rash        Labs:     Recent Labs   Lab 24  0712 24  0617 24  0711 24  0637 24  0625   WBC 9.4 12.4* 13.1* 13.5* 13.8*   HGB 8.8* 9.2* 8.7* 8.9* 8.8*   MCV 98.3 99.0 98.6 100.3* 104.2*   .0 243.0 232.0 212.0 189.0       Recent Labs   Lab 24  0942  05/27/24  0651 05/28/24  0712 05/29/24  0617 05/29/24  1453 05/30/24  0711 05/31/24  0637 05/31/24  1645    144   < > 143 143 144 145 145   K 3.2* 4.1  4.1   < > 4.3 4.7 4.3 4.1 4.7    114*   < > 116* 116* 117* 120* 119*   CO2 29.0 25.0   < > 22.0 21.0 20.0* 17.0* 19.0*   BUN 56* 43*   < > 32* 30* 32* 29* 32*   CREATSERUM 2.79* 2.57*   < > 2.12* 2.18* 2.33* 2.04* 2.11*   CA 7.0* 7.4*   < > 8.8 8.9 8.7 8.4* 8.5   MG 1.6 2.2  --   --   --   --   --   --    GLU 95 102*   < > 87 148* 91 89 91    < > = values in this interval not displayed.       No results for input(s): \"ALT\", \"AST\", \"ALB\", \"AMYLASE\", \"LIPASE\", \"LDH\" in the last 168 hours.    Invalid input(s): \"ALPHOS\", \"TBIL\", \"DBIL\", \"TPROT\"      No results for input(s): \"PGLU\" in the last 168 hours.    Meds:   Current Facility-Administered Medications   Medication Dose Route Frequency    hydrALAzine (Apresoline) 20 mg/mL injection 10 mg  10 mg Intravenous Once    acetaminophen (Tylenol Extra Strength) tab 500 mg  500 mg Oral Q4H PRN    labetalol (Trandate) 5 mg/mL injection 20 mg  20 mg Intravenous Q6H PRN    melatonin tab 3 mg  3 mg Oral Nightly    vancomycin (Vancocin) cap 125 mg  125 mg Oral QID    amiodarone (Pacerone) tab 100 mg  100 mg Oral Daily    apixaban (Eliquis) tab 2.5 mg  2.5 mg Oral BID    aspirin DR tab 81 mg  81 mg Oral Daily    calcium carbonate-vitamin D (Oyster Shell-D) 250-3.125 MG-MCG per tab 2 tablet  2 tablet Oral Daily    cyanocobalamin (Vitamin B12) tab 100 mcg  100 mcg Oral Daily    dilTIAZem ER (Dilacor XR) 24 hr cap 120 mg  120 mg Oral Daily    ferrous sulfate DR tab 325 mg  325 mg Oral Daily with breakfast    pantoprazole (Protonix) DR tab 20 mg  20 mg Oral QAM AC         Impression/Plan:      1) ZOHREH- due to diuresis / poor PO intake / ostomy losses. Creat sig better overall after IVF, encourage po intake     2) CKD 3- baseline Cr 1.5 mg/dl 2022-3 in part due to remote nephrectomy > 20 yrs ago      3) Abnormal UA- agree Cx  may represent colonization; dc abx tyree with Cdif     4) s/p exp lap / LOS / colon resection / ostomy takedown / creation of colostomy 4/3 (Dr. 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 Fe stores noted -> IV FE + EPO      9) Chronic edema- onset 6/23 per family; EF 60% with mild-mod TR / AI + mild MR; RV WNL.  Plan for prn diuretics moving forward- d/w pt and son     10) Cdif colitis- on PO vanco    11)  HTN- BP modestly elevated, somewhat limited in use of preferred meds- HR precludes BB and with tendency to vol depletion would not start ACE/ARB despite favoring these agents in CKD.  Start amlodipine at low dose.  Would still consider home today and can f/u BP as outpt    Questions/concerns were discussed with patient and/or family by bedside.        Talha Hernandez MD  6/1/2024  8:34 AM

## 2024-06-01 NOTE — PROGRESS NOTES
[facility]   part of [facility]    Progress Note     Sunita Loza Patient Status:  Inpatient    1932 MRN [id number]   Location [facility] 3NE-A Attending Chon Chaves MD   Hosp Day # 8 PCP Janell Powell MD     Follow up for: The primary encounter diagnosis was Dehydration. Diagnoses of Syncope, near and ZOHREH (acute kidney injury) (HCC) were also pertinent to this visit.      Interval History/Subjective:     Poor oral hydration.  Per RN, still having a lot of output through the ostomy, son at bedside confirms this.  Stools are still very watery in nature.  Overall patient states that she is feeling good and wants to go home soon.      Vital signs:  Temp:  [97.8 °F (36.6 °C)-98.2 °F (36.8 °C)] 97.9 °F (36.6 °C)  Pulse:  [58-82] 78  Resp:  [16-18] 18  BP: (135-166)/(59-68) 135/63  SpO2:  [96 %-100 %] 96 %    Physical Exam:    General: NAD, Comfortable, Nontoxic, frail elderly woman  Respiratory: CTAB; reg resp rate & effort, no wheezes/crackles  Cardiovascular: S1, S2. Regular rate and rhythm. No murmurs appreciated  Abdomen: Soft, NTND, no guarding/rebound; s/p ostomy with watery stools  Neurologic: No focal neurological deficits.   Extremities: No edema.   Skin: Dry, no rashes, ulcers or lesions     Diagnostic Data:      Labs:  Recent Labs   Lab 24  0712 24  0617 24  0711 24  0637 24  0625   WBC 9.4 12.4* 13.1* 13.5* 13.8*   HGB 8.8* 9.2* 8.7* 8.9* 8.8*   MCV 98.3 99.0 98.6 100.3* 104.2*   .0 243.0 232.0 212.0 189.0       Recent Labs   Lab 24  0637 24  1645 24  0809   GLU 89 91 89   BUN 29* 32* 37*   CREATSERUM 2.04* 2.11* 2.14*   CA 8.4* 8.5 8.6    145 145   K 4.1 4.7 4.6   * 119* 120*   CO2 17.0* 19.0* 16.0*       No results for input(s): \"PTP\", \"INR\" in the last 168 hours.    No results for input(s): \"TROP\", \"CK\" in the last 168 hours.         Imaging: Imaging data reviewed in Epic.    Medications:    amLODIPine  5 mg  Oral Daily    melatonin  3 mg Oral Nightly    vancomycin  125 mg Oral QID    amiodarone  100 mg Oral Daily    apixaban  2.5 mg Oral BID    aspirin  81 mg Oral Daily    calcium carbonate-vitamin D  2 tablet Oral Daily    cyanocobalamin  100 mcg Oral Daily    dilTIAZem ER  120 mg Oral Daily    ferrous sulfate  325 mg Oral Daily with breakfast    pantoprazole  20 mg Oral QAM AC       ASSESSMENT / PLAN:     Sunita Loza Is a a 89 year old female who presented with weakness, ZOHREH on CKD3 likely due to dehydration secondary to C Difficile colitis    Problem List / Diagnoses    ZOHREH on CKD3  C Difficile Colitis  Abnormal UA  HTN  pAF    Plan    ZOHREH on CKD3 -- worsening   -- 3.7 on admission, peaked at 4.1 5/24,   -- IVF stopped 5/28, creatinine rising 1.88->2.12->2.33 off IVF   -- Will repeat BMP in the a.m. to evaluate  - continue to encourage p.o. intake  -- maintain strict I&Os  -- renaly dose all meds, avoid nephrotoxic agents     C Difficile Colitis  -- C Diff PCR positive, start empiric vancomycin QID  -- Given persistent watery stools despite multiple doses of p.o. vancomycin, will consider ID consult    Abnormal UA  -- negative nitrities & pt without urinary symptoms, but started on rocephin given concomitant ZOHREH & patient's underlying dementia making her an unreliable historian  -- Urine cultures with ESBL  - Given lack of urinary symptoms, resolution of leukocytosis and improvement in renal function despite an adequate antimicrobial coverage, this likely represents colonization and not true UTI.    -Given her known C. difficile colitis and known resistances, will elect not to treat as risks likely outweigh benefits.  This was discussed with patient and her son, Uday, at bedside and all questions answered    Hematuria  - Noted on admission UA  -No clots, patient and family instructed to inform staff if she develops any urinary symptoms or retention, or notes clots  -Hemoglobin has been stable, monitor    HTN  --  resume home diltiazem  -- home torsemide on hold given above, hold for now given Cr   -Amlodipine added    pAF  -- resume home meds, eliquis for AC   -- tele       DVT Mechanical Prophylaxis:   SCDs,    DVT Pharmacologic Prophylaxis   Medication    apixaban (Eliquis) tab 2.5 mg         DVT Pharmacologic prophylaxis: Aspirin 81 mg    Code Status: Full Code    Dispo: inpatient; ALEXANDRU 1-2 days pending improvement in Cr, anticipate discharge tomorrow    Plan of care discussed with patient and/or family at bedside.    Yonis Camargo DO  [facility]        This note was created using voice recognition technology. It may include inadvertent transcriptional errors. Any such errors should be contextually interpreted and should not be taken to alter the content or the meaning.     Note to Patient: The 21st Century Cures Act makes medical notes like these available to patients in the interest of transparency. However, be advised this is a medical document. It is intended as peer to peer communication. It is written in medical language and may contain abbreviations or verbiage that are unfamiliar. It may appear blunt or direct. Medical documents are intended to carry relevant information, facts as evident, and the clinical opinion of the practitioner and not intended to be the primary source of your information.  Please refer directly to myself or clinical staff for information regarding plan of care.

## 2024-06-01 NOTE — PLAN OF CARE
A&Ox4  BP elevated ('s; DBP 60's), no med given d/t parameters not being met.  Pt c/o Headache 4-5/10. PRN tylenol given @ 0441.  Meds given as per MAR  Contact and safety precautions in place, bed at lowest position, bed alarm on and call light within pt's reach.      Contact precautions and safety precautions in place, bed at lowest position, bed alarm on, and call light within pt's reach  Problem: CARDIOVASCULAR - ADULT  Goal: Maintains optimal cardiac output and hemodynamic stability  Description: INTERVENTIONS:  - Monitor vital signs, rhythm, and trends  - Monitor for bleeding, hypotension and signs of decreased cardiac output  - Evaluate effectiveness of vasoactive medications to optimize hemodynamic stability  - Monitor arterial and/or venous puncture sites for bleeding and/or hematoma  - Assess quality of pulses, skin color and temperature  - Assess for signs of decreased coronary artery perfusion - ex. Angina  - Evaluate fluid balance, assess for edema, trend weights  Outcome: Progressing  Goal: Absence of cardiac arrhythmias or at baseline  Description: INTERVENTIONS:  - Continuous cardiac monitoring, monitor vital signs, obtain 12 lead EKG if indicated  - Evaluate effectiveness of antiarrhythmic and heart rate control medications as ordered  - Initiate emergency measures for life threatening arrhythmias  - Monitor electrolytes and administer replacement therapy as ordered  Outcome: Progressing     Problem: RISK FOR INFECTION - ADULT  Goal: Absence of fever/infection during anticipated neutropenic period  Description: INTERVENTIONS  - Monitor WBC  - Administer growth factors as ordered  - Implement neutropenic guidelines  Outcome: Progressing

## 2024-06-02 VITALS
WEIGHT: 81.88 LBS | OXYGEN SATURATION: 96 % | HEIGHT: 60 IN | RESPIRATION RATE: 18 BRPM | DIASTOLIC BLOOD PRESSURE: 65 MMHG | TEMPERATURE: 98 F | SYSTOLIC BLOOD PRESSURE: 153 MMHG | HEART RATE: 79 BPM | BODY MASS INDEX: 16.08 KG/M2

## 2024-06-02 LAB
ANION GAP SERPL CALC-SCNC: 7 MMOL/L (ref 0–18)
BASOPHILS # BLD AUTO: 0.05 X10(3) UL (ref 0–0.2)
BASOPHILS NFR BLD AUTO: 0.4 %
BUN BLD-MCNC: 42 MG/DL (ref 9–23)
CALCIUM BLD-MCNC: 8.4 MG/DL (ref 8.5–10.1)
CHLORIDE SERPL-SCNC: 122 MMOL/L (ref 98–112)
CO2 SERPL-SCNC: 17 MMOL/L (ref 21–32)
CREAT BLD-MCNC: 2.09 MG/DL
EGFRCR SERPLBLD CKD-EPI 2021: 22 ML/MIN/1.73M2 (ref 60–?)
EOSINOPHIL # BLD AUTO: 0.52 X10(3) UL (ref 0–0.7)
EOSINOPHIL NFR BLD AUTO: 4.5 %
ERYTHROCYTE [DISTWIDTH] IN BLOOD BY AUTOMATED COUNT: 21.8 %
GLUCOSE BLD-MCNC: 84 MG/DL (ref 70–99)
HCT VFR BLD AUTO: 30 %
HGB BLD-MCNC: 9.1 G/DL
IMM GRANULOCYTES # BLD AUTO: 0.19 X10(3) UL (ref 0–1)
IMM GRANULOCYTES NFR BLD: 1.6 %
LYMPHOCYTES # BLD AUTO: 1.4 X10(3) UL (ref 1–4)
LYMPHOCYTES NFR BLD AUTO: 12 %
MCH RBC QN AUTO: 30.3 PG (ref 26–34)
MCHC RBC AUTO-ENTMCNC: 30.3 G/DL (ref 31–37)
MCV RBC AUTO: 100 FL
MONOCYTES # BLD AUTO: 0.99 X10(3) UL (ref 0.1–1)
MONOCYTES NFR BLD AUTO: 8.5 %
NEUTROPHILS # BLD AUTO: 8.51 X10 (3) UL (ref 1.5–7.7)
NEUTROPHILS # BLD AUTO: 8.51 X10(3) UL (ref 1.5–7.7)
NEUTROPHILS NFR BLD AUTO: 73 %
OSMOLALITY SERPL CALC.SUM OF ELEC: 312 MOSM/KG (ref 275–295)
PLATELET # BLD AUTO: 215 10(3)UL (ref 150–450)
POTASSIUM SERPL-SCNC: 4.8 MMOL/L (ref 3.5–5.1)
RBC # BLD AUTO: 3 X10(6)UL
SODIUM SERPL-SCNC: 146 MMOL/L (ref 136–145)
WBC # BLD AUTO: 11.7 X10(3) UL (ref 4–11)

## 2024-06-02 PROCEDURE — 99233 SBSQ HOSP IP/OBS HIGH 50: CPT | Performed by: INTERNAL MEDICINE

## 2024-06-02 RX ORDER — VANCOMYCIN HYDROCHLORIDE 125 MG/1
CAPSULE ORAL
Qty: 55 CAPSULE | Refills: 0 | Status: ON HOLD | OUTPATIENT
Start: 2024-06-02 | End: 2024-07-07

## 2024-06-02 NOTE — PROGRESS NOTES
[facility]   part of [facility]     Nephrology Progress Note    Sunita Loza Patient Status:  Inpatient    1932 MRN [id number]   Location [facility] 4NW-A Attending Chon Chaves MD   Hosp Day # 9 PCP Janell Powell MD       SUBJECTIVE:  Up in chair eating breakfast, no c/o, hoping to go home        Physical Exam:   /65 (BP Location: Right arm)   Pulse 79   Temp 97.5 °F (36.4 °C) (Oral)   Resp 18   Ht 5' (1.524 m)   Wt 81 lb 14.4 oz (37.1 kg)   SpO2 96%   BMI 15.99 kg/m²   Temp (24hrs), Av.3 °F (36.8 °C), Min:97.5 °F (36.4 °C), Max:98.9 °F (37.2 °C)       Intake/Output Summary (Last 24 hours) at 2024 0953  Last data filed at 2024 0800  Gross per 24 hour   Intake 480 ml   Output --   Net 480 ml     Last 3 Weights   24 81 lb 14.4 oz (37.1 kg)   24 1542 95 lb (43.1 kg)   24 1017 81 lb (36.7 kg)   24 0158 92 lb 14.4 oz (42.1 kg)   24 95 lb (43.1 kg)   24 0400 87 lb 8.4 oz (39.7 kg)   24 0613 82 lb 1.5 oz (37.2 kg)   24 1510 83 lb (37.6 kg)   24 2133 83 lb 1.6 oz (37.7 kg)   24 2120 83 lb (37.6 kg)   24 1315 86 lb (39 kg)     General: Alert and oriented in no apparent distress.  HEENT: No scleral icterus, MMM  Neck: Supple, no MARIA FERNANDA or thyromegaly  Cardiac: Regular rate and rhythm, S1, S2 normal, no murmur or rub  Lungs: Clear without wheezes, rales, rhonchi.    Abdomen: Soft, non-tender. + bowel sounds, + ostomy  Extremities:  tr BLE edema.  Neurologic: Alert and oriented, cranial nerves grossly intact, moving all extremities  Skin: Warm and dry, no rash        Labs:     Recent Labs   Lab 24  0617 24  0711 24  0637 24  0625 24  0729   WBC 12.4* 13.1* 13.5* 13.8* 11.7*   HGB 9.2* 8.7* 8.9* 8.8* 9.1*   MCV 99.0 98.6 100.3* 104.2* 100.0   .0 232.0 212.0 189.0 215.0       Recent Labs   Lab 24  0651 24  0712 24  0711 24  0637 24  1645  06/01/24  0809 06/02/24  0729      < > 144 145 145 145 146*   K 4.1  4.1   < > 4.3 4.1 4.7 4.6 4.8   *   < > 117* 120* 119* 120* 122*   CO2 25.0   < > 20.0* 17.0* 19.0* 16.0* 17.0*   BUN 43*   < > 32* 29* 32* 37* 42*   CREATSERUM 2.57*   < > 2.33* 2.04* 2.11* 2.14* 2.09*   CA 7.4*   < > 8.7 8.4* 8.5 8.6 8.4*   MG 2.2  --   --   --   --   --   --    *   < > 91 89 91 89 84    < > = values in this interval not displayed.       No results for input(s): \"ALT\", \"AST\", \"ALB\", \"AMYLASE\", \"LIPASE\", \"LDH\" in the last 168 hours.    Invalid input(s): \"ALPHOS\", \"TBIL\", \"DBIL\", \"TPROT\"      No results for input(s): \"PGLU\" in the last 168 hours.    Meds:   Current Facility-Administered Medications   Medication Dose Route Frequency    amLODIPine (Norvasc) tab 5 mg  5 mg Oral Daily    acetaminophen (Tylenol Extra Strength) tab 500 mg  500 mg Oral Q4H PRN    labetalol (Trandate) 5 mg/mL injection 20 mg  20 mg Intravenous Q6H PRN    melatonin tab 3 mg  3 mg Oral Nightly    vancomycin (Vancocin) cap 125 mg  125 mg Oral QID    amiodarone (Pacerone) tab 100 mg  100 mg Oral Daily    apixaban (Eliquis) tab 2.5 mg  2.5 mg Oral BID    aspirin DR tab 81 mg  81 mg Oral Daily    calcium carbonate-vitamin D (Oyster Shell-D) 250-3.125 MG-MCG per tab 2 tablet  2 tablet Oral Daily    cyanocobalamin (Vitamin B12) tab 100 mcg  100 mcg Oral Daily    dilTIAZem ER (Dilacor XR) 24 hr cap 120 mg  120 mg Oral Daily    ferrous sulfate DR tab 325 mg  325 mg Oral Daily with breakfast    pantoprazole (Protonix) DR tab 20 mg  20 mg Oral QAM AC         Impression/Plan:      1) ZOHREH- due to diuresis / poor PO intake / ostomy losses. Creat sig better overall after IVF, encourage po intake     2) CKD 3- baseline Cr 1.5 mg/dl 2022-3 in part due to remote nephrectomy > 20 yrs ago      3)  Recent onset AF on amio / cardizem / eliquis     4) Anemia- due to CKD +/- malignancy. Fe stores noted -> IV FE + EPO      5) Chronic edema- onset 6/23 per  family; EF 60% with mild-mod TR / AI + mild MR; RV WNL.  Plan for prn diuretics moving forward- d/w pt and son     6) Cdif colitis- per ID    7)  HTN- BP modestly elevated, somewhat limited in use of preferred meds- HR precludes BB and with tendency to vol depletion would not start ACE/ARB despite favoring these agents in CKD.  BP improved past 24 hrs.  Cont amlodipine.     Questions/concerns were discussed with patient and/or family by bedside.      Talha Hernandez MD  6/2/2024  9:54 AM

## 2024-06-02 NOTE — PROGRESS NOTES
NURSING DISCHARGE NOTE    Discharged Home via Wheelchair.  Accompanied by Family member  Belongings Taken by patient/family.    Discharge instructions reviewed. Patient and family voiced understanding. Cdiff precautions reviewed.

## 2024-06-02 NOTE — CM/SW NOTE
06/02/24 1600   Discharge disposition   Expected discharge disposition Home or Self   Post Acute Care Provider   ([facility])   Discharge transportation Private car     Radha Rodriguez MBA MSN, RN CTL/  [id number]

## 2024-06-02 NOTE — PROGRESS NOTES
[facility]   part of [facility] Infectious Disease Progress Note    Sunita Loza Patient Status:  Inpatient    1932 MRN [id number]   Location [facility] 4NW-A Attending Yonis Camargo,    Hosp Day # 9 PCP Janell Powell MD     Subjective:  Pt states diarrhea is resolved.     Objective:    Allergies:  Allergies   Allergen Reactions    Ciprofloxacin RASH and HIVES    Penicillins RASH       Medications:    Current Facility-Administered Medications:     amLODIPine (Norvasc) tab 5 mg, 5 mg, Oral, Daily    acetaminophen (Tylenol Extra Strength) tab 500 mg, 500 mg, Oral, Q4H PRN    labetalol (Trandate) 5 mg/mL injection 20 mg, 20 mg, Intravenous, Q6H PRN    melatonin tab 3 mg, 3 mg, Oral, Nightly    vancomycin (Vancocin) cap 125 mg, 125 mg, Oral, QID    amiodarone (Pacerone) tab 100 mg, 100 mg, Oral, Daily    apixaban (Eliquis) tab 2.5 mg, 2.5 mg, Oral, BID    aspirin DR tab 81 mg, 81 mg, Oral, Daily    calcium carbonate-vitamin D (Oyster Shell-D) 250-3.125 MG-MCG per tab 2 tablet, 2 tablet, Oral, Daily    cyanocobalamin (Vitamin B12) tab 100 mcg, 100 mcg, Oral, Daily    dilTIAZem ER (Dilacor XR) 24 hr cap 120 mg, 120 mg, Oral, Daily    ferrous sulfate DR tab 325 mg, 325 mg, Oral, Daily with breakfast    pantoprazole (Protonix) DR tab 20 mg, 20 mg, Oral, QAM AC    Physical Exam:  General: Alert, orientated x3.  Cooperative.  No apparent distress.  Vital Signs:  Blood pressure 153/65, pulse 79, temperature 97.5 °F (36.4 °C), temperature source Oral, resp. rate 18, height 5' (1.524 m), weight 81 lb 14.4 oz (37.1 kg), SpO2 96%.   Temp (24hrs), Av.3 °F (36.8 °C), Min:97.5 °F (36.4 °C), Max:98.9 °F (37.2 °C)      HEENT: Exam is unremarkable.  Without scleral icterus.  Mucous membranes are moist. PERRLA.  Oropharynx is clear.  Neck: No tenderness to palpitation.  Full range of motion to flexion and extension, lateral rotation and lateral flexion of cervical spine.  No JVD. Supple.   Lungs:  Clear to auscultation bilaterally.  Cardiac: Regular rate and rhythm. No murmur.  Abdomen:  Soft, non-distended, non-tender, with no rebound or guarding.   Extremities:  No lower extremity edema noted.  Without clubbing or cyanosis.    Skin: Normal texture and turgor.  Neurologic: Cranial nerves are grossly intact.  Motor strength and sensory examination is grossly normal.  No focal neurologic deficit.    Labs:  Lab Results   Component Value Date    WBC 11.7 06/02/2024    HGB 9.1 06/02/2024    HCT 30.0 06/02/2024    .0 06/02/2024    CREATSERUM 2.09 06/02/2024    BUN 42 06/02/2024     06/02/2024    K 4.8 06/02/2024     06/02/2024    CO2 17.0 06/02/2024    GLU 84 06/02/2024    CA 8.4 06/02/2024         Assessment/Plan:    1.  C diff  -on PO vancomycin d#9  2.  Bacteriuria  -abnormal UA, cultures with ESBL Klebsiella  -was initially started on IV ceftriaxone  -asymptomatic  3.  Hx of abd wall abscess  -path with adenocarcinoma  -hx of teresa syndrome  -s/p IR drainage, cultures with MRSA  -s/p IV vancomycin  4.  ZOHREH  -nephrology on consult  5.  Leukocytosis  -improving at 11k  6.  Dispo  -PO vancomycin qid followed by taper, rx sent to pharmacy  -follow up in 2 weeks    If you have any questions or concerns please call [facility] Infectious Disease at [phone].     BARBARA Santos

## 2024-06-02 NOTE — PLAN OF CARE
Strength and energy improving  Nephrology ok to dc  Awaiting ID clearance  Possible dc today  Patient cares for daughter at home and is eager to return  Takes self care of colostomy  Tolerating diet. Improved appetite  PO vanco  Isolation maintained  IVSL

## 2024-06-02 NOTE — PLAN OF CARE
Received pt a/o x4,forgetful. VSS. Afebrile. Denies pain. Medication admin per MAR. Watery output continued from colostomy. Isolation precautions maintained. Call light within reach. Safety precautions in place.     Problem: CARDIOVASCULAR - ADULT  Goal: Absence of cardiac arrhythmias or at baseline  Description: INTERVENTIONS:  - Continuous cardiac monitoring, monitor vital signs, obtain 12 lead EKG if indicated  - Evaluate effectiveness of antiarrhythmic and heart rate control medications as ordered  - Initiate emergency measures for life threatening arrhythmias  - Monitor electrolytes and administer replacement therapy as ordered  Outcome: Progressing     Problem: RISK FOR INFECTION - ADULT  Goal: Absence of fever/infection during anticipated neutropenic period  Description: INTERVENTIONS  - Monitor WBC  - Administer growth factors as ordered  - Implement neutropenic guidelines  Outcome: Progressing

## 2024-06-03 NOTE — CDS QUERY
DOCUMENTATION CLARIFICATION FORM    Dear Dr. Camargo:  Please provide a nutritional diagnosis, if known, related to the clinical information below:   [   ] Severe Malnutrition  [   ]  Other (please specify) :_____________________________      Documentation from the Medical Record:     Potential Risk Factors/Clinical Indicators    ___ Ht: 152.4 cm (5') Wt: 37.1 kg (81 lb 14.4 oz). BMI: Body mass index is 15.99 kg/m². IBW: 47.7 kg (105lbs)  ___H&P: General: Alert, no distress, appears stated age. cachectic appearing  ___ Dietician Consult Note: NUTRITION ASSESSMENT: Pt meets Pt meets severe malnutrition criteria at this time.   Criteria for severe malnutrition diagnosis: acute illness/injury related to wt loss greater than 5% in 1 month and energy intake less than 50% for greater than 5 days. 5/29/24 89 yo female. Admitted with dehydration. Pt reported poor appetite and abdominal pain on admission. Was noted to have liquid stoma output. Seen today for low BMI, weight loss noted since last admission on April 2024 - dry weight + fluid loss. Pt reports good appetite currently, received breakfast during interview.100% PO intake documented on previous meals. Pt takes ONS daily at home, reports tolerating them and not noticing changes on ostomy output when taking them. Agreeable to getting ONS during admission, recommended taking ONS after/between meals. Pt reports having 3 meals per day PTA and facility providing dinner meal. Pmhx - HTN, PAF, teresa syndrome with recurrent cancer, colon resection s/p colostomy, recent ex lap.      Treatment: Dietician Consult, Medical Food Supplements: Ensure Plus High Protein                 Use of terms such as suspected, possible, or probable (associated with a specific diagnosis that is being evaluated, monitored, or treated as if it exists) are acceptable and can be coded in the inpatient setting, when documented at the time of discharge.     Please add any additional documentation to  your progress note and continue to document this through discharge.    Thank you. For questions regarding this query, please contact Clinical : Zaira Batista [phone]  This form is a permanent part of the medical record.

## 2024-06-10 ENCOUNTER — ANESTHESIA EVENT (OUTPATIENT)
Dept: SURGERY | Facility: HOSPITAL | Age: 89
DRG: 481 | End: 2024-06-10
Payer: MEDICARE

## 2024-06-10 ENCOUNTER — APPOINTMENT (OUTPATIENT)
Dept: GENERAL RADIOLOGY | Facility: HOSPITAL | Age: 89
DRG: 481 | End: 2024-06-10
Attending: EMERGENCY MEDICINE
Payer: MEDICARE

## 2024-06-10 ENCOUNTER — APPOINTMENT (OUTPATIENT)
Dept: ULTRASOUND IMAGING | Facility: HOSPITAL | Age: 89
DRG: 481 | End: 2024-06-10
Attending: EMERGENCY MEDICINE
Payer: MEDICARE

## 2024-06-10 ENCOUNTER — APPOINTMENT (OUTPATIENT)
Dept: GENERAL RADIOLOGY | Facility: HOSPITAL | Age: 89
DRG: 481 | End: 2024-06-10
Attending: HOSPITALIST
Payer: MEDICARE

## 2024-06-10 ENCOUNTER — ANESTHESIA (OUTPATIENT)
Dept: EMERGENCY DEPT | Facility: HOSPITAL | Age: 89
DRG: 481 | End: 2024-06-10
Payer: MEDICARE

## 2024-06-10 ENCOUNTER — APPOINTMENT (OUTPATIENT)
Dept: CT IMAGING | Facility: HOSPITAL | Age: 89
DRG: 481 | End: 2024-06-10
Attending: EMERGENCY MEDICINE
Payer: MEDICARE

## 2024-06-10 ENCOUNTER — HOSPITAL ENCOUNTER (INPATIENT)
Facility: HOSPITAL | Age: 89
LOS: 5 days | Discharge: NURSING, REHAB OR LONG-TERM CARE | DRG: 481 | End: 2024-06-15
Attending: EMERGENCY MEDICINE | Admitting: INTERNAL MEDICINE
Payer: MEDICARE

## 2024-06-10 ENCOUNTER — ANESTHESIA EVENT (OUTPATIENT)
Dept: EMERGENCY DEPT | Facility: HOSPITAL | Age: 89
DRG: 481 | End: 2024-06-10
Payer: MEDICARE

## 2024-06-10 DIAGNOSIS — E87.5 HYPERKALEMIA: ICD-10-CM

## 2024-06-10 DIAGNOSIS — S72.002A CLOSED FRACTURE OF LEFT HIP, INITIAL ENCOUNTER (HCC): Primary | ICD-10-CM

## 2024-06-10 LAB
ALBUMIN SERPL-MCNC: 2.7 G/DL (ref 3.4–5)
ALBUMIN/GLOB SERPL: 0.8 {RATIO} (ref 1–2)
ALP LIVER SERPL-CCNC: 100 U/L
ALT SERPL-CCNC: 19 U/L
ANION GAP SERPL CALC-SCNC: 7 MMOL/L (ref 0–18)
ANION GAP SERPL CALC-SCNC: 8 MMOL/L (ref 0–18)
APTT PPP: 28.3 SECONDS (ref 23–36)
AST SERPL-CCNC: 20 U/L (ref 15–37)
ATRIAL RATE: 72 BPM
BASOPHILS # BLD AUTO: 0.05 X10(3) UL (ref 0–0.2)
BASOPHILS NFR BLD AUTO: 0.5 %
BILIRUB SERPL-MCNC: 0.3 MG/DL (ref 0.1–2)
BUN BLD-MCNC: 36 MG/DL (ref 9–23)
BUN BLD-MCNC: 37 MG/DL (ref 9–23)
CALCIUM BLD-MCNC: 8.7 MG/DL (ref 8.5–10.1)
CALCIUM BLD-MCNC: 9.7 MG/DL (ref 8.5–10.1)
CHLORIDE SERPL-SCNC: 120 MMOL/L (ref 98–112)
CHLORIDE SERPL-SCNC: 122 MMOL/L (ref 98–112)
CO2 SERPL-SCNC: 16 MMOL/L (ref 21–32)
CO2 SERPL-SCNC: 16 MMOL/L (ref 21–32)
CREAT BLD-MCNC: 2.25 MG/DL
CREAT BLD-MCNC: 2.25 MG/DL
EGFRCR SERPLBLD CKD-EPI 2021: 20 ML/MIN/1.73M2 (ref 60–?)
EGFRCR SERPLBLD CKD-EPI 2021: 20 ML/MIN/1.73M2 (ref 60–?)
EOSINOPHIL # BLD AUTO: 0.4 X10(3) UL (ref 0–0.7)
EOSINOPHIL NFR BLD AUTO: 3.8 %
ERYTHROCYTE [DISTWIDTH] IN BLOOD BY AUTOMATED COUNT: 20.5 %
GLOBULIN PLAS-MCNC: 3.6 G/DL (ref 2.8–4.4)
GLUCOSE BLD-MCNC: 121 MG/DL (ref 70–99)
GLUCOSE BLD-MCNC: 180 MG/DL (ref 70–99)
GLUCOSE BLD-MCNC: 83 MG/DL (ref 70–99)
GLUCOSE BLD-MCNC: 91 MG/DL (ref 70–99)
HCT VFR BLD AUTO: 32.8 %
HGB BLD-MCNC: 9.8 G/DL
IMM GRANULOCYTES # BLD AUTO: 0.07 X10(3) UL (ref 0–1)
IMM GRANULOCYTES NFR BLD: 0.7 %
INR BLD: 0.99 (ref 0.8–1.2)
LYMPHOCYTES # BLD AUTO: 0.75 X10(3) UL (ref 1–4)
LYMPHOCYTES NFR BLD AUTO: 7.2 %
MCH RBC QN AUTO: 30.3 PG (ref 26–34)
MCHC RBC AUTO-ENTMCNC: 29.9 G/DL (ref 31–37)
MCV RBC AUTO: 101.5 FL
MONOCYTES # BLD AUTO: 0.73 X10(3) UL (ref 0.1–1)
MONOCYTES NFR BLD AUTO: 7 %
MRSA NASAL: POSITIVE
NEUTROPHILS # BLD AUTO: 8.44 X10 (3) UL (ref 1.5–7.7)
NEUTROPHILS # BLD AUTO: 8.44 X10(3) UL (ref 1.5–7.7)
NEUTROPHILS NFR BLD AUTO: 80.8 %
OSMOLALITY SERPL CALC.SUM OF ELEC: 306 MOSM/KG (ref 275–295)
OSMOLALITY SERPL CALC.SUM OF ELEC: 310 MOSM/KG (ref 275–295)
P AXIS: 69 DEGREES
P-R INTERVAL: 130 MS
PLATELET # BLD AUTO: 288 10(3)UL (ref 150–450)
POTASSIUM SERPL-SCNC: 5.4 MMOL/L (ref 3.5–5.1)
POTASSIUM SERPL-SCNC: 6.1 MMOL/L (ref 3.5–5.1)
POTASSIUM SERPL-SCNC: 6.1 MMOL/L (ref 3.5–5.1)
PROT SERPL-MCNC: 6.3 G/DL (ref 6.4–8.2)
PROTHROMBIN TIME: 13.1 SECONDS (ref 11.6–14.8)
Q-T INTERVAL: 408 MS
QRS DURATION: 68 MS
QTC CALCULATION (BEZET): 446 MS
R AXIS: 47 DEGREES
RBC # BLD AUTO: 3.23 X10(6)UL
SODIUM SERPL-SCNC: 143 MMOL/L (ref 136–145)
SODIUM SERPL-SCNC: 146 MMOL/L (ref 136–145)
STAPH A BY PCR: POSITIVE
T AXIS: 71 DEGREES
VENTRICULAR RATE: 72 BPM
WBC # BLD AUTO: 10.4 X10(3) UL (ref 4–11)

## 2024-06-10 PROCEDURE — 3E0T3BZ INTRODUCTION OF ANESTHETIC AGENT INTO PERIPHERAL NERVES AND PLEXI, PERCUTANEOUS APPROACH: ICD-10-PCS | Performed by: ANESTHESIOLOGY

## 2024-06-10 PROCEDURE — 99223 1ST HOSP IP/OBS HIGH 75: CPT | Performed by: INTERNAL MEDICINE

## 2024-06-10 PROCEDURE — 93970 EXTREMITY STUDY: CPT | Performed by: EMERGENCY MEDICINE

## 2024-06-10 PROCEDURE — 70450 CT HEAD/BRAIN W/O DYE: CPT | Performed by: EMERGENCY MEDICINE

## 2024-06-10 PROCEDURE — 73502 X-RAY EXAM HIP UNI 2-3 VIEWS: CPT | Performed by: EMERGENCY MEDICINE

## 2024-06-10 PROCEDURE — 71045 X-RAY EXAM CHEST 1 VIEW: CPT | Performed by: EMERGENCY MEDICINE

## 2024-06-10 PROCEDURE — 73560 X-RAY EXAM OF KNEE 1 OR 2: CPT | Performed by: HOSPITALIST

## 2024-06-10 PROCEDURE — 73080 X-RAY EXAM OF ELBOW: CPT | Performed by: HOSPITALIST

## 2024-06-10 PROCEDURE — 73060 X-RAY EXAM OF HUMERUS: CPT | Performed by: HOSPITALIST

## 2024-06-10 PROCEDURE — 73090 X-RAY EXAM OF FOREARM: CPT | Performed by: HOSPITALIST

## 2024-06-10 PROCEDURE — 76942 ECHO GUIDE FOR BIOPSY: CPT | Performed by: ANESTHESIOLOGY

## 2024-06-10 RX ORDER — CALCIUM GLUCONATE 20 MG/ML
2 INJECTION, SOLUTION INTRAVENOUS ONCE
Status: COMPLETED | OUTPATIENT
Start: 2024-06-10 | End: 2024-06-10

## 2024-06-10 RX ORDER — VANCOMYCIN HYDROCHLORIDE 125 MG/1
125 CAPSULE ORAL 2 TIMES DAILY
Status: DISCONTINUED | OUTPATIENT
Start: 2024-06-10 | End: 2024-06-15

## 2024-06-10 RX ORDER — VANCOMYCIN HYDROCHLORIDE 125 MG/1
125 CAPSULE ORAL
Status: DISCONTINUED | OUTPATIENT
Start: 2024-06-24 | End: 2024-06-15

## 2024-06-10 RX ORDER — MORPHINE SULFATE 2 MG/ML
1 INJECTION, SOLUTION INTRAMUSCULAR; INTRAVENOUS EVERY 2 HOUR PRN
Status: DISCONTINUED | OUTPATIENT
Start: 2024-06-10 | End: 2024-06-15

## 2024-06-10 RX ORDER — DEXTROSE MONOHYDRATE 25 G/50ML
50 INJECTION, SOLUTION INTRAVENOUS ONCE
Status: COMPLETED | OUTPATIENT
Start: 2024-06-10 | End: 2024-06-10

## 2024-06-10 RX ORDER — AMIODARONE HYDROCHLORIDE 100 MG/1
100 TABLET ORAL DAILY
Status: DISCONTINUED | OUTPATIENT
Start: 2024-06-10 | End: 2024-06-15

## 2024-06-10 RX ORDER — HYDROMORPHONE HYDROCHLORIDE 1 MG/ML
0.2 INJECTION, SOLUTION INTRAMUSCULAR; INTRAVENOUS; SUBCUTANEOUS EVERY 2 HOUR PRN
Status: DISCONTINUED | OUTPATIENT
Start: 2024-06-10 | End: 2024-06-15

## 2024-06-10 RX ORDER — MAGNESIUM OXIDE 400 MG (241.3 MG MAGNESIUM) TABLET
325 TABLET
Status: DISCONTINUED | OUTPATIENT
Start: 2024-06-11 | End: 2024-06-15

## 2024-06-10 RX ORDER — DILTIAZEM HYDROCHLORIDE 120 MG/1
120 CAPSULE, EXTENDED RELEASE ORAL DAILY
Status: DISCONTINUED | OUTPATIENT
Start: 2024-06-10 | End: 2024-06-15

## 2024-06-10 RX ORDER — ROPIVACAINE HYDROCHLORIDE 5 MG/ML
30 INJECTION, SOLUTION EPIDURAL; INFILTRATION; PERINEURAL AS NEEDED
Status: DISCONTINUED | OUTPATIENT
Start: 2024-06-10 | End: 2024-06-15

## 2024-06-10 RX ORDER — BUPIVACAINE HYDROCHLORIDE 5 MG/ML
INJECTION, SOLUTION EPIDURAL; INTRACAUDAL AS NEEDED
Status: DISCONTINUED | OUTPATIENT
Start: 2024-06-10 | End: 2024-06-10

## 2024-06-10 RX ORDER — HYDROMORPHONE HYDROCHLORIDE 1 MG/ML
INJECTION, SOLUTION INTRAMUSCULAR; INTRAVENOUS; SUBCUTANEOUS EVERY 30 MIN PRN
Status: DISCONTINUED | OUTPATIENT
Start: 2024-06-10 | End: 2024-06-10

## 2024-06-10 RX ORDER — MORPHINE SULFATE 2 MG/ML
2 INJECTION, SOLUTION INTRAMUSCULAR; INTRAVENOUS EVERY 2 HOUR PRN
Status: DISCONTINUED | OUTPATIENT
Start: 2024-06-10 | End: 2024-06-15

## 2024-06-10 RX ORDER — PANTOPRAZOLE SODIUM 40 MG/1
40 TABLET, DELAYED RELEASE ORAL
Status: DISCONTINUED | OUTPATIENT
Start: 2024-06-11 | End: 2024-06-15

## 2024-06-10 RX ORDER — ASPIRIN 81 MG/1
81 TABLET ORAL DAILY
Status: DISCONTINUED | OUTPATIENT
Start: 2024-06-10 | End: 2024-06-15

## 2024-06-10 RX ORDER — VANCOMYCIN HYDROCHLORIDE 125 MG/1
125 CAPSULE ORAL DAILY
Status: DISCONTINUED | OUTPATIENT
Start: 2024-06-16 | End: 2024-06-15

## 2024-06-10 RX ORDER — DEXTROSE MONOHYDRATE 25 G/50ML
INJECTION, SOLUTION INTRAVENOUS ONCE
Status: COMPLETED | OUTPATIENT
Start: 2024-06-10 | End: 2024-06-10

## 2024-06-10 RX ORDER — HYDRALAZINE HYDROCHLORIDE 20 MG/ML
10 INJECTION INTRAMUSCULAR; INTRAVENOUS
Status: DISCONTINUED | OUTPATIENT
Start: 2024-06-10 | End: 2024-06-10

## 2024-06-10 RX ORDER — HYDROMORPHONE HYDROCHLORIDE 1 MG/ML
0.8 INJECTION, SOLUTION INTRAMUSCULAR; INTRAVENOUS; SUBCUTANEOUS EVERY 2 HOUR PRN
Status: DISCONTINUED | OUTPATIENT
Start: 2024-06-10 | End: 2024-06-15

## 2024-06-10 RX ORDER — HYDRALAZINE HYDROCHLORIDE 20 MG/ML
5 INJECTION INTRAMUSCULAR; INTRAVENOUS EVERY 4 HOURS PRN
Status: DISCONTINUED | OUTPATIENT
Start: 2024-06-10 | End: 2024-06-15

## 2024-06-10 RX ORDER — SODIUM CHLORIDE 9 MG/ML
10 INJECTION, SOLUTION INTRAMUSCULAR; INTRAVENOUS; SUBCUTANEOUS AS NEEDED
Status: DISCONTINUED | OUTPATIENT
Start: 2024-06-10 | End: 2024-06-15

## 2024-06-10 RX ORDER — DEXTROSE MONOHYDRATE 25 G/50ML
25 INJECTION, SOLUTION INTRAVENOUS
Status: ACTIVE | OUTPATIENT
Start: 2024-06-10 | End: 2024-06-10

## 2024-06-10 RX ORDER — HYDROMORPHONE HYDROCHLORIDE 1 MG/ML
0.4 INJECTION, SOLUTION INTRAMUSCULAR; INTRAVENOUS; SUBCUTANEOUS EVERY 2 HOUR PRN
Status: DISCONTINUED | OUTPATIENT
Start: 2024-06-10 | End: 2024-06-15

## 2024-06-10 RX ORDER — LIDOCAINE HYDROCHLORIDE 10 MG/ML
2 INJECTION, SOLUTION EPIDURAL; INFILTRATION; INTRACAUDAL; PERINEURAL AS NEEDED
Status: DISCONTINUED | OUTPATIENT
Start: 2024-06-10 | End: 2024-06-15

## 2024-06-10 RX ORDER — AMLODIPINE BESYLATE 5 MG/1
5 TABLET ORAL DAILY
Status: DISCONTINUED | OUTPATIENT
Start: 2024-06-10 | End: 2024-06-11

## 2024-06-10 NOTE — ED QUICK NOTES
Called floor and notified that pt is going to them after ultrasound and family is on their way there now with volunteer.

## 2024-06-10 NOTE — ED QUICK NOTES
Verbal consent obtained from pt for femoral block at this time. Dr. Daniels will complete consent form in epic.

## 2024-06-10 NOTE — ED INITIAL ASSESSMENT (HPI)
Pt presents to the ED from independent living facility with c/o fall. Pt reports she was walking and her leg gave out, landing on her back. Ems report that pt's left hip appears shortened. Ems administered fentanyl 50 mcg with some relief. Pt is on eliquis. Pt denies hitting her head. Pt awake and alert, skin w/d,resps appear unlabored. Left leg appears shortened.

## 2024-06-10 NOTE — ED PROVIDER NOTES
Patient Seen in: [facility] Emergency Department      History     Chief Complaint   Patient presents with    Fall    Hip Pain     Stated Complaint: fall/hip shortening    Subjective:   HPI    Patient with history of A-fib on Eliquis, kidney disease, on vancomycin for recently diagnosed C. difficile, Bishop syndrome, partial colectomy with colostomy, here after a fall.  Patient is unable to give details.  She lives in independent living with her daughter who has Down syndrome.  Patient reports left hip pain radiating down the to the left knee.  Left lower extremity shortened    Objective:   Past Medical History:    Arthritis    Atrial fibrillation (HCC)    Back pain    C. difficile colitis    CKD (chronic kidney disease)    and S/P nephrectomy    Colon cancer (HCC)    Congestive heart disease (HCC)    Easy bruising    Endometrial cancer (HCC)    UTERINE, DX ABOUT 30 YEARS    Hearing impairment    AIDS    Heart palpitations    Afib    High blood pressure    Kidney failure    Leg swelling    Bishop syndrome    hereditary colorectal cancer    Muscle weakness    WALKER    Pain in joints    Wears glasses    Weight loss              Past Surgical History:   Procedure Laterality Date    Colectomy      Nephrectomy Left     Part removal colon w end colostomy                  Social History     Socioeconomic History    Marital status:    Tobacco Use    Smoking status: Never    Smokeless tobacco: Never   Vaping Use    Vaping status: Never Used   Substance and Sexual Activity    Alcohol use: Not Currently    Drug use: Never     Social Determinants of Health     Food Insecurity: No Food Insecurity (6/10/2024)    Food Insecurity     Food Insecurity: Never true   Transportation Needs: No Transportation Needs (6/10/2024)    Transportation Needs     Lack of Transportation: No   Housing Stability: Low Risk  (6/10/2024)    Housing Stability     Housing Instability: No              Review of Systems    Positive for stated  complaint: fall/hip shortening  Other systems are as noted in HPI.  Constitutional and vital signs reviewed.      All other systems reviewed and negative except as noted above.    Physical Exam     ED Triage Vitals [06/10/24 0753]   BP (!) 192/94   Pulse 74   Resp 18   Temp 97.9 °F (36.6 °C)   Temp src Oral   SpO2 99 %   O2 Device None (Room air)       Current Vitals:   Vital Signs  BP: (!) 188/74  Pulse: 89  Resp: 14  Temp: 97.9 °F (36.6 °C)  Temp src: Oral  MAP (mmHg): (!) 107    Oxygen Therapy  SpO2: 97 %  O2 Device: None (Room air)            Physical Exam    General: Elderly, frail, pain with movement of the left hip otherwise no significant acute distress  Head and neck: Normocephalic, atraumatic  Cardiovascular: no obvious murmurs, rubs, or gallops   Lungs: Clear to auscultation bilaterally with equal breath sounds, no wheezing, no rhonchi   Abdomen: Positive bowel sounds,  soft, no tenderness, no distension, no rebound, no guarding   Extremities: No cyanosis, no clubbing, no edema in the upper extremities, bilateral pitting edema to the knees  Musculoskeletal: No tenderness, swelling, deformity   Skin: Erythema bilateral lower legs  Neuro: Grossly intact to patient's baseline  Distal pulses intact     ED Course     Labs Reviewed   COMP METABOLIC PANEL (14) - Abnormal; Notable for the following components:       Result Value    Sodium 146 (*)     Potassium 6.1 (*)     Chloride 122 (*)     CO2 16.0 (*)     BUN 37 (*)     Creatinine 2.25 (*)     Calculated Osmolality 310 (*)     eGFR-Cr 20 (*)     Total Protein 6.3 (*)     Albumin 2.7 (*)     A/G Ratio 0.8 (*)     All other components within normal limits   BASIC METABOLIC PANEL (8) - Abnormal; Notable for the following components:    Glucose 121 (*)     Potassium 6.1 (*)     Chloride 120 (*)     CO2 16.0 (*)     BUN 36 (*)     Creatinine 2.25 (*)     Calculated Osmolality 306 (*)     eGFR-Cr 20 (*)     All other components within normal limits   POCT GLUCOSE -  Abnormal; Notable for the following components:    POC Glucose 180 (*)     All other components within normal limits   MRSA/SA SCRN BY PCR:EMERG ORTHO SURG ONLY - Abnormal; Notable for the following components:    Staph Aureus Screen By PCR Positive (*)     MRSA Screen By PCR Positive (*)     All other components within normal limits    Narrative:     A positive result does not necessarily indicate the presence of viable organisms. For confirmation, epidemiological typing and susceptibility testing, appropriate culture is needed.    PLEASE REFER TO MRSA SCREENING PROTOCOL FOR TREATMENT.     CBC W/ DIFFERENTIAL - Abnormal; Notable for the following components:    RBC 3.23 (*)     HGB 9.8 (*)     HCT 32.8 (*)     .5 (*)     MCHC 29.9 (*)     Neutrophil Absolute Prelim 8.44 (*)     Neutrophil Absolute 8.44 (*)     Lymphocyte Absolute 0.75 (*)     All other components within normal limits   PTT, ACTIVATED - Normal   PROTHROMBIN TIME (PT) - Normal   POCT GLUCOSE - Normal   CBC WITH DIFFERENTIAL WITH PLATELET    Narrative:     The following orders were created for panel order CBC With Differential With Platelet.  Procedure                               Abnormality         Status                     ---------                               -----------         ------                     CBC W/ DIFFERENTIAL[[id number]]          Abnormal            Final result                 Please view results for these tests on the individual orders.   URINALYSIS WITH CULTURE REFLEX   URINALYSIS, ROUTINE   POTASSIUM   RAINBOW DRAW LAVENDER   RAINBOW DRAW LIGHT GREEN   RAINBOW DRAW BLUE   RAINBOW DRAW GOLD     EKG    Rate, intervals and axes as noted on EKG Report.  Rate: 72  Rhythm: Sinus Rhythm  Reading: No acute process.         Differential diagnosis includes left hip fracture, fall, cranial bleed, among other processes        CT brain images reviewed by myself show no acute bleed.  Radiology read concurs  Left hip x-ray  intertrochanteric left femoral neck fracture -with varus angulation  Chest x-ray no acute process  Ultrasound venous Doppler bilaterally negative for DVT       MDM      89-year-old from independent living after a fall, history of A-fib on Eliquis, kidney disease, on vancomycin for recently diagnosed C. difficile, Bishop syndrome, partial colectomy with colostomy; complaining of left hip pain, left lower extremity is shortened.  X-ray confirms fracture of the left hip intertrochanteric mildly displaced with angulation.  Patient's potassium 6.2 and a nonhemolyzed specimen.  Patient's GFR baseline is in the low 20s, 20 today.  BUN/creatinine 37 and 2.25 slightly worse than her usual.    Anesthesia here for femoral nerve block after which patient feels much more comfortable.  Dr. Whitehead notified.  [facility] notified.  [facility] hospitalist on page.  Dr. Hernandez at bedside   Patient will be admitted, will need medical optimization,anticipated repair tomorrow.                         Medical Decision Making      Disposition and Plan     Clinical Impression:  1. Closed fracture of left hip, initial encounter ([facility])    2. Hyperkalemia         Disposition:  There is no disposition on file for this visit.  There is no disposition time on file for this visit.    Follow-up:  No follow-up provider specified.        Medications Prescribed:  Current Discharge Medication List

## 2024-06-10 NOTE — CONSULTS
[facility]  Report of Consultation    Sunita Loza Patient Status:  Emergency    1932 MRN [id number]   Location [facility] EMERGENCY DEPARTMENT Attending Emily Hernandez MD   Hosp Day # 0 PCP Janell Powell MD     Reason for Consultation:  CKD IV/hyperkalemia/L hip fx    History of Present Illness:  Sunita Loza is a a(n) 89 year old woman known to our service with mult med probs incl CKD IV- creat close to 2 of late- in setting of remote nephrectomy recently adm for eval of ZOHREH and hyperkalemia.  At time of discharge decision made to tolerate edema and remain off diuretics with tendency to ZOHREH related to prerenal azotemia in setting of high ostomy losses and poor intake.  She was at rehab facility today and suffered a fall.  Imaging notable for L femoral neck fx.  K 6.1 meq/l in ED.  Of note pts admit med list includes bid potassium supplement    History:  Past Medical History:    Arthritis    Atrial fibrillation (HCC)    Back pain    C. difficile colitis    CKD (chronic kidney disease)    and S/P nephrectomy    Colon cancer (HCC)    Congestive heart disease (HCC)    Easy bruising    Endometrial cancer (HCC)    UTERINE, DX ABOUT 30 YEARS    Hearing impairment    AIDS    Heart palpitations    Afib    High blood pressure    Kidney failure    Leg swelling    Bishop syndrome    hereditary colorectal cancer    Muscle weakness    WALKER    Pain in joints    Wears glasses    Weight loss     Past Surgical History:   Procedure Laterality Date    Colectomy      Nephrectomy Left     Part removal colon w end colostomy       Family History   Problem Relation Age of Onset    Heart Disorder Father     Heart Disorder Mother     Colon Cancer Mother         70    Heart Disorder Sister     Breast Cancer Sister     Diabetes Brother     Breast Cancer Sister     Cancer Sister     Breast Cancer Sister     Breast Cancer Daughter       reports that she has never smoked. She has never used smokeless tobacco. She reports  that she does not currently use alcohol. She reports that she does not use drugs.    Allergies:  Allergies   Allergen Reactions    Ciprofloxacin RASH and HIVES    Penicillins RASH       Medications:    Current Facility-Administered Medications:     dextrose 50% injection 25 mL, 25 mL, Intravenous, Q1H PRN    HYDROmorphone (Dilaudid) 1 MG/ML injection 0.5-1 mg, 0.5-1 mg, Intravenous, Q30 Min PRN    lidocaine PF (Xylocaine-MPF) 1% injection, 2 mL, Injection, PRN    ropivacaine (Naropin) 0.5% injection, 30 mL, Injection, PRN    sodium chloride 0.9% PF injection 10 mL, 10 mL, Injection, PRN  Prior to Admission Medications   Medication Sig    vancomycin 125 MG Oral Cap Take 1 capsule (125 mg total) by mouth 4 (four) times daily for 7 days, THEN 1 capsule (125 mg total) 2 (two) times a day for 7 days, THEN 1 capsule (125 mg total) daily for 7 days, THEN 1 capsule (125 mg total) Every Monday, Wednesday, and Friday for 14 days.    amLODIPine 5 MG Oral Tab Take 1 tablet (5 mg total) by mouth daily.    sodium bicarbonate 650 MG Oral Tab Take 1 tablet (650 mg total) by mouth 2 (two) times daily.    omeprazole 20 MG Oral Capsule Delayed Release Take 1 capsule (20 mg total) by mouth every morning before breakfast.    potassium chloride 10 MEQ Oral Tab CR Take 2 tablets (20 mEq total) by mouth daily.    Glucosamine-Chondroit-Biofl-Mn (GLUCOSAMINE CHONDROIT,BIOFLAV, OR) Take 1 tablet by mouth daily.    ferrous sulfate 325 (65 FE) MG Oral Tab EC Take 1 tablet (325 mg total) by mouth daily with breakfast.    amiodarone 100 MG Oral Tab Take 1 tablet (100 mg total) by mouth daily.    apixaban 2.5 MG Oral Tab Take 1 tablet (2.5 mg total) by mouth 2 (two) times daily.    dilTIAZem  MG Oral Capsule SR 24 Hr Take 1 capsule (120 mg total) by mouth daily.    Calcium Carbonate-Vitamin D (OYSTER SHELL CALCIUM/D) 500-200 MG-UNIT Oral Tab Take 1 tablet by mouth daily.    aspirin 81 MG Oral Tab Take 1 tablet (81 mg total) by mouth daily.     cyanocobalamin 1000 MCG Oral Tab Take 100 mcg by mouth daily.    Multiple Vitamin (MULTIVITAMINS OR) Take 1 tablet by mouth daily.    acetaminophen 500 MG Oral Tab Take 1 tablet (500 mg total) by mouth in the morning and 1 tablet (500 mg total) before bedtime.       Review of Systems:  Denies fever/chills  Denies wt loss/gain  Denies HA or visual changes  Denies CP or palpitations  Denies SOB/cough/hemoptysis  Denies abd or flank pain  Denies N/V/D  Denies change in urinary habits or gross hematuria  + LE edema  + L hip pain      Physical Exam:   BP (!) 188/74   Pulse 89   Temp 97.9 °F (36.6 °C) (Oral)   Resp 14   Ht 5' 1\" (1.549 m)   Wt 81 lb 12.7 oz (37.1 kg)   SpO2 97%   BMI 15.45 kg/m²   Temp (24hrs), Av.9 °F (36.6 °C), Min:97.9 °F (36.6 °C), Max:97.9 °F (36.6 °C)       Intake/Output Summary (Last 24 hours) at 6/10/2024 1148  Last data filed at 6/10/2024 1129  Gross per 24 hour   Intake 250 ml   Output --   Net 250 ml     Last 3 Weights   06/10/24 0753 81 lb 12.7 oz (37.1 kg)   24 81 lb 14.4 oz (37.1 kg)   24 1542 95 lb (43.1 kg)   24 1017 81 lb (36.7 kg)   24 0158 92 lb 14.4 oz (42.1 kg)   24 203 95 lb (43.1 kg)   24 0400 87 lb 8.4 oz (39.7 kg)   24 0613 82 lb 1.5 oz (37.2 kg)   24 1510 83 lb (37.6 kg)     General: Alert and oriented , appears uncomfortable.  HEENT: No scleral icterus, MMM  Neck: Supple, no MARIA FERNANDA or thyromegaly  Cardiac: Regular rate and rhythm, S1, S2 normal, no murmur or rub  Lungs: Clear without wheezes, rales, rhonchi.    Abdomen: Soft, non-tender. + bowel sounds, + ostomy  Extremities: 1+ BLE edema.  Neurologic:  moving all extremities  Skin: Warm and dry, no rashes      Laboratory Data:  Lab Results   Component Value Date    WBC 10.4 06/10/2024    HGB 9.8 06/10/2024    HCT 32.8 06/10/2024    .0 06/10/2024    CREATSERUM 2.25 06/10/2024    BUN 37 06/10/2024     06/10/2024    K 6.1 06/10/2024     06/10/2024     CO2 16.0 06/10/2024    GLU 91 06/10/2024    CA 8.7 06/10/2024    ALB 2.7 06/10/2024    ALKPHO 100 06/10/2024    BILT 0.3 06/10/2024    TP 6.3 06/10/2024    AST 20 06/10/2024    ALT 19 06/10/2024    PTT 28.3 06/10/2024    INR 0.99 06/10/2024    PTP 13.1 06/10/2024    PGLU 180 06/10/2024       BUN (mg/dL)   Date Value   06/10/2024 37 (H)   06/02/2024 42 (H)   06/01/2024 37 (H)     Blood Urea Nitrogen (mg/dL)   Date Value   03/02/2022 23.0 (H)   07/27/2021 23.0 (H)   01/29/2021 19.0     CREATININE (no units)   Date Value   02/05/2020 1.81   07/15/2019 1.8     Creatinine (mg/dL)   Date Value   06/10/2024 2.25 (H)   06/02/2024 2.09 (H)   06/01/2024 2.14 (H)   03/02/2022 1.49 (H)   07/27/2021 1.47 (H)   01/29/2021 1.29 (H)       No results found for: \"MICROALBCREA\"    Recent Labs   Lab 06/10/24  0810   WBC 10.4   HGB 9.8*   .5*   .0   INR 0.99       Recent Labs   Lab 06/10/24  0810   *   K 6.1*   *   CO2 16.0*   BUN 37*   CREATSERUM 2.25*   CA 8.7   GLU 91       Recent Labs   Lab 06/10/24  0810   ALT 19   AST 20   ALB 2.7*       Recent Labs   Lab 06/10/24  0944 06/10/24  1104   PGLU 83 180*           Imaging:  Hip xray noted    Impression/Plan:    #1.  ZOHREH/CKD IV- has had longstanding CKD in setting of remote nephrectomy.  B/l creat of late close to 2.0 mg/dl or so.  Gentle IVF today    #2.  Hyperkalemia- in setting of CKD and ongoing potassium supplement.  Treat medically today.  Stop k moving forward    #3.  L hip fx- per ortho    #4.  HTN- in part likely pain mediated.  Cont usual meds and follow    Thank you for allowing me to participate in the care of your patient. Please do not hesitate to call with any questions or concerns.       Talha Hernandez MD  6/10/2024  11:48 AM

## 2024-06-10 NOTE — H&P
[facility] H&P/Consult note       CC:   Chief Complaint   Patient presents with    Fall    Hip Pain        PCP: Janell Powell MD    History of Present Illness: Patient is a 89 year old female with PMH sig for CKD, colon cancer, RCC sp nephrectomy, HTN, PAF, recent admission for cdiff here for fall and hip fx    Unclear circumstances of the fall. Per fmaily has had another fall where she just tripped but otherwise no fall hx. Unclear if passed out. Complains of pian in her left hip and left elbow.   No recent illness aside from the hospitalization. No f/c reported at home but currnetly she is having tremors but also states she feels warm      PMH  Past Medical History:    Arthritis    Atrial fibrillation (HCC)    Back pain    C. difficile colitis    CKD (chronic kidney disease)    and S/P nephrectomy    Colon cancer (HCC)    Congestive heart disease (HCC)    Easy bruising    Endometrial cancer (HCC)    UTERINE, DX ABOUT 30 YEARS    Hearing impairment    AIDS    Heart palpitations    Afib    High blood pressure    Kidney failure    Leg swelling    Bishop syndrome    hereditary colorectal cancer    Muscle weakness    WALKER    Pain in joints    Wears glasses    Weight loss        PSH  Past Surgical History:   Procedure Laterality Date    Colectomy      Nephrectomy Left     Part removal colon w end colostomy          ALL:  Allergies   Allergen Reactions    Ciprofloxacin RASH and HIVES    Penicillins RASH        Home Medications:  Outpatient Medications Marked as Taking for the 6/10/24 encounter (Hospital Encounter)   Medication Sig Dispense Refill    vancomycin 125 MG Oral Cap Take 1 capsule (125 mg total) by mouth 4 (four) times daily for 7 days, THEN 1 capsule (125 mg total) 2 (two) times a day for 7 days, THEN 1 capsule (125 mg total) daily for 7 days, THEN 1 capsule (125 mg total) Every Monday, Wednesday, and Friday for 14 days. 55 capsule 0    amLODIPine 5 MG Oral Tab Take 1 tablet (5 mg total) by mouth  daily. 30 tablet 2    sodium bicarbonate 650 MG Oral Tab Take 1 tablet (650 mg total) by mouth 2 (two) times daily. 60 tablet 0    omeprazole 20 MG Oral Capsule Delayed Release Take 1 capsule (20 mg total) by mouth every morning before breakfast.      potassium chloride 10 MEQ Oral Tab CR Take 2 tablets (20 mEq total) by mouth daily.      Glucosamine-Chondroit-Biofl-Mn (GLUCOSAMINE CHONDROIT,BIOFLAV, OR) Take 1 tablet by mouth daily.      ferrous sulfate 325 (65 FE) MG Oral Tab EC Take 1 tablet (325 mg total) by mouth daily with breakfast.      amiodarone 100 MG Oral Tab Take 1 tablet (100 mg total) by mouth daily.      apixaban 2.5 MG Oral Tab Take 1 tablet (2.5 mg total) by mouth 2 (two) times daily.      dilTIAZem  MG Oral Capsule SR 24 Hr Take 1 capsule (120 mg total) by mouth daily.      Calcium Carbonate-Vitamin D (OYSTER SHELL CALCIUM/D) 500-200 MG-UNIT Oral Tab Take 1 tablet by mouth daily.      aspirin 81 MG Oral Tab Take 1 tablet (81 mg total) by mouth daily.      cyanocobalamin 1000 MCG Oral Tab Take 100 mcg by mouth daily.      Multiple Vitamin (MULTIVITAMINS OR) Take 1 tablet by mouth daily.      acetaminophen 500 MG Oral Tab Take 1 tablet (500 mg total) by mouth in the morning and 1 tablet (500 mg total) before bedtime.           Soc Hx  Social History     Tobacco Use    Smoking status: Never    Smokeless tobacco: Never   Substance Use Topics    Alcohol use: Not Currently        Fam Hx  Family History   Problem Relation Age of Onset    Heart Disorder Father     Heart Disorder Mother     Colon Cancer Mother         70    Heart Disorder Sister     Breast Cancer Sister     Diabetes Brother     Breast Cancer Sister     Cancer Sister     Breast Cancer Sister     Breast Cancer Daughter        Review of Systems  Comprehensive ROS reviewed and negative except for what's stated above.        OBJECTIVE:  BP (!) 188/74   Pulse 89   Temp 97.9 °F (36.6 °C) (Oral)   Resp 14   Ht 5' 1\" (1.549 m)   Wt 81 lb  12.7 oz (37.1 kg)   SpO2 97%   BMI 15.45 kg/m²   General:  Alert, no distress, weak frail appearing   Head:  Normocephalic, without obvious abnormality, atraumatic.   Eyes:  Sclera anicteric, No conjunctival pallor, EOMs intact.    Throat: dry   Neck: Supple    Lungs:   Clear to auscultation bilaterally    Chest wall:  No tenderness or deformity.   Heart:  Regular rate and rhythm    Abdomen:   Soft, NT/ND    Extremities: Mild edema bl   Skin: No visible rashes or lesions.    Neurologic: Left sided nottested, pain with movement. No facial asymmetry, no dysarhtira     Diagnostic Data:    CBC/Chem  Recent Labs   Lab 06/10/24  0810   WBC 10.4   HGB 9.8*   .5*   .0   INR 0.99       Recent Labs   Lab 06/10/24  0810   *   K 6.1*   *   CO2 16.0*   BUN 37*   CREATSERUM 2.25*   GLU 91   CA 8.7       Recent Labs   Lab 06/10/24  0810   ALT 19   AST 20   ALB 2.7*       No results for input(s): \"TROP\" in the last 168 hours.         Radiology: CT BRAIN OR HEAD (69146)    Result Date: 6/10/2024  PROCEDURE:  CT BRAIN OR HEAD (61280)  COMPARISON:  [facility] , CT, CT BRAIN OR HEAD ([id number]), 5/24/2024, 5:23 PM.  INDICATIONS:  fall/hip shortening, pain  TECHNIQUE:  Noncontrast CT scanning is performed through the brain. Dose reduction techniques were used. Dose information is transmitted to the ACR (American College of Radiology) NRDR (National Radiology Data Registry) which includes the Dose Index Registry.  PATIENT STATED HISTORY: (As transcribed by Technologist)  Fall    FINDINGS:  VENTRICLES/SULCI:  Ventricles and sulci are normal in size.  INTRACRANIAL:  There are no abnormal extraaxial fluid collections.  There is no midline shift.  Mild to moderate chronic microvascular ischemic changes are present within the subcortical, deep and periventricular white matter.  There is nothing specific for acute infarct.  There is no hemorrhage or mass lesion.  SINUSES:           No sign of acute sinusitis.  MASTOIDS:           No sign of acute inflammation. SKULL:             No evidence for fracture or osseous abnormality. OTHER:             None.            CONCLUSION:  No significant midline shift or mass effect.  No acute intracranial hemorrhage.  Mild to moderate chronic microvascular ischemic changes are present within the white matter.  If there is persistent clinical concern then consider MRI.     LOCATION:  [id number]   Dictated by (CST): Uday Jackson MD on 6/10/2024 at 11:19 AM     Finalized by (CST): Uday Jackson MD on 6/10/2024 at 11:22 AM       XR HIP W OR WO PELVIS 2 OR 3 VIEWS, LEFT (CPT=73502)    Result Date: 6/10/2024  PROCEDURE:  XR HIP W OR WO PELVIS 2 OR 3 VIEWS, LEFT (CPT=73502)  TECHNIQUE:  Unilateral 2 to 3 views of the hip and pelvis if performed.  COMPARISON:  None.  INDICATIONS:  fall/hip shortening  PATIENT STATED HISTORY: (As transcribed by Technologist)  patient had a fall this morning and has left hip pain.               CONCLUSION:   There is an acute, mildly displaced intertrochanteric left femoral neck fracture with superior migration of the femoral shaft resulting in varus angulation of the hip.  No traumatic dislocation.  Moderate osteoarthritis of bilateral hips.  Obturator rings are intact.  Normal sacroiliac joints and pubic symphysis.  Lower lumbar spondylosis.  Extensive regional vascular calcification.   LOCATION:  [facility]   Dictated by ([facility]): Davis Estrada MD on 6/10/2024 at 10:04 AM     Finalized by (CST): Davis Estrada MD on 6/10/2024 at 10:05 AM       XR CHEST AP PORTABLE  (CPT=71045)    Result Date: 6/10/2024  PROCEDURE:  XR CHEST AP PORTABLE  (CPT=71045)  TECHNIQUE:  AP chest radiograph was obtained.  COMPARISON:  [facility] , XR, XR CHEST AP PORTABLE  (CPT=71045), 4/22/2024, 8:43 PM.  INDICATIONS:  fall/hip shortening  PATIENT STATED HISTORY: (As transcribed by Technologist)  patient had a fall this morning.               CONCLUSION:   Stable cardiac and mediastinal contours.  Chronic interstitial  thickening without pulmonary edema or focal airspace consolidation.  No significant pleural effusion or appreciable pneumothorax.   LOCATION:  [facility]      Dictated by (CST): Davis Estrada MD on 6/10/2024 at 10:04 AM     Finalized by (CST): Davis Estrada MD on 6/10/2024 at 10:04 AM       CT BRAIN OR HEAD ([id number])    Result Date: 5/24/2024  PROCEDURE:  CT BRAIN OR HEAD (38806)  COMPARISON:  None.  INDICATIONS:  Pt from nursing home c/o dizziness/lightheaded x 2 days  TECHNIQUE:  Noncontrast CT scanning is performed through the brain. Dose reduction techniques were used. Dose information is transmitted to the ACR (American College of Radiology) NRDR (National Radiology Data Registry) which includes the Dose Index Registry.  PATIENT STATED HISTORY: (As transcribed by Technologist)  Dizziness, lightheadedness    FINDINGS:  There is cerebral atrophy with symmetric prominence of the ventricles. There are patchy areas of low attenuation in the periventricular and deep white matter which are nonspecific but most consistent with small vessel ischemic changes. There is no evidence of hemorrhage, mass, midline shift, or extra-axial fluid collection.  The visualized paranasal sinuses show no significant findings. No evidence of depressed skull fracture.            CONCLUSION: 1. No acute intracranial findings 2. Cerebral atrophy with chronic microvascular ischemic changes.    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 Mechanical vs syncope  -plan for OR tomorrow- would also rec vacno for periop surg ppx given mrsa hx   -cards consult    -she also has left arm / knee pain - will get xrays too    *zohreh  *hyper K - likely from supplement  -dw renal,will see  -fluids / K management per renal    *Cdfiff  -cont vancomycin  -has generalized shaking will check blood cx but otherwise no other infectious symptoms    Chronic conditions  Home meds as able          Further recommendations pending patient's clinical course. [facility] hospitalist to continue to follow patient while in house    Patient and/or patient's family given opportunity to ask questions and note understanding and agreeing with therapeutic plan as outlined    [facility] Hospitalist  [phone]  Answering Service: [phone]

## 2024-06-10 NOTE — CONSULTS
Cardiology Consultation    Sunita Loza Patient Status:  Emergency    1932 MRN [id number]   [facility] EMERGENCY DEPARTMENT Attending Emily Hernandez MD   Hosp Day # 0 PCP Janell Powell MD     Reason for Consultation:  PAF, preop eval      History of Present Illness:  Sunita Loza is a a(n) 89 year old female. Elderly woman with paroysmal afib, on eliquis.  Also with CRI with edema and challenging to manage volume status.  EF preserved with mild pulm HTN, echo 2024.  Unfortunately she had a mechanical fall this morning and broke her left femoral neck.  In the ER, she ws in sinus rhythm.  Potassium was 6.1, nephrology has seen her.    History:  Past Medical History:    Arthritis    Atrial fibrillation (HCC)    Back pain    C. difficile colitis    CKD (chronic kidney disease)    and S/P nephrectomy    Colon cancer (HCC)    Congestive heart disease (HCC)    Easy bruising    Endometrial cancer (HCC)    UTERINE, DX ABOUT 30 YEARS    Hearing impairment    AIDS    Heart palpitations    Afib    High blood pressure    Kidney failure    Leg swelling    Bishop syndrome    hereditary colorectal cancer    Muscle weakness    WALKER    Pain in joints    Wears glasses    Weight loss     Past Surgical History:   Procedure Laterality Date    Colectomy      Nephrectomy Left     Part removal colon w end colostomy       Family History   Problem Relation Age of Onset    Heart Disorder Father     Heart Disorder Mother     Colon Cancer Mother         70    Heart Disorder Sister     Breast Cancer Sister     Diabetes Brother     Breast Cancer Sister     Cancer Sister     Breast Cancer Sister     Breast Cancer Daughter          Allergies:  Allergies   Allergen Reactions    Ciprofloxacin RASH and HIVES    Penicillins RASH       Medications:   patiromer  8.4 g Oral Once       Continuous Infusions:      Social History:   reports that she has never smoked. She has never used smokeless tobacco. She reports that  she does not currently use alcohol. She reports that she does not use drugs.    Review of Systems:  All systems were reviewed and are negative except as described above in HPI.    Physical Exam:      Temp:  [97.9 °F (36.6 °C)] 97.9 °F (36.6 °C)  Pulse:  [69-89] 89  Resp:  [8-20] 14  BP: (149-192)/(64-95) 188/74  SpO2:  [93 %-100 %] 97 %    Last 3 Weights   06/10/24 0753 81 lb 12.7 oz (37.1 kg)   05/24/24 2036 81 lb 14.4 oz (37.1 kg)   05/24/24 1542 95 lb (43.1 kg)   05/23/24 1017 81 lb (36.7 kg)           General: No apparent distress  HEENT: No focal deficits.  Neck: supple. JVP normal  Cardiac: Regular rhythm, S1, S2 normal,   No rub or gallop.  No murmur.  Lungs: CTA  Abdomen: Soft, non-tender.   Extremities: No edema  Neurologic: no focal deficits  Skin: Warm and dry.          Telemetry: sinus    Laboratories and Data:  Diagnostics:    EKG, 6/10/2024:  reviewed    CXR, 6/10/2024:  clear    Labs:   HEM:  Recent Labs   Lab 06/10/24  0810   WBC 10.4   HGB 9.8*   .0       Chem:  Recent Labs   Lab 06/10/24  0810   *   K 6.1*   *   CO2 16.0*   BUN 37*   CREATSERUM 2.25*   CA 8.7   GLU 91       Recent Labs   Lab 06/10/24  0810   ALT 19   AST 20   ALB 2.7*       Recent Labs   Lab 06/10/24  0810   PTP 13.1   INR 0.99       No results for input(s): \"TROP\", \"CK\" in the last 168 hours.      Impression:   Mechanical fall with left hip fracture.  PAF - on eliquis.  Sinus rhythm at present on amiodarone.  CRI with hyperkalemia, chronic edema.  Tasking oral potassium per home med list.    Plan:  Acceptable CV risk for surgery if indicated.  Same home BP meds, prn IV hydralazine.  Holding eliquis.  Same amio and metoprolol.  Follow tele.    Quan Palacios MD  6/10/2024  12:17 PM  C5

## 2024-06-10 NOTE — PLAN OF CARE
Family at bedside. A&Ox2. BP elevated upon arrival to unit - received IV Hydralazine, BP now stable. On room air. . SCDs. Colostomy in place. Purewick in place. IV pain medication given with relief. Isolation precautions maintained. Potassium elevated - see MAR. Bedrest. Awaiting Ortho MD mcgee. Xrays pending. Patient and family updated and in agreement with plan of care. Safety precautions in place. Instructed patient to call for assistance, call light within reach.

## 2024-06-10 NOTE — HISTORICAL OFFICE NOTE
[facility]  [address] [place] [address], 4[address] [place] [address]  [phone]      Emmie Loza  Progress Note  Demographics:  Name: Emmie Loza YOB: 1932  Age: 89, Female Medical Record No: [id number]  Visited Date/Time: 02/15/2024 11:20 AM    Chief Complaints  ER visit on 2/13 for swelling in legs  History of Present Illness  89-year-old female with newly diagnosed atrial fibrillation.  Her primary symptom was significant lower extremity edema and fatigue.  She went to [facility] and started on amiodarone.  She does not report having a cardioversion.  She does not know all the testing she had    Her edema is much better.  She has more energy according to her family members    No prior stroke or TIA    She has had prior renal cancer and a separate colon cancer.  These were all in the remote past more than 10 years ago    She has chronic kidney disease creatinine is 2.7    Current visit:  Since my last visit the patient has been in the hospital twice.  1 for dehydration and elevated creatinine and then once her diuretic was stopped she came in with volume overload.  Her medications been adjusted by her PCP and then by her nephrologist who put her on torsemide which started today  Cardiac risk factors Never smoked  Past Medical History  1.Unspecified atrial fibrillation  2.Benign essential HTN  3.Hyperlipidemia, mixed  4.Pure hypercholesterolemia  5.Chronic kidney disease, stage 3b  6.Hypokalemia  7.Vitamin D deficiency  Family History  1. Father - Family history of stroke  2. Mother - Family history of stroke  3. Son - History of heart attack  Social History  Smoking status Never smoked  Tobacco usage - No (Non-smoker (finding))  Review of systems  Cardiovascular Edema  No history of Chest pain, BANKS, Palpitations, Syncope, PND, Orthopnea and Claudication  Physical Examination  Vitals Left Arm Sitting  / 50 mmHg, Pulse rate 80 bpm, Height in 5', BMI: 18.7,  Weight in 96 lbs (or) 44 kgs and BSA : 1.35 cc/m²  General Appearance No Acute Distress, Well groomed and Normal Body habitus  Cardiovascular   EKG/Other abnormalities  General - NAD  PEERLA/EOMI  JVD - normal  CTA Bilaterally  CV- RRR, S1/S2, No murmurs  GI- Soft, Nontender  Extremities normal pulses  Mood/Affect Congruent  Skin no lesions  Joint/Musculoskeletal - Normal  Allergies  1.ciprofloxacin - Ingredient(Reaction:rash, Severity:Moderate)  2.penicillin - Ingredient(Reaction:rash, Severity:Moderate)  Medications  1.amiodarone 100 mg tablet, Take 1 tablet orally once a day.  2.aspirin 81 mg tablet,delayed release, Take 1 tablet orally once a day.  3.Calcium 500 mg , daily  4.Centrum Silver Women 8 mg iron-400 mcg-50 mcg tablet, Take 1 tablet orally once a day.  5.cyanocobalamin (vit B-12) 1,000 mcg tablet, Take 1 tablet orally once a day.  6.dilTIAZem 120 mg tablet, Take 1 tablet orally once a day.  7.Eliquis 2.5 mg tablet, Take 1 tablet orally 2 times a day.  8.ferrous sulfate 325 mg (65 mg iron) tablet, Take 1 tablet orally once a day.  9.glucosamine 750 mg-chondroitin 600 mg chewable tablet, Take 1 tablet orally once a day.  10.Klor-Con 10 mEq tablet,extended release, Take 1 tablet orally once a day.  11.torsemide 10 mg tablet, Take 1 tablet orally once a day.  12.TylenoL 325 mg tablet, Take 2 tablets orally 2 times a day.  13.Vitmain D 500mcg, daily  Impression  1.Unspecified atrial fibrillation  2.Benign essential HTN  3.Hyperlipidemia, mixed  4.Pure hypercholesterolemia  5.Chronic kidney disease, stage 3b  6.Hypokalemia  7.Vitamin D deficiency  Assessment & Plan  ===============================  Cardiac Testing Personally Reviewed    ECG Reviewed -normal sinus rhythm ectopic atrial rhythm    Echo Results []    Stress Test Results []    Monitor Results []    EP Procedures: []  ==============================    Problem List:    #Symptomatic persistent atrial fibrillation  - Chest Vascor of 4 for  hypertension, age x2, gender  --On Eliquis 2.5 twice daily  - Continue amiodarone 100 mg a day as well as diltiazem  - LFTs and TSH were checked recently and are normal     #Essential hypertension on diltiazem and hydrochlorothiazide     # []     # []     # []      Continue current meds except as outlined above.  =====================================    Check out Items:  -Labs as noted above  - Follow-up in 6 months  Labs and Diagnostics ordered  1.EKG (electrocardiogram) (Today)  Future appointments  1.Follow up visit - Nayeli Cisneros MD (6 Months)  Miscellaneous  1.Weight monitoring (regime/therapy)  Nurses documentation  Refills: none  Upcoming surgeries: none   Use of assisted devices: walker  EKG: Done  (AR, GM)     Patient instructions  Follow up appointment unscheduled:   Your provider has requested for you to follow up in 6 months We will call you to schedule the appointment.  It is always important to bring all your medications including over the counter medications, vitamins and supplements to your doctor visits. This will assist in providing the best care for your condition. Please bring your insurance cards to your appointment.      CPOE Orders carried out by: Janis JOYA  Care Providers: Nayeli Cisneros MD, Debbie Garcia, Janis Wills [facility] and Asha Padilla [facility]  Electronically Authenticated by  Nayeli Cisneros MD  02/15/2024 12:38:19 PM  Disclaimer: Components of this note were documented using voice recognition system and are subject to errors not corrected at proofreading. Contact the author of this note for any clarifications.

## 2024-06-10 NOTE — ED QUICK NOTES
Orders for admission, patient is aware of plan and ready to go upstairs. Any questions, please call ED RN Chris at extension [phone].     Patient Covid vaccination status: Fully vaccinated     COVID Test Ordered in ED: None    COVID Suspicion at Admission: N/A    Running Infusions:  None    Mental Status/LOC at time of transport: A&O x 3    Other pertinent information:   CIWA score: N/A   NIH score:  N/A     Per Dr. Hernandez, urine is not necessary in the ER. BMP and MRSA swab is pending.

## 2024-06-10 NOTE — ANESTHESIA PREPROCEDURE EVALUATION
PRE-OP EVALUATION    Patient Name: Sunita Loza    Admit Diagnosis: Hyperkalemia [E87.5]  Closed fracture of left hip, initial encounter ([facility]) [S72.002A]    Pre-op Diagnosis: LEFT HIP FRACTURE    LEFT HIP CEPHALOMEDULLARY NAIL    Anesthesia Procedure: LEFT HIP CEPHALOMEDULLARY NAIL (Left)    Surgeons and Role:     * Lokesh Whitehead MD - Primary    Pre-op vitals reviewed.  Temp: 97.9 °F (36.6 °C)  Pulse: 89  Resp: 14  BP: 188/74  SpO2: 97 %  Body mass index is 15.45 kg/m².    Current medications reviewed.  Hospital Medications:   [COMPLETED] calcium gluconate 2g in 100mL iso-NaCl IVPB premix  2 g Intravenous Once    [COMPLETED] insulin regular human (Novolin R, Humulin R) 100 UNIT/ML injection 5 Units  5 Units Intravenous Once    [COMPLETED] dextrose 50% injection  mL   mL Intravenous Once    [] dextrose 50% injection 25 mL  25 mL Intravenous Q1H PRN    [COMPLETED] sodium chloride 0.9 % IV bolus 250 mL  250 mL Intravenous Once    lidocaine PF (Xylocaine-MPF) 1% injection  2 mL Injection PRN    ropivacaine (Naropin) 0.5% injection  30 mL Injection PRN    sodium chloride 0.9% PF injection 10 mL  10 mL Injection PRN    sodium zirconium cyclosilicate (Lokelma) oral packet 10 g  10 g Oral Q8H    [COMPLETED] insulin regular human (Novolin R, Humulin R) 100 UNIT/ML injection 10 Units  10 Units Intravenous Once    [COMPLETED] dextrose 50% injection 50 mL  50 mL Intravenous Once    calcium gluconate 2g in 100mL iso-NaCl IVPB premix  2 g Intravenous Once    amiodarone (Pacerone) tab 100 mg  100 mg Oral Daily    amLODIPine (Norvasc) tab 5 mg  5 mg Oral Daily    aspirin DR tab 81 mg  81 mg Oral Daily    dilTIAZem ER (Dilacor XR) 24 hr cap 120 mg  120 mg Oral Daily    hydrALAzine (Apresoline) 20 mg/mL injection 10 mg  10 mg Intravenous Q3H PRN    morphINE PF 2 MG/ML injection 1 mg  1 mg Intravenous Q2H PRN    Or    morphINE PF 2 MG/ML injection 2 mg  2 mg Intravenous Q2H PRN    [START ON 2024] ferrous  sulfate DR tab 325 mg  325 mg Oral Daily with breakfast    [START ON 6/11/2024] pantoprazole (Protonix) DR tab 40 mg  40 mg Oral QAM AC    vancomycin (Vancocin) cap 125 mg  125 mg Oral BID    Followed by    [START ON 6/16/2024] vancomycin (Vancocin) cap 125 mg  125 mg Oral Daily    Followed by    [START ON 6/24/2024] vancomycin (Vancocin) cap 125 mg  125 mg Oral Q MWF    HYDROmorphone (Dilaudid) 1 MG/ML injection 0.2 mg  0.2 mg Intravenous Q2H PRN    Or    HYDROmorphone (Dilaudid) 1 MG/ML injection 0.4 mg  0.4 mg Intravenous Q2H PRN    Or    HYDROmorphone (Dilaudid) 1 MG/ML injection 0.8 mg  0.8 mg Intravenous Q2H PRN       Outpatient Medications:     Medications Prior to Admission   Medication Sig Dispense Refill Last Dose    vancomycin 125 MG Oral Cap Take 1 capsule (125 mg total) by mouth 4 (four) times daily for 7 days, THEN 1 capsule (125 mg total) 2 (two) times a day for 7 days, THEN 1 capsule (125 mg total) daily for 7 days, THEN 1 capsule (125 mg total) Every Monday, Wednesday, and Friday for 14 days. 55 capsule 0 Taking    amLODIPine 5 MG Oral Tab Take 1 tablet (5 mg total) by mouth daily. 30 tablet 2 Taking    sodium bicarbonate 650 MG Oral Tab Take 1 tablet (650 mg total) by mouth 2 (two) times daily. 60 tablet 0 Taking    omeprazole 20 MG Oral Capsule Delayed Release Take 1 capsule (20 mg total) by mouth every morning before breakfast.   Taking    potassium chloride 10 MEQ Oral Tab CR Take 2 tablets (20 mEq total) by mouth daily.   Taking    Glucosamine-Chondroit-Biofl-Mn (GLUCOSAMINE CHONDROIT,BIOFLAV, OR) Take 1 tablet by mouth daily.   Taking    ferrous sulfate 325 (65 FE) MG Oral Tab EC Take 1 tablet (325 mg total) by mouth daily with breakfast.   Taking    amiodarone 100 MG Oral Tab Take 1 tablet (100 mg total) by mouth daily.   Taking    apixaban 2.5 MG Oral Tab Take 1 tablet (2.5 mg total) by mouth 2 (two) times daily.   Taking    dilTIAZem  MG Oral Capsule SR 24 Hr Take 1 capsule (120 mg  total) by mouth daily.   Taking    Calcium Carbonate-Vitamin D (OYSTER SHELL CALCIUM/D) 500-200 MG-UNIT Oral Tab Take 1 tablet by mouth daily.   Taking    aspirin 81 MG Oral Tab Take 1 tablet (81 mg total) by mouth daily.   Taking    cyanocobalamin 1000 MCG Oral Tab Take 100 mcg by mouth daily.   Taking    Multiple Vitamin (MULTIVITAMINS OR) Take 1 tablet by mouth daily.   Taking    acetaminophen 500 MG Oral Tab Take 1 tablet (500 mg total) by mouth in the morning and 1 tablet (500 mg total) before bedtime.   Taking       Allergies: Ciprofloxacin and Penicillins      Anesthesia Evaluation    Patient summary reviewed.    Anesthetic Complications  (-) history of anesthetic complications         GI/Hepatic/Renal  Comment: Hx of rectal and colon cancer; colostomy in place    (+) GERD       (+) chronic renal disease and CRI  (-) liver disease    (+) colon cancer             Cardiovascular  Comment: TTE 05/2024:  Conclusions:   1. Left ventricle: The cavity size was normal. Wall thickness was normal.      Systolic function was normal. The estimated ejection fraction was 65-70%.      Doppler parameters are consistent with abnormal left ventricular      relaxation - grade 1 diastolic dysfunction.   2. Right ventricle: Systolic pressure was mildly increased.   3. Mitral valve: There was mild regurgitation.   4. Pulmonary arteries: Systolic pressure was estimated to be 41mm Hg.      Estimated pulmonary artery diastolic pressure was 12mm Hg.   5. Pericardium, extracardiac: There was no pericardial effusion.   Impressions:  No previous study was available for comparison. What appears   to be artifact is noted on the apical views of both the tricuspid and mitral   valves. No vegetations are identified by this good quality transthoracic   echocardiogram; however, this can not \"rule out\" endocaritis. Correlation   suggested.         Exercise tolerance: poor     MET: <=4      (+) hypertension                  (+) dysrhythmias and  atrial fibrillation  (+) CHF                Endo/Other      (-) diabetes         (+) anemia            (+) arthritis       Pulmonary    Negative pulmonary ROS.                       Neuro/Psych    Negative neuro/psych ROS.      (-) CVA   (-) TIA  (-) seizures               Mechanical fall with left hip fracture.  PAF - on eliquis.  Sinus rhythm at present on amiodarone.  CRI with hyperkalemia, chronic edema.  Tasking oral potassium per home med list.  S/p fem nerve block on 6/10/24        Past Surgical History:   Procedure Laterality Date    Colectomy      Nephrectomy Left     Part removal colon w end colostomy       Social History     Socioeconomic History    Marital status:    Tobacco Use    Smoking status: Never    Smokeless tobacco: Never   Vaping Use    Vaping status: Never Used   Substance and Sexual Activity    Alcohol use: Not Currently    Drug use: Never     History   Drug Use Unknown     Available pre-op labs reviewed.  Lab Results   Component Value Date    WBC 10.4 06/10/2024    RBC 3.23 (L) 06/10/2024    HGB 9.8 (L) 06/10/2024    HCT 32.8 (L) 06/10/2024    .5 (H) 06/10/2024    MCH 30.3 06/10/2024    MCHC 29.9 (L) 06/10/2024    RDW 20.5 06/10/2024    .0 06/10/2024     Lab Results   Component Value Date     06/10/2024    K 6.1 (HH) 06/10/2024     (H) 06/10/2024    CO2 16.0 (L) 06/10/2024    BUN 36 (H) 06/10/2024    CREATSERUM 2.25 (H) 06/10/2024     (H) 06/10/2024    CA 9.7 06/10/2024     Lab Results   Component Value Date    INR 0.99 06/10/2024         Airway  Comment: Airway 04/2024:    Difficult Airway: No  Final Airway Type: endotracheal airway  Mask Difficulty Assessment: 1 - vent by mask  Final Endotracheal Airway: ETT  Cuffed: Yes  Cormack-Lehane Classification: grade IIA - partial view of glottis  Technique Used For Successful Placement: direct laryngoscopy  Insertion Site: oral  Blade Type: Zari  Blade Size: 3  ETT Size (mm): 7.0  Number of Attempts at  Approach: 1  Number of Other Approaches Attempted: 0        Mallampati: II  Mouth opening: <3 FB  TM distance: > 6 cm  Neck ROM: limited Cardiovascular    Cardiovascular exam normal.         Dental  Comment: All visible teeth chipped or eroded. Will assess more completely post induction pre airway instrumentation, given limitations of bedside exam, for dental vulnerability.           Pulmonary    Pulmonary exam normal.                 Other findings              ASA: 3   Plan: general  NPO status verified and patient meets guidelines.    Post-procedure pain management plan discussed with surgeon and patient.  Surgeon requests: regional block  Comment: I spoke with the patient and discussed the risks of general anesthesia, which include allergic reaction, nausea, vomiting, dental injury, sore throat, awareness, hypotension, as well as more serious cardiac and pulmonary complications. The patient understands and consents to receiving general anesthesia for this procedure.     Per ED doctor's request I explained benefits of femoral nerve block and PEGN block to Sunita Loza including significant but temporary pain relief (unlikely complete), Realistic expectation for block duration was discussed. I also explained possible downsides of peripheral nerve blocks including failed block, prolonged nerve blockade leading to prolonged numbness in lower extremity and possible muscle weakness necessitating knee immobilization and falls precautions. Other very rare complications such as local anesthetic systemic toxicity (LAST), infection, hematoma formation, and permanent nerve damage was also discussed. All questions were answered and patient demonstrated understanding of realistic expectations and risks of peripheral nerve blocks, and wishes to proceed with the procedure. L thigh was marked by me with patient confirmation.     Plan/risks discussed with: patient and child/children  Use of blood product(s) discussed with:  patient    Consented to blood products.      I personally examined Ms Loza and reviewed the plan for her OR anesthesia. I agree with Dr Guadalupe's history and exam.     Clarissa Llamas MD    Present on Admission:  **None**

## 2024-06-10 NOTE — ANESTHESIA PROCEDURE NOTES
Regional Block    Performed by: Leobardo Daniels MD  Authorized by: Leobardo Daniels MD      General Information and Staff    Start Time:   Anesthesiologist:  Leobardo Daniels MD  Performed by:  Anesthesiologist  Patient Location:  OR      Site Identification: real time ultrasound guided and image stored and retrievable    Block site/laterality marked before start: site marked  Reason for Block: at surgeon's request and post-op pain management    Preanesthetic Checklist: 2 patient identifers, IV checked, risks and benefits discussed, monitors and equipment checked, pre-op evaluation, timeout performed, anesthesia consent, sterile technique used, no prohibitive neurological deficits and no local skin infection at insertion site      Procedure Details    Patient Position:   Prep: ChloraPrep    Monitoring:  Cardiac monitor, continuous pulse ox and blood pressure cuff  Block Type:  Femoral  Laterality:  Left  Injection Technique:  Single-shot    Needle    Needle Type:  Short-bevel and echogenic  Needle Localization:  Ultrasound guidance  Reason for Ultrasound Use: appropriate spread of the medication was noted in real time and no ultrasound evidence of intravascular and/or intraneural injection      Muscle Twitch Response: patellar snap      Assessment    Injection Assessment:  Good spread noted, negative resistance, negative aspiration for heme, incremental injection and low pressure  Heart Rate Change: No    - Patient tolerated block procedure well without evidence of immediate block related complications.     Medications      Additional Comments    Medication:  Bupivacaine 0.25% 15mL

## 2024-06-11 ENCOUNTER — APPOINTMENT (OUTPATIENT)
Dept: GENERAL RADIOLOGY | Facility: HOSPITAL | Age: 89
DRG: 481 | End: 2024-06-11
Attending: ORTHOPAEDIC SURGERY
Payer: MEDICARE

## 2024-06-11 ENCOUNTER — ANESTHESIA (OUTPATIENT)
Dept: SURGERY | Facility: HOSPITAL | Age: 89
DRG: 481 | End: 2024-06-11
Payer: MEDICARE

## 2024-06-11 LAB
ANION GAP SERPL CALC-SCNC: 5 MMOL/L (ref 0–18)
BILIRUB UR QL STRIP.AUTO: NEGATIVE
BUN BLD-MCNC: 37 MG/DL (ref 9–23)
CALCIUM BLD-MCNC: 8.8 MG/DL (ref 8.5–10.1)
CHLORIDE SERPL-SCNC: 121 MMOL/L (ref 98–112)
CLARITY UR REFRACT.AUTO: CLEAR
CO2 SERPL-SCNC: 16 MMOL/L (ref 21–32)
COLOR UR AUTO: YELLOW
CREAT BLD-MCNC: 2.33 MG/DL
EGFRCR SERPLBLD CKD-EPI 2021: 19 ML/MIN/1.73M2 (ref 60–?)
ERYTHROCYTE [DISTWIDTH] IN BLOOD BY AUTOMATED COUNT: 20.5 %
GLUCOSE BLD-MCNC: 104 MG/DL (ref 70–99)
GLUCOSE UR STRIP.AUTO-MCNC: NORMAL MG/DL
HCT VFR BLD AUTO: 30.6 %
HGB BLD-MCNC: 9.1 G/DL
KETONES UR STRIP.AUTO-MCNC: NEGATIVE MG/DL
LEUKOCYTE ESTERASE UR QL STRIP.AUTO: NEGATIVE
MCH RBC QN AUTO: 29.9 PG (ref 26–34)
MCHC RBC AUTO-ENTMCNC: 29.7 G/DL (ref 31–37)
MCV RBC AUTO: 100.7 FL
NITRITE UR QL STRIP.AUTO: NEGATIVE
OSMOLALITY SERPL CALC.SUM OF ELEC: 303 MOSM/KG (ref 275–295)
PH UR STRIP.AUTO: 5 [PH] (ref 5–8)
PLATELET # BLD AUTO: 266 10(3)UL (ref 150–450)
POTASSIUM SERPL-SCNC: 5.6 MMOL/L (ref 3.5–5.1)
POTASSIUM SERPL-SCNC: 5.9 MMOL/L (ref 3.5–5.1)
PROT UR STRIP.AUTO-MCNC: 30 MG/DL
RBC # BLD AUTO: 3.04 X10(6)UL
RBC UR QL AUTO: NEGATIVE
SARS-COV-2 RNA RESP QL NAA+PROBE: NOT DETECTED
SODIUM SERPL-SCNC: 142 MMOL/L (ref 136–145)
SP GR UR STRIP.AUTO: 1.02 (ref 1–1.03)
UROBILINOGEN UR STRIP.AUTO-MCNC: NORMAL MG/DL
WBC # BLD AUTO: 10.1 X10(3) UL (ref 4–11)

## 2024-06-11 PROCEDURE — 0QS706Z REPOSITION LEFT UPPER FEMUR WITH INTRAMEDULLARY INTERNAL FIXATION DEVICE, OPEN APPROACH: ICD-10-PCS | Performed by: ORTHOPAEDIC SURGERY

## 2024-06-11 PROCEDURE — 76000 FLUOROSCOPY <1 HR PHYS/QHP: CPT | Performed by: ORTHOPAEDIC SURGERY

## 2024-06-11 PROCEDURE — 73501 X-RAY EXAM HIP UNI 1 VIEW: CPT | Performed by: ORTHOPAEDIC SURGERY

## 2024-06-11 PROCEDURE — 99233 SBSQ HOSP IP/OBS HIGH 50: CPT | Performed by: INTERNAL MEDICINE

## 2024-06-11 DEVICE — IMPLANTABLE DEVICE: Type: IMPLANTABLE DEVICE | Site: HIP | Status: FUNCTIONAL

## 2024-06-11 RX ORDER — CEFAZOLIN SODIUM 1 G/3ML
INJECTION, POWDER, FOR SOLUTION INTRAMUSCULAR; INTRAVENOUS AS NEEDED
Status: DISCONTINUED | OUTPATIENT
Start: 2024-06-11 | End: 2024-06-11 | Stop reason: SURG

## 2024-06-11 RX ORDER — ACETAMINOPHEN 10 MG/ML
INJECTION, SOLUTION INTRAVENOUS AS NEEDED
Status: DISCONTINUED | OUTPATIENT
Start: 2024-06-11 | End: 2024-06-11 | Stop reason: SURG

## 2024-06-11 RX ORDER — HYDROMORPHONE HYDROCHLORIDE 1 MG/ML
0.2 INJECTION, SOLUTION INTRAMUSCULAR; INTRAVENOUS; SUBCUTANEOUS EVERY 5 MIN PRN
Status: DISCONTINUED | OUTPATIENT
Start: 2024-06-11 | End: 2024-06-11 | Stop reason: HOSPADM

## 2024-06-11 RX ORDER — DEXTROSE MONOHYDRATE 25 G/50ML
50 INJECTION, SOLUTION INTRAVENOUS ONCE
Status: COMPLETED | OUTPATIENT
Start: 2024-06-11 | End: 2024-06-11

## 2024-06-11 RX ORDER — ACETAMINOPHEN 500 MG
1000 TABLET ORAL ONCE AS NEEDED
Status: DISCONTINUED | OUTPATIENT
Start: 2024-06-11 | End: 2024-06-11 | Stop reason: HOSPADM

## 2024-06-11 RX ORDER — ONDANSETRON 2 MG/ML
4 INJECTION INTRAMUSCULAR; INTRAVENOUS EVERY 6 HOURS PRN
Status: DISCONTINUED | OUTPATIENT
Start: 2024-06-11 | End: 2024-06-15

## 2024-06-11 RX ORDER — SODIUM CHLORIDE, SODIUM LACTATE, POTASSIUM CHLORIDE, CALCIUM CHLORIDE 600; 310; 30; 20 MG/100ML; MG/100ML; MG/100ML; MG/100ML
INJECTION, SOLUTION INTRAVENOUS CONTINUOUS
Status: DISCONTINUED | OUTPATIENT
Start: 2024-06-11 | End: 2024-06-11 | Stop reason: HOSPADM

## 2024-06-11 RX ORDER — NEOSTIGMINE METHYLSULFATE 1 MG/ML
INJECTION, SOLUTION INTRAVENOUS AS NEEDED
Status: DISCONTINUED | OUTPATIENT
Start: 2024-06-11 | End: 2024-06-11 | Stop reason: SURG

## 2024-06-11 RX ORDER — METOCLOPRAMIDE HYDROCHLORIDE 5 MG/ML
10 INJECTION INTRAMUSCULAR; INTRAVENOUS EVERY 8 HOURS PRN
Status: DISCONTINUED | OUTPATIENT
Start: 2024-06-11 | End: 2024-06-11 | Stop reason: HOSPADM

## 2024-06-11 RX ORDER — DEXAMETHASONE SODIUM PHOSPHATE 4 MG/ML
VIAL (ML) INJECTION AS NEEDED
Status: DISCONTINUED | OUTPATIENT
Start: 2024-06-11 | End: 2024-06-11 | Stop reason: SURG

## 2024-06-11 RX ORDER — HYDROCODONE BITARTRATE AND ACETAMINOPHEN 5; 325 MG/1; MG/1
2 TABLET ORAL ONCE AS NEEDED
Status: DISCONTINUED | OUTPATIENT
Start: 2024-06-11 | End: 2024-06-11 | Stop reason: HOSPADM

## 2024-06-11 RX ORDER — ENOXAPARIN SODIUM 100 MG/ML
40 INJECTION SUBCUTANEOUS DAILY
Status: DISCONTINUED | OUTPATIENT
Start: 2024-06-12 | End: 2024-06-11 | Stop reason: DRUGHIGH

## 2024-06-11 RX ORDER — ONDANSETRON 2 MG/ML
4 INJECTION INTRAMUSCULAR; INTRAVENOUS EVERY 6 HOURS PRN
Status: DISCONTINUED | OUTPATIENT
Start: 2024-06-11 | End: 2024-06-11 | Stop reason: HOSPADM

## 2024-06-11 RX ORDER — ROCURONIUM BROMIDE 10 MG/ML
INJECTION, SOLUTION INTRAVENOUS AS NEEDED
Status: DISCONTINUED | OUTPATIENT
Start: 2024-06-11 | End: 2024-06-11 | Stop reason: SURG

## 2024-06-11 RX ORDER — METOCLOPRAMIDE HYDROCHLORIDE 5 MG/ML
5 INJECTION INTRAMUSCULAR; INTRAVENOUS EVERY 8 HOURS PRN
Status: DISCONTINUED | OUTPATIENT
Start: 2024-06-11 | End: 2024-06-15

## 2024-06-11 RX ORDER — HYDROMORPHONE HYDROCHLORIDE 1 MG/ML
0.6 INJECTION, SOLUTION INTRAMUSCULAR; INTRAVENOUS; SUBCUTANEOUS EVERY 5 MIN PRN
Status: DISCONTINUED | OUTPATIENT
Start: 2024-06-11 | End: 2024-06-11 | Stop reason: HOSPADM

## 2024-06-11 RX ORDER — SODIUM CHLORIDE, SODIUM LACTATE, POTASSIUM CHLORIDE, CALCIUM CHLORIDE 600; 310; 30; 20 MG/100ML; MG/100ML; MG/100ML; MG/100ML
INJECTION, SOLUTION INTRAVENOUS CONTINUOUS PRN
Status: DISCONTINUED | OUTPATIENT
Start: 2024-06-11 | End: 2024-06-11 | Stop reason: SURG

## 2024-06-11 RX ORDER — BUPIVACAINE HYDROCHLORIDE AND EPINEPHRINE 2.5; 5 MG/ML; UG/ML
INJECTION, SOLUTION EPIDURAL; INFILTRATION; INTRACAUDAL; PERINEURAL AS NEEDED
Status: DISCONTINUED | OUTPATIENT
Start: 2024-06-11 | End: 2024-06-11 | Stop reason: HOSPADM

## 2024-06-11 RX ORDER — ONDANSETRON 2 MG/ML
INJECTION INTRAMUSCULAR; INTRAVENOUS AS NEEDED
Status: DISCONTINUED | OUTPATIENT
Start: 2024-06-11 | End: 2024-06-11 | Stop reason: SURG

## 2024-06-11 RX ORDER — HEPARIN SODIUM 5000 [USP'U]/ML
5000 INJECTION, SOLUTION INTRAVENOUS; SUBCUTANEOUS EVERY 8 HOURS SCHEDULED
Status: DISCONTINUED | OUTPATIENT
Start: 2024-06-11 | End: 2024-06-12

## 2024-06-11 RX ORDER — PHENYLEPHRINE HCL 10 MG/ML
VIAL (ML) INJECTION AS NEEDED
Status: DISCONTINUED | OUTPATIENT
Start: 2024-06-11 | End: 2024-06-11 | Stop reason: SURG

## 2024-06-11 RX ORDER — DOXEPIN HYDROCHLORIDE 50 MG/1
1 CAPSULE ORAL DAILY
Status: DISCONTINUED | OUTPATIENT
Start: 2024-06-12 | End: 2024-06-15

## 2024-06-11 RX ORDER — NALOXONE HYDROCHLORIDE 0.4 MG/ML
0.08 INJECTION, SOLUTION INTRAMUSCULAR; INTRAVENOUS; SUBCUTANEOUS AS NEEDED
Status: DISCONTINUED | OUTPATIENT
Start: 2024-06-11 | End: 2024-06-11 | Stop reason: HOSPADM

## 2024-06-11 RX ORDER — GLYCOPYRROLATE 0.2 MG/ML
INJECTION, SOLUTION INTRAMUSCULAR; INTRAVENOUS AS NEEDED
Status: DISCONTINUED | OUTPATIENT
Start: 2024-06-11 | End: 2024-06-11 | Stop reason: SURG

## 2024-06-11 RX ORDER — TRANEXAMIC ACID 10 MG/ML
INJECTION, SOLUTION INTRAVENOUS AS NEEDED
Status: DISCONTINUED | OUTPATIENT
Start: 2024-06-11 | End: 2024-06-11 | Stop reason: SURG

## 2024-06-11 RX ORDER — SODIUM CHLORIDE 9 MG/ML
INJECTION, SOLUTION INTRAVENOUS CONTINUOUS
Status: DISCONTINUED | OUTPATIENT
Start: 2024-06-11 | End: 2024-06-12

## 2024-06-11 RX ORDER — AMLODIPINE BESYLATE 5 MG/1
10 TABLET ORAL DAILY
Status: DISCONTINUED | OUTPATIENT
Start: 2024-06-11 | End: 2024-06-15

## 2024-06-11 RX ORDER — SENNOSIDES 8.6 MG
17.2 TABLET ORAL NIGHTLY
Status: DISCONTINUED | OUTPATIENT
Start: 2024-06-11 | End: 2024-06-15

## 2024-06-11 RX ORDER — CALCIUM GLUCONATE 20 MG/ML
2 INJECTION, SOLUTION INTRAVENOUS ONCE
Status: COMPLETED | OUTPATIENT
Start: 2024-06-11 | End: 2024-06-11

## 2024-06-11 RX ORDER — LIDOCAINE HYDROCHLORIDE 10 MG/ML
INJECTION, SOLUTION EPIDURAL; INFILTRATION; INTRACAUDAL; PERINEURAL AS NEEDED
Status: DISCONTINUED | OUTPATIENT
Start: 2024-06-11 | End: 2024-06-11 | Stop reason: SURG

## 2024-06-11 RX ORDER — HYDROCODONE BITARTRATE AND ACETAMINOPHEN 5; 325 MG/1; MG/1
1 TABLET ORAL ONCE AS NEEDED
Status: DISCONTINUED | OUTPATIENT
Start: 2024-06-11 | End: 2024-06-11 | Stop reason: HOSPADM

## 2024-06-11 RX ORDER — HYDROMORPHONE HYDROCHLORIDE 1 MG/ML
0.4 INJECTION, SOLUTION INTRAMUSCULAR; INTRAVENOUS; SUBCUTANEOUS EVERY 5 MIN PRN
Status: DISCONTINUED | OUTPATIENT
Start: 2024-06-11 | End: 2024-06-11 | Stop reason: HOSPADM

## 2024-06-11 RX ADMIN — PHENYLEPHRINE HCL 100 MCG: 10 MG/ML VIAL (ML) INJECTION at 18:18:00

## 2024-06-11 RX ADMIN — LIDOCAINE HYDROCHLORIDE 25 MG: 10 INJECTION, SOLUTION EPIDURAL; INFILTRATION; INTRACAUDAL; PERINEURAL at 18:12:00

## 2024-06-11 RX ADMIN — NEOSTIGMINE METHYLSULFATE 2 MG: 1 INJECTION, SOLUTION INTRAVENOUS at 19:02:00

## 2024-06-11 RX ADMIN — ROCURONIUM BROMIDE 30 MG: 10 INJECTION, SOLUTION INTRAVENOUS at 18:13:00

## 2024-06-11 RX ADMIN — GLYCOPYRROLATE 0.2 MG: 0.2 INJECTION, SOLUTION INTRAMUSCULAR; INTRAVENOUS at 19:02:00

## 2024-06-11 RX ADMIN — PHENYLEPHRINE HCL 100 MCG: 10 MG/ML VIAL (ML) INJECTION at 18:13:00

## 2024-06-11 RX ADMIN — ACETAMINOPHEN 350 MG: 10 INJECTION, SOLUTION INTRAVENOUS at 18:52:00

## 2024-06-11 RX ADMIN — DEXAMETHASONE SODIUM PHOSPHATE 4 MG: 4 MG/ML VIAL (ML) INJECTION at 18:22:00

## 2024-06-11 RX ADMIN — TRANEXAMIC ACID 1000 MG: 10 INJECTION, SOLUTION INTRAVENOUS at 18:43:00

## 2024-06-11 RX ADMIN — CEFAZOLIN SODIUM 2 G: 1 INJECTION, POWDER, FOR SOLUTION INTRAMUSCULAR; INTRAVENOUS at 18:32:00

## 2024-06-11 RX ADMIN — SODIUM CHLORIDE, SODIUM LACTATE, POTASSIUM CHLORIDE, CALCIUM CHLORIDE: 600; 310; 30; 20 INJECTION, SOLUTION INTRAVENOUS at 18:10:00

## 2024-06-11 RX ADMIN — ONDANSETRON 4 MG: 2 INJECTION INTRAMUSCULAR; INTRAVENOUS at 18:53:00

## 2024-06-11 NOTE — PROGRESS NOTES
[facility]   part of [facility]    Progress Note    Sunita Loza Patient Status:  Inpatient    1932 MRN [id number]   Location [facility] PRE OP HOLDING Attending Tree Li MD   Hosp Day # 1 PCP Janell Powell MD     SUBJECTIVE:  Interval History: Patient has no current complaints.  Pain: better tolerated today.  Patient denies chest pain, shortness of breath and calf pain.    OBJECTIVE:  Blood pressure 148/66, pulse 105, temperature 98.8 °F (37.1 °C), temperature source Oral, resp. rate 17, height 5' 1\" (1.549 m), weight 81 lb 12.7 oz (37.1 kg), SpO2 93%.     Physical Exam:  General: Alert, cooperative, no distress, appears stated age  Extremities: Bilateral lower extremities neurovascularly intact. Bilateral calf soft, non-tender. left lower extremity externally rotated and shortened. Pain any movement and to palpation of left hip. No pain to knee or ankle of left leg. right lower extremity with full ROM and no pain.   Pulses: 2+ and symmetric all extremities.  Skin: Intact over left hip with no ecchymosis or abrasions noted     Diagnostics: Xray of left hip/pelvis demonstrate a displaced intertrochanteric femur fracture.    ASSESSMENT/PLAN:  89 yo F with L hip intertrochanteric femur fracture  - NWB LLE  - Bedrest  - Plan for OR today for L hip CMN  - NPO for OR  - Hold AC for OR  - Further recommendations pending intra-operative findings    Lokesh Whitehead MD  2024  5:02 PM

## 2024-06-11 NOTE — PROGRESS NOTES
CC: follow-up hospital admission hip fx    SUBJECTIVE:  Interval History:     Seen this am  Watching tv  Having pain in hip. Left arm is better - able to raise it more    OBJECTIVE:  Scheduled Meds:    amLODIPine  10 mg Oral Daily    sodium zirconium cyclosilicate  10 g Oral Q8H    amiodarone  100 mg Oral Daily    aspirin  81 mg Oral Daily    dilTIAZem ER  120 mg Oral Daily    ferrous sulfate  325 mg Oral Daily with breakfast    pantoprazole  40 mg Oral QAM AC    vancomycin  125 mg Oral BID    Followed by    [START ON 6/16/2024] vancomycin  125 mg Oral Daily    Followed by    [START ON 6/24/2024] vancomycin  125 mg Oral Q MWF    mupirocin  1 Application Nasal Q12H     Continuous Infusions:    sodium chloride       PRN Meds:   lidocaine PF    ropivacaine    sodium chloride 0.9% PF    morphINE **OR** morphINE    HYDROmorphone **OR** HYDROmorphone **OR** HYDROmorphone    hydrALAzine    PHYSICAL EXAM  Vital signs: Temp:  [98 °F (36.7 °C)-99.2 °F (37.3 °C)] 99 °F (37.2 °C)  Pulse:  [88-97] 89  Resp:  [16-18] 18  BP: (130-165)/(48-65) 147/60  SpO2:  [94 %-95 %] 95 %      GENERAL - NAD, AAO  EYES- sclera anicteric   HENT- normocephalic, OP - dry  NECK - supple  CV- RRR  RESP - CTAB, normal resp effort  ABDOMEN- soft, NT/ND, stoma  EXT- no LE edema        Data Review:   Labs:   Recent Labs   Lab 06/10/24  0810 06/11/24  0433   WBC 10.4 10.1   HGB 9.8* 9.1*   .5* 100.7*   .0 266.0   INR 0.99  --        Recent Labs   Lab 06/10/24  0810 06/10/24  1137 06/10/24  1505 06/11/24  0433 06/11/24  1321   * 143  --  142  --    K 6.1* 6.1* 5.4* 5.9* 5.6*   * 120*  --  121*  --    CO2 16.0* 16.0*  --  16.0*  --    BUN 37* 36*  --  37*  --    CREATSERUM 2.25* 2.25*  --  2.33*  --    CA 8.7 9.7  --  8.8  --    GLU 91 121*  --  104*  --        Recent Labs   Lab 06/10/24  0810   ALT 19   AST 20   ALB 2.7*       Recent Labs   Lab 06/10/24  0944 06/10/24  1104   PGLU 83 180*           ASSESSMENT/PLAN:    Patient is a  89 year old female with PMH sig for CKD, colon cancer, RCC sp nephrectomy, HTN, PAF, recent admission for cdiff here for fall and hip fx     Impression     -L acute, displaced IT left femoral neck fracture      -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 Mechanical vs syncope  -plan for OR  - would also rec vacno for periop surg ppx given mrsa hx   -cards consult     -she also has left arm / knee pain - will get xrays too - neg for fx     *zohreh  *hyper K - likely from supplement  -dw renal,will see  -fluids / K management per renal     *Cdfiff  -cont vancomycin  -has generalized shaking will check blood cx but otherwise no other infectious symptoms - ngtd     Chronic conditions  Home meds as able      Will continue to follow while hospitalized. Please page me or the on-call hospitalist with questions or concerns.    Praful Neves Hospitalist  [phone]  Answering Service: [phone]

## 2024-06-11 NOTE — PROGRESS NOTES
[facility]   part of [facility]     Nephrology Progress Note    Sunita Loza Patient Status:  Inpatient    1932 MRN [id number]   Location [facility] 3SW-A Attending Tree Li MD   Hosp Day # 1 PCP Janell Powell MD       SUBJECTIVE:  L hip pain persists        Physical Exam:   /48 (BP Location: Right arm)   Pulse 88   Temp 99.2 °F (37.3 °C) (Oral)   Resp 17   Ht 5' 1\" (1.549 m)   Wt 81 lb 12.7 oz (37.1 kg)   SpO2 94%   BMI 15.45 kg/m²   Temp (24hrs), Av.5 °F (36.9 °C), Min:98 °F (36.7 °C), Max:99.2 °F (37.3 °C)       Intake/Output Summary (Last 24 hours) at 2024 0901  Last data filed at 2024 0743  Gross per 24 hour   Intake 250 ml   Output 350 ml   Net -100 ml     Last 3 Weights   06/10/24 1423 81 lb 12.7 oz (37.1 kg)   06/10/24 0753 81 lb 12.7 oz (37.1 kg)   24 2036 81 lb 14.4 oz (37.1 kg)   24 1542 95 lb (43.1 kg)   24 1017 81 lb (36.7 kg)   24 0158 92 lb 14.4 oz (42.1 kg)   24 2036 95 lb (43.1 kg)   24 0400 87 lb 8.4 oz (39.7 kg)   24 0613 82 lb 1.5 oz (37.2 kg)   24 1510 83 lb (37.6 kg)     General: Alert and oriented in no apparent distress.  HEENT: No scleral icterus, MMM  Neck: Supple, no MARIA FERNANDA or thyromegaly  Cardiac: Regular rate and rhythm, S1, S2 normal, no murmur or rub  Lungs: Clear without wheezes, rales, rhonchi.    Abdomen: Soft, non-tender. + bowel sounds, no palpable organomegaly  Extremities: 1+ BLE edema.  Neurologic:  moving all extremities  Skin: Warm and dry, no rash        Labs:     Recent Labs   Lab 06/10/24  0810 24  0433   WBC 10.4 10.1   HGB 9.8* 9.1*   .5* 100.7*   .0 266.0   INR 0.99  --        Recent Labs   Lab 06/10/24  0810 06/10/24  1137 06/10/24  1505 24  0433   * 143  --  142   K 6.1* 6.1* 5.4* 5.9*   * 120*  --  121*   CO2 16.0* 16.0*  --  16.0*   BUN 37* 36*  --  37*   CREATSERUM 2.25* 2.25*  --  2.33*   CA 8.7 9.7  --  8.8   GLU 91  121*  --  104*       Recent Labs   Lab 06/10/24  0810   ALT 19   AST 20   ALB 2.7*       Recent Labs   Lab 06/10/24  0944 06/10/24  1104   PGLU 83 180*       Meds:   Current Facility-Administered Medications   Medication Dose Route Frequency    calcium gluconate 2g in 100mL iso-NaCl IVPB premix  2 g Intravenous Once    amLODIPine (Norvasc) tab 10 mg  10 mg Oral Daily    sodium chloride 0.9% infusion   Intravenous Continuous    lidocaine PF (Xylocaine-MPF) 1% injection  2 mL Injection PRN    ropivacaine (Naropin) 0.5% injection  30 mL Injection PRN    sodium chloride 0.9% PF injection 10 mL  10 mL Injection PRN    sodium zirconium cyclosilicate (Lokelma) oral packet 10 g  10 g Oral Q8H    amiodarone (Pacerone) tab 100 mg  100 mg Oral Daily    aspirin DR tab 81 mg  81 mg Oral Daily    dilTIAZem ER (Dilacor XR) 24 hr cap 120 mg  120 mg Oral Daily    morphINE PF 2 MG/ML injection 1 mg  1 mg Intravenous Q2H PRN    Or    morphINE PF 2 MG/ML injection 2 mg  2 mg Intravenous Q2H PRN    ferrous sulfate DR tab 325 mg  325 mg Oral Daily with breakfast    pantoprazole (Protonix) DR tab 40 mg  40 mg Oral QAM AC    vancomycin (Vancocin) cap 125 mg  125 mg Oral BID    Followed by    [START ON 6/16/2024] vancomycin (Vancocin) cap 125 mg  125 mg Oral Daily    Followed by    [START ON 6/24/2024] vancomycin (Vancocin) cap 125 mg  125 mg Oral Q MWF    HYDROmorphone (Dilaudid) 1 MG/ML injection 0.2 mg  0.2 mg Intravenous Q2H PRN    Or    HYDROmorphone (Dilaudid) 1 MG/ML injection 0.4 mg  0.4 mg Intravenous Q2H PRN    Or    HYDROmorphone (Dilaudid) 1 MG/ML injection 0.8 mg  0.8 mg Intravenous Q2H PRN    mupirocin (Bactroban) 2% nasal ointment 1 Application  1 Application Nasal Q12H    hydrALAzine (Apresoline) 20 mg/mL injection 5 mg  5 mg Intravenous Q4H PRN         Impression/Plan:      #1.  ZORHEH/CKD IV- has had longstanding CKD in setting of remote nephrectomy.  B/l creat of late close to 2.0 mg/dl or so.  Gentle IVF today     #2.   Hyperkalemia- in setting of CKD and ongoing potassium supplement.  Treat medically today.  Stop k moving forward     #3.  L hip fx- per ortho     #4.  HTN- BP stable.  Cont usual meds and follow    Questions/concerns were discussed with patient and/or family by bedside.          Talha Hernandez MD  6/11/2024  9:01 AM

## 2024-06-11 NOTE — CM/SW NOTE
06/11/24 1500   CM/SW Referral Data   Referral Source Social Work (self-referral)   Reason for Referral Discharge planning   Informant Patient;Son;Daughter;EMR   Patient Info   Patient's Current Mental Status at Time of Assessment Alert;Memory Impairments   Patient's Home Environment Independent Living  ([facility])   Number of Levels in Home 1   Patient lives with Daughter  (Olga - down's syndrome)   Patient Status Prior to Admission   Independent with ADLs and Mobility No   Pt. requires assistance with Housework;Driving;Meals;Medications;Finances   Services in place prior to admission DME/Supplies at home;Home Health Care   Home Health Provider Info [facility]   Type of DME/Supplies Wheeled Walker;Straight Cane   Discharge Needs   Anticipated D/C needs Subacute rehab;Transportation services   Services Requested   PASRR Level 1 Submitted Yes       Patient is a 89 y/o woman admitted s/p fall with hip fracture.  Plan for OR later today.  Met with pt and her dtr, 3 sons at bedside to discuss DC planning.  Pt is a poor historian and her children provided much of the history.  Pt lives at the [facility] in [place].  Her dtr, Olga who has down's syndrome lives with her.  Pt normally ambulates with a walker outside of her apartment but without device in the apartment.  She has had multiple hospital admissions recently, including as an inpatient from 5/24-6/2.  She is now current with [facility] for RN and PT.  She has previous history of DAWNA at [facility].    Discussed DC planning and anticipated needs.  Plan for post op therapy evals.  Anticipate pt will be appropriate for DAWNA stay at [facility].  Pt's family in agreement and prefer [facility].  They expressed concern that pt's cognition has declined and are considering possible move to [place] after rehab, if appropriate.      Referral sent to [facility] via [employer].  PASRR completed and added to referral.  Await post op MD and therapy recommendations  for further DC planning.  If DAWNA appropriate, pt does not need 3 inpatient night stay for Medicare coverage as she has had one in the past 30 days.  / to remain available for support and/or discharge planning.     Roxanne Mina, [facility]  Discharge Planner  [phone]

## 2024-06-11 NOTE — ANESTHESIA PROCEDURE NOTES
Airway  Date/Time: 6/11/2024 6:16 PM  Urgency: elective      General Information and Staff    Patient location during procedure: OR  Anesthesiologist: Clarissa Llamas MD  Performed: anesthesiologist   Performed by: Clarissa Llamas MD  Authorized by: Clarissa Llamas MD      Indications and Patient Condition  Indications for airway management: anesthesia  Spontaneous Ventilation: absent  Sedation level: deep  Preoxygenated: yes  Patient position: sniffing  Mask difficulty assessment: 2 - vent by mask + OA or adjuvant +/- NMBA    Final Airway Details  Final airway type: endotracheal airway      Successful airway: ETT  Cuffed: yes   Successful intubation technique: direct laryngoscopy  Endotracheal tube insertion site: oral  Blade: Zari  Blade size: #3  ETT size (mm): 6.5    Cormack-Lehane Classification: grade I - full view of glottis  Placement verified by: capnometry   Cuff volume (mL): 6  Measured from: lips  ETT to lips (cm): 21  Number of attempts at approach: 1  Number of other approaches attempted: 0    Additional Comments  Dentition unchanged from pre op exam

## 2024-06-11 NOTE — CONSULTS
[facility]   part of [facility]    Report of Consultation    Sunita Loza Patient Status:  Inpatient    1932 MRN [id number]   Location [facility] 3SW-A Attending Tree Li MD   Hosp Day # 1 PCP Janell Powell MD     Date of Admission:  6/10/2024  Date of Consult:  6/10/24    SUBJECTIVE:    Reason for Consultation: Fall with left hip pain  Consult Requested by: ED    History of Present Illness:  Patient is a(n) 89 year old female who presents with left hip pain. Patient brought in to the ER after a fall. Patient was unable to ambulate after the fall and had pain in the left hip. Patient denies hitting head, LOC, nausea or vommitting. Patient denies any back/neck or opposite leg pain.      Past Medical History:    Arthritis    Atrial fibrillation (HCC)    Back pain    C. difficile colitis    CKD (chronic kidney disease)    and S/P nephrectomy    Colon cancer (HCC)    Congestive heart disease (HCC)    Easy bruising    Endometrial cancer (HCC)    UTERINE, DX ABOUT 30 YEARS    Hearing impairment    AIDS    Heart palpitations    Afib    High blood pressure    Kidney failure    Leg swelling    Bishop syndrome    hereditary colorectal cancer    Muscle weakness    WALKER    Pain in joints    Wears glasses    Weight loss     Past Surgical History:   Procedure Laterality Date    Colectomy      Nephrectomy Left     Part removal colon w end colostomy       Social History     Tobacco Use    Smoking status: Never    Smokeless tobacco: Never   Substance Use Topics    Alcohol use: Not Currently     Family History   Problem Relation Age of Onset    Heart Disorder Father     Heart Disorder Mother     Colon Cancer Mother         70    Heart Disorder Sister     Breast Cancer Sister     Diabetes Brother     Breast Cancer Sister     Cancer Sister     Breast Cancer Sister     Breast Cancer Daughter        Allergies:  Allergies   Allergen Reactions    Ciprofloxacin RASH and HIVES    Penicillins RASH        Review of Systems:  See H&P    OBJECTIVE:    Blood pressure (!) 164/64, pulse 97, temperature 98.9 °F (37.2 °C), temperature source Oral, resp. rate 16, height 5' 1\" (1.549 m), weight 81 lb 12.7 oz (37.1 kg), SpO2 94%.    Intake/Output Summary (Last 24 hours) at 6/11/2024 0700  Last data filed at 6/11/2024 0500  Gross per 24 hour   Intake 250 ml   Output 200 ml   Net 50 ml       Physical Exam:  General: Alert, cooperative, no distress, appears stated age  Extremities: Bilateral lower extremities neurovascularly intact. Bilateral calf soft, non-tender. left lower extremity externally rotated and shortened. Pain any movement and to palpation of left hip. No pain to knee or ankle of left leg. right lower extremity with full ROM and no pain.   Pulses: 2+ and symmetric all extremities.  Skin: Intact over left hip with no ecchymosis or abrasions noted    Diagnostics: Xray of left hip/pelvis demonstrate a displaced intertrochanteric femur fracture.    ASSESSMENT:    left Femoral intertrochanteric fracture, displaced    PLAN:    Patients diagnosis and treatment options are discussed today. Patient will need a left hip CMN. Risks and benefits of the surgery were discussed today and the patient agreed with the plan. Please consent the patient for left hip cephallomedullary nail. Antibiotics have been ordered and are on call to OR. DVT prophylaxis with SCD are in place. Patient should remain NPO. Patient will need medical clearance for surgery. NWCHIQUIS MATAE, bedrest.     Lokesh Whitehead MD  6/10/2024  7:25 PM

## 2024-06-11 NOTE — ANESTHESIA PROCEDURE NOTES
Peripheral IV  Date/Time: 6/11/2024 6:19 PM  Inserted by: Clarissa Llamas MD    Placement  Needle size: 20 G  Laterality: left  Location: arm  Local anesthetic: none  Site prep: alcohol  Technique: anatomical landmarks  Attempts: 1

## 2024-06-11 NOTE — PLAN OF CARE
Alert and Oriented x4. On RA. BP >=165, hydralazine PRN given, see MAR for action, otherwise VSS. Moderate Pain controlled by PRN medications, see MAR for actions. Denies N/T. Voiding freely. NPO. Denies N/V. Call light within reach at this time.    Plan: NPO now for OR today, pain management, PT/OT

## 2024-06-11 NOTE — CDS QUERY
DOCUMENTATION CLARIFICATION FORM    Dear Dr. Clarke :  Clinical information, provided below, indicates a BMI of 15.45. For accurate ICD-10-CM code assignment,   Can you please further clarify in the medical record the diagnosis associated with these findings:  (  x ) Underweight    (   ) Other condition (please specify):      Documentation from the Medical Record:     vitals: 188/74  Pulse 89  Temp 97.9 °F (36.6 °C) (Oral)  Resp 14  Ht 5' 1\" (1.549 m)  Wt 81 lb 12.7 oz (37.1 kg)  SpO2 97%     BMI 15.45 kg/m²    h&p: -L acute, displaced IT left femoral neck fracture; susu on ckd 3-4, hyperkalemia, cdiff colitis; colon cancer; metabolic acidosis; htn; paf      For questions regarding this query, please contact Clinical :  Abida Webb RN, CDS @ [email or web address] [phone] or email: [email or web address]  Thank you    THIS FORM IS A PERMANENT PART OF THE MEDICAL RECORD

## 2024-06-11 NOTE — PROGRESS NOTES
Pharmacy Dosing Service  Initial Pharmacokinetic Consult for Vancomycin Dosing          Sunita Loza is a 89 year old female who is being treated for surgical prophylaxis due to recent Hx of MRSA abd wall abscess.  The initial treatment and monitoring approach will be non-AUC strategy.      Pharmacy has been asked to dose vancomycin by Dr. Clarke.    Est CrCl: <10 mL/min     Renal dosing considerations: CKD IV    Pharmacokinetic target: Trough/random 10-15 mg/L -- if continued beyond surgery          A/P:  1.  Start vancomycin 500mg (~15mg/kg) q48hr based upon actual BW and estimated renal function.    2.  Plan for vancomycin trough to be obtained before the 3rd dose if continued - need to clarify duration of abx ppx with Dr Clarke.    3.  Will monitor SCr daily while on vancomycin to assess renal function.    Pharmacy will continue to follow her and adjust dosing as appropriate.        Shayne Stevenson, PharmD, BCPS  6/11/2024  3:53 PM  [facility]: [phone]

## 2024-06-11 NOTE — PLAN OF CARE
A&Ox2. VSS. On room air. . Refusing SCDs. Colostomy in place. Purewick in place. Denies need for pain medication at this time Isolation precautions maintained. Potassium elevated - see MAR. NPO. Bedrest. Plan is for surgery today. Patient updated and in agreement with plan of care. Safety precautions in place. Instructed patient to call for assistance, call light within reach.

## 2024-06-12 PROBLEM — E87.20 ACIDOSIS: Status: ACTIVE | Noted: 2020-04-15

## 2024-06-12 LAB
ANION GAP SERPL CALC-SCNC: 10 MMOL/L (ref 0–18)
BUN BLD-MCNC: 40 MG/DL (ref 9–23)
CALCIUM BLD-MCNC: 7.8 MG/DL (ref 8.5–10.1)
CHLORIDE SERPL-SCNC: 121 MMOL/L (ref 98–112)
CO2 SERPL-SCNC: 14 MMOL/L (ref 21–32)
CREAT BLD-MCNC: 2.47 MG/DL
EGFRCR SERPLBLD CKD-EPI 2021: 18 ML/MIN/1.73M2 (ref 60–?)
ERYTHROCYTE [DISTWIDTH] IN BLOOD BY AUTOMATED COUNT: 19.9 %
GLUCOSE BLD-MCNC: 101 MG/DL (ref 70–99)
HCT VFR BLD AUTO: 26.4 %
HGB BLD-MCNC: 7.7 G/DL
MCH RBC QN AUTO: 30 PG (ref 26–34)
MCHC RBC AUTO-ENTMCNC: 29.2 G/DL (ref 31–37)
MCV RBC AUTO: 102.7 FL
OSMOLALITY SERPL CALC.SUM OF ELEC: 310 MOSM/KG (ref 275–295)
PLATELET # BLD AUTO: 244 10(3)UL (ref 150–450)
POTASSIUM SERPL-SCNC: 5 MMOL/L (ref 3.5–5.1)
POTASSIUM SERPL-SCNC: 5.6 MMOL/L (ref 3.5–5.1)
RBC # BLD AUTO: 2.57 X10(6)UL
SODIUM SERPL-SCNC: 145 MMOL/L (ref 136–145)
WBC # BLD AUTO: 11.9 X10(3) UL (ref 4–11)

## 2024-06-12 PROCEDURE — 99233 SBSQ HOSP IP/OBS HIGH 50: CPT | Performed by: INTERNAL MEDICINE

## 2024-06-12 NOTE — OPERATIVE REPORT
OPERATIVE REPORT    Facility:  [facility]    Patient Name:  Sunita Loza    Age/Gender:  89 year old female  :  1932    MRN:  [id number]    Date of Operation:  2024    Preoperative Diagnosis:  LEFT INTERTROCHANTERIC FEMUR FRACTURE    Postoperative Diagnosis:  LEFT INTERTROCHANTERIC FEMUR FRACTURE    Procedure Performed:  LEFT FEMUR INTRAMEDULLARY NAILING    Surgeon:  Lokesh Whitehead M.D.    Assistant:  Clive Brooks PA-C    Anesthesia:  GENERAL    Estimated Blood Loss:  100 mL    Drain:  NONE    Complications:  NONE    Implants:   1.  Synthes TFNA short cephalomedullary nail, 130 degree, 12 mm x 190 mm   2.  Lag screw 10.5 mm x 90 mm     Indications for Procedure:  Sunita Loza is a 89 year old female with a left intertrochanteric femur fracture.  Medical clearance was obtained and surgery was scheduled.    Description of Procedure:  The patient was taken to the operating room after the correct surgical site was marked in the preop holding area.  The patient was placed under anesthesia and placed in a supine position on the [place] orthopaedic table.  Preoperative antibiotics were given as well as pre-operative tranexamic acid.  All bony prominences were padded.  A reduction was obtained on the orthopaedic table verified by fluoroscopy.  The left hip was prepped and draped in usual sterile surgical fashion.  An incision was made proximal to the greater trochanter and a guidewire was used to locate the tip of the trochanter.  Once appropriate reduction and guide pin placement was confirmed on intra-operative flouroscopy (AP and lateral views), the guide pin was advanced into the intramedullary canal. The opening reamer was used to open the proximal femur. A short nail was selected given the relatively stable fracture pattern. The nail was passed using imaging and seated to an appropriate depth as visualized on intra-operative flouroscopy.  A guidepin was placed into the femoral head using the insertion  handle guide and verified with flouroscopy (AP and lateral views).  Measurements were taken from the guidewire and an appropraitely sized lag screw was placed after reaming. Position of the screw was verified on AP and lateral views.  The locking screw was placed into the top of the nail. Next using the insertion guide from the handle the distal locking screw was placed through a lateral incision. The depth was measured form the drill guide and appropriate length screw placed and confirmed length and position with fluoroscopy. The insertion handle removed.  Fluoroscopy verified reduction and position of the implants.  Wounds were irrigated and closed with 0 vicryl, 2-0 monocryl and staples. Sterile dressing was placed to all incisions. No drain used.  The surgical assistant was present for the entire procedure and assisted with the approach, exposure, definitive surgery and closure.  The patient left the operating room in stable condition.  There were no complications.    mobilize with PT, WBAT on operative extremity with use of protective walker at all times  pain controlled with meds  Lovenox for DVT prophylaxis for 2 weeks, then transition to ECASA 81mg BID for 4 weeks  24 hours post-operative prophyllactic antibiotics    Lokesh Whitehead MD

## 2024-06-12 NOTE — PHYSICAL THERAPY NOTE
PHYSICAL THERAPY EVALUATION - INPATIENT     Room Number: 352/352-A  Evaluation Date: 6/12/2024  Type of Evaluation: Initial  Physician Order: PT Eval and Treat    Presenting Problem: L hip fracture s/p cephalomedullary nail 6/11/24  Co-Morbidities : CKD, HTN, colon and endometrial cancer  Reason for Therapy: Mobility Dysfunction and Discharge Planning    PHYSICAL THERAPY ASSESSMENT   Patient is currently functioning below baseline with bed mobility, transfers, gait, and standing prolonged periods.  Prior to admission, patient's baseline is supervision.  Patient is requiring maximum assist as a result of the following impairments: decreased functional strength, pain, impaired standing balance, impaired motor planning, decreased muscular endurance, and cognitive deficits (memory impairments and difficulty following complex commands).  Physical Therapy will continue to follow for duration of hospitalization.    Patient will benefit from continued skilled PT Services to promote return to prior level of function and safety with continuous assistance and gradual rehabilitative therapy .    PLAN  PT Treatment Plan: Bed mobility;Endurance;Energy conservation;Patient education;Gait training;Neuromuscular re-educate;Range of motion;Strengthening;Transfer training  Rehab Potential : Fair  Frequency (Obs): 3-5x/week  Number of Visits to Meet Established Goals: 6      CURRENT GOALS    Goal #1 Patient is able to demonstrate supine - sit EOB @ level: moderate assistance     Goal #2 Patient is able to demonstrate transfers Sit to/from Stand at assistance level: moderate assistance     Goal #3 Patient is able to transfer to bedside chair with MOD assist     Goal #4    Goal #5    Goal #6    Goal Comments: Goals established on 6/12/2024      PHYSICAL THERAPY MEDICAL/SOCIAL HISTORY  History related to current admission: Patient is a 89 year old female admitted on 6/10/2024 from home for fall.  Pt diagnosed with L hip fracture s/p  L hip cephalomedullary nail 6/11/24.     Recent admissions:   5/24-6/2/24: dehydration (from home and DC home)  4/23-4/29/24: cellulitis abdominal wall/ sean-ostomy infection (from [facility] and DC home)  4/3-4/8/24: s/p partial colon resection, takedown colostomy/colostomy creation (from home and DC [facility])  3/26-3/27/24: infection colostomy (from home and DC home)    HOME SITUATION  Type of Home: Independent living facility   Home Layout: One level                Lives With: Daughter  Drives: No  Patient Owned Equipment: Rolling walker  Patient Regularly Uses: Glasses    Prior Level of [place]: Pt lives with daughter at [facility]. Pts daughter has Downs Syndrome. Pt ambulates with RW outside of her apartment. Pt has baseline memory/cognitive impairments. Pt is on the waiting list for [facility].     SUBJECTIVE  \"My knee really hurts.\" Pt reports chronic L knee pain.      OBJECTIVE  Precautions: Hard of hearing;Bed/chair alarm  Fall Risk: High fall risk    WEIGHT BEARING RESTRICTION  Weight Bearing Restriction: L lower extremity           L Lower Extremity: Weight Bearing as Tolerated    PAIN ASSESSMENT  Rating: Unable to rate  Location: L hip  Management Techniques: Activity promotion;Repositioning    COGNITION  Memory:  decreased recall of recent events and decreased short term memory  Following Commands:  follows one step commands with increased time and follows one step commands with repetition  Initiation: cues to initiate tasks  Motor Planning: impaired  Safety Judgement:  decreased awareness of need for assistance and decreased awareness of need for safety  Awareness of Errors:  decreased awareness of errors     RANGE OF MOTION AND STRENGTH ASSESSMENT  Upper extremity ROM and strength are within functional limits     Lower extremity ROM is within functional limits     Lower extremity strength is within functional limits except for the following:    Right Knee extension  3+/5  Left Knee extension   2+/5  Right Dorsiflexion  3+/5  Left Dorsiflexion  3+/5      BALANCE  Static Sitting: Fair  Dynamic Sitting: Fair -  Static Standing: Poor -  Dynamic Standing: Poor -    ADDITIONAL TESTS                                    ACTIVITY TOLERANCE                         O2 WALK       NEUROLOGICAL FINDINGS                        AM-PAC '6-Clicks' INPATIENT SHORT FORM - BASIC MOBILITY  How much difficulty does the patient currently have...  Patient Difficulty: Turning over in bed (including adjusting bedclothes, sheets and blankets)?: A Lot   Patient Difficulty: Sitting down on and standing up from a chair with arms (e.g., wheelchair, bedside commode, etc.): A Lot   Patient Difficulty: Moving from lying on back to sitting on the side of the bed?: A Lot   How much help from another person does the patient currently need...   Help from Another: Moving to and from a bed to a chair (including a wheelchair)?: A Lot   Help from Another: Need to walk in hospital room?: Total   Help from Another: Climbing 3-5 steps with a railing?: Total       AM-PAC Score:  Raw Score: 10   Approx Degree of Impairment: 76.75%   Standardized Score (AM-PAC Scale): 32.29   CMS Modifier (G-Code): CL    FUNCTIONAL ABILITY STATUS  Gait Assessment   Functional Mobility/Gait Assessment  Gait Assistance: Not tested    Skilled Therapy Provided   Pt reports significant L hip pain in supine.   Noted offloading L hip in bed.   Minimal initiation for supine-sit.   MAX assist for LE/trunk support for supine-sit.   VC for anterior weightshift at EOB.   Sit-stand x 2 reps with RW and MAX assist for force generation.   VC for posture and LE placement.   Able to achieve hip clearance and upright posture with MAX assist.   Unable to advance feet for ambulation.   Returned to sitting EOB impulsively due to pain.   Pt performed stand pivot transfer to bedside chair with MAX assist for force generation, balance, and guidance to chair.   VC for UE/LE placement and hip  extension.   Assisted with repositioning.     Bed Mobility:  Rolling: MAX  Supine to sit: MAX   Sit to supine: NT     Transfer Mobility:  Sit to stand: MAX   Stand to sit: MAX  Gait = MAX     Therapist's Comments:  Recommend 2 person stand pivot transfer or sit-stand lift use for safe transfer to chair/commode.     Exercise/Education Provided:  Bed mobility  Energy conservation  Functional activity tolerated  Posture  Strengthening  Transfer training    Patient End of Session: Up in chair;Needs met;Call light within reach;RN aware of session/findings;All patient questions and concerns addressed;SCDs in place;Alarm set;Family present      Patient Evaluation Complexity Level:  History High - 3 or more personal factors and/or co-morbidities   Examination of body systems Moderate - addressing a total of 3 or more elements   Clinical Presentation Moderate - Evolving   Clinical Decision Making Moderate - Evolving       PT Session Time: 30 minutes  Gait Training:  minutes  Therapeutic Activity: 15 minutes  Neuromuscular Re-education:  minutes  Therapeutic Exercise:  minutes

## 2024-06-12 NOTE — PROGRESS NOTES
[facility]   part of [facility]     Nephrology Progress Note    Sunita Loza Patient Status:  Inpatient    1932 MRN [id number]   Location [facility] 3SW-A Attending Tree Li MD   Hosp Day # 2 PCP Janell Powell MD       SUBJECTIVE:  S/p OR yest, stable this AM        Physical Exam:   /68 (BP Location: Right arm)   Pulse 96   Temp 99.8 °F (37.7 °C) (Oral)   Resp 12   Ht 5' 1\" (1.549 m)   Wt 81 lb 12.7 oz (37.1 kg)   SpO2 99%   BMI 15.45 kg/m²   Temp (24hrs), Av °F (37.2 °C), Min:98.5 °F (36.9 °C), Max:99.8 °F (37.7 °C)       Intake/Output Summary (Last 24 hours) at 2024 0746  Last data filed at 2024 0648  Gross per 24 hour   Intake 0 ml   Output 885 ml   Net -885 ml     Last 3 Weights   06/10/24 1423 81 lb 12.7 oz (37.1 kg)   06/10/24 0753 81 lb 12.7 oz (37.1 kg)   24 81 lb 14.4 oz (37.1 kg)   24 1542 95 lb (43.1 kg)   24 1017 81 lb (36.7 kg)   24 0158 92 lb 14.4 oz (42.1 kg)   24 95 lb (43.1 kg)   24 0400 87 lb 8.4 oz (39.7 kg)   24 0613 82 lb 1.5 oz (37.2 kg)   24 1510 83 lb (37.6 kg)     General: Alert and oriented in no apparent distress.  HEENT: No scleral icterus, MMM  Neck: Supple, no MARIA FERNANDA or thyromegaly  Cardiac: Regular rate and rhythm, S1, S2 normal, no murmur or rub  Lungs: Clear without wheezes, rales, rhonchi.    Abdomen: Soft, non-tender. + bowel sounds, no palpable organomegaly  Extremities: no sig pitting edema.  Neurologic:  moving all extremities  Skin: Warm and dry, no rash        Labs:     Recent Labs   Lab 06/10/24  0810 24  0433 24  0457   WBC 10.4 10.1 11.9*   HGB 9.8* 9.1* 7.7*   .5* 100.7* 102.7*   .0 266.0 244.0   INR 0.99  --   --        Recent Labs   Lab 06/10/24  0810 06/10/24  1137 06/10/24  1505 24  0433 24  1321 24  0458   * 143  --  142  --  145   K 6.1* 6.1* 5.4* 5.9* 5.6* 5.6*   * 120*  --  121*  --  121*    CO2 16.0* 16.0*  --  16.0*  --  14.0*   BUN 37* 36*  --  37*  --  40*   CREATSERUM 2.25* 2.25*  --  2.33*  --  2.47*   CA 8.7 9.7  --  8.8  --  7.8*   GLU 91 121*  --  104*  --  101*       Recent Labs   Lab 06/10/24  0810   ALT 19   AST 20   ALB 2.7*       Recent Labs   Lab 06/10/24  0944 06/10/24  1104   PGLU 83 180*       Meds:   Current Facility-Administered Medications   Medication Dose Route Frequency    amLODIPine (Norvasc) tab 10 mg  10 mg Oral Daily    sodium chloride 0.9% infusion   Intravenous Continuous    sennosides (Senokot) tab 17.2 mg  17.2 mg Oral Nightly    ondansetron (Zofran) 4 MG/2ML injection 4 mg  4 mg Intravenous Q6H PRN    metoclopramide (Reglan) 5 mg/mL injection 5 mg  5 mg Intravenous Q8H PRN    multivitamin (Tab-A-Juanjo/Beta Carotene) tab 1 tablet  1 tablet Oral Daily    heparin (Porcine) 5000 UNIT/ML injection 5,000 Units  5,000 Units Subcutaneous Q8H DK    [START ON 6/13/2024] vancomycin (Vancocin) 500 mg in sodium chloride 0.9% 150 mL IVPB  15 mg/kg Intravenous Q48H    lidocaine PF (Xylocaine-MPF) 1% injection  2 mL Injection PRN    ropivacaine (Naropin) 0.5% injection  30 mL Injection PRN    sodium chloride 0.9% PF injection 10 mL  10 mL Injection PRN    [Transfer Hold] sodium zirconium cyclosilicate (Lokelma) oral packet 10 g  10 g Oral Q8H    amiodarone (Pacerone) tab 100 mg  100 mg Oral Daily    aspirin DR tab 81 mg  81 mg Oral Daily    dilTIAZem ER (Dilacor XR) 24 hr cap 120 mg  120 mg Oral Daily    morphINE PF 2 MG/ML injection 1 mg  1 mg Intravenous Q2H PRN    Or    morphINE PF 2 MG/ML injection 2 mg  2 mg Intravenous Q2H PRN    ferrous sulfate DR tab 325 mg  325 mg Oral Daily with breakfast    pantoprazole (Protonix) DR tab 40 mg  40 mg Oral QAM AC    vancomycin (Vancocin) cap 125 mg  125 mg Oral BID    Followed by    [START ON 6/16/2024] vancomycin (Vancocin) cap 125 mg  125 mg Oral Daily    Followed by    [START ON 6/24/2024] vancomycin (Vancocin) cap 125 mg  125 mg Oral Q  MWF    HYDROmorphone (Dilaudid) 1 MG/ML injection 0.2 mg  0.2 mg Intravenous Q2H PRN    Or    HYDROmorphone (Dilaudid) 1 MG/ML injection 0.4 mg  0.4 mg Intravenous Q2H PRN    Or    HYDROmorphone (Dilaudid) 1 MG/ML injection 0.8 mg  0.8 mg Intravenous Q2H PRN    mupirocin (Bactroban) 2% nasal ointment 1 Application  1 Application Nasal Q12H    hydrALAzine (Apresoline) 20 mg/mL injection 5 mg  5 mg Intravenous Q4H PRN         Impression/Plan:      #1.  ZOHREH/CKD IV- has had longstanding CKD in setting of remote nephrectomy.  B/l creat of late close to 2.0 mg/dl or so.  Will cont IVF today- incr rate     #2.  Hyperkalemia- in setting of CKD and ongoing potassium supplement.  Exacerbated by acidosis.  Cont to follow     #3.  L hip fx- s/p OR.  per ortho     #4.  HTN- BP stable.  Cont usual meds and follow    #5.  Acidosis- in setting of ZOHREH.  Bicarb gtt today    Questions/concerns were discussed with patient and/or family by bedside.        Talha Hernandez MD  6/12/2024  8:34 AM

## 2024-06-12 NOTE — PLAN OF CARE
Patient alert x2-3. VSS, 2L O2 via NC when sleeping, RA at baseline. Mild pain reported with movement, no pain at rest. Reports normal sensation to BLE. Swelling to all extremities. Tolerating PO intake, no reports of N/V. Voiding small amounts via purewick, straight cath this AM, DTV 1300. IVF infusing per orders. Aquacel x2 and telga/tegaderm dressings to left hip, CDI.       Plan: PT/OT to see, IVF.

## 2024-06-12 NOTE — PLAN OF CARE
Discussed with orthopedics - OK to restart eliquis.  We will follow peripherally from  cardiology perspective. Please follow up with EP two weeks from discharge. Please contact us with further cardiology concerns.

## 2024-06-12 NOTE — CM/SW NOTE
Noted PT/OT worked with patient and Anticipated therapy need: Gradual Rehabilitative Therapy  Pt lives at [facility] and a referral was sent to [facility].  [facility] review requested.  Inpt since 6/10/24    / to remain available for support and/or discharge planning.     Radha Rodriguez MBA MSN, RN CTL/  [id number]

## 2024-06-12 NOTE — DIETARY MALNUTRITION NOTE
[facility]   part of [facility]    NUTRITION ASSESSMENT    Pt meets moderate malnutrition criteria at this time.    CRITERIA FOR MALNUTRITION DIAGNOSIS:  Criteria for non-severe malnutrition diagnosis: chronic illness related to energy intake less than75% for greater than 1 month and moderate fat/muscle wasting, and low BMI:15.45.      NUTRITION INTERVENTION:    RD nutrition Care Plan- See RD nutrition assessment for additional recommendations  Meal and Snacks - Monitor and encourage adequate PO intake. Renal diet. Encouraged smaller more frequent meals, if easier and to consume ONS in between meals.   Medical Food Supplements -van Nepro Daily; at breakfast. Rationale/use for oral supplements discussed.      PATIENT STATUS:     6/12/24- 89 y/o female admitted s/p fall resulting in L intertrochanteric femur fx. S/p IM nailing on 6/11. Pt seen d/t MST score of 2 and low BMI:15.45. Pt's family present at bedside and helped provide wt/diet hx. Pt reported fair appetite. Consumed ~50% of omelette, 50% of toast, and fruit cup for breakfast this AM. Consumed 90% of flatbread pizza, cookie, pudding, and cola for lunch today, which pt's family commented that was a fairly good amount of food for the pt. Pt stated she typically is not a \"big eater.\" Agreeable to ONS daily to help maximize nutrition intakes to aid w/ healing after surgery. RN noted output from colostomy is just gas today. Hyperkalemia resolved. Current K+:5.0. K+:6.1 on admit. S/p Lokelma on 6/10. Receiving bicarb gtt. Per Renal note, pt's admit med list included K+ supplement BID. K+ supplement discontinued.   PMH:recent C-diff, higher output from colostomy, colon CA, RCC s/p remote nephrectomy, endometrial CA, Bishop Syndrome, HTN, CKD4.         ANTHROPOMETRICS:  Ht: 154.9 cm (5' 1\")  Wt: 37.1 kg (81 lb 12.7 oz).   BMI: Body mass index is 15.45 kg/m².  IBW: 47.7 kg      WEIGHT HISTORY:     *Pt's family stated wt has been fairly stable in the low 80  lbs.    *Wts appear to fluctuate around 81 lb-92 lb over the past year.    Wt Readings from Last 11 Encounters:   06/10/24 37.1 kg (81 lb 12.7 oz)   05/24/24 37.1 kg (81 lb 14.4 oz)   05/23/24 36.7 kg (81 lb)   04/23/24 42.1 kg (92 lb 14.4 oz)   04/04/24 39.7 kg (87 lb 8.4 oz)   03/26/24 37.7 kg (83 lb 1.6 oz)   03/26/24 39 kg (86 lb)   02/27/24 40.5 kg (89 lb 4 oz)   02/13/24 44.5 kg (98 lb)   02/02/24 41.9 kg (92 lb 6 oz)   07/05/23 37.8 kg (83 lb 4 oz)       NUTRITION:  Diet:       Procedures    Regular/General diet Is Patient on Accuchecks? Yes; Misc Restriction: Renal      Food Allergies: No  Cultural/Ethnic/Denominational Preferences Addressed: Yes    Percent Meals Eaten (last 3 days)       Date/Time Percent Meals Eaten (%)    06/11/24 0743 0 %     Percent Meals Eaten (%): NPO at 06/11/24 0743    06/11/24 1140 0 %     Percent Meals Eaten (%): NPO at 06/11/24 1140    06/11/24 1634 0 %     Percent Meals Eaten (%): NPO at 06/11/24 1634          GI system review:  colostomy, +flatus    Skin and wounds: no pressure ulcers. L surgical hip incision.    NUTRITION RELATED PHYSICAL FINDINGS:     1. Body Fat/Muscle Mass: moderate depletion body fat Triceps and moderate muscle depletion Temple region and Clavicle region     2. Fluid Accumulation:  non-pitting edema of B/L LE       NUTRITION PRESCRIPTION: 37.1 kg-81 lb (6/10)  Calories: 0106-9418 calories/day (30-35 kcal/kg)  Protein: 37-45 grams protein/day ( 1.0-1.2  grams protein per kg)  Fluid: ~1 ml/kcal or per MD discretion    NUTRITION DIAGNOSIS/PROBLEM:  Malnutrition related to  chronic illness  as evidenced by  energy intake less than75% for greater than 1 month and moderate fat/muscle wasting, and Low BMI:15.45.    Increased nutrient needs related to surgical wound healing as evidenced by L femur fx, s/p IM nailing on 6/11.    MONITOR AND EVALUATE/NUTRITION GOALS:  PO intake of 75% of meals TID - New  PO intake of 75% of oral nutrition supplement/s - New  Weight  stable within 1 to 2 lbs during admission - New      MEDICATIONS:  Bicarb gtt, s/p Lokelma on 6/10, Iron, MVI, abx    LABS:  Glu:101, K+:5.0 (K+:6.1 on 6/10), BUN:40, Cr:2.47, GFR:18    Pt is at Moderate nutrition risk    Ju Vazquez MS, RD, LDN  Clinical Dietitian  Ext:[phone]

## 2024-06-12 NOTE — ADDENDUM NOTE
Addendum  created 06/12/24 0819 by Clarissa Llamas MD    Clinical Note Signed, SmartForm saved

## 2024-06-12 NOTE — BRIEF OP NOTE
Pre-Operative Diagnosis: LEFT HIP FRACTURE     Post-Operative Diagnosis: LEFT HIP FRACTURE      Procedure Performed:   LEFT HIP CEPHALOMEDULLARY NAIL    Surgeons and Role:     * Lokesh Whitehead MD - Primary    Assistant(s):  PA: Clive Brooks PA-C     Surgical Findings: See detailed operative     Specimen: None     Estimated Blood Loss: 100 mL       Lokesh Whitehead MD  6/11/2024  7:10 PM

## 2024-06-12 NOTE — OCCUPATIONAL THERAPY NOTE
OCCUPATIONAL THERAPY EVALUATION - INPATIENT     Room Number: 352/352-A  Evaluation Date: 6/12/2024  Type of Evaluation: Initial  Presenting Problem: L hip fx s/p IMN of L hip on 6/11    Physician Order: IP Consult to Occupational Therapy  Reason for Therapy: ADL/IADL Dysfunction and Discharge Planning    OCCUPATIONAL THERAPY ASSESSMENT   Patient is currently functioning below baseline with toileting, bathing, upper body dressing, lower body dressing, bed mobility, and transfers. Prior to admission, patient's baseline is mod I with the use of a RW as needed.  Patient is requiring moderate assist and maximum assist as a result of the following impairments: decreased functional strength, decreased functional reach, decreased endurance, pain, and impaired standing balance. Occupational Therapy will continue to follow for duration of hospitalization.    Patient will benefit from continued skilled OT Services to promote return to prior level of function and safety with continuous assistance and gradual rehabilitative therapy       History Related to Current Admission: Patient is a 89 year old female admitted on 6/10/2024 with Presenting Problem: L hip fx s/p IMN of L hip on 6/11.     Co-Morbidities : CKD, HTN, colon and endometrial cancer, s/p partial colon resection, takedown colostomy/colostomy creation 4/3/24    Recommendations for nursing staff:   Transfers: sit>stand lift versus squat pivot 2 person A   Toileting location: bed     EVALUATION SESSION  Patient Start of Session: Pt was found in her bed with her son at the bedside.   FUNCTIONAL TRANSFER ASSESSMENT  Sit to Stand: Edge of Bed  Edge of Bed: Dependent (squat pivot T/F)    BED MOBILITY  Supine to Sit : Dependent (max Ax2)    BALANCE ASSESSMENT     FUNCTIONAL ADL ASSESSMENT       ACTIVITY TOLERANCE:                          O2 SATURATIONS       Cognition  Pt is oriented to self and place only   Follows simple one steps commands     Upper extremity  L shoulder  limited     EDUCATION PROVIDED  Patient: Role of Occupational Therapy; Discharge Recommendations; Plan of Care; Fall Prevention; Functional Transfer Techniques; Weight Bear Status; Surgical Precautions  Patient's Response to Education: Requires Further Education  Family/Caregiver: Role of Occupational Therapy; Plan of Care; Discharge Recommendations  Family/Caregiver's Response to Education: Verbalized Understanding      Equipment used: RW, gait belt   Demonstrates functional use, Would benefit from additional trial     Therapist comments: supine>sit EOB with 2 person A with draw sheet A>attempted standing to RW with 2 person A>pt unable to tolerate standing upright and stepping>squat pivot with total A>bed>chair T/F     Patient End of Session: Up in chair;Needs met;Call light within reach;RN aware of session/findings;All patient questions and concerns addressed;SCDs in place;Alarm set;Ice applied    OCCUPATIONAL PROFILE    HOME SITUATION  Type of Home: Independent living facility ([place] [facility])  Home Layout: One level  Lives With: Daughter;Staff 24 hours (daughter has down sydrome)    Toilet and Equipment: Standard height toilet;Comfort height toilet  Shower/Tub and Equipment: Walk-in shower;Shower chair  Other Equipment: Other (Comment) (RW)    Occupation/Status: Retired  Hand Dominance: Right  Drives: No  Patient Regularly Uses: Glasses    Prior Level of Function: Pt lives with daughter at [facility]. Pts daughter has Down syndrome and patient provides supervision for daughter. Pt ambulates with RW occasionally when outside her apartment. Pt independent with ADL and mobility. Pts family notes patient has been more forgetful and have been talking about transition to [facility] and is on waitlist.     SUBJECTIVE   \"I can't stand, I'm sorry!\"     PAIN ASSESSMENT  Rating: Unable to rate  Location: L hip/knee  Management Techniques: Ice;Repositioning;Relaxation    OBJECTIVE  Precautions: Colostomy;Hard of hearing;Bed/chair  alarm  Fall Risk: High fall risk    WEIGHT BEARING RESTRICTION  Weight Bearing Restriction: L lower extremity           L Lower Extremity: Weight Bearing as Tolerated    ASSESSMENTS    AM-PAC ‘6-Clicks’ Inpatient Daily Activity Short Form  -   Putting on and taking off regular lower body clothing?: A Lot  -   Bathing (including washing, rinsing, drying)?: A Lot  -   Toileting, which includes using toilet, bedpan or urinal? : A Lot  -   Putting on and taking off regular upper body clothing?: A Lot  -   Taking care of personal grooming such as brushing teeth?: A Little  -   Eating meals?: A Little    AM-PAC Score:  Score: 14  Approx Degree of Impairment: 59.67%  Standardized Score (AM-PAC Scale): 33.39    PLAN  OT Treatment Plan: Balance activities;Energy conservation/work simplification techniques;ADL training;Functional transfer training;Endurance training;Patient/Family training;Patient/Family education;Equipment eval/education;Compensatory technique education;Continued evaluation  Rehab Potential : Fair  Frequency: 5x/week  Number of Visits to Meet Established Goals: 5    ADL Goals  Patient will perform toileting with mod A and AE PRN  Patient will perform LB dressing with mod A and AE PRN    Functional Transfer Goals  Patient will perform bed mobility supine to sit with mod A  Patient will perform bed mobility sit to supine with mod A  Patient will perform toilet transfer with mod A        Patient Evaluation Complexity Level:   Occupational Profile/Medical History LOW - Brief history including review of medical or therapy records    Specific performance deficits impacting engagement in ADL/IADL LOW  1 - 3 performance deficits    Client Assessment/Performance Deficits LOW - No comorbidities nor modifications of tasks    Clinical Decision Making LOW - Analysis of occupational profile, problem-focused assessments, limited treatment options    Overall Complexity LOW     OT Session Time: 30 minutes  Therapeutic  Activity: 15 minutes

## 2024-06-12 NOTE — PLAN OF CARE
Received pt from PACU drowsy but arousable, pts son at bedside. Oriented to self only. VSS on 2L O2 nc. . Pain well controlled with scheduled medication. WBAT LLE with use of protective walker at all times. PT/OT to see. IV vanco post op. Voids freely via purewick, incontinent at baseline. Ostomy site C/D/I. Bilateral SCDs. Aquacel dressing x2 C/D/I. Ice packs applied to R hip. Reviewed POC, pain management, and fall precautions with pt and son. Bed alarm on w/bed in lowest position. Pt reminded to use call light. Will continue to monitor.

## 2024-06-12 NOTE — PROGRESS NOTES
[facility] Orthopedics  Progress Note    Sunita Loza Patient Status:  Inpatient    1932 MRN [id number]   Location [facility] 3SW-A Attending Tree Li MD   Hosp Day # 2 PCP Janell Powell MD       Subjective:  POD #1 from left hip cephalomedullary nail with Dr. Whitehead on 24.    No overnight events.  The patient's pain has been well-controlled.  Patient was seen resting in bed.  Son is at bedside. Denies fever, chills, chest pain, shortness of breath, calf pain.  Patient does have complaints of new onset tremors.  Was able to work with PT however was not able to put much weight on operative extremity due to pain.        Objective:  Vital signs in last 24 hours:  Patient Vitals for the past 24 hrs:   BP Temp Temp src Pulse Resp SpO2   24 1300 112/89 98 °F (36.7 °C) Oral 104 14 94 %   24 1241 -- -- -- -- -- 94 %   24 0813 144/59 97.3 °F (36.3 °C) Oral 92 17 97 %   24 0300 157/68 99.8 °F (37.7 °C) Oral 96 12 99 %   24 2309 144/60 98.5 °F (36.9 °C) Oral 90 12 98 %   24 146/49 98.8 °F (37.1 °C) Oral 85 14 98 %   24 150/55 99.2 °F (37.3 °C) Temporal 91 12 99 %   24 152/61 -- -- 94 11 99 %   24 -- -- -- 105 15 98 %   24 -- -- -- 115 15 93 %   24 151/79 -- -- 109 18 95 %   24 147/70 -- -- 110 12 92 %   24 (!) 125/97 -- -- 105 11 94 %   24 145/63 99.2 °F (37.3 °C) Temporal 109 25 94 %         General: A&Ox3, NAD  Neuro: No focal deficits.  Sensation intact light touch throughout the lower extremity.  EHL/DF/PF intact.  + tremor  MSK:   Examination of the left lower extremity demonstrates leg lengths are equal.  Aquacel dressings present with less than 25% saturation.  No surrounding erythema.  Minimal postoperative swelling.  Thigh compartment is soft and compressible.  Calf compartment soft compressible without tenderness.  Examination neurovascular intact  with 2+ PT and DP pulses.          Data Review  CBC:   Recent Labs   Lab 06/10/24  0810 06/11/24  0433 06/12/24  0457   RBC 3.23* 3.04* 2.57*   HGB 9.8* 9.1* 7.7*   HCT 32.8* 30.6* 26.4*   .5* 100.7* 102.7*   MCH 30.3 29.9 30.0   MCHC 29.9* 29.7* 29.2*   RDW 20.5 20.5 19.9   NEPRELIM 8.44*  --   --    WBC 10.4 10.1 11.9*   .0 266.0 244.0       Recent Labs   Lab 06/10/24  0810 06/10/24  1137 06/10/24  1505 06/11/24  0433 06/11/24  1321 06/12/24  0458 06/12/24  1402   GLU 91 121*  --  104*  --  101*  --    BUN 37* 36*  --  37*  --  40*  --    CREATSERUM 2.25* 2.25*  --  2.33*  --  2.47*  --    EGFRCR 20* 20*  --  19*  --  18*  --    CA 8.7 9.7  --  8.8  --  7.8*  --    ALB 2.7*  --   --   --   --   --   --    * 143  --  142  --  145  --    K 6.1* 6.1*   < > 5.9* 5.6* 5.6* 5.0   * 120*  --  121*  --  121*  --    CO2 16.0* 16.0*  --  16.0*  --  14.0*  --    ALKPHO 100  --   --   --   --   --   --    AST 20  --   --   --   --   --   --    ALT 19  --   --   --   --   --   --    BILT 0.3  --   --   --   --   --   --    TP 6.3*  --   --   --   --   --   --     < > = values in this interval not displayed.         Assessment/Plan:    POD #1 cephalomedullary nail.  Anemia with hemoglobin 7.7.  Patient does have CKD and has underlying anemia at baseline.  Worsening anemia likely due to acute blood loss from fracture/surgery.    1) maintain Aquacel dressings. may change if dressings become over 50% saturated.  2) PT -weightbearing as tolerated left lower extremity.  3) continue pain control  4) remaining medical care per hospitalist service.  5) DVT prophylaxis with Eliquis.  6) antibiotics complete.  7) Discharge plan: Likely discharge to [facility].  8) okay for discharge from Ortho perspective once cleared by PT and other services. we will continue to follow while inpatient.  9) follow-up as outpatient in 2 weeks for suture removal and repeat x-rays.        Clive Brooks PA-C  [facility] and  [facility]  Orthopedic Surgery  6/12/2024  5:46 PM

## 2024-06-12 NOTE — PROGRESS NOTES
Cardiology Progress Note        Sunita Loza Patient Status:  Inpatient    1932 MRN [id number]   [facility] 3SW-A Attending Tree Li MD   Hosp Day # 2 PCP Janell Powell MD     Subjective:  No cardiac issues, surgery last night.  Remains a bit confused, but clinically stable.    Medications:   amLODIPine  10 mg Oral Daily    sennosides  17.2 mg Oral Nightly    multivitamin  1 tablet Oral Daily    heparin  5,000 Units Subcutaneous Q8H DK    [START ON 2024] vancomycin  15 mg/kg Intravenous Q48H    [Transfer Hold] sodium zirconium cyclosilicate  10 g Oral Q8H    amiodarone  100 mg Oral Daily    aspirin  81 mg Oral Daily    dilTIAZem ER  120 mg Oral Daily    ferrous sulfate  325 mg Oral Daily with breakfast    pantoprazole  40 mg Oral QAM AC    vancomycin  125 mg Oral BID    Followed by    [START ON 2024] vancomycin  125 mg Oral Daily    Followed by    [START ON 2024] vancomycin  125 mg Oral Q MWF    mupirocin  1 Application Nasal Q12H       Continuous Infusions:   sodium chloride           Allergies:  Allergies   Allergen Reactions    Ciprofloxacin RASH and HIVES    Penicillins RASH         Intake/Output:    Intake/Output Summary (Last 24 hours) at 2024 0726  Last data filed at 2024 0648  Gross per 24 hour   Intake 0 ml   Output 1035 ml   Net -1035 ml           Last 3 Weights   06/10/24 1423 81 lb 12.7 oz (37.1 kg)   06/10/24 0753 81 lb 12.7 oz (37.1 kg)   24 2036 81 lb 14.4 oz (37.1 kg)   24 1542 95 lb (43.1 kg)   24 1017 81 lb (36.7 kg)            Physical Exam:    Temp:  [98.5 °F (36.9 °C)-99.8 °F (37.7 °C)] 99.8 °F (37.7 °C)  Pulse:  [] 96  Resp:  [11-25] 12  BP: (125-157)/(48-97) 157/68  SpO2:  [92 %-99 %] 99 %    General: No apparent distress  HEENT: No focal deficits.  Neck: supple. JVP normal  Cardiac: Regular rhythm, S1, S2 normal,  rub or gallop.  No murmur.  Lungs: CTA  Abdomen: Soft, non-tender.   Extremities: No  edema  Neurologic: no focal deficits  Skin: Warm and dry.     Telemetry: sinus    EKG:      Echo:      Cardiac Cath:      Labs:  HEM:  Recent Labs   Lab 06/10/24  0810 06/11/24  0433 06/12/24  0457   WBC 10.4 10.1 11.9*   HGB 9.8* 9.1* 7.7*   .0 266.0 244.0       Chem:  Recent Labs   Lab 06/10/24  0810 06/10/24  1137 06/10/24  1505 06/11/24  0433 06/11/24  1321 06/12/24  0458   * 143  --  142  --  145   K 6.1* 6.1* 5.4* 5.9* 5.6* 5.6*   * 120*  --  121*  --  121*   CO2 16.0* 16.0*  --  16.0*  --  14.0*   BUN 37* 36*  --  37*  --  40*   CREATSERUM 2.25* 2.25*  --  2.33*  --  2.47*   CA 8.7 9.7  --  8.8  --  7.8*   GLU 91 121*  --  104*  --  101*       Recent Labs   Lab 06/10/24  0810   ALT 19   AST 20   ALB 2.7*       No results for input(s): \"TROP\", \"CK\" in the last 168 hours.    Recent Labs   Lab 06/10/24  0810   PTP 13.1   INR 0.99                 Impression:  Mechanical fall with left hip fracture.  Nailing on 6/11.  PAF - on eliquis.  Sinus rhythm at present on amiodarone.  CRI with hyperkalemia, chronic edema.  Taking oral potassium per home med list.  Nephro following, 5.6 today.  HTN -reasonable.          Plan:  CV status is stable.  Resume eliquis on dc if ok with ortho.  2.5 mg BID.        Quan Palacios MD    L2

## 2024-06-12 NOTE — ANESTHESIA POSTPROCEDURE EVALUATION
[facility]    Sunita Loza Patient Status:  Inpatient   Age/Gender 89 year old female MRN [id number]   Location [facility] POST ANESTHESIA CARE UNIT Attending Tree Li MD   Hosp Day # 1 PCP Janell Powell MD       Anesthesia Post-op Note    LEFT HIP CEPHALOMEDULLARY NAIL    Procedure Summary       Date: 06/11/24 Room / Location:  MAIN OR 14 / [facility] MAIN OR    Anesthesia Start: 1808 Anesthesia Stop: 1945    Procedure: LEFT HIP CEPHALOMEDULLARY NAIL (Left: Hip) Diagnosis: (LEFT HIP FRACTURE)    Surgeons: Lokesh Whitehead MD Anesthesiologist: lCarissa Llamas MD    Anesthesia Type: general ASA Status: 3            Anesthesia Type: general    Vitals Value Taken Time   /97 06/11/24 1941   Temp 99.2 °F (37.3 °C) 06/11/24 1936   Pulse 110 06/11/24 1944   Resp 14 06/11/24 1944   SpO2 94 % 06/11/24 1945   Vitals shown include unfiled device data.    Patient Location: Endoscopy    Anesthesia Type: MAC    Airway Patency: patent    Postop Pain Control: adequate    Mental Status: sedated until time of extubation    Nausea/Vomiting: none    Cardiopulmonary/Hydration status: stable euvolemic    Complications: no apparent anesthesia related complications    Postop vital signs: stable    Dental Exam: Unchanged from Preop    Patient to be discharged home when criteria met.

## 2024-06-12 NOTE — PROGRESS NOTES
CC: follow-up hospital admission hip fx    SUBJECTIVE:  Interval History:     No acute issues overnight  Pain controlled  Started to eat, no n/v  No abd pain , stoma wo mirna rosenthal  Son at bs    OBJECTIVE:  Scheduled Meds:    [START ON 6/13/2024] vancomycin  15 mg/kg Intravenous Q48H    amLODIPine  10 mg Oral Daily    sennosides  17.2 mg Oral Nightly    multivitamin  1 tablet Oral Daily    heparin  5,000 Units Subcutaneous Q8H DK    [Transfer Hold] sodium zirconium cyclosilicate  10 g Oral Q8H    amiodarone  100 mg Oral Daily    aspirin  81 mg Oral Daily    dilTIAZem ER  120 mg Oral Daily    ferrous sulfate  325 mg Oral Daily with breakfast    pantoprazole  40 mg Oral QAM AC    vancomycin  125 mg Oral BID    Followed by    [START ON 6/16/2024] vancomycin  125 mg Oral Daily    Followed by    [START ON 6/24/2024] vancomycin  125 mg Oral Q MWF    mupirocin  1 Application Nasal Q12H     Continuous Infusions:    sodium bicarbonate in D5W infusion 150 mEq (06/12/24 0906)     PRN Meds:   ondansetron    metoclopramide    lidocaine PF    ropivacaine    sodium chloride 0.9% PF    morphINE **OR** morphINE    HYDROmorphone **OR** HYDROmorphone **OR** HYDROmorphone    hydrALAzine    PHYSICAL EXAM  Vital signs: Temp:  [97.3 °F (36.3 °C)-99.8 °F (37.7 °C)] 97.3 °F (36.3 °C)  Pulse:  [] 92  Resp:  [11-25] 17  BP: (125-157)/(49-97) 144/59  SpO2:  [92 %-99 %] 97 %      GENERAL - NAD, AAO  EYES- sclera anicteric   HENT- normocephalic, OP - dry  NECK - supple  CV- appears regular   RESP - CTAB, normal resp effort  ABDOMEN- soft, NT/ND, stoma  EXT- no LE edema   Pain with palp over L forearm but able to move arm more than before       Data Review:   Labs:   Recent Labs   Lab 06/10/24  0810 06/11/24  0433 06/12/24  0457   WBC 10.4 10.1 11.9*   HGB 9.8* 9.1* 7.7*   .5* 100.7* 102.7*   .0 266.0 244.0   INR 0.99  --   --        Recent Labs   Lab 06/10/24  0810 06/10/24  1137 06/10/24  1500 06/11/24  8774  06/11/24  1321 06/12/24  0458   * 143  --  142  --  145   K 6.1* 6.1* 5.4* 5.9* 5.6* 5.6*   * 120*  --  121*  --  121*   CO2 16.0* 16.0*  --  16.0*  --  14.0*   BUN 37* 36*  --  37*  --  40*   CREATSERUM 2.25* 2.25*  --  2.33*  --  2.47*   CA 8.7 9.7  --  8.8  --  7.8*   GLU 91 121*  --  104*  --  101*       Recent Labs   Lab 06/10/24  0810   ALT 19   AST 20   ALB 2.7*       Recent Labs   Lab 06/10/24  0944 06/10/24  1104   PGLU 83 180*           ASSESSMENT/PLAN:    Patient is a 89 year old female with PMH sig for CKD, colon cancer, RCC sp nephrectomy, HTN, PAF, recent admission for cdiff here for fall and hip fx     Impression     -L acute, displaced IT left femoral neck fracture      -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 Mechanical vs syncope  -sp OR and chephalomedullary nail 06/11  -pain control  -IS  PT OT      -she also has left arm / knee pain - will get xrays too - neg for fx     *zohreh  *hyper K - likely from supplement  -dw renal,will see  -fluids / K management per renal     *Cdfiff  -cont vancomycin  -has generalized shaking will check blood cx but otherwise no other infectious symptoms - ngtd  -thao RN , will monitor stool outpt. Pt still seems to requiring increasing rate of IVFs.     *anemia  -chronic but worse today, likely fluids / abla from surgery  Daily cbc     Chronic conditions  Home meds as able    Dvt ppx  Scds  Resume eliuqis when ok with ortho    Will continue to follow while hospitalized. Please page me or the on-call hospitalist with questions or concerns.    Praful Neves  Hospitalist  [phone]  Answering Service: [phone]

## 2024-06-13 LAB
ALBUMIN SERPL-MCNC: 1.7 G/DL (ref 3.4–5)
ALBUMIN/GLOB SERPL: 0.5 {RATIO} (ref 1–2)
ALP LIVER SERPL-CCNC: 76 U/L
ALT SERPL-CCNC: 10 U/L
ANION GAP SERPL CALC-SCNC: 8 MMOL/L (ref 0–18)
AST SERPL-CCNC: 18 U/L (ref 15–37)
BILIRUB SERPL-MCNC: 0.3 MG/DL (ref 0.1–2)
BUN BLD-MCNC: 38 MG/DL (ref 9–23)
CALCIUM BLD-MCNC: 6.2 MG/DL (ref 8.5–10.1)
CHLORIDE SERPL-SCNC: 109 MMOL/L (ref 98–112)
CO2 SERPL-SCNC: 24 MMOL/L (ref 21–32)
CREAT BLD-MCNC: 2.19 MG/DL
EGFRCR SERPLBLD CKD-EPI 2021: 21 ML/MIN/1.73M2 (ref 60–?)
ERYTHROCYTE [DISTWIDTH] IN BLOOD BY AUTOMATED COUNT: 18.7 %
GLOBULIN PLAS-MCNC: 3.2 G/DL (ref 2.8–4.4)
GLUCOSE BLD-MCNC: 124 MG/DL (ref 70–99)
HCT VFR BLD AUTO: 23.2 %
HGB BLD-MCNC: 7.2 G/DL
HGB BLD-MCNC: 8.5 G/DL
MCH RBC QN AUTO: 30 PG (ref 26–34)
MCHC RBC AUTO-ENTMCNC: 31 G/DL (ref 31–37)
MCV RBC AUTO: 96.7 FL
OSMOLALITY SERPL CALC.SUM OF ELEC: 302 MOSM/KG (ref 275–295)
PLATELET # BLD AUTO: 215 10(3)UL (ref 150–450)
POTASSIUM SERPL-SCNC: 3.9 MMOL/L (ref 3.5–5.1)
PROT SERPL-MCNC: 4.9 G/DL (ref 6.4–8.2)
RBC # BLD AUTO: 2.4 X10(6)UL
SODIUM SERPL-SCNC: 141 MMOL/L (ref 136–145)
WBC # BLD AUTO: 9.9 X10(3) UL (ref 4–11)

## 2024-06-13 PROCEDURE — 99233 SBSQ HOSP IP/OBS HIGH 50: CPT | Performed by: INTERNAL MEDICINE

## 2024-06-13 RX ORDER — ACETAMINOPHEN 325 MG/1
650 TABLET ORAL EVERY 6 HOURS PRN
Status: DISCONTINUED | OUTPATIENT
Start: 2024-06-13 | End: 2024-06-15

## 2024-06-13 RX ORDER — HYDROCODONE BITARTRATE AND ACETAMINOPHEN 5; 325 MG/1; MG/1
1 TABLET ORAL EVERY 6 HOURS PRN
Status: DISCONTINUED | OUTPATIENT
Start: 2024-06-13 | End: 2024-06-15

## 2024-06-13 NOTE — CONGREGATE LIVING REVIEW
[facility] Authorization    The [facility] Review Committee has reviewed this case and the patient IS APPROVED for discharge to a facility for Short Term Skilled once the following procedure is followed:     - The physician discharge instructions (contained within the SHANTE note for SNF) must inlcude the below appropriate and approved COVID instructions to the facility    For questions regarding CLRC approval process, please contact the CM assigned to the case.  For questions regarding RN discharge workflow, please contact the unit Clinical Leader.

## 2024-06-13 NOTE — CDS QUERY
DOCUMENTATION CLARIFICATION FORM    Dear Dr. Clarke  Please provide a nutritional diagnosis, if known, related to the clinical information below:   [   x] Moderate Malnutrition  [   ] Other (please specify) :_____________________________      Documentation from the Medical Record:       6/12 dietary note:     Pt meets moderate malnutrition criteria at this time.    CRITERIA FOR MALNUTRITION DIAGNOSIS:    Criteria for non-severe malnutrition diagnosis: chronic illness related to  energy intake less than75% for greater than 1 month and moderate fat/muscle wasting, and low BMI:15.45.    Medical Food Supplements -   Nepro Daily; at breakfast. Rationale/use for oral supplements discussed.           Use of terms such as suspected, possible, or probable (associated with a specific diagnosis that is being evaluated, monitored, or treated as if it exists) are acceptable and can be coded in the inpatient setting, when documented at the time of discharge.     Please add any additional documentation to your progress note and continue to document this through discharge.    Thank you. For questions regarding this query, please contact Clinical : Abida Webb RN, CDS at [phone]  This form is a permanent part of the medical record.

## 2024-06-13 NOTE — PLAN OF CARE
Patient alert and oriented x2. VSS on RA. Tele in NSR-ST. Isolation precautions in place. Pain controlled with PO PRN meds. Denies n/t. LLQ ostomy in place. X2 Aquacel dressings to left hip intact with moderate drainage. SCDs in place, ankle pumps encouraged, IS encouraged. Pt able to sit at edge of med, up x2 max assist with the sit to stand. Tolerating diet, no c/o n/v.   Bladder scan at 1018, 457mls. Straight cath at 1100, 600mls. DTV by 1700.     Plan: per hospitalist monitor hgb, pain control, PT/OT

## 2024-06-13 NOTE — PROGRESS NOTES
[facility]   part of [facility]     Nephrology Progress Note    Sunita Loza Patient Status:  Inpatient    1932 MRN [id number]   Location [facility] 3SW-A Attending Tree Li MD   Hosp Day # 3 PCP Janell Powell MD       SUBJECTIVE:  Stable this AM, a bit confused at times.  Family at bedside        Physical Exam:   /66 (BP Location: Right arm)   Pulse 103   Temp 97.5 °F (36.4 °C) (Oral)   Resp 18   Ht 5' 1\" (1.549 m)   Wt 81 lb 12.7 oz (37.1 kg)   SpO2 94%   BMI 15.45 kg/m²   Temp (24hrs), Av.8 °F (36.6 °C), Min:97.3 °F (36.3 °C), Max:98.2 °F (36.8 °C)       Intake/Output Summary (Last 24 hours) at 2024 1012  Last data filed at 2024 0920  Gross per 24 hour   Intake 2052 ml   Output 800 ml   Net 1252 ml     Last 3 Weights   06/10/24 1423 81 lb 12.7 oz (37.1 kg)   06/10/24 0753 81 lb 12.7 oz (37.1 kg)   24 81 lb 14.4 oz (37.1 kg)   24 1542 95 lb (43.1 kg)   24 1017 81 lb (36.7 kg)   24 0158 92 lb 14.4 oz (42.1 kg)   24 95 lb (43.1 kg)   24 0400 87 lb 8.4 oz (39.7 kg)   24 0613 82 lb 1.5 oz (37.2 kg)   24 1510 83 lb (37.6 kg)     General: Alert and in no apparent distress.  HEENT: No scleral icterus, MMM  Neck: Supple, no MARIA FERNANDA or thyromegaly  Cardiac: Regular rate and rhythm, S1, S2 normal, no murmur or rub  Lungs: Clear without wheezes, rales, rhonchi.    Abdomen: Soft, non-tender. + bowel sounds, no palpable organomegaly  Extremities: no sig pitting edema.  Neurologic:  moving all extremities  Skin: Warm and dry, no rash        Labs:     Recent Labs   Lab 06/10/24  0810 24  0433 24  0457 24  0520   WBC 10.4 10.1 11.9* 9.9   HGB 9.8* 9.1* 7.7* 7.2*   .5* 100.7* 102.7* 96.7   .0 266.0 244.0 215.0   INR 0.99  --   --   --        Recent Labs   Lab 06/10/24  0810 06/10/24  1137 06/10/24  1505 24  0433 24  1321 24  0458 24  1402 24  0520   NA  146* 143  --  142  --  145  --  141   K 6.1* 6.1*   < > 5.9* 5.6* 5.6* 5.0 3.9   * 120*  --  121*  --  121*  --  109   CO2 16.0* 16.0*  --  16.0*  --  14.0*  --  24.0   BUN 37* 36*  --  37*  --  40*  --  38*   CREATSERUM 2.25* 2.25*  --  2.33*  --  2.47*  --  2.19*   CA 8.7 9.7  --  8.8  --  7.8*  --  6.2*   GLU 91 121*  --  104*  --  101*  --  124*    < > = values in this interval not displayed.       Recent Labs   Lab 06/10/24  0810 06/13/24  0520   ALT 19 10*   AST 20 18   ALB 2.7* 1.7*       Recent Labs   Lab 06/10/24  0944 06/10/24  1104   PGLU 83 180*       Meds:   Current Facility-Administered Medications   Medication Dose Route Frequency    acetaminophen (Tylenol) tab 650 mg  650 mg Oral Q6H PRN    vancomycin (Vancocin) 500 mg in sodium chloride 0.9% 150 mL IVPB  15 mg/kg Intravenous Q48H    apixaban (Eliquis) tab 2.5 mg  2.5 mg Oral BID    amLODIPine (Norvasc) tab 10 mg  10 mg Oral Daily    sennosides (Senokot) tab 17.2 mg  17.2 mg Oral Nightly    ondansetron (Zofran) 4 MG/2ML injection 4 mg  4 mg Intravenous Q6H PRN    metoclopramide (Reglan) 5 mg/mL injection 5 mg  5 mg Intravenous Q8H PRN    multivitamin (Tab-A-Juanjo/Beta Carotene) tab 1 tablet  1 tablet Oral Daily    lidocaine PF (Xylocaine-MPF) 1% injection  2 mL Injection PRN    ropivacaine (Naropin) 0.5% injection  30 mL Injection PRN    sodium chloride 0.9% PF injection 10 mL  10 mL Injection PRN    amiodarone (Pacerone) tab 100 mg  100 mg Oral Daily    aspirin DR tab 81 mg  81 mg Oral Daily    dilTIAZem ER (Dilacor XR) 24 hr cap 120 mg  120 mg Oral Daily    morphINE PF 2 MG/ML injection 1 mg  1 mg Intravenous Q2H PRN    Or    morphINE PF 2 MG/ML injection 2 mg  2 mg Intravenous Q2H PRN    ferrous sulfate DR tab 325 mg  325 mg Oral Daily with breakfast    pantoprazole (Protonix) DR tab 40 mg  40 mg Oral QAM AC    vancomycin (Vancocin) cap 125 mg  125 mg Oral BID    Followed by    [START ON 6/16/2024] vancomycin (Vancocin) cap 125 mg  125 mg  Oral Daily    Followed by    [START ON 6/24/2024] vancomycin (Vancocin) cap 125 mg  125 mg Oral Q MWF    HYDROmorphone (Dilaudid) 1 MG/ML injection 0.2 mg  0.2 mg Intravenous Q2H PRN    Or    HYDROmorphone (Dilaudid) 1 MG/ML injection 0.4 mg  0.4 mg Intravenous Q2H PRN    Or    HYDROmorphone (Dilaudid) 1 MG/ML injection 0.8 mg  0.8 mg Intravenous Q2H PRN    mupirocin (Bactroban) 2% nasal ointment 1 Application  1 Application Nasal Q12H    hydrALAzine (Apresoline) 20 mg/mL injection 5 mg  5 mg Intravenous Q4H PRN         Impression/Plan:      #1.  ZOHREH/CKD IV- has had longstanding CKD in setting of remote nephrectomy.  B/l creat of late close to 2.0 mg/dl or so.  Creat much better today, stop IVF and encourage po intake     #2.  Hyperkalemia- in setting of CKD and ongoing potassium supplement.  Exacerbated by acidosis.  resolved     #3.  L hip fx- s/p OR.  per ortho     #4.  HTN- BP stable.  Cont usual meds and follow    #5.  Acidosis- in setting of ZOHREH. Resolved and will stop IVF    Questions/concerns were discussed with patient and/or family by bedside.      Talha Hernandez MD  6/13/2024  10:13 AM

## 2024-06-13 NOTE — PLAN OF CARE
Patient is alert and oriented x 2. Vitals stable on room air. Generalized shakiness noted. Pain managed by scheduled meds. Denies numbness & tingling. Dressing x2 to left hip with small amt drainage. Patient not able to void. Straight cath. DTV 0800. Colostomy to LLQ. Tolerating diet. Patient up max assist. Plan to monitor k+, bicarb gtt, pt recs DAWNA. Plan of care discussed with patient.

## 2024-06-14 LAB
ANION GAP SERPL CALC-SCNC: 5 MMOL/L (ref 0–18)
BUN BLD-MCNC: 39 MG/DL (ref 9–23)
CALCIUM BLD-MCNC: 6.1 MG/DL (ref 8.5–10.1)
CHLORIDE SERPL-SCNC: 107 MMOL/L (ref 98–112)
CO2 SERPL-SCNC: 27 MMOL/L (ref 21–32)
CREAT BLD-MCNC: 2.23 MG/DL
EGFRCR SERPLBLD CKD-EPI 2021: 20 ML/MIN/1.73M2 (ref 60–?)
ERYTHROCYTE [DISTWIDTH] IN BLOOD BY AUTOMATED COUNT: 18.6 %
GLUCOSE BLD-MCNC: 102 MG/DL (ref 70–99)
HCT VFR BLD AUTO: 23.2 %
HGB BLD-MCNC: 7.4 G/DL
MCH RBC QN AUTO: 30.1 PG (ref 26–34)
MCHC RBC AUTO-ENTMCNC: 31.9 G/DL (ref 31–37)
MCV RBC AUTO: 94.3 FL
OSMOLALITY SERPL CALC.SUM OF ELEC: 298 MOSM/KG (ref 275–295)
PLATELET # BLD AUTO: 240 10(3)UL (ref 150–450)
POTASSIUM SERPL-SCNC: 4 MMOL/L (ref 3.5–5.1)
RBC # BLD AUTO: 2.46 X10(6)UL
SODIUM SERPL-SCNC: 139 MMOL/L (ref 136–145)
WBC # BLD AUTO: 9.9 X10(3) UL (ref 4–11)

## 2024-06-14 PROCEDURE — 99233 SBSQ HOSP IP/OBS HIGH 50: CPT | Performed by: INTERNAL MEDICINE

## 2024-06-14 NOTE — CM/SW NOTE
Spoke with pt's son and pt's RN.  Awaiting medical clearance for discharge to [facility].  Updated [place] about possible DC tomorrow 6/15.  / to remain available for support and/or discharge planning.     [facility]  [phone]    Roxanne Mina [facility]  Discharge Planner  [phone]

## 2024-06-14 NOTE — PLAN OF CARE
Patient alert and oriented x2. VSS on RA. Tele in NSR. Pain controlled with PO PRN meds. Denies n/t. X2 aquacel dressings to left hip changed per ortho, CDI. SCDs in place, ankle pumps encouraged, IS encouraged. Pt up x2 with alexandra steady. Tolerating diet, no c/o n/v. DTV by 0930.     Plan: monitor urine output, discharge to [facility] when cleared by all services

## 2024-06-14 NOTE — PLAN OF CARE
Patient A&O X2-3 on RA. VSS, /IS/telemetry- NSR. SCDs, Eliquis. Purewick in place, LBM 6/13- colostomy to LLQ. Isolation precautions maintained. Surgical incision to left hip covered with aquacel x2, c/d/i. Denies n/t. Pain controlled with PO medication. Ice as needed. WBAT. PT/OT. Up with sit-stand. Reminded to use call light. Plan to dc to [facility] when cleared.

## 2024-06-14 NOTE — PHYSICAL THERAPY NOTE
PHYSICAL THERAPY TREATMENT NOTE - INPATIENT    Room Number: 352/352-A     Session: 1     Number of Visits to Meet Established Goals: 6    Presenting Problem: L hip fracture s/p cephalomedullary nail 6/11/24  Co-Morbidities : CKD, HTN, colon and endometrial cancer    PHYSICAL THERAPY MEDICAL/SOCIAL HISTORY  History related to current admission: Patient is a 89 year old female admitted on 6/10/2024 from home for fall.  Pt diagnosed with L hip fracture s/p L hip cephalomedullary nail 6/11/24.     ASSESSMENT   Patient demonstrates fair progress this session, goals  remain in progress.    Patient continues to function below baseline with bed mobility, transfers, gait, and standing prolonged periods.  Contributing factors to remaining limitations include decreased functional strength, pain, impaired standing balance, impaired motor planning, and decreased muscular endurance.  Next session anticipate patient to progress transfers and gait.  Physical Therapy will continue to follow patient for duration of hospitalization.    Patient continues to benefit from continued skilled PT services: to promote return to prior level of function and safety with continuous assistance and gradual rehabilitative therapy .    PLAN  PT Treatment Plan: Bed mobility;Endurance;Energy conservation;Patient education;Gait training;Neuromuscular re-educate;Range of motion;Strengthening;Transfer training  Rehab Potential : Fair  Frequency (Obs): 3-5x/week    CURRENT GOALS     Goal #1 Patient is able to demonstrate supine - sit EOB @ level: moderate assistance      Goal #2 Patient is able to demonstrate transfers Sit to/from Stand at assistance level: moderate assistance      Goal #3 Patient is able to transfer to bedside chair with MOD assist      Goal #4     Goal #5     Goal #6     Goal Comments: Goals established on 6/12/2024 6/14/2024 all goals ongoing    SUBJECTIVE  \"It really hurts when I try to stand.\"     OBJECTIVE  Precautions: Hard of  hearing;Bed/chair alarm    WEIGHT BEARING RESTRICTION  Weight Bearing Restriction: L lower extremity           L Lower Extremity: Weight Bearing as Tolerated    PAIN ASSESSMENT   Rating: Unable to rate  Location: L hip  Management Techniques: Activity promotion;Repositioning    BALANCE                                                                                                                       Static Sitting: Fair  Dynamic Sitting: Fair -           Static Standing: Poor  Dynamic Standing: Poor -    ACTIVITY TOLERANCE                         O2 WALK         AM-PAC '6-Clicks' INPATIENT SHORT FORM - BASIC MOBILITY  How much difficulty does the patient currently have...  Patient Difficulty: Turning over in bed (including adjusting bedclothes, sheets and blankets)?: A Lot   Patient Difficulty: Sitting down on and standing up from a chair with arms (e.g., wheelchair, bedside commode, etc.): A Lot   Patient Difficulty: Moving from lying on back to sitting on the side of the bed?: A Lot   How much help from another person does the patient currently need...   Help from Another: Moving to and from a bed to a chair (including a wheelchair)?: A Lot   Help from Another: Need to walk in hospital room?: A Lot   Help from Another: Climbing 3-5 steps with a railing?: Total       AM-PAC Score:  Raw Score: 11   Approx Degree of Impairment: 72.57%   Standardized Score (AM-PAC Scale): 33.86   CMS Modifier (G-Code): CL    FUNCTIONAL ABILITY STATUS  Gait Assessment   Functional Mobility/Gait Assessment  Gait Assistance: Maximum assistance  Distance (ft): 3, 10  Assistive Device: Rolling walker    Skilled Therapy Provided  VC for transfer set up.   MOD assist x 2P for LE/trunk support with supine-sit.   Assisted with scooting to EOB.   VC for midline positioning.   VC for transfer set up.   MOD assist x 2P for force generation and balance to sit-stand to RW.   VC for sequencing side steps towards bedside chair.   MOD assist x 2P  for balance, walker management, and guidance to chair.   Returned to bedside chair.   Chair wheeled into hallway.   VC for transfer setup.   MOD assist x 2P to sit-stand to RW.   Ambulated 10ft with RW and chair follow.   MOD assist x 2P for balance, walker management, and weightshifting.  Ambulates with short stride length, excessive hip flexion, and decreased stance time on L LE.  Constant VC for gait sequencing and limb advancement.   Returned to sitting up in bedside chair in room.     Bed Mobility:  Rolling: MOD   Supine<>Sit: MOD x 2   Sit<>Supine: NT     Transfer Mobility:  Sit<>Stand: MOD x 2  Stand<>Sit: MOD x 2  Gait: MAX x 2    Therapist's Comments:  Reviewed activity recommendations. Recommend [facility] for OOB mobility.       THERAPEUTIC EXERCISES  Lower Extremity Ankle pumps  LAQ     Upper Extremity      Position Sitting     Repetitions   10   Sets   1     Patient End of Session: Up in chair;Needs met;Call light within reach;RN aware of session/findings;All patient questions and concerns addressed;SCDs in place;Alarm set;Family present;Discussed recommendations with /    PT Session Time: 25 minutes  Gait Training: 15 minutes  Therapeutic Activity: 10 minutes  Therapeutic Exercise:  minutes   Neuromuscular Re-education:  minutes

## 2024-06-14 NOTE — OCCUPATIONAL THERAPY NOTE
OCCUPATIONAL THERAPY TREATMENT NOTE - INPATIENT     Room Number: 352/352-A  Session: 1   Number of Visits to Meet Established Goals: 5    Presenting Problem: L hip fx s/p IMN of L hip on 6/11  Prior Level of Function: Pt lives with daughter at [facility]. Pts daughter has Down syndrome and patient provides supervision for daughter. Pt ambulates with RW occasionally when outside her apartment. Pt independent with ADL and mobility. Pts family notes patient has been more forgetful and have been talking about transition to [facility] and is on waitlist.     ASSESSMENT   Patient demonstrates good  progress this session, goals remain in progress.    Patient continues to function below baseline with toileting, lower body dressing, bed mobility, transfers, static standing balance, dynamic standing balance, and functional standing tolerance.   Contributing factors to remaining limitations include decreased functional strength, decreased endurance, pain, impaired standing balance, impaired coordination, decreased muscular endurance, and cognitive deficits (memory impairment).  Next session anticipate patient to progress toileting, lower body dressing, bed mobility, transfers, static standing balance, dynamic standing balance, and functional standing tolerance.  Occupational Therapy will continue to follow patient for duration of hospitalization.    Patient continues to benefit from continued skilled OT services: to promote return to prior level of function and safety with continuous assistance and gradual rehabilitative therapy .          OT Device Recommendations: TBD    History: Patient is a 89 year old female admitted on 6/10/2024 with Presenting Problem: L hip fx s/p IMN of L hip on 6/11.      Co-Morbidities : CKD, HTN, colon and endometrial cancer, s/p partial colon resection, takedown colostomy/colostomy creation 4/3/24    WEIGHT BEARING RESTRICTION  Weight Bearing Restriction: L lower extremity           L Lower Extremity:  Weight Bearing as Tolerated    Recommendations for nursing staff:   Transfers: alexandra morrison  Toileting location: bedside commode    TREATMENT SESSION:  Patient Start of Session: semi supine in bed; son present  FUNCTIONAL TRANSFER ASSESSMENT  Sit to Stand: Edge of Bed; Chair  Edge of Bed: Maximum Assist (mod A x 2)  Chair: Maximum Assist (mod A x 2)    BED MOBILITY  Supine to Sit : Maximum Assist (mod A x 2)  Scooting: max A    BALANCE ASSESSMENT     FUNCTIONAL ADL ASSESSMENT       ACTIVITY TOLERANCE: fair-WFL                         O2 SATURATIONS       EDUCATION PROVIDED  Patient: Role of Occupational Therapy; Plan of Care; Discharge Recommendations; Functional Transfer Techniques; Fall Prevention; Posture/Positioning; Energy Conservation; Proper Body Mechanics  Patient's Response to Education: Verbalized Understanding; Returned Demonstration; Requires Further Education  Family/Caregiver: Role of Occupational Therapy; Plan of Care; Discharge Recommendations  Family/Caregiver's Response to Education: Verbalized Understanding      Equipment used: RW  Demonstrates functional use, Would benefit from additional trial      Therapist comments: Pt received semi supine in bed, pleasant and cooperative for OT session. Pt agreeable for OOB activity/mobility in preparation for bedside commode transfer and improved ADLs. Pt performs bed mobility at mod A x 2 with cues provided for hand placement and sequencing of task. Sit to stand transfer from EOB to RW performed at mod A x 2 with cues provided for hand placement. In standing, pt demos side step transfer to bedside chair with RW requiring mod A x 2. Cues provided for sequencing and RW management. Pt then engages in sit to stand transfer from bedside chair to RW requiring mod A x 2. Pt encouraged to engage in short distance functional mobility with RW in preparation for improved standing based ADLs/functional transfers. Pt requires mod A x 2 and close chair follow. Pt left in  chair with all needs. RN made aware.   Patient End of Session: Up in chair;Needs met;Call light within reach;RN aware of session/findings;All patient questions and concerns addressed;Alarm set;Family present    SUBJECTIVE  Pt pleasant and cooperative.    PAIN ASSESSMENT  Ratin  Location: L hip/knee (increased with activity/mobility)  Management Techniques: Ice;Repositioning;Relaxation     OBJECTIVE  Precautions: Hard of hearing;Bed/chair alarm    AM-PAC ‘6-Clicks’ Inpatient Daily Activity Short Form  -   Putting on and taking off regular lower body clothing?: A Lot  -   Bathing (including washing, rinsing, drying)?: A Lot  -   Toileting, which includes using toilet, bedpan or urinal? : A Lot  -   Putting on and taking off regular upper body clothing?: A Lot  -   Taking care of personal grooming such as brushing teeth?: A Little  -   Eating meals?: A Little    AM-PAC Score:  Score: 14  Approx Degree of Impairment: 59.67%  Standardized Score (AM-PAC Scale): 33.39    PLAN  OT Treatment Plan: Balance activities;Energy conservation/work simplification techniques;ADL training;Functional transfer training;Endurance training;Patient/Family training;Patient/Family education;Equipment eval/education;Compensatory technique education;Continued evaluation  Rehab Potential : Fair  Frequency: 5x/week    OT Goals:     All goals ongoing     ADL Goals  Patient will perform toileting with mod A and AE PRN  Patient will perform LB dressing with mod A and AE PRN     Functional Transfer Goals  Patient will perform bed mobility supine to sit with mod A  Patient will perform bed mobility sit to supine with mod A  Patient will perform toilet transfer with mod A    OT Session Time: 25 minutes  Therapeutic Activity: 25 minutes

## 2024-06-14 NOTE — PROGRESS NOTES
[facility]   part of [facility]     Nephrology Progress Note    Sunita Loza Patient Status:  Inpatient    1932 MRN [id number]   Location [facility] 3SW-A Attending Tree Li MD   Hosp Day # 4 PCP Janell Powell MD       SUBJECTIVE:  Doing well today        Physical Exam:   /65 (BP Location: Right arm)   Pulse 116   Temp 98.3 °F (36.8 °C) (Oral)   Resp 18   Ht 5' 1\" (1.549 m)   Wt 81 lb 12.7 oz (37.1 kg)   SpO2 94%   BMI 15.45 kg/m²   Temp (24hrs), Av.4 °F (36.9 °C), Min:98 °F (36.7 °C), Max:98.8 °F (37.1 °C)       Intake/Output Summary (Last 24 hours) at 2024 1119  Last data filed at 2024 1047  Gross per 24 hour   Intake 460 ml   Output 1975 ml   Net -1515 ml     Last 3 Weights   06/10/24 1423 81 lb 12.7 oz (37.1 kg)   06/10/24 0753 81 lb 12.7 oz (37.1 kg)   24 203 81 lb 14.4 oz (37.1 kg)   24 1542 95 lb (43.1 kg)   24 1017 81 lb (36.7 kg)   24 0158 92 lb 14.4 oz (42.1 kg)   24 2036 95 lb (43.1 kg)   24 0400 87 lb 8.4 oz (39.7 kg)   24 0613 82 lb 1.5 oz (37.2 kg)   24 1510 83 lb (37.6 kg)     General: Alert and in no apparent distress.  HEENT: No scleral icterus, MMM  Neck: Supple, no MARIA FERNANDA or thyromegaly  Cardiac: Regular rate and rhythm, S1, S2 normal, no murmur or rub  Lungs: Clear without wheezes, rales, rhonchi.    Abdomen: Soft, non-tender. + bowel sounds, no palpable organomegaly  Extremities: no sig pitting edema.  Neurologic:  moving all extremities  Skin: Warm and dry, no rash        Labs:     Recent Labs   Lab 06/10/24  0810 24  0433 24  0457 24  0520 24  1615 24  0511   WBC 10.4 10.1 11.9* 9.9  --  9.9   HGB 9.8* 9.1* 7.7* 7.2* 8.5* 7.4*   .5* 100.7* 102.7* 96.7  --  94.3   .0 266.0 244.0 215.0  --  240.0   INR 0.99  --   --   --   --   --        Recent Labs   Lab 06/10/24  1137 06/10/24  1505 24  0433 24  1321 24  0458  06/12/24  1402 06/13/24  0520 06/14/24  0511     --  142  --  145  --  141 139   K 6.1*   < > 5.9* 5.6* 5.6* 5.0 3.9 4.0   *  --  121*  --  121*  --  109 107   CO2 16.0*  --  16.0*  --  14.0*  --  24.0 27.0   BUN 36*  --  37*  --  40*  --  38* 39*   CREATSERUM 2.25*  --  2.33*  --  2.47*  --  2.19* 2.23*   CA 9.7  --  8.8  --  7.8*  --  6.2* 6.1*   *  --  104*  --  101*  --  124* 102*    < > = values in this interval not displayed.       Recent Labs   Lab 06/10/24  0810 06/13/24  0520   ALT 19 10*   AST 20 18   ALB 2.7* 1.7*       Recent Labs   Lab 06/10/24  0944 06/10/24  1104   PGLU 83 180*       Meds:   Current Facility-Administered Medications   Medication Dose Route Frequency    acetaminophen (Tylenol) tab 650 mg  650 mg Oral Q6H PRN    HYDROcodone-acetaminophen (Norco) 5-325 MG per tab 1 tablet  1 tablet Oral Q6H PRN    apixaban (Eliquis) tab 2.5 mg  2.5 mg Oral BID    amLODIPine (Norvasc) tab 10 mg  10 mg Oral Daily    sennosides (Senokot) tab 17.2 mg  17.2 mg Oral Nightly    ondansetron (Zofran) 4 MG/2ML injection 4 mg  4 mg Intravenous Q6H PRN    metoclopramide (Reglan) 5 mg/mL injection 5 mg  5 mg Intravenous Q8H PRN    multivitamin (Tab-A-Juanjo/Beta Carotene) tab 1 tablet  1 tablet Oral Daily    lidocaine PF (Xylocaine-MPF) 1% injection  2 mL Injection PRN    ropivacaine (Naropin) 0.5% injection  30 mL Injection PRN    sodium chloride 0.9% PF injection 10 mL  10 mL Injection PRN    amiodarone (Pacerone) tab 100 mg  100 mg Oral Daily    aspirin DR tab 81 mg  81 mg Oral Daily    dilTIAZem ER (Dilacor XR) 24 hr cap 120 mg  120 mg Oral Daily    morphINE PF 2 MG/ML injection 1 mg  1 mg Intravenous Q2H PRN    Or    morphINE PF 2 MG/ML injection 2 mg  2 mg Intravenous Q2H PRN    ferrous sulfate DR tab 325 mg  325 mg Oral Daily with breakfast    pantoprazole (Protonix) DR tab 40 mg  40 mg Oral QAM AC    vancomycin (Vancocin) cap 125 mg  125 mg Oral BID    Followed by    [START ON 6/16/2024]  vancomycin (Vancocin) cap 125 mg  125 mg Oral Daily    Followed by    [START ON 6/24/2024] vancomycin (Vancocin) cap 125 mg  125 mg Oral Q MWF    HYDROmorphone (Dilaudid) 1 MG/ML injection 0.2 mg  0.2 mg Intravenous Q2H PRN    Or    HYDROmorphone (Dilaudid) 1 MG/ML injection 0.4 mg  0.4 mg Intravenous Q2H PRN    Or    HYDROmorphone (Dilaudid) 1 MG/ML injection 0.8 mg  0.8 mg Intravenous Q2H PRN    mupirocin (Bactroban) 2% nasal ointment 1 Application  1 Application Nasal Q12H    hydrALAzine (Apresoline) 20 mg/mL injection 5 mg  5 mg Intravenous Q4H PRN         Impression/Plan:      #1.  ZOHREH/CKD IV- has had longstanding CKD in setting of remote nephrectomy.  B/l creat of late close to 2.0 mg/dl or so.  Creat remains stable at baseline.  encourage po intake     #2.  Hyperkalemia- in setting of CKD and ongoing potassium supplement.  Exacerbated by acidosis.  Resolved, k supplement discontinued     #3.  L hip fx- s/p OR.  per ortho     #4.  HTN- BP stable.  Cont usual meds and follow      Questions/concerns were discussed with patient and/or family by bedside.        OK for discharge from nephrology standpoint        Talha Hernandez MD  6/14/2024  11:19 AM

## 2024-06-14 NOTE — PROGRESS NOTES
CC: follow-up hospital admission hip fx    SUBJECTIVE:  Interval History:     No acute issues overnight, she is more alert  Famly at bs      OBJECTIVE:  Scheduled Meds:    apixaban  2.5 mg Oral BID    amLODIPine  10 mg Oral Daily    sennosides  17.2 mg Oral Nightly    multivitamin  1 tablet Oral Daily    amiodarone  100 mg Oral Daily    aspirin  81 mg Oral Daily    dilTIAZem ER  120 mg Oral Daily    ferrous sulfate  325 mg Oral Daily with breakfast    pantoprazole  40 mg Oral QAM AC    vancomycin  125 mg Oral BID    Followed by    [START ON 6/16/2024] vancomycin  125 mg Oral Daily    Followed by    [START ON 6/24/2024] vancomycin  125 mg Oral Q MWF    mupirocin  1 Application Nasal Q12H     Continuous Infusions:       PRN Meds:   acetaminophen    HYDROcodone-acetaminophen    ondansetron    metoclopramide    lidocaine PF    ropivacaine    sodium chloride 0.9% PF    morphINE **OR** morphINE    HYDROmorphone **OR** HYDROmorphone **OR** HYDROmorphone    hydrALAzine    PHYSICAL EXAM  Vital signs: Temp:  [98 °F (36.7 °C)-98.8 °F (37.1 °C)] 98.3 °F (36.8 °C)  Pulse:  [] 116  Resp:  [14-22] 18  BP: (119-155)/(49-85) 122/65  SpO2:  [92 %-97 %] 94 %      GENERAL - NAD   EYES- sclera anicteric   HENT- normocephalic, OP - dry  NECK - supple  CV- appears regular   RESP - CTAB, normal resp effort  ABDOMEN- soft, NT/ND, stoma  EXT- mild edema bl       Data Review:   Labs:   Recent Labs   Lab 06/10/24  0810 06/11/24  0433 06/12/24  0457 06/13/24  0520 06/13/24  1615 06/14/24  0511   WBC 10.4 10.1 11.9* 9.9  --  9.9   HGB 9.8* 9.1* 7.7* 7.2* 8.5* 7.4*   .5* 100.7* 102.7* 96.7  --  94.3   .0 266.0 244.0 215.0  --  240.0   INR 0.99  --   --   --   --   --        Recent Labs   Lab 06/10/24  1137 06/10/24  1505 06/11/24  0433 06/11/24  1321 06/12/24  0458 06/12/24  1402 06/13/24  0520 06/14/24  0511     --  142  --  145  --  141 139   K 6.1*   < > 5.9* 5.6* 5.6* 5.0 3.9 4.0   *  --  121*  --  121*  --   109 107   CO2 16.0*  --  16.0*  --  14.0*  --  24.0 27.0   BUN 36*  --  37*  --  40*  --  38* 39*   CREATSERUM 2.25*  --  2.33*  --  2.47*  --  2.19* 2.23*   CA 9.7  --  8.8  --  7.8*  --  6.2* 6.1*   *  --  104*  --  101*  --  124* 102*    < > = values in this interval not displayed.       Recent Labs   Lab 06/10/24  0810 06/13/24  0520   ALT 19 10*   AST 20 18   ALB 2.7* 1.7*       Recent Labs   Lab 06/10/24  0944 06/10/24  1104   PGLU 83 180*           ASSESSMENT/PLAN:    Patient is a 89 year old female with PMH sig for CKD, colon cancer, RCC sp nephrectomy, HTN, PAF, recent admission for cdiff here for fall and hip fx     Impression     -L acute, displaced IT left femoral neck fracture      -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 Mechanical vs syncope  -sp OR and chephalomedullary nail 06/11  -pain control  -IS  PT OT      -she also has left arm / knee pain - will get xrays too - neg for fx     *zohreh  *hyper K - likely from supplement  -fluids / K management per renal - currently off     *Cdfiff  -cont vancomycin  -has generalized shaking will check blood cx but otherwise no other infectious symptoms - ngtd  -thao RN , will monitor stool outpt. About 1.2L yesterday    *anemia  -chronic but worse last couple days, likely fluids / abla from surgery  Daily cbc  Stable on repeat   Resume elqius     Chronic conditions  Home meds as able    Dvt ppx  Scds  Eliquis    Dispo - if hgb stays stable tomorrow while on eliquis and Cr remains stable off fluids, can dc to [facility]    Will continue to follow while hospitalized. Please page me or the on-call  hospitalist with questions or concerns.    Praful [facility] Hospitalist  [phone]  Answering Service: [phone]

## 2024-06-14 NOTE — PROGRESS NOTES
[facility]  Progress Note    Sunita Loza Patient Status:  Inpatient    1932 MRN [id number]   Location [facility] 3SW-A Attending Tree Li MD   Hosp Day # 4 PCP Janell Powell MD       Subjective:  POD #3 from left hip cephalomedullary nail with Dr. Whitehead on 24.      No overnight events. Patient sleeping comfortably, but was easily awakened. States she has pain to her knee and back, but she states they are chronic issues. Denies pain to hip.  Eliquis was held yesterday AM due to patient's anemia. Had dose yesterday evening per RN. Denies fever, chills, chest pain, shortness of breath, calf pain        Objective:  Vital signs in last 24 hours:  Patient Vitals for the past 24 hrs:   BP Temp Temp src Pulse Resp SpO2   24 0809 122/65 98.3 °F (36.8 °C) Oral 95 18 97 %   24 0410 128/49 98.8 °F (37.1 °C) Oral 80 14 93 %   24 2350 135/72 98.1 °F (36.7 °C) Oral 96 16 94 %   24 1915 140/85 98 °F (36.7 °C) Oral 98 20 94 %   24 1601 119/53 98.5 °F (36.9 °C) Oral 98 18 92 %   24 1406 -- -- -- -- -- 94 %   24 1217 155/68 98.7 °F (37.1 °C) Oral 109 22 94 %       General: NAD, Alert.   Neuro: No focal deficits. EHL/DF/PF intact.   MSK: Examination of the left lower extremity demonstrates leg lengths are equal. Aquacel dressings present with greater than 50% saturation. No surrounding erythema. Minimal postoperative swelling. Thigh compartment is soft and compressible. Calf compartment soft compressible without tenderness. Examination neurovascular intact with 2+ PT and DP pulses.           Data Review  CBC:   Recent Labs   Lab 06/10/24  0810 24  0433 24  0457 24  0520 24  1615 24  0511   RBC 3.23*   < > 2.57* 2.40*  --  2.46*   HGB 9.8*   < > 7.7* 7.2* 8.5* 7.4*   HCT 32.8*   < > 26.4* 23.2*  --  23.2*   .5*   < > 102.7* 96.7  --  94.3   MCH 30.3   < > 30.0 30.0  --  30.1   MCHC 29.9*   < > 29.2*  31.0  --  31.9   RDW 20.5   < > 19.9 18.7  --  18.6   NEPRELIM 8.44*  --   --   --   --   --    WBC 10.4   < > 11.9* 9.9  --  9.9   .0   < > 244.0 215.0  --  240.0    < > = values in this interval not displayed.       Recent Labs   Lab 06/10/24  0810 06/10/24  1137 06/12/24  0458 06/12/24  1402 06/13/24  0520 06/14/24  0511   GLU 91   < > 101*  --  124* 102*   BUN 37*   < > 40*  --  38* 39*   CREATSERUM 2.25*   < > 2.47*  --  2.19* 2.23*   EGFRCR 20*   < > 18*  --  21* 20*   CA 8.7   < > 7.8*  --  6.2* 6.1*   ALB 2.7*  --   --   --  1.7*  --    *   < > 145  --  141 139   K 6.1*   < > 5.6* 5.0 3.9 4.0   *   < > 121*  --  109 107   CO2 16.0*   < > 14.0*  --  24.0 27.0   ALKPHO 100  --   --   --  76  --    AST 20  --   --   --  18  --    ALT 19  --   --   --  10*  --    BILT 0.3  --   --   --  0.3  --    TP 6.3*  --   --   --  4.9*  --     < > = values in this interval not displayed.         Assessment/Plan:    POD #3 cephalomedullary nail.  Hgb 7.4 today  Patient does have CKD and has underlying anemia at baseline.  Worsening anemia likely due to acute blood loss from fracture/surgery.     1) maintain Aquacel dressings. may change if dressings become over 50% saturated.  2) PT -weightbearing as tolerated left lower extremity.  3) continue pain control  4) remaining medical care per hospitalist service.  5) DVT prophylaxis with Eliquis.  6) antibiotics complete.  7) Discharge plan: Likely discharge to [facility].  8) okay for discharge from [facility] perspective once cleared by PT and other services. we will continue to follow while inpatient.  9) follow-up as outpatient in 2 weeks for suture removal and repeat x-rays.        Clive Brooks PA-C  [facility]  Orthopedic Surgery  6/14/2024  8:42 AM

## 2024-06-15 VITALS
HEART RATE: 81 BPM | DIASTOLIC BLOOD PRESSURE: 57 MMHG | BODY MASS INDEX: 15.45 KG/M2 | WEIGHT: 81.81 LBS | TEMPERATURE: 98 F | OXYGEN SATURATION: 90 % | SYSTOLIC BLOOD PRESSURE: 131 MMHG | HEIGHT: 61 IN | RESPIRATION RATE: 18 BRPM

## 2024-06-15 LAB
ANION GAP SERPL CALC-SCNC: 7 MMOL/L (ref 0–18)
ANTIBODY SCREEN: NEGATIVE
BUN BLD-MCNC: 41 MG/DL (ref 9–23)
CALCIUM BLD-MCNC: 6.2 MG/DL (ref 8.5–10.1)
CHLORIDE SERPL-SCNC: 107 MMOL/L (ref 98–112)
CO2 SERPL-SCNC: 26 MMOL/L (ref 21–32)
CREAT BLD-MCNC: 2.31 MG/DL
DEPRECATED HBV CORE AB SER IA-ACNC: 247.5 NG/ML
EGFRCR SERPLBLD CKD-EPI 2021: 19 ML/MIN/1.73M2 (ref 60–?)
ERYTHROCYTE [DISTWIDTH] IN BLOOD BY AUTOMATED COUNT: 18.2 %
GLUCOSE BLD-MCNC: 103 MG/DL (ref 70–99)
HCT VFR BLD AUTO: 22.9 %
HGB BLD-MCNC: 7.3 G/DL
IRON SATN MFR SERPL: 5 %
IRON SERPL-MCNC: 8 UG/DL
MCH RBC QN AUTO: 30 PG (ref 26–34)
MCHC RBC AUTO-ENTMCNC: 31.9 G/DL (ref 31–37)
MCV RBC AUTO: 94.2 FL
OSMOLALITY SERPL CALC.SUM OF ELEC: 300 MOSM/KG (ref 275–295)
PLATELET # BLD AUTO: 248 10(3)UL (ref 150–450)
POTASSIUM SERPL-SCNC: 3.9 MMOL/L (ref 3.5–5.1)
RBC # BLD AUTO: 2.43 X10(6)UL
RH BLOOD TYPE: POSITIVE
SODIUM SERPL-SCNC: 140 MMOL/L (ref 136–145)
TIBC SERPL-MCNC: 149 UG/DL (ref 240–450)
TRANSFERRIN SERPL-MCNC: 100 MG/DL (ref 200–360)
WBC # BLD AUTO: 9 X10(3) UL (ref 4–11)

## 2024-06-15 PROCEDURE — 30233N1 TRANSFUSION OF NONAUTOLOGOUS RED BLOOD CELLS INTO PERIPHERAL VEIN, PERCUTANEOUS APPROACH: ICD-10-PCS | Performed by: HOSPITALIST

## 2024-06-15 PROCEDURE — 99233 SBSQ HOSP IP/OBS HIGH 50: CPT | Performed by: INTERNAL MEDICINE

## 2024-06-15 RX ORDER — SODIUM CHLORIDE 9 MG/ML
INJECTION, SOLUTION INTRAVENOUS ONCE
Status: DISCONTINUED | OUTPATIENT
Start: 2024-06-15 | End: 2024-06-15

## 2024-06-15 NOTE — PROGRESS NOTES
[facility]   part of [facility]     Nephrology Progress Note    Sunita Loza Patient Status:  Inpatient    1932 MRN [id number]   Location [facility] 3SW-A Attending Tree Li MD   Hosp Day # 5 PCP Janell Powell MD       SUBJECTIVE:  Stable this AM        Physical Exam:   /56 (BP Location: Left arm)   Pulse 81   Temp 98.4 °F (36.9 °C) (Oral)   Resp 15   Ht 5' 1\" (1.549 m)   Wt 81 lb 12.7 oz (37.1 kg)   SpO2 97%   BMI 15.45 kg/m²   Temp (24hrs), Av.4 °F (36.9 °C), Min:97.9 °F (36.6 °C), Max:99.1 °F (37.3 °C)       Intake/Output Summary (Last 24 hours) at 6/15/2024 0916  Last data filed at 6/15/2024 0854  Gross per 24 hour   Intake 240 ml   Output 1850 ml   Net -1610 ml     Last 3 Weights   06/10/24 1423 81 lb 12.7 oz (37.1 kg)   06/10/24 0753 81 lb 12.7 oz (37.1 kg)   24 2036 81 lb 14.4 oz (37.1 kg)   24 1542 95 lb (43.1 kg)   24 1017 81 lb (36.7 kg)   24 0158 92 lb 14.4 oz (42.1 kg)   24 2036 95 lb (43.1 kg)   24 0400 87 lb 8.4 oz (39.7 kg)   24 0613 82 lb 1.5 oz (37.2 kg)   24 1510 83 lb (37.6 kg)     General: Alert and in no apparent distress.  HEENT: No scleral icterus, MMM  Neck: Supple, no MARIA FERNANDA or thyromegaly  Cardiac: Regular rate and rhythm, S1, S2 normal, no murmur or rub  Lungs: Clear without wheezes, rales, rhonchi.    Abdomen: Soft, non-tender. + bowel sounds, no palpable organomegaly  Extremities: no sig pitting edema.  Neurologic:  moving all extremities  Skin: Warm and dry, no rash        Labs:     Recent Labs   Lab 06/10/24  0810 24  0433 24  0457 24  0520 24  1615 24  0511 06/15/24  0513   WBC 10.4 10.1 11.9* 9.9  --  9.9 9.0   HGB 9.8* 9.1* 7.7* 7.2* 8.5* 7.4* 7.3*   .5* 100.7* 102.7* 96.7  --  94.3 94.2   .0 266.0 244.0 215.0  --  240.0 248.0   INR 0.99  --   --   --   --   --   --        Recent Labs   Lab 24  0433 24  1321 24  0458  06/12/24  1402 06/13/24  0520 06/14/24  0511 06/15/24  0513     --  145  --  141 139 140   K 5.9*   < > 5.6* 5.0 3.9 4.0 3.9   *  --  121*  --  109 107 107   CO2 16.0*  --  14.0*  --  24.0 27.0 26.0   BUN 37*  --  40*  --  38* 39* 41*   CREATSERUM 2.33*  --  2.47*  --  2.19* 2.23* 2.31*   CA 8.8  --  7.8*  --  6.2* 6.1* 6.2*   *  --  101*  --  124* 102* 103*    < > = values in this interval not displayed.       Recent Labs   Lab 06/10/24  0810 06/13/24  0520   ALT 19 10*   AST 20 18   ALB 2.7* 1.7*       Recent Labs   Lab 06/10/24  0944 06/10/24  1104   PGLU 83 180*       Meds:   Current Facility-Administered Medications   Medication Dose Route Frequency    acetaminophen (Tylenol) tab 650 mg  650 mg Oral Q6H PRN    HYDROcodone-acetaminophen (Norco) 5-325 MG per tab 1 tablet  1 tablet Oral Q6H PRN    apixaban (Eliquis) tab 2.5 mg  2.5 mg Oral BID    amLODIPine (Norvasc) tab 10 mg  10 mg Oral Daily    sennosides (Senokot) tab 17.2 mg  17.2 mg Oral Nightly    ondansetron (Zofran) 4 MG/2ML injection 4 mg  4 mg Intravenous Q6H PRN    metoclopramide (Reglan) 5 mg/mL injection 5 mg  5 mg Intravenous Q8H PRN    multivitamin (Tab-A-Juanjo/Beta Carotene) tab 1 tablet  1 tablet Oral Daily    lidocaine PF (Xylocaine-MPF) 1% injection  2 mL Injection PRN    ropivacaine (Naropin) 0.5% injection  30 mL Injection PRN    sodium chloride 0.9% PF injection 10 mL  10 mL Injection PRN    amiodarone (Pacerone) tab 100 mg  100 mg Oral Daily    aspirin DR tab 81 mg  81 mg Oral Daily    dilTIAZem ER (Dilacor XR) 24 hr cap 120 mg  120 mg Oral Daily    morphINE PF 2 MG/ML injection 1 mg  1 mg Intravenous Q2H PRN    Or    morphINE PF 2 MG/ML injection 2 mg  2 mg Intravenous Q2H PRN    ferrous sulfate DR tab 325 mg  325 mg Oral Daily with breakfast    pantoprazole (Protonix) DR tab 40 mg  40 mg Oral QAM AC    vancomycin (Vancocin) cap 125 mg  125 mg Oral BID    Followed by    [START ON 6/16/2024] vancomycin (Vancocin) cap 125  mg  125 mg Oral Daily    Followed by    [START ON 6/24/2024] vancomycin (Vancocin) cap 125 mg  125 mg Oral Q MWF    HYDROmorphone (Dilaudid) 1 MG/ML injection 0.2 mg  0.2 mg Intravenous Q2H PRN    Or    HYDROmorphone (Dilaudid) 1 MG/ML injection 0.4 mg  0.4 mg Intravenous Q2H PRN    Or    HYDROmorphone (Dilaudid) 1 MG/ML injection 0.8 mg  0.8 mg Intravenous Q2H PRN    hydrALAzine (Apresoline) 20 mg/mL injection 5 mg  5 mg Intravenous Q4H PRN         Impression/Plan:      #1.  ZOHREH/CKD IV- has had longstanding CKD in setting of remote nephrectomy.  B/l creat of late close to 2.0 mg/dl or so.  Creat remains stable essentially at baseline.  Cont to encourage po intake     #2.  Hyperkalemia- in setting of CKD and ongoing potassium supplement.  Exacerbated by acidosis.  Resolved, k supplement discontinued     #3.  L hip fx- s/p OR.  per ortho     #4.  HTN- BP stable.  Cont usual meds and follow      Questions/concerns were discussed with patient and/or family by bedside.        OK for discharge from nephrology standpoint      Talha Hernandez MD  6/15/2024  9:17 AM

## 2024-06-15 NOTE — PLAN OF CARE
NURSING DISCHARGE NOTE    Discharged Rehab facility via Ambulance.  Accompanied by Family member  Belongings Taken by patient/family.    AVS printed and discussed, IV removed, Pt ready to discharge to [facility].

## 2024-06-15 NOTE — PROGRESS NOTES
CC: follow-up hospital admission hip fx    SUBJECTIVE:  Concern over hgb continuing to trend in low 7 range  Feels okay, pain controlled. Took a few steps to chair yesterday per family.    OBJECTIVE:  Scheduled Meds:    apixaban  2.5 mg Oral BID    amLODIPine  10 mg Oral Daily    sennosides  17.2 mg Oral Nightly    multivitamin  1 tablet Oral Daily    amiodarone  100 mg Oral Daily    aspirin  81 mg Oral Daily    dilTIAZem ER  120 mg Oral Daily    ferrous sulfate  325 mg Oral Daily with breakfast    pantoprazole  40 mg Oral QAM AC    vancomycin  125 mg Oral BID    Followed by    [START ON 6/16/2024] vancomycin  125 mg Oral Daily    Followed by    [START ON 6/24/2024] vancomycin  125 mg Oral Q MWF     Continuous Infusions:       PRN Meds:   acetaminophen    HYDROcodone-acetaminophen    ondansetron    metoclopramide    lidocaine PF    ropivacaine    sodium chloride 0.9% PF    morphINE **OR** morphINE    HYDROmorphone **OR** HYDROmorphone **OR** HYDROmorphone    hydrALAzine    PHYSICAL EXAM  Vital signs: Temp:  [97.9 °F (36.6 °C)-99.1 °F (37.3 °C)] 98.4 °F (36.9 °C)  Pulse:  [] 81  Resp:  [15-25] 15  BP: (119-133)/(56-67) 124/56  SpO2:  [90 %-97 %] 97 %      GENERAL - NAD   EYES- sclera anicteric   HENT- normocephalic, OP - dry  NECK - supple  CV- appears regular   RESP - CTAB, normal resp effort  ABDOMEN- soft, NT/ND, stoma with brown stool  EXT- mild edema bl       Data Review:   Labs:   Recent Labs   Lab 06/10/24  0810 06/11/24  0433 06/12/24  0457 06/13/24  0520 06/13/24  1615 06/14/24  0511 06/15/24  0513   WBC 10.4 10.1 11.9* 9.9  --  9.9 9.0   HGB 9.8* 9.1* 7.7* 7.2* 8.5* 7.4* 7.3*   .5* 100.7* 102.7* 96.7  --  94.3 94.2   .0 266.0 244.0 215.0  --  240.0 248.0   INR 0.99  --   --   --   --   --   --        Recent Labs   Lab 06/11/24  0433 06/11/24  1321 06/12/24  0458 06/12/24  1402 06/13/24  0520 06/14/24  0511 06/15/24  0513     --  145  --  141 139 140   K 5.9*   < > 5.6* 5.0 3.9 4.0  3.9   *  --  121*  --  109 107 107   CO2 16.0*  --  14.0*  --  24.0 27.0 26.0   BUN 37*  --  40*  --  38* 39* 41*   CREATSERUM 2.33*  --  2.47*  --  2.19* 2.23* 2.31*   CA 8.8  --  7.8*  --  6.2* 6.1* 6.2*   *  --  101*  --  124* 102* 103*    < > = values in this interval not displayed.       Recent Labs   Lab 06/10/24  0810 06/13/24  0520   ALT 19 10*   AST 20 18   ALB 2.7* 1.7*       Recent Labs   Lab 06/10/24  0944 06/10/24  1104   PGLU 83 180*           ASSESSMENT/PLAN:    Patient is a 89 year old female with PMH sig for CKD, colon cancer, RCC sp nephrectomy, HTN, PAF, recent admission for cdiff here for fall and hip fx     Impression     -L acute, displaced IT left femoral neck fracture      -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 Mechanical vs syncope  -sp OR and chephalomedullary nail 06/11  -PT OT --> [facility]  -she also has left arm / knee pain neg for fx     *zohreh  *hyper K - likely from supplement  -fluids / K management per renal - currently off     *Cdfiff  -cont vancomycin  -has generalized shaking will check blood cx but otherwise no other infectious symptoms - ngtd  -thao RN , will monitor stool outpt. About 1.2L yesterday    *anemia  -chronic (8-9 range during last hospital stay) but worse last couple days, likely fluids / abla from surgery in setting of ckd  -check iron studies  -as pt has been consistently in low 7 range and may transfer to [facility] today would give 1 unit prior to dc, family had discussed with previous hospitalist as well.      Chronic conditions  Home meds as able    Dvt ppx  Scds  Eliquis    Dispo - okay to  dc to [facility] after prbc completed.     Annette Walker MD  [facility] Hospitalist  Answering Service: [phone]

## 2024-06-15 NOTE — CM/SW NOTE
Ambulance set up for discharge at 6pm, to [facility]. PCS form completed. RN updated.     Jayshree Newell LCSW  /Discharge Planner

## 2024-06-15 NOTE — DISCHARGE INSTRUCTIONS
Hip Fracture Discharge Instructions    Activity    Weight bearing restrictions:    Weight bearing as tolerated (WBAT) ? you may put as much of your weight on your leg as you can tolerate. This means you may use a walker, crutches, cane- or no device at all to get around.  Full weight bearing (FWB) ? you may put full weight on your leg. You may not need any device at all to help with walking.     Bathing  No tub baths, pools, or saunas until cleared by surgeon (about 4-6 weeks because it takes that long for the incision(s) on the skin to heal and be a barrier to prevent infection.)    When allowed to shower:  AQUACEL dressing is waterproof and does not require being covered before showering.  Pat dressing(s) and surrounding skin dry after shower.   There may be one incision or a few that have a dressing.                                      AQUACEL           MEDIPORE/COVERLET           DRY STERILE GAUZE                                                                                                                         MEDIPORE/COVERLET dressing and dry sterile gauze are NOT waterproof and REQUIRE being covered with a waterproof barrier to keep the dressing and incision dry.  SARAN WRAP, GLAD WRAP, PRESS N SEAL WORK REALLY WELL BUT ANY PLASTIC WRAP WILL DO.  Do not wash incision.   Remove entire wrapping and old dressing (if Medipore/coverlet or gauze) after showering. Pat dry with a CLEAN TOWEL if necessary and cover incision with new Medipore/coverlet or gauze.    Incision care/Dressing changes  Wash hands before and after dressing changes.  There may be one or a few dressings on the hip/leg    FOR MEDIPORE/COVERLET DRESSINGS:  Change dressing daily using Medipore/coverlet once Aquacel (waterproof) dressing is removed (which is about 7 days after surgery). Patient should be standing or lying flat so dressing goes on smoothly.  (This dressing needed for hip patients because of location of incision-don’t want  contamination from bathroom use)  FOR DRY STERILE GAUZE DRESSINGS:   Change dressing daily using dry sterile gauze and paper tape.  Watch ALL incision for any redness, drainage, increased warmth or opening of the incision.   Call surgeon if you notice any of these.  No lotions or ointments on or near incision(s).                             DRY STERILE GAUZE                         MEDIPORE /COVERLET    Continue dressing changes until your first visit with your surgeon’s office.  There could be a small amount of redness around the staples or incision; this is normal.  Watch for increased redness, warmth, any odor, increased drainage or opening of the incision. A little clear yellow or blood tinged drainage is normal up to 2 weeks after surgery but it should be less every day until it stops.  Call physician if you notice any concerning changes.  Sutures/staples will be removed at first office visit (10 days- 3 weeks).       Driving  Do not drive until cleared by surgeon. Possibly six weeks after surgery. Discuss this at follow-up office visit.   Not allowed while taking narcotic pain medication or muscle relaxants.    Sex  Usually allowed after six weeks - check with surgeon at your office visit.    Return to work  Usually allowed after six weeks. Discuss specific work activities with your surgeon.    Restrictions  Follow instructions provided by physical therapy    No smoking  Avoid smoking. It is known to cause breathing problems and can decrease the rate of healing.                      Medication  Anticoagulants = blood thinners (Xarelto, Eliquis, Lovenox, Coumadin or Aspirin)  Pill or shot form depending on what your physician orders.   IF placed on Coumadin, you may also need lab work done for monitoring.  You will bleed easier and bruise easier while on these medications.   Usually you will be on a blood thinner for about 4-5 weeks after surgery.  Contact your physician if you have signs of bruising, nose  bleeds or blood in your urine. Use electric razors and soft toothbrushes only.  Do not take NSAIDS (non-steroidal anti-inflammatory medications) while taking blood thinners unless ordered by your surgeon.  Review anticoagulant education information sheet provided.    Discomfort  Surgical discomfort is normal for one to two months.  Have realistic goals and keep a positive outlook.  Keep pain manageable; pain should not disrupt your sleep or activities like getting out of bed or walking.  You may need pain medication regularly (every 4-6 hours) the first 2 weeks and then begin to decrease how often you are taking it.  Take pain medication as prescribed with food, especially before therapy, allowing 30-60 minutes to take effect.  Do not drink alcohol while on pain medication.  As you have less discomfort, decrease the amount of pain medication you take. Use plain Tylenol (acetaminophen) for less severe pain.  Some pain medications have Tylenol (acetaminophen) in them such as Norco and Percocet. Do NOT take Tylenol (acetaminophen) within 4 hours of a dose of these medications.  Apply ice or cold therapy to surgical site for 20 minutes at least four times a day, especially after therapy. Be sure there is a thin cloth barrier between skin and ice or cold therapy.  Change position at least every 45 minutes while awake to avoid stiffness or increased discomfort.  Deep breathing and relaxation techniques and distractions can help!  If you focus on something else, you do not experience the pain the same. Take advantage of everything available to your to help control you discomfort.  Contact physician if discomfort does not respond to pain medication.    Body changes  Constipation is common with the use of narcotics.  Eat fiber rich foods and drink plenty of fluids, at least 4-6 glasses of water a day, unless restricted.  To prevent constipation, use stool softeners such as Colace and laxatives such as Miralax, Senakot or  Milk of Magnesia while taking laxatives.   An enema or suppository may be needed if above measures do not work.    Prevention of infection and promotion of healing  Good hand washing is important. Everyone should wash their hands or use hand  as soon as they walk in your house-whether they live there or are visiting.  Keep bed linen/clothing freshly laundered.  Do not allow others or pets to touch your incision.  Avoid people that have colds or the flu.  Your surgeon may recommend that you take antibiotics before you undergo any dental or other invasive surgical procedures after your hip fracture surgery. Speak with your surgeon about this at your post-op office visit.  Continue using incentive spirometry because narcotics make you sleepy so you may not take good deep breaths. We do not want you to get pneumonia.     Post op Office visits  Schedule 10 days to 3 weeks after surgery WITH SURGEON’s office.  Additional visits may need to be scheduled. Your physician will discuss this at first post-op office visit.  Schedule outpatient physical therapy per your surgeon’s orders.  Schedule one week follow up after surgery WITH PRIMARY CARE PHYSICIAN; review your medications over last 6 months.  Your body gets stressed by surgery and that stress can affect all your other health issues (such as high blood pressure, diabetes, CHF, afib, and asthma just to name a few).  It is much better to catch developing problems and prevent them from becoming larger ones.  ANA MARIA HOSE - IF ordered by your surgeon, wear these during the day and off at night.  Surgeon will tell you when you don’t need them anymore.    Notify your surgeon if you notice any of the following signs  Separation of incision line.  Increased redness, swelling, or warmth of skin around incision.  Increased or foul smelling drainage from incision  Red streaks on skin near incision.  Temperature >100.4F.  Increased pain at incision not relieved by pain  medication.    Signs of Possible Hip Dislocation IF Total Hip Replacement or Eduardo-arthroplasty Done  Increased severe leg or groin pain  Turning in or out of surgical leg that is new  Increased numbness, tingling to leg  Inability to walk or put weight on your surgical leg  Signs of blood clot  Pain, excessive tenderness, redness, or swelling in leg or calf (other than incision site).  Call physician and await their directions.    Go directly to the ER or CALL 911 if you:  become short of breath  have chest pain  cough up blood  have unexplained anxiety with breathing     Traveling and Handicapped parking  Check with you surgeon when you are allowed to travel so you don’t set yourself up for greater chance of complications.  If traveling by car, get out to stretch every 2 hours.  This helps prevent stiffness.  You may need to do this any time you travel for the first year after surgery.  If traveling by plane, BEFORE you get into a security line, let them know that you had your hip replaced, as you will most likely set off the metal detector. The doctors no longer provide an identification card for this as they are easily copied. ALSO request a wheelchair the first year to board and get off a plane…this aids in priority seating and you should sit on the aisle or at the bulkhead where you can easily stretch your legs and get up to walk up and down the aisles…this helps prevent blood clots and stiffness.  TEMPORARY HANDICAP PARKING APPLICATION  (good for 3-6 months)  - At Surgeon or PCP visit, request they fill out their part of the form. You fill our your portion, then go to Department of Motor Vehicles. Some [facility] offices provide the same service. (Rashid Singh and [place] have this service; if you live in another [facility], you may check with them as well). You need space to open car doors to position yourself properly with walker to get in and out of your car safely; some parking spaces are  practically  on top of each other and do not give you enough room.     SPECIAL  INSTRUCTIONS:   maintain Aquacel dressings. may change if dressings become over 50% saturated.  PT -weightbearing as tolerated left lower extremity.  continue pain control   DVT prophylaxis with Eliquis.  follow-up as outpatient in 2 weeks for suture removal and repeat x-rays.

## 2024-06-15 NOTE — PLAN OF CARE
POD 4 Lt hip nailing, AAOX2-3, forgetful at times, room air, TELE, plascencia placed D/T retention, WBAT up MAX x 2 waiting on hospitalist on labs for possible discharge today, PO meds for pain, see MAR, Pt doing well, all needs met, all safety measures in place, call light within reach, will CTM.

## 2024-06-15 NOTE — PLAN OF CARE
Pt voided 100ml in commode with max ast up x2 walker & gait belt, pt had some urinary urgency & began to wet brief as she was getting OOB. PVR showing greater then 430ml in bladder, pt st cathed per protocol as she was uncomfortable stating she knows she needs to void more but cannot. Pt st cathed 400ml of clear yellow urine returned out. Pt now resting comfortably in bed. Vss at this time. Plan dc to [facility] when ready, awaiting morning lab results. Bed alarm in place, call light within reach, isolation precautions remain in place as ordered.    0600 pt unable to void at this time, bladder scan showing 500ml in bladder. On call I'm md updated.   0645Per md insert indwelling plascencia cath, see orders. Pt updated, new indwelling plascencia cath in place. Aquacel dressings saturated, moderate to large amounts on both dressings, changed, new gauze & coverlets in place x2, old had serous drainage.

## 2024-06-16 LAB
BLOOD TYPE BARCODE: 7300
UNIT VOLUME: 350 ML

## 2024-06-24 ENCOUNTER — NURSE ONLY (OUTPATIENT)
Dept: NEPHROLOGY | Facility: CLINIC | Age: 89
End: 2024-06-24

## 2024-06-24 VITALS — DIASTOLIC BLOOD PRESSURE: 62 MMHG | WEIGHT: 101 LBS | SYSTOLIC BLOOD PRESSURE: 118 MMHG | BODY MASS INDEX: 19 KG/M2

## 2024-06-24 DIAGNOSIS — D63.1 ANEMIA DUE TO STAGE 4 CHRONIC KIDNEY DISEASE (HCC): ICD-10-CM

## 2024-06-24 DIAGNOSIS — N18.4 ANEMIA DUE TO STAGE 4 CHRONIC KIDNEY DISEASE (HCC): ICD-10-CM

## 2024-06-24 DIAGNOSIS — N18.4 CKD (CHRONIC KIDNEY DISEASE) STAGE 4, GFR 15-29 ML/MIN (HCC): Primary | ICD-10-CM

## 2024-07-08 ENCOUNTER — OFFICE VISIT (OUTPATIENT)
Dept: NEPHROLOGY | Facility: CLINIC | Age: 89
End: 2024-07-08
Payer: MEDICARE

## 2024-07-08 VITALS — DIASTOLIC BLOOD PRESSURE: 70 MMHG | SYSTOLIC BLOOD PRESSURE: 124 MMHG

## 2024-07-08 DIAGNOSIS — N18.30 STAGE 3 CHRONIC KIDNEY DISEASE, UNSPECIFIED WHETHER STAGE 3A OR 3B CKD (HCC): Primary | ICD-10-CM

## 2024-07-08 DIAGNOSIS — R60.9 EDEMA, UNSPECIFIED TYPE: ICD-10-CM

## 2024-07-08 DIAGNOSIS — N17.9 AKI (ACUTE KIDNEY INJURY) (HCC): ICD-10-CM

## 2024-07-08 PROCEDURE — 99215 OFFICE O/P EST HI 40 MIN: CPT | Performed by: INTERNAL MEDICINE

## 2024-07-08 RX ORDER — SACCHAROMYCES BOULARDII 250 MG
250 CAPSULE ORAL 2 TIMES DAILY
COMMUNITY

## 2024-07-08 RX ORDER — TORSEMIDE 20 MG/1
20 TABLET ORAL EVERY OTHER DAY
COMMUNITY

## 2024-07-08 NOTE — PROGRESS NOTES
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        HPI:   Sunita Loza is a 89 year old female who presents for follow-up of   Chief Complaint   Patient presents with    Chronic Kidney Disease    Hypertension    Anemia       Very pleasant 89-year-old female presents for follow-up of chronic kidney disease, chronic edema and anemia after several recent hospitalizations for acute kidney injury, bowel resection with creation of new ostomy as well as left hip fracture undergoing intramedullary nail last month.    HISTORY:  Past Medical History:    Arthritis    Atrial fibrillation (HCC)    Back pain    C. difficile colitis    CKD (chronic kidney disease)    and S/P nephrectomy    Colon cancer (HCC)    Congestive heart disease (HCC)    Easy bruising    Endometrial cancer (HCC)    UTERINE, DX ABOUT 30 YEARS    Hearing impairment    AIDS    Heart palpitations    Afib    High blood pressure    Kidney failure    Leg swelling    Bishop syndrome    hereditary colorectal cancer    Muscle weakness    WALKER    Pain in joints    Wears glasses    Weight loss      Past Surgical History:   Procedure Laterality  Date    Colectomy      Nephrectomy Left     Part removal colon w end colostomy        Family History   Problem Relation Age of Onset    Heart Disorder Father     Heart Disorder Mother     Colon Cancer Mother         70    Heart Disorder Sister     Breast Cancer Sister     Diabetes Brother     Breast Cancer Sister     Cancer Sister     Breast Cancer Sister     Breast Cancer Daughter       Social History:   Social History     Socioeconomic History    Marital status:    Tobacco Use    Smoking status: Never    Smokeless tobacco: Never   Vaping Use    Vaping status: Never Used   Substance and Sexual Activity    Alcohol use: Not Currently    Drug use: Never     Social Determinants of Health     Food Insecurity: No Food Insecurity (6/10/2024)    Food Insecurity     Food Insecurity: Never true   Transportation Needs: No Transportation Needs (6/10/2024)    Transportation Needs     Lack of Transportation: No   Housing Stability: Low Risk  (6/10/2024)    Housing Stability     Housing Instability: No        Medications (Active prior to today's visit):  Current Outpatient Medications   Medication Sig Dispense Refill    amLODIPine 5 MG Oral Tab Take 1 tablet (5 mg total) by mouth daily. 30 tablet 2    sodium bicarbonate 650 MG Oral Tab Take 1 tablet (650 mg total) by mouth 2 (two) times daily. 60 tablet 0    omeprazole 20 MG Oral Capsule Delayed Release Take 1 capsule (20 mg total) by mouth every morning before breakfast.      Glucosamine-Chondroit-Biofl-Mn (GLUCOSAMINE CHONDROIT,BIOFLAV, OR) Take 1 tablet by mouth daily.      ferrous sulfate 325 (65 FE) MG Oral Tab EC Take 1 tablet (325 mg total) by mouth daily with breakfast.      amiodarone 100 MG Oral Tab Take 1 tablet (100 mg total) by mouth daily.      apixaban 2.5 MG Oral Tab Take 1 tablet (2.5 mg total) by mouth 2 (two) times daily.      dilTIAZem  MG Oral Capsule SR 24 Hr Take 1 capsule (120 mg total) by mouth daily.      Calcium Carbonate-Vitamin D (OYSTER  SHELL CALCIUM/D) 500-200 MG-UNIT Oral Tab Take 1 tablet by mouth daily.      aspirin 81 MG Oral Tab Take 1 tablet (81 mg total) by mouth daily.      cyanocobalamin 1000 MCG Oral Tab Take 100 mcg by mouth daily.      Multiple Vitamin (MULTIVITAMINS OR) Take 1 tablet by mouth daily.      acetaminophen 500 MG Oral Tab Take 1 tablet (500 mg total) by mouth in the morning and 1 tablet (500 mg total) before bedtime.         Allergies:  Allergies   Allergen Reactions    Ciprofloxacin RASH and HIVES    Penicillins RASH       ROS:     Denies fever/chills  Denies wt loss/gain  Denies HA or visual changes  Denies CP or palpitations  Denies SOB/cough/hemoptysis  Denies abd or flank pain  Denies N/V/D  Denies change in urinary habits or gross hematuria  Denies skin rashes/myalgias/arthralgias      PHYSICAL EXAM:   /70   Wt Readings from Last 3 Encounters:   06/24/24 101 lb (45.8 kg)   06/10/24 81 lb 12.7 oz (37.1 kg)   05/24/24 81 lb 14.4 oz (37.1 kg)     General: Alert and oriented in no apparent distress.  HEENT: No scleral icterus, MMM  Neck: Supple, no MARIA FERNANDA or thyromegaly  Cardiac: Regular rate and rhythm, S1, S2 normal, no murmur or rub  Lungs: Clear without wheezes, rales, rhonchi.    Abdomen: Soft, non-tender. + bowel sounds, no palpable organomegaly  Extremities: Without clubbing, cyanosis; + LE edema bilat R > L   Neurologic: Alert and oriented, normal affect, cranial nerves grossly intact, moving all extremities  Skin: Warm and dry, no rashes      Fabiana Miller MD  7/8/2024  1:23 PM

## 2024-07-09 ENCOUNTER — MED REC SCAN ONLY (OUTPATIENT)
Dept: NEPHROLOGY | Facility: CLINIC | Age: 89
End: 2024-07-09

## 2024-07-10 ENCOUNTER — HOSPITAL ENCOUNTER (EMERGENCY)
Facility: HOSPITAL | Age: 89
Discharge: HOME | End: 2024-07-10
Attending: EMERGENCY MEDICINE
Payer: MEDICARE

## 2024-07-10 VITALS
SYSTOLIC BLOOD PRESSURE: 159 MMHG | BODY MASS INDEX: 19.63 KG/M2 | HEIGHT: 60 IN | WEIGHT: 100 LBS | HEART RATE: 70 BPM | OXYGEN SATURATION: 100 % | DIASTOLIC BLOOD PRESSURE: 76 MMHG | TEMPERATURE: 98 F | RESPIRATION RATE: 11 BRPM

## 2024-07-10 DIAGNOSIS — N28.9 RENAL INSUFFICIENCY: Primary | ICD-10-CM

## 2024-07-10 LAB
ALBUMIN SERPL-MCNC: 2.6 G/DL (ref 3.4–5)
ALBUMIN/GLOB SERPL: 0.6 {RATIO} (ref 1–2)
ALP LIVER SERPL-CCNC: 152 U/L
ALT SERPL-CCNC: 15 U/L
ANION GAP SERPL CALC-SCNC: 5 MMOL/L (ref 0–18)
AST SERPL-CCNC: 37 U/L (ref 15–37)
ATRIAL RATE: 82 BPM
BASOPHILS # BLD AUTO: 0.05 X10(3) UL (ref 0–0.2)
BASOPHILS NFR BLD AUTO: 0.5 %
BILIRUB SERPL-MCNC: 0.3 MG/DL (ref 0.1–2)
BILIRUB UR QL STRIP.AUTO: NEGATIVE
BUN BLD-MCNC: 47 MG/DL (ref 9–23)
CALCIUM BLD-MCNC: 8.5 MG/DL (ref 8.5–10.1)
CHLORIDE SERPL-SCNC: 113 MMOL/L (ref 98–112)
CLARITY UR REFRACT.AUTO: CLEAR
CO2 SERPL-SCNC: 21 MMOL/L (ref 21–32)
CREAT BLD-MCNC: 2.86 MG/DL
EGFRCR SERPLBLD CKD-EPI 2021: 15 ML/MIN/1.73M2 (ref 60–?)
EOSINOPHIL # BLD AUTO: 0.1 X10(3) UL (ref 0–0.7)
EOSINOPHIL NFR BLD AUTO: 0.9 %
ERYTHROCYTE [DISTWIDTH] IN BLOOD BY AUTOMATED COUNT: 18.8 %
FLUAV + FLUBV RNA SPEC NAA+PROBE: NEGATIVE
FLUAV + FLUBV RNA SPEC NAA+PROBE: NEGATIVE
GLOBULIN PLAS-MCNC: 4.5 G/DL (ref 2.8–4.4)
GLUCOSE BLD-MCNC: 121 MG/DL (ref 70–99)
GLUCOSE UR STRIP.AUTO-MCNC: NORMAL MG/DL
HCT VFR BLD AUTO: 37.5 %
HGB BLD-MCNC: 11.3 G/DL
IMM GRANULOCYTES # BLD AUTO: 0.09 X10(3) UL (ref 0–1)
IMM GRANULOCYTES NFR BLD: 0.8 %
KETONES UR STRIP.AUTO-MCNC: NEGATIVE MG/DL
LEUKOCYTE ESTERASE UR QL STRIP.AUTO: 500
LYMPHOCYTES # BLD AUTO: 1.29 X10(3) UL (ref 1–4)
LYMPHOCYTES NFR BLD AUTO: 11.8 %
MCH RBC QN AUTO: 29.1 PG (ref 26–34)
MCHC RBC AUTO-ENTMCNC: 30.1 G/DL (ref 31–37)
MCV RBC AUTO: 96.6 FL
MONOCYTES # BLD AUTO: 0.89 X10(3) UL (ref 0.1–1)
MONOCYTES NFR BLD AUTO: 8.2 %
NEUTROPHILS # BLD AUTO: 8.48 X10 (3) UL (ref 1.5–7.7)
NEUTROPHILS # BLD AUTO: 8.48 X10(3) UL (ref 1.5–7.7)
NEUTROPHILS NFR BLD AUTO: 77.8 %
NITRITE UR QL STRIP.AUTO: NEGATIVE
OSMOLALITY SERPL CALC.SUM OF ELEC: 302 MOSM/KG (ref 275–295)
P AXIS: 58 DEGREES
P-R INTERVAL: 176 MS
PH UR STRIP.AUTO: 5.5 [PH] (ref 5–8)
PLATELET # BLD AUTO: 381 10(3)UL (ref 150–450)
POTASSIUM SERPL-SCNC: 5.7 MMOL/L (ref 3.5–5.1)
PROT SERPL-MCNC: 7.1 G/DL (ref 6.4–8.2)
PROT UR STRIP.AUTO-MCNC: 20 MG/DL
Q-T INTERVAL: 388 MS
QRS DURATION: 72 MS
QTC CALCULATION (BEZET): 453 MS
R AXIS: 18 DEGREES
RBC # BLD AUTO: 3.88 X10(6)UL
RBC UR QL AUTO: NEGATIVE
RSV RNA SPEC NAA+PROBE: NEGATIVE
SARS-COV-2 RNA RESP QL NAA+PROBE: NOT DETECTED
SODIUM SERPL-SCNC: 139 MMOL/L (ref 136–145)
SP GR UR STRIP.AUTO: 1.01 (ref 1–1.03)
T AXIS: 56 DEGREES
UROBILINOGEN UR STRIP.AUTO-MCNC: NORMAL MG/DL
VENTRICULAR RATE: 82 BPM
WBC # BLD AUTO: 10.9 X10(3) UL (ref 4–11)

## 2024-07-10 PROCEDURE — 0241U SARS-COV-2/FLU A AND B/RSV BY PCR (GENEXPERT): CPT | Performed by: EMERGENCY MEDICINE

## 2024-07-10 PROCEDURE — 81001 URINALYSIS AUTO W/SCOPE: CPT | Performed by: EMERGENCY MEDICINE

## 2024-07-10 PROCEDURE — 93010 ELECTROCARDIOGRAM REPORT: CPT

## 2024-07-10 PROCEDURE — 96361 HYDRATE IV INFUSION ADD-ON: CPT

## 2024-07-10 PROCEDURE — 96374 THER/PROPH/DIAG INJ IV PUSH: CPT

## 2024-07-10 PROCEDURE — 93005 ELECTROCARDIOGRAM TRACING: CPT

## 2024-07-10 PROCEDURE — 85025 COMPLETE CBC W/AUTO DIFF WBC: CPT | Performed by: EMERGENCY MEDICINE

## 2024-07-10 PROCEDURE — 80053 COMPREHEN METABOLIC PANEL: CPT | Performed by: EMERGENCY MEDICINE

## 2024-07-10 PROCEDURE — 99285 EMERGENCY DEPT VISIT HI MDM: CPT

## 2024-07-10 RX ORDER — FUROSEMIDE 10 MG/ML
20 INJECTION INTRAMUSCULAR; INTRAVENOUS ONCE
Status: COMPLETED | OUTPATIENT
Start: 2024-07-10 | End: 2024-07-10

## 2024-07-10 NOTE — ED PROVIDER NOTES
Patient Seen in: [facility] Emergency Department      History     Chief Complaint   Patient presents with    Abnormal Result     Stated Complaint: potassium 6.5    Subjective:   HPI    Patient is a 89-year-old woman with a history of renal insufficiency presents from [facility] with an elevated potassium.  Patient is asymptomatic.  She has known renal insufficiency.  Says she has been taking her medications.  No new dietary changes.  She feels well.  Says she ate lunch prior to coming here.  No other specific complaints.  She is followed by Dr. Miller of nephrology.    Objective:   Past Medical History:    Arthritis    Atrial fibrillation (HCC)    Back pain    C. difficile colitis    CKD (chronic kidney disease)    and S/P nephrectomy    Colon cancer (HCC)    Congestive heart disease (HCC)    Easy bruising    Endometrial cancer (HCC)    UTERINE, DX ABOUT 30 YEARS    Hearing impairment    AIDS    Heart palpitations    Afib    High blood pressure    Kidney failure    Leg swelling    Bishop syndrome    hereditary colorectal cancer    Muscle weakness    WALKER    Pain in joints    Wears glasses    Weight loss              Past Surgical History:   Procedure Laterality Date    Colectomy      Nephrectomy Left     Part removal colon w end colostomy                  Social History     Socioeconomic History    Marital status:    Tobacco Use    Smoking status: Never    Smokeless tobacco: Never   Vaping Use    Vaping status: Never Used   Substance and Sexual Activity    Alcohol use: Not Currently    Drug use: Never     Social Determinants of Health     Food Insecurity: No Food Insecurity (6/10/2024)    Food Insecurity     Food Insecurity: Never true   Transportation Needs: No Transportation Needs (6/10/2024)    Transportation Needs     Lack of Transportation: No   Housing Stability: Low Risk  (6/10/2024)    Housing Stability     Housing Instability: No              Review of Systems    Positive for stated  Chief Complaint: Abnormal Result    Other systems are as noted in HPI.  Constitutional and vital signs reviewed.      All other systems reviewed and negative except as noted above.    Physical Exam     ED Triage Vitals [07/10/24 1303]   BP (!) 160/118   Pulse 87   Resp 18   Temp 97.7 °F (36.5 °C)   Temp src    SpO2 100 %   O2 Device None (Room air)       Current Vitals:   Vital Signs  BP: 160/70  Pulse: 76  Resp: 11  Temp: 97.7 °F (36.5 °C)  MAP (mmHg): 95    Oxygen Therapy  SpO2: 100 %  O2 Device: None (Room air)            Physical Exam    General: Patient is resting comfortably in no acute distress  HEENT: Normal cephalic atraumatic.  Nonicteric sclera.  Moist mucous membranes.  No meningismus.  No adenopathy  Lungs: No tachypnea.  Lungs clear to auscultation bilaterally without rales/rhonchi.  Equal breath sounds bilaterally  Cardiac: No tachycardia.  No murmurs.  Regular rate and rhythm.  Abdomen: Soft and nontender throughout.  No rebound or guarding  Extremities: No clubbing/cyanosis/edema.  Skin: No rashes, no pallor  Neuro: Awake oriented ×3.  Nonfocal.  Good strength throughout    ED Course     Labs Reviewed   COMP METABOLIC PANEL (14) - Abnormal; Notable for the following components:       Result Value    Glucose 121 (*)     Potassium 5.7 (*)     Chloride 113 (*)     BUN 47 (*)     Creatinine 2.86 (*)     Calculated Osmolality 302 (*)     eGFR-Cr 15 (*)     Alkaline Phosphatase 152 (*)     Albumin 2.6 (*)     Globulin  4.5 (*)     A/G Ratio 0.6 (*)     All other components within normal limits   CBC W/ DIFFERENTIAL - Abnormal; Notable for the following components:    HGB 11.3 (*)     MCHC 30.1 (*)     Neutrophil Absolute Prelim 8.48 (*)     Neutrophil Absolute 8.48 (*)     All other components within normal limits   SARS-COV-2/FLU A AND B/RSV BY PCR (GENEXPERT) - Normal    Narrative:     This test is intended for the qualitative detection and differentiation of SARS-CoV-2, influenza A, influenza B, and  respiratory syncytial virus (RSV) viral RNA in nasopharyngeal or nares swabs from individuals suspected of respiratory viral infection consistent with COVID-19 by their healthcare provider. Signs and symptoms of respiratory viral infection due to SARS-CoV-2, influenza, and RSV can be similar.    Test performed using the Xpert Xpress SARS-CoV-2/FLU/RSV (real time RT-PCR)  assay on the GeneXpert instrument, [employer], [place] [address].   This test is being used under the Food and Drug Administration's Emergency Use Authorization.    The authorized Fact Sheet for Healthcare Providers for this assay is available upon request from the laboratory.   CBC WITH DIFFERENTIAL WITH PLATELET    Narrative:     The following orders were created for panel order CBC With Differential With Platelet.  Procedure                               Abnormality         Status                     ---------                               -----------         ------                     CBC W/ DIFFERENTIAL[[id number]]          Abnormal            Final result                 Please view results for these tests on the individual orders.   URINALYSIS, ROUTINE   RAINBOW DRAW LAVENDER   RAINBOW DRAW LIGHT GREEN   RAINBOW DRAW BLUE   RAINBOW DRAW GOLD     EKG    Rate, intervals and axes as noted on EKG Report.  Rate: 82  Rhythm: Sinus Rhythm  Reading: Normal sinus rhythm.  LVH.  No peaked T waves.  Axis intervals are noted but otherwise, agree with EKG report                 Blood work reviewed here.  Potassium 5.7, mildly hemolyzed per lab.         MDM      Patient presents with mild hyperkalemia in the face of known renal insufficiency.  She is got no medications that would cause.  She was hydrated given 1 dose of Lasix.  She was given 1 dose of lokemia.  Case was discussed with Dr. Miller of nephrology.  Okay to be discharged.  Continue current regimen.  Should recheck her potassium in 2 days.                                   Medical Decision  Making      Disposition and Plan     Clinical Impression:  1. Renal insufficiency    Mild hyperkalemia    Disposition:  There is no disposition on file for this visit.  There is no disposition time on file for this visit.    Follow-up:  Fabiana Miller MD  [address] [place] [address]  [address] [id number] [phone]    Follow up in 1 week(s)            Medications Prescribed:  Current Discharge Medication List

## 2024-07-10 NOTE — DISCHARGE INSTRUCTIONS
You were given dose medications and lower your potassium.  Stay well-hydrated and continue her medications at home.  Should recheck your potassium in 1 to 2 days.  Follow-up with Dr. Miller.  Follow-up with primary care.

## 2024-07-29 ENCOUNTER — HOSPITAL ENCOUNTER (INPATIENT)
Facility: HOSPITAL | Age: 89
LOS: 1 days | Discharge: NURSING, REHAB OR LONG-TERM CARE | End: 2024-07-31
Attending: EMERGENCY MEDICINE | Admitting: INTERNAL MEDICINE
Payer: MEDICARE

## 2024-07-29 DIAGNOSIS — I48.20 CHRONIC ATRIAL FIBRILLATION WITH RVR (HCC): ICD-10-CM

## 2024-07-29 DIAGNOSIS — D72.829 LEUKOCYTOSIS, UNSPECIFIED TYPE: ICD-10-CM

## 2024-07-29 DIAGNOSIS — E86.0 DEHYDRATION: ICD-10-CM

## 2024-07-29 DIAGNOSIS — A04.72 CLOSTRIDIUM DIFFICILE COLITIS: Primary | ICD-10-CM

## 2024-07-29 DIAGNOSIS — E87.20 METABOLIC ACIDOSIS: ICD-10-CM

## 2024-07-29 LAB
ALBUMIN SERPL-MCNC: 3.6 G/DL (ref 3.2–4.8)
ALBUMIN/GLOB SERPL: 1.4 {RATIO} (ref 1–2)
ALP LIVER SERPL-CCNC: 125 U/L
ALT SERPL-CCNC: 8 U/L
ANION GAP SERPL CALC-SCNC: 8 MMOL/L (ref 0–18)
AST SERPL-CCNC: 13 U/L (ref ?–34)
BASOPHILS # BLD AUTO: 0.02 X10(3) UL (ref 0–0.2)
BASOPHILS NFR BLD AUTO: 0.1 %
BILIRUB SERPL-MCNC: <0.2 MG/DL (ref 0.2–0.9)
BUN BLD-MCNC: 52 MG/DL (ref 9–23)
CALCIUM BLD-MCNC: 8.2 MG/DL (ref 8.7–10.4)
CHLORIDE SERPL-SCNC: 109 MMOL/L (ref 98–112)
CO2 SERPL-SCNC: 20 MMOL/L (ref 21–32)
CREAT BLD-MCNC: 3.2 MG/DL
EGFRCR SERPLBLD CKD-EPI 2021: 13 ML/MIN/1.73M2 (ref 60–?)
EOSINOPHIL # BLD AUTO: 0.12 X10(3) UL (ref 0–0.7)
EOSINOPHIL NFR BLD AUTO: 0.8 %
ERYTHROCYTE [DISTWIDTH] IN BLOOD BY AUTOMATED COUNT: 17.8 %
GLOBULIN PLAS-MCNC: 2.5 G/DL (ref 2–3.5)
GLUCOSE BLD-MCNC: 103 MG/DL (ref 70–99)
HCT VFR BLD AUTO: 33.6 %
HGB BLD-MCNC: 10.5 G/DL
IMM GRANULOCYTES # BLD AUTO: 0.07 X10(3) UL (ref 0–1)
IMM GRANULOCYTES NFR BLD: 0.5 %
LYMPHOCYTES # BLD AUTO: 1.37 X10(3) UL (ref 1–4)
LYMPHOCYTES NFR BLD AUTO: 9.6 %
MCH RBC QN AUTO: 29.4 PG (ref 26–34)
MCHC RBC AUTO-ENTMCNC: 31.3 G/DL (ref 31–37)
MCV RBC AUTO: 94.1 FL
MONOCYTES # BLD AUTO: 1.13 X10(3) UL (ref 0.1–1)
MONOCYTES NFR BLD AUTO: 8 %
NEUTROPHILS # BLD AUTO: 11.5 X10 (3) UL (ref 1.5–7.7)
NEUTROPHILS # BLD AUTO: 11.5 X10(3) UL (ref 1.5–7.7)
NEUTROPHILS NFR BLD AUTO: 81 %
OSMOLALITY SERPL CALC.SUM OF ELEC: 298 MOSM/KG (ref 275–295)
PLATELET # BLD AUTO: 277 10(3)UL (ref 150–450)
POTASSIUM SERPL-SCNC: 4.7 MMOL/L (ref 3.5–5.1)
PROT SERPL-MCNC: 6.1 G/DL (ref 5.7–8.2)
RBC # BLD AUTO: 3.57 X10(6)UL
SODIUM SERPL-SCNC: 137 MMOL/L (ref 136–145)
WBC # BLD AUTO: 14.2 X10(3) UL (ref 4–11)

## 2024-07-30 PROBLEM — D72.829 LEUKOCYTOSIS, UNSPECIFIED TYPE: Status: ACTIVE | Noted: 2024-07-30

## 2024-07-30 PROBLEM — I48.20 CHRONIC ATRIAL FIBRILLATION WITH RVR (HCC): Status: ACTIVE | Noted: 2024-07-30

## 2024-07-30 PROBLEM — E87.20 METABOLIC ACIDOSIS: Status: ACTIVE | Noted: 2024-07-30

## 2024-07-30 LAB
ANION GAP SERPL CALC-SCNC: 7 MMOL/L (ref 0–18)
BASOPHILS # BLD AUTO: 0.02 X10(3) UL (ref 0–0.2)
BASOPHILS NFR BLD AUTO: 0.1 %
BUN BLD-MCNC: 49 MG/DL (ref 9–23)
CALCIUM BLD-MCNC: 8.6 MG/DL (ref 8.7–10.4)
CHLORIDE SERPL-SCNC: 116 MMOL/L (ref 98–112)
CO2 SERPL-SCNC: 19 MMOL/L (ref 21–32)
CREAT BLD-MCNC: 2.73 MG/DL
DEPRECATED HBV CORE AB SER IA-ACNC: 260.7 NG/ML
EGFRCR SERPLBLD CKD-EPI 2021: 16 ML/MIN/1.73M2 (ref 60–?)
EOSINOPHIL # BLD AUTO: 0.13 X10(3) UL (ref 0–0.7)
EOSINOPHIL NFR BLD AUTO: 0.9 %
ERYTHROCYTE [DISTWIDTH] IN BLOOD BY AUTOMATED COUNT: 17.9 %
GLUCOSE BLD-MCNC: 91 MG/DL (ref 70–99)
HCT VFR BLD AUTO: 32.8 %
HGB BLD-MCNC: 10.1 G/DL
IMM GRANULOCYTES # BLD AUTO: 0.08 X10(3) UL (ref 0–1)
IMM GRANULOCYTES NFR BLD: 0.6 %
IRON SATN MFR SERPL: 9 %
IRON SERPL-MCNC: 20 UG/DL
LYMPHOCYTES # BLD AUTO: 1.19 X10(3) UL (ref 1–4)
LYMPHOCYTES NFR BLD AUTO: 8.4 %
MCH RBC QN AUTO: 28.9 PG (ref 26–34)
MCHC RBC AUTO-ENTMCNC: 30.8 G/DL (ref 31–37)
MCV RBC AUTO: 93.7 FL
MONOCYTES # BLD AUTO: 1.06 X10(3) UL (ref 0.1–1)
MONOCYTES NFR BLD AUTO: 7.5 %
NEUTROPHILS # BLD AUTO: 11.67 X10 (3) UL (ref 1.5–7.7)
NEUTROPHILS # BLD AUTO: 11.67 X10(3) UL (ref 1.5–7.7)
NEUTROPHILS NFR BLD AUTO: 82.5 %
OSMOLALITY SERPL CALC.SUM OF ELEC: 307 MOSM/KG (ref 275–295)
PLATELET # BLD AUTO: 243 10(3)UL (ref 150–450)
PLATELETS.RETICULATED NFR BLD AUTO: 5.6 % (ref 0–7)
POTASSIUM SERPL-SCNC: 4.7 MMOL/L (ref 3.5–5.1)
RBC # BLD AUTO: 3.5 X10(6)UL
SARS-COV-2 RNA RESP QL NAA+PROBE: NOT DETECTED
SODIUM SERPL-SCNC: 142 MMOL/L (ref 136–145)
TOTAL IRON BINDING CAPACITY: 219 UG/DL (ref 250–425)
TRANSFERRIN SERPL-MCNC: 152 MG/DL (ref 250–380)
TROPONIN I SERPL HS-MCNC: 32 NG/L
WBC # BLD AUTO: 14.2 X10(3) UL (ref 4–11)

## 2024-07-30 PROCEDURE — 99223 1ST HOSP IP/OBS HIGH 75: CPT | Performed by: INTERNAL MEDICINE

## 2024-07-30 RX ORDER — ACETAMINOPHEN 500 MG
500 TABLET ORAL EVERY 4 HOURS PRN
Status: DISCONTINUED | OUTPATIENT
Start: 2024-07-30 | End: 2024-07-31

## 2024-07-30 RX ORDER — TRAMADOL HYDROCHLORIDE 50 MG/1
25 TABLET ORAL 2 TIMES DAILY
COMMUNITY
Start: 2024-07-17

## 2024-07-30 RX ORDER — AMIODARONE HYDROCHLORIDE 100 MG/1
100 TABLET ORAL DAILY
Status: DISCONTINUED | OUTPATIENT
Start: 2024-07-30 | End: 2024-07-30

## 2024-07-30 RX ORDER — VANCOMYCIN HYDROCHLORIDE 125 MG/1
125 CAPSULE ORAL 4 TIMES DAILY
Status: DISCONTINUED | OUTPATIENT
Start: 2024-07-30 | End: 2024-07-31

## 2024-07-30 RX ORDER — DILTIAZEM HYDROCHLORIDE 120 MG/1
120 CAPSULE, EXTENDED RELEASE ORAL DAILY
Status: DISCONTINUED | OUTPATIENT
Start: 2024-07-30 | End: 2024-07-30

## 2024-07-30 RX ORDER — SODIUM BICARBONATE 325 MG/1
650 TABLET ORAL 2 TIMES DAILY
Status: DISCONTINUED | OUTPATIENT
Start: 2024-07-30 | End: 2024-07-31

## 2024-07-30 RX ORDER — AMLODIPINE BESYLATE 5 MG/1
5 TABLET ORAL DAILY
Status: DISCONTINUED | OUTPATIENT
Start: 2024-07-30 | End: 2024-07-30

## 2024-07-30 RX ORDER — SODIUM CHLORIDE 9 MG/ML
INJECTION, SOLUTION INTRAVENOUS CONTINUOUS
Status: DISCONTINUED | OUTPATIENT
Start: 2024-07-30 | End: 2024-07-30

## 2024-07-30 RX ORDER — DILTIAZEM HYDROCHLORIDE 120 MG/1
240 CAPSULE, EXTENDED RELEASE ORAL DAILY
Status: DISCONTINUED | OUTPATIENT
Start: 2024-07-31 | End: 2024-07-31

## 2024-07-30 RX ORDER — ASPIRIN 81 MG/1
81 TABLET, CHEWABLE ORAL DAILY
Status: DISCONTINUED | OUTPATIENT
Start: 2024-07-30 | End: 2024-07-31

## 2024-07-30 RX ORDER — AMIODARONE HYDROCHLORIDE 200 MG/1
200 TABLET ORAL 2 TIMES DAILY WITH MEALS
Status: DISCONTINUED | OUTPATIENT
Start: 2024-07-30 | End: 2024-07-31

## 2024-07-30 NOTE — HISTORICAL OFFICE NOTE
Facility Logo [facility]  [address], 4th [place] [address]  [phone]      Emmie Loza  Progress Note  Demographics:  Name: Emmie Loza YOB: 1932  Age: 89, Female Medical Record No: [id number]  Visited Date/Time: 06/20/2024 09:00 AM    Chief Complaints  Patient has been hospitalized a few times seen last OV  -In April, had part of her colon removed  -In May, pt was dehydrated so diuretic was stopped  Recent hospitalized from 6/10-6/15 for fracture of left hip  -Dr. Palacios consulted  -c/o edema in her legs  History of Present Illness  89-year-old female with newly diagnosed atrial fibrillation.  Her primary symptom was significant lower extremity edema and fatigue.  She went to [facility] and started on amiodarone.  She does not report having a cardioversion.  She does not know all the testing she had    Her edema is much better.  She has more energy according to her family members    No prior stroke or TIA    She has had prior renal cancer and a separate colon cancer.  These were all in the remote past more than 10 years ago    She has chronic kidney disease creatinine is 2.7    Current visit:  Since my last visit the patient has been in the hospital twice.  1 for dehydration and elevated creatinine and then once her diuretic was stopped she came in with volume overload.  Her medications been adjusted by her PCP and then by her nephrologist who put her on torsemide which started today  Visit June 20 , 2024  Patient was recently hospitalized with a fall and a hip fracture.  She had surgical repair.  She is about 20 pounds heavier now than she was when she was more compensated.  She has lower extremity edema.  She also has active C. difficile but is on a weaning dose of vancomycin  Cardiac risk factors Never smoked  Past Medical History  1.Unspecified atrial fibrillation  2.Benign essential HTN  3.Hyperlipidemia, mixed  4.Pure  hypercholesterolemia  5.Chronic kidney disease, stage 3b  6.Hypokalemia  7.Vitamin D deficiency  Family History  1. Father - Family history of stroke  2. Mother - Family history of stroke  3. Son - History of heart attack  Social History  Smoking status Never smoked  Tobacco usage - No (Non-smoker (finding))  Review of systems  Cardiovascular Edema  No history of Chest pain, BANKS, Palpitations, Syncope, PND, Orthopnea and Claudication  Physical Examination  Vitals Right Arm Sitting  / 62 mmHg, Pulse rate 94 bpm, Height in 5', BMI: 18.7, Weight in 96 lbs (or) 44 kgs and BSA : 1.35 cc/m²  General Appearance No Acute Distress, Well groomed and Normal Body habitus  Cardiovascular   EKG/Other abnormalities  General - NAD  PEERLA/EOMI  JVD - normal  CTA Bilaterally  CV- RRR, S1/S2, No murmurs  GI- Soft, Nontender  Extremities normal pulses  Mood/Affect Congruent  Skin no lesions  Joint/Musculoskeletal - Normal     Lower extremity edema noted  Allergies  1.ciprofloxacin - Ingredient(Reaction:rash, Severity:Moderate)  2.penicillin - Ingredient(Reaction:rash, Severity:Moderate)  Medications  1.amiodarone 100 mg tablet, Take 1 tablet orally once a day.  2.aspirin 81 mg tablet,delayed release, Take 1 tablet orally once a day.  3.Calcium 500 mg , daily  4.Centrum Silver Women 8 mg iron-400 mcg-50 mcg tablet, Take 1 tablet orally once a day.  5.cyanocobalamin (vit B-12) 1,000 mcg tablet, Take 1 tablet orally once a day.  6.dilTIAZem 120 mg tablet, Take 1 tablet orally once a day.  7.Eliquis 2.5 mg tablet, Take 1 tablet orally 2 times a day.  8.ferrous sulfate 325 mg (65 mg iron) tablet, Take 1 tablet orally once a day.  9.glucosamine 750 mg-chondroitin 600 mg chewable tablet, Take 1 tablet orally once a day.  10.omeprazole 20 mg tablet,delayed release, Take 1 tablet orally once a day.  11.sodium bicarbonate 650 mg tablet, Take 1 tablet orally 2 times a day.  12.TylenoL 325 mg tablet, Take 2 tablets orally 2 times a  day.  13.Vitmain D 500mcg, daily  Impression  1.Unspecified atrial fibrillation  2.Benign essential HTN  3.Hyperlipidemia, mixed  4.Pure hypercholesterolemia  5.Chronic kidney disease, stage 3b  6.Hypokalemia  7.Vitamin D deficiency  Assessment & Plan  ===============================  Cardiac Testing Personally Reviewed    ECG Reviewed -normal sinus rhythm ectopic atrial rhythm    Echo Results []    Stress Test Results []    Monitor Results []    EP Procedures: []  ==============================    Problem List:    #Symptomatic persistent atrial fibrillation  - Chest Vascor of 4 for hypertension, age x2, gender  --On Eliquis 2.5 twice daily  - Continue amiodarone 100 mg a day as well as diltiazem  - LFTs and TSH were checked recently and are normal     #Essential hypertension on diltiazem and hydrochlorothiazide     # Volume overload  - Will start torsemide 20 mg every other day  - Has an appointment with her nephrologist July 7  - Will also have an appointment with [facility] for longitudinal heart failure with preserved ejection fraction management     # C. difficile now weaning.  Vancomycin     # Recent hip fracture surgical repair completed      Continue current meds except as outlined above.  =====================================    Check out Items:  Follow-up in 6 months  Labs and Diagnostics ordered  1.EKG (electrocardiogram) (Today)  Future appointments  1.Follow up visit - Nayeli Cisneros MD (6 Months)  2.Referral Visit - Carmen Tapia ([email or web address]) : (Today)  Miscellaneous  1.Weight monitoring (regime/therapy)  Nurses documentation  Refills: none  Upcoming surgeries: none   Use of assisted devices: walker  EKG: Done  (GM VARELA)     Patient instructions  Medications:   1. Will start torsemide 20 mg every other day  2. Continue other current medications.    Provider Follow Up:  1. Follow up with [facility] next available   2. Follow up with Dr. Cisneros in 6 months     Please  bring in you medication bottles or updated medicine list to your next appointment.  Call [facility] if you have any problems or concerns at [phone]   CPOE Orders carried out by: Janis JOYA  Care Providers: Nayeli Cisneros MD, Jessa Trinidad and Janis JOYA  Electronically Authenticated by  Nayeli Cisneros MD  06/20/2024 12:57:30 PM  Disclaimer: Components of this note were documented using voice recognition system and are subject to errors not corrected at proofreading. Contact the author of this note for any clarifications.

## 2024-07-30 NOTE — ED QUICK NOTES
Orders for admission, patient is aware of plan and ready to go upstairs. Any questions, please call ED RN Jo Ann at extension [phone].     Patient Covid vaccination status: Fully vaccinated     COVID Test Ordered in ED: Rapid SARS-CoV-2 by PCR    COVID Suspicion at Admission: N/A    Running Infusions:    dilTIAZem 10 mg/hr (07/30/24 0050)        Mental Status/LOC at time of transport: A&Ox3 - wilianck, dilt drip  Other pertinent information:   CIWA score: N/A   NIH score:  N/A

## 2024-07-30 NOTE — ED PROVIDER NOTES
Patient Seen in: [facility] Emergency Department      History     Chief Complaint   Patient presents with    Abnormal Labs     Stated Complaint: elevated WBC    Subjective:   HPI    A 89-year-old female presented with a recent diagnosis of C. diff colitis, although she denied experiencing diarrhea, a common symptom of this condition. She reported having regular bowel movements and only recalled possibly having diarrhea for one day a few weeks prior. She also mentioned experiencing a headache and cognitive difficulties, stating that she was having trouble thinking. She acknowledged a previous episode of abdominal pain, but noted that the pain had since subsided. However, she did express feeling generally unwell, a symptom that seemed to have come on suddenly. She also agreed with the suggestion that she might be dehydrated. .    Objective:   Past Medical History:    Arthritis    Atrial fibrillation (HCC)    Back pain    C. difficile colitis    CKD (chronic kidney disease)    and S/P nephrectomy    Colon cancer (HCC)    Congestive heart disease (HCC)    Easy bruising    Endometrial cancer (HCC)    UTERINE, DX ABOUT 30 YEARS    Hearing impairment    AIDS    Heart palpitations    Afib    High blood pressure    Kidney failure    Leg swelling    Bishop syndrome    hereditary colorectal cancer    Muscle weakness    WALKER    Pain in joints    Wears glasses    Weight loss              Past Surgical History:   Procedure Laterality Date    Colectomy      Nephrectomy Left     Part removal colon w end colostomy                  Social History     Socioeconomic History    Marital status:    Tobacco Use    Smoking status: Never    Smokeless tobacco: Never   Vaping Use    Vaping status: Never Used   Substance and Sexual Activity    Alcohol use: Not Currently    Drug use: Never     Social Determinants of Health     Food Insecurity: No Food Insecurity (6/10/2024)    Food Insecurity     Food Insecurity: Never true    Transportation Needs: No Transportation Needs (6/10/2024)    Transportation Needs     Lack of Transportation: No   Housing Stability: Low Risk  (6/10/2024)    Housing Stability     Housing Instability: No              Review of Systems    Positive for stated Chief Complaint: Abnormal Labs    Other systems are as noted in HPI.  Constitutional and vital signs reviewed.      All other systems reviewed and negative except as noted above.    Physical Exam     ED Triage Vitals [07/29/24 2201]   BP (!) 176/80   Pulse 76   Resp 18   Temp 97.6 °F (36.4 °C)   Temp src    SpO2 100 %   O2 Device None (Room air)       Current Vitals:   Vital Signs  BP: 139/71  Pulse: 74  Resp: 21  Temp: 97.6 °F (36.4 °C)  MAP (mmHg): 87    Oxygen Therapy  SpO2: 97 %  O2 Device: None (Room air)            Physical Exam      Vital signs reviewed  General appearance: Patient is alert and in no acute distress  HEENT: Pupils equal react to light extraocular muscles intact no scleral icterus, mucous membranes are dry, there is no erythema or exudate in the posterior pharynx  Neck: Supple no JVD no lymphadenopathy no meningismus no carotid bruit  CV: Regular rate and rhythm no murmur rub  Respiratory: Clear to auscultation bilaterally no crackles no wheezes no accessory muscle use  Abdomen: Soft nontender nondistended, no rebound no guarding  no hepatosplenomegaly bowel sounds are present , no pulsatile mass  Extremities: No clubbing cyanosis or edema 2+ distal pulses.  Neuro: Cranial nerves II through XII intact with no gross focal sensory or motor abnormality.      ED Course     Labs Reviewed   COMP METABOLIC PANEL (14) - Abnormal; Notable for the following components:       Result Value    Glucose 103 (*)     CO2 20.0 (*)     BUN 52 (*)     Creatinine 3.20 (*)     Calcium, Total 8.2 (*)     Calculated Osmolality 298 (*)     eGFR-Cr 13 (*)     ALT 8 (*)     Bilirubin, Total <0.2 (*)     All other components within normal limits   CBC W/  DIFFERENTIAL - Abnormal; Notable for the following components:    WBC 14.2 (*)     RBC 3.57 (*)     HGB 10.5 (*)     HCT 33.6 (*)     Neutrophil Absolute Prelim 11.50 (*)     Neutrophil Absolute 11.50 (*)     Monocyte Absolute 1.13 (*)     All other components within normal limits   CBC WITH DIFFERENTIAL WITH PLATELET    Narrative:     The following orders were created for panel order CBC With Differential With Platelet.  Procedure                               Abnormality         Status                     ---------                               -----------         ------                     CBC W/ DIFFERENTIAL[[id number]]          Abnormal            Final result                 Please view results for these tests on the individual orders.     EKG    Rate, intervals and axes as noted on EKG Report.  Rate: 153  Rhythm: Atrial Fibrillation  Reading: Atrial flutter with RVR                 Initially when I saw patient and her heart rate was in a regular rhythm and her labs were drawn CBC chemistry.  Her white count was elevated at 14.2 her creatinine was 3.2 and BUN of 52.  I had called nephrology as they follow her closely as she has had issues with her kidneys in the past and they did feel she should be admitted for some IV hydration she was adamant she is not having any diarrhea but the nephrologist who knew her well states sometimes her story does not match what is actually happening.  While waiting for bed her heart rate spiked up to 153 and had an EKG and was found to be in atrial flutter with RVR.  Will give her a Cardizem bolus and drip.  I discussed case with the hospitalist she is already on an anticoagulant and will admit her after I get her rate controlled.    No results found.      Patient was seen immediately upon arrival due to a high probability of sudden, clinically significant, or life threatening deterioration of the patient's clinical status.  The patient required my time at the bedside performing  direct personal management, the absence of which would likely result in sudden, clinically significant or life threatening deterioration of  clinical condition.   The patient required my constant attention. Time was spent ordering, reviewing, and interpreting ancillary testing and imaging. The patient was frequently reevaluated, and I made frequent checks of  vital signs and monitor. Nursing notes were reviewed.     Critical care time was[60 minutes], and exclusive of procedures.         MDM      Differential diagnosis reflecting the complexity of care include: Dehydration, acute renal insufficiency, C. difficile colitis, leukocytosis, atrial fib with RVR    Comorbidities that add complexity to management include: Atrial fib, congestive heart failure, kidney failure, chronic kidney disease, C. difficile colitis    External chart review was done and was noted: Nursing home notes were reviewed and patient was sent due to an elevated white count and elevated BUN and creatinine    Discussions of management was done with: Hospitalist along with nephrology was contacted they agreed with gentle fluid.  Will admit to CTU      Shared decision making was done by patient nephrology and the hospitalist.  Patient will be admitted for rate control hydration and further monitoring    Patient was evaluated in the emergency department and at this point patient will need admission for further management of medical condition.  Patient was stable in the emergency department and will be transferred to floor for further definitive care.  Patient's questions were answered.    This note was prepared using Dragon Medical voice recognition dictation software. As a result errors may occur. When identified these errors have been corrected. While every attempt is made to correct errors during dictation discrepancies may still exist      Admission disposition: 7/30/2024 12:44 AM                                        MDM    Disposition and Plan      Clinical Impression:  1. Clostridium difficile colitis    2. Metabolic acidosis    3. Dehydration    4. Leukocytosis, unspecified type    5. Chronic atrial fibrillation with RVR (HCC)         Disposition:  Admit  7/30/2024 12:44 am    Follow-up:  No follow-up provider specified.        Medications Prescribed:  Current Discharge Medication List                            Hospital Problems       Present on Admission  Date Reviewed: 7/8/2024            ICD-10-CM Noted POA    * (Principal) Clostridium difficile colitis A04.72 7/29/2024 Unknown                    be transferred to floor for further definitive care.  Patient's questions were answered.    This note was prepared using Dragon Medical voice recognition dictation software. As a result errors may occur. When identified these errors have been corrected. While every attempt is made to correct errors during dictation discrepancies may still exist      Admission disposition: 7/30/2024 12:44 AM                                        Medical Decision Making      Disposition and Plan     Clinical Impression:  1. Clostridium difficile colitis    2. Metabolic acidosis    3. Dehydration    4. Leukocytosis, unspecified type    5. Chronic atrial fibrillation with RVR (HCC)         Disposition:  Admit  7/30/2024 12:44 am    Follow-up:  Nayeli Cisneros MD  [address] [address]  [phone]    Follow up  Office will call you for follow up appt with Dr. Cisneros or [facility] next week. [phone].    Janell Powell MD  [facility] [address]  [phone]    Schedule an appointment as soon as possible for a visit            Medications Prescribed:  Discharge Medication List as of 7/31/2024  5:45 PM                            Hospital Problems       Present on Admission  Date Reviewed: 7/8/2024            ICD-10-CM Noted POA    * (Principal) Clostridium difficile colitis A04.72 7/29/2024 Unknown    Chronic atrial fibrillation with RVR (HCC) I48.20 7/30/2024 Unknown    Dehydration E86.0 2/1/2024 Unknown    Leukocytosis, unspecified type D72.829 7/30/2024 Unknown    Metabolic acidosis E87.20 7/30/2024 Unknown

## 2024-07-30 NOTE — PROGRESS NOTES
NURSING ADMISSION NOTE      Patient admitted via Ambulance  Oriented to room 3606.   Safety precautions initiated.  Bed in low position.  Call light in reach.    Received pt to unit around 0515. Admission navigator completed with pt and this RN. PT did not know home medications, verified with ALCON Del Valle from [facility]. A&O x3, disoriented to time. Intermittent confusion. RA, VSS, NSR on tele. Non cardiac electrolyte replacement protocol. Lab draw. PIVs in L and R forearms flushed and capped. Dressings CDI. R infusing cardizem gtt 5 mL/hr. L infusing NS 75 mL/hr. Takes eliquis and ASA at home. Low fiber soft diet, pills whole with thin. Purewick in place. Denies pain. Questions answered, nephro to see.

## 2024-07-30 NOTE — DIETARY NOTE
[facility]   part of [facility]   CLINICAL NUTRITION    Sunita Loza     Admitting diagnosis:  Dehydration [E86.0]  Metabolic acidosis [E87.20]  Clostridium difficile colitis [A04.72]  Leukocytosis, unspecified type [D72.829]  Chronic atrial fibrillation with RVR (HCC) [I48.20]    Ht: 152.4 cm (5')  Wt: 41.3 kg (91 lb).   Body mass index is 17.77 kg/m².  IBW: 45.5kg    Wt Readings from Last 6 Encounters:   07/30/24 41.3 kg (91 lb)   07/10/24 45.4 kg (100 lb)   06/24/24 45.8 kg (101 lb)   06/10/24 37.1 kg (81 lb 12.7 oz)   05/24/24 37.1 kg (81 lb 14.4 oz)   05/23/24 36.7 kg (81 lb)        Labs/Meds reviewed    Diet:       Procedures    Regular/General diet Is Patient on Accuchecks? No     Percent Meals Eaten (last 3 days)       Date/Time Percent Meals Eaten (%)    07/30/24 0845 25 %    07/30/24 1000 75 %            Pt chart reviewed d/t Low BMI.  Patient reports fair appetite at this time.  Nursing notes reports Percent Meals Eaten (%): 75 % intake for last meal.  Tolerating po diet without diarrhea, emesis, or constipation.   No significant weight changes noted.     PMH includes Colon CA, Endometrial Ca, HF, HTN, CKD, Nephrectomy.  Pt p/w dehydration, C.Diff. Pt seen for Low BMI. PT states her appetite is fair, RN reports pt ate breakfast this AM.  No n/v/d reported. + Colostomy.  Pt appears thin, but not wasted.  She is chronically thin with a -110# per pt.  She drinks Ensure Clear PTA, will resume.  Encouraged PO all meals.    Patient is at low nutrition risk at this time.    Please consult if patient status changes or nutrition issues arise.    Jessica Correa RD, LDN, [facility]  Clinical Dietitian  Phone [phone]

## 2024-07-30 NOTE — PLAN OF CARE
Problem: CARDIOVASCULAR - ADULT  Goal: Absence of cardiac arrhythmias or at baseline  Description: INTERVENTIONS:  - Continuous cardiac monitoring, monitor vital signs, obtain 12 lead EKG if indicated  - Evaluate effectiveness of antiarrhythmic and heart rate control medications as ordered  - Initiate emergency measures for life threatening arrhythmias  - Monitor electrolytes and administer replacement therapy as ordered  Outcome: Progressing     Problem: GASTROINTESTINAL - ADULT  Goal: Maintains or returns to baseline bowel function  Description: INTERVENTIONS:  - Assess bowel function  - Maintain adequate hydration with IV or PO as ordered and tolerated  - Evaluate effectiveness of GI medications  - Encourage mobilization and activity  - Obtain nutritional consult as needed  - Establish a toileting routine/schedule  - Consider collaborating with pharmacy to review patient's medication profile  Outcome: Progressing     Problem: METABOLIC/FLUID AND ELECTROLYTES - ADULT  Goal: Electrolytes maintained within normal limits  Description: INTERVENTIONS:  - Monitor labs and rhythm and assess patient for signs and symptoms of electrolyte imbalances  - Administer electrolyte replacement as ordered  - Monitor response to electrolyte replacements, including rhythm and repeat lab results as appropriate  - Fluid restriction as ordered  - Instruct patient on fluid and nutrition restrictions as appropriate  Outcome: Progressing  Goal: Hemodynamic stability and optimal renal function maintained  Description: INTERVENTIONS:  - Monitor labs and assess for signs and symptoms of volume excess or deficit  - Monitor intake, output and patient weight  - Monitor urine specific gravity, serum osmolarity and serum sodium as indicated or ordered  - Monitor response to interventions for patient's volume status, including labs, urine output, blood pressure (other measures as available)  - Encourage oral intake as appropriate  - Instruct  patient on fluid and nutrition restrictions as appropriate  Outcome: Progressing

## 2024-07-30 NOTE — CONSULTS
[facility]  Report of Consultation    Sunita Loza Patient Status:  Inpatient    1932 MRN [id number]   Location [facility] 3NE-A Attending Yannick Mcpherson MD   Hosp Day # 0 PCP Janell Powell MD       Assessment / Plan:    1) ZOHREH- due to modest PO intake / ostomy losses / Cdif colitis / diuretics; no other clear insults. PLAN- hold torsemide; gentle IVF (bicarb gtt)     2) CKD 3- baseline Cr < 2 mg/dl  in part due to remote nephrectomy > 20 yrs ago     3) L hip fx s/p IM nail  -> ongoing rehab / PT     4) s/p exp lap / LOS / colon resection / ostomy takedown / creation of colostomy .       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  Very pleasant 89-year-old female was been through quite a bit recently with mild memory impairment history of paroxysmal atrial fibrillation on amiodarone and Eliquis, chronic kidney disease, diastolic heart failure, recent hip fracture, recent ostomy revision who presents for abnormal labs including leukocytosis.  She was noted to be in a flutter with 2-1 conduction emergency room, started on Cardizem and was back in sinus rhythm while still in the ER.  Noted to have worsening kidney function, metabolic acidosis.  Denies any increased ostomy output or changes in habits or diet.  Has no specific complaints.    History:  Past Medical History:    Arthritis    Atrial fibrillation (HCC)    Back pain    C. difficile colitis    CKD (chronic kidney disease)    and S/P nephrectomy    Colon cancer (HCC)    Congestive  heart disease (HCC)    Easy bruising    Endometrial cancer (HCC)    UTERINE, DX ABOUT 30 YEARS    Hearing impairment    AIDS    Heart palpitations    Afib    High blood pressure    Kidney failure    Leg swelling    Bishop syndrome    hereditary colorectal cancer    Muscle weakness    WALKER    Pain in joints    Wears glasses    Weight loss     Past Surgical History:   Procedure Laterality Date    Colectomy      Nephrectomy Left     Part removal colon w end colostomy       Family History   Problem Relation Age of Onset    Heart Disorder Father     Heart Disorder Mother     Colon Cancer Mother         70    Heart Disorder Sister     Breast Cancer Sister     Diabetes Brother     Breast Cancer Sister     Cancer Sister     Breast Cancer Sister     Breast Cancer Daughter      Denies family history of kidney disease.    reports that she has never smoked. She has never used smokeless tobacco. She reports that she does not currently use alcohol. She reports that she does not use drugs.    Allergies:  Allergies   Allergen Reactions    Ciprofloxacin RASH and HIVES    Penicillins RASH       Medications:    Current Facility-Administered Medications:     amiodarone (Pacerone) tab 100 mg, 100 mg, Oral, Daily    amLODIPine (Norvasc) tab 5 mg, 5 mg, Oral, Daily    apixaban (Eliquis) tab 2.5 mg, 2.5 mg, Oral, BID    aspirin chewable tab 81 mg, 81 mg, Oral, Daily    dilTIAZem ER (Dilacor XR) 24 hr cap 120 mg, 120 mg, Oral, Daily    sodium bicarbonate tab 650 mg, 650 mg, Oral, BID    sodium chloride 0.9% infusion, , Intravenous, Continuous    acetaminophen (Tylenol Extra Strength) tab 500 mg, 500 mg, Oral, Q4H PRN    dilTIAZem (cardIZEM) 100 mg in sodium chloride 0.9% 100 mL IVPB-ADDV, 2.5-20 mg/hr, Intravenous, Continuous  No current outpatient medications on file.       Review of Systems:  Please see HPI for pertinent positives. 10 point review of systems otherwise reviewed and negative.     Physical Exam:  /64 (BP Location:  Left arm)   Pulse 70   Temp 97.4 °F (36.3 °C) (Oral)   Resp 16   Ht 5' (1.524 m)   Wt 91 lb (41.3 kg)   SpO2 100%   BMI 17.77 kg/m²   Temp (24hrs), Av.6 °F (36.4 °C), Min:97.4 °F (36.3 °C), Max:97.7 °F (36.5 °C)       Intake/Output Summary (Last 24 hours) at 2024 1107  Last data filed at 2024 1000  Gross per 24 hour   Intake 170 ml   Output --   Net 170 ml     Wt Readings from Last 3 Encounters:   24 91 lb (41.3 kg)   07/10/24 100 lb (45.4 kg)   24 101 lb (45.8 kg)     General: awake alert  HEENT: No scleral icterus, MMM  Neck: Supple, no MARIA FERNANDA or thyromegaly  Cardiac: Regular rate and rhythm, S1, S2 normal, no murmur, rub, or gallop  Lungs: Decreased breath sounds at the bases bilaterally.   Abdomen: Soft, non-tender. + bowel sounds, no palpable organomegaly  Extremities: Without clubbing, cyanosis; mild LE edema  Neurologic: Cranial nerves grossly intact, moving all extremities  Skin: Warm and dry, no rashes      Laboratory Data:  Lab Results   Component Value Date    WBC 14.2 2024    HGB 10.1 2024    HCT 32.8 2024    .0 2024    CREATSERUM 2.73 2024    BUN 49 2024     2024    K 4.7 2024     2024    CO2 19.0 2024    GLU 91 2024    CA 8.6 2024    ALB 3.6 2024    ALKPHO 125 2024    BILT <0.2 2024    TP 6.1 2024    AST 13 2024    ALT 8 2024       Imaging:  All imaging studies reviewed.      Thank you for allowing me to participate in the care of your patient.    Fabiana Miller MD  2024  11:07 AM

## 2024-07-30 NOTE — ED INITIAL ASSESSMENT (HPI)
Pt arrived via [facility] from [facility] with c/o elevated WBC in recent lab work. Pt currently being treated for c.diff.     A&Ox3

## 2024-07-30 NOTE — PLAN OF CARE
Pt Aox1-2,  NSR this morning so cardizem gtt discontinued.  VSS, denies pain or discomfort  Cardiology consult, medications adjusted  Nephro saw patient, Sodium bicarb at 75 ml/hr infusing  Regular diet, meds in applesauce.  Good appetite  Patient has colostomy in LLQ, purewick.    Up to restroom once with assist and walker.  Patient was in rehab post left hip fracture surgery.   Patient resides at [facility] and was at rehab and was going to discharge back in a couple days.  [facility] to be notified if home health needed prior to discharge.  Family at bedside updated on plan of care  On isolation for Cdiff- oral vanco per MAR  History of ESBL and MRSA  Glasses and hearing aids at bedside.  Continue to monitor.    Problem: CARDIOVASCULAR - ADULT  Goal: Absence of cardiac arrhythmias or at baseline  Description: INTERVENTIONS:  - Continuous cardiac monitoring, monitor vital signs, obtain 12 lead EKG if indicated  - Evaluate effectiveness of antiarrhythmic and heart rate control medications as ordered  - Initiate emergency measures for life threatening arrhythmias  - Monitor electrolytes and administer replacement therapy as ordered  Outcome: Progressing     Problem: METABOLIC/FLUID AND ELECTROLYTES - ADULT  Goal: Electrolytes maintained within normal limits  Description: INTERVENTIONS:  - Monitor labs and rhythm and assess patient for signs and symptoms of electrolyte imbalances  - Administer electrolyte replacement as ordered  - Monitor response to electrolyte replacements, including rhythm and repeat lab results as appropriate  - Fluid restriction as ordered  - Instruct patient on fluid and nutrition restrictions as appropriate  Outcome: Progressing  Goal: Hemodynamic stability and optimal renal function maintained  Description: INTERVENTIONS:  - Monitor labs and assess for signs and symptoms of volume excess or deficit  - Monitor intake, output and patient weight  - Monitor urine specific  gravity, serum osmolarity and serum sodium as indicated or ordered  - Monitor response to interventions for patient's volume status, including labs, urine output, blood pressure (other measures as available)  - Encourage oral intake as appropriate  - Instruct patient on fluid and nutrition restrictions as appropriate  Outcome: Progressing

## 2024-07-30 NOTE — CONSULTS
Cardiology Consultation    Sunita Loza Patient Status:  Inpatient    1932 MRN [id number]   [facility] 3NE-A Attending Yannick Mcpherson MD   Hosp Day # 0 PCP Janell Powell MD     Reason for Consultation:  PAF/flutter      History of Present Illness:  Sunita Loza is a a(n) 89 year old female. Sweet elderly woman with some memory impairment.  She has PAF, on amio and eliquis.  Also with CRI, diastolic CHF, C. Dif, recent hip fracture, and HTN.  She is unclear why she is here, but chart says for abnormal labs - elevated WBC.  In the ER, she was in atrial flutter with 2:1 conduction.  Started on IV diltiazem and back in NSR before leaving the ER.  She denies any palp's, cp, or dyspnea.    History:  Past Medical History:    Arthritis    Atrial fibrillation (HCC)    Back pain    C. difficile colitis    CKD (chronic kidney disease)    and S/P nephrectomy    Colon cancer (HCC)    Congestive heart disease (HCC)    Easy bruising    Endometrial cancer (HCC)    UTERINE, DX ABOUT 30 YEARS    Hearing impairment    AIDS    Heart palpitations    Afib    High blood pressure    Kidney failure    Leg swelling    Bishop syndrome    hereditary colorectal cancer    Muscle weakness    WALKER    Pain in joints    Wears glasses    Weight loss     Past Surgical History:   Procedure Laterality Date    Colectomy      Nephrectomy Left     Part removal colon w end colostomy       Family History   Problem Relation Age of Onset    Heart Disorder Father     Heart Disorder Mother     Colon Cancer Mother         70    Heart Disorder Sister     Breast Cancer Sister     Diabetes Brother     Breast Cancer Sister     Cancer Sister     Breast Cancer Sister     Breast Cancer Daughter          Allergies:  Allergies   Allergen Reactions    Ciprofloxacin RASH and HIVES    Penicillins RASH       Medications:   apixaban  2.5 mg Oral BID    aspirin  81 mg Oral Daily    sodium bicarbonate  650 mg Oral BID    amiodarone  200 mg Oral  BID with meals    [START ON 7/31/2024] dilTIAZem ER  240 mg Oral Daily       Continuous Infusions:   sodium chloride 75 mL/hr at 07/30/24 0554       Social History:   reports that she has never smoked. She has never used smokeless tobacco. She reports that she does not currently use alcohol. She reports that she does not use drugs.    Review of Systems:  All systems were reviewed and are negative except as described above in HPI.    Physical Exam:      Temp:  [97.4 °F (36.3 °C)-97.7 °F (36.5 °C)] 97.4 °F (36.3 °C)  Pulse:  [] 70  Resp:  [13-24] 16  BP: (121-176)/() 157/64  SpO2:  [97 %-100 %] 100 %    Last 3 Weights   07/30/24 0500 91 lb (41.3 kg)   07/29/24 2201 100 lb 1.4 oz (45.4 kg)   07/10/24 1303 100 lb (45.4 kg)   06/24/24 1027 101 lb (45.8 kg)           General: No apparent distress  HEENT: No focal deficits.  Neck: supple. JVP normal  Cardiac: Regular rhythm, S1, S2 normal,   No rub or gallop.  No murmur.  Lungs: CTA  Abdomen: Soft, non-tender.   Extremities: No edema  Neurologic: no focal deficits  Skin: Warm and dry.          Telemetry: sinus    Laboratories and Data:  Diagnostics:    EKG, 7/30/2024:  both reviewed    CXR, 7/30/2024:  none    Labs:   HEM:  Recent Labs   Lab 07/29/24 2205 07/30/24 0721   WBC 14.2* 14.2*   HGB 10.5* 10.1*   .0 243.0       Chem:  Recent Labs   Lab 07/29/24 2205 07/30/24  0721    142   K 4.7 4.7    116*   CO2 20.0* 19.0*   BUN 52* 49*   CREATSERUM 3.20* 2.73*   CA 8.2* 8.6*   * 91       Recent Labs   Lab 07/29/24 2205   ALT 8*   AST 13   ALB 3.6       No results for input(s): \"PTP\", \"INR\" in the last 168 hours.    No results for input(s): \"TROP\", \"CK\" in the last 168 hours.      Impression:   PAF/flutter - Asx it appears.  On eliquis and low dose amio.  Elevated WBC  C. Dif.  CRI - nephrology saw.  HTN  Advanced age, recent hip fracture, some dementia, frail state.  Chronic diastolic CHF, preserved EF April 2024.    Plan:  Stop  amlodipine.  Increase diltiazem to 240 mg daily.  Amio 200 mg BID for two weeks, then 200 mg daily.  Continue eliquis.  Plan per primary.      Quan Palacios MD  7/30/2024  11:20 AM  C5

## 2024-07-30 NOTE — ED QUICK NOTES
Orders for admission, patient is aware of plan and ready to go upstairs. Any questions, please call ED RN Jo Ann at extension [phone].     Patient Covid vaccination status: Fully vaccinated     COVID Test Ordered in ED: None    COVID Suspicion at Admission: N/A    Running Infusions:      Mental Status/LOC at time of transport: A&Ox3. Being tx for c.diff at [facility].     Other pertinent information:   CIWA score: N/A   NIH score:  N/A

## 2024-07-30 NOTE — H&P
[facility] Hospitalist H&P       CC:   Chief Complaint   Patient presents with    Abnormal Labs        PCP: Janell Powell MD    History of Present Illness:    Patient is a 89-year-old female significant past medical history of recent left femoral neck fracture status post cephalomedullary nailing last month, CKD stage III-IV, renal cell carcinoma status post nephrectomy, partial colon resection currently with an ostomy, history of C. difficile colitis, hypertension, paroxysmal A-fib, diastolic heart failure presented with elevated white blood cell count from the rehab facility that she was recovering after she was sent during her last admission.  Patient currently has no symptoms she states that she feels well, she has an ostomy so has not noticed that the diarrhea is significantly worse no fevers no chills no abdominal pain, no nausea no vomiting -Labs done at the nursing facility had shown elevated white count and patient was being treated with 6 for C. difficile and so she was sent to the ER.    In the emergency room she was slightly hypertensive with blood pressure 176/80, rest of vitals were stable, labs were remarkable for leukocytosis of 14.2, creatinine was elevated at 3.2 EKG showed a flutter with RVR and heart rate of 153    She was started on some fluids, nephrology was consulted given the fact that she was in a flutter she was also started on Cardizem bolus and drip    Patient seen at bedside with her son, doing well no complaints, she stated that she was planning on going to her assisted living for after being discharged from facility and she was to be discharged this Friday.  PMH  Past Medical History:    Arthritis    Atrial fibrillation (HCC)    Back pain    C. difficile colitis    CKD (chronic kidney disease)    and S/P nephrectomy    Colon cancer (HCC)    Congestive heart disease (HCC)    Easy bruising    Endometrial cancer (HCC)    UTERINE, DX ABOUT 30 YEARS    Hearing impairment    AIDS    Heart  palpitations    Afib    High blood pressure    Kidney failure    Leg swelling    Bishop syndrome    hereditary colorectal cancer    Muscle weakness    WALKER    Pain in joints    Wears glasses    Weight loss        PSH  Past Surgical History:   Procedure Laterality Date    Colectomy      Nephrectomy Left     Part removal colon w end colostomy          ALL:  Allergies   Allergen Reactions    Ciprofloxacin RASH and HIVES    Penicillins RASH        Home Medications:  Current Facility-Administered Medications for the 7/29/24 encounter (Hospital Encounter)   Medication Dose Route Frequency Provider Last Rate Last Admin    [COMPLETED] darbepoetin chris (Aranesp) 300 MCG/0.6ML injection 300 mcg  300 mcg Subcutaneous Once Fabiana Miller MD   300 mcg at 06/24/24 1041     Outpatient Medications Marked as Taking for the 7/29/24 encounter (Hospital Encounter)   Medication Sig Dispense Refill    lidocaine-menthol 4-1 % External Patch Place 1 patch onto the skin daily. L knee bedtime      traMADol 50 MG Oral Tab Take 0.5 tablets (25 mg total) by mouth 2 (two) times daily.      vancomycin 50 mg/ml Oral Solution Take 2.5 mL (125 mg total) by mouth every 6 (six) hours. For 14 days, end 8/11      torsemide 20 MG Oral Tab Take 1 tablet (20 mg total) by mouth every other day.      saccharomyces boulardii 250 MG Oral Cap Take 1 capsule (250 mg total) by mouth daily.      amLODIPine 5 MG Oral Tab Take 1 tablet (5 mg total) by mouth daily. 30 tablet 2    sodium bicarbonate 650 MG Oral Tab Take 1 tablet (650 mg total) by mouth 2 (two) times daily. 60 tablet 0    omeprazole 20 MG Oral Capsule Delayed Release Take 1 capsule (20 mg total) by mouth every morning before breakfast.      Glucosamine-Chondroit-Biofl-Mn (GLUCOSAMINE CHONDROIT,BIOFLAV, OR) Take 1 tablet by mouth daily.      ferrous sulfate 325 (65 FE) MG Oral Tab EC Take 1 tablet (325 mg total) by mouth daily with breakfast.      amiodarone 100 MG Oral Tab Take 1 tablet (100 mg  total) by mouth daily.      apixaban 2.5 MG Oral Tab Take 1 tablet (2.5 mg total) by mouth 2 (two) times daily.      dilTIAZem  MG Oral Capsule SR 24 Hr Take 1 capsule (120 mg total) by mouth daily.      Calcium Carbonate-Vitamin D (OYSTER SHELL CALCIUM/D) 500-200 MG-UNIT Oral Tab Take 1 tablet by mouth daily.      aspirin 81 MG Oral Tab Take 1 tablet (81 mg total) by mouth daily.      cyanocobalamin 1000 MCG Oral Tab Take 100 mcg by mouth daily.      Multiple Vitamin (MULTIVITAMINS OR) Take 1 tablet by mouth daily.      acetaminophen 500 MG Oral Tab Take 1 tablet (500 mg total) by mouth in the morning and 1 tablet (500 mg total) before bedtime.           Soc Hx  Social History     Tobacco Use    Smoking status: Never    Smokeless tobacco: Never   Substance Use Topics    Alcohol use: Not Currently        Fam Hx  Family History   Problem Relation Age of Onset    Heart Disorder Father     Heart Disorder Mother     Colon Cancer Mother         70    Heart Disorder Sister     Breast Cancer Sister     Diabetes Brother     Breast Cancer Sister     Cancer Sister     Breast Cancer Sister     Breast Cancer Daughter        Review of Systems  General: Denies unintentional weight loss, fevers, or chills-negative except for above  HEENT: Denies vision loss or double vision, denies hearing loss  Cardiovascular: Denies chest pain, palpitations, peripheral edema  Pulmonary: Denies cough, shortness of breath, or wheezing  Gastrointestinal: Denies abdominal pain, melena, or hematochezia  Genitourinary: Denies urinary frequency, urgency, and dysuria  Neurologic: Denies numbness, headaches, focal weakness  Skin: Denies rashes, sores  Endocrine: Denies heat or cold intolerance, denies polydipsia  Hematologic: Denies abnormal bleeding or bruising     OBJECTIVE:  /68 (BP Location: Left arm)   Pulse 77   Temp 97.4 °F (36.3 °C) (Oral)   Resp 16   Ht 5' (1.524 m)   Wt 91 lb (41.3 kg)   SpO2 100%   BMI 17.77 kg/m²     BP  Readings from Last 3 Encounters:   07/30/24 153/68   07/10/24 159/76   07/08/24 124/70     Wt Readings from Last 3 Encounters:   07/30/24 91 lb (41.3 kg)   07/10/24 100 lb (45.4 kg)   06/24/24 101 lb (45.8 kg)       Wt Readings from Last 6 Encounters:   07/30/24 91 lb (41.3 kg)   07/10/24 100 lb (45.4 kg)   06/24/24 101 lb (45.8 kg)   06/10/24 81 lb 12.7 oz (37.1 kg)   05/24/24 81 lb 14.4 oz (37.1 kg)   05/23/24 81 lb (36.7 kg)     Gen: No acute distress, alert and oriented x 3  Pulm: Lungs clear bilaterally, good inspiratory effort   CV:  nL S1/S2 normal sinus rhythm at this time  Abd: soft, NT/ND, no hepatomegaly, +BS ostomy in place with loose bowel movements but no bloodMSK: moving all extremities, no edema  Neuro: no focal deficits  Skin: no rashes/lesions  Psych: normal mood/affect          Diagnostic Data:    CBC/Chem  Recent Labs   Lab 07/29/24 2205 07/30/24  0721   WBC 14.2* 14.2*   HGB 10.5* 10.1*   MCV 94.1 93.7   .0 243.0       Recent Labs   Lab 07/29/24 2205 07/30/24  0721    142   K 4.7 4.7    116*   CO2 20.0* 19.0*   BUN 52* 49*   CREATSERUM 3.20* 2.73*   * 91   CA 8.2* 8.6*       Recent Labs   Lab 07/29/24 2205   ALT 8*   AST 13   ALB 3.6       No results for input(s): \"TROP\" in the last 168 hours.      Radiology: No results found.         ASSESSMENT / PLAN:       Patient is a 89-year-old female significant past medical history of recent left femoral neck fracture status post cephalomedullary nailing last month, CKD stage III-IV, renal cell carcinoma status post nephrectomy, partial colon resection currently with an ostomy, history of C. difficile colitis, hypertension, paroxysmal A-fib, diastolic heart failure presented with elevated white blood cell count    # Paroxysmal A-fib/a flutter  -Converted on her own, off diltiazem drip  -Increase p.o. diltiazem to 240 mg daily  -Amiodarone 200 mg twice daily for 2 weeks and 200 mg daily, continue anticoagulation, cardiology  consulted  -No need to repeat an echo    # ZOHREH on CKD stage III  -Likely secondary to C. difficile plus diuretics plus diarrhea has poor p.o. intake  -Diarrhea appears to be well-controlled at this time, continue bicarb drip  -Creatinine already improved from 3.2--> 2.7, baseline is around 2.1  -Continue to monitor on fluids at this time  -Holding diuretics  Nephrology managing-    # C. difficile colitis with the leukocytosis  -Leukocytosis at 14 on admission,  -Patient has an ostomy with liquid stool in it  -Continue p.o. vancomycin 105 4 times daily,  -Check C. difficile again here    # Hypertension   stopped amlodipine at this time, increase diltiazem,  -Stopped of torsemide at this time as well    # Diastolic heart failure  -Currently euvolemic and compensated, continue to hold torsemide secondary to ZOHREH    # History of colon cancer status post colon resection and creation of ostomy  # RCC s/p nephrectomy  -    Prophy:  DVT: Already on Eliquis OT  Deconditioning prevention: PT OT    Dispo: admit to [facility] with telemetry    Outpatient records reviewed confirming patient's medical history and medications.     Further recommendations pending patient's clinical course.  [facility] hospitalist to continue to follow patient while in house    Time spent: greater than 95 minutes spent in d/w pt/family, coordination of care, and d/w staff.     Santos avendaño MD   Internal Medicine  [facility] Hospitalist  Pager: [phone]      **Certification      PHYSICIAN Certification of Need for Inpatient Hospitalization - Initial Certification    Patient will require inpatient services that will reasonably be expected to span two midnight's based on the clinical documentation in H+P.   Based on patients current state of illness, I anticipate that, after discharge, patient will require TBD.

## 2024-07-31 VITALS
RESPIRATION RATE: 17 BRPM | DIASTOLIC BLOOD PRESSURE: 64 MMHG | WEIGHT: 91 LBS | HEIGHT: 60 IN | TEMPERATURE: 98 F | OXYGEN SATURATION: 97 % | SYSTOLIC BLOOD PRESSURE: 146 MMHG | HEART RATE: 80 BPM | BODY MASS INDEX: 17.87 KG/M2

## 2024-07-31 LAB
ANION GAP SERPL CALC-SCNC: 7 MMOL/L (ref 0–18)
BASOPHILS # BLD AUTO: 0.02 X10(3) UL (ref 0–0.2)
BASOPHILS NFR BLD AUTO: 0.2 %
BUN BLD-MCNC: 28 MG/DL (ref 9–23)
CALCIUM BLD-MCNC: 8.2 MG/DL (ref 8.7–10.4)
CHLORIDE SERPL-SCNC: 112 MMOL/L (ref 98–112)
CO2 SERPL-SCNC: 22 MMOL/L (ref 21–32)
CREAT BLD-MCNC: 2.19 MG/DL
EGFRCR SERPLBLD CKD-EPI 2021: 21 ML/MIN/1.73M2 (ref 60–?)
EOSINOPHIL # BLD AUTO: 0.1 X10(3) UL (ref 0–0.7)
EOSINOPHIL NFR BLD AUTO: 1 %
ERYTHROCYTE [DISTWIDTH] IN BLOOD BY AUTOMATED COUNT: 18.1 %
GLUCOSE BLD-MCNC: 88 MG/DL (ref 70–99)
HCT VFR BLD AUTO: 30.9 %
HGB BLD-MCNC: 9.5 G/DL
IMM GRANULOCYTES # BLD AUTO: 0.06 X10(3) UL (ref 0–1)
IMM GRANULOCYTES NFR BLD: 0.6 %
LYMPHOCYTES # BLD AUTO: 1.13 X10(3) UL (ref 1–4)
LYMPHOCYTES NFR BLD AUTO: 11.3 %
MCH RBC QN AUTO: 28.9 PG (ref 26–34)
MCHC RBC AUTO-ENTMCNC: 30.7 G/DL (ref 31–37)
MCV RBC AUTO: 93.9 FL
MONOCYTES # BLD AUTO: 0.92 X10(3) UL (ref 0.1–1)
MONOCYTES NFR BLD AUTO: 9.2 %
NEUTROPHILS # BLD AUTO: 7.81 X10 (3) UL (ref 1.5–7.7)
NEUTROPHILS # BLD AUTO: 7.81 X10(3) UL (ref 1.5–7.7)
NEUTROPHILS NFR BLD AUTO: 77.7 %
OSMOLALITY SERPL CALC.SUM OF ELEC: 297 MOSM/KG (ref 275–295)
PLATELET # BLD AUTO: 259 10(3)UL (ref 150–450)
POTASSIUM SERPL-SCNC: 4.8 MMOL/L (ref 3.5–5.1)
RBC # BLD AUTO: 3.29 X10(6)UL
SODIUM SERPL-SCNC: 141 MMOL/L (ref 136–145)
WBC # BLD AUTO: 10 X10(3) UL (ref 4–11)

## 2024-07-31 PROCEDURE — 99233 SBSQ HOSP IP/OBS HIGH 50: CPT | Performed by: INTERNAL MEDICINE

## 2024-07-31 RX ORDER — VANCOMYCIN HYDROCHLORIDE 125 MG/1
CAPSULE ORAL
Qty: 55 CAPSULE | Refills: 0 | Status: SHIPPED | OUTPATIENT
Start: 2024-07-31 | End: 2024-09-03

## 2024-07-31 RX ORDER — AMIODARONE HYDROCHLORIDE 200 MG/1
TABLET ORAL
Qty: 60 TABLET | Refills: 0 | Status: SHIPPED | OUTPATIENT
Start: 2024-07-31

## 2024-07-31 RX ORDER — DILTIAZEM HYDROCHLORIDE 240 MG/1
240 CAPSULE, EXTENDED RELEASE ORAL DAILY
Qty: 30 CAPSULE | Refills: 11 | Status: SHIPPED | OUTPATIENT
Start: 2024-08-01 | End: 2025-08-01

## 2024-07-31 NOTE — CM/SW NOTE
07/31/24 1611   Discharge disposition   Expected discharge disposition subacute   Post Acute Care Provider [facility]   Discharge transportation [facility]     Notified by RN pt is medically cleared for discharge. [facility] arranged for 5:30pm. Updated RN and [facility]. RN to update pt/family. PCS form completed and available for RN to print.      [facility]  [phone]    [facility]  [phone]    SUDHEER Leblanc  Discharge Planner

## 2024-07-31 NOTE — PROGRESS NOTES
Cardiology Progress Note        Sunita Loza Patient Status:  Inpatient    1932 MRN [id number]   [facility] 3NE-A Attending Yannick Mcpherson MD   Hosp Day # 1 PCP Janell Powell MD     Subjective:  No cardiac issues.  Remains confused.    Medications:   apixaban  2.5 mg Oral BID    aspirin  81 mg Oral Daily    sodium bicarbonate  650 mg Oral BID    amiodarone  200 mg Oral BID with meals    dilTIAZem ER  240 mg Oral Daily    vancomycin  125 mg Oral QID       Continuous Infusions:   sodium bicarbonate in sterile water infusion 100 mEq (24 1321)         Allergies:  Allergies   Allergen Reactions    Ciprofloxacin RASH and HIVES    Penicillins RASH         Intake/Output:    Intake/Output Summary (Last 24 hours) at 2024 0712  Last data filed at 2024 0647  Gross per 24 hour   Intake 2195.4 ml   Output 1350 ml   Net 845.4 ml           Last 3 Weights   24 0500 91 lb (41.3 kg)   24 2201 100 lb 1.4 oz (45.4 kg)   07/10/24 1303 100 lb (45.4 kg)   24 1027 101 lb (45.8 kg)            Physical Exam:    Temp:  [97.4 °F (36.3 °C)-97.9 °F (36.6 °C)] 97.9 °F (36.6 °C)  Pulse:  [68-83] 77  Resp:  [16-18] 18  BP: (151-157)/(64-81) 155/71  SpO2:  [97 %-100 %] 100 %    General: No apparent distress  HEENT: No focal deficits.  Neck: supple. JVP normal  Cardiac: Regular rhythm, S1, S2 normal,  rub or gallop.  No murmur.  Lungs: CTA  Abdomen: Soft, non-tender.   Extremities: No edema  Neurologic: no focal deficits  Skin: Warm and dry.     Telemetry: sinus    EKG:      Echo:      Cardiac Cath:      Labs:  HEM:  Recent Labs   Lab 24   WBC 14.2* 14.2*   HGB 10.5* 10.1*   .0 243.0       Chem:  Recent Labs   Lab 24    142   K 4.7 4.7    116*   CO2 20.0* 19.0*   BUN 52* 49*   CREATSERUM 3.20* 2.73*   CA 8.2* 8.6*   * 91       Recent Labs   Lab 24  2205   ALT 8*   AST 13   ALB 3.6       No  results for input(s): \"TROP\", \"CK\" in the last 168 hours.    No results for input(s): \"PTP\", \"INR\" in the last 168 hours.              Impression:  PAF/flutter (2:1) - Asx it appears.  On eliquis and low dose amio.  Converted spontaneously in the ER.  Elevated WBC  C. Dif.  CRI - nephrology saw.  HTN  Advanced age, recent hip fracture, some dementia, frail state.  Chronic diastolic CHF, preserved EF April 2024.          Plan:  Stopped amlodipine and increased diltiazem.  Amio was at 100 mg daily.  Will load with 200 mg BID for two weeks, then 200 mg daily.  Same eliquis.  Labs pending today.  CV status is stable.        Quan Palacios MD  7/31/2024  7:12 AM  L3

## 2024-07-31 NOTE — PROGRESS NOTES
[facility]  Nephrology Progress Note    Sunita Loza Attending:  Yannick Mcpherson MD       Assessment and Plan:    1) ZOHREH- due to modest PO intake / ostomy losses / Cdif colitis / diuretics- improving. HL IV     2) CKD 3- baseline Cr < 2 mg/dl  in part due to remote nephrectomy > 20 yrs ago     3) L hip fx s/p IM nail  -> ongoing rehab / PT     4) s/p exp lap / LOS / colon resection / ostomy takedown / creation of colostomy .       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)     8) Anemia- due to CKD / chronic dz / low Fe stores; s/p recent IV Fe + EPO      9) Chronic dependent edema- preserved EF with mild-mod TR / AI / MR + normal RV     10) Recent cdif colitis- on PO vanco     DC planning      Subjective:  Awake notes reviewed in good spirits no c/o     Physical Exam:   /72 (BP Location: Left arm)   Pulse 73   Temp 97.7 °F (36.5 °C) (Oral)   Resp 17   Ht 5' (1.524 m)   Wt 91 lb (41.3 kg)   SpO2 100%   BMI 17.77 kg/m²   Temp (24hrs), Av.7 °F (36.5 °C), Min:97.4 °F (36.3 °C), Max:97.9 °F (36.6 °C)       Intake/Output Summary (Last 24 hours) at 2024 1026  Last data filed at 2024 0800  Gross per 24 hour   Intake 2265.4 ml   Output 1350 ml   Net 915.4 ml     Wt Readings from Last 3 Encounters:   24 91 lb (41.3 kg)   07/10/24 100 lb (45.4 kg)   24 101 lb (45.8 kg)     General: awake  HEENT: No scleral icterus, MMM  Neck: Supple, no MARIA FERNANDA or thyromegaly  Cardiac: Regular rate and rhythm, S1, S2 normal, no murmur or tub  Lungs: Decreased BS at bases bilaterally   Abdomen: Soft, non-tender. + bowel sounds, no palpable organomegaly  Extremities: Without clubbing, cyanosis; no edema  Neurologic: Cranial nerves grossly intact, moving all extremities  Skin: Warm and dry, no rashes       Labs:   Lab Results   Component Value Date    WBC 10.0  07/31/2024    HGB 9.5 07/31/2024    HCT 30.9 07/31/2024    .0 07/31/2024    CREATSERUM 2.19 07/31/2024    BUN 28 07/31/2024     07/31/2024    K 4.8 07/31/2024     07/31/2024    CO2 22.0 07/31/2024    GLU 88 07/31/2024    CA 8.2 07/31/2024       Imaging:  All imaging studies reviewed.    Meds:   Current Facility-Administered Medications   Medication Dose Route Frequency    apixaban (Eliquis) tab 2.5 mg  2.5 mg Oral BID    aspirin chewable tab 81 mg  81 mg Oral Daily    sodium bicarbonate tab 650 mg  650 mg Oral BID    acetaminophen (Tylenol Extra Strength) tab 500 mg  500 mg Oral Q4H PRN    amiodarone (Pacerone) tab 200 mg  200 mg Oral BID with meals    dilTIAZem ER (Dilacor XR) 24 hr cap 240 mg  240 mg Oral Daily    sodium bicarbonate 100 mEq in sterile water 1,100 mL infusion  100 mEq Intravenous Continuous    vancomycin (Vancocin) cap 125 mg  125 mg Oral QID         Questions/concerns were discussed with patient and/or family by bedside.          Fabiana Miller MD  7/31/2024  10:26 AM

## 2024-07-31 NOTE — CM/SW NOTE
07/31/24 1500   CM/SW Referral Data   Referral Source Social Work (self-referral)   Reason for Referral Discharge planning   Informant Son;Clinical Staff Member;EMR   Medical Hx   Does patient have an established PCP? Yes   Patient Info   Patient's Home Environment Independent Living   Patient lives with Daughter   Discharge Needs   Anticipated D/C needs Subacute rehab     Pt is a 89 y/o female admitted with clostridium difficile colitis. MARY received message from PT stating pt was at [facility] and was supposed to transition to prison yesterday prior to hospitalization. Notified by PT anticipated need is home healthcare. Anticipated therapy need: Home with Home Healthcare  MARY spoke with [facility] Director of Nursing Shahana via phone [phone].    Shahana stated pt is not yet a resident at [facility] and cannot admit to prison today. Shahana stated pt will need to return to [facility] and she will re-evaluate pt later this week to determine if pt is appropriate for [facility]. MARY inquired if pt can admit to [facility] since she was supposed to transition to prison yesterday. Shahana stated the [facility] transition had been postponed and she will need to reassess pt before she can admit to prison. Shahana stated pt must admit back to [facility].    MARY spoke with Admissions Manager Sosa at [facility]. Sosa stated pt was supposed to transition to [facility] yesterday but transition was postponed due to abnormal labs. Sosa stated [facility] is able to accept pt back today.     MARY spoke with pt's son Arron via phone. Pt's son stated pt resides at [facility] but has been in rehab for the last six weeks. Pt's son stated pt's dtr who has down syndrome also lives with pt in [place] [facility] [place] apartment. Pt's son stated pt is not an prison resident but is in the process of transitioning to [facility]. Pt's son stated pt's dtr will move into prison apartment with pt once pt transitions to [facility], and pt's dtr is currently staying with  him. SW informed pt's son [facility] will not accept pt and stated she must return to [facility]. Pt's son in agreement with returning to [facility] today.     Referral sent to [facility] in [place]. Updated RN.     SUDHEER Leblanc  Discharge Planner

## 2024-07-31 NOTE — PLAN OF CARE
Assumed pt care this morning at 0730.   Pt is A&OX2-3, periods of forgetfulness. Anxious.   On room air, lungs clear. VSS.  Tele: NSR.   Denies having pain.   On isolation for c.diff, MRSA, ESBL.   Iron IVPB and epoetin subcutaneous given today.   Plan is discharge today.   L forearm PIV saline lock.   R AV PIV saline lock.   Regular diet tolerating well.   Pt worked with physical therapy, up in chair.   Bed in lowest position, call light within reach, bed alarm on.     Problem: CARDIOVASCULAR - ADULT  Goal: Absence of cardiac arrhythmias or at baseline  Description: INTERVENTIONS:  - Continuous cardiac monitoring, monitor vital signs, obtain 12 lead EKG if indicated  - Evaluate effectiveness of antiarrhythmic and heart rate control medications as ordered  - Initiate emergency measures for life threatening arrhythmias  - Monitor electrolytes and administer replacement therapy as ordered  Outcome: Progressing     Problem: GASTROINTESTINAL - ADULT  Goal: Maintains or returns to baseline bowel function  Description: INTERVENTIONS:  - Assess bowel function  - Maintain adequate hydration with IV or PO as ordered and tolerated  - Evaluate effectiveness of GI medications  - Encourage mobilization and activity  - Obtain nutritional consult as needed  - Establish a toileting routine/schedule  - Consider collaborating with pharmacy to review patient's medication profile  Outcome: Progressing     Problem: METABOLIC/FLUID AND ELECTROLYTES - ADULT  Goal: Electrolytes maintained within normal limits  Description: INTERVENTIONS:  - Monitor labs and rhythm and assess patient for signs and symptoms of electrolyte imbalances  - Administer electrolyte replacement as ordered  - Monitor response to electrolyte replacements, including rhythm and repeat lab results as appropriate  - Fluid restriction as ordered  - Instruct patient on fluid and nutrition restrictions as appropriate  Outcome: Progressing  Goal: Hemodynamic stability and  optimal renal function maintained  Description: INTERVENTIONS:  - Monitor labs and assess for signs and symptoms of volume excess or deficit  - Monitor intake, output and patient weight  - Monitor urine specific gravity, serum osmolarity and serum sodium as indicated or ordered  - Monitor response to interventions for patient's volume status, including labs, urine output, blood pressure (other measures as available)  - Encourage oral intake as appropriate  - Instruct patient on fluid and nutrition restrictions as appropriate  Outcome: Progressing

## 2024-07-31 NOTE — PLAN OF CARE
Assumed care at 1930. A&O X2, disoriented to time and situation. Short term memory loss. RA, VSS, NSR on tele. Non cardiac electrolyte replacement protocol. Lab draw. PIVs in L AC and R AC flushed and capped. Dressings CDI. Bicarb gtt 75 mL/hr. Regular diet, pills in applesauce. Able to swallow whole, pt prefers crushed. Purewick at night, continent. Colostomy. Denies pain.Up 1 with walker. Contact iso for cdiff, PO vanco.     Problem: CARDIOVASCULAR - ADULT  Goal: Absence of cardiac arrhythmias or at baseline  Description: INTERVENTIONS:  - Continuous cardiac monitoring, monitor vital signs, obtain 12 lead EKG if indicated  - Evaluate effectiveness of antiarrhythmic and heart rate control medications as ordered  - Initiate emergency measures for life threatening arrhythmias  - Monitor electrolytes and administer replacement therapy as ordered  Outcome: Progressing     Problem: GASTROINTESTINAL - ADULT  Goal: Maintains or returns to baseline bowel function  Description: INTERVENTIONS:  - Assess bowel function  - Maintain adequate hydration with IV or PO as ordered and tolerated  - Evaluate effectiveness of GI medications  - Encourage mobilization and activity  - Obtain nutritional consult as needed  - Establish a toileting routine/schedule  - Consider collaborating with pharmacy to review patient's medication profile  Outcome: Progressing     Problem: METABOLIC/FLUID AND ELECTROLYTES - ADULT  Goal: Electrolytes maintained within normal limits  Description: INTERVENTIONS:  - Monitor labs and rhythm and assess patient for signs and symptoms of electrolyte imbalances  - Administer electrolyte replacement as ordered  - Monitor response to electrolyte replacements, including rhythm and repeat lab results as appropriate  - Fluid restriction as ordered  - Instruct patient on fluid and nutrition restrictions as appropriate  Outcome: Progressing  Goal: Hemodynamic stability and optimal renal function maintained  Description:  INTERVENTIONS:  - Monitor labs and assess for signs and symptoms of volume excess or deficit  - Monitor intake, output and patient weight  - Monitor urine specific gravity, serum osmolarity and serum sodium as indicated or ordered  - Monitor response to interventions for patient's volume status, including labs, urine output, blood pressure (other measures as available)  - Encourage oral intake as appropriate  - Instruct patient on fluid and nutrition restrictions as appropriate  Outcome: Progressing

## 2024-07-31 NOTE — PHYSICAL THERAPY NOTE
PHYSICAL THERAPY EVALUATION - INPATIENT     Room Number: 3606/3606-A  Evaluation Date: 7/31/2024  Type of Evaluation: Initial  Physician Order: PT Eval and Treat    Presenting Problem: cdiff, PAF/flutter  Co-Morbidities : L hip fracture surgery 6/11/24, CKD, nephrectomy, colectomy with ostomy, HTN, afib, heart failure  Reason for Therapy: Mobility Dysfunction and Discharge Planning    PHYSICAL THERAPY ASSESSMENT   Patient is currently functioning near baseline with bed mobility, transfers, and gait.  Prior to admission, patient's baseline is mod ind with RW.  Patient is requiring contact guard assist as a result of the following impairments: decreased functional strength, decreased endurance/aerobic capacity, impaired   balance, and decreased muscular endurance. Physical Therapy will continue to follow for duration of hospitalization.      Patient will benefit from continued skilled PT Services at discharge to promote prior level of function and safety with additional support and return home with home health PT.    PLAN  PT Treatment Plan: Bed mobility;Endurance;Energy conservation;Patient education;Family education;Gait training;Range of motion;Strengthening;Transfer training;Balance training  Rehab Potential : Good  Frequency (Obs): 3-5x/week  Number of Visits to Meet Established Goals: 4      CURRENT GOALS    Goal #1 Patient is able to demonstrate supine - sit EOB @ level: supervision     Goal #2 Patient is able to demonstrate transfers Sit to/from Stand at assistance level: supervision     Goal #3 Patient is able to ambulate 150 feet with assist device: walker - rolling at assistance level: supervision     Goal #4    Goal #5    Goal #6    Goal Comments: Goals established on 7/31/2024      PHYSICAL THERAPY MEDICAL/SOCIAL HISTORY  History related to current admission: Patient is a 89 year old female admitted on 7/29/2024 from [facility] for cdiff and PAF/flutter.      HOME SITUATION  Type of Home: Assisted living  facility   Home Layout: One level                Lives With: Staff 24 hours;Daughter (dtr with down syndrome)  Drives: No  Patient Owned Equipment: Rolling walker;Wheelchair       Prior Level of [place]: Pt is typically mod ind with ADLs and ambulates with a RW. Pt recently set up to move from [facility] to [facility], however was then hospitalized this admission. Per son, pt will have 1:1 assist for functional mobility initially when returning to [facility].     SUBJECTIVE  \"What are we doing now?\"      OBJECTIVE  Precautions: Bed/chair alarm;Colostomy  Fall Risk: High fall risk    WEIGHT BEARING RESTRICTION  Weight Bearing Restriction: L lower extremity           L Lower Extremity: Weight Bearing as Tolerated    PAIN ASSESSMENT  Rating: Unable to rate  Location: LLE  Management Techniques: Activity promotion;Relaxation;Repositioning    COGNITION  Not formally assessed, pt forgetful, pt follows commands well    RANGE OF MOTION AND STRENGTH ASSESSMENT  Upper extremity ROM and strength are within functional limits     Lower extremity ROM is within functional limits, except LLE limited due to recent L hip surgery    Lower extremity strength is within functional limits; except LLE limited due to recent L hip surgery       BALANCE  Static Sitting: Good  Dynamic Sitting: Good  Static Standing: Poor +  Dynamic Standing: Poor +    ADDITIONAL TESTS                                    ACTIVITY TOLERANCE   Vitals stable                       O2 WALK       NEUROLOGICAL FINDINGS                        AM-PAC '6-Clicks' INPATIENT SHORT FORM - BASIC MOBILITY  How much difficulty does the patient currently have...  Patient Difficulty: Turning over in bed (including adjusting bedclothes, sheets and blankets)?: None   Patient Difficulty: Sitting down on and standing up from a chair with arms (e.g., wheelchair, bedside commode, etc.): A Little   Patient Difficulty: Moving from lying on back to sitting on the side of the bed?: A Little   How  much help from another person does the patient currently need...   Help from Another: Moving to and from a bed to a chair (including a wheelchair)?: A Little   Help from Another: Need to walk in hospital room?: A Little   Help from Another: Climbing 3-5 steps with a railing?: A Little       AM-PAC Score:  Raw Score: 19   Approx Degree of Impairment: 41.77%   Standardized Score (AM-PAC Scale): 45.44   CMS Modifier (G-Code): CK    FUNCTIONAL ABILITY STATUS  Gait Assessment   Functional Mobility/Gait Assessment  Gait Assistance: Contact guard assist  Distance (ft): 25  Assistive Device: Rolling walker  Pattern: Shuffle    Skilled Therapy Provided: Per RN okay to work with pt. Pt received in supine and was agreeable to PT session.     Bed Mobility:  Rolling: NT  Supine to sit: supervision   Sit to supine: NT     Transfer Mobility:  Sit to stand: min A    Stand to sit: min A  Gait = pt ambulated with RW and CGA    Therapist's Comments: pt educated on role of therapy, goals for session, safety, fall prevention, and activity recommendations.     Exercise/Education Provided:  Bed mobility  Energy conservation  Functional activity tolerated  Gait training  Posture  Strengthening  Transfer training    Patient End of Session: Up in chair;Needs met;Call light within reach;RN aware of session/findings;All patient questions and concerns addressed;Alarm set;Discussed recommendations with /      Patient Evaluation Complexity Level:  History Moderate - 1 or 2 personal factors and/or co-morbidities   Examination of body systems Low -  addressing 1-2 elements   Clinical Presentation Low- Stable   Clinical Decision Making Low Complexity       PT Session Time: 30 minutes  Therapeutic Activity: 10 minutes

## 2024-08-01 LAB
ATRIAL RATE: 267 BPM
ATRIAL RATE: 67 BPM
P AXIS: 254 DEGREES
P AXIS: 82 DEGREES
P-R INTERVAL: 124 MS
Q-T INTERVAL: 264 MS
Q-T INTERVAL: 396 MS
QRS DURATION: 64 MS
QRS DURATION: 72 MS
QTC CALCULATION (BEZET): 418 MS
QTC CALCULATION (BEZET): 421 MS
R AXIS: 68 DEGREES
R AXIS: 68 DEGREES
T AXIS: 254 DEGREES
T AXIS: 71 DEGREES
VENTRICULAR RATE: 153 BPM
VENTRICULAR RATE: 67 BPM

## 2024-08-01 NOTE — CDS QUERY
Clinical Documentation Clarification   Dear Dr. French,  Clinical information, provided below, indicates a BMI of 17.77 kg/m²  Can you please further clarify in the medical record the diagnosis associated with these findings:    (   ) Underweight  (   ) Other condition (please specify)_______________________________      Documentation from the Medical Record:       Clinical Indicators:   >07/30/27 Dietary note   Ht: 152.4 cm (5')  Wt: 41.3 kg (91 lb).  Body mass index is 17.77 kg/m².  Wt Readings from Last 3 Encounters:  07/30/24 41.3 kg (91 lb)  07/10/24 45.4 kg (100 lb)  06/24/24 45.8 kg (101 lb)  Pt p/w dehydration, C.Diff. Pt seen for Low BMI.PT states her appetite is fair. No n/v/d reported.+ Colostomy. Pt appears thin, but not wasted.   Patient is at low nutrition risk at this time.    Diet: Regular diet     Risk Factors : Advanced age, CKD stage III-IV, renal cell carcinoma status post nephrectomy, partial colon resection currently with an ostomy, history of C. difficile colitis.   Treatment: Dietician consult,  She drinks Ensure Clear PTA, will resume. Encouraged PO all meals.             For questions regarding this query, please contact Clinical :  Susan Leonard RN/BSN  EXT [phone]  Thank you    THIS FORM IS A PERMANENT PART OF THE MEDICAL RECORD

## 2024-08-20 ENCOUNTER — HOSPITAL ENCOUNTER (INPATIENT)
Facility: HOSPITAL | Age: 89
LOS: 2 days | Discharge: NURSING, REHAB OR LONG-TERM CARE | End: 2024-08-22
Attending: EMERGENCY MEDICINE | Admitting: INTERNAL MEDICINE
Payer: MEDICARE

## 2024-08-20 ENCOUNTER — APPOINTMENT (OUTPATIENT)
Dept: CT IMAGING | Facility: HOSPITAL | Age: 89
End: 2024-08-20
Attending: EMERGENCY MEDICINE
Payer: MEDICARE

## 2024-08-20 DIAGNOSIS — D64.9 ACUTE ON CHRONIC ANEMIA: ICD-10-CM

## 2024-08-20 DIAGNOSIS — N17.9 AKI (ACUTE KIDNEY INJURY) (HCC): ICD-10-CM

## 2024-08-20 DIAGNOSIS — R31.0 GROSS HEMATURIA: Primary | ICD-10-CM

## 2024-08-20 LAB
ALBUMIN SERPL-MCNC: 3.8 G/DL (ref 3.2–4.8)
ALBUMIN/GLOB SERPL: 1.4 {RATIO} (ref 1–2)
ALP LIVER SERPL-CCNC: 125 U/L
ALT SERPL-CCNC: 16 U/L
ANION GAP SERPL CALC-SCNC: 10 MMOL/L (ref 0–18)
APTT PPP: 31.5 SECONDS (ref 23–36)
AST SERPL-CCNC: 25 U/L (ref ?–34)
BASOPHILS # BLD AUTO: 0.02 X10(3) UL (ref 0–0.2)
BASOPHILS NFR BLD AUTO: 0.4 %
BILIRUB SERPL-MCNC: <0.2 MG/DL (ref 0.2–0.9)
BILIRUB UR QL STRIP.AUTO: NEGATIVE
BUN BLD-MCNC: 54 MG/DL (ref 9–23)
CALCIUM BLD-MCNC: 8.2 MG/DL (ref 8.7–10.4)
CHLORIDE SERPL-SCNC: 110 MMOL/L (ref 98–112)
CO2 SERPL-SCNC: 17 MMOL/L (ref 21–32)
CREAT BLD-MCNC: 3.95 MG/DL
EGFRCR SERPLBLD CKD-EPI 2021: 10 ML/MIN/1.73M2 (ref 60–?)
EOSINOPHIL # BLD AUTO: 0.01 X10(3) UL (ref 0–0.7)
EOSINOPHIL NFR BLD AUTO: 0.2 %
ERYTHROCYTE [DISTWIDTH] IN BLOOD BY AUTOMATED COUNT: 17.4 %
GLOBULIN PLAS-MCNC: 2.8 G/DL (ref 2–3.5)
GLUCOSE BLD-MCNC: 112 MG/DL (ref 70–99)
GLUCOSE UR STRIP.AUTO-MCNC: NEGATIVE MG/DL
HCT VFR BLD AUTO: 26.9 %
HGB BLD-MCNC: 8.2 G/DL
IMM GRANULOCYTES # BLD AUTO: 0.02 X10(3) UL (ref 0–1)
IMM GRANULOCYTES NFR BLD: 0.4 %
INR BLD: 1.25 (ref 0.8–1.2)
KETONES UR STRIP.AUTO-MCNC: NEGATIVE MG/DL
LYMPHOCYTES # BLD AUTO: 0.64 X10(3) UL (ref 1–4)
LYMPHOCYTES NFR BLD AUTO: 12.3 %
MCH RBC QN AUTO: 28.5 PG (ref 26–34)
MCHC RBC AUTO-ENTMCNC: 30.5 G/DL (ref 31–37)
MCV RBC AUTO: 93.4 FL
MONOCYTES # BLD AUTO: 0.24 X10(3) UL (ref 0.1–1)
MONOCYTES NFR BLD AUTO: 4.6 %
NEUTROPHILS # BLD AUTO: 4.28 X10 (3) UL (ref 1.5–7.7)
NEUTROPHILS # BLD AUTO: 4.28 X10(3) UL (ref 1.5–7.7)
NEUTROPHILS NFR BLD AUTO: 82.1 %
NITRITE UR QL STRIP.AUTO: NEGATIVE
OSMOLALITY SERPL CALC.SUM OF ELEC: 300 MOSM/KG (ref 275–295)
PH UR STRIP.AUTO: 7 [PH] (ref 5–8)
PLATELET # BLD AUTO: 366 10(3)UL (ref 150–450)
POTASSIUM SERPL-SCNC: 5.1 MMOL/L (ref 3.5–5.1)
PROT SERPL-MCNC: 6.6 G/DL (ref 5.7–8.2)
PROT UR STRIP.AUTO-MCNC: >=300 MG/DL
PROTHROMBIN TIME: 15.7 SECONDS (ref 11.6–14.8)
RBC # BLD AUTO: 2.88 X10(6)UL
RBC #/AREA URNS AUTO: >10 /HPF
RBC #/AREA URNS AUTO: >10 /HPF
SODIUM SERPL-SCNC: 137 MMOL/L (ref 136–145)
SP GR UR REFRACTOMETRY: 1.02 (ref 1–1.03)
SP GR UR STRIP.AUTO: >=1.03 (ref 1–1.03)
TROPONIN I SERPL HS-MCNC: 26 NG/L
UROBILINOGEN UR STRIP.AUTO-MCNC: 0.2 MG/DL
WBC # BLD AUTO: 5.2 X10(3) UL (ref 4–11)

## 2024-08-20 PROCEDURE — 74176 CT ABD & PELVIS W/O CONTRAST: CPT | Performed by: EMERGENCY MEDICINE

## 2024-08-20 RX ORDER — SODIUM CHLORIDE, SODIUM LACTATE, POTASSIUM CHLORIDE, CALCIUM CHLORIDE 600; 310; 30; 20 MG/100ML; MG/100ML; MG/100ML; MG/100ML
INJECTION, SOLUTION INTRAVENOUS CONTINUOUS
Status: DISCONTINUED | OUTPATIENT
Start: 2024-08-20 | End: 2024-08-21

## 2024-08-20 RX ORDER — SODIUM BICARBONATE 325 MG/1
650 TABLET ORAL 2 TIMES DAILY
Status: DISCONTINUED | OUTPATIENT
Start: 2024-08-20 | End: 2024-08-21

## 2024-08-20 RX ORDER — METOCLOPRAMIDE HYDROCHLORIDE 5 MG/ML
5 INJECTION INTRAMUSCULAR; INTRAVENOUS EVERY 8 HOURS PRN
Status: DISCONTINUED | OUTPATIENT
Start: 2024-08-20 | End: 2024-08-22

## 2024-08-20 RX ORDER — AMIODARONE HYDROCHLORIDE 200 MG/1
200 TABLET ORAL DAILY
Status: DISCONTINUED | OUTPATIENT
Start: 2024-08-20 | End: 2024-08-22

## 2024-08-20 RX ORDER — ONDANSETRON 2 MG/ML
4 INJECTION INTRAMUSCULAR; INTRAVENOUS EVERY 6 HOURS PRN
Status: DISCONTINUED | OUTPATIENT
Start: 2024-08-20 | End: 2024-08-22

## 2024-08-20 RX ORDER — TORSEMIDE 5 MG/1
10 TABLET ORAL EVERY OTHER DAY
Status: DISCONTINUED | OUTPATIENT
Start: 2024-08-21 | End: 2024-08-22

## 2024-08-20 RX ORDER — DILTIAZEM HYDROCHLORIDE 120 MG/1
240 CAPSULE, EXTENDED RELEASE ORAL DAILY
Status: DISCONTINUED | OUTPATIENT
Start: 2024-08-21 | End: 2024-08-22

## 2024-08-20 RX ORDER — MAGNESIUM HYDROXIDE 1200 MG/15ML
3000 LIQUID ORAL CONTINUOUS
Status: DISCONTINUED | OUTPATIENT
Start: 2024-08-20 | End: 2024-08-21

## 2024-08-20 RX ORDER — ACETAMINOPHEN 500 MG
500 TABLET ORAL EVERY 4 HOURS PRN
Status: DISCONTINUED | OUTPATIENT
Start: 2024-08-20 | End: 2024-08-22

## 2024-08-20 RX ORDER — TORSEMIDE 10 MG/1
10 TABLET ORAL EVERY OTHER DAY
COMMUNITY

## 2024-08-20 NOTE — ED QUICK NOTES
Orders for admission, patient is aware of plan and ready to go upstairs. Any questions, please call ED RN Talha at extension [phone]    Patient Covid vaccination status: Fully vaccinated and immunocompromised     COVID Test Ordered in ED: None    COVID Suspicion at Admission: N/A    Running Infusions:  None    Mental Status/LOC at time of transport: Alert    Other pertinent information:     Continuous irrigation running during transport ( SCU 3rd floor nurse notified)  AAO x 3/4  Fall Risk        CIWA score: N/A   NIH score:  N/A

## 2024-08-20 NOTE — PROGRESS NOTES
NURSING ADMISSION NOTE      Patient admitted via Cart  Oriented to room.  Safety precautions initiated.  Bed in low position.  Call light in reach.    Patient arrived to the floor via cart from ER in stable condition at 1814. Family present at bedside.

## 2024-08-20 NOTE — ED PROVIDER NOTES
Patient Seen in: [facility] Emergency Department      History     Chief Complaint   Patient presents with    Urinary Symptoms     Stated Complaint: Hematuria, headache    Subjective:   HPI    89-year-old female with a past medical history as below hypertension, hyperlipidemia, CKD, CHF, atrial fibrillation on Eliquis, remote endometrial and colon cancer brought by EMS from nursing home due to hematuria.  Patient is a poor historian.  Patient thinks she has had some blood in her urine for the past few days.  She otherwise states she feels well.  She denies any urinary symptoms.  Denies abdominal pain, nausea or vomiting.  Denies flank pain, fever or chills.  Denies feeling dizzy or lightheaded.    Objective:   Past Medical History:    Arthritis    Atrial fibrillation (HCC)    Back pain    C. difficile colitis    CKD (chronic kidney disease)    and S/P nephrectomy    Colon cancer (HCC)    Congestive heart disease (HCC)    Easy bruising    Endometrial cancer (HCC)    UTERINE, DX ABOUT 30 YEARS    Hearing impairment    AIDS    Heart palpitations    Afib    High blood pressure    Kidney failure    Leg swelling    Bishop syndrome    hereditary colorectal cancer    Muscle weakness    WALKER    Pain in joints    Wears glasses    Weight loss              Past Surgical History:   Procedure Laterality Date    Colectomy      Nephrectomy Left     Part removal colon w end colostomy                  Social History     Socioeconomic History    Marital status:    Tobacco Use    Smoking status: Never    Smokeless tobacco: Never   Vaping Use    Vaping status: Never Used   Substance and Sexual Activity    Alcohol use: Not Currently    Drug use: Never     Social Determinants of Health     Food Insecurity: No Food Insecurity (7/30/2024)    Food Insecurity     Food Insecurity: Never true   Transportation Needs: No Transportation Needs (7/30/2024)    Transportation Needs     Lack of Transportation: No   Housing Stability: Low  Chart reviewed, agree with LPN plan.  Large jump from boost (double dose) last week from reported missed dose.  INR has been waxing and waning a bit.  We will verify dose in 2 weeks.     Risk  (7/30/2024)    Housing Stability     Housing Instability: No              Review of Systems    Positive for stated Chief Complaint: Urinary Symptoms    Other systems are as noted in HPI.  Constitutional and vital signs reviewed.      All other systems reviewed and negative except as noted above.    Physical Exam     ED Triage Vitals   BP 08/20/24 1247 117/63   Pulse 08/20/24 1250 (!) 134   Resp 08/20/24 1247 18   Temp 08/20/24 1247 97.8 °F (36.6 °C)   Temp src 08/20/24 1247 Temporal   SpO2 08/20/24 1250 100 %   O2 Device 08/20/24 1250 None (Room air)       Current Vitals:   Vital Signs  BP: 124/57  Pulse: 68  Resp: 13  Temp: 97.8 °F (36.6 °C)  Temp src: Temporal  MAP (mmHg): 78    Oxygen Therapy  SpO2: 100 %  O2 Device: None (Room air)            Physical Exam  Vitals and nursing note reviewed.   Constitutional:       Appearance: She is well-developed.   HENT:      Head: Normocephalic and atraumatic.      Mouth/Throat:      Mouth: Mucous membranes are moist.   Eyes:      General: No scleral icterus.  Cardiovascular:      Rate and Rhythm: Normal rate. Rhythm irregular.   Pulmonary:      Effort: Pulmonary effort is normal.      Breath sounds: Normal breath sounds.   Abdominal:      Palpations: Abdomen is soft.      Tenderness: There is no abdominal tenderness.      Comments: Colostomy without blood noted   Skin:     General: Skin is warm and dry.   Neurological:      General: No focal deficit present.      Mental Status: She is alert and oriented to person, place, and time.      Cranial Nerves: No cranial nerve deficit.      Motor: No weakness.   Psychiatric:         Mood and Affect: Mood normal.         Behavior: Behavior normal.               ED Course     Labs Reviewed   CBC WITH DIFFERENTIAL WITH PLATELET - Abnormal; Notable for the following components:       Result Value    RBC 2.88 (*)     HGB 8.2 (*)     HCT 26.9 (*)     MCHC 30.5 (*)     Lymphocyte Absolute 0.64 (*)     All other components within  normal limits   COMP METABOLIC PANEL (14) - Abnormal; Notable for the following components:    Glucose 112 (*)     CO2 17.0 (*)     BUN 54 (*)     Creatinine 3.95 (*)     Calcium, Total 8.2 (*)     Calculated Osmolality 300 (*)     eGFR-Cr 10 (*)     Bilirubin, Total <0.2 (*)     All other components within normal limits   URINALYSIS WITH CULTURE REFLEX - Abnormal; Notable for the following components:    Urine Color Light-Red (*)     Clarity Urine Ex.Turbid (*)     All other components within normal limits   PROTHROMBIN TIME (PT) - Abnormal; Notable for the following components:    PT 15.7 (*)     INR 1.25 (*)     All other components within normal limits   TROPONIN I HIGH SENSITIVITY - Normal   PTT, ACTIVATED - Normal   SPECIFIC GRAVITY, URINE - Normal   RAINBOW DRAW BLUE   RAINBOW DRAW GOLD   URINE CULTURE, ROUTINE     EKG    Rate, intervals and axes as noted on EKG Report.  Rate: 134  Rhythm: Atrial flutter  Reading: Atrial flutter with 2-1 block, LVH                 CT ABDOMEN+PELVIS(CPT=74176)    Result Date: 8/20/2024  CONCLUSION:  1. Hyperdense mass in the bladder likely represent hematoma measuring up to 7.9 cm. 2. The left kidney is surgically absent. 3. Mild right hydronephrosis   LOCATION:  [id number]   Dictated by (CST): Deepak Enrique MD on 8/20/2024 at 4:24 PM     Finalized by (CST): Deepak Enrique MD on 8/20/2024 at 4:28 PM               MDM      89-year-old female with a past medical history as below hypertension, hyperlipidemia, CKD, CHF, atrial fibrillation on Eliquis, remote endometrial and colon cancer brought by EMS from nursing home due to hematuria.     Differential includes but is not limited to hemorrhagic cystitis, kidney stone, hematuria secondary to anticoagulation/coagulopathy, anemia    Labs show ZOHREH with BUN/CR 50/3.95 increased from 28/2.19 on 7/31/2024.  WBC is normal at 5.2.  Patient is acute on chronic anemia with hemoglobin 8.2 down from 9.5 on 8/5/2024.  UA with gross hematuria but  remainder of UA states unable to perform due to color interference.    Independent interpretation of CT abdomen/pelvis shows a large mass in the bladder.  Radiology report reviewed as above noting hyperdense mass likely representing hematoma measuring up to 7.9 cm along with mild right hydronephrosis, left kidney is surgically absent.    Will admit for further management.  Discussed with ID Dr. Rosas, will Taine a cath specimen and treat based on cultures, will hold off on antibiotics at this time as patient is afebrile with normal WBC.  Discussed with urology Dr. House, recommends bladder irrigation with three-way Huizar.  Recommends holding Eliquis.  Discussed with hospitalist Dr. Moran.      Admission disposition: 8/20/2024  4:51 PM                                        Medical Decision Making  Amount and/or Complexity of Data Reviewed  External Data Reviewed: labs.     Details: See MDM  Labs: ordered.  Radiology: ordered and independent interpretation performed. Decision-making details documented in ED Course.  ECG/medicine tests: ordered and independent interpretation performed. Decision-making details documented in ED Course.  Discussion of management or test interpretation with external provider(s): Hospitalist, ID, urology    Risk  Decision regarding hospitalization.        Disposition and Plan     Clinical Impression:  1. Gross hematuria    2. ZOHREH (acute kidney injury) (HCC)    3. Acute on chronic anemia         Disposition:  Admit  8/20/2024  4:51 pm    Follow-up:  No follow-up provider specified.        Medications Prescribed:  Current Discharge Medication List                            Hospital Problems       Present on Admission  Date Reviewed: 7/8/2024   None

## 2024-08-20 NOTE — H&P
[facility] Hospitalist History and Physical     PCP:  Janell Powell MD      Chief Complaint: hematuria     History of Present Illness: Patient is a 89 year old female with hx of afib on eliquis, CKD stage 4, hx of colon cancer s/p colostomy, hx of endometrial cancer s/p hysterectomy, hx of renal cell CA s/p nephrectomy, HFpEF, hx of ORIF 6/2024, hx of cdiff colitis, here from nursing home for hematuria.     Pt not the best historian, but notes she has had some hematuria which started about 2 days ago and it got worse. No dysuria, no fevers noted, no diarrhea noted in the ostomy.  No chest pain no SOB noted.  Pt overall states she feels well.      In the ED, pt afebrile, HR 80s, RR 18, BP 110s sytolics, RA satting 100.  UA no infection noted, wbc wnl, hgb 8.2, cr 3.95, baseline around 2.3. CT abd massin the bladder likley hematoma measuring 7.9 cm and mild R hydro, has known left side nephrectomy.          Current Meds  Scheduled Meds:   Continuous Infusions:    sodium chloride       PRN Meds:     Allergies   Allergen Reactions    Ciprofloxacin RASH and HIVES    Penicillins RASH        Past Medical History:    Arthritis    Atrial fibrillation (HCC)    Back pain    C. difficile colitis    CKD (chronic kidney disease)    and S/P nephrectomy    Colon cancer (HCC)    Congestive heart disease (HCC)    Easy bruising    Endometrial cancer (HCC)    UTERINE, DX ABOUT 30 YEARS    Hearing impairment    AIDS    Heart palpitations    Afib    High blood pressure    Kidney failure    Leg swelling    Bishop syndrome    hereditary colorectal cancer    Muscle weakness    WALKER    Pain in joints    Wears glasses    Weight loss      Past Surgical History:   Procedure Laterality Date    Colectomy      Nephrectomy Left     Part removal colon w end colostomy        Social History     Tobacco Use    Smoking status: Never    Smokeless tobacco: Never   Substance Use Topics    Alcohol use: Not Currently      Family History   Problem Relation Age  of Onset    Heart Disorder Father     Heart Disorder Mother     Colon Cancer Mother         70    Heart Disorder Sister     Breast Cancer Sister     Diabetes Brother     Breast Cancer Sister     Cancer Sister     Breast Cancer Sister     Breast Cancer Daughter             Intake/Output:  No intake/output data recorded.  Wt Readings from Last 3 Encounters:   07/30/24 91 lb (41.3 kg)   07/10/24 100 lb (45.4 kg)   06/24/24 101 lb (45.8 kg)         Exam:     Temp:  [97.8 °F (36.6 °C)] 97.8 °F (36.6 °C)  Pulse:  [] 73  Resp:  [9-24] 12  BP: (105-145)/(56-73) 124/57  SpO2:  [100 %] 100 %  General:  Alert, no distress, appears stated age.   Head:  Normocephalic, without obvious abnormality, atraumatic.    Eyes:  Sclera anicteric, No conjunctival pallor, EOMs intact.    Throat: MMM     Neck: Supple, symmetrical, trachea midline    Lungs:   Clear to auscultation bilaterally. Normal effort    Chest wall:  No tenderness or deformity   Heart:  Regular rate and rhythm, no peripheral edema    Abdomen:   Soft, NT/ND, ostomy   MSK: Atraumatic, no cyanosis, some edema noted     Skin: No visible rashes or lesions.     Neurologic: Normal strength, no focal deficit appreciated            Labs:     Chem:  Recent Labs   Lab 08/20/24  1252      K 5.1      CO2 17.0*   BUN 54*   ALT 16   AST 25   ALB 3.8       HEM:  Recent Labs   Lab 08/20/24  1252   WBC 5.2   HGB 8.2*   .0   MCV 93.4       Coagulation:  Recent Labs   Lab 08/20/24  1252   PTT 31.5   INR 1.25*   HCT 26.9*   HGB 8.2*   .0       Cardiac:  No results for input(s): \"CKMB\" in the last 168 hours.    Invalid input(s): \"CKTOTAL\", \"CKMBINDEX\", \"TROPONINI\"    Urinalysis:   Recent Labs   Lab 08/20/24  1449   RBCUR None Seen              Assessment/Plan:  Patient is a 89 year old female with hx of afib on eliquis, CKD stage 4, hx of colon cancer s/p colostomy, hx of endometrial cancer s/p hysterectomy, hx of renal cell CA s/p nephrectomy, HFpEF, hx of  ORIF 6/2024, hx of cdiff colitis, here from nursing home for hematuria.          Plan:    Hematuria noted at the nursing home  - UA no RBCs noted, but CT abd with mass likley representing hematoma  - holding eliquis for now   - >400cc urine retention noted in the ED  - urology eval     Afib   - hold eliquis given above- restart when ok with urology   - amiodarone  - dilt     ZOHREH on CKD  Hx of RCC s/p L nephrectomy   - baseline around 2.2   - hold diuretics  - will ask renal to see     HFpEF  - some LE swelling noted  - unclear volume status  - renal to assist with diuretics       Hx of colon cancer s/p ostomy  Hx of endometrial cancer s/p hysterectomy  - stable    Hx of ORIF 6/2024  - stable    Hx of cdiff colitis  - per pt no diarrhea noted in ostomy            Prophylaxis:  DVT: SCD        Full d/w pt       Joanne Moran MD

## 2024-08-21 ENCOUNTER — APPOINTMENT (OUTPATIENT)
Dept: GENERAL RADIOLOGY | Facility: HOSPITAL | Age: 89
End: 2024-08-21
Attending: UROLOGY
Payer: MEDICARE

## 2024-08-21 ENCOUNTER — APPOINTMENT (OUTPATIENT)
Facility: HOSPITAL | Age: 89
End: 2024-08-21
Attending: INTERNAL MEDICINE
Payer: MEDICARE

## 2024-08-21 ENCOUNTER — ANESTHESIA EVENT (OUTPATIENT)
Dept: SURGERY | Facility: HOSPITAL | Age: 89
End: 2024-08-21
Payer: MEDICARE

## 2024-08-21 ENCOUNTER — ANESTHESIA (OUTPATIENT)
Dept: SURGERY | Facility: HOSPITAL | Age: 89
End: 2024-08-21
Payer: MEDICARE

## 2024-08-21 PROBLEM — I50.30 HEART FAILURE WITH PRESERVED EJECTION FRACTION (HCC): Status: ACTIVE | Noted: 2024-05-28

## 2024-08-21 LAB
ANION GAP SERPL CALC-SCNC: 9 MMOL/L (ref 0–18)
ANTIBODY SCREEN: NEGATIVE
ATRIAL RATE: 268 BPM
BASOPHILS # BLD AUTO: 0.02 X10(3) UL (ref 0–0.2)
BASOPHILS # BLD AUTO: 0.03 X10(3) UL (ref 0–0.2)
BASOPHILS NFR BLD AUTO: 0.3 %
BASOPHILS NFR BLD AUTO: 0.5 %
BUN BLD-MCNC: 46 MG/DL (ref 9–23)
C DIFF TOX B STL QL: NEGATIVE
CALCIUM BLD-MCNC: 7.7 MG/DL (ref 8.7–10.4)
CHLORIDE SERPL-SCNC: 115 MMOL/L (ref 98–112)
CO2 SERPL-SCNC: 18 MMOL/L (ref 21–32)
CREAT BLD-MCNC: 3.39 MG/DL
EGFRCR SERPLBLD CKD-EPI 2021: 12 ML/MIN/1.73M2 (ref 60–?)
EOSINOPHIL # BLD AUTO: 0.04 X10(3) UL (ref 0–0.7)
EOSINOPHIL # BLD AUTO: 0.1 X10(3) UL (ref 0–0.7)
EOSINOPHIL NFR BLD AUTO: 0.6 %
EOSINOPHIL NFR BLD AUTO: 1.7 %
ERYTHROCYTE [DISTWIDTH] IN BLOOD BY AUTOMATED COUNT: 17.3 %
ERYTHROCYTE [DISTWIDTH] IN BLOOD BY AUTOMATED COUNT: 22.2 %
GLUCOSE BLD-MCNC: 89 MG/DL (ref 70–99)
HCT VFR BLD AUTO: 19.5 %
HCT VFR BLD AUTO: 28.3 %
HGB BLD-MCNC: 6 G/DL
HGB BLD-MCNC: 7.9 G/DL
IMM GRANULOCYTES # BLD AUTO: 0.03 X10(3) UL (ref 0–1)
IMM GRANULOCYTES # BLD AUTO: 0.04 X10(3) UL (ref 0–1)
IMM GRANULOCYTES NFR BLD: 0.5 %
IMM GRANULOCYTES NFR BLD: 0.7 %
LYMPHOCYTES # BLD AUTO: 1.13 X10(3) UL (ref 1–4)
LYMPHOCYTES # BLD AUTO: 1.23 X10(3) UL (ref 1–4)
LYMPHOCYTES NFR BLD AUTO: 18.7 %
LYMPHOCYTES NFR BLD AUTO: 19.2 %
MCH RBC QN AUTO: 26.9 PG (ref 26–34)
MCH RBC QN AUTO: 28.8 PG (ref 26–34)
MCHC RBC AUTO-ENTMCNC: 27.9 G/DL (ref 31–37)
MCHC RBC AUTO-ENTMCNC: 30.8 G/DL (ref 31–37)
MCV RBC AUTO: 93.8 FL
MCV RBC AUTO: 96.3 FL
MONOCYTES # BLD AUTO: 0.63 X10(3) UL (ref 0.1–1)
MONOCYTES # BLD AUTO: 0.65 X10(3) UL (ref 0.1–1)
MONOCYTES NFR BLD AUTO: 10.7 %
MONOCYTES NFR BLD AUTO: 9.8 %
NEUTROPHILS # BLD AUTO: 4.11 X10 (3) UL (ref 1.5–7.7)
NEUTROPHILS # BLD AUTO: 4.11 X10(3) UL (ref 1.5–7.7)
NEUTROPHILS # BLD AUTO: 4.46 X10 (3) UL (ref 1.5–7.7)
NEUTROPHILS # BLD AUTO: 4.46 X10(3) UL (ref 1.5–7.7)
NEUTROPHILS NFR BLD AUTO: 67.9 %
NEUTROPHILS NFR BLD AUTO: 69.4 %
OSMOLALITY SERPL CALC.SUM OF ELEC: 305 MOSM/KG (ref 275–295)
P AXIS: -88 DEGREES
PLATELET # BLD AUTO: 239 10(3)UL (ref 150–450)
PLATELET # BLD AUTO: 258 10(3)UL (ref 150–450)
PLATELET MORPHOLOGY: NORMAL
POTASSIUM SERPL-SCNC: 4.1 MMOL/L (ref 3.5–5.1)
Q-T INTERVAL: 318 MS
QRS DURATION: 82 MS
QTC CALCULATION (BEZET): 474 MS
R AXIS: 43 DEGREES
RBC # BLD AUTO: 2.08 X10(6)UL
RBC # BLD AUTO: 2.94 X10(6)UL
RH BLOOD TYPE: POSITIVE
SARS-COV-2 RNA RESP QL NAA+PROBE: DETECTED
SODIUM SERPL-SCNC: 142 MMOL/L (ref 136–145)
T AXIS: 46 DEGREES
VENTRICULAR RATE: 134 BPM
WBC # BLD AUTO: 6.1 X10(3) UL (ref 4–11)
WBC # BLD AUTO: 6.4 X10(3) UL (ref 4–11)

## 2024-08-21 PROCEDURE — 3E1K38Z IRRIGATION OF GENITOURINARY TRACT USING IRRIGATING SUBSTANCE, PERCUTANEOUS APPROACH: ICD-10-PCS | Performed by: HOSPITALIST

## 2024-08-21 PROCEDURE — 30233N1 TRANSFUSION OF NONAUTOLOGOUS RED BLOOD CELLS INTO PERIPHERAL VEIN, PERCUTANEOUS APPROACH: ICD-10-PCS | Performed by: HOSPITALIST

## 2024-08-21 PROCEDURE — BT1DZZZ FLUOROSCOPY OF RIGHT KIDNEY, URETER AND BLADDER: ICD-10-PCS | Performed by: UROLOGY

## 2024-08-21 PROCEDURE — 99223 1ST HOSP IP/OBS HIGH 75: CPT | Performed by: INTERNAL MEDICINE

## 2024-08-21 RX ORDER — MULTIPLE VITAMINS W/ MINERALS TAB 9MG-400MCG
1 TAB ORAL DAILY
Status: DISCONTINUED | OUTPATIENT
Start: 2024-08-21 | End: 2024-08-22

## 2024-08-21 RX ORDER — METOPROLOL TARTRATE 1 MG/ML
2.5 INJECTION, SOLUTION INTRAVENOUS ONCE
Status: DISCONTINUED | OUTPATIENT
Start: 2024-08-21 | End: 2024-08-21 | Stop reason: HOSPADM

## 2024-08-21 RX ORDER — SODIUM CHLORIDE, SODIUM LACTATE, POTASSIUM CHLORIDE, CALCIUM CHLORIDE 600; 310; 30; 20 MG/100ML; MG/100ML; MG/100ML; MG/100ML
INJECTION, SOLUTION INTRAVENOUS CONTINUOUS
Status: DISCONTINUED | OUTPATIENT
Start: 2024-08-21 | End: 2024-08-21 | Stop reason: HOSPADM

## 2024-08-21 RX ORDER — HYDROMORPHONE HYDROCHLORIDE 1 MG/ML
0.2 INJECTION, SOLUTION INTRAMUSCULAR; INTRAVENOUS; SUBCUTANEOUS EVERY 5 MIN PRN
Status: DISCONTINUED | OUTPATIENT
Start: 2024-08-21 | End: 2024-08-21 | Stop reason: HOSPADM

## 2024-08-21 RX ORDER — ONDANSETRON 2 MG/ML
INJECTION INTRAMUSCULAR; INTRAVENOUS AS NEEDED
Status: DISCONTINUED | OUTPATIENT
Start: 2024-08-21 | End: 2024-08-21 | Stop reason: SURG

## 2024-08-21 RX ORDER — METOPROLOL TARTRATE 25 MG/1
25 TABLET, FILM COATED ORAL
Status: DISCONTINUED | OUTPATIENT
Start: 2024-08-21 | End: 2024-08-22

## 2024-08-21 RX ORDER — ACETAMINOPHEN 500 MG
1000 TABLET ORAL ONCE AS NEEDED
Status: DISCONTINUED | OUTPATIENT
Start: 2024-08-21 | End: 2024-08-21 | Stop reason: HOSPADM

## 2024-08-21 RX ORDER — NALOXONE HYDROCHLORIDE 0.4 MG/ML
80 INJECTION, SOLUTION INTRAMUSCULAR; INTRAVENOUS; SUBCUTANEOUS AS NEEDED
Status: DISCONTINUED | OUTPATIENT
Start: 2024-08-21 | End: 2024-08-21 | Stop reason: HOSPADM

## 2024-08-21 RX ORDER — ONDANSETRON 2 MG/ML
4 INJECTION INTRAMUSCULAR; INTRAVENOUS EVERY 6 HOURS PRN
Status: DISCONTINUED | OUTPATIENT
Start: 2024-08-21 | End: 2024-08-21 | Stop reason: HOSPADM

## 2024-08-21 RX ORDER — DEXAMETHASONE SODIUM PHOSPHATE 4 MG/ML
VIAL (ML) INJECTION AS NEEDED
Status: DISCONTINUED | OUTPATIENT
Start: 2024-08-21 | End: 2024-08-21 | Stop reason: SURG

## 2024-08-21 RX ORDER — SODIUM CHLORIDE 9 MG/ML
INJECTION, SOLUTION INTRAVENOUS ONCE
Status: COMPLETED | OUTPATIENT
Start: 2024-08-21 | End: 2024-08-21

## 2024-08-21 RX ORDER — SODIUM CHLORIDE, SODIUM LACTATE, POTASSIUM CHLORIDE, CALCIUM CHLORIDE 600; 310; 30; 20 MG/100ML; MG/100ML; MG/100ML; MG/100ML
INJECTION, SOLUTION INTRAVENOUS CONTINUOUS PRN
Status: DISCONTINUED | OUTPATIENT
Start: 2024-08-21 | End: 2024-08-21 | Stop reason: SURG

## 2024-08-21 RX ORDER — MEPERIDINE HYDROCHLORIDE 25 MG/ML
12.5 INJECTION INTRAMUSCULAR; INTRAVENOUS; SUBCUTANEOUS AS NEEDED
Status: DISCONTINUED | OUTPATIENT
Start: 2024-08-21 | End: 2024-08-21 | Stop reason: HOSPADM

## 2024-08-21 RX ORDER — CEFOXITIN 1 G/1
INJECTION, POWDER, FOR SOLUTION INTRAVENOUS AS NEEDED
Status: DISCONTINUED | OUTPATIENT
Start: 2024-08-21 | End: 2024-08-21 | Stop reason: SURG

## 2024-08-21 RX ORDER — TRAMADOL HYDROCHLORIDE 50 MG/1
50 TABLET ORAL EVERY 12 HOURS PRN
Status: DISCONTINUED | OUTPATIENT
Start: 2024-08-21 | End: 2024-08-22

## 2024-08-21 RX ADMIN — DEXAMETHASONE SODIUM PHOSPHATE 2 MG: 4 MG/ML VIAL (ML) INJECTION at 20:05:00

## 2024-08-21 RX ADMIN — ONDANSETRON 4 MG: 2 INJECTION INTRAMUSCULAR; INTRAVENOUS at 20:32:00

## 2024-08-21 RX ADMIN — CEFOXITIN 2 G: 1 INJECTION, POWDER, FOR SOLUTION INTRAVENOUS at 20:09:00

## 2024-08-21 RX ADMIN — SODIUM CHLORIDE, SODIUM LACTATE, POTASSIUM CHLORIDE, CALCIUM CHLORIDE: 600; 310; 30; 20 INJECTION, SOLUTION INTRAVENOUS at 20:01:00

## 2024-08-21 NOTE — PROGRESS NOTES
[facility] Hospitalist Progress Note       SUBJECTIVE:  Pt feels okay   Hgb 6.0   No chest pain no SOB noted     Tested pos for covid, this was checked bc pt has to go to the OR    OBJECTIVE:  Scheduled Meds:    ceFAZolin  2 g Intravenous On Call to OR    sodium chloride   Intravenous Once    amiodarone  200 mg Oral Daily    dilTIAZem HCl ER Beads  240 mg Oral Daily    sodium bicarbonate  650 mg Oral BID    torsemide  10 mg Oral QOD     Continuous Infusions:    sodium chloride       PRN Meds:   acetaminophen    ondansetron    metoclopramide    Vitals    Vitals:    08/21/24 0824   BP: 141/58   Pulse: 71   Resp: 18   Temp: 98 °F (36.7 °C)         Exam   Gen-    no acute distress, alert and oriented x 3   RESP-   Lungs CTA, normal respiratory effort  CV-      Heart RRR, no mgr  Abd-    soft, nondistended, nontender, ostomy noted   Skin-    no rash  Neuro-  no focal neurologic deficits  Ext-      No edema in extremities   Psych- alert and oriented x 3     Labs:     Chem:  Recent Labs   Lab 08/20/24  1252 08/21/24  0633    142   K 5.1 4.1    115*   CO2 17.0* 18.0*   BUN 54* 46*   ALT 16  --    AST 25  --    ALB 3.8  --        HEM:  Recent Labs   Lab 08/20/24  1252 08/21/24  0636   WBC 5.2 6.1   HGB 8.2* 6.0*   .0 258.0   MCV 93.4 93.8       Coagulation:  Recent Labs   Lab 08/20/24  1252 08/21/24  0636   PTT 31.5  --    INR 1.25*  --    HCT 26.9* 19.5*   HGB 8.2* 6.0*   .0 258.0        Urinalysis:   Recent Labs   Lab 08/20/24  1756   RBCUR >10*  >10*   UROBILINOGEN 0.2   NITRITE Negative          AP:  89 year old female with hx of afib on eliquis, CKD stage 4, hx of colon cancer s/p colostomy, hx of endometrial cancer s/p hysterectomy, hx of renal cell CA s/p nephrectomy, HFpEF, hx of ORIF 6/2024, hx of cdiff colitis, here from nursing home for hematuria.            Plan:    Hematuria noted at the nursing home  - UA  RBCs noted,   CT abd with mass likley representing hematoma  - holding eliquis for now    - >400cc urine retention noted in the ED  - urology eval - plan for OR 8/21  - will have cards see for preop eval     Anemia - abla   - 2/2 above  - 2 units PRBCs ordered 8/21    ZOHREH on CKD  Hx of RCC s/p L nephrectomy   - baseline around 2.2   - holding diuretics   - renal following     Afib   - hold eliquis given above   - amiodarone  - dilt   - cards to eval if okay to hold eliquis      HFpEF  - some LE swelling noted  - unclear volume status  - renal and cards to assist with diuretics   - some EKG changes noted, cards consulted      CoVID asymptoamtic  - no symptoms no hypoxia   - checked b/c pt needs surgery  - pt not immunosuppressed doesn't meet criteria for remdesivir   - pt on eliquis unable to give paxlovid      Hx of colon cancer s/p ostomy  Hx of endometrial cancer s/p hysterectomy  - stable     Hx of ORIF 6/2024  - stable  - tramadol prn      Hx of cdiff colitis  - per pt no diarrhea noted in ostomy             Prophylaxis:  DVT: SCD holding med ppx due to hematuria       Full d/w pt         Joanne Moran MD

## 2024-08-21 NOTE — PLAN OF CARE
Pt a&o x 4.  Forgetful @ times.  Friendly & cooperative w/ care  Son @ bedside.   Participating & helpful with plan of care  PRBC's currently infusing  CBI infusing  Denies c/o pain  Npo for OR this evening  Positioned for comfort  Plan of care:  cysto @ 2510

## 2024-08-21 NOTE — CDS QUERY
Dear Dr. Moran,    Clinical information includes a diagnosis of ANEMIA. Based on your judgement and review of the clinical findings, can you please specify further the condition being treated?    [   ] Anemia due to acute blood loss    [ x  ] Acute blood loss anemia on chronic anemia  [   ] Other (Please specify:      CLINICAL INFORMATION FROM THE MEDICAL RECORD    Risk Factors/Clinical Indicators: 89 F to ED from NH for evaluation of hematuria. Admitted for gross hematuria s/t anticoagulant use.    ___ Historical HGB: 11.3 (07/10/2024), 10.5 (07/29/2024), 10.1 (07/30/2024)   ___ ED Impression: Acute on chronic anemia  ___ 08/20/2024 Initial HGB 8.2  ___ 08/21/2024 Repeat HGB 6.0    Treatment: Repeat labs, blood transfusion     Use of terms such as suspected, possible, or probable (associated with a specific diagnosis that is being evaluated, monitored, or treated as if it exists) are acceptable and can be coded in the inpatient setting, when documented at the time of discharge.    Please add any additional documentation to your progress note and continue to document this through discharge. For questions regarding this query, please contact Clinical : Esperanza Robbins RN at #[phone].    THIS FORM IS A PERMANENT PART OF THE MEDICAL RECORD

## 2024-08-21 NOTE — HISTORICAL OFFICE NOTE
Facility Logo [facility]  [address], [address]th [place] [address]  [phone]      MAI Loza  Progress Note  Demographics:  Name: MAI Loza YOB: 1932  Age: 89, Female Medical Record No: [id number]  Visited Date/Time: 08/13/2024 09:00 AM    Chief Complaints  Hospital follow up   Afib w/ RVR  History of Present Illness  89-year-old female with newly diagnosed atrial fibrillation.  Her primary symptom was significant lower extremity edema and fatigue.  She went to [facility] and started on amiodarone.  She does not report having a cardioversion.  She does not know all the testing she had    Her edema is much better.  She has more energy according to her family members    No prior stroke or TIA    She has had prior renal cancer and a separate colon cancer.  These were all in the remote past more than 10 years ago    She has chronic kidney disease creatinine is 2.7    Current visit:  Since my last visit the patient has been in the hospital twice.  1 for dehydration and elevated creatinine and then once her diuretic was stopped she came in with volume overload.  Her medications been adjusted by her PCP and then by her nephrologist who put her on torsemide which started today  Visit June 20 , 2024  Patient was recently hospitalized with a fall and a hip fracture.  She had surgical repair.  She is about 20 pounds heavier now than she was when she was more compensated.  She has lower extremity edema.  She also has active C. difficile but is on a weaning dose of vancomycin    Visit Aug 13, 2024  Patient was recently hospitalized again with colitis.  Patient went into A-fib with RVR during the hospitalization.  Diltiazem was increased and amiodarone was temporarily increased  Amlodipine was decreased.  There may have been some other medication changes.    Patient has a shallow ulcer on her left calf on the posterior side  Cardiac risk factors Never  smoked  Past Medical History  1.Unspecified atrial fibrillation  2.Benign essential HTN  3.Hyperlipidemia, mixed  4.Pure hypercholesterolemia  5.Chronic kidney disease, stage 3b  6.Hypokalemia  7.Vitamin D deficiency  Family History  1. Father - Family history of stroke  2. Mother - Family history of stroke  3. Son - History of heart attack  Social History  Smoking status Never smoked  Tobacco usage - No (Non-smoker (finding))  Review of systems  Cardiovascular No history of Chest pain, BANKS, Palpitations, Syncope, PND, Orthopnea, Edema and Claudication  Respiratory No history of SOB, Wheezing and Sputum  Physical Examination  Vitals Right Arm Sitting  / 64 mmHg, Pulse rate 71 bpm, Height in 5' 0\", BMI: 17.6, Weight in 90 lbs (or) 40.82 kgs and BSA : 1.31 cc/m²  General Appearance No Acute Distress, Well groomed and Normal Body habitus  Cardiovascular   EKG/Other abnormalities  General - NAD  PEERLA/EOMI  JVD - normal  CTA Bilaterally  CV- RRR, S1/S2, No murmurs  GI- Soft, Nontender  Extremities normal pulses  Mood/Affect Congruent  Skin no lesions  Joint/Musculoskeletal - Normal     Lower extremity edema noted  Allergies  1.ciprofloxacin - Ingredient(Reaction:rash, Severity:Moderate)  2.penicillin - Ingredient(Reaction:rash, Severity:Moderate)  Medications  1.amiodarone 100 mg tablet, Take 1 tablet orally once a day.  2.aspirin 81 mg tablet,delayed release, Take 1 tablet orally once a day.  3.Calcium 500 mg , daily  4.Centrum Silver Women 8 mg iron-400 mcg-50 mcg tablet, Take 1 tablet orally once a day.  5.cyanocobalamin (vit B-12) 1,000 mcg tablet, Take 1 tablet orally once a day.  6.dilTIAZem 120 mg tablet, Take 1 tablet orally once a day.  7.Eliquis 2.5 mg tablet, Take 1 tablet orally 2 times a day.  8.ferrous sulfate 325 mg (65 mg iron) tablet, Take 1 tablet orally once a day.  9.glucosamine 750 mg-chondroitin 600 mg chewable tablet, Take 1 tablet orally once a day.  10.omeprazole 20 mg tablet,delayed  release, Take 1 tablet orally once a day.  11.sodium bicarbonate 650 mg tablet, Take 1 tablet orally 2 times a day.  12.TylenoL 325 mg tablet, Take 2 tablets orally 2 times a day.  13.Vitmain D 500mcg, daily  14.dilTIAZem  mg capsule,24 hr,extended release, Take 1 capsule orally once a day.  15.multivitamin tablet, Take 1 tablet orally once a day.  16.Saccharomyces boulardii 250 mg capsule, Take 1 capsule orally once a day.  17.torsemide 20 mg tablet, Take 1 tablet orally once every other day  18.traMADoL 50 mg tablet, Take one-half tablet orally 2 times a day.  19.vancomycin 125 mg capsule, Take 1 capsule (125 mg total) by mouth 4 (four) times daily for 7 days, THEN 1 capsule (125 mg total) 2 (two) times a day for 7 days, THEN 1  Impression  1.Unspecified atrial fibrillation  2.Benign essential HTN  3.Hyperlipidemia, mixed  4.Pure hypercholesterolemia  5.Chronic kidney disease, stage 3b  6.Hypokalemia  7.Vitamin D deficiency  Assessment & Plan  ===============================  Cardiac Testing Personally Reviewed    ECG Reviewed -normal sinus rhythm ectopic atrial rhythm    Echo Results []    Stress Test Results []    Monitor Results []    EP Procedures: []  ==============================    Problem List:    #Symptomatic persistent atrial fibrillation  - Chest Vascor of 4 for hypertension, age x2, gender  --On Eliquis 2.5 twice daily  - LFTs and TSH were checked recently and are normal    -I would like to write out for the patient's son so they can give it to the nursing home that she should be on amiodarone 200 mg daily  - He will call back with the accurate list of current medications     #Essential hypertension on diltiazem and hydrochlorothiazide     # Volume overload  - Continue current medications     # C. difficile now weaning.  Vancomycin     # Recent hip fracture surgical repair completed    #Left calf ulcer.  I have recommended that she see wound care or talk to her primary care physician about a  wound care consult      Continue current meds except as outlined above.  =====================================    Check out Items:  Follow-up in 6 months  Medications Ordered  1.amiodarone 200 mg tablet, Take 1 tablet orally once a day.  Labs and Diagnostics ordered  1.EKG (electrocardiogram) (Today)  Future appointments  1.Follow up visit - Nayeli Cisneros MD (6 Months)  2.Referral Visit - Carmen Tapia ([email or web address]) : (Today)  Miscellaneous  1.Weight monitoring (regime/therapy)  Nurses documentation  Refill: none   Upcoming surgeries: none   Use of assistive devices(s): none   Triage & medication list reviewed by:   LYSSA  Patient instructions  Medications:   1. Continue current medications.  2. Patient should be taking amiodarone 200mg daily   Please call our office with the update medication list    Provider Follow Up:  1. Follow up with Dr. Cisneros in 6 months  Your provider recommends to see wound care clinic or talk to her primary care physician about a wound care consult  Call to Edward: [phone] to schedule an appointment with the wound care clinic      Please bring in you medication bottles or updated medicine list to your next appointment.  Call [facility] if you have any problems or concerns at [phone]   CPOE Orders carried out by: Nayeli Cisneros MD, Tracy Quezada and Janis JOYA  Care Providers: Nayeli Cisneros MD, Tracy Quezada and Janis JOYA  Electronically Authenticated by  Nayeli Cisneros MD  08/13/2024 01:02:58 PM  Disclaimer: Components of this note were documented using voice recognition system and are subject to errors not corrected at proofreading. Contact the author of this note for any clarifications.

## 2024-08-21 NOTE — PROGRESS NOTES
Patient A&Ox4; forgetful at times and hard of hearing. Vital signs stable on room air. Denies pain at  this time. Abdomen soft, nondistended, nontender. Bowel sounds present; hypoactive. Patient passing gas and stool to colostomy bag. Denies nausea, vomiting. Huizar catheter in place, CBI running moderate rate, pink-tinged output to gravity. Regular diet being tolerated well. Patient and son, Arron (POA), updated on plan of care. Questions and concerns discussed.

## 2024-08-21 NOTE — PROGRESS NOTES
1100: Patient A&Ox4; forgetful at times and hard of hearing with bilateral hearing aids. Vital signs stable on room air. Denies pain at this time. Abdomen soft, nondistended, tender. Patient passing gas and watery stool to colostomy. Denies nausea, vomiting. Huizar catheter in place, CBI running moderate speed and draining pink-tinged output to gravity. No clots present at this time. NPO diet order in place for surgery.     Patient and son, Arron (POA), updated on plan of care for blood transfusion and surgical plan. Consent for both procedures placed in chart, dual witness done with Maria Luisa RONDON and son Arron. Questions and concerns discussed. Report given to Arianna RONDON.

## 2024-08-21 NOTE — DIETARY MALNUTRITION NOTE
[facility]   part of [facility]    NUTRITION ASSESSMENT    Pt meets moderate malnutrition criteria at this time.    CRITERIA FOR MALNUTRITION DIAGNOSIS:  Criteria for non-severe malnutrition diagnosis: acute illness/injury related to wt loss 5% in 1 month and moderate muscle and fat wasting    NUTRITION INTERVENTION:    RD nutrition Care Plan- See RD nutrition assessment for additional recommendations  Meal and Snacks - Monitor and encourage adequate PO intake.   Medical Food Supplements - Ensure Plus High Protein Daily. Rationale/use for oral supplements discussed.  Nutrition Education - Encouraged patient to take small, frequent meals with emphasis on good protein sources.  Vitamin and Mineral Supplements - adding Multivitamin with minerals    PATIENT STATUS: 08/21/24  Patient is a 89 year old female with hx of afib, CKD stage 4, hx of colon cancer s/p colostomy, hx of endometrial cancer s/p hysterectomy, hx of renal cell CA s/p nephrectomy, CHF, ORIF 6/2024, cdiff colitis. From nursing home. Admitted with hematuria. RD screened due to low BMI. Pt [place]. States appetite is pretty good. Pt states wt loss but unsure how much. Per EMR 9% x ~ 1 month, significant per standards. Pt agreeable to EPHP and encouraged small, frequent meals. Follow for po, ons, wts    ANTHROPOMETRICS:  Ht:  60\"  Wt: 37.6 kg (83 lb).   BMI: Body mass index is 16.21 kg/m².  IBW: 46 kg    WEIGHT HISTORY:   Wt Readings from Last 10 Encounters:   08/20/24 37.6 kg (83 lb)   07/30/24 41.3 kg (91 lb)   07/10/24 45.4 kg (100 lb)   06/24/24 45.8 kg (101 lb)   06/10/24 37.1 kg (81 lb 12.7 oz)   05/24/24 37.1 kg (81 lb 14.4 oz)   05/23/24 36.7 kg (81 lb)   04/23/24 42.1 kg (92 lb 14.4 oz)   04/04/24 39.7 kg (87 lb 8.4 oz)   03/26/24 37.7 kg (83 lb 1.6 oz)      NUTRITION:  Diet:       Procedures    NPO      Food Allergies: No  Cultural/Ethnic/Mosque Preferences Addressed: Yes    Percent Meals Eaten (last 3 days)       Date/Time Percent Meals  Eaten (%)    08/20/24 2045 100 %          GI system review: WNL Last BM: 8/21, colostomy  Skin and wounds: Hip    NUTRITION RELATED PHYSICAL FINDINGS:     1. Body Fat/Muscle Mass: moderate depletion body fat Buccal fat pad and Midaxillary line and moderate muscle depletion Temple region and Clavicle region     2. Fluid Accumulation: none     NUTRITION PRESCRIPTION: 50 kg IBW  Calories: 5337-0509 calories/day (25-30 kcal/kg)  Protein:  grams protein/day (1.5-2.0 grams protein per kg)  Fluid: ~1 ml/kcal or per MD discretion    NUTRITION DIAGNOSIS/PROBLEM:  Malnutrition related to physiological causes as evidenced by documented/reported insufficient oral intake, documented/reported unintentional weight loss, loss of fat mass, and loss of muscle mass    MONITOR AND EVALUATE/NUTRITION GOALS:  PO intake of 75% of meals TID - New  PO intake of 75% of oral nutrition supplement/s - New  Weight stable within 1 to 2 lbs during admission - New    MEDICATIONS:  Reviewed    LABS:  Reviewed    Pt is at Moderate nutrition risk    Love Begum RD, LDN  Clinical Dietitian

## 2024-08-21 NOTE — OCCUPATIONAL THERAPY NOTE
OT orders received, pt chart reviewed. Pt currently has HGB of 6.0 and has been trending downward. Will hold off until values are safe to participate in OT; will re-attempt as able. RN notified. VZ

## 2024-08-21 NOTE — CONSULTS
[facility]  Cardiology Consultation    Sunita Loza Patient Status:  Inpatient    1932 MRN [id number]   Location [facility] 3NW-A Attending Joanne Moran MD   Hosp Day # 1 PCP Janell Powell MD     Consults      Reason for Consultation     A/C management        History of Present Illness     Sunita Loza is a a(n) 89 year old female with persistent AF, here with hematuria    Has AFib for which I see her and she has been on rhythm control strategy with Amio/Eliquis.    CKD    HTN    Chadsvasc- 4 ( agex2, HTN, Gender)    History:     Past Medical History:    Arthritis    Atrial fibrillation (HCC)    Back pain    C. difficile colitis    CKD (chronic kidney disease)    and S/P nephrectomy    Colon cancer (HCC)    Congestive heart disease (HCC)    Easy bruising    Endometrial cancer (HCC)    UTERINE, DX ABOUT 30 YEARS    Hearing impairment    AIDS    Heart palpitations    Afib    High blood pressure    Hip fracture (HCC)    left    Kidney failure    Leg swelling    Bishop syndrome    hereditary colorectal cancer    Muscle weakness    WALKER    Pain in joints    Wears glasses    Weight loss     Past Surgical History:   Procedure Laterality Date    Colectomy      Nephrectomy Left     Orif hip fracture Left 06/10/2024    Part removal colon w end colostomy       Family History   Problem Relation Age of Onset    Heart Disorder Father     Heart Disorder Mother     Colon Cancer Mother         70    Heart Disorder Sister     Breast Cancer Sister     Diabetes Brother     Breast Cancer Sister     Cancer Sister     Breast Cancer Sister     Breast Cancer Daughter       reports that she has never smoked. She has never used smokeless tobacco. She reports that she does not currently use alcohol. She reports that she does not use drugs.      Allergies:     Allergies   Allergen Reactions    Ciprofloxacin RASH and HIVES    Penicillins RASH         Medications       Current Facility-Administered Medications:      ceFAZolin (Ancef) 2g in 10mL IV syringe premix, 2 g, Intravenous, On Call to OR    sodium chloride 0.9% infusion, , Intravenous, Once    traMADol (Ultram) tab 50 mg, 50 mg, Oral, Q12H PRN    sodium bicarbonate 75 mEq in dextrose 5%-sodium chloride 0.45% 1,075 mL infusion, 75 mEq, Intravenous, Continuous    multivitamin with minerals (Thera M Plus) tab 1 tablet, 1 tablet, Oral, Daily    [COMPLETED] Huizar / urology care, , , Until Discontinued **AND** [COMPLETED] Huizar / urology care, , , Continuous **AND** sodium chloride 0.9 % irrigation 3,000 mL, 3,000 mL, Irrigation, Continuous    amiodarone (Pacerone) tab 200 mg, 200 mg, Oral, Daily    dilTIAZem ER (Dilacor XR) 24 hr cap 240 mg, 240 mg, Oral, Daily    [Held by provider] torsemide (Demadex) tab 10 mg, 10 mg, Oral, QOD    acetaminophen (Tylenol Extra Strength) tab 500 mg, 500 mg, Oral, Q4H PRN    ondansetron (Zofran) 4 MG/2ML injection 4 mg, 4 mg, Intravenous, Q6H PRN    metoclopramide (Reglan) 5 mg/mL injection 5 mg, 5 mg, Intravenous, Q8H PRN      Review of Systems     10 point ROS was negative except        Telemetry:         Telemetry: NSR      Physical Exam     Physical Exam   Blood pressure 153/49, pulse 71, temperature 97.8 °F (36.6 °C), temperature source Oral, resp. rate 18, weight 83 lb (37.6 kg), SpO2 100%.  Temp (24hrs), Av °F (36.7 °C), Min:97.7 °F (36.5 °C), Max:98.3 °F (36.8 °C)    Wt Readings from Last 3 Encounters:   24 83 lb (37.6 kg)   24 91 lb (41.3 kg)   07/10/24 100 lb (45.4 kg)       NAD  PERRLA/EOMI  Neck veins not elevated  Carotids- no bruits  CTA bilaterally  Cardiac- RRR  Abdomen- Soft,Nontender, normal BS  Extremities- pulses normal  Edema-   Mood /Affect Congruent  Skin- no lesions            Lab/Radiology Results     Recent Labs   Lab 24  1252 24  0633   * 89   BUN 54* 46*   CREATSERUM 3.95* 3.39*   EGFRCR 10* 12*   CA 8.2* 7.7*    142   K 5.1 4.1    115*   CO2 17.0* 18.0*     Recent Labs    Lab 08/20/24  1252 08/21/24  0636   RBC 2.88* 2.08*   HGB 8.2* 6.0*   HCT 26.9* 19.5*   MCV 93.4 93.8   MCH 28.5 28.8   MCHC 30.5* 30.8*   RDW 17.4 17.3   NEPRELIM 4.28 4.11   WBC 5.2 6.1   .0 258.0         [unfilled]  No results for input(s): \"BNP\" in the last 168 hours.  Lab Results   Component Value Date    INR 1.25 (H) 08/20/2024    INR 0.99 06/10/2024    INR 1.43 (H) 04/23/2024     No results found for: \"TROP\"      CT ABDOMEN+PELVIS(CPT=74176)    Result Date: 8/20/2024  CONCLUSION:  1. Hyperdense mass in the bladder likely represent hematoma measuring up to 7.9 cm. 2. The left kidney is surgically absent. 3. Mild right hydronephrosis   LOCATION:  [id number]   Dictated by (CST): Deepak Enrique MD on 8/20/2024 at 4:24 PM     Finalized by (CST): Deepak Enrique MD on 8/20/2024 at 4:28 PM       EKG    Result Date: 8/21/2024  Atrial flutter with 2:1 A-V conduction LVH with repolarization abnormality ST & T wave abnormality, consider medication effect, electrolyte imbalance, ischemia, or strain Abnormal ECG When compared with ECG of 30-JUL-2024 01:01, Atrial flutter has replaced Sinus rhythm Vent. rate has increased BY  67 BPM ST now depressed in Inferior leads T wave inversion now evident in Inferior leads T wave inversion now evident in Lateral leads Confirmed by ROJELIO JONAS EVANS ([id number]) on 8/21/2024 9:34:05 AM       Problem List     Patient Active Problem List   Diagnosis    DJD (degenerative joint disease), cervical    Asymptomatic postmenopausal status    Benign essential HTN    Chronic kidney disease, stage III (moderate) (HCC)    Chronic rhinitis    Disorder of bone and cartilage    Esophageal reflux    Generalized osteoarthrosis, involving multiple sites    Malignant neoplasm of uterus (HCC)    Other vitamin B12 deficiency anemia    Pure hypercholesterolemia    Renal failure    Acute pain of left knee    Vitamin D deficiency    Acidosis    Malignant neoplasm (HCC)    Hypokalemia    Hyperlipidemia, mixed     Endometrial carcinoma (HCC)    Anemia in chronic kidney disease    Osteopenia    Chronic kidney disease, stage 3b (HCC)    Otalgia    Sensorineural hearing loss, bilateral    ZOHREH (acute kidney injury) ([facility])    Hypernatremia    Hypomagnesemia    Dehydration    ATN (acute tubular necrosis) ([facility])    Atrial fibrillation (HCC)    Bilateral leg edema    Essential hypertension    Primary osteoarthritis of left knee    Abdominal mass, unspecified abdominal location    Anemia    Acute kidney injury (HCC)    Azotemia    Hyperglycemia    Abdominal pain, generalized    CKD (chronic kidney disease) stage 4, GFR 15-29 ml/min ([facility])    Shock (HCC)    Decreased ferritin    Chronic edema    Cellulitis of abdominal wall    Acute renal injury (HCC)    Leukocytosis    Acute renal failure (ARF) ([facility])    Syncope, near    Chronic heart failure with preserved ejection fraction ([facility])    Solitary kidney, acquired    Closed fracture of left hip, initial encounter ([facility])    Hyperkalemia    Clostridium difficile colitis    Metabolic acidosis    Leukocytosis, unspecified type    Chronic atrial fibrillation with RVR ([facility])    Gross hematuria    Acute on chronic anemia       Diagnostic Testing     ECG Atrial Flutter- RVR  TTE 4/2024    Conclusions:     1. Left ventricle: The cavity size was normal. Wall thickness was normal.      Systolic function was normal. The estimated ejection fraction was 65-70%.      Doppler parameters are consistent with abnormal left ventricular      relaxation - grade 1 diastolic dysfunction.   2. Right ventricle: Systolic pressure was mildly increased.   3. Mitral valve: There was mild regurgitation.   4. Pulmonary arteries: Systolic pressure was estimated to be 41mm Hg.      Estimated pulmonary artery diastolic pressure was 12mm Hg.   5. Pericardium, extracardiac: There was no pericardial effusion.   Impressions:  No previous study was available for comparison. What appears   to be artifact is noted on the apical views of  both the tricuspid and mitral   valves. No vegetations are identified by this good quality transthoracic   echocardiogram; however, this can not \"rule out\" endocaritis. Correlation   suggested.     Stress Testing   EP Procedures     Assessment     Atrial Flutter- typical  Persistent Atrial Fib- on OAC/Amio in the past  Hematuria  HTN      Plan     Hematuria -eval. Hold OAC  Rate control for AFL- continue amio/dilt  Will start Metoprolol tartrate 25 TID  Once we determine if she can be back on OAC, we can address a SHIRA/DCCV in the future.        C5      Nayeli Cisneros MD    Cardiac Electrophysiology  [facility]  8/21/2024  2:36 PM

## 2024-08-21 NOTE — PHYSICAL THERAPY NOTE
PT order received and chart reviewed. Here for gross hematuria.Pt noted with HGB at 6.0 and has been trending down from prior hgb. Noted to be NPO for possible procedure. Will f/u when approp and safe to participate in skilled PT intervention.

## 2024-08-21 NOTE — PROGRESS NOTES
[facility]  Urology Progress Note    Sunita Loza Patient Status:  Inpatient    1932 MRN [id number]   Location [facility] 3NW-A Attending Joanne Moran MD   Hosp Day # 1 PCP Janell Powell MD     Subjective:  Sunita Loza is a(n) 89 year old female.    Current complaints: No pain.  No fever.      Objective:  General appearance: alert, appears stated age, and cooperative  Blood pressure 132/55, pulse 68, temperature 97.7 °F (36.5 °C), temperature source Oral, resp. rate 18, weight 83 lb (37.6 kg), SpO2 97%.  Lungs: non-labored respirations  Abdomen: soft, non-tender  : plascencia catheter in place draining reddish to pinkish urine with CBI.    Lab Results   Component Value Date    WBC 5.2 2024    HGB 8.2 2024    HCT 26.9 2024    .0 2024    CREATSERUM 3.95 2024    BUN 54 2024     2024    K 5.1 2024     2024    CO2 17.0 2024     2024    CA 8.2 2024    ALB 3.8 2024    ALKPHO 125 2024    BILT <0.2 2024    TP 6.6 2024    AST 25 2024    ALT 16 2024    PTT 31.5 2024    INR 1.25 2024    PTP 15.7 2024       No results found for this visit on 24.     CT ABDOMEN+PELVIS(CPT=74176)    Result Date: 2024  CONCLUSION:  1. Hyperdense mass in the bladder likely represent hematoma measuring up to 7.9 cm. 2. The left kidney is surgically absent. 3. Mild right hydronephrosis   LOCATION:  CPX879   Dictated by (CST): Deepak Enrique MD on 2024 at 4:24 PM     Finalized by (CST): Deepak Enrique MD on 2024 at 4:28 PM         Assessment:    Gross hematuria related to anticoagulation.  Eliquis on hold.  Given her episode of hematuria, the need for Eliquis should be reexamined.  Incomplete bladder emptying at baseline and probably worsened by the clot in her bladder.    Afebrile, VSS  No leukocytosis  Hgb 8.2  Serum creat 3.95  Repeat labs to be collected  Urine  culture pending  CT A/P 8/20/24:  The left kidney is surgically absent.  Mild prominence of the right collecting system may represent mild right hydronephrosis.  No renal masses are identified.   A hyperdense mass in the bladder is noted measuring up to 7.9 x 4.6 cm (series 3, image 139) by 6.2 centimeters in craniocaudal dimension likely representing a hematoma although underlying soft tissue mass cannot be completely excluded.     Plan:    Huizar catheter hand irrigated and clots obtained.  CBI resumed.    Check final culture result      Spoke with Dr. House - he recommends cystoscopy, clot evacuation, possible biopsy, right retrograde pyelogram, possible right ureteroscopy, possible right ureteral stent placement. Reviewed risks, benefits, alternatives, and complications of the procedure including but not limited to risk of anesthesia, bladder/ureteral injury, use of nephrostomy tube, bleeding, infection, and ureteral stent related symptoms with the patient and son (Uday) over the phone.  Patient/son informed the ureteral stent is not a permanent implant and would eventually need to be removed (timing of stent removal per urologist).      NPO  Consent to be signed  Ancef on call to OR    Above discussed with patient, patient's sone (Uday) over the phone, nurse, Dr. House.      Daksha Chou PA-C  [facility]  Department of Urology  8/21/2024  5:55 AM      Addendum:  Recent Labs   Lab 08/20/24  1252 08/21/24  0636   RBC 2.88* 2.08*   HGB 8.2* 6.0*   HCT 26.9* 19.5*   MCV 93.4 93.8   MCH 28.5 28.8   MCHC 30.5* 30.8*   RDW 17.4 17.3   NEPRELIM 4.28 4.11   WBC 5.2 6.1   .0 258.0     Recent Labs   Lab 08/20/24  1252 08/21/24  0633   * 89   BUN 54* 46*   CREATSERUM 3.95* 3.39*   EGFRCR 10* 12*   CA 8.2* 7.7*    142   K 5.1 4.1    115*   CO2 17.0* 18.0*     No leukocytosis  Hgb 8.2 > 6  Serum 3.95 > 3.39    Patient will need a transfusion prior to procedure.      Above discussed with  hospitalist, Dr. House.    Daksha Chou P.A.-C  Urology

## 2024-08-21 NOTE — CONSULTS
[facility]  Report of Consultation    [facility]    Sunita Loza Patient Status:  Inpatient    1932 MRN [id number]   Location [facility] 3NW-A Attending Joanne Moran MD   Hosp Day # 0 PCP Janell oPwell MD     Impression and Plan:  Gross hematuria related to anticoagulation.  Eliquis on hold.  Given her episode of hematuria, the need for Eliquis should be reexamined.  Incomplete bladder emptying at baseline and probably worsened by the clot in her bladder.  I irrigated many clots out of the bladder at the bedside. Continue bladder irrigation. Follow creatinine. Will keep on clear liquids overnight in case acute procedure is needed, but that seems unlikely. Void trial once urine clears. Out patient office cystoscopy.  Norm House MD  2024  9:05 PM      History of Present Illness:  Sunita Loza was admited from the nursing home with gross hematuria.  She is a 89-year-old female on Eliquis for atrial fibrillation.  CT scan shows a solitary right kidney with mild hydronephrosis.  The bladder is distended, similar to her previous exams and she has perivesical phleboliths.  There is a 6.6 x 4 cm clot in the bladder.  The bladder wall on a CT scan done in January of this year did not show any lesions.  The distended bladder was of similar size.  Creatinine is 4, only slightly above her baseline, around 3.. A urologic consultation is requested regarding hematuria and mild hydronephrosis of her right solitary kidney.  She has a history of a nephrectomy for renal cell carcinoma. H/O of colon cancer s/p colostomy, hx of endometrial cancer s/p hysterectomy     Allergies:  Allergies   Allergen Reactions    Ciprofloxacin RASH and HIVES    Penicillins RASH       Medications:    Outpatient Medications Marked as Taking for the 24 encounter (Hospital Encounter)   Medication Sig Dispense Refill    torsemide 10 MG Oral Tab Take 1 tablet (10 mg total) by mouth every other day. MWF      amiodarone  200 MG Oral Tab Take 1 Tab twice a day for the next 7 days, then REDUCE to ONE tab daily. (Patient taking differently: Take 1 tablet (200 mg total) by mouth every morning. Take 1 Tab twice a day for the next 7 days, then REDUCE to ONE tab daily.) 60 tablet 0    dilTIAZem HCl ER Beads 240 MG Oral Capsule SR 24 Hr Take 1 capsule (240 mg total) by mouth daily. 30 capsule 11    traMADol 50 MG Oral Tab Take 0.5 tablets (25 mg total) by mouth 2 (two) times daily.      torsemide 10 MG Oral Tab Take 1 tablet (10 mg total) by mouth every other day. T,Th,S,S      saccharomyces boulardii 250 MG Oral Cap Take 1 capsule (250 mg total) by mouth daily.      sodium bicarbonate 650 MG Oral Tab Take 1 tablet (650 mg total) by mouth 2 (two) times daily. 60 tablet 0    ferrous sulfate 325 (65 FE) MG Oral Tab EC Take 1 tablet (325 mg total) by mouth daily with breakfast.      apixaban 2.5 MG Oral Tab Take 1 tablet (2.5 mg total) by mouth 2 (two) times daily.      aspirin 81 MG Oral Tab Take 1 tablet (81 mg total) by mouth daily.      Multiple Vitamin (MULTIVITAMINS OR) Take 1 tablet by mouth daily.      acetaminophen 500 MG Oral Tab Take 1 tablet (500 mg total) by mouth in the morning and 1 tablet (500 mg total) before bedtime.         Current Facility-Administered Medications   Medication Dose Route Frequency    sodium chloride 0.9 % irrigation 3,000 mL  3,000 mL Irrigation Continuous    amiodarone (Pacerone) tab 200 mg  200 mg Oral Daily    [START ON 8/21/2024] dilTIAZem ER (Dilacor XR) 24 hr cap 240 mg  240 mg Oral Daily    sodium bicarbonate tab 650 mg  650 mg Oral BID    [START ON 8/21/2024] torsemide (Demadex) tab 10 mg  10 mg Oral QOD    acetaminophen (Tylenol Extra Strength) tab 500 mg  500 mg Oral Q4H PRN    ondansetron (Zofran) 4 MG/2ML injection 4 mg  4 mg Intravenous Q6H PRN    metoclopramide (Reglan) 5 mg/mL injection 5 mg  5 mg Intravenous Q8H PRN    lactated ringers infusion   Intravenous Continuous       History:  Past  Medical History:    Arthritis    Atrial fibrillation (HCC)    Back pain    C. difficile colitis    CKD (chronic kidney disease)    and S/P nephrectomy    Colon cancer (HCC)    Congestive heart disease (HCC)    Easy bruising    Endometrial cancer (HCC)    UTERINE, DX ABOUT 30 YEARS    Hearing impairment    AIDS    Heart palpitations    Afib    High blood pressure    Hip fracture (HCC)    left    Kidney failure    Leg swelling    Bishop syndrome    hereditary colorectal cancer    Muscle weakness    WALKER    Pain in joints    Wears glasses    Weight loss       Past Surgical History:   Procedure Laterality Date    Colectomy      Nephrectomy Left     Orif hip fracture Left 06/10/2024    Part removal colon w end colostomy         Family History   Problem Relation Age of Onset    Heart Disorder Father     Heart Disorder Mother     Colon Cancer Mother         70    Heart Disorder Sister     Breast Cancer Sister     Diabetes Brother     Breast Cancer Sister     Cancer Sister     Breast Cancer Sister     Breast Cancer Daughter        Social History     Socioeconomic History    Marital status:      Spouse name: Not on file    Number of children: Not on file    Years of education: Not on file    Highest education level: Not on file   Occupational History    Not on file   Tobacco Use    Smoking status: Never    Smokeless tobacco: Never   Vaping Use    Vaping status: Never Used   Substance and Sexual Activity    Alcohol use: Not Currently    Drug use: Never    Sexual activity: Not on file   Other Topics Concern    Not on file   Social History Narrative    Not on file     Social Determinants of Health     Financial Resource Strain: Not on file   Food Insecurity: No Food Insecurity (8/20/2024)    Food Insecurity     Food Insecurity: Never true   Transportation Needs: No Transportation Needs (8/20/2024)    Transportation Needs     Lack of Transportation: No     Car Seat: Not on file   Physical Activity: Not on file   Stress:  Not on file   Social Connections: Not on file   Housing Stability: Low Risk  (8/20/2024)    Housing Stability     Housing Instability: No     Housing Instability Emergency: Not on file     Crib or Bassinette: Not on file       Review of Systems:  No SOB, angina, fevers, focal weakness, and as in HPI.    Physical Exam:  /54 (BP Location: Right arm)   Pulse 84   Temp 98.3 °F (36.8 °C) (Oral)   Resp 18   Wt 83 lb (37.6 kg)   SpO2 100%   BMI 16.21 kg/m²   General: Alert, Cooperative.  Seems generally oriented. No apparent distress.    HEENT: Exam is unremarkable.   Lungs: Respirations not labored.  Cardiac: Appears well-perfused.  Abdomen:  LLQ Colostomy present..Bag has air. Abd is Soft, non-distended, non-tender, with no rebound or guarding.      Genitourinary: No flank tenderness.  I manualy irrigated ~50cc Clot out of the bladder at the bedside using ~150cc of NS over 7-10 min.  Extremities:  No lower extremity edema noted.  Left leg ankle bandage.   Neurologic:  Moves all extr. .  No focal neurologic deficit.    Laboratory Data:  Lab Results   Component Value Date    WBC 5.2 08/20/2024    HGB 8.2 08/20/2024    .0 08/20/2024    CREATSERUM 3.95 08/20/2024    K 5.1 08/20/2024    CA 8.2 08/20/2024       Lab Results   Component Value Date    COLORUR Red 08/20/2024    CLARITY Cloudy 08/20/2024    SPECGRAVITY >=1.030 08/20/2024    SPECGRAVITY 1.018 08/20/2024    GLUUR Negative 08/20/2024    BILUR Negative 08/20/2024    KETUR Negative 08/20/2024    BLOODURINE Moderate 08/20/2024    PHURINE 7.0 08/20/2024    PROUR >=300 08/20/2024    UROBILINOGEN 0.2 08/20/2024    NITRITE Negative 08/20/2024    LEUUR Trace 08/20/2024

## 2024-08-21 NOTE — CONSULTS
[facility]  Report of Consultation    Sunita Loza Patient Status:  Inpatient    1932 MRN [id number]   Location [facility] 3NW-A Attending Joanne Moran MD   Hosp Day # 1 PCP Janell Powell MD     Reason for Consultation:  Betzaida/CKD    History of Present Illness:  Sunita Loza is a a(n) 89 year old female with hx of CKD stg 3/4 (baseline Cr ~ 2, hx of colon cancer, hx of endometrial cancer s/p hysterectomy, s/p nephrectomy (renal cell) who is admitted to hospital for evaluation of hematuria.  Patient is currently getting CBI.   Cr upto ~ 3.9 on admit    She denies any abd pain   No n/v    Admit eval showed CT abd w/ bladder mass ; mild R hydro (pt s/p L nephrectomy)      History:  Past Medical History:    Arthritis    Atrial fibrillation (HCC)    Back pain    C. difficile colitis    CKD (chronic kidney disease)    and S/P nephrectomy    Colon cancer (HCC)    Congestive heart disease (HCC)    Easy bruising    Endometrial cancer (HCC)    UTERINE, DX ABOUT 30 YEARS    Hearing impairment    AIDS    Heart palpitations    Afib    High blood pressure    Hip fracture (HCC)    left    Kidney failure    Leg swelling    Bishop syndrome    hereditary colorectal cancer    Muscle weakness    WALKER    Pain in joints    Wears glasses    Weight loss     Past Surgical History:   Procedure Laterality Date    Colectomy      Nephrectomy Left     Orif hip fracture Left 06/10/2024    Part removal colon w end colostomy       Family History   Problem Relation Age of Onset    Heart Disorder Father     Heart Disorder Mother     Colon Cancer Mother         70    Heart Disorder Sister     Breast Cancer Sister     Diabetes Brother     Breast Cancer Sister     Cancer Sister     Breast Cancer Sister     Breast Cancer Daughter       reports that she has never smoked. She has never used smokeless tobacco. She reports that she does not currently use alcohol. She reports that she does not use drugs.    Allergies:  Allergies   Allergen  Reactions    Ciprofloxacin RASH and HIVES    Penicillins RASH       Current Medications:    Current Facility-Administered Medications:     ceFAZolin (Ancef) 2g in 10mL IV syringe premix, 2 g, Intravenous, On Call to OR    sodium chloride 0.9% infusion, , Intravenous, Once    traMADol (Ultram) tab 50 mg, 50 mg, Oral, Q12H PRN    [COMPLETED] Huizar / urology care, , , Until Discontinued **AND** [COMPLETED] Huizar / urology care, , , Continuous **AND** sodium chloride 0.9 % irrigation 3,000 mL, 3,000 mL, Irrigation, Continuous    amiodarone (Pacerone) tab 200 mg, 200 mg, Oral, Daily    dilTIAZem ER (Dilacor XR) 24 hr cap 240 mg, 240 mg, Oral, Daily    sodium bicarbonate tab 650 mg, 650 mg, Oral, BID    [Held by provider] torsemide (Demadex) tab 10 mg, 10 mg, Oral, QOD    acetaminophen (Tylenol Extra Strength) tab 500 mg, 500 mg, Oral, Q4H PRN    ondansetron (Zofran) 4 MG/2ML injection 4 mg, 4 mg, Intravenous, Q6H PRN    metoclopramide (Reglan) 5 mg/mL injection 5 mg, 5 mg, Intravenous, Q8H PRN  Home Medications:  No current outpatient medications on file.       Review of Systems:  See HPI; A total of 12 systems reviewed and otherwise unremarkable.    Physical Exam:  Vital signs: Blood pressure 153/49, pulse 71, temperature 97.8 °F (36.6 °C), temperature source Oral, resp. rate 18, weight 83 lb (37.6 kg), SpO2 100%.  General: No acute distress. Alert and oriented x 3.  HEENT: Moist mucous membranes. EOM-I. PERRL  Neck: No lymphadenopathy.  No JVD. No carotid bruits.  Respiratory: Clear to auscultation bilaterally.  No wheezes. No rhonchi.  Cardiovascular: S1, S2.  Regular rate and rhythm.  No murmurs. Equal pulses   Abdomen: Soft, nontender, nondistended.  Positive bowel sounds. No rebound tenderness   Neurologic: No focal neurological deficits.   Musculoskeletal: Full range of motion of all extremities.  No swelling noted.   Integument: No lesions. No erythema.  Psychiatric: Appropriate mood and  affect.    Laboratory:  Lab Results   Component Value Date    WBC 6.1 08/21/2024    HGB 6.0 08/21/2024    HCT 19.5 08/21/2024    .0 08/21/2024    CREATSERUM 3.39 08/21/2024    BUN 46 08/21/2024     08/21/2024    K 4.1 08/21/2024     08/21/2024    CO2 18.0 08/21/2024    GLU 89 08/21/2024    CA 7.7 08/21/2024     Lab Results   Component Value Date    COLORUR Red 08/20/2024    CLARITY Cloudy 08/20/2024    SPECGRAVITY >=1.030 08/20/2024    SPECGRAVITY 1.018 08/20/2024    GLUUR Negative 08/20/2024    BILUR Negative 08/20/2024    KETUR Negative 08/20/2024    BLOODURINE Moderate 08/20/2024    PHURINE 7.0 08/20/2024    PROUR >=300 08/20/2024    UROBILINOGEN 0.2 08/20/2024    NITRITE Negative 08/20/2024    LEUUR Trace 08/20/2024       BUN (mg/dL)   Date Value   08/21/2024 46 (H)   08/20/2024 54 (H)   07/31/2024 28 (H)     Blood Urea Nitrogen (mg/dL)   Date Value   03/02/2022 23.0 (H)   07/27/2021 23.0 (H)   01/29/2021 19.0     CREATININE (no units)   Date Value   02/05/2020 1.81   07/15/2019 1.8     Creatinine (mg/dL)   Date Value   08/21/2024 3.39 (H)   08/20/2024 3.95 (H)   07/31/2024 2.19 (H)   03/02/2022 1.49 (H)   07/27/2021 1.47 (H)   01/29/2021 1.29 (H)         Imaging:  Reviewed     Impression:  ZOHREH/CKD_ baseline Cr ~ 2 related to past nephrectomy/age/etc. ZOHREH in setting of obstructive uropathy from bladder mass - suspected hematoma  - ongoing CBI per urology; plan for OR  - start fluids  - monitor labs  Hematuria - as above; appreciate urology evaluation; holding AC  Anemia- plan for transfusion today (hgb 6)  Acidosis - hx of c-diff; no diarrhea reported presently;   -started on iv bicarb; monitor   Afib - controlled; off AC  HFpEF; stable volume; hold diuretics for now  Hx of colon/uterine cancer        Thank you for allowing me to participate in this patient's care.  Please feel free to call me with any questions or concerns.    Dante Marques MD  8/21/2024  1:18 PM

## 2024-08-21 NOTE — PLAN OF CARE
Patient alert and oriented x4, vital signs stable on room air. Pt reports no pain, CBI in place draining light pink. Up with standby assist and walker. NPO with sips since 0000. Safety measures in place, questions addressed, plan of care discussed with patient.

## 2024-08-22 VITALS
BODY MASS INDEX: 16 KG/M2 | OXYGEN SATURATION: 95 % | WEIGHT: 83 LBS | DIASTOLIC BLOOD PRESSURE: 49 MMHG | HEART RATE: 60 BPM | TEMPERATURE: 98 F | RESPIRATION RATE: 17 BRPM | SYSTOLIC BLOOD PRESSURE: 111 MMHG

## 2024-08-22 LAB
ANION GAP SERPL CALC-SCNC: 7 MMOL/L (ref 0–18)
BASOPHILS # BLD AUTO: 0 X10(3) UL (ref 0–0.2)
BASOPHILS NFR BLD AUTO: 0 %
BUN BLD-MCNC: 41 MG/DL (ref 9–23)
CALCIUM BLD-MCNC: 7.7 MG/DL (ref 8.7–10.4)
CHLORIDE SERPL-SCNC: 116 MMOL/L (ref 98–112)
CO2 SERPL-SCNC: 20 MMOL/L (ref 21–32)
CREAT BLD-MCNC: 2.84 MG/DL
EGFRCR SERPLBLD CKD-EPI 2021: 15 ML/MIN/1.73M2 (ref 60–?)
EOSINOPHIL # BLD AUTO: 0 X10(3) UL (ref 0–0.7)
EOSINOPHIL NFR BLD AUTO: 0 %
ERYTHROCYTE [DISTWIDTH] IN BLOOD BY AUTOMATED COUNT: 22.3 %
GLUCOSE BLD-MCNC: 128 MG/DL (ref 70–99)
HCT VFR BLD AUTO: 26.2 %
HGB BLD-MCNC: 8 G/DL
IMM GRANULOCYTES # BLD AUTO: 0.04 X10(3) UL (ref 0–1)
IMM GRANULOCYTES NFR BLD: 1 %
LYMPHOCYTES # BLD AUTO: 0.46 X10(3) UL (ref 1–4)
LYMPHOCYTES NFR BLD AUTO: 11.9 %
MAGNESIUM SERPL-MCNC: 1.2 MG/DL (ref 1.6–2.6)
MCH RBC QN AUTO: 26.8 PG (ref 26–34)
MCHC RBC AUTO-ENTMCNC: 30.5 G/DL (ref 31–37)
MCV RBC AUTO: 87.6 FL
MONOCYTES # BLD AUTO: 0.05 X10(3) UL (ref 0.1–1)
MONOCYTES NFR BLD AUTO: 1.3 %
NEUTROPHILS # BLD AUTO: 3.32 X10 (3) UL (ref 1.5–7.7)
NEUTROPHILS # BLD AUTO: 3.32 X10(3) UL (ref 1.5–7.7)
NEUTROPHILS NFR BLD AUTO: 85.8 %
OSMOLALITY SERPL CALC.SUM OF ELEC: 308 MOSM/KG (ref 275–295)
PLATELET # BLD AUTO: 276 10(3)UL (ref 150–450)
POTASSIUM SERPL-SCNC: 4.1 MMOL/L (ref 3.5–5.1)
RBC # BLD AUTO: 2.99 X10(6)UL
SODIUM SERPL-SCNC: 143 MMOL/L (ref 136–145)
WBC # BLD AUTO: 3.9 X10(3) UL (ref 4–11)

## 2024-08-22 PROCEDURE — 99232 SBSQ HOSP IP/OBS MODERATE 35: CPT | Performed by: INTERNAL MEDICINE

## 2024-08-22 RX ORDER — MAGNESIUM OXIDE 400 MG/1
400 TABLET ORAL ONCE
Status: DISCONTINUED | OUTPATIENT
Start: 2024-08-22 | End: 2024-08-22

## 2024-08-22 RX ORDER — MAGNESIUM OXIDE 400 MG/1
800 TABLET ORAL ONCE
Status: COMPLETED | OUTPATIENT
Start: 2024-08-22 | End: 2024-08-22

## 2024-08-22 RX ORDER — HYDRALAZINE HYDROCHLORIDE 20 MG/ML
10 INJECTION INTRAMUSCULAR; INTRAVENOUS EVERY 6 HOURS PRN
Status: DISCONTINUED | OUTPATIENT
Start: 2024-08-22 | End: 2024-08-22

## 2024-08-22 NOTE — ANESTHESIA POSTPROCEDURE EVALUATION
[facility]    Sunita Loza Patient Status:  Inpatient   Age/Gender 89 year old female MRN [id number]   Location [facility] POST ANESTHESIA CARE UNIT Attending Joanne Moran MD   Hosp Day # 1 PCP Janell Powell MD       Anesthesia Post-op Note    CYSTOSCOPY, CLOT EVACUATION, POSSIBLE BLADDER BIOPSY, RIGHT RETROGRADE PYELOGRAM, POSSIBLE RIGHT URETEROSCOPY, POSSIBLE RIGHT URETERAL STENT    Procedure Summary       Date: 08/21/24 Room / Location:  MAIN OR 07 / [facility] MAIN OR    Anesthesia Start: 2001 Anesthesia Stop: 2048    Procedure: CYSTOSCOPY, CLOT EVACUATION, POSSIBLE BLADDER BIOPSY, RIGHT RETROGRADE PYELOGRAM, POSSIBLE RIGHT URETEROSCOPY, POSSIBLE RIGHT URETERAL STENT (Right: Bladder) Diagnosis:       Hematuria      (Hematuria [R31.9])    Surgeons: Norm House MD Responsible Provider: Spencer Cuba MD    Anesthesia Type: general ASA Status: 3            Anesthesia Type: general    Vitals Value Taken Time   /71 08/21/24 2050   Temp 97.7 °F (36.5 °C) 08/21/24 2050   Pulse 60 08/21/24 2050   Resp 15 08/21/24 2050   SpO2 97 % 08/21/24 2050       Patient Location: PACU    Anesthesia Type: general    Airway Patency: extubated and patent    Postop Pain Control: adequate    Mental Status: preanesthetic baseline    Nausea/Vomiting: none    Cardiopulmonary/Hydration status: stable euvolemic    Complications: no apparent anesthesia related complications    Postop vital signs: stable    Dental Exam: Unchanged from Preop    Patient to be discharged from PACU when criteria met.

## 2024-08-22 NOTE — OCCUPATIONAL THERAPY NOTE
OCCUPATIONAL THERAPY EVALUATION - INPATIENT    Room Number: 317/317-A  Evaluation Date: 8/22/2024     Type of Evaluation: Initial  Presenting Problem: gross hematuria    Physician Order: IP Consult to Occupational Therapy  Reason for Therapy:  ADL/IADL Dysfunction and Discharge Planning      OCCUPATIONAL THERAPY ASSESSMENT   Patient is a 89 year old female admitted on 8/20/2024 with Presenting Problem: gross hematuria. Co-Morbidities : chronic a. fib, heart failure, CKD,  Patient is currently functioning near baseline with ADLs and functional mobility.  Prior to admission, patient's baseline is Supervision/MOD I with ADLs and mobility.  Patient met all OT goals at or close to baseline level.  Patient reports no further questions/concerns at this time.    Patient  is cleared for discharge from acute OT services viewpoint. OT will sign off.         WEIGHT BEARING RESTRICTION  Weight Bearing Restriction: None                Recommendations for nursing staff:   Transfers: supervision 1 person with RW  Toileting location: Toilet    EVALUATION SESSION:  Patient at start of session: seated in chair  FUNCTIONAL TRANSFER ASSESSMENT  Sit to Stand: Chair  Chair: Modified Independent    BED MOBILITY  Supine to Sit : Not tested  Sit to Supine (OT): Not Tested  Scooting: IND    BALANCE ASSESSMENT  Static Sitting: Independent  Sitting Unilateral: Modified Independent  Static Standing: Supervision  Standing Unilateral: Supervision    FUNCTIONAL ADL ASSESSMENT  Eating: Independent      ACTIVITY TOLERANCE: functional mobility approx  feet in room utilizing RW. Pt reported no fatigue after mobility.                          O2 SATURATIONS       COGNITION  Overall Cognitive Status:  WFL - within functional limits  Arousal/Alertness:  appropriate responses to stimuli  Following Commands:  follows all commands and directions without difficulty  COGNITION ASSESSMENTS       Upper Extremity:   ROM: within functional limits   Strength:  is within functional limits   Coordination:  Gross motor: WFL  Fine motor: WFL  Sensation: Light touch:  intact    EDUCATION PROVIDED  Patient: Role of Occupational Therapy; Plan of Care; Functional Transfer Techniques; Posture/Positioning; Proper Body Mechanics; Energy Conservation; Compensatory ADL Techniques  Patient's Response to Education: Verbalized Understanding; Returned Demonstration    Equipment used: RW  Demonstrates functional use    Patient End of Session: In bed;Up in chair;Call light within reach;RN aware of session/findings;All patient questions and concerns addressed;Needs met    OCCUPATIONAL PROFILE    HOME SITUATION  Type of Home: Assisted living facility  Home Layout: One level  Lives With: Staff 24 hours               Occupation/Status: retired, watches TV, does crafts             Prior Level of Function: Pt reports at home in long-term with assist as needed for ADLs and mobility with RW. Hx of L hip fx s/p IMN of L hip on . As of 2024: Pt lives with daughter at [facility]. Pts daughter has Down syndrome and patient provides supervision for daughter. Pt ambulates with RW occasionally when outside her apartment. Pt independent with ADL and mobility. Pts family notes patient has been more forgetful and have been talking about transition to long-term and is on waitlist.     SUBJECTIVE  \"I feel good.\"    PAIN ASSESSMENT  Ratin  Location: denies      OBJECTIVE  Precautions: None  Fall Risk: Standard fall risk    WEIGHT BEARING RESTRICTION  Weight Bearing Restriction: None                AM-PAC ‘6-Clicks’ Inpatient Daily Activity Short Form  -   Putting on and taking off regular lower body clothing?: A Little  -   Bathing (including washing, rinsing, drying)?: None  -   Toileting, which includes using toilet, bedpan or urinal? : None  -   Putting on and taking off regular upper body clothing?: None  -   Taking care of personal grooming such as brushing teeth?: None       AM-PAC Score:                ADDITIONAL TESTS     NEUROLOGICAL FINDINGS        PLAN   Patient has been evaluated and presents with no skilled Occupational Therapy needs at this time.  Patient discharged from Occupational Therapy services.  Please re-order if a new functional limitation presents during this admission.      Patient Evaluation Complexity Level:   Occupational Profile/Medical History LOW - Brief history including review of medical or therapy records    Specific performance deficits impacting engagement in ADL/IADL LOW  1 - 3 performance deficits    Client Assessment/Performance Deficits MODERATE - Comorbidities and min to mod modifications of tasks    Clinical Decision Making LOW - Analysis of occupational profile, problem-focused assessments, limited treatment options    Overall Complexity LOW     OT Session Time: 16 minutes  Therapeutic Activity: 12 minutes

## 2024-08-22 NOTE — PROGRESS NOTES
[facility]  Report of Consultation    Sunita Loza Patient Status:  Inpatient    1932 MRN [id number]   Formerly [facility] 3NW-A Attending Joanne Moran MD   Hosp Day # 2 PCP Janell Powell MD        NO complains      Current Facility-Administered Medications:     hydrALAzine (Apresoline) 20 mg/mL injection 10 mg, 10 mg, Intravenous, Q6H PRN    traMADol (Ultram) tab 50 mg, 50 mg, Oral, Q12H PRN    sodium bicarbonate 75 mEq in dextrose 5%-sodium chloride 0.45% 1,075 mL infusion, 75 mEq, Intravenous, Continuous    multivitamin with minerals (Thera M Plus) tab 1 tablet, 1 tablet, Oral, Daily    amiodarone (Pacerone) tab 200 mg, 200 mg, Oral, Daily    dilTIAZem ER (Dilacor XR) 24 hr cap 240 mg, 240 mg, Oral, Daily    [Held by provider] torsemide (Demadex) tab 10 mg, 10 mg, Oral, QOD    acetaminophen (Tylenol Extra Strength) tab 500 mg, 500 mg, Oral, Q4H PRN    ondansetron (Zofran) 4 MG/2ML injection 4 mg, 4 mg, Intravenous, Q6H PRN    metoclopramide (Reglan) 5 mg/mL injection 5 mg, 5 mg, Intravenous, Q8H PRN       Physical Exam:  Vital signs: Blood pressure 149/59, pulse 63, temperature 97.7 °F (36.5 °C), temperature source Oral, resp. rate 17, weight 83 lb (37.6 kg), SpO2 99%.  General: No acute distress. Alert and oriented x 3.  HEENT: Moist mucous membranes. EOM-I. PERRL  Neck: No lymphadenopathy.  No JVD. No carotid bruits.  Respiratory: Clear to auscultation bilaterally.  No wheezes. No rhonchi.  Cardiovascular: S1, S2.  Regular rate and rhythm.  No murmurs. Equal pulses   Abdomen: Soft, nontender, nondistended.  Positive bowel sounds. No rebound tenderness   Neurologic: No focal neurological deficits.   Musculoskeletal: Full range of motion of all extremities.  No swelling noted.   Integument: No lesions. No erythema.  Psychiatric: Appropriate mood and affect.    Recent Labs     24  1252 24  0636 24  1755 24  0621   WBC 5.2 6.1 6.4 3.9*   HGB 8.2* 6.0* 7.9* 8.0*   MCV  93.4 93.8 96.3 87.6   .0 258.0 239.0 276.0   INR 1.25*  --   --   --        Recent Labs     08/20/24  1252 08/21/24  0633 08/22/24  0621    142 143   K 5.1 4.1 4.1    115* 116*   CO2 17.0* 18.0* 20.0*   BUN 54* 46* 41*   CREATSERUM 3.95* 3.39* 2.84*   CA 8.2* 7.7* 7.7*   MG  --   --  1.2*       Recent Labs     08/20/24  1252   ALT 16   AST 25   ALB 3.8       Imaging:  Reviewed     Impression:  ZOHREH/CKD_ baseline Cr ~ 2 related to past nephrectomy/age/etc. ZOHREH in setting of obstructive uropathy from bladder mass - suspected hematoma  - appreciate urology eval  - improving   Hematuria - as above; appreciate urology evaluation; holding AC  Anemia- s/p tranfusion  Acidosis - hx of c-diff; no diarrhea reported presently;   - start on oral bicarb  Afib - controlled; off AC  HFpEF; stable volume; hold diuretics for now  Hx of colon/uterine cancer   Replace mag         Thank you for allowing me to participate in this patient's care.  Please feel free to call me with any questions or concerns.    Dante Marques MD  8/22/2024

## 2024-08-22 NOTE — ANESTHESIA PREPROCEDURE EVALUATION
PRE-OP EVALUATION    Patient Name: Sunita Loza    Admit Diagnosis: Gross hematuria [R31.0]  ZOHREH (acute kidney injury) (HCC) [N17.9]  Acute on chronic anemia [D64.9]    Pre-op Diagnosis: Hematuria [R31.9]    CYSTOSCOPY, CLOT EVACUATION, POSSIBLE BLADDER BIOPSY, RIGHT RETROGRADE PYELOGRAM, POSSIBLE RIGHT URETEROSCOPY, POSSIBLE RIGHT URETERAL STENT    Anesthesia Procedure: CYSTOSCOPY, CLOT EVACUATION, POSSIBLE BLADDER BIOPSY, RIGHT RETROGRADE PYELOGRAM, POSSIBLE RIGHT URETEROSCOPY, POSSIBLE RIGHT URETERAL STENT (Right)    Surgeons and Role:     * Norm House MD - Primary    Pre-op vitals reviewed.  Temp: 98 °F (36.7 °C)  Pulse: 67  Resp: 18  BP: 154/62  SpO2: 100 %  Body mass index is 16.21 kg/m².    Current medications reviewed.  Hospital Medications:   ceFAZolin (Ancef) 2g in 10mL IV syringe premix  2 g Intravenous On Call to OR    [COMPLETED] sodium chloride 0.9% infusion   Intravenous Once    [Transfer Hold] traMADol (Ultram) tab 50 mg  50 mg Oral Q12H PRN    sodium bicarbonate 75 mEq in dextrose 5%-sodium chloride 0.45% 1,075 mL infusion  75 mEq Intravenous Continuous    [Transfer Hold] multivitamin with minerals (Thera M Plus) tab 1 tablet  1 tablet Oral Daily    [Transfer Hold] metoprolol tartrate (Lopressor) tab 25 mg  25 mg Oral TID Beta Blocker/Cardiac    [COMPLETED] sodium chloride 0.9 % IV bolus 500 mL  500 mL Intravenous Once    sodium chloride 0.9 % irrigation 3,000 mL  3,000 mL Irrigation Continuous    [Transfer Hold] amiodarone (Pacerone) tab 200 mg  200 mg Oral Daily    [Transfer Hold] dilTIAZem ER (Dilacor XR) 24 hr cap 240 mg  240 mg Oral Daily    [Held by provider] torsemide (Demadex) tab 10 mg  10 mg Oral QOD    [Transfer Hold] acetaminophen (Tylenol Extra Strength) tab 500 mg  500 mg Oral Q4H PRN    [Transfer Hold] ondansetron (Zofran) 4 MG/2ML injection 4 mg  4 mg Intravenous Q6H PRN    [Transfer Hold] metoclopramide (Reglan) 5 mg/mL injection 5 mg  5 mg Intravenous Q8H PRN       Outpatient  Medications:     Medications Prior to Admission   Medication Sig Dispense Refill Last Dose    torsemide 10 MG Oral Tab Take 1 tablet (10 mg total) by mouth every other day. MWF   8/19/2024    amiodarone 200 MG Oral Tab Take 1 Tab twice a day for the next 7 days, then REDUCE to ONE tab daily. (Patient taking differently: Take 1 tablet (200 mg total) by mouth every morning. Take 1 Tab twice a day for the next 7 days, then REDUCE to ONE tab daily.) 60 tablet 0 8/20/2024    dilTIAZem HCl ER Beads 240 MG Oral Capsule SR 24 Hr Take 1 capsule (240 mg total) by mouth daily. 30 capsule 11 8/20/2024    traMADol 50 MG Oral Tab Take 0.5 tablets (25 mg total) by mouth 2 (two) times daily.   8/20/2024    torsemide 10 MG Oral Tab Take 1 tablet (10 mg total) by mouth every other day. T,Th,S,S   8/20/2024    saccharomyces boulardii 250 MG Oral Cap Take 1 capsule (250 mg total) by mouth daily.   8/20/2024    sodium bicarbonate 650 MG Oral Tab Take 1 tablet (650 mg total) by mouth 2 (two) times daily. 60 tablet 0 8/20/2024    ferrous sulfate 325 (65 FE) MG Oral Tab EC Take 1 tablet (325 mg total) by mouth daily with breakfast.   8/20/2024    apixaban 2.5 MG Oral Tab Take 1 tablet (2.5 mg total) by mouth 2 (two) times daily.   8/20/2024    aspirin 81 MG Oral Tab Take 1 tablet (81 mg total) by mouth daily.   8/20/2024    Multiple Vitamin (MULTIVITAMINS OR) Take 1 tablet by mouth daily.   8/20/2024    acetaminophen 500 MG Oral Tab Take 1 tablet (500 mg total) by mouth in the morning and 1 tablet (500 mg total) before bedtime.   8/20/2024    vancomycin 125 MG Oral Cap Take 1 capsule (125 mg total) by mouth 4 (four) times daily for 7 days, THEN 1 capsule (125 mg total) 2 (two) times a day for 7 days, THEN 1 capsule (125 mg total) daily for 7 days, THEN 1 capsule (125 mg total) Every Monday, Wednesday, and Friday for 14 days. 55 capsule 0        Allergies: Ciprofloxacin and Penicillins      Anesthesia Evaluation    Patient summary  reviewed.    Anesthetic Complications  (-) history of anesthetic complications         GI/Hepatic/Renal             (+) chronic renal disease (ZOHREH) and CRI                   Cardiovascular  Comment: 4/2024 2D Echo:    Conclusions:     1. Left ventricle: The cavity size was normal. Wall thickness was normal.      Systolic function was normal. The estimated ejection fraction was 65-70%.      Doppler parameters are consistent with abnormal left ventricular      relaxation - grade 1 diastolic dysfunction.   2. Right ventricle: Systolic pressure was mildly increased.   3. Mitral valve: There was mild regurgitation.   4. Pulmonary arteries: Systolic pressure was estimated to be 41mm Hg.      Estimated pulmonary artery diastolic pressure was 12mm Hg.   5. Pericardium, extracardiac: There was no pericardial effusion.   Impressions:  No previous study was available for comparison. What appears   to be artifact is noted on the apical views of both the tricuspid and mitral   valves. No vegetations are identified by this good quality transthoracic   echocardiogram; however, this can not \"rule out\" endocaritis. Correlation   suggested.       ECG reviewed.            (+) hypertension                  (+) dysrhythmias and atrial fibrillation  (+) CHF                Endo/Other               (+) anemia            (+) arthritis       Pulmonary    Negative pulmonary ROS.                       Neuro/Psych                              + COVID currently -- pt denies cough, SOB        Past Surgical History:   Procedure Laterality Date    Colectomy      Nephrectomy Left     Orif hip fracture Left 06/10/2024    Part removal colon w end colostomy       Social History     Socioeconomic History    Marital status:    Tobacco Use    Smoking status: Never    Smokeless tobacco: Never   Vaping Use    Vaping status: Never Used   Substance and Sexual Activity    Alcohol use: Not Currently    Drug use: Never     History   Drug Use Unknown      Available pre-op labs reviewed.  Lab Results   Component Value Date    WBC 6.4 08/21/2024    RBC 2.94 (L) 08/21/2024    HGB 7.9 (L) 08/21/2024    HCT 28.3 (L) 08/21/2024    MCV 96.3 08/21/2024    MCH 26.9 08/21/2024    MCHC 27.9 (L) 08/21/2024    RDW 22.2 08/21/2024    .0 08/21/2024     Lab Results   Component Value Date     08/21/2024    K 4.1 08/21/2024     (H) 08/21/2024    CO2 18.0 (L) 08/21/2024    BUN 46 (H) 08/21/2024    CREATSERUM 3.39 (H) 08/21/2024    GLU 89 08/21/2024    CA 7.7 (L) 08/21/2024     Lab Results   Component Value Date    INR 1.25 (H) 08/20/2024         Airway      Mallampati: II  Mouth opening: 3 FB  TM distance: > 6 cm  Neck ROM: full Cardiovascular      Rhythm: irregular  Rate: normal     Dental  Comment: Pt reports multiple permanent dental prosthetics           Pulmonary      Breath sounds clear to auscultation bilaterally.               Other findings              ASA: 3   Plan: general  NPO status verified and patient meets guidelines.  Patient has taken beta blockers in last 24 hours.      Comment: We discussed risk of respiratory compromise related to COVID infection including potential need for intubation and postop ventilation/ICU admission.  We discussed potential need for blood products.  We discussed risk of POCD.  We discussed that dental prosthetics are not as strong as healthy, native teeth and that airway manipulation and instrumentation carry with them a risk of damage to dental prosthetics as well as potential injury to native teeth.   All anesthetic related questions were addressed.  Pt and children expressed understanding of and agreement with the anesthetic plan.      Plan/risks discussed with: patient and child/children  Use of blood product(s) discussed with: patient and child/children            Present on Admission:  **None**

## 2024-08-22 NOTE — DISCHARGE SUMMARY
[facility] Discharge Summary    Patient ID  Sunita Loza  [id number]  89 year old  6/19/1932    Admit date: 8/20/2024    Discharge date: 08/22/24    Attending: Joanne Moran MD     Primary Care Physician: Janell Powell MD      Reason for admission: hematuria    Discharge condition: stable    Disposition: home    Important follow up:  -PCP within 7 d  -specialists:       -labs:     -radiology:      Additional patient instructions     Hold eliquis until Monday  Repeat your hemoglobin and renal function (lab tests) next week with PCP  If you notice recurrent bleeding, dizziness, chest pain or any concerns please reach out to your PCP      Discharge med list     Medication List        ASK your doctor about these medications      acetaminophen 500 MG Tabs  Commonly known as: Tylenol Extra Strength     amiodarone 200 MG Tabs  Commonly known as: Pacerone  Take 1 Tab twice a day for the next 7 days, then REDUCE to ONE tab daily.     apixaban 2.5 MG Tabs  Commonly known as: Eliquis     aspirin 81 MG Tabs     dilTIAZem HCl ER Beads 240 MG Cp24  Commonly known as: TIAZAC  Take 1 capsule (240 mg total) by mouth daily.     ferrous sulfate 325 (65 FE) MG Tbec     MULTIVITAMINS OR     saccharomyces boulardii 250 MG Caps  Commonly known as: Florastor     sodium bicarbonate 650 MG Tabs  Take 1 tablet (650 mg total) by mouth 2 (two) times daily.     * torsemide 10 MG Tabs  Commonly known as: DEMADEX     * torsemide 10 MG Tabs  Commonly known as: DEMADEX     traMADol 50 MG Tabs  Commonly known as: Ultram     vancomycin 125 MG Caps  Commonly known as: Vancocin  Take 1 capsule (125 mg total) by mouth 4 (four) times daily for 7 days, THEN 1 capsule (125 mg total) 2 (two) times a day for 7 days, THEN 1 capsule (125 mg total) daily for 7 days, THEN 1 capsule (125 mg total) Every Monday, Wednesday, and Friday for 14 days.  Start taking on: July 31, 2024           * This list has 2 medication(s) that are the same as other medications  prescribed for you. Read the directions carefully, and ask your doctor or other care provider to review them with you.                  Discharge Diagnoses:    Hematuria  Anemia  Anjel on ckd 4  RCC sp L nephrectomy  AF  HFPEF  COVID      Consults:  IP CONSULT TO HOSPITALIST  IP CONSULT TO UROLOGY  IP CONSULT TO CARDIOLOGY  IP CONSULT TO VASCULAR ACCESS TEAM  IP CONSULT TO NEPHROLOGY    Radiology:  XR OR - N/C    Result Date: 8/21/2024  PROCEDURE:  XR OR - N/C  COMPARISON:  None.  INDICATIONS:  CYSTOSCOPY, CLOT EVACUATION, POSSIBLE BLADDER BIOPSY, RIGHT RETROGRADE PYELOGRAM, POSSIBLE RIGHT URETEROSCOPY, POSSIBLE RIGHT URETERAL STENT  TECHNIQUE:   FLUOROSCOPY IMAGES OBTAINED:  4 FLUOROSCOPY TIME:  33 seconds TECHNOLOGIST TIME:  30 minutes RADIATION DOSE (AIR KERMA PRODUCT):  111.1 uGy/m2   FINDINGS:  Fluoroscopy provided for guidance. No radiologist was present for the procedure. There are expected intraoperative changes present. Please refer to the operative report for further details.            CONCLUSION:  See above.   LOCATION:  [facility]    Dictated by (CST): Uday Jackson MD on 8/21/2024 at 10:08 PM     Finalized by (CST): Uday Jackson MD on 8/21/2024 at 10:08 PM       CT ABDOMEN+PELVIS(CPT=74176)    Result Date: 8/20/2024  PROCEDURE:  CT ABDOMEN+PELVIS (CPT=74176)  COMPARISON:  [facility], CT ABDOMEN+PELVIS(CPT=74176), 4/22/2024, 9:34 PM.  INDICATIONS:  Hematuria  TECHNIQUE:  Unenhanced multislice CT scanning was performed from the dome of the diaphragm to the pubic symphysis.  Dose reduction techniques were used. Dose information is transmitted to the ACR (American College of Radiology) NRDR (National Radiology Data Registry) which includes the Dose Index Registry.  PATIENT STATED HISTORY: (As transcribed by Technologist)  Patient is here for hematuria.    FINDINGS:  LIVER:  No enlargement, atrophy, abnormal density, or significant focal lesion.  BILIARY:  Gallbladder is surgically absent. PANCREAS:  No lesion, fluid  collection, ductal dilatation, or atrophy.  SPLEEN:  No enlargement or focal lesion.  KIDNEYS:  The left kidney is surgically absent.  Mild prominence of the right collecting system may represent mild right hydronephrosis.  No renal masses are identified.. ADRENALS:  No mass or enlargement.  AORTA/VASCULAR:    Unremarkable as seen on non-contrast imaging. RETROPERITONEUM:  No mass or adenopathy.  BOWEL/MESENTERY:  Findings compatible with rectal prolapse are noted.  Ostomy is noted in the left lower quadrant.  Previously seen parastomal hernia is no longer present.  No visible mass, obstruction, or bowel wall thickening.  ABDOMINAL WALL:  No mass or hernia.  URINARY BLADDER:  A hyperdense mass in the bladder is noted measuring up to 7.9 x 4.6 cm (series 3, image 139) by 6.2 centimeters in craniocaudal dimension likely representing a hematoma although underlying soft tissue mass cannot be completely excluded. PELVIC NODES:  No adenopathy.  PELVIC ORGANS:  No visible mass.  Pelvic organs appropriate for patient age.  BONES:  No bony lesion or fracture.  LUNG BASES:  No visible pulmonary or pleural disease.  OTHER:  Negative.             CONCLUSION:  1. Hyperdense mass in the bladder likely represent hematoma measuring up to 7.9 cm. 2. The left kidney is surgically absent. 3. Mild right hydronephrosis   LOCATION:  [id number]   Dictated by (CST): Deepak Enrique MD on 8/20/2024 at 4:24 PM     Finalized by (CST): Deepak Enrique MD on 8/20/2024 at 4:28 PM         Operative reports:  Procedure(s) (LRB):  CYSTOSCOPY, RIGHT RETROGRADE PYELOGRAM (Right)    Hospital course:    89 year old female with hx of afib on eliquis, CKD stage 4, hx of colon cancer s/p colostomy, hx of endometrial cancer s/p hysterectomy, hx of renal cell CA s/p nephrectomy, HFpEF, hx of ORIF 6/2024, hx of cdiff colitis, here from nursing home for hematuria.          Plan:    Hematuria noted at the nursing home  - UA  RBCs noted,   CT abd with mass radha  representing hematoma  - holding eliquis for now. Resume on Monday per   - >400cc urine retention noted in the ED  - urology saw - sp cystoscop and R RGPG - no actibe  bleeding, mild UPJ narrowing and mild hydronephrosis, no obstruction. Suspect bleeding was from her hx of radiation and use of Eliquis     Anemia - abla   - 2/2 above  - 2 units PRBCs ordered 8/21. Hgb stable in 8. Repeat as outpt next week     ZOHREH on CKD  Hx of RCC s/p L nephrectomy   - likely from retention, bleeding, better     Afib   - hold eliquis given above   - amiodarone  - dilt     HFpEF  - some LE swelling noted, chronic per pt  -  appears euvolemic    CoVID asymptoamtic  - no symptoms no hypoxia   - checked b/c pt needs surgery  - pt not immunosuppressed doesn't meet criteria for remdesivir   - pt on eliquis unable to give paxlovid      Hx of colon cancer s/p ostomy  Hx of endometrial cancer s/p hysterectomy  - stable     Hx of ORIF 6/2024  - stable  - tramadol prn      Hx of cdiff colitis  - per pt no diarrhea noted in ostomy          Day of discharge exam:  Vitals:    08/22/24 0817   BP: 149/59   Pulse: 63   Resp: 17   Temp: 97.7 °F (36.5 °C)     Feels well, no cp or osbo, no nv    No acute distress, alert and oriented   Lungs clear  Heart regular  Abdomen benign    Total time coordinating care 35 min      Patient and/or family had opportunity to ask questions and expressed understanding and agreement with therapeutic plan as outlined         [facility] Hospitalist  [phone]  Answering Service: [phone]

## 2024-08-22 NOTE — CM/SW NOTE
08/22/24 1400   CM/SW Referral Data   Referral Source Social Work (self-referral)   Reason for Referral Discharge planning   Informant Patient;Son;EMR;Clinical Staff Member   Medical Hx   Does patient have an established PCP? Yes   Patient Info   Patient's Home Environment Assisted Living   Post Acute Care Provider Upon Admission [facility]   Patient lives with Daughter  (hx Down Syndrome)   Patient Status Prior to Admission   Independent with ADLs and Mobility No   Pt. requires assistance with Driving;Meals;Medications;Toileting   Discharge Needs   Anticipated D/C needs Assisted/Supported living;Subacute rehab;To be determined   Services Requested   Submitted to [facility] n/a   PASRR Level 1 Submitted No - Current within 90 days     Case discussed with RN, anticipating DC for today. Pt is a 89 y/o female admitted for gross hematuria, she recently transitioned to [facility] at [facility]. Per chart review, pt special needs dtr lives with her at [facility].    Confirmed with [facility] rehab yesterday they can accept pt for GT. She is within 30 day window for readmit to rehab. Awaiting anticipated therapy need, and medical clearance.    Addendum 1400:  MARY spoke to Shahana ZUÑIGA for [facility] ([phone]). Reports due to procedure and pt testing positive for COVID they are unable to accept pt back today. Discussed son preference for pt to return to [facility], vs Rehab. ZARA reports she will reach out to son to discuss, awaiting call back from ZARA.    MARY spoke to May from rehab side of [facility], confirmed they can accept her today. BLS for 4 pm, PCS done.    Awaiting call back from ZARA.    Addendum 6917:  [facility] confirmed they can accept pt back. Faxed over med list, and HH orders ([phone]). RN to call facility for report. Updated son of acceptance, and transport time. PCS updated.    [facility] [phone]     Nurses please call report to:     [phone]) [facility]      MARY/PORSHA to remain available for dc planning, and/or  additional need for support.    SUDHEER Shook  Discharge Planner  [id number]

## 2024-08-22 NOTE — PROGRESS NOTES
Alert and oriented x4. Hearing aids and glasses in place. Tolerating regular diet. Ostomy intact with gas and stool. Will PVR after next void. Denies chest pain/SOB/nausea. Ambulates with 1 and walker. Patient updated on plan of care. Questions and concerns addressed.

## 2024-08-22 NOTE — PROGRESS NOTES
NURSING DISCHARGE NOTE    Discharged Other, (see nursing note) via Ambulance.  Accompanied by Support staff  Belongings Taken by patient/family.    VSS, tolerating diet, voiding adequately, pain controlled, tolerating ambulation. Discharge education provided. Reviewed medications and follow up appts. All questions answered and concerns addressed, pt verbalized understanding. IV removed. Pt dc in stable condition. Patient left unit via EMS at 1625    Report called to Mikayla at [facility]

## 2024-08-22 NOTE — PROGRESS NOTES
[facility] Cardiology Progress Note    Sunita Loza Patient Status:  Inpatient    1932 MRN [id number]   Location [facility] 3NW-A Attending Joanne Moran MD   Hosp Day # 2 PCP Janell Powell MD     Subjective:  No acute events overnight  Back in SR, feeling good  Yellow urine     Objective:  /59 (BP Location: Right arm)   Pulse 63   Temp 97.7 °F (36.5 °C) (Oral)   Resp 17   Wt 83 lb (37.6 kg)   SpO2 99%   BMI 16.21 kg/m²     Telemetry: SR, some PACs       Intake/Output:    Intake/Output Summary (Last 24 hours) at 2024 1137  Last data filed at 2024 1036  Gross per 24 hour   Intake 1843 ml   Output 850 ml   Net 993 ml       Last 3 Weights   24 2300 83 lb (37.6 kg)   24 2045 83 lb (37.6 kg)   24 0500 91 lb (41.3 kg)   24 2201 100 lb 1.4 oz (45.4 kg)   07/10/24 1303 100 lb (45.4 kg)       Labs:  Recent Labs   Lab 24  1252 24  0633 24  0621   * 89 128*   BUN 54* 46* 41*   CREATSERUM 3.95* 3.39* 2.84*   EGFRCR 10* 12* 15*   CA 8.2* 7.7* 7.7*    142 143   K 5.1 4.1 4.1    115* 116*   CO2 17.0* 18.0* 20.0*     Recent Labs   Lab 24  0636 24  1755 24  0621   RBC 2.08* 2.94* 2.99*   HGB 6.0* 7.9* 8.0*   HCT 19.5* 28.3* 26.2*   MCV 93.8 96.3 87.6   MCH 28.8 26.9 26.8   MCHC 30.8* 27.9* 30.5*   RDW 17.3 22.2 22.3   NEPRELIM 4.11 4.46 3.32   WBC 6.1 6.4 3.9*   .0 239.0 276.0         Recent Labs   Lab 24  1252   TROPHS 26       Diagnostics:             Physical Exam:    Physical Exam  Cardiovascular:      Rate and Rhythm: Normal rate and regular rhythm.   Pulmonary:      Effort: Pulmonary effort is normal.   Musculoskeletal:      Right lower leg: No edema.      Left lower leg: No edema.   Neurological:      Mental Status: She is alert and oriented to person, place, and time.         Medications:   multivitamin with minerals  1 tablet Oral Daily    metoprolol tartrate  25 mg Oral TID Beta  Blocker/Cardiac    amiodarone  200 mg Oral Daily    dilTIAZem HCl ER Beads  240 mg Oral Daily    [Held by provider] torsemide  10 mg Oral QOD      sodium bicarbonate in D5 0.45% NS infusion 75 mEq (08/21/24 1900)       Assessment:   89yo presenting with hematuria.     Typical Atrial flutter , new   -rate control strategy for now, but converted to SR without intervenetion       Persistant Afib  -on amio/dilt   -[id number]=5 ( age, htn, gender)   -eliquis to restart on Monday, per urology     Covid Postive    Preserved LVEF     Hematuria, with Hydronephrosis  - s/p cysto without active bleeding  - s/p transfusion for Hgb 6--> 8    ZOHREH on CKD 3/4  -improving, baseline Cr ~ 2    HTN-controlled         Plan:    HR 60s and sinus, will stop metoprolol  Role of infection, anemia, covid contributing to onset.   Continue home Amio and Dilt  Follow up in Afib clinic  2 weeks, and can discuss MCT.   Ok to discharge from cardiology perspective        Stacia Paez, APRN  8/22/2024  11:37 AM  Ph [phone] ([place])  Ph [phone] ([place])

## 2024-08-22 NOTE — PLAN OF CARE
Patient alert and oriented x4, vital signs stable on room air. No pain or nausea, IV fluids infusing. Up with standby assist and walker to void. Tolerating diet. Safety measures in place, questions addressed, plan of care discussed with patient.    2130: Pt arrived back to floor.

## 2024-08-22 NOTE — CDS QUERY
Dear Dr. Clarke,     Clinical information indicates BMI 16.21 kg/m². Can you please clarify the diagnosis associated with these findings?     [   x] Moderate malnutrition     [   ] Other (Please specify):      CLINICAL INFORMATION FROM THE MEDICAL RECORD    Risk Factors/Clinical Indicators: 89F to ED from NH for evaluation of gross hematuria. Admitted for gross hematuria related to anticoagulant use   ___ BMI 16.21 kg/m², height 5', weight 83 lbs  ___ 08/21 Dietary Malnutrition Note: Pt meets moderate malnutrition criteria at this time. Nutrition Related Physical Findings: Body Fat/Muscle Mass: moderate depletion body fat Buccal fat pad and Midaxillary line and moderate muscle depletion Temple region and Clavicle region.       Treatment: Dietician evaluation, PO intake of 75% of meals TID, PO intake of 75% of oral nutrition supplement, weight stable within 1 to 2 lbs during admission            Use of terms such as suspected, possible, or probable (associated with a specific diagnosis that is being evaluated, monitored, or treated as if it exists) are acceptable and can be coded in the inpatient setting, when documented at the time of discharge.    Please add any additional documentation to your progress note and continue to document this through discharge. For questions regarding this query, please contact Clinical : Esperanza Robbins RN at #[phone].    THIS FORM IS A PERMANENT PART OF THE MEDICAL RECORD

## 2024-08-22 NOTE — ANESTHESIA PROCEDURE NOTES
Airway  Date/Time: 8/21/2024 8:06 PM  Urgency: elective      General Information and Staff    Patient location during procedure: OR  Anesthesiologist: Spencer Cuba MD  Performed: anesthesiologist   Performed by: Spencer Cuba MD  Authorized by: Spencer Cuba MD      Indications and Patient Condition  Indications for airway management: anesthesia  Sedation level: deep  Preoxygenated: yes  Patient position: sniffing  Mask difficulty assessment: 0 - not attempted    Final Airway Details  Final airway type: supraglottic airway      Successful airway: classic  Size 3       Number of attempts at approach: 1  Number of other approaches attempted: 0    Additional Comments  Easy LMA placement.  No evidence of facial, dental, or oral trauma.    Spencer Cuba MD  [facility]

## 2024-08-22 NOTE — PHYSICAL THERAPY NOTE
Orders received and chart reviewed. Per OT patient was ambulatory independently within her room, no skilled PT needs found at this time. Will sign-off.

## 2024-08-22 NOTE — DISCHARGE INSTRUCTIONS
Hold eliquis until Monday  Repeat your hemoglobin and renal function (lab tests) next week with PCP  If you notice recurrent bleeding, dizziness, chest pain or any concerns please reach out to your PCP

## 2024-08-22 NOTE — PROGRESS NOTES
[facility]  Urology Progress Note    Sunita Loza Patient Status:  Inpatient    1932 MRN [id number]   Location [facility] 3NW-A Attending Joanne Moran MD   Hosp Day # 2 PCP Janell Powell MD     Subjective:  Sunita Loza is a(n) 89 year old female.    Current complaints: Voiding once overnight - urine yellow. No pain. RN about to get patient up now to go to the restroom.  No abdominal pain. Did not sleep well overnight.      Objective:  General appearance: alert, appears stated age, and cooperative  Blood pressure 128/46, pulse 54, temperature 97.6 °F (36.4 °C), temperature source Oral, resp. rate 18, weight 83 lb (37.6 kg), SpO2 100%.  Lungs: non-labored respirations  Abdomen: soft, non-tender  Lab Results   Component Value Date    WBC 6.4 2024    HGB 7.9 2024    HCT 28.3 2024    .0 2024    CREATSERUM 3.39 2024    BUN 46 2024     2024    K 4.1 2024     2024    CO2 18.0 2024    GLU 89 2024    CA 7.7 2024       Hospital Encounter on 24   1. Urine Culture, Routine     Status: None (Preliminary result)    Collection Time: 24  2:49 PM    Specimen: Urine, clean catch   Result Value Ref Range    Urine Culture No Growth at <18 hours N/A        XR OR - N/C    Result Date: 2024  CONCLUSION:  See above.   LOCATION:  [facility]    Dictated by (CST): Uday Jackson MD on 2024 at 10:08 PM     Finalized by (CST): Uday Jackson MD on 2024 at 10:08 PM       CT ABDOMEN+PELVIS(CPT=74176)    Result Date: 2024  CONCLUSION:  1. Hyperdense mass in the bladder likely represent hematoma measuring up to 7.9 cm. 2. The left kidney is surgically absent. 3. Mild right hydronephrosis   LOCATION:  [facility]   Dictated by (CST): Deepak Enrique MD on 2024 at 4:24 PM     Finalized by (CST): Deepak Enrique MD on 2024 at 4:28 PM         Assessment:    GROSS HEMATURIA, HYDRONEPHROSIS, INCOMPLETE BLADDER EMPTYING AT  BASELINE  S/P cystoscopy, right RGPG 8/21/24 with Dr. House  FINDINGS: No active bleeding.  Mild UPJ narrowing and very mild hydronephrosis but Dr. House could see contrast rapidly draining from the renal pelvis and there did not appear to be any significant obstruction.  No hydroureter or filling defects.   Suspect the bleeding was from her history of radiation and the use of Eliquis.  Hgb 8.2 > 6 > transfusion > 8  Serum creat 3.95 > 3.39 > 2.84  Urine culture 8/20/24:  prelim no growth at <18 hours  CT A/P 8/20/24:  The left kidney is surgically absent.  Mild prominence of the right collecting system may represent mild right hydronephrosis.  No renal masses are identified.   A hyperdense mass in the bladder is noted measuring up to 7.9 x 4.6 cm (series 3, image 139) by 6.2 centimeters in craniocaudal dimension likely representing a hematoma although underlying soft tissue mass cannot be completely excluded.     Plan:    Check final culture result  Check PVR  Monitor color/clarity of urine  Resume eliquis on Monday  F/U with Dr. House in 1 month    Above discussed with nurse, Dr. House.    Daksha Chou PA-C  [facility]  Department of Urology  8/22/2024  5:42 AM

## 2024-08-22 NOTE — OPERATIVE REPORT
[facility]   OPERATIVE REPORT    Sunita Loza Patient Status:  Inpatient    1932 MRN [id number]   Location [facility] PRE OP HOLDING Attending Joanne Moran MD   Hosp Day # 1 PCP Janell Powell MD     DATE OF OPERATION; 2024    SURGEON: Norm House M.D.    PREOPERATIVE DIAGNOSIS: Hematuria, Hydronephrosis    POSTOPERATIVE DIAGNOSIS: Same    PROCEDURE PERFORMED: Cystoscopy, Right Retrograde Pyelogram     ANESTHESIA:  General.     INDICATIONS: 89-year-old female on Eliquis for atrial fibrillation with a history of radiation or colorectal cancer, who developed hematuria, and a 7 cm clot in the bladder.  She has a history of left nephrectomy for renal cell carcinoma, and therefore has a solitary right kidney.  There is mild hydronephrosis and her creatinine had increased from her baseline of 3, up to 4, now down to 3.4 today.  CT scan showed a large clot in the bladder and mild right hydronephrosis.  She is having mild hematuria today but her hemoglobin had dropped to 6.  She was given a unit of blood in preparation for this and the hemoglobin increased to 7.9.  I spoke with the patient and her 2 sons in the preoperative holding area immediately prior to this procedure.  Completely cleared by the time she got down to the preoperative holding area.     FINDINGS: No active bleeding.  Mild UPJ narrowing and very mild hydronephrosis but I could see contrast rapidly draining from the renal pelvis and there did not appear to be any significant obstruction.  No hydroureter or filling defects.   I suspect the bleeding was from her history of radiation and the use of Eliquis.  We will give her a voiding trial.  I suspect that she carries a large residual and we could tolerate that if her creatinine stable.    EBL: None     COMPLICATIONS: None     TECHNIQUE:  A general anesthesia was induced.  A time-out was  reviewed.  Preoperative antibiotics were administered.  The patient  was prepped and draped in  the dorsal lithotomy position.  Sequential  compression devices were in place on the lower legs.     The cystoscope was passed into the urethra, and the urethra and  bladder were examined. The left ureteral orifice was absent and there was scar on the left hemitrigone, consistent with her history of nephrectomy.  It looks like she probably had a nephro ureterectomy.  There was no clot in the bladder.  There were diffuse telangiectasias of the bladder, consistent with her history of radiation.  A few small wisps of blood attached to some of these, probably related to her bladder irrigation or possibly the source of the previous bleeding.  The  cystoscope was used to guide an 8 Fijian cone tip catheter into the right ureteral orifice and water soluble contrast was injected retrograde, with the findings as described above.  I watched the ureter drained under fluoroscopy and it seemed to be draining well.  I could see urine coming through the UPJ at intervals.  The instruments were removed.  Quadrant colostomy bag had come off when she was moved onto the operating table so I removed it the rest of the way and replaced the bag.  The patient was awakened, and taken to the recovery area in good condition.     Norm Neves, Urology  [phone]  8/21/2024

## 2024-08-23 LAB
BLOOD TYPE BARCODE: 7300
UNIT VOLUME: 350 ML

## 2024-09-04 ENCOUNTER — OFFICE VISIT (OUTPATIENT)
Dept: NEPHROLOGY | Facility: CLINIC | Age: 89
End: 2024-09-04
Payer: MEDICARE

## 2024-09-04 VITALS — SYSTOLIC BLOOD PRESSURE: 152 MMHG | DIASTOLIC BLOOD PRESSURE: 56 MMHG | WEIGHT: 90.5 LBS | BODY MASS INDEX: 18 KG/M2

## 2024-09-04 DIAGNOSIS — D63.1 ANEMIA DUE TO STAGE 4 CHRONIC KIDNEY DISEASE (HCC): ICD-10-CM

## 2024-09-04 DIAGNOSIS — N18.4 CKD (CHRONIC KIDNEY DISEASE) STAGE 4, GFR 15-29 ML/MIN (HCC): Primary | ICD-10-CM

## 2024-09-04 DIAGNOSIS — N18.4 ANEMIA DUE TO STAGE 4 CHRONIC KIDNEY DISEASE (HCC): ICD-10-CM

## 2024-09-04 DIAGNOSIS — N17.9 AKI (ACUTE KIDNEY INJURY) (HCC): ICD-10-CM

## 2024-09-04 PROCEDURE — 99214 OFFICE O/P EST MOD 30 MIN: CPT | Performed by: INTERNAL MEDICINE

## 2024-09-04 NOTE — PROGRESS NOTES
Nephrology Progress Note      ASSESSMENT/PLAN:      1) Recurrent ZOHREH- due to modest PO intake / ostomy losses / diuresis / bleed / anemia, etc.     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)    8) Anemia- due to CKD / chronic dz / low Fe stores; needs weekly EPO    9) Chronic edema- preserved EF with mild-mod TR / AI / MR + normal RV    10) Recent cdif colitis    PLAN- 1) weekly BMP / CBC to be faxed to me ([phone]); 2) weekly EPO 10,000 units subQ for hgb < 10; 3) torsemide 10-20 mg daily        HPI:   Sunita Loza is a 89 year old female who presents for follow-up of   Chief Complaint   Patient presents with    Chronic Kidney Disease    Hypertension       Very pleasant 89-year-old female presents for follow-up of chronic kidney disease, chronic edema and anemia after several recent hospitalizations for acute kidney injury, bowel resection with creation of new ostomy as well as left hip fracture undergoing intramedullary nail; s/p recent cysto for gross hematuria 8/24    HISTORY:  Past Medical History:    Arthritis    Atrial fibrillation (HCC)    Back pain    C. difficile colitis    CKD (chronic kidney disease)    and S/P nephrectomy    Colon cancer (HCC)    Congestive heart disease (HCC)    Easy bruising    Endometrial cancer (HCC)    UTERINE, DX ABOUT 30 YEARS    Hearing impairment    AIDS    Heart palpitations    Afib    High blood pressure    Hip fracture (HCC)    left    Kidney failure    Leg swelling    Bishop syndrome    hereditary colorectal cancer    Muscle weakness    WALKER    Pain in joints    Wears glasses    Weight loss      Past Surgical History:    Procedure Laterality Date    Colectomy      Nephrectomy Left     Orif hip fracture Left 06/10/2024    Part removal colon w end colostomy        Family History   Problem Relation Age of Onset    Heart Disorder Father     Heart Disorder Mother     Colon Cancer Mother         70    Heart Disorder Sister     Breast Cancer Sister     Diabetes Brother     Breast Cancer Sister     Cancer Sister     Breast Cancer Sister     Breast Cancer Daughter       Social History:   Social History     Socioeconomic History    Marital status:    Tobacco Use    Smoking status: Never    Smokeless tobacco: Never   Vaping Use    Vaping status: Never Used   Substance and Sexual Activity    Alcohol use: Not Currently    Drug use: Never     Social Determinants of Health     Food Insecurity: No Food Insecurity (8/20/2024)    Food Insecurity     Food Insecurity: Never true   Transportation Needs: No Transportation Needs (8/20/2024)    Transportation Needs     Lack of Transportation: No   Housing Stability: Low Risk  (8/20/2024)    Housing Stability     Housing Instability: No        Medications (Active prior to today's visit):  Current Outpatient Medications   Medication Sig Dispense Refill    apixaban 2.5 MG Oral Tab Take 1 tablet (2.5 mg total) by mouth 2 (two) times daily.      torsemide 10 MG Oral Tab Take 1 tablet (10 mg total) by mouth every other day. MWF      amiodarone 200 MG Oral Tab Take 1 Tab twice a day for the next 7 days, then REDUCE to ONE tab daily. (Patient taking differently: Take 1 tablet (200 mg total) by mouth every morning. Take 1 Tab twice a day for the next 7 days, then REDUCE to ONE tab daily.) 60 tablet 0    dilTIAZem HCl ER Beads 240 MG Oral Capsule SR 24 Hr Take 1 capsule (240 mg total) by mouth daily. 30 capsule 11    traMADol 50 MG Oral Tab Take 0.5 tablets (25 mg total) by mouth 2 (two) times daily.      saccharomyces boulardii 250 MG Oral Cap Take 1 capsule (250 mg total) by mouth daily.      sodium  bicarbonate 650 MG Oral Tab Take 1 tablet (650 mg total) by mouth 2 (two) times daily. 60 tablet 0    ferrous sulfate 325 (65 FE) MG Oral Tab EC Take 1 tablet (325 mg total) by mouth daily with breakfast.      aspirin 81 MG Oral Tab Take 1 tablet (81 mg total) by mouth daily.      Multiple Vitamin (MULTIVITAMINS OR) Take 1 tablet by mouth daily.      acetaminophen 500 MG Oral Tab Take 1 tablet (500 mg total) by mouth in the morning and 1 tablet (500 mg total) before bedtime.         Allergies:  Allergies   Allergen Reactions    Ciprofloxacin RASH and HIVES    Penicillins RASH       ROS:     Denies fever/chills  Denies wt loss/gain  Denies HA or visual changes  Denies CP or palpitations  Denies SOB/cough/hemoptysis  Denies abd or flank pain  Denies N/V/D  Denies change in urinary habits or gross hematuria  Denies skin rashes/myalgias/arthralgias      PHYSICAL EXAM:   There were no vitals taken for this visit.  Wt Readings from Last 3 Encounters:   08/20/24 83 lb (37.6 kg)   07/30/24 91 lb (41.3 kg)   07/10/24 100 lb (45.4 kg)     General: Alert and oriented in no apparent distress.  HEENT: No scleral icterus, MMM  Neck: Supple, no MARIA FERNANDA or thyromegaly  Cardiac: Regular rate and rhythm, S1, S2 normal, no murmur or rub  Lungs: Clear without wheezes, rales, rhonchi.    Abdomen: Soft, non-tender. + bowel sounds, no palpable organomegaly  Extremities: Without clubbing, cyanosis; + LE edema bilat R > L   Neurologic: Alert and oriented, normal affect, cranial nerves grossly intact, moving all extremities  Skin: Warm and dry, no rashes      Fabiana Miller MD  9/4/2024  1:23 PM

## 2024-09-06 ENCOUNTER — MED REC SCAN ONLY (OUTPATIENT)
Dept: NEPHROLOGY | Facility: CLINIC | Age: 89
End: 2024-09-06

## 2024-09-06 ENCOUNTER — HOSPITAL ENCOUNTER (OUTPATIENT)
Dept: LAB | Facility: HOSPITAL | Age: 89
Discharge: HOME | End: 2024-09-06
Attending: NURSE PRACTITIONER
Payer: MEDICARE

## 2024-09-06 LAB
ANION GAP SERPL CALC-SCNC: 9 MMOL/L (ref 0–18)
BASOPHILS # BLD AUTO: 0.05 X10(3) UL (ref 0–0.2)
BASOPHILS NFR BLD AUTO: 0.6 %
BUN BLD-MCNC: 41 MG/DL (ref 9–23)
CALCIUM BLD-MCNC: 8.5 MG/DL (ref 8.7–10.4)
CHLORIDE SERPL-SCNC: 112 MMOL/L (ref 98–112)
CO2 SERPL-SCNC: 23 MMOL/L (ref 21–32)
CREAT BLD-MCNC: 2.29 MG/DL
EGFRCR SERPLBLD CKD-EPI 2021: 20 ML/MIN/1.73M2 (ref 60–?)
EOSINOPHIL # BLD AUTO: 0.28 X10(3) UL (ref 0–0.7)
EOSINOPHIL NFR BLD AUTO: 3.6 %
ERYTHROCYTE [DISTWIDTH] IN BLOOD BY AUTOMATED COUNT: 22.7 %
FASTING STATUS PATIENT QL REPORTED: NO
GLUCOSE BLD-MCNC: 122 MG/DL (ref 70–99)
HCT VFR BLD AUTO: 26.7 %
HGB BLD-MCNC: 8.3 G/DL
IMM GRANULOCYTES # BLD AUTO: 0.09 X10(3) UL (ref 0–1)
IMM GRANULOCYTES NFR BLD: 1.2 %
LYMPHOCYTES # BLD AUTO: 1.26 X10(3) UL (ref 1–4)
LYMPHOCYTES NFR BLD AUTO: 16.2 %
MCH RBC QN AUTO: 27.9 PG (ref 26–34)
MCHC RBC AUTO-ENTMCNC: 31.1 G/DL (ref 31–37)
MCV RBC AUTO: 89.6 FL
MONOCYTES # BLD AUTO: 0.66 X10(3) UL (ref 0.1–1)
MONOCYTES NFR BLD AUTO: 8.5 %
NEUTROPHILS # BLD AUTO: 5.43 X10 (3) UL (ref 1.5–7.7)
NEUTROPHILS # BLD AUTO: 5.43 X10(3) UL (ref 1.5–7.7)
NEUTROPHILS NFR BLD AUTO: 69.9 %
OSMOLALITY SERPL CALC.SUM OF ELEC: 309 MOSM/KG (ref 275–295)
PLATELET # BLD AUTO: 236 10(3)UL (ref 150–450)
PLATELET MORPHOLOGY: NORMAL
POTASSIUM SERPL-SCNC: 4.2 MMOL/L (ref 3.5–5.1)
RBC # BLD AUTO: 2.98 X10(6)UL
SODIUM SERPL-SCNC: 144 MMOL/L (ref 136–145)
WBC # BLD AUTO: 7.8 X10(3) UL (ref 4–11)

## 2024-09-06 PROCEDURE — 36415 COLL VENOUS BLD VENIPUNCTURE: CPT | Performed by: NURSE PRACTITIONER

## 2024-09-06 PROCEDURE — 80048 BASIC METABOLIC PNL TOTAL CA: CPT | Performed by: NURSE PRACTITIONER

## 2024-09-06 PROCEDURE — 85025 COMPLETE CBC W/AUTO DIFF WBC: CPT | Performed by: NURSE PRACTITIONER

## 2024-09-16 ENCOUNTER — MED REC SCAN ONLY (OUTPATIENT)
Dept: NEPHROLOGY | Facility: CLINIC | Age: 89
End: 2024-09-16

## 2024-09-17 ENCOUNTER — TELEPHONE (OUTPATIENT)
Dept: NEPHROLOGY | Facility: CLINIC | Age: 89
End: 2024-09-17

## 2024-09-18 ENCOUNTER — MED REC SCAN ONLY (OUTPATIENT)
Dept: NEPHROLOGY | Facility: CLINIC | Age: 89
End: 2024-09-18

## 2024-09-18 ENCOUNTER — TELEPHONE (OUTPATIENT)
Dept: NEPHROLOGY | Facility: CLINIC | Age: 89
End: 2024-09-18

## 2024-09-18 DIAGNOSIS — D63.1 ANEMIA DUE TO STAGE 4 CHRONIC KIDNEY DISEASE (HCC): Primary | ICD-10-CM

## 2024-09-18 DIAGNOSIS — N18.4 ANEMIA DUE TO STAGE 4 CHRONIC KIDNEY DISEASE (HCC): Primary | ICD-10-CM

## 2024-09-18 DIAGNOSIS — N18.4 CKD (CHRONIC KIDNEY DISEASE) STAGE 4, GFR 15-29 ML/MIN (HCC): Primary | ICD-10-CM

## 2024-09-18 DIAGNOSIS — R60.9 EDEMA, UNSPECIFIED TYPE: ICD-10-CM

## 2024-09-18 RX ORDER — TORSEMIDE 10 MG/1
10 TABLET ORAL DAILY
Qty: 90 TABLET | Refills: 1 | Status: SHIPPED | OUTPATIENT
Start: 2024-09-18

## 2024-09-18 NOTE — TELEPHONE ENCOUNTER
Received order from Dr. Miller via staff message for patient to decrease torsemide to 10mg daily. Called to notify Cony RONDON and prescription was sent to [facility] pharmacy. She states she spoke with [facility] supervisor who confirms [facility] RN is able to administer the procrit injections weekly (when HGB <10) on Fridays following lab work on Thursdays. Cony RONDON verbalized understanding

## 2024-09-18 NOTE — TELEPHONE ENCOUNTER
Called [facility] assisted living facility and spoke to patient's nurse Cony to address questions from Dr. Miller. Patient is taking torsemide 20mg daily and the direct # for nursing station is [phone]. She reports assisted living does not administer any injections but [facility] RN is in the process of reviewing with her supervisor Mare to confirm if procrit can be administered by [facility]. Cony RN states she will pass on our office number to Mare for her to call us once confirmed.

## 2024-09-27 ENCOUNTER — MED REC SCAN ONLY (OUTPATIENT)
Dept: NEPHROLOGY | Facility: CLINIC | Age: 89
End: 2024-09-27

## 2024-10-04 ENCOUNTER — MED REC SCAN ONLY (OUTPATIENT)
Dept: NEPHROLOGY | Facility: CLINIC | Age: 89
End: 2024-10-04

## 2024-10-11 ENCOUNTER — MED REC SCAN ONLY (OUTPATIENT)
Dept: NEPHROLOGY | Facility: CLINIC | Age: 89
End: 2024-10-11

## 2024-10-18 ENCOUNTER — TELEPHONE (OUTPATIENT)
Dept: NEPHROLOGY | Facility: CLINIC | Age: 89
End: 2024-10-18

## 2024-10-21 NOTE — TELEPHONE ENCOUNTER
Called [facility] and left message with clinical staff to continue weekly procrit injections and labs per Dr. Miller. Staff verbalized understanding

## 2024-10-24 ENCOUNTER — HOSPITAL ENCOUNTER (EMERGENCY)
Facility: HOSPITAL | Age: 89
Discharge: HOME | End: 2024-10-24
Attending: EMERGENCY MEDICINE
Payer: MEDICARE

## 2024-10-24 VITALS
HEART RATE: 62 BPM | SYSTOLIC BLOOD PRESSURE: 153 MMHG | BODY MASS INDEX: 19.63 KG/M2 | WEIGHT: 100 LBS | OXYGEN SATURATION: 100 % | DIASTOLIC BLOOD PRESSURE: 66 MMHG | RESPIRATION RATE: 16 BRPM | TEMPERATURE: 98 F | HEIGHT: 60 IN

## 2024-10-24 DIAGNOSIS — D64.9 ANEMIA, UNSPECIFIED TYPE: Primary | ICD-10-CM

## 2024-10-24 DIAGNOSIS — I10 PRIMARY HYPERTENSION: ICD-10-CM

## 2024-10-24 LAB
ALBUMIN SERPL-MCNC: 3.7 G/DL (ref 3.2–4.8)
ALBUMIN/GLOB SERPL: 1.4 {RATIO} (ref 1–2)
ALP LIVER SERPL-CCNC: 97 U/L
ALT SERPL-CCNC: 17 U/L
ANION GAP SERPL CALC-SCNC: 7 MMOL/L (ref 0–18)
ANTIBODY SCREEN: NEGATIVE
AST SERPL-CCNC: 38 U/L (ref ?–34)
BASOPHILS # BLD AUTO: 0.04 X10(3) UL (ref 0–0.2)
BASOPHILS NFR BLD AUTO: 0.5 %
BILIRUB SERPL-MCNC: <0.2 MG/DL (ref 0.2–0.9)
BUN BLD-MCNC: 47 MG/DL (ref 9–23)
CALCIUM BLD-MCNC: 8.6 MG/DL (ref 8.7–10.4)
CHLORIDE SERPL-SCNC: 112 MMOL/L (ref 98–112)
CO2 SERPL-SCNC: 22 MMOL/L (ref 21–32)
CREAT BLD-MCNC: 2.5 MG/DL
EGFRCR SERPLBLD CKD-EPI 2021: 18 ML/MIN/1.73M2 (ref 60–?)
EOSINOPHIL # BLD AUTO: 0.17 X10(3) UL (ref 0–0.7)
EOSINOPHIL NFR BLD AUTO: 2.1 %
ERYTHROCYTE [DISTWIDTH] IN BLOOD BY AUTOMATED COUNT: 17.6 %
GLOBULIN PLAS-MCNC: 2.6 G/DL (ref 2–3.5)
GLUCOSE BLD-MCNC: 102 MG/DL (ref 70–99)
HCT VFR BLD AUTO: 24.4 %
HGB BLD-MCNC: 7.2 G/DL
IMM GRANULOCYTES # BLD AUTO: 0.03 X10(3) UL (ref 0–1)
IMM GRANULOCYTES NFR BLD: 0.4 %
LYMPHOCYTES # BLD AUTO: 1.51 X10(3) UL (ref 1–4)
LYMPHOCYTES NFR BLD AUTO: 19 %
MCH RBC QN AUTO: 28.9 PG (ref 26–34)
MCHC RBC AUTO-ENTMCNC: 29.5 G/DL (ref 31–37)
MCV RBC AUTO: 98 FL
MONOCYTES # BLD AUTO: 0.76 X10(3) UL (ref 0.1–1)
MONOCYTES NFR BLD AUTO: 9.6 %
NEUTROPHILS # BLD AUTO: 5.44 X10 (3) UL (ref 1.5–7.7)
NEUTROPHILS # BLD AUTO: 5.44 X10(3) UL (ref 1.5–7.7)
NEUTROPHILS NFR BLD AUTO: 68.4 %
OSMOLALITY SERPL CALC.SUM OF ELEC: 304 MOSM/KG (ref 275–295)
PLATELET # BLD AUTO: 393 10(3)UL (ref 150–450)
POTASSIUM SERPL-SCNC: 4.8 MMOL/L (ref 3.5–5.1)
PROT SERPL-MCNC: 6.3 G/DL (ref 5.7–8.2)
RBC # BLD AUTO: 2.49 X10(6)UL
RH BLOOD TYPE: POSITIVE
SODIUM SERPL-SCNC: 141 MMOL/L (ref 136–145)
WBC # BLD AUTO: 8 X10(3) UL (ref 4–11)

## 2024-10-24 PROCEDURE — 86900 BLOOD TYPING SEROLOGIC ABO: CPT | Performed by: EMERGENCY MEDICINE

## 2024-10-24 PROCEDURE — 86901 BLOOD TYPING SEROLOGIC RH(D): CPT | Performed by: EMERGENCY MEDICINE

## 2024-10-24 PROCEDURE — 82272 OCCULT BLD FECES 1-3 TESTS: CPT

## 2024-10-24 PROCEDURE — 36415 COLL VENOUS BLD VENIPUNCTURE: CPT

## 2024-10-24 PROCEDURE — 99284 EMERGENCY DEPT VISIT MOD MDM: CPT

## 2024-10-24 PROCEDURE — 80053 COMPREHEN METABOLIC PANEL: CPT | Performed by: EMERGENCY MEDICINE

## 2024-10-24 PROCEDURE — 85025 COMPLETE CBC W/AUTO DIFF WBC: CPT | Performed by: EMERGENCY MEDICINE

## 2024-10-24 PROCEDURE — 86850 RBC ANTIBODY SCREEN: CPT | Performed by: EMERGENCY MEDICINE

## 2024-10-24 PROCEDURE — 99285 EMERGENCY DEPT VISIT HI MDM: CPT

## 2024-10-24 RX ORDER — CLONIDINE HYDROCHLORIDE 0.1 MG/1
0.1 TABLET ORAL ONCE
Status: COMPLETED | OUTPATIENT
Start: 2024-10-24 | End: 2024-10-24

## 2024-10-24 RX ORDER — ACETAMINOPHEN 500 MG
1000 TABLET ORAL ONCE
Status: COMPLETED | OUTPATIENT
Start: 2024-10-24 | End: 2024-10-24

## 2024-10-24 NOTE — ED QUICK NOTES
Spoke to Jessa RONDON at [facility]. They informed this RN that pt last had Epoetin shot on 10/12/2024. Report pt got amiodarone, diltiazem, and torsemide medications this morning.

## 2024-10-24 NOTE — ED INITIAL ASSESSMENT (HPI)
Arrives via EMS from [facility] in [place] c/o low hemoglobin. Pt had labs drawn this morning, Hbg was 6.2 per paperwork. Pt denies dizziness, CP, SOB. States she has a mild headache but no other complaints.

## 2024-10-24 NOTE — ED QUICK NOTES
Pt cleared for discharge by Cheryl CUELLO. This RN provided pt and pt's son, Arron, with discharge teaching, pt and son verbalized understanding of information. VSS for discharge per MD, peripheral line removed. No further questions regarding discharge. Left via [facility] back to [facility].

## 2024-10-25 ENCOUNTER — MED REC SCAN ONLY (OUTPATIENT)
Dept: NEPHROLOGY | Facility: CLINIC | Age: 89
End: 2024-10-25

## 2024-10-25 NOTE — ED PROVIDER NOTES
Patient Seen in: [facility] Emergency Department      History     Chief Complaint   Patient presents with    Anemia     Stated Complaint: Low hemoglobin    Subjective:   HPI    89-year-old female presents with [facility] assisted [facility] sent for  low hemoglobin 6.2 today.  She denies any symptoms.  She states she just has a mild headache.  Son is at bedside and states that she does complain of mild headaches from time to time often when she is dehydrated.  She denies feeling lightheaded, fatigued, dizzy, chest pain, shortness of breath.  Denies blood in her colostomy bag, denies black stools.    Per notes, patient sees Dr. Miller and has anemia of chronic disease and is supposed to be getting Procrit injections every week.  However her last Procrit injection was 12 days ago.  Objective:     Past Medical History:    Arthritis    Atrial fibrillation (HCC)    Back pain    C. difficile colitis    CKD (chronic kidney disease)    and S/P nephrectomy    Colon cancer (HCC)    Congestive heart disease (HCC)    Easy bruising    Endometrial cancer (HCC)    UTERINE, DX ABOUT 30 YEARS    Hearing impairment    AIDS    Heart palpitations    Afib    High blood pressure    Hip fracture (HCC)    left    Kidney failure    Leg swelling    Bishop syndrome    hereditary colorectal cancer    Muscle weakness    WALKER    Pain in joints    Wears glasses    Weight loss              Past Surgical History:   Procedure Laterality Date    Colectomy      Nephrectomy Left     Orif hip fracture Left 06/10/2024    Part removal colon w end colostomy                  Social History     Socioeconomic History    Marital status:    Tobacco Use    Smoking status: Never    Smokeless tobacco: Never   Vaping Use    Vaping status: Never Used   Substance and Sexual Activity    Alcohol use: Not Currently    Drug use: Never     Social Drivers of Health     Food Insecurity: No Food Insecurity (8/20/2024)    Food Insecurity     Food Insecurity: Never  true   Transportation Needs: No Transportation Needs (8/20/2024)    Transportation Needs     Lack of Transportation: No   Housing Stability: Low Risk  (8/20/2024)    Housing Stability     Housing Instability: No                  Physical Exam     ED Triage Vitals   BP 10/24/24 1532 (!) 186/69   Pulse 10/24/24 1532 78   Resp 10/24/24 1532 16   Temp 10/24/24 1620 97.8 °F (36.6 °C)   Temp src 10/24/24 1620 Temporal   SpO2 10/24/24 1532 100 %   O2 Device 10/24/24 1532 None (Room air)       Current Vitals:   Vital Signs  BP: 153/66  Pulse: 62  Resp: 16  Temp: 97.8 °F (36.6 °C)  Temp src: Temporal  MAP (mmHg): 90    Oxygen Therapy  SpO2: 100 %  O2 Device: None (Room air)        Physical Exam  Vital signs reviewed.  Nursing note reviewed.  Constitutional: Alert, well-appearing  Head: Normocephalic, atraumatic  Mouth: Moist  Eyes: Extraocular muscles intact, pupils equal  Cardiovascular: Regular rate and rhythm  Pulmonary: Effort normal, breath sounds normal  Abdomen: Soft, nontender nondistended.  Colostomy bag, stool appears brown, Hemoccult positive  Skin: Warm and dry  Musculoskeletal range of motion grossly normal all extremities  Neuro: Alert, at baseline, no focal neuro deficit.  Moves all extremities against gravity  Psych: Mood normal          ED Course     Labs Reviewed   CBC WITH DIFFERENTIAL WITH PLATELET - Abnormal; Notable for the following components:       Result Value    RBC 2.49 (*)     HGB 7.2 (*)     HCT 24.4 (*)     MCHC 29.5 (*)     All other components within normal limits   COMP METABOLIC PANEL (14) - Abnormal; Notable for the following components:    Glucose 102 (*)     BUN 47 (*)     Creatinine 2.50 (*)     Calcium, Total 8.6 (*)     Calculated Osmolality 304 (*)     eGFR-Cr 18 (*)     AST 38 (*)     Bilirubin, Total <0.2 (*)     All other components within normal limits   TYPE AND SCREEN    Narrative:     The following orders were created for panel order Type and screen.  Procedure                                Abnormality         Status                     ---------                               -----------         ------                     ABORH (Blood Type)[[id number]]                               Final result               Antibody Screen[[id number]]                                  Final result                 Please view results for these tests on the individual orders.   ABORH (BLOOD TYPE)   ANTIBODY SCREEN   RAINBOW DRAW BLUE            No results found.         MDM      Medical Decision Making:    Differential diagnosis before testing includes anemia chronic disease, GI bleed potential life threatening diagnosis which is a medical condition that poses a threat to life/function.     Comorbidities that add complexity to management: See HPI    I reviewed prior external ED notes including reviewed notes from Dr. Miller see above    Discussions of management with: See below    Social determinants of health care: Assisted living facility    Shared decision making:    Hemoglobin 7.2.  No complaints of black stools nor blood in the stool.  Discussed with Dr. East, GI, states given lack of this, unlikely GI bleed and more likely anemia of chronic disease.  Not requiring a blood transfusion at this level.  Offered admission, son and patient declined this time, promised to return for any worsening or if hemoglobin drops less than 7.  We did discuss that she is post to be getting weekly lab draws and weekly Procrit injections per the notes in the system per Dr. Sutherland and apparently she has not had a Procrit injection for 12 days so she is due for 1.  Discussed with the son that she should have this when she returns to the assisted living.  Discussed that we do not do Procrit injections from the ER.    Patient with elevated blood pressure, given clonidine and blood pressure came down to 150s.  MDM    Disposition and Plan     Clinical Impression:  1. Anemia, unspecified type    2. Primary hypertension          Disposition:  Discharge  10/24/2024  5:55 pm    Follow-up:  Janell Powell MD  [address] [address]  [phone]    Follow up in 1 day(s)      Fabiana Miller MD  [address] [address]  [phone]    Follow up in 2 day(s)      Jordan East MD  [address]   [facility] [id number] [phone]    Follow up in 1 day(s)            Medications Prescribed:  Discharge Medication List as of 10/24/2024  6:25 PM              Supplementary Documentation:

## 2024-10-31 ENCOUNTER — APPOINTMENT (OUTPATIENT)
Dept: CT IMAGING | Facility: HOSPITAL | Age: 89
End: 2024-10-31
Attending: EMERGENCY MEDICINE
Payer: MEDICARE

## 2024-10-31 ENCOUNTER — HOSPITAL ENCOUNTER (OUTPATIENT)
Facility: HOSPITAL | Age: 89
Setting detail: OBSERVATION
Discharge: HOME | End: 2024-11-03
Attending: EMERGENCY MEDICINE | Admitting: HOSPITALIST
Payer: MEDICARE

## 2024-10-31 ENCOUNTER — TELEPHONE (OUTPATIENT)
Dept: NEPHROLOGY | Facility: CLINIC | Age: 89
End: 2024-10-31

## 2024-10-31 DIAGNOSIS — D64.9 ANEMIA, UNSPECIFIED TYPE: Primary | ICD-10-CM

## 2024-10-31 DIAGNOSIS — R53.83 OTHER FATIGUE: ICD-10-CM

## 2024-10-31 DIAGNOSIS — K92.2 GASTROINTESTINAL HEMORRHAGE, UNSPECIFIED GASTROINTESTINAL HEMORRHAGE TYPE: ICD-10-CM

## 2024-10-31 LAB
ALBUMIN SERPL-MCNC: 3.7 G/DL (ref 3.2–4.8)
ALBUMIN/GLOB SERPL: 1.4 {RATIO} (ref 1–2)
ALP LIVER SERPL-CCNC: 98 U/L
ALT SERPL-CCNC: 13 U/L
ANION GAP SERPL CALC-SCNC: 8 MMOL/L (ref 0–18)
ANTIBODY SCREEN: NEGATIVE
APTT PPP: 29.5 SECONDS (ref 23–36)
AST SERPL-CCNC: 26 U/L (ref ?–34)
BASOPHILS # BLD AUTO: 0.03 X10(3) UL (ref 0–0.2)
BASOPHILS NFR BLD AUTO: 0.4 %
BILIRUB SERPL-MCNC: <0.2 MG/DL (ref 0.2–0.9)
BUN BLD-MCNC: 45 MG/DL (ref 9–23)
CALCIUM BLD-MCNC: 9 MG/DL (ref 8.7–10.4)
CHLORIDE SERPL-SCNC: 108 MMOL/L (ref 98–112)
CO2 SERPL-SCNC: 23 MMOL/L (ref 21–32)
CREAT BLD-MCNC: 2.77 MG/DL
EGFRCR SERPLBLD CKD-EPI 2021: 16 ML/MIN/1.73M2 (ref 60–?)
EOSINOPHIL # BLD AUTO: 0.11 X10(3) UL (ref 0–0.7)
EOSINOPHIL NFR BLD AUTO: 1.3 %
ERYTHROCYTE [DISTWIDTH] IN BLOOD BY AUTOMATED COUNT: 16.8 %
GLOBULIN PLAS-MCNC: 2.6 G/DL (ref 2–3.5)
GLUCOSE BLD-MCNC: 174 MG/DL (ref 70–99)
HCT VFR BLD AUTO: 22.1 %
HGB BLD-MCNC: 6.8 G/DL
IMM GRANULOCYTES # BLD AUTO: 0.04 X10(3) UL (ref 0–1)
IMM GRANULOCYTES NFR BLD: 0.5 %
INR BLD: 1.17 (ref 0.8–1.2)
LYMPHOCYTES # BLD AUTO: 1.59 X10(3) UL (ref 1–4)
LYMPHOCYTES NFR BLD AUTO: 18.9 %
MCH RBC QN AUTO: 29.4 PG (ref 26–34)
MCHC RBC AUTO-ENTMCNC: 30.8 G/DL (ref 31–37)
MCV RBC AUTO: 95.7 FL
MONOCYTES # BLD AUTO: 0.78 X10(3) UL (ref 0.1–1)
MONOCYTES NFR BLD AUTO: 9.3 %
NEUTROPHILS # BLD AUTO: 5.86 X10 (3) UL (ref 1.5–7.7)
NEUTROPHILS # BLD AUTO: 5.86 X10(3) UL (ref 1.5–7.7)
NEUTROPHILS NFR BLD AUTO: 69.6 %
OSMOLALITY SERPL CALC.SUM OF ELEC: 304 MOSM/KG (ref 275–295)
PLATELET # BLD AUTO: 390 10(3)UL (ref 150–450)
POTASSIUM SERPL-SCNC: 4.3 MMOL/L (ref 3.5–5.1)
PROT SERPL-MCNC: 6.3 G/DL (ref 5.7–8.2)
PROTHROMBIN TIME: 15 SECONDS (ref 11.6–14.8)
RBC # BLD AUTO: 2.31 X10(6)UL
RH BLOOD TYPE: POSITIVE
SARS-COV-2 RNA RESP QL NAA+PROBE: NOT DETECTED
SODIUM SERPL-SCNC: 139 MMOL/L (ref 136–145)
WBC # BLD AUTO: 8.4 X10(3) UL (ref 4–11)

## 2024-10-31 PROCEDURE — 99285 EMERGENCY DEPT VISIT HI MDM: CPT

## 2024-10-31 PROCEDURE — 80053 COMPREHEN METABOLIC PANEL: CPT | Performed by: EMERGENCY MEDICINE

## 2024-10-31 PROCEDURE — 82272 OCCULT BLD FECES 1-3 TESTS: CPT

## 2024-10-31 PROCEDURE — 86920 COMPATIBILITY TEST SPIN: CPT

## 2024-10-31 PROCEDURE — 86850 RBC ANTIBODY SCREEN: CPT | Performed by: EMERGENCY MEDICINE

## 2024-10-31 PROCEDURE — 85025 COMPLETE CBC W/AUTO DIFF WBC: CPT | Performed by: EMERGENCY MEDICINE

## 2024-10-31 PROCEDURE — 74176 CT ABD & PELVIS W/O CONTRAST: CPT | Performed by: EMERGENCY MEDICINE

## 2024-10-31 PROCEDURE — 86900 BLOOD TYPING SEROLOGIC ABO: CPT | Performed by: EMERGENCY MEDICINE

## 2024-10-31 PROCEDURE — 85730 THROMBOPLASTIN TIME PARTIAL: CPT | Performed by: EMERGENCY MEDICINE

## 2024-10-31 PROCEDURE — 86901 BLOOD TYPING SEROLOGIC RH(D): CPT | Performed by: EMERGENCY MEDICINE

## 2024-10-31 PROCEDURE — 96374 THER/PROPH/DIAG INJ IV PUSH: CPT

## 2024-10-31 PROCEDURE — 36430 TRANSFUSION BLD/BLD COMPNT: CPT

## 2024-10-31 PROCEDURE — 85610 PROTHROMBIN TIME: CPT | Performed by: EMERGENCY MEDICINE

## 2024-10-31 RX ORDER — SODIUM CHLORIDE 9 MG/ML
INJECTION, SOLUTION INTRAVENOUS CONTINUOUS
Status: ACTIVE | OUTPATIENT
Start: 2024-10-31 | End: 2024-11-01

## 2024-10-31 NOTE — ED PROVIDER NOTES
Patient Seen in: [facility] Emergency Department      History     Chief Complaint   Patient presents with    Abnormal Labs     Stated Complaint: abdn labs    Subjective:   HPI      The patient is a 89-year-old female presents emergency room with a history of being at [facility] while increasing fatigue and weakness which has been ongoing the last couple of days.  The patient poorly had hemoglobin of 5.8 today was sent to the ER for further evaluation and treatment.  According to medical records the patient does have a history of chronic anemia she was seen here in the emergency room 1 week ago by one of my colleagues with a hemoglobin of 7.2 and was discharged back to the [facility] at that time.  Patient was not found to have any blood in her stool at that time.  The patient denies history of any abdominal pain.  The patient denies history of chest pain.  The patient has had no history of any fall or trauma.  Patient does have a colostomy.    Objective:     Past Medical History:    Arthritis    Atrial fibrillation (HCC)    Back pain    C. difficile colitis    CKD (chronic kidney disease)    and S/P nephrectomy    Colon cancer (HCC)    Congestive heart disease (HCC)    Easy bruising    Endometrial cancer (HCC)    UTERINE, DX ABOUT 30 YEARS    Hearing impairment    AIDS    Heart palpitations    Afib    High blood pressure    Hip fracture (HCC)    left    Kidney failure    Leg swelling    Bishop syndrome    hereditary colorectal cancer    Muscle weakness    WALKER    Pain in joints    Wears glasses    Weight loss              Past Surgical History:   Procedure Laterality Date    Colectomy      Nephrectomy Left     Orif hip fracture Left 06/10/2024    Part removal colon w end colostomy                  No pertinent social history.                Physical Exam     ED Triage Vitals   BP 10/31/24 1623 (!) 164/62   Pulse 10/31/24 1623 76   Resp 10/31/24 1623 14   Temp 10/31/24 1635 98 °F (36.7 °C)   Temp src 10/31/24  1635 Temporal   SpO2 10/31/24 1623 100 %   O2 Device --        Current Vitals:   Vital Signs  BP: 135/55  Pulse: 69  Resp: 12  Temp: 98 °F (36.7 °C)  Temp src: Oral  MAP (mmHg): 79    Oxygen Therapy  SpO2: 100 %        Physical Exam  GENERAL: Well-developed, well-nourished female sitting up breathing easily in no apparent distress.  Patient is nontoxic in appearance.  HEENT: Head is normocephalic, atraumatic. Pupils are 4 mm equally round and reactive to light.  Conjunctiva are somewhat pale.  Oropharynx is clear. Mucous membranes are somewhat dry.  LUNGS: Clear to auscultation bilaterally with no wheeze. There is good equal air entry bilaterally.  HEART: Regular rate and rhythm. Normal S1, S2 no S3, or S4. No murmur.  ABDOMEN: There is mild  focal tenderness to palpation appreciated to the mid abdomen only with no other focal tenderness to palpation appreciated. There is no guarding, no rebound, no mass, and no organomegaly appreciated. There is normoactive bowel sounds. There is evidence of an ostomy in place with somewhat dark stool that is Hemoccult positive no gross blood appreciated.    EXTREMITIES: There is no cyanosis, clubbing, or edema appreciated. Pulses are 2+ and equal in all 4 extremities.  NEURO: Patient is awake, alert and answering questions to her norm. Motor strength is 5 over 5 in all 4 extremities. There are no gross motor or sensory deficits appreciated.          ED Course     Labs Reviewed   CBC WITH DIFFERENTIAL WITH PLATELET - Abnormal; Notable for the following components:       Result Value    RBC 2.31 (*)     HGB 6.8 (*)     HCT 22.1 (*)     MCHC 30.8 (*)     All other components within normal limits   COMP METABOLIC PANEL (14) - Abnormal; Notable for the following components:    Glucose 174 (*)     BUN 45 (*)     Creatinine 2.77 (*)     Calculated Osmolality 304 (*)     eGFR-Cr 16 (*)     Bilirubin, Total <0.2 (*)     All other components within normal limits   PROTHROMBIN TIME (PT) -  Abnormal; Notable for the following components:    PT 15.0 (*)     All other components within normal limits   PTT, ACTIVATED - Normal   RAPID SARS-COV-2 BY PCR - Normal   TYPE AND SCREEN    Narrative:     The following orders were created for panel order Type and screen.  Procedure                               Abnormality         Status                     ---------                               -----------         ------                     ABORH (Blood Type)[[id number]]                               Final result               Antibody Screen[[id number]]                                  Final result                 Please view results for these tests on the individual orders.   ABORH (BLOOD TYPE)   ANTIBODY SCREEN   PREPARE RBC            CT ABDOMEN+PELVIS(CPT=74176)    Result Date: 10/31/2024  CONCLUSION:   1. Sequelae of left nephrectomy.  Mild fullness of right renal collecting system is noted.  No obstructing lesions identified.  2. Left lower quadrant ostomy is noted.  Fat containing hernia in the left lower quadrant ostomy is noted.   LOCATION:  [place] [address]   Dictated by (CST): Chris Vela MD on 10/31/2024 at 5:35 PM     Finalized by (CST): Chris Vela MD on 10/31/2024 at 5:39 PM             MDM      Patient an IV line established blood were drawn including a CBC, chemistries, BUN/creatinine, and blood sugar showed evidence of a hemoglobin of 6.8 down from patient's previous hemoglobin 7.2 last week BUN/creatinine are 45 and 2.77 which is stable compared to previous.  COVID test found to be negative.  Coags are unremarkable.  Liver function tests are unremarkable.  White blood cell count is normal.  The patient was typed and crossed for 1 unit of packed red blood cells here in the emergency room.  Patient underwent CT scan of the abdomen in the ER which showed results as noted above.  Patient is sitting back and breathing easily in no apparent distress on repeat examination.  Patient will be  admitted to the hospital will be transfused and will be seen by gastroenterology.  Patient's case discussed with Dr. Baldwin from gastroenterology also discussed with the [facility] hospitalist.  The patient will be admitted to the hospital for further care at this time.    Admission disposition: 10/31/2024  6:14 PM           Medical Decision Making      Disposition and Plan     Clinical Impression:  1. Anemia, unspecified type    2. Other fatigue    3. Gastrointestinal hemorrhage, unspecified gastrointestinal hemorrhage type         Disposition:  Admit  10/31/2024  6:14 pm    Follow-up:  No follow-up provider specified.        Medications Prescribed:  Current Discharge Medication List              Supplementary Documentation:         Hospital Problems       Present on Admission  Date Reviewed: 9/4/2024            ICD-10-CM Noted POA    * (Principal) Anemia, unspecified type D64.9 10/31/2024 Unknown

## 2024-10-31 NOTE — TELEPHONE ENCOUNTER
Los from [facility] called with a Critical lab 5.8 HGB; Pt will be sent out to the hospital due to the low lab.

## 2024-10-31 NOTE — ED INITIAL ASSESSMENT (HPI)
Pt to ER from [facility] for increased fatigue and weakness x 2 days. Pt now c/o headache. Pt was sent for hgb of 5.8. Pt takes eliquis. Pt A&O x 1.

## 2024-11-01 LAB
ANION GAP SERPL CALC-SCNC: 7 MMOL/L (ref 0–18)
BUN BLD-MCNC: 37 MG/DL (ref 9–23)
C DIFF TOX B STL QL: NEGATIVE
CALCIUM BLD-MCNC: 9 MG/DL (ref 8.7–10.4)
CHLORIDE SERPL-SCNC: 113 MMOL/L (ref 98–112)
CO2 SERPL-SCNC: 21 MMOL/L (ref 21–32)
CREAT BLD-MCNC: 2.4 MG/DL
DEPRECATED HBV CORE AB SER IA-ACNC: 45 NG/ML
EGFRCR SERPLBLD CKD-EPI 2021: 18 ML/MIN/1.73M2 (ref 60–?)
ERYTHROCYTE [DISTWIDTH] IN BLOOD BY AUTOMATED COUNT: 18.1 %
GLUCOSE BLD-MCNC: 97 MG/DL (ref 70–99)
HCT VFR BLD AUTO: 26.5 %
HGB BLD-MCNC: 8.6 G/DL
HGB BLD-MCNC: 8.8 G/DL
HGB BLD-MCNC: 9.2 G/DL
IRON SATN MFR SERPL: 3 %
IRON SERPL-MCNC: 9 UG/DL
MAGNESIUM SERPL-MCNC: 1.7 MG/DL (ref 1.6–2.6)
MCH RBC QN AUTO: 29.6 PG (ref 26–34)
MCHC RBC AUTO-ENTMCNC: 33.2 G/DL (ref 31–37)
MCV RBC AUTO: 89.2 FL
OSMOLALITY SERPL CALC.SUM OF ELEC: 301 MOSM/KG (ref 275–295)
PLATELET # BLD AUTO: 335 10(3)UL (ref 150–450)
PLATELETS.RETICULATED NFR BLD AUTO: 2.1 % (ref 0–7)
POTASSIUM SERPL-SCNC: 4.8 MMOL/L (ref 3.5–5.1)
RBC # BLD AUTO: 2.97 X10(6)UL
SODIUM SERPL-SCNC: 141 MMOL/L (ref 136–145)
TOTAL IRON BINDING CAPACITY: 262 UG/DL (ref 250–425)
TRANSFERRIN SERPL-MCNC: 203 MG/DL (ref 250–380)
WBC # BLD AUTO: 8.9 X10(3) UL (ref 4–11)

## 2024-11-01 PROCEDURE — 85018 HEMOGLOBIN: CPT | Performed by: HOSPITALIST

## 2024-11-01 PROCEDURE — 83540 ASSAY OF IRON: CPT | Performed by: INTERNAL MEDICINE

## 2024-11-01 PROCEDURE — 96376 TX/PRO/DX INJ SAME DRUG ADON: CPT

## 2024-11-01 PROCEDURE — 82728 ASSAY OF FERRITIN: CPT | Performed by: INTERNAL MEDICINE

## 2024-11-01 PROCEDURE — 96375 TX/PRO/DX INJ NEW DRUG ADDON: CPT

## 2024-11-01 PROCEDURE — 97161 PT EVAL LOW COMPLEX 20 MIN: CPT

## 2024-11-01 PROCEDURE — 80048 BASIC METABOLIC PNL TOTAL CA: CPT

## 2024-11-01 PROCEDURE — 85027 COMPLETE CBC AUTOMATED: CPT

## 2024-11-01 PROCEDURE — 83735 ASSAY OF MAGNESIUM: CPT

## 2024-11-01 PROCEDURE — 87493 C DIFF AMPLIFIED PROBE: CPT | Performed by: HOSPITALIST

## 2024-11-01 PROCEDURE — 83550 IRON BINDING TEST: CPT | Performed by: INTERNAL MEDICINE

## 2024-11-01 PROCEDURE — 97530 THERAPEUTIC ACTIVITIES: CPT

## 2024-11-01 PROCEDURE — 85018 HEMOGLOBIN: CPT | Performed by: INTERNAL MEDICINE

## 2024-11-01 RX ORDER — TORSEMIDE 5 MG/1
10 TABLET ORAL DAILY
Status: DISCONTINUED | OUTPATIENT
Start: 2024-11-01 | End: 2024-11-03

## 2024-11-01 RX ORDER — ACETAMINOPHEN 325 MG/1
650 TABLET ORAL EVERY 6 HOURS PRN
Status: DISCONTINUED | OUTPATIENT
Start: 2024-11-01 | End: 2024-11-03

## 2024-11-01 RX ORDER — DILTIAZEM HYDROCHLORIDE 120 MG/1
240 CAPSULE, EXTENDED RELEASE ORAL DAILY
Status: DISCONTINUED | OUTPATIENT
Start: 2024-11-01 | End: 2024-11-03

## 2024-11-01 RX ORDER — SODIUM BICARBONATE 325 MG/1
650 TABLET ORAL 2 TIMES DAILY
Status: DISCONTINUED | OUTPATIENT
Start: 2024-11-01 | End: 2024-11-03

## 2024-11-01 RX ORDER — AMIODARONE HYDROCHLORIDE 200 MG/1
200 TABLET ORAL DAILY
Status: DISCONTINUED | OUTPATIENT
Start: 2024-11-01 | End: 2024-11-03

## 2024-11-01 RX ORDER — LACTOBACILLUS ACIDOPHILUS 500MM CELL
1 CAPSULE ORAL 2 TIMES DAILY
Status: DISCONTINUED | OUTPATIENT
Start: 2024-11-01 | End: 2024-11-03

## 2024-11-01 RX ORDER — FERROUS SULFATE 325(65) MG
325 TABLET, DELAYED RELEASE (ENTERIC COATED) ORAL
Status: DISCONTINUED | OUTPATIENT
Start: 2024-11-01 | End: 2024-11-01

## 2024-11-01 RX ORDER — HYDRALAZINE HYDROCHLORIDE 20 MG/ML
10 INJECTION INTRAMUSCULAR; INTRAVENOUS EVERY 6 HOURS PRN
Status: DISCONTINUED | OUTPATIENT
Start: 2024-11-01 | End: 2024-11-03

## 2024-11-01 RX ORDER — MAGNESIUM OXIDE 400 MG/1
400 TABLET ORAL ONCE
Status: COMPLETED | OUTPATIENT
Start: 2024-11-01 | End: 2024-11-01

## 2024-11-01 NOTE — CONSULTS
[facility]                       Gastroenterology Consultation-[facility] Gastroenterology    Sunita Loza Patient Status:  Inpatient    1932 MRN [id number]   Location [facility] 4NW-A Attending Annette Walker MD   Hosp Day # 1 PCP Janell Powell MD         Reason for consultation: Anemia  HPI: Ms. Loza is a 89 yo F with A fib on Eliquis, CKD, CHF, Bishop syndrome and colon cancer 20 years ago with recent recurrence s/p partial colectomy (2024) and endometrial cancer s/p hysterectomy. Renal cell cancer s/p nephrectomy, who presented with acute on chronic anemia. She denies melena, hematochezia, abdominal pain. She does not recall where and when she last had an EGD or colonoscopy. She denies routine NSAIDs. She denies vomiting, dsyphagia, weight loss, decreased appetite.    PMHx:   Past Medical History:    Arthritis    Atrial fibrillation (HCC)    Back pain    C. difficile colitis    CKD (chronic kidney disease)    and S/P nephrectomy    Colon cancer (HCC)    Congestive heart disease (HCC)    Easy bruising    Endometrial cancer (HCC)    UTERINE, DX ABOUT 30 YEARS    Hearing impairment    AIDS    Heart palpitations    Afib    High blood pressure    Hip fracture (HCC)    left    Kidney failure    Leg swelling    Bishop syndrome    hereditary colorectal cancer    Muscle weakness    WALKER    Pain in joints    Wears glasses    Weight loss     PSHx:   Past Surgical History:   Procedure Laterality Date    Colectomy      Nephrectomy Left     Orif hip fracture Left 06/10/2024    Part removal colon w end colostomy       Medications:    amiodarone (Pacerone) tab 200 mg  200 mg Oral Daily    dilTIAZem ER (Dilacor XR) 24 hr cap 240 mg  240 mg Oral Daily    ferrous sulfate DR tab 325 mg  325 mg Oral Daily with breakfast    acidophilus (Probiotic) cap/tab 1 capsule  1 capsule Oral BID    sodium bicarbonate tab 650 mg  650 mg Oral BID    torsemide (Demadex) tab 10 mg  10 mg Oral Daily     acetaminophen (Tylenol) tab 650 mg  650 mg Oral Q6H PRN    [COMPLETED] magnesium oxide (Mag-Ox) tab 400 mg  400 mg Oral Once    [] sodium chloride 0.9% infusion   Intravenous Continuous    pantoprazole (Protonix) 40 mg in sodium chloride 0.9% PF 10 mL IV push  40 mg Intravenous Q12H     Allergies: Allergies[1]  SocHx:  No history of smoking;  The patient drinks alcohol on social occasions; The patient has no history of IV drug use or other illicit substances.  FamHx: The patient has no family history of colon cancer or other gastrointestinal malignancies;  No family history of ulcer disease, or inflammatory bowel disease  ROS:  In addition to the pertinent positives described above:            Infectious Disease:  No chronic infections or recent fevers prior to the acute illness            Cardiovascular: A fib            Respiratory: No shortness of breath, asthma, copd, recurrent pneumonia            Hematologic: + known chronic anemia            Dermatologic: The patient reports no recent rashes or chronic skin disorders            Rheumatologic: The patient reports no history of chronic arthritis, myalgias, arthralgias            Genitourinary:  The patient reports no history of recurrent urinary tract infections, hematuria, dysuria, or nephrolithiasis           Psychiatric: The patient reports no history of depression, anxiety, suicidal ideation, or homicidal ideation           Oncologic: The patient reports on history of prior solid tumor or hematologic malignancy           ENT: The patient reports no hoarseness of voice, hearing loss, sinus congestion, tinnitus           Neurologic: The patient reports no history of seizure, stroke, or frequent headaches    PE: BP (!) 181/51 (BP Location: Left arm)   Pulse (!) 42   Temp 98.2 °F (36.8 °C) (Oral)   Resp 20   Wt 86 lb 4.8 oz (39.1 kg)   SpO2 100%   BMI 16.85 kg/m²   Gen: AAO x 3, able to speak in complete sentences  HENT: NCAT, EOMI, PERRL,  oropharynx is clear with moist mucosal membranes  Eyes: Sclerae are anicteric  Neck:  Supple without nuchal rigidity  CV: bradycardic   Resp: No increased respiratory effort  Abdomen: Soft, non-tender, non-distended with the presence of bowel sounds;  no rebound or guarding; No ascites is clinically apparent; no tympany to percussion; ostomy with brown stool  Ext: No peripheral edema or cyanosis  Skin: Warm and dry  Psychiatric: Appropriate mood and congruent affect without obvious depression or anxiety  Labs:   Lab Results   Component Value Date    WBC 8.9 11/01/2024    HGB 8.8 11/01/2024    HCT 26.5 11/01/2024    .0 11/01/2024    CREATSERUM 2.40 11/01/2024    BUN 37 11/01/2024     11/01/2024    K 4.8 11/01/2024     11/01/2024    CO2 21.0 11/01/2024    GLU 97 11/01/2024    CA 9.0 11/01/2024    ALB 3.7 10/31/2024    ALKPHO 98 10/31/2024    BILT <0.2 10/31/2024    AST 26 10/31/2024    ALT 13 10/31/2024    PTT 29.5 10/31/2024    INR 1.17 10/31/2024    PTP 15.0 10/31/2024    MG 1.7 11/01/2024     Recent Labs   Lab 10/31/24  1628 11/01/24  0620   * 97   BUN 45* 37*   CREATSERUM 2.77* 2.40*   CA 9.0 9.0    141   K 4.3 4.8    113*   CO2 23.0 21.0     Recent Labs   Lab 10/31/24  1629 11/01/24  0007 11/01/24  0625   RBC 2.31*  --  2.97*   HGB 6.8*   < > 8.8*   HCT 22.1*  --  26.5*   MCV 95.7  --  89.2   MCH 29.4  --  29.6   MCHC 30.8*  --  33.2   RDW 16.8  --  18.1   NEPRELIM 5.86  --   --    WBC 8.4  --  8.9   .0  --  335.0    < > = values in this interval not displayed.       Recent Labs   Lab 10/31/24  1628   ALT 13   AST 26       Imaging:   CT A/P:  Impression   CONCLUSION:       1. Sequelae of left nephrectomy.  Mild fullness of right renal collecting system is noted.  No obstructing lesions identified.     2. Left lower quadrant ostomy is noted.  Fat containing hernia in the left lower quadrant ostomy is noted.     Impression:   Acute on chronic anemia  H/o colon cancer  with recurrence s/p partial colectomy   Bishop syndrome c/b colon cancer, renal cell cancer and endometrial cancer  A fib: on Eliquis  CHF  CKD    Recommendations:   Iron studies, ferritin (if this can be added to blood prior to transfusion)  Unclear where she has had her care: [facility] nursing as they thought that she had been seen by us in the past. Tried calling son to discuss goals of care given her age and co-morbidities and to see when and where her last EGD and colonoscopy were done. I was unable to reach him.   PPI BID  Monitor for overt bleeding  Hbg BID and transfuse to goal > 7  CLD for now  Stop oral Iron as this will make it difficult to assess stools  If risk is acceptable; hold Eliquis          Thank you for the consultation, we will follow the patient with you.    Shirin Loving DO  12:22 PM  11/1/2024  [facility]  [phone]             [1]   Allergies  Allergen Reactions    Ciprofloxacin RASH and HIVES    Penicillins RASH

## 2024-11-01 NOTE — CM/SW NOTE
11/01/24 1100   CM/SW Referral Data   Referral Source Social Work (self-referral)   Reason for Referral Discharge planning   Informant Patient   Patient Info   Patient's Home Environment Assisted Living   Patient lives with Daughter   Discharge Needs   Anticipated D/C needs Assisted/Supported living     MARY called patient's son Arron to confirm patient's home environement. He confirmed that she lives at [facility] with her 63 year old daughter who has Down's Syndrome. They live very successfully together per Arron and patient's daughter is high functioning and independent. Arron reported that patient uses a wheelchair majority of the time due to weakness and decreased arm strength. Patient is not successful with a walker anymore.     She is current with [facility] per Arron and he would like these services resumed. MARY sent return referral to [facility] in [place].     Patient will need [facility] at [place]. Patient's son is agreeable to [facility] cost.     SW will continue to follow for plan of care changes and remain available for any additional [place] needs or concerns.     Karey Merida MSW, LSW  Discharge Planner   [phone]

## 2024-11-01 NOTE — PLAN OF CARE
NURSING ADMISSION NOTE      Patient admitted via Cart  Oriented to room.  Safety precautions initiated.  Bed in low position.  Call light in reach.    Pt received A&Ox3-4. BP elevated, otherwise VSS. Afebrile. C/o LLE pain and headache. PRN tylenol given with relief. Bilateral hearing aids and glasses bedside. Pt has LLQ colostomy. Left calf dressing changed, c/d/i. PIV saline locked per orders. Pt NPO. Hgb 8.6 after 1 unit PRBCs. Med rec and admission navigator complete. MD notified. Fall and isolation precautions maintained. Call light within reach.

## 2024-11-01 NOTE — H&P
[facility]  HOSPITALIST  History and Physical     Sunita Loza Patient Status:  Inpatient    1932 MRN [id number]   [facility] 4NW-A Attending Annette Walker MD   Hosp Day # 1 PCP Janell Powell MD     Chief Complaint:   Weakness, low hgb    History of Present Illness: Sunita Loza is a 89 year old female with PMH sig for afib, CKD III, HTN, bishop syndrome, CHF, presents to the ED with weakness and fatigue x few days. The pt had her hgb checked and was found to be 5.8. She was sent to the ED to [facility]ly. Pt has hx of chronic anemia and was seen in the ED 1 wk ago w/ hgb 7.2 and was discharged home at that time. No blood in her stool then. She does admit to having dark stools. 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 Denies diarrhea, denies abd pain. Denies nausea vomiting. In the ED labs showed hgb 6.8. creatinine 2.7. she was transfused 1 unit prbc. CT a/p - showed LLQ ostomy, sp L nephrectomy, no obstructing lesions. GI was consulted and pt was admitted for further care and management.     Past Medical History:  Past Medical History:    Arthritis    Atrial fibrillation (HCC)    Back pain    C. difficile colitis    CKD (chronic kidney disease)    and S/P nephrectomy    Colon cancer (HCC)    Congestive heart disease (HCC)    Easy bruising    Endometrial cancer (HCC)    UTERINE, DX ABOUT 30 YEARS    Hearing impairment    AIDS    Heart palpitations    Afib    High blood pressure    Hip fracture (HCC)    left    Kidney failure    Leg swelling    Bishop syndrome    hereditary colorectal cancer    Muscle weakness    WALKER    Pain in joints    Wears glasses    Weight loss        Past Surgical History:   Past Surgical History:   Procedure Laterality Date    Colectomy      Nephrectomy Left     Orif hip fracture Left 06/10/2024    Part removal colon w end colostomy         Social History:  reports that she has never smoked. She has never used smokeless tobacco. She reports that she does not  currently use alcohol. She reports that she does not use drugs.    Family History:   Family History   Problem Relation Age of Onset    Heart Disorder Father     Heart Disorder Mother     Colon Cancer Mother         70    Heart Disorder Sister     Breast Cancer Sister     Diabetes Brother     Breast Cancer Sister     Cancer Sister     Breast Cancer Sister     Breast Cancer Daughter         Allergies: Allergies[1]    Medications:  Medications Ordered Prior to Encounter[2]    Review of Systems:   A comprehensive 14 point review of systems was completed.    Pertinent positives and negatives noted in the HPI.    Physical Exam:    BP (!) 166/61 (BP Location: Left arm)   Pulse 66   Temp 98.6 °F (37 °C) (Oral)   Resp 20   Wt 86 lb 4.8 oz (39.1 kg)   SpO2 100%   BMI 16.85 kg/m²   General: No acute distress. Alert and oriented x 3.  HEENT: Normocephalic atraumatic. Moist mucous membranes. EOM-I. PERRLA. Anicteric.  Neck: No lymphadenopathy. No JVD. No carotid bruits.  Respiratory: Clear to auscultation bilaterally. No wheezes. No rhonchi.  Cardiovascular: S1, S2. Regular rate and rhythm. No murmurs, rubs or gallops. Equal pulses.   Chest and Back: No tenderness or deformity.  Abdomen: Soft, nontender, nondistended.  Positive bowel sounds. No rebound, guarding or organomegaly.  Neurologic: No focal neurological deficits. CNII-XII grossly intact.  Musculoskeletal: Moves all extremities.  Extremities: No edema or cyanosis.  Integument: No rashes or lesions.   Psychiatric: Appropriate mood and affect.      Diagnostic Data:      Labs:  Recent Labs   Lab 10/31/24  1628 10/31/24  1629 11/01/24  0007 11/01/24  0625   WBC  --  8.4  --  8.9   HGB  --  6.8* 8.6* 8.8*   MCV  --  95.7  --  89.2   PLT  --  390.0  --  335.0   INR 1.17  --   --   --        Recent Labs   Lab 10/31/24  1628 11/01/24  0620   * 97   BUN 45* 37*   CREATSERUM 2.77* 2.40*   CA 9.0 9.0   ALB 3.7  --     141   K 4.3 4.8    113*   CO2 23.0  21.0   ALKPHO 98  --    AST 26  --    ALT 13  --    BILT <0.2*  --    TP 6.3  --        Estimated Creatinine Clearance: 9.2 mL/min (A) (based on SCr of 2.4 mg/dL (H)).    Recent Labs   Lab 10/31/24  1628   PTP 15.0*   INR 1.17       COVID-19 Lab Results    COVID-19  Lab Results   Component Value Date    COVID19 Not Detected 10/31/2024    COVID19 Detected (A) 08/21/2024    COVID19 Not Detected 07/30/2024       Pro-Calcitonin  No results for input(s): \"PCT\" in the last 168 hours.    Cardiac  No results for input(s): \"TROP\", \"PBNP\" in the last 168 hours.    Creatinine Kinase  No results for input(s): \"CK\" in the last 168 hours.    Inflammatory Markers  No results for input(s): \"CRP\", \"BRANDYN\", \"LDH\", \"DDIMER\" in the last 168 hours.    No results for input(s): \"TROP\", \"TROPHS\", \"CK\" in the last 168 hours.    Imaging: Imaging data reviewed in Epic.      ASSESSMENT / PLAN:   Sunita Loza is a 89 year old female with PMH sig for afib, CKD III, HTN, teresa syndrome, CHF, presents to the ED with weakness and fatigue x few days.     Melena 2/2 upper GI bleed  Acute blood loss anemia  -admit  -hgb 6.8 >> 1 un it prbc given to pt from ED  -repeat hgb 8.6 >> 8.8 this am  -PPI IV  -GI consulted >> apprec recs  -supportive care  -adv diet per GI recs    HTN  CHF  -resume home meds  -no evidence of acute exacerbation    Afib  -on apixaban - on hold for now    CKDIII  -monitor renal function  -avoid nephrotoxic agents        Quality:  DVT Prophylaxis: on hold for   CODE status: full code  Huizar: no  If COVID testing is negative, may discontinue isolation: yes     Plan of care discussed with patient and all questions answered.        Herminia Hirsch MD  [facility]  Pager [phone]  Answering Service number: [phone]            **Certification      PHYSICIAN Certification of Need for Inpatient Hospitalization - Initial Certification    Patient will require inpatient services that will reasonably be expected to span two  midnight's based on the clinical documentation in H+P.   Based on patients current state of illness, I anticipate that, after discharge, patient will require TBD.                  [1]   Allergies  Allergen Reactions    Ciprofloxacin RASH and HIVES    Penicillins RASH   [2]   Current Facility-Administered Medications on File Prior to Encounter   Medication Dose Route Frequency Provider Last Rate Last Admin    [COMPLETED] acetaminophen (Tylenol Extra Strength) tab 1,000 mg  1,000 mg Oral Once Crista uLnaa S, DO   1,000 mg at 10/24/24 1659    [COMPLETED] cloNIDine (Catapres) tab 0.1 mg  0.1 mg Oral Once Sveta Luna S, DO   0.1 mg at 10/24/24 1736    [COMPLETED] magnesium oxide (Mag-Ox) tab 800 mg  800 mg Oral Once Joanne Moran MD   800 mg at 24 0911    [] ceFAZolin (Ancef) 2g in 10mL IV syringe premix  2 g Intravenous On Call to OR Norm House MD        [COMPLETED] sodium chloride 0.9% infusion   Intravenous Once Joanne Moran MD 10 mL/hr at 24 1510 New Bag at 24 1510    [COMPLETED] sodium chloride 0.9 % IV bolus 500 mL  500 mL Intravenous Once Reena Daly MD   Stopped at 24 1650     Current Outpatient Medications on File Prior to Encounter   Medication Sig Dispense Refill    torsemide 10 MG Oral Tab Take 1 tablet (10 mg total) by mouth daily. 90 tablet 1    apixaban 2.5 MG Oral Tab Take 1 tablet (2.5 mg total) by mouth 2 (two) times daily.      amiodarone 200 MG Oral Tab Take 1 Tab twice a day for the next 7 days, then REDUCE to ONE tab daily. (Patient taking differently: Take 1 tablet (200 mg total) by mouth daily. Take 1 Tab twice a day for the next 7 days, then REDUCE to ONE tab daily.) 60 tablet 0    dilTIAZem HCl ER Beads 240 MG Oral Capsule SR 24 Hr Take 1 capsule (240 mg total) by mouth daily. 30 capsule 11    traMADol 50 MG Oral Tab Take 0.5 tablets (25 mg total) by mouth 2 (two) times daily.      saccharomyces boulardii 250 MG Oral Cap Take 1 capsule (250 mg total)  by mouth daily.      sodium bicarbonate 650 MG Oral Tab Take 1 tablet (650 mg total) by mouth 2 (two) times daily. 60 tablet 0    ferrous sulfate 325 (65 FE) MG Oral Tab EC Take 1 tablet (325 mg total) by mouth daily with breakfast.      aspirin 81 MG Oral Tab Take 1 tablet (81 mg total) by mouth daily.      Multiple Vitamin (MULTIVITAMINS OR) Take 1 tablet by mouth daily.      acetaminophen 500 MG Oral Tab Take 1 tablet (500 mg total) by mouth in the morning and 1 tablet (500 mg total) before bedtime.      epoetin chris 85451 UNIT/ML Injection Solution Inject 1 mL (10,000 Units total) into the skin once a week. For HGB <10 1 mL 11

## 2024-11-01 NOTE — ED QUICK NOTES
Orders for admission, patient is aware of plan and ready to go upstairs. Any questions, please call ED RN Francheska at extension [phone].     Patient Covid vaccination status: Fully vaccinated and immunocompromised     COVID Test Ordered in ED: Rapid SARS-CoV-2 by PCR    COVID Suspicion at Admission: N/A    Running Infusions:      Mental Status/LOC at time of transport: A&O x1    Other pertinent information:   CIWA score: N/A   NIH score:  N/A

## 2024-11-01 NOTE — PHYSICAL THERAPY NOTE
PHYSICAL THERAPY EVALUATION - INPATIENT     Room Number: 402/402-A  Evaluation Date: 11/1/2024  Type of Evaluation: Initial  Physician Order: PT Eval and Treat    Presenting Problem: Anemia, GIH  Co-Morbidities : A fib w/ RVR, heart failure, CKD 4, HTN, endometrial ca, chronic anemia  Reason for Therapy: Mobility Dysfunction and Discharge Planning    PHYSICAL THERAPY ASSESSMENT   Patient is a 89 year old female admitted 10/31/2024 for anemia, GIH.  Prior to admission, patient's baseline is mod I for bed mobility, transfers and gait w/ rw.  Needs assist w/ bathing.  Patient is currently functioning below baseline with transfers and gait.  Patient is requiring contact guard assist and minimal assist as a result of the following impairments: decreased functional strength, decreased endurance/aerobic capacity, impaired standing balance, and decreased muscular endurance.  Physical Therapy will continue to follow for duration of hospitalization.    Patient will benefit from continued skilled PT Services at discharge to promote prior level of function and safety with additional support and return home with home health PT.    PLAN DURING HOSPITALIZATION  Nursing Mobility Recommendation : 1 Assist  PT Device Recommendation: Rolling walker (Has rolling walker at home)  PT Treatment Plan: Bed mobility;Endurance;Energy conservation;Patient education;Gait training;Strengthening;Transfer training;Balance training  Rehab Potential : Good  Frequency (Obs): 3-5x/week     CURRENT GOALS    Goal #1 Patient is able to demonstrate supine - sit EOB @ level: modified independent     Goal #2 Patient is able to demonstrate transfers EOB to/from BSC at assistance level: modified independent     Goal #3 Patient is able to ambulate 150 feet with assist device: walker - rolling at assistance level: supervision     Goal #4    Goal #5    Goal #6    Goal Comments: Goals established on 11/1/2024      PHYSICAL THERAPY MEDICAL/SOCIAL HISTORY  History  related to current admission: Patient is a 89 year old female admitted on 10/31/2024 from [facility] for increased weakness and fatigue.  Pt diagnosed with anemia and GIH.  Recent Hospitalizations:  - Hematuria, s/p cystoscopy  - Dehydration, C diff  6/10-6/15 L femur fx s/p nailing  -  ZOHREH, syncope      HOME SITUATION  Type of Home: Assisted living facility  Home Layout: One level  Stairs to Enter : 0        Stairs to Bedroom: 0         Lives With: Daughter;Staff 24 hours (Daughter has Down Syndrome)    Drives: No   Patient Regularly Uses: Hearing aides;Rolling walker      Prior Level of [place]: Pt lives in the [facility] w/ her dtr who has Down Syndrome.  Pt is typically independent w/ adl's and gait w/ rw w/ exception of bathing.  Can get more assistance from staff if needed.    SUBJECTIVE  \"I'm shaky because I haven't been out of bed in a while.\"    OBJECTIVE  Precautions: Colostomy;Hard of hearing;Bed/chair alarm  Fall Risk: High fall risk    WEIGHT BEARING RESTRICTION     PAIN ASSESSMENT  Ratin          COGNITION  Overall Cognitive Status:  WFL - within functional limits    RANGE OF MOTION AND STRENGTH ASSESSMENT  Upper extremity ROM and strength are within functional limits - grossly 4-/5    Lower extremity ROM is within functional limits     Lower extremity strength is within functional limits - grossly 4-/5    BALANCE  Static Sitting: Good  Dynamic Sitting: Fair  Static Standing: Poor +  Dynamic Standing: Poor +    ADDITIONAL TESTS                                    ACTIVITY TOLERANCE  Pulse: 76  Heart Rate Source: Monitor     BP: (!) 182/71  BP Location: Left arm  BP Method: Automatic  Patient Position: Sitting    O2 WALK  Oxygen Therapy  SPO2% on Room Air at Rest: 100    NEUROLOGICAL FINDINGS                        AM-PAC '6-Clicks' INPATIENT SHORT FORM - BASIC MOBILITY  How much difficulty does the patient currently have...  Patient Difficulty: Turning over in  bed (including adjusting bedclothes, sheets and blankets)?: None   Patient Difficulty: Sitting down on and standing up from a chair with arms (e.g., wheelchair, bedside commode, etc.): A Little   Patient Difficulty: Moving from lying on back to sitting on the side of the bed?: None   How much help from another person does the patient currently need...   Help from Another: Moving to and from a bed to a chair (including a wheelchair)?: A Little   Help from Another: Need to walk in hospital room?: A Little   Help from Another: Climbing 3-5 steps with a railing?: A Lot     AM-PAC Score:  Raw Score: 19   Approx Degree of Impairment: 41.77%   Standardized Score (AM-PAC Scale): 45.44   CMS Modifier (G-Code): CK    FUNCTIONAL ABILITY STATUS  Gait Assessment   Functional Mobility/Gait Assessment  Gait Assistance: Contact guard assist  Distance (ft): 8', 12'  Assistive Device: Rolling walker  Pattern: Shuffle    Skilled Therapy Provided     Bed Mobility:  Rolling: R w/ hob flat - mod I  Supine to sit: Mod I w/ hob flat. Denied dizziness upon sitting.   Sit to supine: NT     Transfer Mobility:  Sit to stand: CGA both from eob and from toilet - cues for hand placement.  Once in bathroom, pt was able to manage emptying her own colostomy, but took her several minutes and was becoming fatigued and dizzy.  Required close supervision and encouragement to take breaks and rest.  Stood at sink for one minute to wash hands after completing her toileting activities.   Stand to sit: CGA  Gait = Min a of 1 for gait 8' and 12'x1 using rolling walker.  Took pt's bp once sitting in bedside chair secondary to c/o of dizziness and headache.  Was 182/71 - rn informed.  Did not have pt walk more this session due to symptoms and clear fatigue w/ activity.    Therapist's Comments: Pt reports feeling comfortable in bedside chair at end of session (alarm in place).  Pt' breakfast due to arrive.    Exercise/Education Provided:  Bed  mobility  Functional activity tolerated  Gait training  Transfer training    Patient End of Session: Up in chair;Needs met;Call light within reach;RN aware of session/findings;All patient questions and concerns addressed;Alarm set (Rn Maureen aware of eval session)      Patient Evaluation Complexity Level:  History Moderate - 1 or 2 personal factors and/or co-morbidities   Examination of body systems Moderate - addressing a total of 3 or more elements   Clinical Presentation Low- Stable   Clinical Decision Making Low Complexity       PT Session Time: 35 minutes  Gait Trainin minutes  Therapeutic Activity: 20 minutes  Neuromuscular Re-education: 0 minutes  Therapeutic Exercise: 0 minutes

## 2024-11-02 LAB
HGB BLD-MCNC: 8.9 G/DL
HGB BLD-MCNC: 9.8 G/DL
MAGNESIUM SERPL-MCNC: 1.9 MG/DL (ref 1.6–2.6)

## 2024-11-02 PROCEDURE — 96376 TX/PRO/DX INJ SAME DRUG ADON: CPT

## 2024-11-02 PROCEDURE — 97535 SELF CARE MNGMENT TRAINING: CPT

## 2024-11-02 PROCEDURE — 97165 OT EVAL LOW COMPLEX 30 MIN: CPT

## 2024-11-02 PROCEDURE — 97530 THERAPEUTIC ACTIVITIES: CPT

## 2024-11-02 PROCEDURE — 83735 ASSAY OF MAGNESIUM: CPT | Performed by: INTERNAL MEDICINE

## 2024-11-02 PROCEDURE — 85018 HEMOGLOBIN: CPT | Performed by: INTERNAL MEDICINE

## 2024-11-02 PROCEDURE — 97161 PT EVAL LOW COMPLEX 20 MIN: CPT

## 2024-11-02 NOTE — PLAN OF CARE
Pt A&Ox3, mostly oriented but sometimes confused and forgetful. VSS on room air. Medications given per MAR. Pt still on clear liquid diet; GI would like to keep her there until they have clarified POC with son's input. RN unable to reach son via phone, left message and am waiting to hear back. Call light within reach, isolation and safety precautions in place.    Problem: CARDIOVASCULAR - ADULT  Goal: Absence of cardiac arrhythmias or at baseline  Description: INTERVENTIONS:  - Continuous cardiac monitoring, monitor vital signs, obtain 12 lead EKG if indicated  - Evaluate effectiveness of antiarrhythmic and heart rate control medications as ordered  - Initiate emergency measures for life threatening arrhythmias  - Monitor electrolytes and administer replacement therapy as ordered  Outcome: Progressing     Problem: METABOLIC/FLUID AND ELECTROLYTES - ADULT  Goal: Electrolytes maintained within normal limits  Description: INTERVENTIONS:  - Monitor labs and rhythm and assess patient for signs and symptoms of electrolyte imbalances  - Administer electrolyte replacement as ordered  - Monitor response to electrolyte replacements, including rhythm and repeat lab results as appropriate    Outcome: Progressing

## 2024-11-02 NOTE — OCCUPATIONAL THERAPY NOTE
OCCUPATIONAL THERAPY EVALUATION - INPATIENT     Room Number: 402/402-A  Evaluation Date: 11/2/2024  Type of Evaluation: Initial  Presenting Problem: Upper GIB, anemia    Physician Order: IP Consult to Occupational Therapy  Reason for Therapy: ADL/IADL Dysfunction and Discharge Planning    OCCUPATIONAL THERAPY ASSESSMENT   Patient is currently functioning near baseline with ADL and mobility. Prior to admission, patient's baseline is assist bathing, otherwise independent with ADL, uses walker, possibly wheelchair at times.  Patient is requiring CGA as a result of the following impairments: cognition, RW safety, balance, endurance, deconditioning.  Occupational Therapy will continue to follow for duration of hospitalization.      Patient will benefit from continued skilled OT Services at discharge to promote prior level of function and safety with additional support and return home with home health OT      History Related to Current Admission: Patient is a 89 year old female admitted on 10/31/2024 with Presenting Problem: Upper GIB, anemia. Co-Morbidities : A fib w/ RVR, heart failure, CKD 4, HTN, endometrial ca, chronic anemia    WEIGHT BEARING RESTRICTION  Weight Bearing Restriction: None                Recommendations for nursing staff:   Transfers: CGA with RW  Toileting location: toilet    EVALUATION SESSION:  Patient Start of Session: semi supine  FUNCTIONAL TRANSFER ASSESSMENT  Sit to Stand: Edge of Bed  Edge of Bed: Contact Guard Assist  Toilet Transfer: Contact Guard Assist    BED MOBILITY  Supine to Sit : Supervision    BALANCE ASSESSMENT     FUNCTIONAL ADL ASSESSMENT  Grooming Standing: Contact Guard Assist (Poor RW safety, hand hygiene at sink. CGA to reach sink, then SBA)  LB Dressing Seated: Supervision (socks, increased time)  Toileting Seated: Supervision      ACTIVITY TOLERANCE: vitals wfl    COGNITION  Memory:  decreased recall of recent events  Following Commands:  follows one step commands  consistently  Safety Judgement:  decreased awareness of need for assistance and decreased awareness of need for safety    Upper Extremity   ROM: within functional limits   Strength: within functional limits   Coordination  Gross motor: wfl  Fine motor: wfl  Sensation: Light touch:  intact    EDUCATION PROVIDED  Patient Education : Role of Occupational Therapy; Plan of Care; Functional Transfer Techniques  Patient's Response to Education: Verbalized Understanding    Equipment used: RW, grab bar  Demonstrates functional use, Would benefit from additional trial      Therapist comments: ADL/mobility as noted. CGA ambulation to bathroom, then chair with RW. Poor RW safety in bathroom, cueing to keep walker with her. Chair transfer CGA.     Patient End of Session: Up in chair;Needs met;Call light within reach;RN aware of session/findings;All patient questions and concerns addressed;Hospital anti-slip socks;Alarm set    OCCUPATIONAL PROFILE    HOME SITUATION  Type of Home: Assisted living facility  Home Layout: One level  Lives With: Daughter;Staff 24 hours (Daughter has Down Syndrome)    Toilet and Equipment: Comfort height toilet;Grab bar  Shower/Tub and Equipment: Walk-in shower;Grab bar;Shower chair             Drives: No  Patient Regularly Uses: Hearing aides;Rolling walker    Prior Level of Function: Pt is a poor historian. Per chart review, pt has assist for bathing, otherwise is independent with ADL. Pt can ambulate short distances with a walker, but uses a wheelchair the majority of the time.     SUBJECTIVE   Pt states \"I feel a little tired, but I usually do when I first get up\"    PAIN ASSESSMENT  Rating: Unable to rate  Location: knees, chronic  Management Techniques: Repositioning    OBJECTIVE  Precautions: Colostomy;Hard of hearing;Bed/chair alarm  Fall Risk: High fall risk      ASSESSMENTS    AM-PAC ‘6-Clicks’ Inpatient Daily Activity Short Form  -   Putting on and taking off regular lower body clothing?: A  Little  -   Bathing (including washing, rinsing, drying)?: A Little  -   Toileting, which includes using toilet, bedpan or urinal? : A Little  -   Putting on and taking off regular upper body clothing?: A Little  -   Taking care of personal grooming such as brushing teeth?: A Little  -   Eating meals?: None    AM-PAC Score:  Score: 19  Approx Degree of Impairment: 42.8%  Standardized Score (AM-PAC Scale): 40.22    ADDITIONAL TESTS     NEUROLOGICAL FINDINGS      COGNITION ASSESSMENTS       PLAN  OT Treatment Plan: Balance activities;ADL training;Functional transfer training;UE strengthening/ROM;Patient/Family education;Patient/Family training;Compensatory technique education  Rehab Potential : Good  Frequency: 3-5x/week  Number of Visits to Meet Established Goals: 3    ADL Goals   Patient will perform grooming: with supervision  Patient will perform toileting: with supervision    Functional Transfer Goals  Patient will transfer to toilet:  with supervision    UE Exercise Program Goal  Patient will be supervision with bilateral AROM HEP (home exercise program).    Patient Evaluation Complexity Level:   Occupational Profile/Medical History LOW - Brief history including review of medical or therapy records    Specific performance deficits impacting engagement in ADL/IADL LOW  1 - 3 performance deficits    Client Assessment/Performance Deficits LOW - No comorbidities nor modifications of tasks    Clinical Decision Making LOW - Analysis of occupational profile, problem-focused assessments, limited treatment options    Overall Complexity LOW     OT Session Time: 25 minutes  Self-Care Home Management: 15 minutes  Therapeutic Activity:  minutes  Neuromuscular Re-education:  minutes  Therapeutic Exercise:  minutes  Orthotic Management and Training:  minutes

## 2024-11-02 NOTE — PROGRESS NOTES
[facility] Gastroenterology Progress Note    CC: Anemia  S: No GIB sx at this time.   O: /62 (BP Location: Left arm)   Pulse 73   Temp 97.5 °F (36.4 °C) (Oral)   Resp 18   Wt 86 lb 4.8 oz (39.1 kg)   SpO2 (!) 64%   BMI 16.85 kg/m²     Gen: NAD  Abd: (+)BS, soft, non-tender, non-distended; no rebound or guarding    Laboratory/Imaging Data:     No results for input(s): \"PGLU\" in the last 168 hours.  Recent Labs   Lab 10/31/24  1628   INR 1.17         Recent Labs   Lab 10/31/24  1629 11/01/24  0007 11/01/24  0625 11/01/24  1843 11/02/24  0802   WBC 8.4  --  8.9  --   --    HGB 6.8* 8.6* 8.8* 9.2* 8.9*   .0  --  335.0  --   --        Recent Labs   Lab 10/31/24  1628 11/01/24  0620    141   K 4.3 4.8    113*   CO2 23.0 21.0   BUN 45* 37*   CREATSERUM 2.77* 2.40*       Recent Labs   Lab 10/31/24  1628   ALT 13   AST 26       Assessment/Plan:    89 yo F with A fib on Eliquis, CKD, CHF, Bishop syndrome and colon cancer 20 years ago with recent recurrence s/p partial colectomy (4/2024) and endometrial cancer s/p hysterectomy. Renal cell cancer s/p nephrectomy, who presented with acute on chronic anemia.  Hgb 6.8 on admission and now 8.9 and stable.  Low Ferritin.   -Unclear where she has had her care: [facility] deferred the case to [facility] per nursing as they thought that she had been seen by us in the past. Inpatient GI team tried calling son to discuss goals of care given her age and co-morbidities and to see when and where her last EGD and colonoscopy were done but was unable to reach him 11/1. Tried to call both sons again today with no answer as pt is not sure if she wants to proceed or not; message left    - CLD  - PPI BID  - Monitor for overt bleeding  - Hbg BID and transfuse to goal > 7  -If risk is acceptable; hold Eliquis pending discussion with sherri Bunch MD, 11/02/24, 9:20 AM  [facility]    Called and spoke to VETO [place]; discussed egd/colon  for evaluation and discussed risks vs benefits; pt's POA would like to hold off on procedures at this time.     GI will sign off at this time. Please call back with any questions or concerns or if pt's POA wishes to rediscuss procedures.      Primary Defect Length In Cm (Final Defect Size - Required For Flaps/Grafts): 0.6

## 2024-11-02 NOTE — PROGRESS NOTES
Patient is alert and oriented x3, sometimes forgetful. Complaints of headache. Tylenol given with relief. Medications given per MAR. Elevated BP. Notified MD. Prn Hydralazine ordered and given. GI saw patient. PT worked with the patient. Patient changed to clear liquid diet. Fall precautions in place. Call light within reach.

## 2024-11-02 NOTE — PROGRESS NOTES
[facility]   part of [facility] Hospitalist Progress Note     Sunita Loza Patient Status:  Observation    1932 MRN [id number]   Location [facility] [place] 4NW-A Attending Annette Walker MD   Hosp Day # 0 PCP Janell Powell MD     Chief Complaint:   Follow up - Weakness, low hgb    Subjective:     Patient seen and examined.   No further dark stools  Hgb 9.2 >> 8.9  this am  Improved from yest  On CLD      Objective:    Review of Systems:   10 point ROS completed and was negative, except for pertinent positive and negatives stated in subjective.    Vital signs:  Temp:  [97.5 °F (36.4 °C)-98.2 °F (36.8 °C)] 97.5 °F (36.4 °C)  Pulse:  [42-86] 73  Resp:  [16-20] 18  BP: (111-182)/(50-71) 155/62  SpO2:  [64 %-100 %] 64 %    Physical Exam:    General: No acute distress.   HEENT:  EOMI, PERRLA, OP clear  Respiratory: Clear to auscultation bilaterally. No wheezes. No rhonchi.  Cardiovascular: S1, S2. Regular rate and rhythm. No murmurs.  Abdomen: Soft, nontender, nondistended.  Positive bowel sounds. No rebound or guarding.  Extremities: No edema.  Neuro:  Grossly non focal, no motor deficits.        Diagnostic Data:    Labs:  Recent Labs   Lab 10/31/24  1628 10/31/24  16224  0007 24  0625 24  1843   WBC  --  8.4  --  8.9  --    HGB  --  6.8* 8.6* 8.8* 9.2*   MCV  --  95.7  --  89.2  --    PLT  --  390.0  --  335.0  --    INR 1.17  --   --   --   --        Recent Labs   Lab 10/31/24  1628 24  0620   * 97   BUN 45* 37*   CREATSERUM 2.77* 2.40*   CA 9.0 9.0   ALB 3.7  --     141   K 4.3 4.8    113*   CO2 23.0 21.0   ALKPHO 98  --    AST 26  --    ALT 13  --    BILT <0.2*  --    TP 6.3  --        Estimated Creatinine Clearance: 9.2 mL/min (A) (based on SCr of 2.4 mg/dL (H)).    Recent Labs   Lab 10/31/24  1628   PTP 15.0*   INR 1.17            COVID-19 Lab Results    COVID-19  Lab Results   Component Value Date    COVID19 Not Detected  10/31/2024    COVID19 Detected (A) 08/21/2024    COVID19 Not Detected 07/30/2024       Pro-Calcitonin  No results for input(s): \"PCT\" in the last 168 hours.    Cardiac  No results for input(s): \"TROP\", \"PBNP\" in the last 168 hours.    Creatinine Kinase  No results for input(s): \"CK\" in the last 168 hours.    Inflammatory Markers  Recent Labs   Lab 11/01/24  0620   BRANDYN 45*       Imaging: Imaging data reviewed in Epic.    Medications:    amiodarone  200 mg Oral Daily    dilTIAZem HCl ER Beads  240 mg Oral Daily    acidophilus  1 capsule Oral BID    sodium bicarbonate  650 mg Oral BID    torsemide  10 mg Oral Daily    pantoprazole  40 mg Intravenous Q12H       Assessment & Plan:    Sunita Loza is a 89 year old female with PMH sig for afib, CKD III, HTN, teresa syndrome, CHF, presents to the ED with weakness and fatigue x few days.      Melena 2/2 upper GI bleed  Acute blood loss anemia  -admit  -hgb 6.8 >> 1 un it prbc given to pt from ED  -repeat hgb 8.6 >> 8.8 >> 9.2 >> 8.9  -PPI BID  -GI following >> no plan for EGD yet >> await final recs  -cont supportive care  -adv diet per GI recs     HTN  CHF  -resume home meds  -no evidence of acute exacerbation     Afib  -on apixaban - on hold for now     CKDIII  -monitor renal function  -avoid nephrotoxic agents           Quality:  DVT Prophylaxis: on hold for   CODE status: full code  Huizar: no  If COVID testing is negative, may discontinue isolation: yes      DISPO  Dc planning in process  Await final GI recs  Plan of care discussed with patient and all questions answered.           Herminia Hirsch MD  [facility]  Pager [phone]  Answering Service number: [phone]

## 2024-11-02 NOTE — PLAN OF CARE
Received awake and confused as to date, month, year.   Has headache 7/10. She also had left ear ache.  Offered Tylenol and she took it.   Discharge to [facility] tomorrow when medically stable with medicar.  Her headache is better with Tylenol.     Problem: Patient/Family Goals  Goal: Patient/Family Long Term Goal  Description: Patient's Long Term Goal: return to [facility] when medically stable    Interventions:  Telemetry monitoring  O2 protocol,   Fall precaution, bed alarm on, non-skid socks on,  call light and bedside table within reach.  Monitor intake and output.  Electrolyte protocol.  Heparin or Lovenox and SCD for VTE   Daily weight  Strict intake and output.   Maintain on Contact plus isolation for hx Cdiff, ESBL,MRSA       - See additional Care Plan goals for specific interventions  11/2/2024 0413 by Cherie Yee RN  Outcome: Progressing  11/2/2024 0411 by Cherie Yee RN  Outcome: Progressing  Goal: Patient/Family Short Term Goal  Description: Patient's Short Term Goal: VS stable this shift    Interventions:   Telemetry monitoring  O2 protocol,   Fall precaution, bed alarm on, non-skid socks on,  call light and bedside table within reach.  Monitor intake and output.  Electrolyte protocol.  Heparin or Lovenox and SCD for VTE   Daily weight  Strict intake and output.   Maintain on Contact plus isolation for hx Cdiff, ESBL,MRSA      - See additional Care Plan goals for specific interventions  11/2/2024 0413 by Cherie Yee RN  Outcome: Progressing  11/2/2024 0411 by Cherie Yee RN  Outcome: Progressing     Problem: CARDIOVASCULAR - ADULT  Goal: Absence of cardiac arrhythmias or at baseline  Description: INTERVENTIONS:  - Continuous cardiac monitoring, monitor vital signs, obtain 12 lead EKG if indicated  - Evaluate effectiveness of antiarrhythmic and heart rate control medications as ordered  - Initiate emergency measures for life threatening arrhythmias  - Monitor electrolytes and  administer replacement therapy as ordered  11/2/2024 0413 by Cherie Yee RN  Outcome: Progressing  11/2/2024 0411 by Cherie Yee RN  Outcome: Progressing     Problem: METABOLIC/FLUID AND ELECTROLYTES - ADULT  Goal: Electrolytes maintained within normal limits  Description: INTERVENTIONS:  - Monitor labs and rhythm and assess patient for signs and symptoms of electrolyte imbalances  - Administer electrolyte replacement as ordered  - Monitor response to electrolyte replacements, including rhythm and repeat lab results as appropriate    11/2/2024 0413 by Cherie Yee RN  Outcome: Progressing  11/2/2024 0411 by Cherie Yee RN  Outcome: Progressing     Problem: HEMATOLOGIC - ADULT  Goal: Maintains hematologic stability  Description: INTERVENTIONS  - Assess for signs and symptoms of bleeding or hemorrhage  - Monitor labs and vital signs for trends  - Administer supportive blood products/factors, fluids and medications as ordered and appropriate  - Administer supportive blood products/factors as ordered and appropriate  11/2/2024 0413 by Cherie Yee RN  Outcome: Progressing  11/2/2024 0411 by Cherie Yee RN  Outcome: Progressing     Problem: PAIN - ADULT  Goal: Verbalizes/displays adequate comfort level or patient's stated pain goal  Description: INTERVENTIONS:  - Encourage pt to monitor pain and request assistance  - Assess pain using appropriate pain scale  - Administer analgesics based on type and severity of pain and evaluate response  - Implement non-pharmacological measures as appropriate and evaluate response  - Consider cultural and social influences on pain and pain management  - Manage/alleviate anxiety  - Utilize distraction and/or relaxation techniques  - Monitor for opioid side effects  - Notify MD/LIP if interventions unsuccessful or patient reports new pain  - Anticipate increased pain with activity and pre-medicate as appropriate  11/2/2024 0413 by Cherie Yee RN  Outcome:  Progressing  11/2/2024 0411 by Cherie Yee, RN  Outcome: Progressing     Problem: SAFETY ADULT - FALL  Goal: Free from fall injury  Description: INTERVENTIONS:  - Assess pt frequently for physical needs  - Identify cognitive and physical deficits and behaviors that affect risk of falls.  - [place] fall precautions as indicated by assessment.  - Educate pt/family on patient safety including physical limitations  - Instruct pt to call for assistance with activity based on assessment  - Modify environment to reduce risk of injury  - Provide assistive devices as appropriate  - Consider OT/PT consult to assist with strengthening/mobility  - Encourage toileting schedule  11/2/2024 0413 by Cherie Yee, RN  Outcome: Progressing  11/2/2024 0411 by Cherie Yee, RN  Outcome: Progressing

## 2024-11-03 VITALS
OXYGEN SATURATION: 100 % | HEART RATE: 64 BPM | DIASTOLIC BLOOD PRESSURE: 58 MMHG | TEMPERATURE: 98 F | SYSTOLIC BLOOD PRESSURE: 163 MMHG | WEIGHT: 86.31 LBS | RESPIRATION RATE: 16 BRPM | BODY MASS INDEX: 17 KG/M2

## 2024-11-03 LAB
ANION GAP SERPL CALC-SCNC: 6 MMOL/L (ref 0–18)
BASOPHILS # BLD AUTO: 0.02 X10(3) UL (ref 0–0.2)
BASOPHILS NFR BLD AUTO: 0.3 %
BLOOD TYPE BARCODE: 7300
BUN BLD-MCNC: 40 MG/DL (ref 9–23)
CALCIUM BLD-MCNC: 8.4 MG/DL (ref 8.7–10.4)
CHLORIDE SERPL-SCNC: 109 MMOL/L (ref 98–112)
CO2 SERPL-SCNC: 24 MMOL/L (ref 21–32)
CREAT BLD-MCNC: 2.5 MG/DL
EGFRCR SERPLBLD CKD-EPI 2021: 18 ML/MIN/1.73M2 (ref 60–?)
EOSINOPHIL # BLD AUTO: 0.11 X10(3) UL (ref 0–0.7)
EOSINOPHIL NFR BLD AUTO: 1.4 %
ERYTHROCYTE [DISTWIDTH] IN BLOOD BY AUTOMATED COUNT: 17 %
GLUCOSE BLD-MCNC: 93 MG/DL (ref 70–99)
HCT VFR BLD AUTO: 25.4 %
HGB BLD-MCNC: 8.2 G/DL
IMM GRANULOCYTES # BLD AUTO: 0.03 X10(3) UL (ref 0–1)
IMM GRANULOCYTES NFR BLD: 0.4 %
LYMPHOCYTES # BLD AUTO: 1.29 X10(3) UL (ref 1–4)
LYMPHOCYTES NFR BLD AUTO: 17 %
MCH RBC QN AUTO: 29 PG (ref 26–34)
MCHC RBC AUTO-ENTMCNC: 32.3 G/DL (ref 31–37)
MCV RBC AUTO: 89.8 FL
MONOCYTES # BLD AUTO: 0.51 X10(3) UL (ref 0.1–1)
MONOCYTES NFR BLD AUTO: 6.7 %
NEUTROPHILS # BLD AUTO: 5.64 X10 (3) UL (ref 1.5–7.7)
NEUTROPHILS # BLD AUTO: 5.64 X10(3) UL (ref 1.5–7.7)
NEUTROPHILS NFR BLD AUTO: 74.2 %
OSMOLALITY SERPL CALC.SUM OF ELEC: 297 MOSM/KG (ref 275–295)
PLATELET # BLD AUTO: 349 10(3)UL (ref 150–450)
POTASSIUM SERPL-SCNC: 4.8 MMOL/L (ref 3.5–5.1)
RBC # BLD AUTO: 2.83 X10(6)UL
SODIUM SERPL-SCNC: 139 MMOL/L (ref 136–145)
UNIT VOLUME: 350 ML
WBC # BLD AUTO: 7.6 X10(3) UL (ref 4–11)

## 2024-11-03 PROCEDURE — 85025 COMPLETE CBC W/AUTO DIFF WBC: CPT | Performed by: INTERNAL MEDICINE

## 2024-11-03 PROCEDURE — 96376 TX/PRO/DX INJ SAME DRUG ADON: CPT

## 2024-11-03 PROCEDURE — 80048 BASIC METABOLIC PNL TOTAL CA: CPT | Performed by: INTERNAL MEDICINE

## 2024-11-03 RX ORDER — PANTOPRAZOLE SODIUM 40 MG/1
40 TABLET, DELAYED RELEASE ORAL
Qty: 30 TABLET | Refills: 1 | Status: SHIPPED | OUTPATIENT
Start: 2024-11-03 | End: 2025-01-02

## 2024-11-03 RX ORDER — PANTOPRAZOLE SODIUM 40 MG/1
40 TABLET, DELAYED RELEASE ORAL
Qty: 60 TABLET | Refills: 1 | Status: SHIPPED | OUTPATIENT
Start: 2024-11-03 | End: 2024-11-03

## 2024-11-03 NOTE — PLAN OF CARE
Pt A&Ox3, mostly oriented but sometimes confused and forgetful. VSS on room air. Medications given per MAR. Tolerating soft diet and medications without complication. Pt and son updated on and in agreement with discharge plans. Report given to ALCON [place], at [facility]. Call light within reach, isolation and safety precautions in place.    Pt discharge to [facility] via medicar. All belongings and paperwork given to medicar staff.     Problem: CARDIOVASCULAR - ADULT  Goal: Absence of cardiac arrhythmias or at baseline  Description: INTERVENTIONS:  - Continuous cardiac monitoring, monitor vital signs, obtain 12 lead EKG if indicated  - Evaluate effectiveness of antiarrhythmic and heart rate control medications as ordered  - Initiate emergency measures for life threatening arrhythmias  - Monitor electrolytes and administer replacement therapy as ordered  Outcome: Adequate for Discharge     Problem: METABOLIC/FLUID AND ELECTROLYTES - ADULT  Goal: Electrolytes maintained within normal limits  Description: INTERVENTIONS:  - Monitor labs and rhythm and assess patient for signs and symptoms of electrolyte imbalances  - Administer electrolyte replacement as ordered  - Monitor response to electrolyte replacements, including rhythm and repeat lab results as appropriate    Outcome: Adequate for Discharge     Problem: HEMATOLOGIC - ADULT  Goal: Maintains hematologic stability  Description: INTERVENTIONS  - Assess for signs and symptoms of bleeding or hemorrhage  - Monitor labs and vital signs for trends  - Administer supportive blood products/factors, fluids and medications as ordered and appropriate  - Administer supportive blood products/factors as ordered and appropriate  Outcome: Adequate for Discharge     Problem: PAIN - ADULT  Goal: Verbalizes/displays adequate comfort level or patient's stated pain goal  Description: INTERVENTIONS:  - Encourage pt to monitor pain and request assistance  - Assess pain using appropriate pain  scale  - Administer analgesics based on type and severity of pain and evaluate response  - Implement non-pharmacological measures as appropriate and evaluate response  - Consider cultural and social influences on pain and pain management  - Manage/alleviate anxiety  - Utilize distraction and/or relaxation techniques  - Monitor for opioid side effects  - Notify MD/LIP if interventions unsuccessful or patient reports new pain  - Anticipate increased pain with activity and pre-medicate as appropriate  Outcome: Adequate for Discharge

## 2024-11-03 NOTE — PROGRESS NOTES
Pt is alert and oriented x3. Pt up to bathroom with walker. Pt has a colostomy and has no complaints of pain. Pt has call light in reach and safety measures in place.

## 2024-11-03 NOTE — CM/SW NOTE
Notified by RN pt is medically cleared for discharge. Noted pt is from [facility].     MARY called [facility] [phone] and spoke with ALCON Gaona who provided this writer with contact number for ZARA Harkins ([phone]).     MARY spoke with ZARA Harkins via phone. Shahana stated pt is able to return today and requested transportation as soon as possible. Shahana also stated the MD will need to sign each page of pt's MAR and have MAR faxed to [facility]. Shahana provided contact number to [facility] ([phone]). [email or web address] ([phone]).     Updated RN. MARY informed RN and MD Dr. Hirsch that MD will need to sign each page of pt's medication list and this writer will fax to [facility] once available.     Aguila Shearer arranged for 1:30pm. Updated RN. RN to update pt/family. PCS form completed and available for RN to print. Notified [facility] in [place] pt is discharging today.    Addendum (12:20pm) - SW received message from the Hospitalist stating she is no longer in house and cannot sign the medication list. MARY spoke with Dev Harkins who stated the medication list will need to be signed if pt is on any new medication. Shahana stated if pt is returning on the same medication she was on prior to hospitalization the medication does not need to be signed.     MARY called [facility] and spoke with ALCON Gaona. MARY went through the scheduled medications on pt's current MAR and Candelaria confirmed pt was on those medications prior to hospital admission. Updated RN and MD. MD stated pt will discharge on PO Pantoprazole and the script was sent to pt's pharmacy. Pantoprazole is a new medication for pt. MARY reviewed pt's script in the chart and noted script was sent to [facility].     MARY again called [facility] and spoke with ALCON Gaona. Candelaria stated the MD can e-scribe the prescription to [facility] and provided fax [phone]. MARY updated MD and inquired  if script can be e-scribed to [facility].     Addendum (1:10pm) - MARY received message from MD stating script for pantoprazole was e-sent to [facility]. SW reviewed pt's pantoprazole script in pt's chart and noted script has been e-sent to [facility] ([place], [place] - [facility] [phone], fax: [phone]).     Nurses please call report to:     Dev Singh [phone]    [facility]  [phone]    SUDHEER Leblanc  Discharge Planner

## 2024-11-03 NOTE — PROGRESS NOTES
[facility]   part of [facility] Hospitalist Progress Note     Sunita Loza Patient Status:  Observation    1932 MRN [id number]   Location [facility] 4NW-A Attending Annette Walker MD   Hosp Day # 0 PCP Janell Powell MD     Chief Complaint:   Follow up - Weakness, low hgb    Subjective:     Patient seen and examined.   No further dark stools  Hgb 9.2 last night  Am labs show hgb 8.2  No bleeding or dark stool reported  On soft   No bleeding      Objective:    Review of Systems:   10 point ROS completed and was negative, except for pertinent positive and negatives stated in subjective.    Vital signs:  Temp:  [97.4 °F (36.3 °C)-98 °F (36.7 °C)] 97.8 °F (36.6 °C)  Pulse:  [64-90] 64  Resp:  [16] 16  BP: (127-163)/(56-63) 163/58  SpO2:  [100 %] 100 %    Physical Exam:    General: No acute distress.   HEENT:  EOMI, PERRLA, OP clear  Respiratory: Clear to auscultation bilaterally. No wheezes. No rhonchi.  Cardiovascular: S1, S2. Regular rate and rhythm. No murmurs.  Abdomen: Soft, nontender, nondistended.  Positive bowel sounds. No rebound or guarding.  Extremities: No edema.  Neuro:  Grossly non focal, no motor deficits.        Diagnostic Data:    Labs:  Recent Labs   Lab 10/31/24  1628 10/31/24  1629 10/31/24  1629 24  0007 24  0625 24  1843 24  0802 24  1752   WBC  --  8.4  --   --  8.9  --   --   --    HGB  --  6.8*   < > 8.6* 8.8* 9.2* 8.9* 9.8*   MCV  --  95.7  --   --  89.2  --   --   --    PLT  --  390.0  --   --  335.0  --   --   --    INR 1.17  --   --   --   --   --   --   --     < > = values in this interval not displayed.       Recent Labs   Lab 10/31/24  1628 24  0620   * 97   BUN 45* 37*   CREATSERUM 2.77* 2.40*   CA 9.0 9.0   ALB 3.7  --     141   K 4.3 4.8    113*   CO2 23.0 21.0   ALKPHO 98  --    AST 26  --    ALT 13  --    BILT <0.2*  --    TP 6.3  --        Estimated Creatinine Clearance: 9.2  mL/min (A) (based on SCr of 2.4 mg/dL (H)).    Recent Labs   Lab 10/31/24  1628   PTP 15.0*   INR 1.17            COVID-19 Lab Results    COVID-19  Lab Results   Component Value Date    COVID19 Not Detected 10/31/2024    COVID19 Detected (A) 08/21/2024    COVID19 Not Detected 07/30/2024       Pro-Calcitonin  No results for input(s): \"PCT\" in the last 168 hours.    Cardiac  No results for input(s): \"TROP\", \"PBNP\" in the last 168 hours.    Creatinine Kinase  No results for input(s): \"CK\" in the last 168 hours.    Inflammatory Markers  Recent Labs   Lab 11/01/24  0620   BRANDYN 45*       Imaging: Imaging data reviewed in Epic.    Medications:    apixaban  2.5 mg Oral BID    amiodarone  200 mg Oral Daily    dilTIAZem HCl ER Beads  240 mg Oral Daily    acidophilus  1 capsule Oral BID    sodium bicarbonate  650 mg Oral BID    torsemide  10 mg Oral Daily    pantoprazole  40 mg Intravenous Q12H       Assessment & Plan:    Sunita Loza is a 89 year old female with PMH sig for afib, CKD III, HTN, teresa syndrome, CHF, presents to the ED with weakness and fatigue x few days.      Melena 2/2 upper GI bleed  Acute blood loss anemia  -admit  -hgb 6.8 >> 1 unit prbc given to pt from ED  -hgb 9.8 >> am labs show 8.2  -no bleeding or dark stools reported  -PPI BID >> convert to daily >> script sent off  -GI following >> no plan for EGD  -cont supportive care  -advanced to soft diet     HTN  CHF  -resume home meds  -no evidence of acute exacerbation     Afib  -on apixaban - resumed last night     CKDIII  -monitor renal function  -avoid nephrotoxic agents           Quality:  DVT Prophylaxis: on hold for   CODE status: full code  Huizar: no  If COVID testing is negative, may discontinue isolation: yes      DISPO  Dc planning in process  Diet advanced to soft yest  Eliquis restarted  Ok to go home if cleared by GI and no further bleeding  Plan of care discussed with patient and all questions answered.           Herminia Hirsch MD  [facility]  Hospitalist  Pager [phone]  Answering Service number: [phone]

## 2024-11-04 ENCOUNTER — MED REC SCAN ONLY (OUTPATIENT)
Dept: NEPHROLOGY | Facility: CLINIC | Age: 89
End: 2024-11-04

## 2024-11-11 ENCOUNTER — MED REC SCAN ONLY (OUTPATIENT)
Dept: NEPHROLOGY | Facility: CLINIC | Age: 89
End: 2024-11-11

## 2024-11-11 NOTE — DISCHARGE SUMMARY
[facility]  HOSPITALIST  DISCHARGE SUMMARY     Sunita Loza Patient Status:  Observation    1932 MRN [id number]   Location [facility] 4NW-A Attending No att. providers found   Hosp Day # 0 PCP Janell Powell MD     Date of Admission: 10/31/2024  Date of Discharge: 11/3/2024  Discharge Disposition: Home or Self Care    Discharge Diagnosis:   Melena 2/2 upper GI bleed  Acute blood loss anemia  HTN  CHF  Afib  CKDIII       History of Present Illness:   Sunita Loza is a 89 year old female with PMH sig for afib, CKD III, HTN, teresa syndrome, CHF, presents to the ED with weakness and fatigue x few days. The pt had her hgb checked and was found to be 5.8. She was sent to the ED to [facility]. Pt has hx of chronic anemia and was seen in the ED 1 wk ago w/ hgb 7.2 and was discharged home at that time. No blood in her stool then. She does admit to having dark stools. 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 Denies diarrhea, denies abd pain. Denies nausea vomiting. In the ED labs showed hgb 6.8. creatinine 2.7. she was transfused 1 unit prbc. CT a/p - showed LLQ ostomy, sp L nephrectomy, no obstructing lesions. GI was consulted and pt was admitted for further care and management.        Brief Synopsis:     Melena 2/2 upper GI bleed  Acute blood loss anemia  -admit  -hgb 6.8 >> 1 unit prbc given to pt from ED  -hgb 9.8 >> am labs show 8.2  -no bleeding or dark stools reported  -PPI BID >> convert to daily >> script sent off  -GI following >> no plan for EGD  -cont supportive care  -advanced to soft diet     HTN  CHF  -resume home meds  -no evidence of acute exacerbation     Afib  -on apixaban - resumed last night     CKDIII  -monitor renal function  -avoid nephrotoxic agents      DC INSTRUCTIONS  Dc planning in process  Diet advanced to soft yest  Eliquis restarted  Ok to go home if cleared by GI and no further bleeding  Plan of care discussed with patient and all questions answered.          Jesseie+  Score: 80  59-90 High Risk  29-58 Medium Risk  0-28   Low Risk       TCM Follow-Up Recommendation:  LACE > 58: High Risk of readmission after discharge from the hospital.    Procedures during hospitalization:   none    Incidental or significant findings and recommendations (brief descriptions):  none    Lab/Test results pending at Discharge:   none    Consultants:  GI    Discharge Medication List:     Discharge Medications        START taking these medications        Instructions Prescription details   pantoprazole 40 MG Tbec  Commonly known as: Protonix      Take 1 tablet (40 mg total) by mouth every morning before breakfast.   Stop taking on: January 2, 2025  Quantity: 30 tablet  Refills: 1            CHANGE how you take these medications        Instructions Prescription details   amiodarone 200 MG Tabs  Commonly known as: Pacerone  What changed:   how much to take  how to take this  when to take this      Take 1 Tab twice a day for the next 7 days, then REDUCE to ONE tab daily.   Quantity: 60 tablet  Refills: 0            CONTINUE taking these medications        Instructions Prescription details   acetaminophen 500 MG Tabs  Commonly known as: Tylenol Extra Strength      Take 1 tablet (500 mg total) by mouth in the morning and 1 tablet (500 mg total) before bedtime.   Refills: 0     apixaban 2.5 MG Tabs  Commonly known as: Eliquis      Take 1 tablet (2.5 mg total) by mouth 2 (two) times daily.   Refills: 0     aspirin 81 MG Tabs      Take 1 tablet (81 mg total) by mouth daily.   Refills: 0     dilTIAZem HCl ER Beads 240 MG Cp24  Commonly known as: TIAZAC      Take 1 capsule (240 mg total) by mouth daily.   Quantity: 30 capsule  Refills: 11     epoetin chris 45556 UNIT/ML Soln  Commonly known as: Epogen, Procrit      Inject 1 mL (10,000 Units total) into the skin once a week. For HGB <10   Quantity: 1 mL  Refills: 11     ferrous sulfate 325 (65 FE) MG Tbec      Take 1 tablet (325 mg total) by mouth daily with  breakfast.   Refills: 0     MULTIVITAMINS OR      Take 1 tablet by mouth daily.   Refills: 0     saccharomyces boulardii 250 MG Caps  Commonly known as: Florastor      Take 1 capsule (250 mg total) by mouth daily.   Refills: 0     sodium bicarbonate 650 MG Tabs      Take 1 tablet (650 mg total) by mouth 2 (two) times daily.   Quantity: 60 tablet  Refills: 0     torsemide 10 MG Tabs  Commonly known as: DEMADEX      Take 1 tablet (10 mg total) by mouth daily.   Quantity: 90 tablet  Refills: 1     traMADol 50 MG Tabs  Commonly known as: Ultram      Take 0.5 tablets (25 mg total) by mouth 2 (two) times daily.   Refills: 0               Where to Get Your Medications        These medications were sent to [address] [phone], [phone]  [address] [place] [id number]      Phone: [phone]   pantoprazole 40 MG Tbec         [place]    Follow-up appointment:   Janell Powell MD  [address] [address]  [phone]    Schedule an appointment as soon as possible for a visit in 1 week(s)      Appointments for Next 30 Days 11/11/2024 - 12/11/2024        Date Arrival Time Visit Type Length Department Provider     11/13/2024 11:00 AM  NURSE ONLY [9119] 15 min [facility] NEPHROL [facility]NA[facility] NURSE    Patient Instructions:         Location Instructions:     Masks are optional for all patients and visitors, unless otherwise indicated.                      Vital signs:  Temp:  [97.4 °F (36.3 °C)-98 °F (36.7 °C)] 97.8 °F (36.6 °C)  Pulse:  [64-90] 64  Resp:  [16] 16  BP: (127-163)/(56-63) 163/58  SpO2:  [100 %] 100 %     Physical Exam:    General: No acute distress.   HEENT:  EOMI, PERRLA, OP clear  Respiratory: Clear to auscultation bilaterally. No wheezes. No rhonchi.  Cardiovascular: S1, S2. Regular rate and rhythm. No murmurs.  Abdomen: Soft, nontender, nondistended.  Positive bowel sounds.  No rebound or guarding.  Extremities: No edema.  Neuro:  Grossly non focal, no motor deficits.       -----------------------------------------------------------------------------------------------  PATIENT DISCHARGE INSTRUCTIONS: See electronic chart    Herminia Hirsch MD    Time spent:  > 30 minutes

## 2024-11-13 ENCOUNTER — NURSE ONLY (OUTPATIENT)
Dept: NEPHROLOGY | Facility: CLINIC | Age: 89
End: 2024-11-13
Payer: MEDICARE

## 2024-11-13 VITALS — SYSTOLIC BLOOD PRESSURE: 154 MMHG | BODY MASS INDEX: 17 KG/M2 | DIASTOLIC BLOOD PRESSURE: 66 MMHG | WEIGHT: 85.25 LBS

## 2024-11-13 DIAGNOSIS — N18.4 CKD (CHRONIC KIDNEY DISEASE) STAGE 4, GFR 15-29 ML/MIN (HCC): Primary | ICD-10-CM

## 2024-11-13 DIAGNOSIS — N18.4 ANEMIA DUE TO STAGE 4 CHRONIC KIDNEY DISEASE (HCC): ICD-10-CM

## 2024-11-13 DIAGNOSIS — D63.1 ANEMIA DUE TO STAGE 4 CHRONIC KIDNEY DISEASE (HCC): ICD-10-CM

## 2024-11-13 PROCEDURE — 96372 THER/PROPH/DIAG INJ SC/IM: CPT | Performed by: INTERNAL MEDICINE

## 2024-11-15 ENCOUNTER — MED REC SCAN ONLY (OUTPATIENT)
Dept: NEPHROLOGY | Facility: CLINIC | Age: 89
End: 2024-11-15

## 2024-11-29 ENCOUNTER — APPOINTMENT (OUTPATIENT)
Dept: GENERAL RADIOLOGY | Facility: HOSPITAL | Age: 89
End: 2024-11-29
Attending: EMERGENCY MEDICINE
Payer: MEDICARE

## 2024-11-29 ENCOUNTER — APPOINTMENT (OUTPATIENT)
Dept: ULTRASOUND IMAGING | Facility: HOSPITAL | Age: 89
End: 2024-11-29
Attending: EMERGENCY MEDICINE
Payer: MEDICARE

## 2024-11-29 ENCOUNTER — HOSPITAL ENCOUNTER (OUTPATIENT)
Facility: HOSPITAL | Age: 89
Setting detail: OBSERVATION
Discharge: NURSING, REHAB OR LONG-TERM CARE | End: 2024-12-02
Attending: EMERGENCY MEDICINE | Admitting: HOSPITALIST
Payer: MEDICARE

## 2024-11-29 DIAGNOSIS — R07.9 CHEST PAIN OF UNCERTAIN ETIOLOGY: ICD-10-CM

## 2024-11-29 DIAGNOSIS — N17.9 AKI (ACUTE KIDNEY INJURY) (HCC): ICD-10-CM

## 2024-11-29 DIAGNOSIS — M79.81 HEMATOMA, NONTRAUMATIC, SOFT TISSUE: ICD-10-CM

## 2024-11-29 DIAGNOSIS — K06.8 BLEEDING GUMS: ICD-10-CM

## 2024-11-29 DIAGNOSIS — E87.5 HYPERKALEMIA: Primary | ICD-10-CM

## 2024-11-29 PROBLEM — S40.021A CONTUSION OF RIGHT UPPER EXTREMITY, INITIAL ENCOUNTER: Status: ACTIVE | Noted: 2024-11-29

## 2024-11-29 LAB
ALBUMIN SERPL-MCNC: 2.9 G/DL (ref 3.2–4.8)
ALBUMIN/GLOB SERPL: 1.1 {RATIO} (ref 1–2)
ALP LIVER SERPL-CCNC: 92 U/L
ALT SERPL-CCNC: 16 U/L
ANION GAP SERPL CALC-SCNC: 7 MMOL/L (ref 0–18)
ANTIBODY SCREEN: NEGATIVE
APTT PPP: 28.5 SECONDS (ref 23–36)
AST SERPL-CCNC: 40 U/L (ref ?–34)
ATRIAL RATE: 75 BPM
BASOPHILS # BLD AUTO: 0.03 X10(3) UL (ref 0–0.2)
BASOPHILS NFR BLD AUTO: 0.3 %
BILIRUB SERPL-MCNC: <0.2 MG/DL (ref 0.2–0.9)
BUN BLD-MCNC: 45 MG/DL (ref 9–23)
CALCIUM BLD-MCNC: 8.6 MG/DL (ref 8.7–10.4)
CHLORIDE SERPL-SCNC: 106 MMOL/L (ref 98–112)
CO2 SERPL-SCNC: 23 MMOL/L (ref 21–32)
CREAT BLD-MCNC: 3.07 MG/DL
D DIMER PPP FEU-MCNC: 0.74 UG/ML FEU (ref ?–0.92)
EGFRCR SERPLBLD CKD-EPI 2021: 14 ML/MIN/1.73M2 (ref 60–?)
EOSINOPHIL # BLD AUTO: 0.07 X10(3) UL (ref 0–0.7)
EOSINOPHIL NFR BLD AUTO: 0.8 %
ERYTHROCYTE [DISTWIDTH] IN BLOOD BY AUTOMATED COUNT: 19.7 %
FLUAV + FLUBV RNA SPEC NAA+PROBE: NEGATIVE
FLUAV + FLUBV RNA SPEC NAA+PROBE: NEGATIVE
GLOBULIN PLAS-MCNC: 2.6 G/DL (ref 2–3.5)
GLUCOSE BLD-MCNC: 121 MG/DL (ref 70–99)
HCT VFR BLD AUTO: 30.4 %
HGB BLD-MCNC: 7 G/DL
HGB BLD-MCNC: 9.4 G/DL
IMM GRANULOCYTES # BLD AUTO: 0.03 X10(3) UL (ref 0–1)
IMM GRANULOCYTES NFR BLD: 0.3 %
INR BLD: 1.12 (ref 0.8–1.2)
LYMPHOCYTES # BLD AUTO: 1.23 X10(3) UL (ref 1–4)
LYMPHOCYTES NFR BLD AUTO: 13.2 %
MCH RBC QN AUTO: 29.3 PG (ref 26–34)
MCHC RBC AUTO-ENTMCNC: 30.9 G/DL (ref 31–37)
MCV RBC AUTO: 94.7 FL
MONOCYTES # BLD AUTO: 0.51 X10(3) UL (ref 0.1–1)
MONOCYTES NFR BLD AUTO: 5.5 %
NEUTROPHILS # BLD AUTO: 7.44 X10 (3) UL (ref 1.5–7.7)
NEUTROPHILS # BLD AUTO: 7.44 X10(3) UL (ref 1.5–7.7)
NEUTROPHILS NFR BLD AUTO: 79.9 %
NT-PROBNP SERPL-MCNC: 1354 PG/ML (ref ?–450)
OSMOLALITY SERPL CALC.SUM OF ELEC: 295 MOSM/KG (ref 275–295)
P AXIS: 49 DEGREES
P-R INTERVAL: 136 MS
PLATELET # BLD AUTO: 329 10(3)UL (ref 150–450)
POTASSIUM SERPL-SCNC: 5.4 MMOL/L (ref 3.5–5.1)
POTASSIUM SERPL-SCNC: 5.5 MMOL/L (ref 3.5–5.1)
POTASSIUM SERPL-SCNC: 5.8 MMOL/L (ref 3.5–5.1)
PROT SERPL-MCNC: 5.5 G/DL (ref 5.7–8.2)
PROTHROMBIN TIME: 14.6 SECONDS (ref 11.6–14.8)
Q-T INTERVAL: 428 MS
QRS DURATION: 70 MS
QTC CALCULATION (BEZET): 477 MS
R AXIS: 43 DEGREES
RBC # BLD AUTO: 3.21 X10(6)UL
RH BLOOD TYPE: POSITIVE
RSV RNA SPEC NAA+PROBE: NEGATIVE
SARS-COV-2 RNA RESP QL NAA+PROBE: NOT DETECTED
SODIUM SERPL-SCNC: 136 MMOL/L (ref 136–145)
T AXIS: 68 DEGREES
TROPONIN I SERPL HS-MCNC: 7 NG/L
VENTRICULAR RATE: 75 BPM
WBC # BLD AUTO: 9.3 X10(3) UL (ref 4–11)

## 2024-11-29 PROCEDURE — 99223 1ST HOSP IP/OBS HIGH 75: CPT | Performed by: INTERNAL MEDICINE

## 2024-11-29 PROCEDURE — 93971 EXTREMITY STUDY: CPT | Performed by: EMERGENCY MEDICINE

## 2024-11-29 PROCEDURE — 71045 X-RAY EXAM CHEST 1 VIEW: CPT | Performed by: EMERGENCY MEDICINE

## 2024-11-29 PROCEDURE — 30233N1 TRANSFUSION OF NONAUTOLOGOUS RED BLOOD CELLS INTO PERIPHERAL VEIN, PERCUTANEOUS APPROACH: ICD-10-PCS | Performed by: EMERGENCY MEDICINE

## 2024-11-29 RX ORDER — ACETAMINOPHEN 325 MG/1
325 TABLET ORAL EVERY 6 HOURS PRN
COMMUNITY

## 2024-11-29 RX ORDER — ACETAMINOPHEN 500 MG
500 TABLET ORAL EVERY 4 HOURS PRN
Status: DISCONTINUED | OUTPATIENT
Start: 2024-11-29 | End: 2024-12-02

## 2024-11-29 RX ORDER — BISACODYL 10 MG
10 SUPPOSITORY, RECTAL RECTAL
Status: DISCONTINUED | OUTPATIENT
Start: 2024-11-29 | End: 2024-12-02

## 2024-11-29 RX ORDER — TORSEMIDE 5 MG/1
10 TABLET ORAL DAILY
Status: DISCONTINUED | OUTPATIENT
Start: 2024-11-29 | End: 2024-11-29

## 2024-11-29 RX ORDER — AMIODARONE HYDROCHLORIDE 200 MG/1
200 TABLET ORAL EVERY MORNING
Status: DISCONTINUED | OUTPATIENT
Start: 2024-11-29 | End: 2024-12-02

## 2024-11-29 RX ORDER — SODIUM CHLORIDE 9 MG/ML
INJECTION, SOLUTION INTRAVENOUS ONCE
Status: COMPLETED | OUTPATIENT
Start: 2024-11-29 | End: 2024-11-30

## 2024-11-29 RX ORDER — SENNOSIDES 8.6 MG
17.2 TABLET ORAL NIGHTLY PRN
Status: DISCONTINUED | OUTPATIENT
Start: 2024-11-29 | End: 2024-12-02

## 2024-11-29 RX ORDER — DILTIAZEM HYDROCHLORIDE 240 MG/1
240 CAPSULE, COATED, EXTENDED RELEASE ORAL DAILY
Status: DISCONTINUED | OUTPATIENT
Start: 2024-11-29 | End: 2024-12-02

## 2024-11-29 RX ORDER — DILTIAZEM HYDROCHLORIDE 120 MG/1
240 CAPSULE, EXTENDED RELEASE ORAL DAILY
Status: DISCONTINUED | OUTPATIENT
Start: 2024-11-29 | End: 2024-11-29

## 2024-11-29 RX ORDER — METOCLOPRAMIDE HYDROCHLORIDE 5 MG/ML
10 INJECTION INTRAMUSCULAR; INTRAVENOUS EVERY 8 HOURS PRN
Status: DISCONTINUED | OUTPATIENT
Start: 2024-11-29 | End: 2024-11-29

## 2024-11-29 RX ORDER — TRAMADOL HYDROCHLORIDE 50 MG/1
25 TABLET ORAL 2 TIMES DAILY
Status: DISCONTINUED | OUTPATIENT
Start: 2024-11-29 | End: 2024-12-02

## 2024-11-29 RX ORDER — VANCOMYCIN HYDROCHLORIDE 125 MG/1
125 CAPSULE ORAL DAILY
Status: DISCONTINUED | OUTPATIENT
Start: 2024-11-29 | End: 2024-12-02

## 2024-11-29 RX ORDER — AMIODARONE HYDROCHLORIDE 200 MG/1
200 TABLET ORAL EVERY MORNING
COMMUNITY

## 2024-11-29 RX ORDER — SODIUM BICARBONATE 325 MG/1
650 TABLET ORAL 2 TIMES DAILY
Status: DISCONTINUED | OUTPATIENT
Start: 2024-11-29 | End: 2024-12-02

## 2024-11-29 RX ORDER — FUROSEMIDE 10 MG/ML
20 INJECTION INTRAMUSCULAR; INTRAVENOUS ONCE
Status: COMPLETED | OUTPATIENT
Start: 2024-11-29 | End: 2024-11-29

## 2024-11-29 RX ORDER — METOCLOPRAMIDE HYDROCHLORIDE 5 MG/ML
10 INJECTION INTRAMUSCULAR; INTRAVENOUS DAILY PRN
Status: DISCONTINUED | OUTPATIENT
Start: 2024-11-29 | End: 2024-11-30

## 2024-11-29 RX ORDER — PANTOPRAZOLE SODIUM 40 MG/1
40 TABLET, DELAYED RELEASE ORAL
Status: DISCONTINUED | OUTPATIENT
Start: 2024-11-30 | End: 2024-12-02

## 2024-11-29 RX ORDER — POLYETHYLENE GLYCOL 3350 17 G/17G
17 POWDER, FOR SOLUTION ORAL DAILY PRN
Status: DISCONTINUED | OUTPATIENT
Start: 2024-11-29 | End: 2024-12-02

## 2024-11-29 RX ORDER — TORSEMIDE 5 MG/1
10 TABLET ORAL DAILY
Status: DISCONTINUED | OUTPATIENT
Start: 2024-11-29 | End: 2024-12-02

## 2024-11-29 RX ORDER — FERROUS SULFATE 325(65) MG
325 TABLET, DELAYED RELEASE (ENTERIC COATED) ORAL 2 TIMES DAILY
Status: DISCONTINUED | OUTPATIENT
Start: 2024-11-29 | End: 2024-12-02

## 2024-11-29 RX ORDER — LACTOBACILLUS ACIDOPHILUS 500MM CELL
1 CAPSULE ORAL DAILY
Status: DISCONTINUED | OUTPATIENT
Start: 2024-11-29 | End: 2024-12-02

## 2024-11-29 RX ORDER — ONDANSETRON 2 MG/ML
4 INJECTION INTRAMUSCULAR; INTRAVENOUS EVERY 6 HOURS PRN
Status: DISCONTINUED | OUTPATIENT
Start: 2024-11-29 | End: 2024-12-02

## 2024-11-29 RX ORDER — FERROUS SULFATE 325(65) MG
325 TABLET, DELAYED RELEASE (ENTERIC COATED) ORAL 2 TIMES DAILY
COMMUNITY

## 2024-11-29 NOTE — H&P
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  She had awoken with feeling of blood in her mouth.  Noted that her tooth appeared loose and was actively bleeding.  Also noticed that she had difficulty raising her right arm and that there was significant swelling.  She further noted that she had some sensation of chest discomfort and shortness of breath which has since resolved.  Patient's son is at bedside and they had spent the evening with her and did not notice any of these issues at that time.    Patient believes that she may have had a lab draw from her right arm in the preceding few days at her assisted living facility.  However the arm swelling and bruising was new from overnight.  She denies any trauma to her arm or to her mouth.    Was noted to be hyperkalemic in emergency department.  Was given Lokelma and lasix    PMH  Past Medical History:    Arthritis    Atrial fibrillation (HCC)    Back pain    C. difficile colitis    CKD (chronic kidney disease)    and S/P nephrectomy    Colon cancer (HCC)    Congestive heart disease (HCC)    Easy bruising    Endometrial cancer (HCC)    UTERINE, DX ABOUT 30 YEARS    Hearing impairment    AIDS    Heart palpitations    Afib    High blood pressure    Hip fracture (HCC)    left    Kidney failure    Leg swelling    Teresa syndrome    hereditary colorectal cancer    Muscle weakness    WALKER    Pain in joints    Wears glasses    Weight loss        PSH  Past Surgical History:   Procedure Laterality Date    Colectomy      Nephrectomy Left     Orif hip fracture Left 06/10/2024    Part removal colon w end colostomy          ALL:  Allergies[1]     Home  Medications:  Medications Taking[2]      Soc Hx  Social History     Tobacco Use    Smoking status: Never    Smokeless tobacco: Never   Substance Use Topics    Alcohol use: Not Currently        Fam Hx  Family History   Problem Relation Age of Onset    Heart Disorder Father     Heart Disorder Mother     Colon Cancer Mother         70    Heart Disorder Sister     Breast Cancer Sister     Diabetes Brother     Breast Cancer Sister     Cancer Sister     Breast Cancer Sister     Breast Cancer Daughter        Review of Systems  Comprehensive ROS reviewed and negative except for what's stated above.       OBJECTIVE:  /77   Pulse 74   Temp 98.2 °F (36.8 °C) (Temporal)   Resp 16   Wt 83 lb 12.4 oz (38 kg)   SpO2 100%   BMI 16.36 kg/m²     Gen- NAD, appears stated age  HEENT- NCAT, anicteric sclera, bleeding gums and dentition along the left lower portion.  Mild continuous oozing in particular from an area of loose dentition by gold lower incisor implant  Lymph- no cervical LAD  CV- RRR no murmurs. No HARSHA  Lungs- CTAB, good respiratory effort  Abd- soft, ntnd, ostomy in place  Derm/msk-sizable area of ecchymosis and circumferential swelling in the right proximal arm around antecub and into forearm.  2+ radial pulse on right and fingers are warm and well-perfused  Neuro- A&OX3, no focal deficits      Diagnostic Data:    CBC/Chem  Recent Labs   Lab 11/29/24  0508   WBC 9.3   HGB 9.4*   MCV 94.7   .0   INR 1.12       Recent Labs   Lab 11/29/24  0508 11/29/24  0638     --    K 5.8* 5.5*     --    CO2 23.0  --    BUN 45*  --    CREATSERUM 3.07*  --    *  --    CA 8.6*  --        Recent Labs   Lab 11/29/24  0508   ALT 16   AST 40*   ALB 2.9*       No results for input(s): \"TROP\" in the last 168 hours.    Additional Diagnostics: personally reviewed EKG-  Normal sinus rhythm, no ST or T abnormalities    CXR: image personally reviewed- clear    Radiology: XR CHEST AP PORTABLE  (CPT=71045)    Result  Date: 11/29/2024  PROCEDURE:  XR CHEST AP PORTABLE  (CPT=71045)  TECHNIQUE:  AP chest radiograph was obtained.  COMPARISON:  [facility] , XR, XR CHEST AP PORTABLE  (CPT=71045), 6/10/2024, 9:13 AM.  INDICATIONS:  right arm pain  PATIENT STATED HISTORY: (As transcribed by Technologist)  Patient shares she has right arm pain    FINDINGS:  Cardiomegaly with normal pulmonary vascularity. No pleural effusion or pneumothorax. No lobar consolidation.  Stable COPD changes.  Surgical clips the upper abdomen.            CONCLUSION:  No lobar pneumonia or overt congestive failure.  Stable COPD changes.   LOCATION:  [facility]      Dictated by (CST): Brandon Tobin MD on 11/29/2024 at 7:15 AM     Finalized by (CST): Brandon Tobin MD on 11/29/2024 at 7:15 AM       US VENOUS DOPPLER ARM RIGHT - DIAG IMG (CPT=93971)    Result Date: 11/29/2024  PROCEDURE:  US VENOUS DOPPLER ARM RIGHT - DIAG IMG (CPT=93971)  COMPARISON:  None.  INDICATIONS:  Right upper extremity pain  TECHNIQUE:  Real time, grey scale, and duplex ultrasound was used to evaluate the upper extremity venous system. B-mode two-dimensional images of the vascular structures, Doppler spectral analysis, and color flow.  Doppler imaging were performed.  The following veins were imaged:  Subclavian, Jugular, Axillary, Brachial, Basilic, Cephalic, and the contralateral Subclavian and Jugular.  FINDINGS:  Nonvisualization of the right cephalic vein which may be due to chronic occlusion or developmental absence. EXTREMITY:  Right upper extremity THROMBI:  None visible. COMPRESSION:  Normal compressibility, phasicity, and augmentation with exception of above OTHER:  In the area of swelling, there is a mixed attenuation collection without internal vascularity that could represent a soft tissue hematoma measuring at least 8 cm, with other etiologies not excluded.  This could be further assessed with CT and/or MRI of the right arm as clinically directed.            CONCLUSION:   1. No  evidence of deep venous thrombosis in the right upper extremity.  2. In the area of swelling, there is a mixed attenuation collection without internal vascularity that could represent a soft tissue hematoma measuring at least 8 cm, with other etiologies not excluded.  This could be further assessed with CT and/or MRI of the right arm as clinically directed.  LOCATION:  [facility]   Dictated by (CST): Brandon Tobin MD on 11/29/2024 at 6:41 AM     Finalized by (CST): Brandon Tobin MD on 11/29/2024 at 6:43 AM       CT ABDOMEN+PELVIS(CPT=74176)    Result Date: 10/31/2024  PROCEDURE:  CT ABDOMEN+PELVIS (CPT=74176)  COMPARISON:  [facility], CT, CT ABDOMEN+PELVIS(CPT=74176), 8/20/2024, 4:08 PM.  INDICATIONS:  abdn labs  TECHNIQUE:  Unenhanced multislice CT scanning was performed from the dome of the diaphragm to the pubic symphysis.  Dose reduction techniques were used. Dose information is transmitted to the ACR (American College of Radiology) NRDR (National Radiology Data Registry) which includes the Dose Index Registry.  PATIENT STATED HISTORY: (As transcribed by Technologist)  Patient presents with lower back pain.    FINDINGS:  LIVER:  No enlargement, atrophy, abnormal density, or significant focal lesion.  BILIARY:  Sequelae of cholecystectomy. PANCREAS:  No lesion, fluid collection, ductal dilatation, or atrophy.  SPLEEN:  No enlargement or focal lesion.  KIDNEYS:  Sequelae of left nephrectomy.  Mild prominence of the right renal collecting system is noted. ADRENALS:  No mass or enlargement.  AORTA/VASCULAR:    Unremarkable as seen on non-contrast imaging. RETROPERITONEUM:  No mass or adenopathy.  BOWEL/MESENTERY:  No visible mass, obstruction, or bowel wall thickening.  ABDOMINAL WALL:  Left lower quadrant ostomy is noted.  Periosteal fat containing hernia. URINARY BLADDER:  No visible focal wall thickening, lesion, or calculus.  PELVIC NODES:  No adenopathy.  PELVIC ORGANS:  No visible mass.  Pelvic organs appropriate  for patient age.  BONES:  Left hip arthroplasty.  Grade 1 anterior listhesis of L3 over L4 and L5 over S1.  Facet hypertrophic changes is noted.  Dextroscoliosis of the lumbar spine.  Hardware in the left femoral head and neck LUNG BASES:  No visible pulmonary or pleural disease.  OTHER:  Negative.             CONCLUSION:   1. Sequelae of left nephrectomy.  Mild fullness of right renal collecting system is noted.  No obstructing lesions identified.  2. Left lower quadrant ostomy is noted.  Fat containing hernia in the left lower quadrant ostomy is noted.   LOCATION:  [place] [id number]   Dictated by (CST): Chris Vela MD on 10/31/2024 at 5:35 PM     Finalized by (CST): Chris Vela MD on 10/31/2024 at 5:39 PM          Available outpatient records reviewed--    ASSESSMENT / PLAN:     89-year-old woman with history of Bishop syndrome and colostomy for colon cancer, endometrial cancer, A-fib on Eliquis who presented with oral bleeding as well as right arm  swelling and bruising.    Oral bleeding  - Appears to have loose dentition that is new especially along where she has a dental prosthesis.  The greatest extent of bleeding and oozing appears to be from this area.  - Will ask oral surgery to evaluate  - Bleeding at this time is presently a trickle, not sure that it warrants Eliquis reversal but will consider if bleeding becomes more substantial.  For now hold Eliquis    Right upper arm swelling and bruising  - Suspect that it is secondary to recent phlebotomy but unsure why it just became apparent overnight. No dvt seen  - Similar to above, do not see need for urgent Eliquis reversal but will hold Eliquis for now  - Continue serial exams, elevate arm and can try compressive sleeve and/or hot or cold.  There could be a component of superficial thrombophlebitis and would favor giving antibiotics for now    Chest pain/sob  - unclear etiology, since resolved  - ddimer, cxr neg  - trop, EKG okay    Hyperkalemia  ZOHREH on  CKD  -Nephrology following, had received IV Lasix.  Will follow volume status.  Discussed with Dr. Hernandez  - Will follow potassium, had received Lokelma    Bishop syndrome  History of colostomy for colon cancer  History of C. difficile  - Prophylactic Vanco while on antibiotics    A-fib on Eliquis  - Hold Eliquis    SCDs for now. Hold eliquis as above      Annette Walker MD  [facility] Hospitalist  Pager [phone]  Answering Service number: [phone]    Addendum: Discussed case with on-call heme-onc, Dr. Bassett.  Likely little benefit in reversal product as coags were normal and Eliquis added low-dose should be wearing off at this point.  Lingering aspirin effect could be an issue and although platelets are normal could consider additional platelet transfusion to counteract that effect.  For now planning to give a unit of blood as afternoon hemoglobin has declined to 7 and will reassess from there.       [1]   Allergies  Allergen Reactions    Ciprofloxacin RASH and HIVES    Penicillins RASH   [2]   Current Facility-Administered Medications for the 11/29/24 encounter (Hospital Encounter)   Medication Dose Route Frequency Provider Last Rate Last Admin    [COMPLETED] darbepoetin chris (Aranesp) 300 MCG/0.6ML injection 300 mcg  300 mcg Subcutaneous Once Fabiana Miller MD   300 mcg at 11/13/24 1102     No outpatient medications have been marked as taking for the 11/29/24 encounter (Hospital Encounter).

## 2024-11-29 NOTE — ED QUICK NOTES
Pt continues to sip on med via cup/.straw.    Awaiting bed assignment. CT cancelled by MD due to kidney function.

## 2024-11-29 NOTE — ED QUICK NOTES
Orders for admission, patient is aware of plan and ready to go upstairs. Any questions, please call ED RN Cynthia at extension [phone].     Patient Covid vaccination status: Fully vaccinated     COVID Test Ordered in ED: SARS-CoV-2/Flu A and B/RSV by PCR (GeneXpert)    COVID Suspicion at Admission: N/A    Running Infusions:  None    Mental Status/LOC at time of transport: oriented but forgetful.     Other pertinent information: pt with swelling to right arm, bleeding from gums. Pt currently continues to drink Lokelma.--COMPLETED IN ER. Pt is still in need of a urine sample to be sent. Pt also with colostomy bag.  CIWA score: N/A   NIH score:  N/A

## 2024-11-29 NOTE — ED QUICK NOTES
Report received from ALCON Lowery.     Received pt awake and alert, skin pale, w/d,resps appear unlabored. Pt noted to have dried blood to hands and around mouth. Per Beverley, pt had some bleeding from gums on left side, mouth cleaned by Beverley of some clots.Pt forgetful of bleeding.     Pt also c/o pain to right arm. Pt noted to have significant swelling and redness, in particular near the AC area of right arm. Pt with discomfort to area with moving of arm. Pt also forgetful of arm pain and asking why arm is sore. Pt reminded that we are trying to figure that out.    Pt drinking Lokelma at this time via cup with straw and tolerating well.

## 2024-11-29 NOTE — CONSULTS
Asked to eval for oral bleeding  Pt admitted for hyperkalemia    Platelets normal  Hb-stable  Coags normal    Pt takes eliquis now on hold    Exam-pt with loose bridge on LL  Clot around bridge, removed, minimal ooze from gingiva underneath bridge.    Panorex-pending    A/P-mild gingival bleeding     1.  Hold eliquis     2.  TXA rinse swish and spit--try to hold in mouth on L side     3.  Soft diet     4.  FU with DDS as outpatient for bridge removal, bleeding is likely from gingival irritation from loose bridge.     5.  Please call with questions.

## 2024-11-29 NOTE — ED INITIAL ASSESSMENT (HPI)
Pt arrives with c/o right arm pain. Right arm note to be red and swollen. Pt also has gum bleeding.

## 2024-11-29 NOTE — PROGRESS NOTES
Pt presented with large bruise to ED w/ unknown sorce. US of RUE suggested soft tissue hematoma. RUE elevated with ice applied. Hematoma continuing to expand- MD notified

## 2024-11-29 NOTE — PLAN OF CARE
Assumed care of pt around 0900 from ED  AxO x2-3, R/A, pt is primarily wheelchair bound at home  NSR on tele, no cardiac symptoms  Tooth w/ crown has had continued bleeding and is very loose- oral/maxillary surgery consulted  Pt endorses pain to RUE, elevating extremity and applying ice  Skin check completed w/ pct- redness on sacrum and mepilex applied, RUE is swollen with large bruise near antecubital  Plan: IV abx, holding eliquis, mpnitor potassium  Colostomy in place, pt incontinent of bladder- external catheter in place  Fall precautions in place, pt/son updated on plan of care        Problem: PAIN - ADULT  Goal: Verbalizes/displays adequate comfort level or patient's stated pain goal  Description: INTERVENTIONS:  - Encourage pt to monitor pain and request assistance  - Assess pain using appropriate pain scale  - Administer analgesics based on type and severity of pain and evaluate response  - Implement non-pharmacological measures as appropriate and evaluate response  - Consider cultural and social influences on pain and pain management  - Manage/alleviate anxiety  - Utilize distraction and/or relaxation techniques  - Monitor for opioid side effects  - Notify MD/LIP if interventions unsuccessful or patient reports new pain  - Anticipate increased pain with activity and pre-medicate as appropriate  Outcome: Progressing     Problem: RISK FOR INFECTION - ADULT  Goal: Absence of fever/infection during anticipated neutropenic period  Description: INTERVENTIONS  - Monitor WBC  - Administer growth factors as ordered  - Implement neutropenic guidelines  Outcome: Progressing     Problem: SAFETY ADULT - FALL  Goal: Free from fall injury  Description: INTERVENTIONS:  - Assess pt frequently for physical needs  - Identify cognitive and physical deficits and behaviors that affect risk of falls.  - [place]ias fall precautions as indicated by assessment.  - Educate pt/family on patient safety including physical limitations  -  Instruct pt to call for assistance with activity based on assessment  - Modify environment to reduce risk of injury  - Provide assistive devices as appropriate  - Consider OT/PT consult to assist with strengthening/mobility  - Encourage toileting schedule  Outcome: Progressing

## 2024-11-29 NOTE — ED PROVIDER NOTES
Patient Seen in: [facility] 2ne-a      History     Chief Complaint   Patient presents with    Chest Pain Angina     Stated Complaint: right arm pain    Subjective:   HPI      89-year-old female history of endometrial cancer, CKD, chronic A-fib on Eliquis, CHF on torsemide, hypertension, hyperlipidemia presents to ED with multiple complaints including right arm pain and swelling in the antecubital region, bleeding gums, chest pain this morning now resolved.  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  She states that she has been compliant with the Eliquis and has not missed any doses.  Denies fevers, chills, drainage from right arm.  She denies trauma/fall.  She states that she had the chest pain sometime in the middle the night, cannot say exactly what time but states that is now resolved.           Objective:     Past Medical History:    Arthritis    Atrial fibrillation (HCC)    Back pain    C. difficile colitis    CKD (chronic kidney disease)    and S/P nephrectomy    Colon cancer (HCC)    Congestive heart disease (HCC)    Easy bruising    Endometrial cancer (HCC)    UTERINE, DX ABOUT 30 YEARS    Hearing impairment    AIDS    Heart palpitations    Afib    High blood pressure    Hip fracture (HCC)    left    Kidney failure    Leg swelling    Bishop syndrome    hereditary colorectal cancer    Muscle weakness    WALKER    Pain in joints    Wears glasses    Weight loss              Past Surgical History:   Procedure Laterality Date    Colectomy      Nephrectomy Left     Orif hip fracture Left 06/10/2024    Part removal colon w end colostomy                  Social History     Socioeconomic History    Marital status:    Tobacco Use    Smoking status: Never    Smokeless tobacco: Never   Vaping Use    Vaping status: Never Used   Substance and Sexual Activity    Alcohol  use: Not Currently    Drug use: Never     Social Drivers of Health     Food Insecurity: No Food Insecurity (11/29/2024)    Food Insecurity     Food Insecurity: Never true   Transportation Needs: No Transportation Needs (11/29/2024)    Transportation Needs     Lack of Transportation: No   Housing Stability: Low Risk  (11/29/2024)    Housing Stability     Housing Instability: No                  Physical Exam     ED Triage Vitals   BP 11/29/24 0615 (!) 172/78   Pulse 11/29/24 0500 76   Resp 11/29/24 0500 18   Temp 11/29/24 0513 98.2 °F (36.8 °C)   Temp src 11/29/24 0513 Temporal   SpO2 11/29/24 0500 100 %   O2 Device 11/29/24 0500 None (Room air)       Current Vitals:   Vital Signs  BP: 140/62  Pulse: 74  Resp: 18  Temp: 97.3 °F (36.3 °C)  Temp src: Oral  MAP (mmHg): 86    Oxygen Therapy  SpO2: 100 %  O2 Device: None (Room air)  Pulse Oximetry Type: Continuous  Oximetry Probe Site Changed: No  Pulse Ox Probe Location: Left hand        Physical Exam     Vital signs reviewed.  Nursing note reviewed.  Constitutional: Alert, no significant distress  Head: Normocephalic, atraumatic  Mouth: Bottom teeth covered in dried blood, no active bleeding  Eyes: Extraocular muscles intact, pupils equal  Cardiovascular: Regular rate and rhythm  Pulmonary: Effort normal, breath sounds normal  Abdomen: Soft, nontender nondistended  Skin: Right arm antecubital region as photo above with 8 cm x 4 cm region of erythema, surrounding induration   Musculoskeletal restricted right elbow range of motion due to pain on antecubital region, no tenderness on olecranon process nor ulna/radius range of motion grossly normal all other extremities  Neuro: Alert, at baseline, no focal neuro deficit.  Moves all extremities against gravity  Psych: Mood normal      ED Course     Labs Reviewed   COMP METABOLIC PANEL (14) - Abnormal; Notable for the following components:       Result Value    Glucose 121 (*)     Potassium 5.8 (*)     BUN 45 (*)      Creatinine 3.07 (*)     Calcium, Total 8.6 (*)     eGFR-Cr 14 (*)     AST 40 (*)     Bilirubin, Total <0.2 (*)     Total Protein 5.5 (*)     Albumin 2.9 (*)     All other components within normal limits   CBC WITH DIFFERENTIAL WITH PLATELET - Abnormal; Notable for the following components:    RBC 3.21 (*)     HGB 9.4 (*)     HCT 30.4 (*)     MCHC 30.9 (*)     All other components within normal limits   PRO BETA NATRIURETIC PEPTIDE - Abnormal; Notable for the following components:    Pro-Beta Natriuretic Peptide 1,354 (*)     All other components within normal limits   POTASSIUM - Abnormal; Notable for the following components:    Potassium 5.5 (*)     All other components within normal limits   TROPONIN I HIGH SENSITIVITY - Normal   D-DIMER - Normal   PROTHROMBIN TIME (PT) - Normal   PTT, ACTIVATED - Normal   SARS-COV-2/FLU A AND B/RSV BY PCR (GENEXPERT) - Normal    Narrative:     This test is intended for the qualitative detection and differentiation of SARS-CoV-2, influenza A, influenza B, and respiratory syncytial virus (RSV) viral RNA in nasopharyngeal or nares swabs from individuals suspected of respiratory viral infection consistent with COVID-19 by their healthcare provider. Signs and symptoms of respiratory viral infection due to SARS-CoV-2, influenza, and RSV can be similar.    Test performed using the Xpert Xpress SARS-CoV-2/FLU/RSV (real time RT-PCR)  assay on the GeneXpert instrument, [employer], [place] [address].   This test is being used under the Food and Drug Administration's Emergency Use Authorization.    The authorized Fact Sheet for Healthcare Providers for this assay is available upon request from the laboratory.   URINALYSIS WITH CULTURE REFLEX   POTASSIUM   RAINBOW DRAW GOLD   RAINBOW DRAW BLUE     EKG    Rate, intervals and axes as noted on EKG Report.  Rate: 75  Rhythm: Sinus Rhythm  Reading: No new ST-T wave abnormality                XR CHEST AP PORTABLE  (CPT=71045)    Result Date:  11/29/2024  PROCEDURE:  XR CHEST AP PORTABLE  (CPT=71045)  TECHNIQUE:  AP chest radiograph was obtained.  COMPARISON:  [facility] , XR, XR CHEST AP PORTABLE  (CPT=71045), 6/10/2024, 9:13 AM.  INDICATIONS:  right arm pain  PATIENT STATED HISTORY: (As transcribed by Technologist)  Patient shares she has right arm pain    FINDINGS:  Cardiomegaly with normal pulmonary vascularity. No pleural effusion or pneumothorax. No lobar consolidation.  Stable COPD changes.  Surgical clips the upper abdomen.            CONCLUSION:  No lobar pneumonia or overt congestive failure.  Stable COPD changes.   LOCATION:  [facility]      Dictated by (CST): Brandon Tobin MD on 11/29/2024 at 7:15 AM     Finalized by (CST): Brandon Tobin MD on 11/29/2024 at 7:15 AM       US VENOUS DOPPLER ARM RIGHT - DIAG IMG (CPT=93971)    Result Date: 11/29/2024  PROCEDURE:  US VENOUS DOPPLER ARM RIGHT - DIAG IMG (CPT=93971)  COMPARISON:  None.  INDICATIONS:  Right upper extremity pain  TECHNIQUE:  Real time, grey scale, and duplex ultrasound was used to evaluate the upper extremity venous system. B-mode two-dimensional images of the vascular structures, Doppler spectral analysis, and color flow.  Doppler imaging were performed.  The following veins were imaged:  Subclavian, Jugular, Axillary, Brachial, Basilic, Cephalic, and the contralateral Subclavian and Jugular.  FINDINGS:  Nonvisualization of the right cephalic vein which may be due to chronic occlusion or developmental absence. EXTREMITY:  Right upper extremity THROMBI:  None visible. COMPRESSION:  Normal compressibility, phasicity, and augmentation with exception of above OTHER:  In the area of swelling, there is a mixed attenuation collection without internal vascularity that could represent a soft tissue hematoma measuring at least 8 cm, with other etiologies not excluded.  This could be further assessed with CT and/or MRI of the right arm as clinically directed.            CONCLUSION:   1. No evidence  of deep venous thrombosis in the right upper extremity.  2. In the area of swelling, there is a mixed attenuation collection without internal vascularity that could represent a soft tissue hematoma measuring at least 8 cm, with other etiologies not excluded.  This could be further assessed with CT and/or MRI of the right arm as clinically directed.  LOCATION:  [facility]   Dictated by (CST): Brandon Tobin MD on 11/29/2024 at 6:41 AM     Finalized by (CST): Brandon Tobin MD on 11/29/2024 at 6:43 AM       CT ABDOMEN+PELVIS(CPT=74176)    Result Date: 10/31/2024  PROCEDURE:  CT ABDOMEN+PELVIS (CPT=74176)  COMPARISON:  [facility], CT, CT ABDOMEN+PELVIS(CPT=74176), 8/20/2024, 4:08 PM.  INDICATIONS:  abdn labs  TECHNIQUE:  Unenhanced multislice CT scanning was performed from the dome of the diaphragm to the pubic symphysis.  Dose reduction techniques were used. Dose information is transmitted to the ACR (American College of Radiology) NRDR (National Radiology Data Registry) which includes the Dose Index Registry.  PATIENT STATED HISTORY: (As transcribed by Technologist)  Patient presents with lower back pain.    FINDINGS:  LIVER:  No enlargement, atrophy, abnormal density, or significant focal lesion.  BILIARY:  Sequelae of cholecystectomy. PANCREAS:  No lesion, fluid collection, ductal dilatation, or atrophy.  SPLEEN:  No enlargement or focal lesion.  KIDNEYS:  Sequelae of left nephrectomy.  Mild prominence of the right renal collecting system is noted. ADRENALS:  No mass or enlargement.  AORTA/VASCULAR:    Unremarkable as seen on non-contrast imaging. RETROPERITONEUM:  No mass or adenopathy.  BOWEL/MESENTERY:  No visible mass, obstruction, or bowel wall thickening.  ABDOMINAL WALL:  Left lower quadrant ostomy is noted.  Periosteal fat containing hernia. URINARY BLADDER:  No visible focal wall thickening, lesion, or calculus.  PELVIC NODES:  No adenopathy.  PELVIC ORGANS:  No visible mass.  Pelvic organs appropriate for  patient age.  BONES:  Left hip arthroplasty.  Grade 1 anterior listhesis of L3 over L4 and L5 over S1.  Facet hypertrophic changes is noted.  Dextroscoliosis of the lumbar spine.  Hardware in the left femoral head and neck LUNG BASES:  No visible pulmonary or pleural disease.  OTHER:  Negative.             CONCLUSION:   1. Sequelae of left nephrectomy.  Mild fullness of right renal collecting system is noted.  No obstructing lesions identified.  2. Left lower quadrant ostomy is noted.  Fat containing hernia in the left lower quadrant ostomy is noted.   LOCATION:  [place] [address]   Dictated by (CST): Chris Vela MD on 10/31/2024 at 5:35 PM     Finalized by (CST): Chris Vela MD on 10/31/2024 at 5:39 PM             [facility]      Medical Decision Making:    Differential diagnosis before testing includes bleeding gums due to coagulopathic state/eliquis use, right arm pain due to DVT?  Superficial thrombophlebitis; chest pain related to ACS?  To potential life threatening diagnosis which is a medical condition that poses a threat to life/function.     Comorbidities that add complexity to management: See HPI    I reviewed prior external ED notes including reviewed recent HPI/discharge summary as noted in HPI    Discussions of management with: Discussed with hospitalist, Dr. Walker    Social determinants of health care: Patient from [place], lives with her son on [facility] yesterday till 9 PM, he is at the bedside and gives history as well that she felt fine last night when she left his house    I personally reviewed the radiographs and my independent interpretation includes no lung consolidations, no pneumothorax.     Shared decision making:    Chest pain resolved  Troponin negative, D-dimer negative, EKG nonischemic  We will check serial cardiac enzymes    2.  Bleeding gums  Patient on Eliquis, no trauma  Coags normal, platelets normal    3.  Right arm antecubital edema/pain  Ultrasound negative for DVT, + soft  tissue hematoma differential diagnosis includes traumatic hematoma versus bleeding versus infection  Afebrile, no leukocytosis  Unable to get CTA upper extremity due to creatinine 3.08, outlined area with pen will monitor for enlargement, check serial H&H to rule out bleeding    4. ZOHREH  Creat 3.08    5.  Hyperkalemia initial 5.8, recheck 5.5  No EKG changes, given Lasix 20 mg IV, Lokelma    Admission disposition: 11/29/2024  7:27 AM           Medical Decision Making      Disposition and Plan     Clinical Impression:  1. Hyperkalemia    2. Bleeding gums    3. ZOHREH (acute kidney injury) (HCC)    4. Chest pain of uncertain etiology    5. Hematoma, nontraumatic, soft tissue         Disposition:  Admit  11/29/2024  7:27 am    Follow-up:  No follow-up provider specified.        Medications Prescribed:  Current Discharge Medication List              Supplementary Documentation:         Hospital Problems       Present on Admission  Date Reviewed: 9/4/2024            ICD-10-CM Noted POA    * (Principal) Hyperkalemia E87.5 6/10/2024 Unknown    ZOHREH (acute kidney injury) (HCC) N17.9 1/29/2024 Unknown    Bleeding gums K06.8 11/29/2024 Unknown    Chest pain of uncertain etiology R07.9 11/29/2024 Unknown    Contusion of right upper extremity, initial encounter S40.021A 11/29/2024 Unknown

## 2024-11-29 NOTE — CONSULTS
[facility]  Report of Consultation    Sunita Loza Patient Status:  Observation    1932 MRN [id number]   Location [facility] 2NE-A Attending Annette Walker MD   Hosp Day # 0 PCP Janell Powell MD     Reason for Consultation:  ZOHREH/CKD IV/hyperkalemia    History of Present Illness:  Sunita Loza is a a(n) 89 year old woman known to our service with mult med probs incl CKD IV in setting of prior nephrectomy with most recent creat 2.65 mg/dl approx one week ago brought to ED for eval of R arm pain/redness.  Noted to have creat 3 mg/dl with K 5.5 meq/l.  She reports that she was in her usual state of health yest but last night had somewhat sudden onset of R arm pain/swelling/redness.      History:  Past Medical History:    Arthritis    Atrial fibrillation (HCC)    Back pain    C. difficile colitis    CKD (chronic kidney disease)    and S/P nephrectomy    Colon cancer (HCC)    Congestive heart disease (HCC)    Easy bruising    Endometrial cancer (HCC)    UTERINE, DX ABOUT 30 YEARS    Hearing impairment    AIDS    Heart palpitations    Afib    High blood pressure    Hip fracture (HCC)    left    Kidney failure    Leg swelling    Bishop syndrome    hereditary colorectal cancer    Muscle weakness    WALKER    Pain in joints    Wears glasses    Weight loss     Past Surgical History:   Procedure Laterality Date    Colectomy      Nephrectomy Left     Orif hip fracture Left 06/10/2024    Part removal colon w end colostomy       Family History   Problem Relation Age of Onset    Heart Disorder Father     Heart Disorder Mother     Colon Cancer Mother         70    Heart Disorder Sister     Breast Cancer Sister     Diabetes Brother     Breast Cancer Sister     Cancer Sister     Breast Cancer Sister     Breast Cancer Daughter       reports that she has never smoked. She has never used smokeless tobacco. She reports that she does not currently use alcohol. She reports that she does not use  drugs.    Allergies:  Allergies[1]    Medications:    Current Facility-Administered Medications:     acetaminophen (Tylenol Extra Strength) tab 500 mg, 500 mg, Oral, Q4H PRN    polyethylene glycol (PEG 3350) (Miralax) 17 g oral packet 17 g, 17 g, Oral, Daily PRN    sennosides (Senokot) tab 17.2 mg, 17.2 mg, Oral, Nightly PRN    bisacodyl (Dulcolax) 10 MG rectal suppository 10 mg, 10 mg, Rectal, Daily PRN    ondansetron (Zofran) 4 MG/2ML injection 4 mg, 4 mg, Intravenous, Q6H PRN    metoclopramide (Reglan) 5 mg/mL injection 10 mg, 10 mg, Intravenous, Q8H PRN  No current outpatient medications on file.       Review of Systems:  Denies fever/chills  Denies wt loss/gain  Denies HA or visual changes  Denies CP or palpitations  Denies SOB/cough/hemoptysis  Denies abd or flank pain  Denies N/V/D  Denies change in urinary habits or gross hematuria  Denies LE edema  + R arm swelling  Denies skin rashes/myalgias/arthralgias      Physical Exam:   /67 (BP Location: Left arm)   Pulse 76   Temp 98.2 °F (36.8 °C) (Temporal)   Resp 15   Wt 82 lb 7.2 oz (37.4 kg)   SpO2 100%   BMI 16.10 kg/m²   Temp (24hrs), Av.2 °F (36.8 °C), Min:98.2 °F (36.8 °C), Max:98.2 °F (36.8 °C)       Intake/Output Summary (Last 24 hours) at 2024 1024  Last data filed at 2024 0907  Gross per 24 hour   Intake --   Output 150 ml   Net -150 ml     Last 3 Weights   24 0910 82 lb 7.2 oz (37.4 kg)   24 0500 83 lb 12.4 oz (38 kg)   24 1101 85 lb 4 oz (38.7 kg)   10/31/24 2311 86 lb 4.8 oz (39.1 kg)   10/24/24 1532 100 lb (45.4 kg)   24 1331 90 lb 8 oz (41.1 kg)     General: Alert and oriented in no apparent distress.  HEENT: No scleral icterus, MMM  Neck: Supple, no MARIA FERNANDA or thyromegaly  Cardiac: Regular rate and rhythm, S1, S2 normal, + VITA  Lungs: Clear without wheezes, rales, rhonchi.    Abdomen: Soft, non-tender. + bowel sounds, no palpable organomegaly  Extremities: 1+ BLE edema L>R.  R arm swollen from elbow  to shoulder, tender to palpation, erythema noted over antecubital fossa  Neurologic:  moving all extremities  Skin: Warm and dry, no rashes      Laboratory Data:  Lab Results   Component Value Date    WBC 9.3 11/29/2024    HGB 9.4 11/29/2024    HCT 30.4 11/29/2024    .0 11/29/2024    CREATSERUM 3.07 11/29/2024    BUN 45 11/29/2024     11/29/2024    K 5.5 11/29/2024     11/29/2024    CO2 23.0 11/29/2024     11/29/2024    CA 8.6 11/29/2024    ALB 2.9 11/29/2024    ALKPHO 92 11/29/2024    BILT <0.2 11/29/2024    TP 5.5 11/29/2024    AST 40 11/29/2024    ALT 16 11/29/2024    PTT 28.5 11/29/2024    INR 1.12 11/29/2024    PTP 14.6 11/29/2024    DDIMER 0.74 11/29/2024       BUN (mg/dL)   Date Value   11/29/2024 45 (H)   11/21/2024 47 (H)   11/03/2024 40 (H)     Blood Urea Nitrogen (mg/dL)   Date Value   03/02/2022 23.0 (H)   07/27/2021 23.0 (H)   01/29/2021 19.0     CREATININE (no units)   Date Value   02/05/2020 1.81   07/15/2019 1.8     Creatinine (mg/dL)   Date Value   11/29/2024 3.07 (H)   11/21/2024 2.65 (H)   11/03/2024 2.50 (H)   03/02/2022 1.49 (H)   07/27/2021 1.47 (H)   01/29/2021 1.29 (H)       No results found for: \"MICROALBCREA\"    Recent Labs   Lab 11/29/24  0508   WBC 9.3   HGB 9.4*   MCV 94.7   .0   INR 1.12       Recent Labs   Lab 11/29/24  0508 11/29/24  0638     --    K 5.8* 5.5*     --    CO2 23.0  --    BUN 45*  --    CREATSERUM 3.07*  --    CA 8.6*  --    *  --        Recent Labs   Lab 11/29/24  0508   ALT 16   AST 40*   ALB 2.9*       No results for input(s): \"PGLU\" in the last 168 hours.        Imaging:  Cxr reviewed  Doppler ultrasound noted    Impression/Plan:    #1.  ZOHREH/CKD IV- b/l creat most recnetly 2.5-2.6 mg/dl or so in setting of solitary kidney/HTN.  Creat up a bit today, etiology not clear.  Will follow    #2.  Hyperkalemia- mild, received med mgmt in ED.  Follow closely    #3.  R arm pain/swelling/erythema- no DVT noted. Per primary  service    #4.  HTN- on diltiazem    #5.  LE edema- chronic.  Cont low dose torsemide    Thank you for allowing me to participate in the care of your patient. Please do not hesitate to call with any questions or concerns.       Talha Hernandez MD  11/29/2024  10:24 AM         [1]   Allergies  Allergen Reactions    Ciprofloxacin RASH and HIVES    Penicillins RASH

## 2024-11-30 ENCOUNTER — APPOINTMENT (OUTPATIENT)
Dept: CT IMAGING | Facility: HOSPITAL | Age: 89
End: 2024-11-30
Attending: HOSPITALIST
Payer: MEDICARE

## 2024-11-30 ENCOUNTER — APPOINTMENT (OUTPATIENT)
Dept: GENERAL RADIOLOGY | Facility: HOSPITAL | Age: 89
End: 2024-11-30
Attending: DENTIST
Payer: MEDICARE

## 2024-11-30 LAB
ANION GAP SERPL CALC-SCNC: 8 MMOL/L (ref 0–18)
BUN BLD-MCNC: 32 MG/DL (ref 9–23)
CALCIUM BLD-MCNC: 8.3 MG/DL (ref 8.7–10.4)
CHLORIDE SERPL-SCNC: 105 MMOL/L (ref 98–112)
CO2 SERPL-SCNC: 19 MMOL/L (ref 21–32)
CREAT BLD-MCNC: 2.75 MG/DL
EGFRCR SERPLBLD CKD-EPI 2021: 16 ML/MIN/1.73M2 (ref 60–?)
ERYTHROCYTE [DISTWIDTH] IN BLOOD BY AUTOMATED COUNT: 18.3 %
GLUCOSE BLD-MCNC: 83 MG/DL (ref 70–99)
HCT VFR BLD AUTO: 27.4 %
HGB BLD-MCNC: 8.8 G/DL
HGB BLD-MCNC: 9.2 G/DL
MCH RBC QN AUTO: 30.3 PG (ref 26–34)
MCHC RBC AUTO-ENTMCNC: 32.1 G/DL (ref 31–37)
MCV RBC AUTO: 94.5 FL
OSMOLALITY SERPL CALC.SUM OF ELEC: 280 MOSM/KG (ref 275–295)
PLATELET # BLD AUTO: 186 10(3)UL (ref 150–450)
POTASSIUM SERPL-SCNC: 5.4 MMOL/L (ref 3.5–5.1)
RBC # BLD AUTO: 2.9 X10(6)UL
SODIUM SERPL-SCNC: 132 MMOL/L (ref 136–145)
WBC # BLD AUTO: 11.7 X10(3) UL (ref 4–11)

## 2024-11-30 PROCEDURE — 70355 PANORAMIC X-RAY OF JAWS: CPT | Performed by: DENTIST

## 2024-11-30 PROCEDURE — 99233 SBSQ HOSP IP/OBS HIGH 50: CPT | Performed by: INTERNAL MEDICINE

## 2024-11-30 PROCEDURE — 73200 CT UPPER EXTREMITY W/O DYE: CPT | Performed by: HOSPITALIST

## 2024-11-30 RX ORDER — METOCLOPRAMIDE HYDROCHLORIDE 5 MG/ML
5 INJECTION INTRAMUSCULAR; INTRAVENOUS DAILY PRN
Status: DISCONTINUED | OUTPATIENT
Start: 2024-11-30 | End: 2024-12-02

## 2024-11-30 NOTE — OCCUPATIONAL THERAPY NOTE
OCCUPATIONAL THERAPY EVALUATION - INPATIENT     Room Number: 2606/2606-A  Evaluation Date: 11/30/2024  Type of Evaluation: Initial  Presenting Problem: hyperkalemia, chest pain, ZOHREH, RUE hematoma    Physician Order: IP Consult to Occupational Therapy  Reason for Therapy: ADL/IADL Dysfunction and Discharge Planning    OCCUPATIONAL THERAPY ASSESSMENT   Patient is currently functioning below baseline with toileting, upper body dressing, lower body dressing, grooming, eating, bed mobility, and transfers. Prior to admission, patient's baseline is [facility] resident; modified independent at  level for dressing, grooming, eating; has assistance for bathing and medications.  Patient is requiring supervision/set-up to max assist as a result of the following impairments: decreased functional strength, decreased functional reach, impaired standing balance, and limited RUE ROM. Occupational Therapy will continue to follow for duration of hospitalization.    Patient will benefit from continued skilled OT Services at discharge to promote prior level of function and safety with additional support and return home with home health OT    History Related to Current Admission: Patient is a 89 year old female admitted on 11/29/2024 with Presenting Problem: hyperkalemia, chest pain, ZOHREH, RUE hematoma. Co-Morbidities : AF, CKD, h/o endometrial and colon cancer, ostomy, DJD, h/o L hip FX    WEIGHT BEARING RESTRICTION       Recommendations for nursing staff:   Transfers: one person and RW + gait belt  Toileting location: bedside commode    EVALUATION SESSION:  Patient Start of Session: supine in bed, agreeable to get up    FUNCTIONAL TRANSFER ASSESSMENT  Sit to Stand: Edge of Bed  Edge of Bed: Minimal Assist    BED MOBILITY  Supine to Sit : Minimal Assist  Scooting: SBA    BALANCE ASSESSMENT     FUNCTIONAL ADL ASSESSMENT  LB Dressing Seated: Maximum Assist  LB Dressing Standing: Maximum Assist  Toileting Seated: Maximum Assist  Toileting  Standing: Maximum Assist    ACTIVITY TOLERANCE: stable                          O2 SATURATIONS       COGNITION  Arousal/Alertness:  appropriate responses to stimuli  Attention Span:  appears intact  Orientation Level:  oriented to place, oriented to time, oriented to person, and knows she is in [place] and in a hospital but cannot recall city or facility name  Memory:  decreased short term memory  Following Commands:  follows one step commands without difficulty  Initiation: cues to initiate tasks  Awareness of Deficits:  decreased awareness of deficits  Problem Solving:  assistance required to generate solutions    Upper Extremity   ROM: Decreased R elbow wrist and finger flexion due to swelling; LUE WFL  Strength: LUE within functional limits grossly 4+/5; RUE swelling noted with grasp limited 3/5  Coordination  Gross motor: wfl  Fine motor: impaired RUE  Sensation: denied UE deficits    EDUCATION PROVIDED  Patient Education : Role of Occupational Therapy; Plan of Care; Functional Transfer Techniques; Fall Prevention; Posture/Positioning  Patient's Response to Education: Verbalized Understanding; Requires Further Education    Equipment used: rw, gait belt  Demonstrates functional use, Would benefit from additional trial yes     Therapist comments: Assisted patient with orientation to [facility] and hospital. MAX A required for change wet diaper while standing at side of bed. Short distance functional mobility completed with RW and MIN A.     Patient End of Session: Hospital anti-slip socks;All patient questions and concerns addressed;RN aware of session/findings;Call light within reach;SCDs in place;Needs met;Up in chair;Alarm set    OCCUPATIONAL PROFILE    HOME SITUATION  Type of Home: Assisted living facility ([facility])  Home Layout: One level  Lives With: Staff 24 hours;Daughter (daughter has Downs' syndrome)                     Drives: No  Patient Regularly Uses: Glasses;Wheelchair;Rolling walker    Prior  Level of Function:   Patient is not entirely reliable historian. Per EMR, lives at [facility] with daughter who has Downs' syndrome. Patient no longer ambulates in apt , she uses WC. Has assistance for bathing and medications. Typically able to dress and toilet with modified independence from WC.      SUBJECTIVE   Participatory, pleasant, confused    PAIN ASSESSMENT  Rating: Unable to rate  Location: RUE  Management Techniques: Repositioning;Relaxation    OBJECTIVE  Precautions: Limb alert - right;Bed/chair alarm  Fall Risk: High fall risk    ASSESSMENTS    AM-PAC ‘6-Clicks’ Inpatient Daily Activity Short Form  -   Putting on and taking off regular lower body clothing?: A Lot  -   Bathing (including washing, rinsing, drying)?: A Lot  -   Toileting, which includes using toilet, bedpan or urinal? : A Lot  -   Putting on and taking off regular upper body clothing?: A Lot  -   Taking care of personal grooming such as brushing teeth?: A Little  -   Eating meals?: A Little    AM-PAC Score:  Score: 14  Approx Degree of Impairment: 59.67%  Standardized Score (AM-PAC Scale): 33.39    ADDITIONAL TESTS     NEUROLOGICAL FINDINGS      COGNITION ASSESSMENTS     PLAN     OT Treatment Plan: Functional transfer training;ADL training;Cognitive reorientation;Endurance training;Energy conservation/work simplification techniques;Balance activities;UE strengthening/ROM;Equipment eval/education;Patient/Family training;Patient/Family education;Compensatory technique education;Fine motor coordination activities  Rehab Potential : Fair  Frequency: 3-5x/week  Number of Visits to Meet Established Goals: 5    ADL Goals   Patient will perform grooming: with setup, with supervision, while in chair, and with adaptive equipment PRN  Patient will perform lower body dressing:  with mod assist, with adaptive equipment PRN, and with cues  Patient will perform toileting: with min assist and with bedside commode    Functional Transfer Goals  Patient will  transfer to bedside commode:  with stand by assist    UE Exercise Program Goal  Patient will be supervision with bilateral AROM HEP (home exercise program).      Patient Evaluation Complexity Level:   Occupational Profile/Medical History MODERATE - Expanded review of history including review of medical or therapy record   Specific performance deficits impacting engagement in ADL/IADL MODERATE  3 - 5 performance deficits   Client Assessment/Performance Deficits MODERATE - Comorbidities and min to mod modifications of tasks    Clinical Decision Making MODERATE - Analysis of occupational profile, detailed assessments, several treatment options    Overall Complexity MODERATE     OT Session Time: 26 minutes  Self-Care Home Management: 23 minutes

## 2024-11-30 NOTE — PROGRESS NOTES
[facility] Hospitalist note    PCP: Janell Powell MD    Chief Complaint:  F/u R arm swelling, oral bleeding    SUBJECTIVE:  Received 1 unit prbc with improvement in hgb to 9 range  Today family noting that swelling appears worse towards forearm and hand with more of a dark red distribution throughout the arm, less purplish in the initial area of swelling/bruising. Arm was hanging down by body for much of day yesterday and into today per family.    OBJECTIVE:  Temp:  [97.3 °F (36.3 °C)-98.6 °F (37 °C)] 97.5 °F (36.4 °C)  Pulse:  [69-87] 69  Resp:  [18-20] 20  BP: (111-155)/(54-86) 153/54  SpO2:  [94 %-100 %] 100 %    Intake/Output:    Intake/Output Summary (Last 24 hours) at 11/30/2024 0812  Last data filed at 11/30/2024 0406  Gross per 24 hour   Intake 669 ml   Output 950 ml   Net -281 ml       Last 3 Weights   11/29/24 0910 82 lb 7.2 oz (37.4 kg)   11/29/24 0500 83 lb 12.4 oz (38 kg)   11/13/24 1101 85 lb 4 oz (38.7 kg)   10/31/24 2311 86 lb 4.8 oz (39.1 kg)       Exam  Gen: No acute distress  HEENT: anicteric sclera, MMM. No active oral bleeding.   Pulm: Lungs clear, normal respiratory effort  CV: Heart with regular rate and rhythm, no peripheral edema  Abd: Abdomen soft, nontender, nondistended, no organomegaly, bowel sounds present  MSK/derm: able to move R fingers and wwp with 2+ radial pulse. Dark red/maroonish ecchymosis throughout arm, more swelling distally towards forearm/wrist and hand. Area of pinker circumferential swelling in upper R arm proximal to antecub.  Neuro: A&OX3, no focal deficits    Data Review:         Labs:     Recent Labs   Lab 11/29/24  0508 11/29/24  1614 11/30/24  0036   WBC 9.3  --   --    HGB 9.4* 7.0* 9.2*   MCV 94.7  --   --    .0  --   --    NE 7.44  --   --    LYMABS 1.23  --   --    INR 1.12  --   --        Recent Labs   Lab 11/29/24  0508 11/29/24  0638 11/29/24  1525     --   --    K 5.8* 5.5* 5.4*     --   --    CO2 23.0  --   --    BUN 45*  --   --     CREATSERUM 3.07*  --   --    CA 8.6*  --   --    *  --   --        Recent Labs   Lab 11/29/24  0508   ALT 16   AST 40*   ALB 2.9*       Recent Labs   Lab 11/29/24  0508   PBNP 1,354*       Recent Labs   Lab 11/29/24  0508   DDIMER 0.74       No results for input(s): \"PGLU\" in the last 168 hours.    Meds:   Scheduled Medication:   dilTIAZem ER  240 mg Oral Daily    ceFAZolin  1 g Intravenous Q24H    vancomycin  125 mg Oral Daily    tranexamic acid  500 mg Swish & Spit BID    torsemide  10 mg Oral Daily    amiodarone  200 mg Oral QAM    ferrous sulfate  325 mg Oral BID    pantoprazole  40 mg Oral QAM AC    sodium bicarbonate  650 mg Oral BID    traMADol  25 mg Oral BID    acidophilus  1 each Oral Daily     Continuous Infusing Medication:  PRN Medication:  acetaminophen    polyethylene glycol (PEG 3350)    sennosides    bisacodyl    ondansetron    metoclopramide       Microbiology:    No results found for this visit on 11/29/24.    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     Oral bleeding  - likely 2/2 loose bridge + blood thinners  --appreciate oral surgery help- txa rinse  --holding eliquis and bASA  --panorex okay       Right upper arm swelling and bruising  - Suspect that it is secondary to recent phlebotomy but unsure why it just became apparent just prior to admit. No dvt seen  - coags nl on admit and last dose of eliquis 11/28 PM, no likely benefit from eliquis reversal now  --last bASA 11/28 AM but platelets could still be dysfunctional for 7 days- will hold ASA and give 1 unit of platelets  --needs to keep arm much more elevated consistently. Discussed with nursing who is looking  into a wedge to keep it more raised to level of heart at least. Could consider compressive dressing as well.  --given worsened appearance, will obtain CT RUE without contrast for more info. However do suspect that more of the appearance is related to redistribution of the initial bleeding/bruising. Reassured by stable vitals, nl perfusion in hand, stable hgb after transfusion (pt has had h/o recurrent anemia)    Chest pain/sob  - unclear etiology, since resolved  - ddimer, cxr neg  - trop, EKG okay     Hyperkalemia  ZOHREH on CKD  -Nephrology following  - Will follow potassium, had received Lokelma     Bishop syndrome  History of colostomy for colon cancer  History of C. difficile  - Prophylactic Vanco while on antibiotics     A-fib on Eliquis  - Hold Eliquis     SCDs for now. Hold eliquis as above               Annette Walker MD  [facility] Hospitalist  Pager: [phone]

## 2024-11-30 NOTE — PROGRESS NOTES
OMFS    Pt with no continued bleeding from mouth  HG was down to 7 transfused.  Unlikely that small amount of oral bleeding would cause Hg drop of this amount.    Panorex-still pending  Continue TXA rinse today, then PRN  FU with DDS after discharge, if patient does not have DDS, can follow up with me.  Please call with questions

## 2024-11-30 NOTE — PLAN OF CARE
Received patient at approximately 0700. Alert and oriented x3 with confusion noted. Breathing unlabored on RA. Vitals stable; NSR on telemetry. Up c x1 assist and walker. All due medications given and tolerated well. No c/o pain or discomfort. Colostomy present; incont bladder. Patient currently at radiology. All needs met at this time.     Problem: PAIN - ADULT  Goal: Verbalizes/displays adequate comfort level or patient's stated pain goal  Description: INTERVENTIONS:  - Encourage pt to monitor pain and request assistance  - Assess pain using appropriate pain scale  - Administer analgesics based on type and severity of pain and evaluate response  - Implement non-pharmacological measures as appropriate and evaluate response  - Consider cultural and social influences on pain and pain management  - Manage/alleviate anxiety  - Utilize distraction and/or relaxation techniques  - Monitor for opioid side effects  - Notify MD/LIP if interventions unsuccessful or patient reports new pain  - Anticipate increased pain with activity and pre-medicate as appropriate  Outcome: Progressing     Problem: RISK FOR INFECTION - ADULT  Goal: Absence of fever/infection during anticipated neutropenic period  Description: INTERVENTIONS  - Monitor WBC  - Administer growth factors as ordered  - Implement neutropenic guidelines  Outcome: Progressing     Problem: SAFETY ADULT - FALL  Goal: Free from fall injury  Description: INTERVENTIONS:  - Assess pt frequently for physical needs  - Identify cognitive and physical deficits and behaviors that affect risk of falls.  - [place] fall precautions as indicated by assessment.  - Educate pt/family on patient safety including physical limitations  - Instruct pt to call for assistance with activity based on assessment  - Modify environment to reduce risk of injury  - Provide assistive devices as appropriate  - Consider OT/PT consult to assist with strengthening/mobility  - Encourage toileting  schedule  Outcome: Progressing     Problem: CARDIOVASCULAR - ADULT  Goal: Maintains optimal cardiac output and hemodynamic stability  Description: INTERVENTIONS:  - Monitor vital signs, rhythm, and trends  - Monitor for bleeding, hypotension and signs of decreased cardiac output  - Evaluate effectiveness of vasoactive medications to optimize hemodynamic stability  - Monitor arterial and/or venous puncture sites for bleeding and/or hematoma  - Assess quality of pulses, skin color and temperature  - Assess for signs of decreased coronary artery perfusion - ex. Angina  - Evaluate fluid balance, assess for edema, trend weights  Outcome: Progressing  Goal: Absence of cardiac arrhythmias or at baseline  Description: INTERVENTIONS:  - Continuous cardiac monitoring, monitor vital signs, obtain 12 lead EKG if indicated  - Evaluate effectiveness of antiarrhythmic and heart rate control medications as ordered  - Initiate emergency measures for life threatening arrhythmias  - Monitor electrolytes and administer replacement therapy as ordered  Outcome: Progressing     Problem: RESPIRATORY - ADULT  Goal: Achieves optimal ventilation and oxygenation  Description: INTERVENTIONS:  - Assess for changes in respiratory status  - Assess for changes in mentation and behavior  - Position to facilitate oxygenation and minimize respiratory effort  - Oxygen supplementation based on oxygen saturation or ABGs  - Provide Smoking Cessation handout, if applicable  - Encourage broncho-pulmonary hygiene including cough, deep breathe, Incentive Spirometry  - Assess the need for suctioning and perform as needed  - Assess and instruct to report SOB or any respiratory difficulty  - Respiratory Therapy support as indicated  - Manage/alleviate anxiety  - Monitor for signs/symptoms of CO2 retention  Outcome: Progressing     Problem: GASTROINTESTINAL - ADULT  Goal: Minimal or absence of nausea and vomiting  Description: INTERVENTIONS:  - Maintain adequate  hydration with IV or PO as ordered and tolerated  - Nasogastric tube to low intermittent suction as ordered  - Evaluate effectiveness of ordered antiemetic medications  - Provide nonpharmacologic comfort measures as appropriate  - Advance diet as tolerated, if ordered  - Obtain nutritional consult as needed  - Evaluate fluid balance  Outcome: Progressing  Goal: Maintains or returns to baseline bowel function  Description: INTERVENTIONS:  - Assess bowel function  - Maintain adequate hydration with IV or PO as ordered and tolerated  - Evaluate effectiveness of GI medications  - Encourage mobilization and activity  - Obtain nutritional consult as needed  - Establish a toileting routine/schedule  - Consider collaborating with pharmacy to review patient's medication profile  Outcome: Progressing  Goal: Maintains adequate nutritional intake (undernourished)  Description: INTERVENTIONS:  - Monitor percentage of each meal consumed  - Identify factors contributing to decreased intake, treat as appropriate  - Assist with meals as needed  - Monitor I&O, WT and lab values  - Obtain nutritional consult as needed  - Optimize oral hygiene and moisture  - Encourage food from home; allow for food preferences  - Enhance eating environment  Outcome: Progressing  Goal: Achieves appropriate nutritional intake (bariatric)  Description: INTERVENTIONS:  - Monitor for over-consumption  - Identify factors contributing to increased intake, treat as appropriate  - Monitor I&O, WT and lab values  - Obtain nutritional consult as needed  - Evaluate psychosocial factors contributing to over-consumption  Outcome: Progressing

## 2024-11-30 NOTE — PROGRESS NOTES
[facility]   part of [facility]     Nephrology Progress Note    Sunita Loza Patient Status:  Observation    1932 MRN [id number]   Location [facility] 2NE-A Attending Annette Walker MD   Hosp Day # 0 PCP Janell Powell MD       SUBJECTIVE:  Feels better today, R arm pain improved        Physical Exam:   /57 (BP Location: Left arm)   Pulse 63   Temp 97 °F (36.1 °C) (Axillary)   Resp 18   Wt 82 lb 7.2 oz (37.4 kg)   SpO2 100%   BMI 16.10 kg/m²   Temp (24hrs), Av.7 °F (36.5 °C), Min:97 °F (36.1 °C), Max:98.6 °F (37 °C)       Intake/Output Summary (Last 24 hours) at 2024 1102  Last data filed at 2024 0830  Gross per 24 hour   Intake 669 ml   Output 900 ml   Net -231 ml     Last 3 Weights   24 0910 82 lb 7.2 oz (37.4 kg)   24 0500 83 lb 12.4 oz (38 kg)   24 1101 85 lb 4 oz (38.7 kg)   10/31/24 2311 86 lb 4.8 oz (39.1 kg)   10/24/24 1532 100 lb (45.4 kg)   24 1331 90 lb 8 oz (41.1 kg)     General: Alert and oriented in no apparent distress.  HEENT: No scleral icterus, MMM  Neck: Supple, no MARIA FERNANDA or thyromegaly  Cardiac: Regular rate and rhythm, S1, S2 normal, + VITA  Lungs: Clear without wheezes, rales, rhonchi.    Abdomen: Soft, non-tender. + bowel sounds, + ostomy  Extremities: 1+ BLE edema L>R. R arm swelling noted.  Neurologic: Alert and oriented, cranial nerves grossly intact, moving all extremities  Skin: Warm and dry, no rash        Labs:     Recent Labs   Lab 24  0508 24  1614 24  0036 24  0757   WBC 9.3  --   --  11.7*   HGB 9.4* 7.0* 9.2* 8.8*   MCV 94.7  --   --  94.5   .0  --   --  186.0   INR 1.12  --   --   --        Recent Labs   Lab 24  0508 24  0638 24  1525 24  0757     --   --  132*   K 5.8* 5.5* 5.4* 5.4*     --   --  105   CO2 23.0  --   --  19.0*   BUN 45*  --   --  32*   CREATSERUM 3.07*  --   --  2.75*   CA 8.6*  --   --  8.3*   *  --   --  83        Recent Labs   Lab 11/29/24  0508   ALT 16   AST 40*   ALB 2.9*       No results for input(s): \"PGLU\" in the last 168 hours.    Meds:   Current Facility-Administered Medications   Medication Dose Route Frequency    metoclopramide (Reglan) 5 mg/mL injection 5 mg  5 mg Intravenous Daily PRN    acetaminophen (Tylenol Extra Strength) tab 500 mg  500 mg Oral Q4H PRN    polyethylene glycol (PEG 3350) (Miralax) 17 g oral packet 17 g  17 g Oral Daily PRN    sennosides (Senokot) tab 17.2 mg  17.2 mg Oral Nightly PRN    bisacodyl (Dulcolax) 10 MG rectal suppository 10 mg  10 mg Rectal Daily PRN    ondansetron (Zofran) 4 MG/2ML injection 4 mg  4 mg Intravenous Q6H PRN    dilTIAZem ER (CardIZEM CD) 24 hr cap 240 mg  240 mg Oral Daily    ceFAZolin (Ancef) 1 g in dextrose 5% 100mL IVPB-ADD  1 g Intravenous Q24H    vancomycin (Vancocin) cap 125 mg  125 mg Oral Daily    tranexamic acid (Lysteda) 50 mg/mL (5%) oral solution 500 mg  500 mg Swish & Spit BID    torsemide (Demadex) tab 10 mg  10 mg Oral Daily    amiodarone (Pacerone) tab 200 mg  200 mg Oral QAM    ferrous sulfate DR tab 325 mg  325 mg Oral BID    pantoprazole (Protonix) DR tab 40 mg  40 mg Oral QAM AC    sodium bicarbonate tab 650 mg  650 mg Oral BID    traMADol (Ultram) tab 25 mg  25 mg Oral BID    acidophilus (Probiotic) cap/tab 1 each  1 each Oral Daily         Impression/Plan:      #1.  ZOHREH/CKD IV- b/l creat most recnetly 2.5-2.6 mg/dl or so in setting of solitary kidney/HTN.  Creat again at baseline     #2.  Hyperkalemia- mild, received med mgmt in ED.  Cont to follow closely     #3.  R arm pain/swelling/erythema- no DVT noted. Per primary service     #4.  HTN- on diltiazem     #5.  LE edema- chronic.  Cont low dose torsemide    Questions/concerns were discussed with patient and/or family by bedside.          Talha Hernandez MD  11/30/2024  11:02 AM

## 2024-11-30 NOTE — PLAN OF CARE
Pt alert and oriented x4.  Up max assist , wheelchair bound baseline .  On room air.  NSR on tele.  Incontinent of of bowel with a ruq colostomy ,bag was change, stoma looks clean, pink and moist.  Complaints of right upper arm pain, prn pain med given with relief. Upper arm is swollen and red MD aware of it.  Plan of care discussed with pt.  Falls precautions in place.  Call light within reach.    POC: monitor k+, iv abx, lasix, panoramic ja xr.    Problem: PAIN - ADULT  Goal: Verbalizes/displays adequate comfort level or patient's stated pain goal  Description: INTERVENTIONS:  - Encourage pt to monitor pain and request assistance  - Assess pain using appropriate pain scale  - Administer analgesics based on type and severity of pain and evaluate response  - Implement non-pharmacological measures as appropriate and evaluate response  - Consider cultural and social influences on pain and pain management  - Manage/alleviate anxiety  - Utilize distraction and/or relaxation techniques  - Monitor for opioid side effects  - Notify MD/LIP if interventions unsuccessful or patient reports new pain  - Anticipate increased pain with activity and pre-medicate as appropriate  Outcome: Progressing     Problem: RISK FOR INFECTION - ADULT  Goal: Absence of fever/infection during anticipated neutropenic period  Description: INTERVENTIONS  - Monitor WBC  - Administer growth factors as ordered  - Implement neutropenic guidelines  Outcome: Progressing     Problem: SAFETY ADULT - FALL  Goal: Free from fall injury  Description: INTERVENTIONS:  - Assess pt frequently for physical needs  - Identify cognitive and physical deficits and behaviors that affect risk of falls.  - [place] fall precautions as indicated by assessment.  - Educate pt/family on patient safety including physical limitations  - Instruct pt to call for assistance with activity based on assessment  - Modify environment to reduce risk of injury  - Provide assistive  devices as appropriate  - Consider OT/PT consult to assist with strengthening/mobility  - Encourage toileting schedule  Outcome: Progressing     Problem: CARDIOVASCULAR - ADULT  Goal: Maintains optimal cardiac output and hemodynamic stability  Description: INTERVENTIONS:  - Monitor vital signs, rhythm, and trends  - Monitor for bleeding, hypotension and signs of decreased cardiac output  - Evaluate effectiveness of vasoactive medications to optimize hemodynamic stability  - Monitor arterial and/or venous puncture sites for bleeding and/or hematoma  - Assess quality of pulses, skin color and temperature  - Assess for signs of decreased coronary artery perfusion - ex. Angina  - Evaluate fluid balance, assess for edema, trend weights  Outcome: Progressing  Goal: Absence of cardiac arrhythmias or at baseline  Description: INTERVENTIONS:  - Continuous cardiac monitoring, monitor vital signs, obtain 12 lead EKG if indicated  - Evaluate effectiveness of antiarrhythmic and heart rate control medications as ordered  - Initiate emergency measures for life threatening arrhythmias  - Monitor electrolytes and administer replacement therapy as ordered  Outcome: Progressing     Problem: RESPIRATORY - ADULT  Goal: Achieves optimal ventilation and oxygenation  Description: INTERVENTIONS:  - Assess for changes in respiratory status  - Assess for changes in mentation and behavior  - Position to facilitate oxygenation and minimize respiratory effort  - Oxygen supplementation based on oxygen saturation or ABGs  - Provide Smoking Cessation handout, if applicable  - Encourage broncho-pulmonary hygiene including cough, deep breathe, Incentive Spirometry  - Assess the need for suctioning and perform as needed  - Assess and instruct to report SOB or any respiratory difficulty  - Respiratory Therapy support as indicated  - Manage/alleviate anxiety  - Monitor for signs/symptoms of CO2 retention  Outcome: Progressing

## 2024-12-01 LAB
ANION GAP SERPL CALC-SCNC: 7 MMOL/L (ref 0–18)
BASOPHILS # BLD AUTO: 0.02 X10(3) UL (ref 0–0.2)
BASOPHILS NFR BLD AUTO: 0.2 %
BLOOD TYPE BARCODE: 1700
BLOOD TYPE BARCODE: 7300
BUN BLD-MCNC: 44 MG/DL (ref 9–23)
CALCIUM BLD-MCNC: 8.1 MG/DL (ref 8.7–10.4)
CHLORIDE SERPL-SCNC: 105 MMOL/L (ref 98–112)
CO2 SERPL-SCNC: 26 MMOL/L (ref 21–32)
CREAT BLD-MCNC: 2.67 MG/DL
EGFRCR SERPLBLD CKD-EPI 2021: 16 ML/MIN/1.73M2 (ref 60–?)
EOSINOPHIL # BLD AUTO: 0.1 X10(3) UL (ref 0–0.7)
EOSINOPHIL NFR BLD AUTO: 1 %
ERYTHROCYTE [DISTWIDTH] IN BLOOD BY AUTOMATED COUNT: 17.6 %
GLUCOSE BLD-MCNC: 104 MG/DL (ref 70–99)
GLUCOSE BLD-MCNC: 88 MG/DL (ref 70–99)
HCT VFR BLD AUTO: 24.6 %
HGB BLD-MCNC: 8.1 G/DL
IMM GRANULOCYTES # BLD AUTO: 0.04 X10(3) UL (ref 0–1)
IMM GRANULOCYTES NFR BLD: 0.4 %
LYMPHOCYTES # BLD AUTO: 1.18 X10(3) UL (ref 1–4)
LYMPHOCYTES NFR BLD AUTO: 12.4 %
MCH RBC QN AUTO: 30.3 PG (ref 26–34)
MCHC RBC AUTO-ENTMCNC: 32.9 G/DL (ref 31–37)
MCV RBC AUTO: 92.1 FL
MONOCYTES # BLD AUTO: 0.96 X10(3) UL (ref 0.1–1)
MONOCYTES NFR BLD AUTO: 10.1 %
NEUTROPHILS # BLD AUTO: 7.23 X10 (3) UL (ref 1.5–7.7)
NEUTROPHILS # BLD AUTO: 7.23 X10(3) UL (ref 1.5–7.7)
NEUTROPHILS NFR BLD AUTO: 75.9 %
OSMOLALITY SERPL CALC.SUM OF ELEC: 297 MOSM/KG (ref 275–295)
PLATELET # BLD AUTO: 271 10(3)UL (ref 150–450)
POTASSIUM SERPL-SCNC: 4.4 MMOL/L (ref 3.5–5.1)
RBC # BLD AUTO: 2.67 X10(6)UL
SODIUM SERPL-SCNC: 138 MMOL/L (ref 136–145)
UNIT VOLUME: 197 ML
UNIT VOLUME: 350 ML
WBC # BLD AUTO: 9.5 X10(3) UL (ref 4–11)

## 2024-12-01 PROCEDURE — 99233 SBSQ HOSP IP/OBS HIGH 50: CPT | Performed by: INTERNAL MEDICINE

## 2024-12-01 NOTE — PLAN OF CARE
Assumed care of patient around 0730.Patient AXOX3,forgetful.On room air.NSR on tele.C/o pain to right arm and back pain,medicated with scheduled tramadol.Remains on IV Antibiotics.Right upper extremity elevated on pillow.Incontinent of bladder,Left lower quadrant colostomy clean,dry,intact.Isolation precautions maintained.Plan of care updated.Call light within reach.  Problem: PAIN - ADULT  Goal: Verbalizes/displays adequate comfort level or patient's stated pain goal  Description: INTERVENTIONS:  - Encourage pt to monitor pain and request assistance  - Assess pain using appropriate pain scale  - Administer analgesics based on type and severity of pain and evaluate response  - Implement non-pharmacological measures as appropriate and evaluate response  - Consider cultural and social influences on pain and pain management  - Manage/alleviate anxiety  - Utilize distraction and/or relaxation techniques  - Monitor for opioid side effects  - Notify MD/LIP if interventions unsuccessful or patient reports new pain  - Anticipate increased pain with activity and pre-medicate as appropriate  Outcome: Progressing     Problem: RISK FOR INFECTION - ADULT  Goal: Absence of fever/infection during anticipated neutropenic period  Description: INTERVENTIONS  - Monitor WBC  - Administer growth factors as ordered  - Implement neutropenic guidelines  Outcome: Progressing     Problem: SAFETY ADULT - FALL  Goal: Free from fall injury  Description: INTERVENTIONS:  - Assess pt frequently for physical needs  - Identify cognitive and physical deficits and behaviors that affect risk of falls.  - [place] fall precautions as indicated by assessment.  - Educate pt/family on patient safety including physical limitations  - Instruct pt to call for assistance with activity based on assessment  - Modify environment to reduce risk of injury  - Provide assistive devices as appropriate  - Consider OT/PT consult to assist with strengthening/mobility  -  Encourage toileting schedule  Outcome: Progressing     Problem: CARDIOVASCULAR - ADULT  Goal: Maintains optimal cardiac output and hemodynamic stability  Description: INTERVENTIONS:  - Monitor vital signs, rhythm, and trends  - Monitor for bleeding, hypotension and signs of decreased cardiac output  - Evaluate effectiveness of vasoactive medications to optimize hemodynamic stability  - Monitor arterial and/or venous puncture sites for bleeding and/or hematoma  - Assess quality of pulses, skin color and temperature  - Assess for signs of decreased coronary artery perfusion - ex. Angina  - Evaluate fluid balance, assess for edema, trend weights  Outcome: Progressing  Goal: Absence of cardiac arrhythmias or at baseline  Description: INTERVENTIONS:  - Continuous cardiac monitoring, monitor vital signs, obtain 12 lead EKG if indicated  - Evaluate effectiveness of antiarrhythmic and heart rate control medications as ordered  - Initiate emergency measures for life threatening arrhythmias  - Monitor electrolytes and administer replacement therapy as ordered  Outcome: Progressing     Problem: RESPIRATORY - ADULT  Goal: Achieves optimal ventilation and oxygenation  Description: INTERVENTIONS:  - Assess for changes in respiratory status  - Assess for changes in mentation and behavior  - Position to facilitate oxygenation and minimize respiratory effort  - Oxygen supplementation based on oxygen saturation or ABGs  - Provide Smoking Cessation handout, if applicable  - Encourage broncho-pulmonary hygiene including cough, deep breathe, Incentive Spirometry  - Assess the need for suctioning and perform as needed  - Assess and instruct to report SOB or any respiratory difficulty  - Respiratory Therapy support as indicated  - Manage/alleviate anxiety  - Monitor for signs/symptoms of CO2 retention  Outcome: Progressing

## 2024-12-01 NOTE — PROGRESS NOTES
.[facility] note    PCP: Janell Powell MD    Chief Complaint:  F/u R arm swelling, oral bleeding    SUBJECTIVE:  Arm appears significantly improved in terms of swelling.  Residual ecchymosis throughout right arm but somewhat faded in color.  Reports less pain.  No significant oral bleeding but reports continued loose bridge    OBJECTIVE:  Temp:  [97.4 °F (36.3 °C)-98.3 °F (36.8 °C)] 97.5 °F (36.4 °C)  Pulse:  [65-75] 71  Resp:  [16-18] 18  BP: (129-165)/(57-74) 151/74  SpO2:  [98 %-100 %] 100 %    Intake/Output:    Intake/Output Summary (Last 24 hours) at 12/1/2024 0853  Last data filed at 12/1/2024 0550  Gross per 24 hour   Intake 757 ml   Output 870 ml   Net -113 ml       Last 3 Weights   11/29/24 0910 82 lb 7.2 oz (37.4 kg)   11/29/24 0500 83 lb 12.4 oz (38 kg)   11/13/24 1101 85 lb 4 oz (38.7 kg)   10/31/24 2311 86 lb 4.8 oz (39.1 kg)       Exam  Gen: No acute distress  HEENT: anicteric sclera, MMM. No active oral bleeding.   Pulm: Lungs clear, normal respiratory effort  CV: Heart with regular rate and rhythm, no peripheral edema  Abd: Abdomen soft, nontender, nondistended, no organomegaly, bowel sounds present  MSK/derm: able to move R fingers and wwp with 2+ radial pulse. Dark red/maroonish ecchymosis throughout arm, less swelling today  Neuro: A&OX3, no focal deficits    Data Review:         Labs:     Recent Labs   Lab 11/29/24  0508 11/29/24  1614 11/30/24  0036 11/30/24  0757 12/01/24  0552   WBC 9.3  --   --  11.7* 9.5   HGB 9.4* 7.0* 9.2* 8.8* 8.1*   MCV 94.7  --   --  94.5 92.1   .0  --   --  186.0 271.0   NE 7.44  --   --   --  7.23   LYMABS 1.23  --   --   --  1.18   INR 1.12  --   --   --   --        Recent Labs   Lab 11/29/24  0508 11/29/24  0638 11/29/24  1525 11/30/24  0757 12/01/24  0552     --   --  132* 138   K 5.8* 5.5* 5.4* 5.4* 4.4     --   --  105 105   CO2 23.0  --   --  19.0* 26.0   BUN 45*  --   --  32* 44*   CREATSERUM 3.07*  --   --  2.75* 2.67*   CA 8.6*   --   --  8.3* 8.1*   *  --   --  83 88       Recent Labs   Lab 11/29/24  0508   ALT 16   AST 40*   ALB 2.9*       Recent Labs   Lab 11/29/24  0508   PBNP 1,354*       Recent Labs   Lab 11/29/24  0508   DDIMER 0.74       Recent Labs   Lab 12/01/24  0551   PGLU 104*       Meds:   Scheduled Medication:   dilTIAZem ER  240 mg Oral Daily    ceFAZolin  1 g Intravenous Q24H    vancomycin  125 mg Oral Daily    tranexamic acid  500 mg Swish & Spit BID    torsemide  10 mg Oral Daily    amiodarone  200 mg Oral QAM    ferrous sulfate  325 mg Oral BID    pantoprazole  40 mg Oral QAM AC    sodium bicarbonate  650 mg Oral BID    traMADol  25 mg Oral BID    acidophilus  1 each Oral Daily     Continuous Infusing Medication:  PRN Medication:  metoclopramide    acetaminophen    polyethylene glycol (PEG 3350)    sennosides    bisacodyl    ondansetron       Microbiology:    No results found for this visit on 11/29/24.    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     Oral bleeding  - likely 2/2 loose bridge + blood thinners  --appreciate oral surgery help- txa rinse  --holding eliquis and bASA  --panorex okay       Right upper arm swelling and bruising  - Suspect that it is secondary to recent phlebotomy but unsure why it just became apparent just prior to admit. No dvt seen  - coags nl on admit and last dose of eliquis 11/28 PM, no likely benefit from eliquis reversal now  --last bASA 11/28 AM but platelets could still be dysfunctional for 7 days- s/p 1 unit plts 11/30  --cont arm elevation  --CT RUE with large hematoma, not much other significant findings  --on iv cefazolin for possibility of underlying  cellulitis, may consider consider stopping    Chest pain/sob  - unclear etiology, since resolved  - ddimer, cxr neg  - trop, EKG okay     Hyperkalemia  ZOHREH on CKD  -Nephrology following  - Will follow potassium, had received Lokelma     Bishop syndrome  History of colostomy for colon cancer  History of C. difficile  - Prophylactic Vanco while on antibiotics     A-fib on Eliquis  - Hold Eliquis  --from chart appears that pt follows with [facility], may consult them in AM so they can help follow reinitiation of anticoag as outpt     SCDs for now. Hold eliquis as above     Was seen by pt/ot and recommended for HH services. Hopeful dc soon.          Annette Walker MD  [facility]  Pager: [phone]

## 2024-12-01 NOTE — PHYSICAL THERAPY NOTE
PHYSICAL THERAPY EVALUATION - INPATIENT     Room Number: 2606/2606-A  Evaluation Date: 11/30/2024  Type of Evaluation: Initial  Physician Order: PT Eval and Treat    Presenting Problem: RUE pain, RUE hematoma, bleeding gums, hyperkalemia  Co-Morbidities : AF, CKD, h/o endometrial and colon cancer, ostomy, DJD, h/o L hip FX  Reason for Therapy: Mobility Dysfunction and Discharge Planning    PHYSICAL THERAPY ASSESSMENT   Patient is a 89 year old female admitted 11/29/2024 for RUE pain, RUE hematoma, bleeding gums, hyperkalemia.  Prior to admission, patient's baseline is mod ind with RW.  Patient is currently functioning near baseline with bed mobility, transfers, gait, maintaining seated position, and standing prolonged periods.  Patient is requiring contact guard assist as a result of the following impairments: decreased functional strength, decreased endurance/aerobic capacity, pain, impaired   balance, and decreased muscular endurance.  Physical Therapy will continue to follow for duration of hospitalization.    Patient will benefit from continued skilled PT Services at discharge to promote prior level of function and safety with additional support and return home with home health PT.    PLAN DURING HOSPITALIZATION  Nursing Mobility Recommendation : 1 Assist  PT Device Recommendation: Rolling walker  PT Treatment Plan: Bed mobility;Endurance;Energy conservation;Patient education;Family education;Gait training;Strengthening;Transfer training;Balance training  Rehab Potential : Good  Frequency (Obs): 3-5x/week     CURRENT GOALS    Goal #1 Patient is able to demonstrate supine - sit EOB @ level: supervision     Goal #2 Patient is able to demonstrate transfers Sit to/from Stand at assistance level: supervision     Goal #3 Patient is able to ambulate 150 feet with assist device: walker - rolling at assistance level: supervision     Goal #4    Goal #5    Goal #6    Goal Comments: Goals established on  11/30/2024      PHYSICAL THERAPY MEDICAL/SOCIAL HISTORY  History related to current admission: Patient is a 89 year old female admitted on 11/29/2024 from [facility] for RUE pain, RUE hematoma, bleeding gums, hyperkalemia.    Recent Hospitalizations:  10/31-11/3 Anemia, GIH  8/20-8/22 Hematuria, s/p cystoscopy  7/30-7/31 Dehydration, C diff  6/10-6/15 L femur fx s/p nailing  5/24-6/2  ZOHREH, syncope    HOME SITUATION  Type of Home: Assisted living facility ([facility])  Home Layout: One level                     Lives With: Staff 24 hours;Daughter (daughter has Downs' syndrome)    Drives: No   Patient Regularly Uses: Glasses;Wheelchair;Rolling walker      Prior Level of [place]: Pt lives in the [facility] with her dtr who has Down Syndrome. Pt is typically independent with ADLs and ambulates with RW but gets assist with bathing. Can get more assistance from staff if needed.     SUBJECTIVE  \"I can't seem to think straight\"     OBJECTIVE  Precautions: Limb alert - right;Bed/chair alarm, colostomy  Fall Risk: High fall risk    WEIGHT BEARING RESTRICTION     PAIN ASSESSMENT  Rating: Unable to rate  Location: knees  Management Techniques: Activity promotion;Relaxation;Repositioning    COGNITION  Orientation Level:  oriented to person, disoriented to place, disoriented to time, and disoriented to situation  Following Commands:  follows one step commands with increased time  Perseveration: perseverates during conversation  Awareness of Errors:  assistance required to identify errors made and assistance required to correct errors made    RANGE OF MOTION AND STRENGTH ASSESSMENT  Upper extremity ROM and strength: RUE testing limited by pain    Lower extremity ROM is within functional limits     Lower extremity strength is within functional limits: except mild deconditioning    BALANCE  Static Sitting: Good  Dynamic Sitting: Good  Static Standing: Fair -  Dynamic Standing: Poor +    ADDITIONAL TESTS                                     ACTIVITY TOLERANCE                         O2 WALK       NEUROLOGICAL FINDINGS                        AM-PAC '6-Clicks' INPATIENT SHORT FORM - BASIC MOBILITY  How much difficulty does the patient currently have...  Patient Difficulty: Turning over in bed (including adjusting bedclothes, sheets and blankets)?: None   Patient Difficulty: Sitting down on and standing up from a chair with arms (e.g., wheelchair, bedside commode, etc.): A Little   Patient Difficulty: Moving from lying on back to sitting on the side of the bed?: A Little   How much help from another person does the patient currently need...   Help from Another: Moving to and from a bed to a chair (including a wheelchair)?: A Little   Help from Another: Need to walk in hospital room?: A Little   Help from Another: Climbing 3-5 steps with a railing?: A Lot     AM-PAC Score:  Raw Score: 18   Approx Degree of Impairment: 46.58%   Standardized Score (AM-PAC Scale): 43.63   CMS Modifier (G-Code): CK    FUNCTIONAL ABILITY STATUS  Gait Assessment   Functional Mobility/Gait Assessment  Gait Assistance: Contact guard assist  Distance (ft): 3  Assistive Device: Rolling walker  Pattern: Shuffle;R Flexed knee;L Flexed knee    Skilled Therapy Provided: Per RN [place] to work with pt. Pt received in supine and was agreeable to PT session.     Bed Mobility:  Rolling: NT  Supine to sit: CGA   Sit to supine: NT     Transfer Mobility:  Sit to stand: CGA. Pt assisted into fresh brief in standing   Stand to sit: CGA  Gait = pt ambulated from bed to chair with RW and CGA    Therapist's Comments: Pt educated on role of therapy, goals for session, safety, fall prevention, and activity recommendations.     Exercise/Education Provided:  Bed mobility  Energy conservation  Functional activity tolerated  Gait training  Posture  Strengthening  Transfer training    Patient End of Session: Up in chair;Needs met;Call light within reach;RN aware of session/findings;All  patient questions and concerns addressed;Hospital anti-slip socks;Alarm set      Patient Evaluation Complexity Level:  History Moderate - 1 or 2 personal factors and/or co-morbidities   Examination of body systems Low -  addressing 1-2 elements   Clinical Presentation Low- Stable   Clinical Decision Making Low Complexity       PT Session Time: 30 minutes  Therapeutic Activity: 10 minutes

## 2024-12-01 NOTE — PROGRESS NOTES
[facility]   part of [facility]     Nephrology Progress Note    Sunita Loza Patient Status:  Observation    1932 MRN [id number]   Location [facility] 2NE-A Attending Annette Walker MD   Hosp Day # 0 PCP Janell Powell MD       SUBJECTIVE:  Stable this AM, reports R arm pain/swelling improved        Physical Exam:   /63 (BP Location: Left arm)   Pulse 66   Temp 97.6 °F (36.4 °C) (Oral)   Resp 18   Wt 82 lb 7.2 oz (37.4 kg)   SpO2 99%   BMI 16.10 kg/m²   Temp (24hrs), Av.9 °F (36.6 °C), Min:97.5 °F (36.4 °C), Max:98.3 °F (36.8 °C)       Intake/Output Summary (Last 24 hours) at 2024 1241  Last data filed at 2024 1154  Gross per 24 hour   Intake 1117 ml   Output 1170 ml   Net -53 ml     Last 3 Weights   24 0910 82 lb 7.2 oz (37.4 kg)   24 0500 83 lb 12.4 oz (38 kg)   24 1101 85 lb 4 oz (38.7 kg)   10/31/24 2311 86 lb 4.8 oz (39.1 kg)   10/24/24 1532 100 lb (45.4 kg)   24 1331 90 lb 8 oz (41.1 kg)     General: Alert and oriented in no apparent distress.  HEENT: No scleral icterus, MMM  Neck: Supple, no MARIA FERNANDA or thyromegaly  Cardiac: Regular rate and rhythm, S1, S2 normal, + VITA  Lungs: Clear without wheezes, rales, rhonchi.    Abdomen: Soft, non-tender. + bowel sounds, + ostomy  Extremities: tr BLE edema L>R. R arm ecchymosis/erythema noted/edema better.  Neurologic: Alert and oriented, cranial nerves grossly intact, moving all extremities  Skin: Warm and dry, no rash        Labs:     Recent Labs   Lab 24  0508 24  1614 24  0036 24  0757 24  0552   WBC 9.3  --   --  11.7* 9.5   HGB 9.4* 7.0* 9.2* 8.8* 8.1*   MCV 94.7  --   --  94.5 92.1   .0  --   --  186.0 271.0   INR 1.12  --   --   --   --        Recent Labs   Lab 24  0508 24  0638 24  1525 24  0757 24  0552     --   --  132* 138   K 5.8* 5.5* 5.4* 5.4* 4.4     --   --  105 105   CO2 23.0  --   --  19.0* 26.0   BUN 45*   --   --  32* 44*   CREATSERUM 3.07*  --   --  2.75* 2.67*   CA 8.6*  --   --  8.3* 8.1*   *  --   --  83 88       Recent Labs   Lab 11/29/24  0508   ALT 16   AST 40*   ALB 2.9*       Recent Labs   Lab 12/01/24  0551   PGLU 104*       Meds:   Current Facility-Administered Medications   Medication Dose Route Frequency    metoclopramide (Reglan) 5 mg/mL injection 5 mg  5 mg Intravenous Daily PRN    acetaminophen (Tylenol Extra Strength) tab 500 mg  500 mg Oral Q4H PRN    polyethylene glycol (PEG 3350) (Miralax) 17 g oral packet 17 g  17 g Oral Daily PRN    sennosides (Senokot) tab 17.2 mg  17.2 mg Oral Nightly PRN    bisacodyl (Dulcolax) 10 MG rectal suppository 10 mg  10 mg Rectal Daily PRN    ondansetron (Zofran) 4 MG/2ML injection 4 mg  4 mg Intravenous Q6H PRN    dilTIAZem ER (CardIZEM CD) 24 hr cap 240 mg  240 mg Oral Daily    ceFAZolin (Ancef) 1 g in dextrose 5% 100mL IVPB-ADD  1 g Intravenous Q24H    vancomycin (Vancocin) cap 125 mg  125 mg Oral Daily    tranexamic acid (Lysteda) 50 mg/mL (5%) oral solution 500 mg  500 mg Swish & Spit BID    torsemide (Demadex) tab 10 mg  10 mg Oral Daily    amiodarone (Pacerone) tab 200 mg  200 mg Oral QAM    ferrous sulfate DR tab 325 mg  325 mg Oral BID    pantoprazole (Protonix) DR tab 40 mg  40 mg Oral QAM AC    sodium bicarbonate tab 650 mg  650 mg Oral BID    traMADol (Ultram) tab 25 mg  25 mg Oral BID    acidophilus (Probiotic) cap/tab 1 each  1 each Oral Daily         Impression/Plan:      #1.  ZOHREH/CKD IV- b/l creat most recnetly 2.5-2.6 mg/dl or so in setting of solitary kidney/HTN.  Creat again at baseline     #2.  Hyperkalemia- mild, received med mgmt in ED.  Cont to follow closely     #3.  R arm pain/swelling/erythema- no DVT noted. On empiric abx.  Improving.  Per primary service     #4.  HTN- on diltiazem     #5.  LE edema- chronic.  Cont low dose torsemide    Questions/concerns were discussed with patient and/or family by bedside.        Talha FORD  MD David  12/1/2024  12:42 PM

## 2024-12-01 NOTE — PLAN OF CARE
Assumed care around 1930. Patient alert and orientated x 2. Disorientated with time/situation. Maintaining oxygen saturations on room air. Normal sinus on tele. Edema to RUE and BLE. Incontinent, Purewick and brief in place. Colostomy to LLQ. No complaints of pain. Up x 1 assist w/ walker and gait belt. Patient updated with plan of care. Call light within reach. Plan: monitor labs, daily IV abx, monitor for bleeding    Problem: PAIN - ADULT  Goal: Verbalizes/displays adequate comfort level or patient's stated pain goal  Description: INTERVENTIONS:  - Encourage pt to monitor pain and request assistance  - Assess pain using appropriate pain scale  - Administer analgesics based on type and severity of pain and evaluate response  - Implement non-pharmacological measures as appropriate and evaluate response  - Consider cultural and social influences on pain and pain management  - Manage/alleviate anxiety  - Utilize distraction and/or relaxation techniques  - Monitor for opioid side effects  - Notify MD/LIP if interventions unsuccessful or patient reports new pain  - Anticipate increased pain with activity and pre-medicate as appropriate  Outcome: Progressing     Problem: RISK FOR INFECTION - ADULT  Goal: Absence of fever/infection during anticipated neutropenic period  Description: INTERVENTIONS  - Monitor WBC  - Administer growth factors as ordered  - Implement neutropenic guidelines  Outcome: Progressing     Problem: SAFETY ADULT - FALL  Goal: Free from fall injury  Description: INTERVENTIONS:  - Assess pt frequently for physical needs  - Identify cognitive and physical deficits and behaviors that affect risk of falls.  - [place] fall precautions as indicated by assessment.  - Educate pt/family on patient safety including physical limitations  - Instruct pt to call for assistance with activity based on assessment  - Modify environment to reduce risk of injury  - Provide assistive devices as appropriate  - Consider  OT/PT consult to assist with strengthening/mobility  - Encourage toileting schedule  Outcome: Progressing     Problem: CARDIOVASCULAR - ADULT  Goal: Maintains optimal cardiac output and hemodynamic stability  Description: INTERVENTIONS:  - Monitor vital signs, rhythm, and trends  - Monitor for bleeding, hypotension and signs of decreased cardiac output  - Evaluate effectiveness of vasoactive medications to optimize hemodynamic stability  - Monitor arterial and/or venous puncture sites for bleeding and/or hematoma  - Assess quality of pulses, skin color and temperature  - Assess for signs of decreased coronary artery perfusion - ex. Angina  - Evaluate fluid balance, assess for edema, trend weights  Outcome: Progressing  Goal: Absence of cardiac arrhythmias or at baseline  Description: INTERVENTIONS:  - Continuous cardiac monitoring, monitor vital signs, obtain 12 lead EKG if indicated  - Evaluate effectiveness of antiarrhythmic and heart rate control medications as ordered  - Initiate emergency measures for life threatening arrhythmias  - Monitor electrolytes and administer replacement therapy as ordered  Outcome: Progressing     Problem: RESPIRATORY - ADULT  Goal: Achieves optimal ventilation and oxygenation  Description: INTERVENTIONS:  - Assess for changes in respiratory status  - Assess for changes in mentation and behavior  - Position to facilitate oxygenation and minimize respiratory effort  - Oxygen supplementation based on oxygen saturation or ABGs  - Provide Smoking Cessation handout, if applicable  - Encourage broncho-pulmonary hygiene including cough, deep breathe, Incentive Spirometry  - Assess the need for suctioning and perform as needed  - Assess and instruct to report SOB or any respiratory difficulty  - Respiratory Therapy support as indicated  - Manage/alleviate anxiety  - Monitor for signs/symptoms of CO2 retention  Outcome: Progressing     Problem: GASTROINTESTINAL - ADULT  Goal: Minimal or  absence of nausea and vomiting  Description: INTERVENTIONS:  - Maintain adequate hydration with IV or PO as ordered and tolerated  - Nasogastric tube to low intermittent suction as ordered  - Evaluate effectiveness of ordered antiemetic medications  - Provide nonpharmacologic comfort measures as appropriate  - Advance diet as tolerated, if ordered  - Obtain nutritional consult as needed  - Evaluate fluid balance  Outcome: Progressing  Goal: Maintains or returns to baseline bowel function  Description: INTERVENTIONS:  - Assess bowel function  - Maintain adequate hydration with IV or PO as ordered and tolerated  - Evaluate effectiveness of GI medications  - Encourage mobilization and activity  - Obtain nutritional consult as needed  - Establish a toileting routine/schedule  - Consider collaborating with pharmacy to review patient's medication profile  Outcome: Progressing  Goal: Maintains adequate nutritional intake (undernourished)  Description: INTERVENTIONS:  - Monitor percentage of each meal consumed  - Identify factors contributing to decreased intake, treat as appropriate  - Assist with meals as needed  - Monitor I&O, WT and lab values  - Obtain nutritional consult as needed  - Optimize oral hygiene and moisture  - Encourage food from home; allow for food preferences  - Enhance eating environment  Outcome: Progressing  Goal: Achieves appropriate nutritional intake (bariatric)  Description: INTERVENTIONS:  - Monitor for over-consumption  - Identify factors contributing to increased intake, treat as appropriate  - Monitor I&O, WT and lab values  - Obtain nutritional consult as needed  - Evaluate psychosocial factors contributing to over-consumption  Outcome: Progressing

## 2024-12-02 VITALS
RESPIRATION RATE: 18 BRPM | BODY MASS INDEX: 16 KG/M2 | DIASTOLIC BLOOD PRESSURE: 60 MMHG | WEIGHT: 82.44 LBS | HEART RATE: 65 BPM | OXYGEN SATURATION: 99 % | SYSTOLIC BLOOD PRESSURE: 147 MMHG | TEMPERATURE: 98 F

## 2024-12-02 LAB
ERYTHROCYTE [DISTWIDTH] IN BLOOD BY AUTOMATED COUNT: 17.5 %
HCT VFR BLD AUTO: 28.1 %
HGB BLD-MCNC: 8.9 G/DL
MCH RBC QN AUTO: 30.1 PG (ref 26–34)
MCHC RBC AUTO-ENTMCNC: 31.7 G/DL (ref 31–37)
MCV RBC AUTO: 94.9 FL
PLATELET # BLD AUTO: 261 10(3)UL (ref 150–450)
RBC # BLD AUTO: 2.96 X10(6)UL
WBC # BLD AUTO: 9.6 X10(3) UL (ref 4–11)

## 2024-12-02 PROCEDURE — 99233 SBSQ HOSP IP/OBS HIGH 50: CPT | Performed by: INTERNAL MEDICINE

## 2024-12-02 RX ORDER — VANCOMYCIN HYDROCHLORIDE 125 MG/1
125 CAPSULE ORAL DAILY
Qty: 7 CAPSULE | Refills: 0 | Status: SHIPPED | OUTPATIENT
Start: 2024-12-02 | End: 2024-12-09

## 2024-12-02 RX ORDER — VANCOMYCIN HYDROCHLORIDE 125 MG/1
125 CAPSULE ORAL DAILY
Qty: 7 CAPSULE | Refills: 0 | Status: SHIPPED | OUTPATIENT
Start: 2024-12-02 | End: 2024-12-02

## 2024-12-02 RX ORDER — CEPHALEXIN 500 MG/1
500 CAPSULE ORAL DAILY
Qty: 3 CAPSULE | Refills: 0 | Status: SHIPPED | OUTPATIENT
Start: 2024-12-02 | End: 2024-12-05

## 2024-12-02 RX ORDER — CEPHALEXIN 500 MG/1
500 CAPSULE ORAL DAILY
Qty: 3 CAPSULE | Refills: 0 | Status: SHIPPED | OUTPATIENT
Start: 2024-12-02 | End: 2024-12-02

## 2024-12-02 NOTE — HISTORICAL OFFICE NOTE
Facility Logo [facility]  [address], 4th [place] [address]  [phone]      Emmie Loza  Progress Note  Demographics:  Name: Emmie Loza YOB: 1932  Age: 89, Female Medical Record No: [id number]  Visited Date/Time: 10/08/2024 03:30 PM    Chief Complaints  Follow up    History of Present Illness  89-year-old female with newly diagnosed atrial fibrillation.  Her primary symptom was significant lower extremity edema and fatigue.  She went to [facility] and started on amiodarone.  She does not report having a cardioversion.  She does not know all the testing she had    Her edema is much better.  She has more energy according to her family members    No prior stroke or TIA    She has had prior renal cancer and a separate colon cancer.  These were all in the remote past more than 10 years ago    She has chronic kidney disease creatinine is 2.7    Current visit:  Since my last visit the patient has been in the hospital twice.  1 for dehydration and elevated creatinine and then once her diuretic was stopped she came in with volume overload.  Her medications been adjusted by her PCP and then by her nephrologist who put her on torsemide which started today  Visit June 20 , 2024  Patient was recently hospitalized with a fall and a hip fracture.  She had surgical repair.  She is about 20 pounds heavier now than she was when she was more compensated.  She has lower extremity edema.  She also has active C. difficile but is on a weaning dose of vancomycin    Visit Aug 13, 2024  Patient was recently hospitalized again with colitis.  Patient went into A-fib with RVR during the hospitalization.  Diltiazem was increased and amiodarone was temporarily increased  Amlodipine was decreased.  There may have been some other medication changes.    Patient has a shallow ulcer on her left calf on the posterior side    Visit October 8, 2024  We were able to get  verification on the medications and she is on amiodarone.  Cardiac risk factors Never smoked  Past Medical History  1.Unspecified atrial fibrillation  2.Benign essential HTN  3.Hyperlipidemia, mixed  4.Pure hypercholesterolemia  5.Chronic kidney disease, stage 3b  6.Hypokalemia  7.Vitamin D deficiency  Family History  1. Father - Family history of stroke  2. Mother - Family history of stroke  3. Son - History of heart attack  Social History  Smoking status Never smoked  Tobacco usage - No (Non-smoker for personal reasons (finding))  Review of systems  Cardiovascular No history of Chest pain, BANKS, Palpitations, Syncope, PND, Orthopnea, Edema and Claudication  Physical Examination  Vitals Right Arm Sitting  / 62 mmHg, Pulse rate 60 bpm, Height in 5' 0\", BMI: 17.8, Weight in 91 lbs (or) 41.28 kgs and BSA : 1.32 cc/m²  General Appearance No Acute Distress, Well groomed and Normal Body habitus  Cardiovascular   EKG/Other abnormalities  General - NAD  PEERLA/EOMI  JVD - normal  CTA Bilaterally  CV- RRR, S1/S2, No murmurs  GI- Soft, Nontender  Extremities normal pulses  Mood/Affect Congruent  Skin no lesions  Joint/Musculoskeletal - Normal     Lower extremity edema noted  Allergies  1.ciprofloxacin - Ingredient(Reaction:rash, Severity:Moderate)  2.penicillin - Ingredient(Reaction:rash, Severity:Moderate)  Medications  1.amiodarone 200 mg tablet, Take 1 tablet orally once a day.  2.aspirin 81 mg tablet,delayed release, Take 1 tablet orally once a day.  3.Centrum Silver Women 8 mg iron-400 mcg-50 mcg tablet, Take 1 tablet orally once a day.  4.dilTIAZem  mg capsule,24 hr,extended release, Take 1 capsule orally once a day.  5.Eliquis 2.5 mg tablet, Take 1 tablet orally 2 times a day.  6.ferrous sulfate 325 mg (65 mg iron) tablet, Take 1 tablet orally once a day.  7.multivitamin tablet, Take 1 tablet orally once a day.  8.Saccharomyces boulardii 250 mg capsule, Take 1 capsule orally once a day.  9.sodium  bicarbonate 650 mg tablet, Take 1 tablet orally 2 times a day.  10.torsemide 20 mg tablet, Take 1 tablet orally once every other day  11.traMADoL 50 mg tablet, Take one-half tablet orally 2 times a day.  12.TylenoL 325 mg tablet, Take 2 tablets orally 2 times a day.  13.vancomycin 125 mg capsule, Take 1 capsule (125 mg total) by mouth 4 (four) times daily for 7 days, THEN 1 capsule (125 mg total) 2 (two) times a day for 7 days, THEN 1  14.Vitmain D 500mcg, daily    Impression  1.Unspecified atrial fibrillation  2.Benign essential HTN  3.Hyperlipidemia, mixed  4.Pure hypercholesterolemia  5.Chronic kidney disease, stage 3b  6.Hypokalemia  7.Vitamin D deficiency  Assessment & Plan  ===============================  Cardiac Testing Personally Reviewed    ECG Reviewed -normal sinus rhythm ectopic atrial rhythm    Echo Results []    Stress Test Results []    Monitor Results []    EP Procedures: []  ==============================    Problem List:    #Symptomatic persistent atrial fibrillation  - Chest Vascor of 4 for hypertension, age x2, gender  --On Eliquis 2.5 twice daily  - LFTs and TSH were checked recently and are normal  -Patient is on amiodarone 200 mg daily as planned.  In sinus rhythm.  Blood pressure normal  - Her last labs were in September which showed creatinine of 2.29.  Her last CMP was also normal     #Essential hypertension on diltiazem and hydrochlorothiazide     # Volume overload  - Continue current medications     # C. difficile now weaning.  Vancomycin     # Recent hip fracture surgical repair completed    #Left calf ulcer.  I have recommended that she see wound care or talk to her primary care physician about a wound care consult      Continue current meds except as outlined above.  =====================================    Check out Items:  Follow-up in 6 months with a CMP and TSH before that visit  Labs and Diagnostics ordered  1.EKG (electrocardiogram) (Today)  Nurses documentation  Refills:  none  Upcoming surgeries: none  Use of assistive device: none  Verbal order for EKG: yes  (MB, GM)       Patient instructions  Follow up in 6 months  Lab Details  RBC MORPHOLOGY SCAN  09/06/2024 07:29:17 PM  MORPHOLOGY See morphology below Normal, Slide reviewed, see previous RBC morphology. A F  PLATELET MORPHOLOGY Normal Normal  F  HYPOCHROMASIA 1+   F  MICROCYTOSIS 1+   F  BASIC METABOLIC PANEL (8)  09/06/2024 04:43:32 PM  GLUCOSE 122 70-99 mg/dL H F  SODIUM 144 136-145 mmol/L  F  POTASSIUM 4.2 3.5-5.1 mmol/L  F  CHLORIDE 112  mmol/L  F  CO2 23.0 21.0-32.0 mmol/L  F  ANION GAP 9 0-18 mmol/L  F  BUN 41 9-23 mg/dL H F  CREATININE 2.29 0.55-1.02 mg/dL H F  CALCIUM 8.5 8.7-10.4 mg/dL L F  OSMOLALITY CALCULATED 309 275-295 mOsm/kg H F  E GFR CR 20 >=60 mL/min/1.73m2 L F  FASTING PATIENT BMP ANSWER No   F  CBC WITH DIFFERENTIAL WITH PLATELET  09/06/2024 04:10:22 PM  WBC 7.8 4.0-11.0 x10(3) uL  F  RED BLOOD COUNT 2.98 3.80-5.30 x10(6)uL L F  HGB 8.3 12.0-16.0 g/dL L F  HCT 26.7 35.0-48.0 % L F  PLATELETS 236.0 150.0-450.0 10(3)uL  F  MEAN CELL VOLUME 89.6 80.0-100.0 fL  F  MEAN CORPUSCULAR HEMOGLOBIN 27.9 26.0-34.0 pg  F  MEAN CORPUSCULAR HGB CONC 31.1 31.0-37.0 g/dL  F  RED CELL DISTRIBUTION WIDTH CV 22.7 %  F  NEUTROPHIL ABS PRELIM 5.43 1.50-7.70 x10 (3) uL  F  NEUTROPHIL ABSOLUTE 5.43 1.50-7.70 x10(3) uL  F  LYMPHOCYTE ABSOLUTE 1.26 1.00-4.00 x10(3) uL  F  MONOCYTE ABSOLUTE 0.66 0.10-1.00 x10(3) uL  F  EOSINOPHIL ABSOLUTE 0.28 0.00-0.70 x10(3) uL  F  BASOPHIL ABSOLUTE 0.05 0.00-0.20 x10(3) uL  F  IMMATURE GRANULOCYTE COUNT 0.09 0.00-1.00 x10(3) uL  F  NEUTROPHIL % 69.9 %  F  LYMPHOCYTE % 16.2 %  F  MONOCYTE % 8.5 %  F  EOSINOPHIL % 3.6 %  F  BASOPHIL % 0.6 %  F  IMMATURE GRANULOCYTE RATIO % 1.2 %  F  CPOE Orders carried out by: Debbie Garcia  Care Providers: Nayeli Cisneros MD and Debbie Garcia  Electronically Authenticated by  Nayeli Cisneros MD  10/08/2024 04:14:10 PM  Disclaimer: Components of this note  were documented using voice recognition system and are subject to errors not corrected at proofreading. Contact the author of this note for any clarifications.

## 2024-12-02 NOTE — CONSULTS
[facility]  Report of Consultation    Sunita Loza Patient Status:  Observation    1932 MRN [id number]   Location [facility] 2NE-A Attending Annette Walker MD   Hosp Day # 0 PCP Janell Powell MD     Reason for Consultation:  Afib    History of Present Illness:  Sunita Loza is a a(n) 89 year old female admitted with RUE hematoma.  Denies trauma or recent blood draw/vaccine.  Denies chest pain or shortness of breath.        History:  Past Medical History:    Arthritis    Atrial fibrillation (HCC)    Back pain    C. difficile colitis    CKD (chronic kidney disease)    and S/P nephrectomy    Colon cancer (HCC)    Congestive heart disease (HCC)    Easy bruising    Endometrial cancer (HCC)    UTERINE, DX ABOUT 30 YEARS    Hearing impairment    AIDS    Heart palpitations    Afib    High blood pressure    Hip fracture (HCC)    left    Kidney failure    Leg swelling    Bishop syndrome    hereditary colorectal cancer    Muscle weakness    WALKER    Pain in joints    Wears glasses    Weight loss     Past Surgical History:   Procedure Laterality Date    Colectomy      Nephrectomy Left     Orif hip fracture Left 06/10/2024    Part removal colon w end colostomy       Family History   Problem Relation Age of Onset    Heart Disorder Father     Heart Disorder Mother     Colon Cancer Mother         70    Heart Disorder Sister     Breast Cancer Sister     Diabetes Brother     Breast Cancer Sister     Cancer Sister     Breast Cancer Sister     Breast Cancer Daughter       reports that she has never smoked. She has never used smokeless tobacco. She reports that she does not currently use alcohol. She reports that she does not use drugs.    Allergies:  Allergies[1]    Medications:    Current Facility-Administered Medications:     tranexamic acid (Lysteda) 50 mg/mL (5%) oral solution 500 mg, 500 mg, Swish & Spit, BID PRN    metoclopramide (Reglan) 5 mg/mL injection 5 mg, 5 mg, Intravenous, Daily PRN    acetaminophen  (Tylenol Extra Strength) tab 500 mg, 500 mg, Oral, Q4H PRN    polyethylene glycol (PEG 3350) (Miralax) 17 g oral packet 17 g, 17 g, Oral, Daily PRN    sennosides (Senokot) tab 17.2 mg, 17.2 mg, Oral, Nightly PRN    bisacodyl (Dulcolax) 10 MG rectal suppository 10 mg, 10 mg, Rectal, Daily PRN    ondansetron (Zofran) 4 MG/2ML injection 4 mg, 4 mg, Intravenous, Q6H PRN    dilTIAZem ER (CardIZEM CD) 24 hr cap 240 mg, 240 mg, Oral, Daily    ceFAZolin (Ancef) 1 g in dextrose 5% 100mL IVPB-ADD, 1 g, Intravenous, Q24H    vancomycin (Vancocin) cap 125 mg, 125 mg, Oral, Daily    torsemide (Demadex) tab 10 mg, 10 mg, Oral, Daily    amiodarone (Pacerone) tab 200 mg, 200 mg, Oral, QAM    ferrous sulfate DR tab 325 mg, 325 mg, Oral, BID    pantoprazole (Protonix) DR tab 40 mg, 40 mg, Oral, QAM AC    sodium bicarbonate tab 650 mg, 650 mg, Oral, BID    traMADol (Ultram) tab 25 mg, 25 mg, Oral, BID    acidophilus (Probiotic) cap/tab 1 each, 1 each, Oral, Daily    Review of Systems:  All systems were reviewed and are negative except as described above in HPI.    Physical Exam:  Blood pressure 147/60, pulse 65, temperature 97.9 °F (36.6 °C), temperature source Oral, resp. rate 18, weight 82 lb 7.2 oz (37.4 kg), SpO2 99%.  Temp (24hrs), Av.2 °F (36.8 °C), Min:97.6 °F (36.4 °C), Max:98.6 °F (37 °C)    Wt Readings from Last 3 Encounters:   24 82 lb 7.2 oz (37.4 kg)   24 85 lb 4 oz (38.7 kg)   10/31/24 86 lb 4.8 oz (39.1 kg)       Telemetry: SR with CVR  General: Alert and oriented in no apparent distress.  HEENT: No focal deficits.  Neck: No JVD, carotids 2+ no bruits.  Cardiac: Regular rate and rhythm, S1, S2 normal, no murmur, rub or gallop.  Lungs: Clear without wheezes, rales, rhonchi or dullness.  Normal excursions and effort.  Abdomen: Soft, non-tender.   Extremities: Without clubbing, cyanosis or edema.  Peripheral pulses are 2+.; RUE pulses normal, neurovascularly intact  Neurologic: Alert and oriented, normal  affect.  Skin: Warm and dry.     Laboratories and Data:  Diagnostics:      Labs:   Lab Results   Component Value Date    WBC 9.6 12/02/2024    RBC 2.96 12/02/2024    HGB 8.9 12/02/2024    HCT 28.1 12/02/2024    MCV 94.9 12/02/2024    MCH 30.1 12/02/2024    MCHC 31.7 12/02/2024    RDW 17.5 12/02/2024    .0 12/02/2024     Lab Results   Component Value Date    INR 1.12 11/29/2024    INR 1.17 10/31/2024    INR 1.25 (H) 08/20/2024       Assessment/Plan:  Patient Active Problem List   Diagnosis    DJD (degenerative joint disease), cervical    Asymptomatic postmenopausal status    Benign essential HTN    Chronic kidney disease, stage III (moderate) (HCC)    Chronic rhinitis    Disorder of bone and cartilage    Esophageal reflux    Generalized osteoarthrosis, involving multiple sites    Malignant neoplasm of uterus (HCC)    Other vitamin B12 deficiency anemia    Pure hypercholesterolemia    Renal failure    Acute pain of left knee    Vitamin D deficiency    Acidosis    Malignant neoplasm (HCC)    Hypokalemia    Hyperlipidemia, mixed    Endometrial carcinoma (HCC)    Anemia in chronic kidney disease    Osteopenia    Chronic kidney disease, stage 3b (HCC)    Otalgia    Sensorineural hearing loss, bilateral    ZOHREH (acute kidney injury) (HCC)    Hypernatremia    Hypomagnesemia    Dehydration    ATN (acute tubular necrosis) (HCC)    Atrial fibrillation (HCC)    Bilateral leg edema    Essential hypertension    Primary osteoarthritis of left knee    Abdominal mass, unspecified abdominal location    Anemia    Acute kidney injury (HCC)    Azotemia    Hyperglycemia    Abdominal pain, generalized    CKD (chronic kidney disease) stage 4, GFR 15-29 ml/min (HCC)    Shock (HCC)    Decreased ferritin    Chronic edema    Cellulitis of abdominal wall    Acute renal injury (HCC)    Leukocytosis    Acute renal failure (ARF) (HCC)    Syncope, near    Heart failure with preserved ejection fraction (HCC)    Solitary kidney, acquired     Closed fracture of left hip, initial encounter ([facility])    Hyperkalemia    Clostridium difficile colitis    Metabolic acidosis    Leukocytosis, unspecified type    Chronic atrial fibrillation with RVR (HCC)    Gross hematuria    Acute on chronic anemia    Anemia, unspecified type    Other fatigue    Gastrointestinal hemorrhage, unspecified gastrointestinal hemorrhage type    Bleeding gums    Chest pain of uncertain etiology    Contusion of right upper extremity, initial encounter       Afib   - Currently in SR   - Eliquis has been held since admission 3 days ago   - Will resume as OP    Ok for discharge from cardiology standpoint    Antonio Dee MD  12/2/2024  11:37 AM         [1]   Allergies  Allergen Reactions    Ciprofloxacin RASH and HIVES    Penicillins RASH

## 2024-12-02 NOTE — PROGRESS NOTES
Reviewed discharge plan of care with patient. Verbalized understanding. IV removed. Tele removed. All questions answered. [facility] as discharge transportation.

## 2024-12-02 NOTE — CM/SW NOTE
12/02/24 1411   Discharge disposition   Expected discharge disposition [facility]   Post Acute Care Provider MILDRED MG   Discharge transportation Edward Jacintoar     MARY was notified by RN that pt is cleared to [facility].    MARY called and spoke with DON at [facility], [facility] ([phone]), regarding pt's discharge for today. [facility] confirmed they can accept pt back today. [facility] requested that MD sign each page of pt's MAR and have MAR faxed to them (F: [phone]).    MARY updated RN on the above. RN to have MD sign off on each page of pt's med list. SW to fax med list to [facility] once available.    RN requested MARY to place transportation on will-call.    MARY placed [facility] on will-call for today.  PCS form completed.    RN to call [facility] and give report.  RN Report: [phone]     to remain available for support and/or discharge planning.    Aguila Shearer: [id number]    Addendum (2:54pm): MARY faxed med list to [facility] (F: [phone]).    SUDHEER Lee  Discharge Planner  [phone]

## 2024-12-02 NOTE — PLAN OF CARE
Acquired patient around 0730. Alert to person and place, disoriented to time and situation. RUE redness, per patient swelling over arm has gone down significantly since admission. MD notifed about redness of arm, do not know if redness has spread since On Ra. Spo2 within normal limits. NSR/ SB on tele. HR get low as upper 60s. Incontinent of bladder. Colostomy bag. Plan for consult cardiology for anticoag. clearance, monitor Hgb, and Dc later today. Call light within reach. Continue plan of care      1351: Spoke with BD of [facility], gave report of patient and that patient will be dc from hospital. Merdicar on will call.    Problem: PAIN - ADULT  Goal: Verbalizes/displays adequate comfort level or patient's stated pain goal  Description: INTERVENTIONS:  - Encourage pt to monitor pain and request assistance  - Assess pain using appropriate pain scale  - Administer analgesics based on type and severity of pain and evaluate response  - Implement non-pharmacological measures as appropriate and evaluate response  - Consider cultural and social influences on pain and pain management  - Manage/alleviate anxiety  - Utilize distraction and/or relaxation techniques  - Monitor for opioid side effects  - Notify MD/LIP if interventions unsuccessful or patient reports new pain  - Anticipate increased pain with activity and pre-medicate as appropriate  Outcome: Progressing     Problem: RISK FOR INFECTION - ADULT  Goal: Absence of fever/infection during anticipated neutropenic period  Description: INTERVENTIONS  - Monitor WBC  - Administer growth factors as ordered  - Implement neutropenic guidelines  Outcome: Progressing     Problem: SAFETY ADULT - FALL  Goal: Free from fall injury  Description: INTERVENTIONS:  - Assess pt frequently for physical needs  - Identify cognitive and physical deficits and behaviors that affect risk of falls.  - [place] fall precautions as indicated by assessment.  - Educate pt/family on patient safety  including physical limitations  - Instruct pt to call for assistance with activity based on assessment  - Modify environment to reduce risk of injury  - Provide assistive devices as appropriate  - Consider OT/PT consult to assist with strengthening/mobility  - Encourage toileting schedule  Outcome: Progressing     Problem: CARDIOVASCULAR - ADULT  Goal: Maintains optimal cardiac output and hemodynamic stability  Description: INTERVENTIONS:  - Monitor vital signs, rhythm, and trends  - Monitor for bleeding, hypotension and signs of decreased cardiac output  - Evaluate effectiveness of vasoactive medications to optimize hemodynamic stability  - Monitor arterial and/or venous puncture sites for bleeding and/or hematoma  - Assess quality of pulses, skin color and temperature  - Assess for signs of decreased coronary artery perfusion - ex. Angina  - Evaluate fluid balance, assess for edema, trend weights  Outcome: Progressing  Goal: Absence of cardiac arrhythmias or at baseline  Description: INTERVENTIONS:  - Continuous cardiac monitoring, monitor vital signs, obtain 12 lead EKG if indicated  - Evaluate effectiveness of antiarrhythmic and heart rate control medications as ordered  - Initiate emergency measures for life threatening arrhythmias  - Monitor electrolytes and administer replacement therapy as ordered  Outcome: Progressing     Problem: RESPIRATORY - ADULT  Goal: Achieves optimal ventilation and oxygenation  Description: INTERVENTIONS:  - Assess for changes in respiratory status  - Assess for changes in mentation and behavior  - Position to facilitate oxygenation and minimize respiratory effort  - Oxygen supplementation based on oxygen saturation or ABGs  - Provide Smoking Cessation handout, if applicable  - Encourage broncho-pulmonary hygiene including cough, deep breathe, Incentive Spirometry  - Assess the need for suctioning and perform as needed  - Assess and instruct to report SOB or any respiratory  difficulty  - Respiratory Therapy support as indicated  - Manage/alleviate anxiety  - Monitor for signs/symptoms of CO2 retention  Outcome: Progressing     Problem: GASTROINTESTINAL - ADULT  Goal: Minimal or absence of nausea and vomiting  Description: INTERVENTIONS:  - Maintain adequate hydration with IV or PO as ordered and tolerated  - Nasogastric tube to low intermittent suction as ordered  - Evaluate effectiveness of ordered antiemetic medications  - Provide nonpharmacologic comfort measures as appropriate  - Advance diet as tolerated, if ordered  - Obtain nutritional consult as needed  - Evaluate fluid balance  Outcome: Progressing  Goal: Maintains or returns to baseline bowel function  Description: INTERVENTIONS:  - Assess bowel function  - Maintain adequate hydration with IV or PO as ordered and tolerated  - Evaluate effectiveness of GI medications  - Encourage mobilization and activity  - Obtain nutritional consult as needed  - Establish a toileting routine/schedule  - Consider collaborating with pharmacy to review patient's medication profile  Outcome: Progressing  Goal: Maintains adequate nutritional intake (undernourished)  Description: INTERVENTIONS:  - Monitor percentage of each meal consumed  - Identify factors contributing to decreased intake, treat as appropriate  - Assist with meals as needed  - Monitor I&O, WT and lab values  - Obtain nutritional consult as needed  - Optimize oral hygiene and moisture  - Encourage food from home; allow for food preferences  - Enhance eating environment  Outcome: Progressing  Goal: Achieves appropriate nutritional intake (bariatric)  Description: INTERVENTIONS:  - Monitor for over-consumption  - Identify factors contributing to increased intake, treat as appropriate  - Monitor I&O, WT and lab values  - Obtain nutritional consult as needed  - Evaluate psychosocial factors contributing to over-consumption  Outcome: Progressing

## 2024-12-02 NOTE — CM/SW NOTE
12/02/24 1100   CM/SW Referral Data   Referral Source Social Work (self-referral)   Reason for Referral Discharge planning   Informant EMR;Clinical Staff Member;Patient;Son   Medical Hx   Does patient have an established PCP? Yes   Patient Info   Patient's Home Environment Assisted Living   Post Acute Care Provider Upon Admission [facility]   Patient lives with   (24 hour staff)   Patient Status Prior to Admission   Services in place prior to admission Home Health Care;DME/Supplies at home   Home Health Provider Info [facility]   Type of DME/Supplies Wheelchair;Shower Chair;Grab Bars   Discharge Needs   Anticipated D/C needs Home health care   Choice of Post-Acute Provider   Patient/family choice [facility]     HOME SITUATION per PT eval  Type of Home: Assisted living facility ([facility])  Home Layout: One level                     Lives With: Staff 24 hours;Daughter (daughter has Downs' syndrome)    Drives: No   Patient Regularly Uses: Glasses;Wheelchair;Rolling walker       Prior Level of [place] per PT eval: Pt lives in the [facility] with her dtr who has Down Syndrome. Pt is typically independent with ADLs and ambulates with RW but gets assist with bathing. Can get more assistance from staff if needed.    Patient is a 89 y/o female admitted due to hyperkalemia. Anticipated therapy need for pt at [facility] is HH.    Per chart review, pt is from [facility] and had previously been set up with [facility].    SW met with pt at bedside to discuss DC plan. Pt is alert and oriented but a bit forgetful. Pt confirmed she lives at [facility]. Pt reports she has a wheelchair she uses for mobility and stated there is a shower chair and grab bars in her restroom. Pt reports being mod independent with her ADLs but does get assistance with bathing.    SW discussed HH services for pt at [place]. Pt reports  she has therapist that go to her apartment already, but she is unable to recall the name of the agency. Pt stated her sons would know more about her services. Pt agreeable with SW calling her son to further discuss DC plan.    SW called pt's son, Arron ([phone]), and discussed HH services for pt at [place]. Arron agreeable with services and utilizing [facility] again. Arron unsure if pt is still current with [facility] or if services had already stopped prior to admission. MARY confirmed she would send referral to [facility] and get services arranged for pt.    Arron did request for transportation to be arranged for pt at [facility]. Arron stated pt has used [facility] in the past and would like pt to use a [facility] again at [facility]. SW notified him of cost. Arron aware and agreeable with cost. MARY will arrange transportation once pt has been cleared for [place]. Arron thanked SW and denied having any further questions at this time.    MARY sent referral to [facility] via [facility]. David Bojorquez at [facility] confirmed pt is current with them. BEBETO order sent to them via [facility]. [facility] was reserved for pt.     to remain available for support and/or discharge planning.    Susan Reed, FAUSTINO  Discharge Planner  [phone]

## 2024-12-02 NOTE — PROGRESS NOTES
[facility]   part of [facility]     Nephrology Progress Note    Sunita Loza Patient Status:  Observation    1932 MRN [id number]   Location [facility] 2NE-A Attending Annette Walker MD   Hosp Day # 0 PCP Janell Powell MD       SUBJECTIVE:  Feeling good this AM, no acute events        Physical Exam:   /60 (BP Location: Left arm)   Pulse 65   Temp 97.9 °F (36.6 °C) (Oral)   Resp 18   Wt 82 lb 7.2 oz (37.4 kg)   SpO2 99%   BMI 16.10 kg/m²   Temp (24hrs), Av.2 °F (36.8 °C), Min:97.6 °F (36.4 °C), Max:98.6 °F (37 °C)       Intake/Output Summary (Last 24 hours) at 2024 0822  Last data filed at 2024 2330  Gross per 24 hour   Intake 760 ml   Output 670 ml   Net 90 ml     Last 3 Weights   24 0910 82 lb 7.2 oz (37.4 kg)   24 0500 83 lb 12.4 oz (38 kg)   24 1101 85 lb 4 oz (38.7 kg)   10/31/24 2311 86 lb 4.8 oz (39.1 kg)   10/24/24 1532 100 lb (45.4 kg)   24 1331 90 lb 8 oz (41.1 kg)     General: Alert and oriented in no apparent distress.  HEENT: No scleral icterus, MMM  Neck: Supple, no MARIA FERNANDA or thyromegaly  Cardiac: Regular rate and rhythm, S1, S2 normal, + VITA  Lungs: Clear without wheezes, rales, rhonchi.    Abdomen: Soft, non-tender. + bowel sounds, + ostomy  Extremities: tr BLE edema L>R. R arm ecchymosis/erythema noted/edema better.  Neurologic: Alert and oriented, cranial nerves grossly intact, moving all extremities  Skin: Warm and dry, no rash        Labs:     Recent Labs   Lab 24  0508 24  1614 24  0036 24  0757 24  0552   WBC 9.3  --   --  11.7* 9.5   HGB 9.4* 7.0* 9.2* 8.8* 8.1*   MCV 94.7  --   --  94.5 92.1   .0  --   --  186.0 271.0   INR 1.12  --   --   --   --        Recent Labs   Lab 24  0508 24  0638 24  1525 24  0757 24  0552     --   --  132* 138   K 5.8* 5.5* 5.4* 5.4* 4.4     --   --  105 105   CO2 23.0  --   --  19.0* 26.0   BUN 45*  --   --  32* 44*    CREATSERUM 3.07*  --   --  2.75* 2.67*   CA 8.6*  --   --  8.3* 8.1*   *  --   --  83 88       Recent Labs   Lab 11/29/24  0508   ALT 16   AST 40*   ALB 2.9*       Recent Labs   Lab 12/01/24  0551   PGLU 104*       Meds:   Current Facility-Administered Medications   Medication Dose Route Frequency    tranexamic acid (Lysteda) 50 mg/mL (5%) oral solution 500 mg  500 mg Swish & Spit BID PRN    metoclopramide (Reglan) 5 mg/mL injection 5 mg  5 mg Intravenous Daily PRN    acetaminophen (Tylenol Extra Strength) tab 500 mg  500 mg Oral Q4H PRN    polyethylene glycol (PEG 3350) (Miralax) 17 g oral packet 17 g  17 g Oral Daily PRN    sennosides (Senokot) tab 17.2 mg  17.2 mg Oral Nightly PRN    bisacodyl (Dulcolax) 10 MG rectal suppository 10 mg  10 mg Rectal Daily PRN    ondansetron (Zofran) 4 MG/2ML injection 4 mg  4 mg Intravenous Q6H PRN    dilTIAZem ER (CardIZEM CD) 24 hr cap 240 mg  240 mg Oral Daily    ceFAZolin (Ancef) 1 g in dextrose 5% 100mL IVPB-ADD  1 g Intravenous Q24H    vancomycin (Vancocin) cap 125 mg  125 mg Oral Daily    torsemide (Demadex) tab 10 mg  10 mg Oral Daily    amiodarone (Pacerone) tab 200 mg  200 mg Oral QAM    ferrous sulfate DR tab 325 mg  325 mg Oral BID    pantoprazole (Protonix) DR tab 40 mg  40 mg Oral QAM AC    sodium bicarbonate tab 650 mg  650 mg Oral BID    traMADol (Ultram) tab 25 mg  25 mg Oral BID    acidophilus (Probiotic) cap/tab 1 each  1 each Oral Daily         Impression/Plan:      #1.  CKD IV- b/l creat most recnetly 2.5-2.6 mg/dl or so in setting of solitary kidney/HTN.  ZOHREH resolved with creat again at baseline     #2.  Hyperkalemia- resolved    #3.  R arm pain/swelling/erythema- no DVT noted. On empiric abx.  Significantly better.  Per primary service     #4.  HTN- on diltiazem     #5.  LE edema- chronic.  Cont low dose torsemide    Questions/concerns were discussed with patient and/or family by bedside.    No objection to discharge from nephrology  standpoint      Talha Hernandez MD  12/2/2024  8:23 AM

## 2024-12-02 NOTE — PLAN OF CARE
Assumed care around 1930. Alert and orientated x 2. Disorientated with time/situation. Maintaining oxygen saturations on room air. Normal sinus on tele. Incontinent, Purewick and brief in place. Redness to sacrum, Mepilex changed. Colostomy to LLQ. No complaints of pain. Up x 1 assist w/ walker and gait belt. Patient updated with plan of care. Call light within reach.      Problem: PAIN - ADULT  Goal: Verbalizes/displays adequate comfort level or patient's stated pain goal  Description: INTERVENTIONS:  - Encourage pt to monitor pain and request assistance  - Assess pain using appropriate pain scale  - Administer analgesics based on type and severity of pain and evaluate response  - Implement non-pharmacological measures as appropriate and evaluate response  - Consider cultural and social influences on pain and pain management  - Manage/alleviate anxiety  - Utilize distraction and/or relaxation techniques  - Monitor for opioid side effects  - Notify MD/LIP if interventions unsuccessful or patient reports new pain  - Anticipate increased pain with activity and pre-medicate as appropriate  Outcome: Progressing     Problem: RISK FOR INFECTION - ADULT  Goal: Absence of fever/infection during anticipated neutropenic period  Description: INTERVENTIONS  - Monitor WBC  - Administer growth factors as ordered  - Implement neutropenic guidelines  Outcome: Progressing     Problem: SAFETY ADULT - FALL  Goal: Free from fall injury  Description: INTERVENTIONS:  - Assess pt frequently for physical needs  - Identify cognitive and physical deficits and behaviors that affect risk of falls.  - [place] fall precautions as indicated by assessment.  - Educate pt/family on patient safety including physical limitations  - Instruct pt to call for assistance with activity based on assessment  - Modify environment to reduce risk of injury  - Provide assistive devices as appropriate  - Consider OT/PT consult to assist with  strengthening/mobility  - Encourage toileting schedule  Outcome: Progressing     Problem: CARDIOVASCULAR - ADULT  Goal: Maintains optimal cardiac output and hemodynamic stability  Description: INTERVENTIONS:  - Monitor vital signs, rhythm, and trends  - Monitor for bleeding, hypotension and signs of decreased cardiac output  - Evaluate effectiveness of vasoactive medications to optimize hemodynamic stability  - Monitor arterial and/or venous puncture sites for bleeding and/or hematoma  - Assess quality of pulses, skin color and temperature  - Assess for signs of decreased coronary artery perfusion - ex. Angina  - Evaluate fluid balance, assess for edema, trend weights  Outcome: Progressing  Goal: Absence of cardiac arrhythmias or at baseline  Description: INTERVENTIONS:  - Continuous cardiac monitoring, monitor vital signs, obtain 12 lead EKG if indicated  - Evaluate effectiveness of antiarrhythmic and heart rate control medications as ordered  - Initiate emergency measures for life threatening arrhythmias  - Monitor electrolytes and administer replacement therapy as ordered  Outcome: Progressing     Problem: RESPIRATORY - ADULT  Goal: Achieves optimal ventilation and oxygenation  Description: INTERVENTIONS:  - Assess for changes in respiratory status  - Assess for changes in mentation and behavior  - Position to facilitate oxygenation and minimize respiratory effort  - Oxygen supplementation based on oxygen saturation or ABGs  - Provide Smoking Cessation handout, if applicable  - Encourage broncho-pulmonary hygiene including cough, deep breathe, Incentive Spirometry  - Assess the need for suctioning and perform as needed  - Assess and instruct to report SOB or any respiratory difficulty  - Respiratory Therapy support as indicated  - Manage/alleviate anxiety  - Monitor for signs/symptoms of CO2 retention  Outcome: Progressing     Problem: GASTROINTESTINAL - ADULT  Goal: Minimal or absence of nausea and  vomiting  Description: INTERVENTIONS:  - Maintain adequate hydration with IV or PO as ordered and tolerated  - Nasogastric tube to low intermittent suction as ordered  - Evaluate effectiveness of ordered antiemetic medications  - Provide nonpharmacologic comfort measures as appropriate  - Advance diet as tolerated, if ordered  - Obtain nutritional consult as needed  - Evaluate fluid balance  Outcome: Progressing  Goal: Maintains or returns to baseline bowel function  Description: INTERVENTIONS:  - Assess bowel function  - Maintain adequate hydration with IV or PO as ordered and tolerated  - Evaluate effectiveness of GI medications  - Encourage mobilization and activity  - Obtain nutritional consult as needed  - Establish a toileting routine/schedule  - Consider collaborating with pharmacy to review patient's medication profile  Outcome: Progressing  Goal: Maintains adequate nutritional intake (undernourished)  Description: INTERVENTIONS:  - Monitor percentage of each meal consumed  - Identify factors contributing to decreased intake, treat as appropriate  - Assist with meals as needed  - Monitor I&O, WT and lab values  - Obtain nutritional consult as needed  - Optimize oral hygiene and moisture  - Encourage food from home; allow for food preferences  - Enhance eating environment  Outcome: Progressing  Goal: Achieves appropriate nutritional intake (bariatric)  Description: INTERVENTIONS:  - Monitor for over-consumption  - Identify factors contributing to increased intake, treat as appropriate  - Monitor I&O, WT and lab values  - Obtain nutritional consult as needed  - Evaluate psychosocial factors contributing to over-consumption  Outcome: Progressing

## 2024-12-02 NOTE — DISCHARGE INSTRUCTIONS
[facility] - [address] [address]  Phone: [phone]  Fax: [phone]

## 2024-12-06 NOTE — DISCHARGE SUMMARY
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     Oral bleeding  - likely 2/2 loose bridge + blood thinners  --appreciate oral surgery help- txa rinse was given  --held eliquis and bASA but resuming on dc  --panorex okay        Right upper arm swelling and bruising  - Suspect that it is secondary to recent phlebotomy but unsure why it just became apparent just prior to admit. No dvt seen  - coags nl on admit and last dose of eliquis 11/28 PM, no likely benefit from eliquis reversal now  --last bASA 11/28 AM but platelets could still be dysfunctional for 7 days-  s/p 1 unit plts 11/30  --cont arm elevation  --CT RUE with large hematoma, not much other significant findings  --on iv cefazolin for possibility of underlying cellulitis, transition to oral abx for short course on dc  --significantly improved on dc     Chest pain/sob  - unclear etiology, since resolved  - ddimer, cxr neg  - trop, EKG osmani     Hyperkalemia  ZOHREH on CKD  -Nephrology following. K improved    Bishop syndrome  History of colostomy for colon cancer  History of C. difficile  - Prophylactic Vanco while on antibiotics     A-fib on Eliquis  - Held Eliquis  --seen by osmani GONZALEZ to resume eliquis on dc    Day of discharge Exam  Vitals:    12/02/24 0813   BP: 147/60   Pulse: 65   Resp: 18   Temp: 97.9 °F (36.6 °C)     Exam on day of discharge:  Gen: No acute distress, alert and oriented  CV: RRR, +s1/s2  Lungs: CTAB, good respiratory effort  Abdomen: s/nt/nd  Ext: Moves all 4 extremities, no c/c/e  Neuro: CN Intact, no focal deficits    Discharge meds     Medication List        START taking these medications      vancomycin 125 MG Caps  Commonly known as: Vancocin  Take 1 capsule (125 mg total) by mouth daily for 7 days.            CONTINUE taking these medications      * acetaminophen 500 MG Tabs  Commonly known as: Tylenol Extra Strength     * acetaminophen 325 MG Tabs  Commonly known as: Tylenol     amiodarone 200 MG Tabs  Commonly known as: Pacerone     apixaban 2.5 MG Tabs  Commonly known as: Eliquis     aspirin 81 MG Tabs     dilTIAZem HCl ER Beads 240 MG Cp24  Commonly known as: TIAZAC  Take 1 capsule (240 mg total) by mouth daily.     epoetin chris 94295 UNIT/ML Soln  Commonly known as: Epogen, Procrit  Inject 1 mL (10,000 Units total) into the skin once a week. For HGB <10     ferrous sulfate 325 (65 FE) MG Tbec     MULTIVITAMINS OR     pantoprazole 40 MG Tbec  Commonly known as: Protonix  Take 1 tablet (40 mg total) by mouth every morning before breakfast.     SACCHAROMYCES BOULARDII OR     sodium  English bicarbonate 650 MG Tabs  Take 1 tablet (650 mg total) by mouth 2 (two) times daily.     torsemide 10 MG Tabs  Commonly known as: DEMADEX  Take 1 tablet (10 mg total) by mouth daily.     traMADol 50 MG Tabs  Commonly known as: Ultram           * This list has 2 medication(s) that are the same as other medications prescribed for you. Read the directions carefully, and ask your doctor or other care provider to review them with you.                ASK your doctor about these medications      cephALEXin 500 MG Caps  Commonly known as: Keflex  Take 1 capsule (500 mg total) by mouth daily for 3 days.  Ask about: Should I take this medication?               Where to Get Your Medications        You can get these medications from any pharmacy    Bring a paper prescription for each of these medications  cephALEXin 500 MG Caps  vancomycin 125 MG Caps       Consults: IP CONSULT TO NEPHROLOGY  IP CONSULT TO ORAL AND MAXILLOFACIAL SURGERY  IP CONSULT TO SOCIAL WORK  Radiology: CT UPPER EXT RIGHT (WO IV)(CPT=73200)    Result Date: 11/30/2024  PROCEDURE:  CT UPPER EXT RIGHT (WO IV) EM (CPT=73200)  COMPARISON:  [facility], US VENOUS DOPPLER ARM RIGHT - DIAG IMG (CPT=93971), 11/29/2024, 5:47 AM.  INDICATIONS:  Follow-up for history of right arm hematoma with clinical concern for interval increase in size.  TECHNIQUE:  Multi-planar CT images were created without intravenous contrast. Shaded surface renderings are generated on an independent CT scanner workstation.  Dose reduction techniques were used. Dose information is transmitted to the ACR (American College of Radiology) NRDR (National Radiology Data Registry) which includes the Dose Index Registry  PATIENT STATED HISTORY: (As transcribed by Technologist)  Patient having swelling to entire arm with redness.    FINDINGS:  No evidence of acute osseous injuries/fractures.  As noted on recent ultrasound imaging, there is evidence of an intramuscular hematoma within the mid distal  aspect of the right arm, which appears to be involving the biceps and brachioradialis musculature measuring approximately 10.8 x 3.0 x 4.6 cm in maximal dimensions.  There is mild diffuse subcutaneous edema, swelling to the right arm and right forearm.  No additional hematomas or mass lesions are noted.            CONCLUSION:  1. There is suggestion of an intramuscular hematoma within the mid to distal aspect of the right arm and appears to involve the biceps and brachioradialis musculature measuring 10.8 x 3.0 x 4.6 cm. 2. No acute osseous abnormalities.   LOCATION:  [facility]   Dictated by (CST): Kathleen Vora DO on 11/30/2024 at 9:17 PM     Finalized by (CST): Kathleen Vora DO on 11/30/2024 at 9:20 PM       XR PANORAMIC OF JAWS (CPT=70355)    Result Date: 11/30/2024  PROCEDURE:  XR PANORAMIC OF JAWS (CPT=70355)  TECHNIQUE:  Single panelipse view of the mandible was obtained.  COMPARISON:  None.  INDICATIONS:  Loose tooth prosthesis, oral bleeding  PATIENT STATED HISTORY: (As transcribed by Technologist)  Patient offered no additional history at this time               CONCLUSION:  Marked osteopenia.  No acute fracture.  No bone destruction.  Multiple crowns endplates noted.   LOCATION:  [facility]   Dictated by (CST): Daniel Herron MD on 11/30/2024 at 11:12 AM     Finalized by (CST): Daniel Herron MD on 11/30/2024 at 11:14 AM       XR CHEST AP PORTABLE  (CPT=71045)    Result Date: 11/29/2024  PROCEDURE:  XR CHEST AP PORTABLE  (CPT=71045)  TECHNIQUE:  AP chest radiograph was obtained.  COMPARISON:  [facility] , [facility], XR CHEST AP PORTABLE  (CPT=71045), 6/10/2024, 9:13 AM.  INDICATIONS:  right arm pain  PATIENT STATED HISTORY: (As transcribed by Technologist)  Patient shares she has right arm pain    FINDINGS:  Cardiomegaly with normal pulmonary vascularity. No pleural effusion or pneumothorax. No lobar consolidation.  Stable COPD changes.  Surgical clips the upper abdomen.            CONCLUSION:  No lobar pneumonia or  overt congestive failure.  Stable COPD changes.   LOCATION:  [facility]      Dictated by ([facility] [place]): Brandon Tobin MD on 11/29/2024 at 7:15 AM     Finalized by (CST): Brandon Tobin MD on 11/29/2024 at 7:15 AM       US VENOUS DOPPLER ARM RIGHT - DIAG IMG (CPT=93971)    Result Date: 11/29/2024  PROCEDURE:  US VENOUS DOPPLER ARM RIGHT - DIAG IMG (CPT=93971)  COMPARISON:  None.  INDICATIONS:  Right upper extremity pain  TECHNIQUE:  Real time, grey scale, and duplex ultrasound was used to evaluate the upper extremity venous system. B-mode two-dimensional images of the vascular structures, Doppler spectral analysis, and color flow.  Doppler imaging were performed.  The following veins were imaged:  Subclavian, Jugular, Axillary, Brachial, Basilic, Cephalic, and the contralateral Subclavian and Jugular.  FINDINGS:  Nonvisualization of the right cephalic vein which may be due to chronic occlusion or developmental absence. EXTREMITY:  Right upper extremity THROMBI:  None visible. COMPRESSION:  Normal compressibility, phasicity, and augmentation with exception of above OTHER:  In the area of swelling, there is a mixed attenuation collection without internal vascularity that could represent a soft tissue hematoma measuring at least 8 cm, with other etiologies not excluded.  This could be further assessed with CT and/or MRI of the right arm as clinically directed.            CONCLUSION:   1. No evidence of deep venous thrombosis in the right upper extremity.  2. In the area of swelling, there is a mixed attenuation collection without internal vascularity that could represent a soft tissue hematoma measuring at least 8 cm, with other etiologies not excluded.  This could be further assessed with CT and/or MRI of the right arm as clinically directed.  LOCATION:  [facility]   Dictated by ([facility] [place]): Brandon Tobin MD on 11/29/2024 at 6:41 AM     Finalized by (CST): Brandon Tobin MD on 11/29/2024 at 6:43 AM       Operative Procedures:    Activity: activity as tolerated  Diet: regular diet  Wound Care: none needed  Code Status: Full Code  O2: none  Total Time Coordinating Care: 35 minutes Patient had opportunity to ask questions and state understand and agree with therapeutic plan as outlined    I reconciled current and discharge medications on day of discharge.

## 2024-12-09 ENCOUNTER — MED REC SCAN ONLY (OUTPATIENT)
Dept: NEPHROLOGY | Facility: CLINIC | Age: 89
End: 2024-12-09

## 2024-12-13 ENCOUNTER — MED REC SCAN ONLY (OUTPATIENT)
Dept: NEPHROLOGY | Facility: CLINIC | Age: 89
End: 2024-12-13

## 2024-12-15 ENCOUNTER — HOSPITAL ENCOUNTER (EMERGENCY)
Facility: HOSPITAL | Age: 89
Discharge: HOME | End: 2024-12-15
Attending: EMERGENCY MEDICINE
Payer: MEDICARE

## 2024-12-15 ENCOUNTER — APPOINTMENT (OUTPATIENT)
Dept: GENERAL RADIOLOGY | Facility: HOSPITAL | Age: 89
End: 2024-12-15
Attending: EMERGENCY MEDICINE
Payer: MEDICARE

## 2024-12-15 ENCOUNTER — APPOINTMENT (OUTPATIENT)
Dept: CT IMAGING | Facility: HOSPITAL | Age: 89
End: 2024-12-15
Attending: EMERGENCY MEDICINE
Payer: MEDICARE

## 2024-12-15 VITALS
SYSTOLIC BLOOD PRESSURE: 138 MMHG | DIASTOLIC BLOOD PRESSURE: 71 MMHG | WEIGHT: 82.44 LBS | RESPIRATION RATE: 10 BRPM | HEART RATE: 62 BPM | BODY MASS INDEX: 16 KG/M2 | OXYGEN SATURATION: 100 % | TEMPERATURE: 98 F

## 2024-12-15 VITALS
DIASTOLIC BLOOD PRESSURE: 61 MMHG | WEIGHT: 82.44 LBS | OXYGEN SATURATION: 100 % | HEART RATE: 63 BPM | TEMPERATURE: 98 F | BODY MASS INDEX: 16 KG/M2 | SYSTOLIC BLOOD PRESSURE: 160 MMHG | RESPIRATION RATE: 13 BRPM

## 2024-12-15 DIAGNOSIS — R23.3 ABNORMAL BRUISING: Primary | ICD-10-CM

## 2024-12-15 DIAGNOSIS — S09.90XA ACUTE HEAD INJURY, INITIAL ENCOUNTER: Primary | ICD-10-CM

## 2024-12-15 DIAGNOSIS — N18.9 CHRONIC KIDNEY DISEASE, UNSPECIFIED CKD STAGE: ICD-10-CM

## 2024-12-15 DIAGNOSIS — D63.8 ANEMIA OF CHRONIC DISEASE: ICD-10-CM

## 2024-12-15 LAB
ALBUMIN SERPL-MCNC: 2.9 G/DL (ref 3.2–4.8)
ALBUMIN/GLOB SERPL: 1.2 {RATIO} (ref 1–2)
ALP LIVER SERPL-CCNC: 97 U/L
ALT SERPL-CCNC: 7 U/L
ANION GAP SERPL CALC-SCNC: 7 MMOL/L (ref 0–18)
APTT PPP: 36.5 SECONDS (ref 23–36)
AST SERPL-CCNC: 21 U/L (ref ?–34)
BASOPHILS # BLD AUTO: 0.02 X10(3) UL (ref 0–0.2)
BASOPHILS NFR BLD AUTO: 0.2 %
BILIRUB SERPL-MCNC: 0.2 MG/DL (ref 0.2–0.9)
BUN BLD-MCNC: 59 MG/DL (ref 9–23)
CALCIUM BLD-MCNC: 8.6 MG/DL (ref 8.7–10.4)
CHLORIDE SERPL-SCNC: 104 MMOL/L (ref 98–112)
CO2 SERPL-SCNC: 28 MMOL/L (ref 21–32)
CREAT BLD-MCNC: 3.16 MG/DL
EGFRCR SERPLBLD CKD-EPI 2021: 13 ML/MIN/1.73M2 (ref 60–?)
EOSINOPHIL # BLD AUTO: 0.02 X10(3) UL (ref 0–0.7)
EOSINOPHIL NFR BLD AUTO: 0.2 %
ERYTHROCYTE [DISTWIDTH] IN BLOOD BY AUTOMATED COUNT: 17.2 %
GLOBULIN PLAS-MCNC: 2.4 G/DL (ref 2–3.5)
GLUCOSE BLD-MCNC: 90 MG/DL (ref 70–99)
HCT VFR BLD AUTO: 26.6 %
HGB BLD-MCNC: 8.6 G/DL
IMM GRANULOCYTES # BLD AUTO: 0.06 X10(3) UL (ref 0–1)
IMM GRANULOCYTES NFR BLD: 0.5 %
INR BLD: 1.5 (ref 0.8–1.2)
LYMPHOCYTES # BLD AUTO: 0.86 X10(3) UL (ref 1–4)
LYMPHOCYTES NFR BLD AUTO: 7.2 %
MCH RBC QN AUTO: 30.6 PG (ref 26–34)
MCHC RBC AUTO-ENTMCNC: 32.3 G/DL (ref 31–37)
MCV RBC AUTO: 94.7 FL
MONOCYTES # BLD AUTO: 1.08 X10(3) UL (ref 0.1–1)
MONOCYTES NFR BLD AUTO: 9 %
NEUTROPHILS # BLD AUTO: 9.96 X10 (3) UL (ref 1.5–7.7)
NEUTROPHILS # BLD AUTO: 9.96 X10(3) UL (ref 1.5–7.7)
NEUTROPHILS NFR BLD AUTO: 82.9 %
OSMOLALITY SERPL CALC.SUM OF ELEC: 304 MOSM/KG (ref 275–295)
PLATELET # BLD AUTO: 340 10(3)UL (ref 150–450)
POTASSIUM SERPL-SCNC: 5 MMOL/L (ref 3.5–5.1)
PROT SERPL-MCNC: 5.3 G/DL (ref 5.7–8.2)
PROTHROMBIN TIME: 18.3 SECONDS (ref 11.6–14.8)
RBC # BLD AUTO: 2.81 X10(6)UL
SODIUM SERPL-SCNC: 139 MMOL/L (ref 136–145)
WBC # BLD AUTO: 12 X10(3) UL (ref 4–11)

## 2024-12-15 PROCEDURE — 73060 X-RAY EXAM OF HUMERUS: CPT | Performed by: EMERGENCY MEDICINE

## 2024-12-15 PROCEDURE — 85610 PROTHROMBIN TIME: CPT | Performed by: EMERGENCY MEDICINE

## 2024-12-15 PROCEDURE — 72125 CT NECK SPINE W/O DYE: CPT | Performed by: EMERGENCY MEDICINE

## 2024-12-15 PROCEDURE — 85025 COMPLETE CBC W/AUTO DIFF WBC: CPT | Performed by: EMERGENCY MEDICINE

## 2024-12-15 PROCEDURE — 99285 EMERGENCY DEPT VISIT HI MDM: CPT

## 2024-12-15 PROCEDURE — 80053 COMPREHEN METABOLIC PANEL: CPT | Performed by: EMERGENCY MEDICINE

## 2024-12-15 PROCEDURE — 99284 EMERGENCY DEPT VISIT MOD MDM: CPT

## 2024-12-15 PROCEDURE — 70450 CT HEAD/BRAIN W/O DYE: CPT | Performed by: EMERGENCY MEDICINE

## 2024-12-15 PROCEDURE — 36415 COLL VENOUS BLD VENIPUNCTURE: CPT

## 2024-12-15 PROCEDURE — 74176 CT ABD & PELVIS W/O CONTRAST: CPT | Performed by: EMERGENCY MEDICINE

## 2024-12-15 PROCEDURE — 85730 THROMBOPLASTIN TIME PARTIAL: CPT | Performed by: EMERGENCY MEDICINE

## 2024-12-15 NOTE — ED PROVIDER NOTES
Patient Seen in: [facility] Emergency Department      History     Chief Complaint   Patient presents with    Arm Pain    Bruising    Fatigue    GI Bleeding     Stated Complaint: left shoulder pain with movement, rash on left arm, hx of cellulitis in right a*    Subjective:   89-year-old female, history dementia, from her facility with rash or bruising to the left posterior upper arm.  Noticed by staff.  She is on blood thinners.  No reports of fall or trauma but she is demented therefore limited HPI/ROS.  X-ray was performed there but no results yet              Objective:     Past Medical History:    ZOHREH (acute kidney injury) (HCC)    Arthritis    Atrial fibrillation (HCC)    Attention to colostomy (HCC)    Back pain    C. difficile colitis    CKD (chronic kidney disease)    and S/P nephrectomy    CKD (chronic kidney disease) stage 4, GFR 15-29 ml/min (HCC)    Colon cancer (HCC)    Congestive heart disease (HCC)    Easy bruising    Endometrial cancer (HCC)    UTERINE, DX ABOUT 30 YEARS    Hearing impairment    AIDS    Heart palpitations    Afib    High blood pressure    Hip fracture (HCC)    left    Hyperkalemia    Kidney failure    Leg swelling    Bishop syndrome    hereditary colorectal cancer    Metabolic acidosis    Muscle weakness    WALKER    PAF (paroxysmal atrial fibrillation) (HCC)    Pain in joints    Wears glasses    Weight loss              Past Surgical History:   Procedure Laterality Date    Colectomy      Nephrectomy Left     Orif hip fracture Left 06/10/2024    Part removal colon w end colostomy                  Social History     Socioeconomic History    Marital status:    Tobacco Use    Smoking status: Never    Smokeless tobacco: Never   Vaping Use    Vaping status: Never Used   Substance and Sexual Activity    Alcohol use: Not Currently    Drug use: Never     Social Drivers of Health     Food Insecurity: No Food Insecurity (11/29/2024)    Food Insecurity     Food Insecurity: Never  true   Transportation Needs: No Transportation Needs (11/29/2024)    Transportation Needs     Lack of Transportation: No   Housing Stability: Low Risk  (11/29/2024)    Housing Stability     Housing Instability: No                  Physical Exam     ED Triage Vitals [12/15/24 1139]   /53   Pulse 69   Resp 12   Temp 97.7 °F (36.5 °C)   Temp src Oral   SpO2 97 %   O2 Device None (Room air)       Current Vitals:   Vital Signs  BP: 142/62  Pulse: 63  Resp: 17  Temp: 97.7 °F (36.5 °C)  Temp src: Oral  MAP (mmHg): 83    Oxygen Therapy  SpO2: 100 %  O2 Device: None (Room air)        Physical Exam  Vitals and nursing note reviewed.   Constitutional:       Appearance: She is not toxic-appearing.   HENT:      Head: Normocephalic and atraumatic.   Cardiovascular:      Rate and Rhythm: Normal rate.      Pulses: Normal pulses.   Pulmonary:      Effort: Pulmonary effort is normal. No respiratory distress.   Chest:      Chest wall: No tenderness.   Abdominal:      Palpations: Abdomen is soft.   Musculoskeletal:      Cervical back: Neck supple.   Skin:     General: Skin is warm and dry.   Neurological:      Mental Status: She is alert.       Patient looks like subacute bruising to the left posterior upper arm.  Also on her right posterior lateral ribs there is some bruising.  Not really tender to palpation.  Abdomen is soft.  Anterior chest is nontender.  Moving all extremities.    ED Course     Labs Reviewed   COMP METABOLIC PANEL (14) - Abnormal; Notable for the following components:       Result Value    BUN 59 (*)     Creatinine 3.16 (*)     Calcium, Total 8.6 (*)     Calculated Osmolality 304 (*)     eGFR-Cr 13 (*)     ALT 7 (*)     Total Protein 5.3 (*)     Albumin 2.9 (*)     All other components within normal limits   CBC WITH DIFFERENTIAL WITH PLATELET - Abnormal; Notable for the following components:    WBC 12.0 (*)     RBC 2.81 (*)     HGB 8.6 (*)     HCT 26.6 (*)     Neutrophil Absolute Prelim 9.96 (*)      Neutrophil Absolute 9.96 (*)     Lymphocyte Absolute 0.86 (*)     Monocyte Absolute 1.08 (*)     All other components within normal limits   PROTHROMBIN TIME (PT) - Abnormal; Notable for the following components:    PT 18.3 (*)     INR 1.50 (*)     All other components within normal limits   PTT, ACTIVATED - Abnormal; Notable for the following components:    PTT 36.5 (*)     All other components within normal limits                   MDM        I performed extensive chart review including her recent admission for the erythema/hematoma to the right elbow region..  She has same thing now on the left upper extremity and some bruising along the right posterior ribs and low back.  At that time her hemoglobin was at baseline, her platelets and her coags are stable.  She is on Eliquis for atrial fibrillation.  She was worked up, seen by multiple specialist, no acute intervention, history of GI bleed and transfusion in the past.  Her labs are identical today.  I suspect what ever caused her hematoma in her elbow and her gum bleeding last time is the same culprit as today.  We will CT her brain and her abdomen pelvis which will visualize her inferior ribs and her lumbar spine and low back.  If there is no significant findings we will plan on discharge back to her facility.  Will recommend palliative care consult for her due to age and multiple comorbidities.  I did attempt to call her son can at the number listed his voicemail says that he is on PTO until tomorrow.  There is no other phone number listed for family and so I did leave a voicemail for him to call me back and we will be happy to discuss with him assuming gets the message and calls me back.  If not, there does not appear to be anything acute going on, multiple chronic comorbidities and this had bleeding and hematoma formation.  I did discuss the case with Dr. Teixeira, who would be the admitting hospitalist.  Reviewed the case with him and discussed everything  with him and he is in agreement that there does not appear to be anything acute to workup in the hospital in inpatient status, agrees with recommendation of palliative consult as an outpatient and assuming no acute imaging findings can likely be discharged home, anything acute I will call him back for possible admission to the hospital    I independently interpreted x-ray of the left humerus and note some left shoulder osteoarthritis, no acute signs of fracture    Differential diagnosis includes, but not limited to, coagulopathy, thrombocytopenia, anemia, fracture, contusion, abnormal bleeding    External chart review demonstrates a recent hospital admission for what was thought to be a hematoma to her right elbow, or gum bleeding, chronic kidney disease, history of GI bleeding etc., similar to her presentation today    Some information obtained by EMS upon arrival regarding history presenting illness    No acute pathology on CT brain and CT abdomen pelvis.  Chronic abnormalities noted as above.  Will give 500 cc bolus normal saline and plan to discharge back to her facility.  Slept on her back from her son and power of .  Vital signs remained stable.  She is resting in no distress.  Awaiting transport.  Report given back to her facility, recommend discussion with family and possible palliative consult.  Return with any worsening symptoms or concerns    Patient was screened and evaluated during this visit.  As the treating physician attending to the patient, I determined within reasonable clinical confidence and prior to discharge, that an emergency medical condition was not or was no longer present.  There was no indication for further evaluation, treatment, or admission on an emergency basis.  Comprehensive verbal and written discharge and follow-up instructions were provided to help prevent relapse or worsening.  Patient was instructed to follow-up with their primary care provider for further evaluation  and treatment, return immediately to ER for worsening, concerning, new, or changing/persisting symptoms.      Per the discharge paperwork, patients are encouraged to and given instructions on how to sign up for MyChart, where they have access to their records, including any/all incidental findings.     This note was prepared using Dragon Medical voice recognition dictation software. As a result errors may occur. When identified these errors have been corrected. While every attempt is made to correct errors during dictation discrepancies may still exist    Note to patient: The 21st Century Cures Act makes medical notes like these available to patients in the interest of transparency. However, this is a medical document intended as peer to peer communication. It is written in medical language and may contain abbreviations or verbiage that are unfamiliar. It may appear blunt or direct. Medical documents are intended to carry relevant information, facts as evident, and the clinical opinion of the practitioner.     Medical Decision Making      Disposition and Plan     Clinical Impression:  1. Abnormal bruising    2. Chronic kidney disease, unspecified CKD stage    3. Anemia of chronic disease         Disposition:  Discharge  12/15/2024  2:08 pm    Follow-up:  Janell Powell MD  [address] [address]  [phone]    Follow up      [facility]  [address] [facility] [address] [phone]  Follow up  As needed, If symptoms worsen          Medications Prescribed:  Current Discharge Medication List              Supplementary Documentation:

## 2024-12-15 NOTE — ED QUICK NOTES
Patient arrived via [facility] from [facility]. Call was for \"change in condition.\" Patient has dementia, poor historian. Per EMS, they evaluated patient and noted left shoulder pain with movement and a rash to left arm. SNF Staff stated patient had something similar to right arm previously and it was cellulitis. Patient takes Eliquis. SNF states they aren't aware of any fall.   Patient evaluated upon arrival to ER. Noted large bruise to posterior left upper arm. Large bruise to patient's left flank and coccyx area. Patient's left arm is very weak, as well as left leg. Not able to lift left arm off the bed. Patient states it's due to pain.   Patient has a colostomy. Dark black output noted to be leaking out from bag. Hemoccult +. Patient was admitted in October for GI bleed.

## 2024-12-16 NOTE — ED QUICK NOTES
Patient arrives via [facility] from [facility]. Patient was seen here in ER earlier today for bruising to left upper arm and left flank and coccyx. Labs WNL. This RN cared for patient earlier, familiar with her. Patient was transported back to [facility]. Patient was in the kitchen, reaching into a cabinet and fell backwards, striking her posterior head off the refrigerator. Patient denies LOC. Is taking Eliquis. Patient arrives in cervical collar.

## 2024-12-16 NOTE — ED QUICK NOTES
Patient dressed back in nightgown. Patient able to stand at bedside. \"I think I'm ok.\"  Awaiting [facility].

## 2024-12-16 NOTE — ED PROVIDER NOTES
Patient Seen in: [facility] Emergency Department      History     Chief Complaint   Patient presents with    Fall    Trauma     Stated Complaint: fall on thinners    Subjective:   HPI      89-year-old female presenting for a fall with head injury she was seen earlier today for similar issues and today she fell this evening striking her head with no loss of consciousness was brought here for reevaluation.  Patient has no complaints at this time    Objective:     Past Medical History:    ZOHREH (acute kidney injury) (HCC)    Arthritis    Atrial fibrillation (HCC)    Attention to colostomy (HCC)    Back pain    C. difficile colitis    CKD (chronic kidney disease)    and S/P nephrectomy    CKD (chronic kidney disease) stage 4, GFR 15-29 ml/min (HCC)    Colon cancer (HCC)    Congestive heart disease (HCC)    Easy bruising    Endometrial cancer (HCC)    UTERINE, DX ABOUT 30 YEARS    Hearing impairment    AIDS    Heart palpitations    Afib    High blood pressure    Hip fracture (HCC)    left    Hyperkalemia    Kidney failure    Leg swelling    Bishop syndrome    hereditary colorectal cancer    Metabolic acidosis    Muscle weakness    WALKER    PAF (paroxysmal atrial fibrillation) (HCC)    Pain in joints    Wears glasses    Weight loss              Past Surgical History:   Procedure Laterality Date    Colectomy      Nephrectomy Left     Orif hip fracture Left 06/10/2024    Part removal colon w end colostomy                  Social History     Socioeconomic History    Marital status:    Tobacco Use    Smoking status: Never    Smokeless tobacco: Never   Vaping Use    Vaping status: Never Used   Substance and Sexual Activity    Alcohol use: Not Currently    Drug use: Never     Social Drivers of Health     Food Insecurity: No Food Insecurity (11/29/2024)    Food Insecurity     Food Insecurity: Never true   Transportation Needs: No Transportation Needs (11/29/2024)    Transportation Needs     Lack of Transportation: No    Housing Stability: Low Risk  (11/29/2024)    Housing Stability     Housing Instability: No                  Physical Exam     ED Triage Vitals   BP 12/15/24 2153 141/57   Pulse 12/15/24 2153 68   Resp 12/15/24 2153 20   Temp 12/15/24 2202 97.6 °F (36.4 °C)   Temp src 12/15/24 2202 Oral   SpO2 12/15/24 2153 100 %   O2 Device 12/15/24 2153 None (Room air)       Current Vitals:   Vital Signs  BP: 138/71  Pulse: 61  Resp: (!) 9  Temp: 97.6 °F (36.4 °C)  Temp src: Oral  MAP (mmHg): 91    Oxygen Therapy  SpO2: 100 %  O2 Device: None (Room air)        Physical Exam  Patient is awake to person but not place or time patient is in c-collar otherwise HEENT exam normal lungs normal cardiovascular exam normal abdomen normal extremities no Cyanosis or edema within all extremities no complaints of pain    ED Course   Labs Reviewed - No data to display         Differential diagnosis includes subarachnoid hemorrhage subdural hematoma       MDM              Medical Decision Making  89-year-old female with presenting to the emergency department for a fall and head injury.  Independent interpretation by ED physician shows no C-spine fracture shows no subarachnoid hemorrhage patient will be discharged home is to return emerged part worsening symptoms or other complaints.  The patient was screened and evaluated during this visit.  As a treating physician attending to the patient, I determined, within reasonable clinical confidence and prior to discharge, that an emergency medical condition was not or was no longer present.  There was no indication for further evaluation, treatment or admission on an emergency basis.    The usual and customary discharge instructions were discussed given the patient's ER course.  We discussed signs and symptoms that should prompt the patient's immediate return to the emergency department.  Reasonable over-the-counter and prescription treatment options and physician follow-up plan was discussed.  Patient  was discharged home in good condition  This note was prepared using Dragon Medical voice recognition dictation software.  As a result errors may occur.  When identified to these areas have been corrected.  While every attempt is made to correct errors during dictation discrepancies may still exist.  Please contact if there are any errors    Problems Addressed:  Acute head injury, initial encounter: acute illness or injury    Amount and/or Complexity of Data Reviewed  Radiology: ordered and independent interpretation performed. Decision-making details documented in ED Course.        Disposition and Plan     Clinical Impression:  1. Acute head injury, initial encounter         Disposition:  Discharge  12/15/2024 10:42 pm    Follow-up:  Janell Powell MD  [address] [address]  [phone]    Follow up in 1 week(s)            Medications Prescribed:  Current Discharge Medication List              Supplementary Documentation:

## 2024-12-16 NOTE — ED INITIAL ASSESSMENT (HPI)
Patient was here earlier in ER for bruising to left arm and lower back. Patient fell tonight, struck posterior head. On Eliquis. Hx of dementia.

## 2024-12-23 ENCOUNTER — MED REC SCAN ONLY (OUTPATIENT)
Dept: NEPHROLOGY | Facility: CLINIC | Age: 89
End: 2024-12-23

## 2024-12-26 ENCOUNTER — MED REC SCAN ONLY (OUTPATIENT)
Dept: NEPHROLOGY | Facility: CLINIC | Age: 89
End: 2024-12-26

## 2024-12-29 ENCOUNTER — APPOINTMENT (OUTPATIENT)
Dept: GENERAL RADIOLOGY | Facility: HOSPITAL | Age: 89
End: 2024-12-29
Attending: EMERGENCY MEDICINE
Payer: MEDICARE

## 2024-12-29 ENCOUNTER — APPOINTMENT (OUTPATIENT)
Dept: CT IMAGING | Facility: HOSPITAL | Age: 89
End: 2024-12-29
Attending: EMERGENCY MEDICINE
Payer: MEDICARE

## 2024-12-29 ENCOUNTER — HOSPITAL ENCOUNTER (INPATIENT)
Facility: HOSPITAL | Age: 89
LOS: 5 days | Discharge: HOSPICE | End: 2025-01-04
Attending: EMERGENCY MEDICINE | Admitting: HOSPITALIST
Payer: MEDICARE

## 2024-12-29 DIAGNOSIS — N17.9 ACUTE RENAL FAILURE SUPERIMPOSED ON STAGE 4 CHRONIC KIDNEY DISEASE, UNSPECIFIED ACUTE RENAL FAILURE TYPE (HCC): Primary | ICD-10-CM

## 2024-12-29 DIAGNOSIS — E86.0 DEHYDRATION: ICD-10-CM

## 2024-12-29 DIAGNOSIS — N18.4 ACUTE RENAL FAILURE SUPERIMPOSED ON STAGE 4 CHRONIC KIDNEY DISEASE, UNSPECIFIED ACUTE RENAL FAILURE TYPE (HCC): Primary | ICD-10-CM

## 2024-12-29 DIAGNOSIS — M79.89 ARM SWELLING: ICD-10-CM

## 2024-12-29 PROBLEM — E87.1 HYPONATREMIA: Status: ACTIVE | Noted: 2024-12-29

## 2024-12-29 LAB
ALBUMIN SERPL-MCNC: 2.8 G/DL (ref 3.2–4.8)
ALBUMIN/GLOB SERPL: 1.1 {RATIO} (ref 1–2)
ALP LIVER SERPL-CCNC: 113 U/L
ALT SERPL-CCNC: 16 U/L
ANION GAP SERPL CALC-SCNC: 12 MMOL/L (ref 0–18)
AST SERPL-CCNC: 33 U/L (ref ?–34)
BASOPHILS # BLD AUTO: 0.01 X10(3) UL (ref 0–0.2)
BASOPHILS NFR BLD AUTO: 0.2 %
BILIRUB SERPL-MCNC: 0.2 MG/DL (ref 0.2–0.9)
BUN BLD-MCNC: 109 MG/DL (ref 9–23)
CALCIUM BLD-MCNC: 7.6 MG/DL (ref 8.7–10.4)
CHLORIDE SERPL-SCNC: 100 MMOL/L (ref 98–112)
CO2 SERPL-SCNC: 22 MMOL/L (ref 21–32)
CREAT BLD-MCNC: 5.63 MG/DL
EGFRCR SERPLBLD CKD-EPI 2021: 7 ML/MIN/1.73M2 (ref 60–?)
EOSINOPHIL # BLD AUTO: 0 X10(3) UL (ref 0–0.7)
EOSINOPHIL NFR BLD AUTO: 0 %
ERYTHROCYTE [DISTWIDTH] IN BLOOD BY AUTOMATED COUNT: 17.4 %
GLOBULIN PLAS-MCNC: 2.6 G/DL (ref 2–3.5)
GLUCOSE BLD-MCNC: 177 MG/DL (ref 70–99)
GLUCOSE BLD-MCNC: 245 MG/DL (ref 70–99)
HCT VFR BLD AUTO: 26.5 %
HGB BLD-MCNC: 8.4 G/DL
IMM GRANULOCYTES # BLD AUTO: 0.04 X10(3) UL (ref 0–1)
IMM GRANULOCYTES NFR BLD: 0.8 %
LYMPHOCYTES # BLD AUTO: 0.16 X10(3) UL (ref 1–4)
LYMPHOCYTES NFR BLD AUTO: 3.1 %
MCH RBC QN AUTO: 28.3 PG (ref 26–34)
MCHC RBC AUTO-ENTMCNC: 31.7 G/DL (ref 31–37)
MCV RBC AUTO: 89.2 FL
MONOCYTES # BLD AUTO: 0.56 X10(3) UL (ref 0.1–1)
MONOCYTES NFR BLD AUTO: 10.8 %
NEUTROPHILS # BLD AUTO: 4.43 X10 (3) UL (ref 1.5–7.7)
NEUTROPHILS # BLD AUTO: 4.43 X10(3) UL (ref 1.5–7.7)
NEUTROPHILS NFR BLD AUTO: 85.1 %
OSMOLALITY SERPL CALC.SUM OF ELEC: 317 MOSM/KG (ref 275–295)
PHOSPHATE SERPL-MCNC: 8.1 MG/DL (ref 2.4–5.1)
PLATELET # BLD AUTO: 309 10(3)UL (ref 150–450)
POTASSIUM SERPL-SCNC: 5.7 MMOL/L (ref 3.5–5.1)
PROT SERPL-MCNC: 5.4 G/DL (ref 5.7–8.2)
RBC # BLD AUTO: 2.97 X10(6)UL
SODIUM SERPL-SCNC: 134 MMOL/L (ref 136–145)
WBC # BLD AUTO: 5.2 X10(3) UL (ref 4–11)

## 2024-12-29 PROCEDURE — 73090 X-RAY EXAM OF FOREARM: CPT | Performed by: EMERGENCY MEDICINE

## 2024-12-29 PROCEDURE — 70450 CT HEAD/BRAIN W/O DYE: CPT | Performed by: EMERGENCY MEDICINE

## 2024-12-29 RX ORDER — SODIUM CHLORIDE 9 MG/ML
INJECTION, SOLUTION INTRAVENOUS CONTINUOUS
Status: DISCONTINUED | OUTPATIENT
Start: 2024-12-29 | End: 2024-12-30

## 2024-12-29 RX ORDER — SODIUM CHLORIDE 9 MG/ML
1000 INJECTION, SOLUTION INTRAVENOUS ONCE
Status: DISCONTINUED | OUTPATIENT
Start: 2024-12-29 | End: 2024-12-29

## 2024-12-29 RX ORDER — SODIUM CHLORIDE 9 MG/ML
INJECTION, SOLUTION INTRAVENOUS CONTINUOUS
Status: ACTIVE | OUTPATIENT
Start: 2024-12-29 | End: 2024-12-29

## 2024-12-29 RX ORDER — ONDANSETRON 2 MG/ML
4 INJECTION INTRAMUSCULAR; INTRAVENOUS ONCE
Status: COMPLETED | OUTPATIENT
Start: 2024-12-29 | End: 2024-12-29

## 2024-12-29 RX ORDER — HEPARIN SODIUM 5000 [USP'U]/ML
5000 INJECTION, SOLUTION INTRAVENOUS; SUBCUTANEOUS EVERY 8 HOURS SCHEDULED
Status: DISCONTINUED | OUTPATIENT
Start: 2024-12-29 | End: 2024-12-30

## 2024-12-29 RX ORDER — MORPHINE SULFATE 2 MG/ML
2 INJECTION, SOLUTION INTRAMUSCULAR; INTRAVENOUS ONCE
Status: COMPLETED | OUTPATIENT
Start: 2024-12-29 | End: 2024-12-29

## 2024-12-29 RX ORDER — ONDANSETRON 2 MG/ML
4 INJECTION INTRAMUSCULAR; INTRAVENOUS EVERY 6 HOURS PRN
Status: DISCONTINUED | OUTPATIENT
Start: 2024-12-29 | End: 2025-01-04

## 2024-12-29 RX ORDER — METOCLOPRAMIDE HYDROCHLORIDE 5 MG/ML
10 INJECTION INTRAMUSCULAR; INTRAVENOUS DAILY PRN
Status: DISCONTINUED | OUTPATIENT
Start: 2024-12-29 | End: 2025-01-04

## 2024-12-29 RX ORDER — ACETAMINOPHEN 500 MG
500 TABLET ORAL EVERY 4 HOURS PRN
Status: DISCONTINUED | OUTPATIENT
Start: 2024-12-29 | End: 2025-01-01

## 2024-12-29 NOTE — ED PROVIDER NOTES
Patient Seen in: [facility] Emergency Department      History     Chief Complaint   Patient presents with    Syncope     Stated Complaint:     Subjective:   HPI      Came in after a syncopAL EPISODE is a 89-year-old patient who currently lives in a assisted living/rehab facility.  She has had multiple visits to the emergency department this month for multiple reasons but multiple falls have happened apparently she has not been moving much she has a history of atrial fibrillation chronic kidney disease with 1 kidney in place she has a colostomy history of endometrial cancer generalized muscle weakness that she uses a walker for ambulation.  She has not.  Drinking much or moving around and getting out of bed for over a month.  Very poor appetitie  Objective:     Past Medical History:    ZOHREH (acute kidney injury) (HCC)    Arthritis    Atrial fibrillation (HCC)    Attention to colostomy (HCC)    Back pain    C. difficile colitis    CKD (chronic kidney disease)    and S/P nephrectomy    CKD (chronic kidney disease) stage 4, GFR 15-29 ml/min (HCC)    Colon cancer (HCC)    Congestive heart disease (HCC)    Easy bruising    Endometrial cancer (HCC)    UTERINE, DX ABOUT 30 YEARS    Hearing impairment    AIDS    Heart palpitations    Afib    High blood pressure    Hip fracture (HCC)    left    Hyperkalemia    Kidney failure    Leg swelling    Bishop syndrome    hereditary colorectal cancer    Metabolic acidosis    Muscle weakness    WALKER    PAF (paroxysmal atrial fibrillation) (HCC)    Pain in joints    Wears glasses    Weight loss              No pertinent past surgical history.              No pertinent social history.                Physical Exam     ED Triage Vitals   BP 12/29/24 1648 103/40   Pulse 12/29/24 1648 54   Resp 12/29/24 1648 13   Temp 12/29/24 1652 98.2 °F (36.8 °C)   Temp src 12/29/24 2140 Oral   SpO2 12/29/24 1650 100 %   O2 Device 12/29/24 1650 None (Room air)       Current Vitals:   Vital  Signs  BP: 123/51  Pulse: 73  Resp: 16  Temp: 98 °F (36.7 °C)  Temp src: Axillary  MAP (mmHg): 67    Oxygen Therapy  SpO2: 100 %  O2 Device: Nasal cannula  O2 Flow Rate (L/min): 2 L/min  Pulse Oximetry Type: Continuous  Oximetry Probe Site Changed: No  Pulse Ox Probe Location: Left hand        Physical Exam  Vitals and nursing note reviewed. Chaperone present: When the patient's son arrived, much more information available.  Right hand and arm swelling seem to start on December 24 there is no known fall previously the swelling had been on the left arm.   Constitutional:       General: She is not in acute distress.     Appearance: She is well-developed. She is ill-appearing. She is not diaphoretic.      Comments: Cachectic   HENT:      Head: Atraumatic.      Right Ear: External ear normal.      Left Ear: External ear normal.      Nose: Nose normal.      Mouth/Throat:      Mouth: Mucous membranes are dry.   Eyes:      General: No scleral icterus.        Right eye: No discharge.         Left eye: No discharge.      Conjunctiva/sclera: Conjunctivae normal.      Pupils: Pupils are equal, round, and reactive to light.   Neck:      Vascular: No JVD.   Cardiovascular:      Rate and Rhythm: Normal rate. Rhythm irregular.      Heart sounds: Normal heart sounds. No murmur heard.     No friction rub. No gallop.   Pulmonary:      Effort: Pulmonary effort is normal. No respiratory distress.      Breath sounds: Normal breath sounds. No stridor. No wheezing.      Comments: Patient with general shallow breathing  Chest:      Chest wall: No tenderness.   Abdominal:      General: Bowel sounds are normal. There is no distension.      Palpations: Abdomen is soft.      Tenderness: There is no abdominal tenderness. There is no guarding or rebound.      Comments: Colostomy in place nontender abdomen   Musculoskeletal:         General: Swelling, tenderness and deformity present. Normal range of motion.      Cervical back: Normal range of  motion and neck supple. No tenderness.      Comments: Right upper extremity from elbow to fingertips.  Red warm to the touch seems to be almost bruised and tender.  No visible deformities but very swollen   Lymphadenopathy:      Cervical: No cervical adenopathy.   Skin:     General: Skin is warm and dry.      Coloration: Skin is pale.      Findings: Bruising and erythema present. No rash.   Neurological:      Mental Status: She is alert and oriented to person, place, and time.      Cranial Nerves: No cranial nerve deficit.      Coordination: Coordination normal.   Psychiatric:         Behavior: Behavior normal.         Judgment: Judgment normal.      Comments: Patient is a somnolent lying on the emergency department bed she does awaken when I speak with her but noted to be with some significant dementia and fatigue             ED Course     Labs Reviewed   CBC WITH DIFFERENTIAL WITH PLATELET - Abnormal; Notable for the following components:       Result Value    RBC 2.97 (*)     HGB 8.4 (*)     HCT 26.5 (*)     Lymphocyte Absolute 0.16 (*)     All other components within normal limits   COMP METABOLIC PANEL (14) - Abnormal; Notable for the following components:    Glucose 177 (*)     Sodium 134 (*)     Potassium 5.7 (*)      (*)     Creatinine 5.63 (*)     Calcium, Total 7.6 (*)     Calculated Osmolality 317 (*)     eGFR-Cr 7 (*)     Total Protein 5.4 (*)     Albumin 2.8 (*)     All other components within normal limits   PHOSPHORUS - Abnormal; Notable for the following components:    Phosphorus 8.1 (*)     All other components within normal limits   BASIC METABOLIC PANEL (8) - Abnormal; Notable for the following components:     (*)     Creatinine 5.30 (*)     Calcium, Total 7.0 (*)     Calculated Osmolality 313 (*)     eGFR-Cr 7 (*)     All other components within normal limits   CBC, PLATELET; NO DIFFERENTIAL - Abnormal; Notable for the following components:    RBC 2.14 (*)     HGB 6.4 (*)     HCT  18.6 (*)     All other components within normal limits   CBC, PLATELET; NO DIFFERENTIAL - Abnormal; Notable for the following components:    RBC 2.24 (*)     HGB 6.3 (*)     HCT 19.9 (*)     All other components within normal limits   URIC ACID - Abnormal; Notable for the following components:    Uric Acid 10.0 (*)     All other components within normal limits   IRON AND TIBC - Abnormal; Notable for the following components:    Iron 3 (*)     Transferrin 117 (*)     Total Iron Binding Capacity 184 (*)     % Saturation 2 (*)     All other components within normal limits   URINALYSIS WITH CULTURE REFLEX - Abnormal; Notable for the following components:    Protein Urine 20 (*)     All other components within normal limits   HEMOGLOBIN - Abnormal; Notable for the following components:    HGB 8.4 (*)     All other components within normal limits   CREATININE, SERUM - Abnormal; Notable for the following components:    Creatinine 4.37 (*)     eGFR-Cr 9 (*)     All other components within normal limits   POCT GLUCOSE - Abnormal; Notable for the following components:    POC Glucose 245 (*)     All other components within normal limits   BLOOD CULTURE - Abnormal; Notable for the following components:    Blood Culture Result Staphylococcus aureus (*)     Blood Smear Gram positive cocci in pairs and clusters (*)     All other components within normal limits    Narrative:     Aerobic Bottle Volume - 7 mL  Anaerobic Bottle Volume - 7 mL    Anaerobic Bottle Time to Detection - 13 hr  18 min    BLOOD CULTURE - Abnormal; Notable for the following components:    Blood Culture Result Staphylococcus aureus (*)     Blood Smear Gram positive cocci in pairs and clusters (*)     All other components within normal limits    Narrative:     Aerobic Bottle Volume - 14 mL  Aerobic Bottle Volume - 14 mL    Aerobic Bottle Time to Detection - 12 hr  54 min    BLD CULT ID BY PCR - Abnormal; Notable for the following components:    Staphylococcus  aureus, MRSA by PCR Detected (*)     All other components within normal limits   FERRITIN - Normal   LACTIC ACID, PLASMA - Normal   C. DIFFICILE(TOXIGENIC)PCR - Normal   SCAN SLIDE   TYPE AND SCREEN    Narrative:     The following orders were created for panel order Type and screen.  Procedure                               Abnormality         Status                     ---------                               -----------         ------                     ABORH (Blood Type)[[id number]]                               Final result               Antibody Screen[[id number]]                                  Final result                 Please view results for these tests on the individual orders.   PREPARE RBC   ABORH (BLOOD TYPE)   ANTIBODY SCREEN   RAINBOW DRAW LAVENDER   RAINBOW DRAW LIGHT GREEN   RAINBOW DRAW BLUE   RAINBOW DRAW GOLD     EKG    Rate, intervals and axes as noted on EKG Report.  Rate: 50  Rhythm: Sinus Rhythm  Reading: Sinus bradycardia with ventricular rate of 50 bpm no acute ST elevation or significant ST depression to suggest acute ischemia            Very long conversation with this patient's son in [place] by telephone and  one present in the room.  This patient has been enrolled into hospice however this just happened 2 days ago and they certainly did not expect the patient to make such a rapid downturn.  They had thought her kidneys had actually been improving recently.  Prior to me knowing that she was on hospice when the son arrived, blood work had been done which shows significant worsening of the renal function and of course I am quite worried about that right upper extremity x-ray was done to make sure there was not an occult fracture and was started after discussion with shared decision making with the stockings.  I told them that I worried about possible infection and the blood cultures had already been drawn and we talked about the possibility of making the patient no longer a full  code but a full care at this time while we determine the health of her kidneys and treat any infection that could be causing significant pain in her arm.  I discussed the case with nephrology and with the hospitalist service and we can certainly have palliative care see the family in the hospital and discuss goals of care again    The patient seems to be mildly improving in the emergency department with morphine and fluids.  Was given as on my independent review of the chart given Recommendation for Which Seems to Be Almost a Similar Presentation in Mid November on His Right Upper Extremity.  Patient Asked for Water and Something to Eat Which Does Seem to Be Clinical Improvement.    I reviewed all labs with the patient's sons and the patient we discussed pain control, admission to the hospital with IV fluids, IV antibiotics, and then reassessing the situation tomorrow.    Forearm x-ray as independently reviewed by myself does not show any signs of significant joint effusion, gas in the tissues, fracture CT of the brain is independently  Reviewed by myself does not show any signs of intracranial hemorrhage or skull fracture because the patient had a syncopal event at the nursing facility and she went to stand up.       MDM      Syncopal event at the nursing facility could be from significant dehydration as patient has not been eating or drinking for about a month, she also seems to have a very swollen red arm and it is unclear if that is an occult injury from the many falls or possibly an arm cellulitis or even an upper extremity DVT.  Strangely the patient has actually been having this upper extremity swelling redness that was originally in the right then went to the left and now it seems to come back to the right forearm.  Perhaps it is an infection that is not clear?  Patient certainly has poor general function, nutrition, and likely any ability to fight infection.  In addition she has a history of ESBL-and MRSA.   Kidney function is significantly worsening with a BUN of 109 which is probably contributing to her altered mental status.  In addition she has significant hypokalemia and hyperphosphatemia    I am not going to give her a hyperkalemic dosage of medications but we are going to continue IV fluid hydration because the patient is clinically very very dry.    Please see above for I discussed the case with the hospitalist the signs and the nephrologist.  While patient has just very recently been signed up for hospice, no one anticipated that this was going to be this acute event that happened today with a syncopal episode now painful right upper swollen extremity and significant kidney failure.  Therefore, we are going to deploy supportive care with IV fluids IV antibiotics and pain control overnight but not escalate care further dialysis or significant life-saving or intervening like coding the patient after informed discussion with the family    Admission disposition: 12/31/2024  9:22 AM           Medical Decision Making      Disposition and Plan     Clinical Impression:  1. Acute renal failure superimposed on stage 4 chronic kidney disease, unspecified acute renal failure type (HCC)    2. Dehydration    3. Arm swelling         Disposition:  Admit  12/31/2024  9:22 am    Follow-up:  No follow-up provider specified.        Medications Prescribed:  Current Discharge Medication List              Supplementary Documentation:         Hospital Problems       Present on Admission  Date Reviewed: 12/15/2024            ICD-10-CM Noted POA    * (Principal) Acute renal failure superimposed on stage 4 chronic kidney disease, unspecified acute renal failure type (HCC) N17.9, N18.4 12/29/2024 Unknown    Arm swelling M79.89 12/29/2024 Unknown    Dehydration E86.0 2/1/2024 Unknown    Goals of care, counseling/discussion Z71.89 12/30/2024 Unknown    Hyponatremia E87.1 12/29/2024 Yes    Palliative care by specialist Z51.5 12/30/2024  Unknown

## 2024-12-29 NOTE — ED INITIAL ASSESSMENT (HPI)
Pt BIBA from hospice full code.  Staff was emptying pts ostomy & Pt was standing with staff and had syncopal episode. Pt did not fall, staff caught pt. AxO2 unaware of baseline

## 2024-12-29 NOTE — PROGRESS NOTES
CC: follow-up hospital admission hip fx    SUBJECTIVE:  Interval History:     Confused overngith but more alert this am but still cannot recall name of hosptial or current month    OBJECTIVE:  Scheduled Meds:    vancomycin  15 mg/kg Intravenous Q48H    apixaban  2.5 mg Oral BID    amLODIPine  10 mg Oral Daily    sennosides  17.2 mg Oral Nightly    multivitamin  1 tablet Oral Daily    amiodarone  100 mg Oral Daily    aspirin  81 mg Oral Daily    dilTIAZem ER  120 mg Oral Daily    ferrous sulfate  325 mg Oral Daily with breakfast    pantoprazole  40 mg Oral QAM AC    vancomycin  125 mg Oral BID    Followed by    [START ON 6/16/2024] vancomycin  125 mg Oral Daily    Followed by    [START ON 6/24/2024] vancomycin  125 mg Oral Q MWF    mupirocin  1 Application Nasal Q12H     Continuous Infusions:    sodium bicarbonate in D5W infusion 150 mEq (06/13/24 0326)     PRN Meds:   acetaminophen    ondansetron    metoclopramide    lidocaine PF    ropivacaine    sodium chloride 0.9% PF    morphINE **OR** morphINE    HYDROmorphone **OR** HYDROmorphone **OR** HYDROmorphone    hydrALAzine    PHYSICAL EXAM  Vital signs: Temp:  [97.3 °F (36.3 °C)-98.2 °F (36.8 °C)] 97.5 °F (36.4 °C)  Pulse:  [] 103  Resp:  [14-25] 18  BP: (112-138)/(56-89) 138/66  SpO2:  [92 %-96 %] 94 %      GENERAL - NAD   EYES- sclera anicteric   HENT- normocephalic, OP - dry  NECK - supple  CV- appears regular   RESP - CTAB, normal resp effort  ABDOMEN- soft, NT/ND, stoma  EXT- mild edema bl       Data Review:   Labs:   Recent Labs   Lab 06/10/24  0810 06/11/24  0433 06/12/24  0457 06/13/24  0520   WBC 10.4 10.1 11.9* 9.9   HGB 9.8* 9.1* 7.7* 7.2*   .5* 100.7* 102.7* 96.7   .0 266.0 244.0 215.0   INR 0.99  --   --   --        Recent Labs   Lab 06/10/24  0810 06/10/24  1137 06/10/24  1505 06/11/24  0433 06/11/24  1321 06/12/24  0458 06/12/24  1402 06/13/24  0520   * 143  --  142  --  145  --  141   K 6.1* 6.1*   < > 5.9* 5.6* 5.6* 5.0 3.9    * 120*  --  121*  --  121*  --  109   CO2 16.0* 16.0*  --  16.0*  --  14.0*  --  24.0   BUN 37* 36*  --  37*  --  40*  --  38*   CREATSERUM 2.25* 2.25*  --  2.33*  --  2.47*  --  2.19*   CA 8.7 9.7  --  8.8  --  7.8*  --  6.2*   GLU 91 121*  --  104*  --  101*  --  124*    < > = values in this interval not displayed.       Recent Labs   Lab 06/10/24  0810 06/13/24  0520   ALT 19 10*   AST 20 18   ALB 2.7* 1.7*       Recent Labs   Lab 06/10/24  0944 06/10/24  1104   PGLU 83 180*           ASSESSMENT/PLAN:    Patient is a 89 year old female with PMH sig for CKD, colon cancer, RCC sp nephrectomy, HTN, PAF, recent admission for cdiff here for fall and hip fx     Impression     -L acute, displaced IT left femoral neck fracture      -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 Mechanical vs syncope  -sp OR and chephalomedullary nail 06/11  -pain control  -IS  PT OT      -she also has left arm / knee pain - will get xrays too - neg for fx     *zohreh  *hyper K - likely from supplement  -fluids / K management per renal     *Cdfiff  -cont vancomycin  -has generalized shaking will check blood cx but otherwise no other infectious symptoms - ngtd  -thao RN , will monitor stool outpt. Not recorded overnight.     *anemia  -chronic but worse today, likely fluids / abla from surgery  Daily cbc  Hold eliquis this am, repeat hh this afternoon     Chronic conditions  Home meds as able    Dvt ppx  Scds  Will hold eliquis     Will continue to follow while hospitalized. Please page me or the on-call hospitalist with questions or concerns.    Praful Clarke  [place]  Hospitalist  [phone]  Answering Service: [phone]    Addendum    Hgb stable /better  Ok for eliquis tonight     patient

## 2024-12-30 ENCOUNTER — APPOINTMENT (OUTPATIENT)
Dept: CT IMAGING | Facility: HOSPITAL | Age: 89
End: 2024-12-30
Attending: HOSPITALIST
Payer: MEDICARE

## 2024-12-30 PROBLEM — Z71.89 GOALS OF CARE, COUNSELING/DISCUSSION: Status: ACTIVE | Noted: 2024-12-30

## 2024-12-30 PROBLEM — Z51.5 PALLIATIVE CARE BY SPECIALIST: Status: ACTIVE | Noted: 2024-12-30

## 2024-12-30 LAB
ANION GAP SERPL CALC-SCNC: 8 MMOL/L (ref 0–18)
ANTIBODY SCREEN: NEGATIVE
ATRIAL RATE: 50 BPM
BILIRUB UR QL STRIP.AUTO: NEGATIVE
BUN BLD-MCNC: 102 MG/DL (ref 9–23)
C DIFF TOX B STL QL: NEGATIVE
CALCIUM BLD-MCNC: 7 MG/DL (ref 8.7–10.4)
CHLORIDE SERPL-SCNC: 107 MMOL/L (ref 98–112)
CLARITY UR REFRACT.AUTO: CLEAR
CO2 SERPL-SCNC: 21 MMOL/L (ref 21–32)
COLOR UR AUTO: YELLOW
CREAT BLD-MCNC: 5.3 MG/DL
DEPRECATED HBV CORE AB SER IA-ACNC: 106 NG/ML
EGFRCR SERPLBLD CKD-EPI 2021: 7 ML/MIN/1.73M2 (ref 60–?)
ERYTHROCYTE [DISTWIDTH] IN BLOOD BY AUTOMATED COUNT: 17.2 %
ERYTHROCYTE [DISTWIDTH] IN BLOOD BY AUTOMATED COUNT: 17.5 %
GLUCOSE BLD-MCNC: 81 MG/DL (ref 70–99)
GLUCOSE UR STRIP.AUTO-MCNC: NORMAL MG/DL
HCT VFR BLD AUTO: 18.6 %
HCT VFR BLD AUTO: 19.9 %
HGB BLD-MCNC: 6.3 G/DL
HGB BLD-MCNC: 6.4 G/DL
HGB BLD-MCNC: 8.4 G/DL
IRON SATN MFR SERPL: 2 %
IRON SERPL-MCNC: 3 UG/DL
KETONES UR STRIP.AUTO-MCNC: NEGATIVE MG/DL
LACTATE SERPL-SCNC: 1.1 MMOL/L (ref 0.5–2)
LEUKOCYTE ESTERASE UR QL STRIP.AUTO: NEGATIVE
MCH RBC QN AUTO: 28.1 PG (ref 26–34)
MCH RBC QN AUTO: 29.9 PG (ref 26–34)
MCHC RBC AUTO-ENTMCNC: 31.7 G/DL (ref 31–37)
MCHC RBC AUTO-ENTMCNC: 34.4 G/DL (ref 31–37)
MCV RBC AUTO: 86.9 FL
MCV RBC AUTO: 88.8 FL
NITRITE UR QL STRIP.AUTO: NEGATIVE
OSMOLALITY SERPL CALC.SUM OF ELEC: 313 MOSM/KG (ref 275–295)
P AXIS: 70 DEGREES
P-R INTERVAL: 156 MS
PH UR STRIP.AUTO: 5 [PH] (ref 5–8)
PLATELET # BLD AUTO: 237 10(3)UL (ref 150–450)
PLATELET # BLD AUTO: 243 10(3)UL (ref 150–450)
POTASSIUM SERPL-SCNC: 5.1 MMOL/L (ref 3.5–5.1)
PROT UR STRIP.AUTO-MCNC: 20 MG/DL
Q-T INTERVAL: 492 MS
QRS DURATION: 82 MS
QTC CALCULATION (BEZET): 448 MS
R AXIS: 41 DEGREES
RBC # BLD AUTO: 2.14 X10(6)UL
RBC # BLD AUTO: 2.24 X10(6)UL
RBC UR QL AUTO: NEGATIVE
RH BLOOD TYPE: POSITIVE
SODIUM SERPL-SCNC: 136 MMOL/L (ref 136–145)
SP GR UR STRIP.AUTO: 1.02 (ref 1–1.03)
T AXIS: 58 DEGREES
TOTAL IRON BINDING CAPACITY: 184 UG/DL (ref 250–425)
TRANSFERRIN SERPL-MCNC: 117 MG/DL (ref 250–380)
URATE SERPL-MCNC: 10 MG/DL
UROBILINOGEN UR STRIP.AUTO-MCNC: NORMAL MG/DL
VENTRICULAR RATE: 50 BPM
WBC # BLD AUTO: 6.7 X10(3) UL (ref 4–11)
WBC # BLD AUTO: 9.2 X10(3) UL (ref 4–11)

## 2024-12-30 PROCEDURE — 73200 CT UPPER EXTREMITY W/O DYE: CPT | Performed by: HOSPITALIST

## 2024-12-30 PROCEDURE — 30233N1 TRANSFUSION OF NONAUTOLOGOUS RED BLOOD CELLS INTO PERIPHERAL VEIN, PERCUTANEOUS APPROACH: ICD-10-PCS | Performed by: HOSPITALIST

## 2024-12-30 PROCEDURE — 99221 1ST HOSP IP/OBS SF/LOW 40: CPT | Performed by: NURSE PRACTITIONER

## 2024-12-30 PROCEDURE — 76377 3D RENDER W/INTRP POSTPROCES: CPT | Performed by: HOSPITALIST

## 2024-12-30 PROCEDURE — 99223 1ST HOSP IP/OBS HIGH 75: CPT | Performed by: INTERNAL MEDICINE

## 2024-12-30 RX ORDER — TRAMADOL HYDROCHLORIDE 50 MG/1
25 TABLET ORAL 2 TIMES DAILY
Status: DISCONTINUED | OUTPATIENT
Start: 2024-12-30 | End: 2025-01-04

## 2024-12-30 RX ORDER — IPRATROPIUM BROMIDE AND ALBUTEROL SULFATE 2.5; .5 MG/3ML; MG/3ML
3 SOLUTION RESPIRATORY (INHALATION)
Status: DISCONTINUED | OUTPATIENT
Start: 2024-12-30 | End: 2024-12-31

## 2024-12-30 RX ORDER — VANCOMYCIN HYDROCHLORIDE 125 MG/1
125 CAPSULE ORAL DAILY
Status: DISCONTINUED | OUTPATIENT
Start: 2024-12-30 | End: 2024-12-30

## 2024-12-30 RX ORDER — SODIUM CHLORIDE 9 MG/ML
INJECTION, SOLUTION INTRAVENOUS ONCE
Status: COMPLETED | OUTPATIENT
Start: 2024-12-30 | End: 2024-12-30

## 2024-12-30 NOTE — PROGRESS NOTES
formerly [facility] Pharmacy Dosing Service      Initial Pharmacokinetic Consult for Vancomycin Dosing     Sunita Loza is a 89 year old female who is being initiated on vancomycin therapy for  MRSA bacteremia .  Pharmacy has been asked to dose vancomycin by Dr. Torrez.  The initial treatment and monitoring approach will be non-AUC strategy.        Weight and Temperature:    Wt Readings from Last 1 Encounters:   24 36.9 kg (81 lb 6.4 oz)        Temp Readings from Last 1 Encounters:   24 98.3 °F (36.8 °C) (Oral)      Labs:   Recent Labs   Lab 24  1729 24  0637   CREATSERUM 5.63* 5.30*      Estimated Creatinine Clearance: 3.9 mL/min (A) (based on SCr of 5.3 mg/dL (H)).     Recent Labs   Lab 24  1729 24  0637 24  0938   WBC 5.2 6.7 9.2          The Pharmacokinetic Target is:    Trough/random 10-15 mg/L    Renal Dosing Considerations:    ZOHREH/ARF - admission SCr was 5.63 mg/dL, baseline SCr appears to be ~2-3 mg/dL.     Assessment/Plan:   Initial/Loading dose: Will receive 1000 mg IV (25 mg/kg, capped at 2250 mg) x 1 loading dose.      Maintenance dose: Pharmacy will dose vancomycin per levels    Monitorin) Plan for vancomycin random level to be obtained after the loading dose.    2) Pharmacy will order SCr as clinically indicated to assess renal function.    3) Pharmacy will monitor for toxicity and efficacy, adjust vancomycin dose and/or frequency, and order vancomycin levels as appropriate per the Pharmacy and Therapeutics Committee approved protocol until discontinuation of the medication.       We appreciate the opportunity to assist in the care of this patient.     Braydon Farah, PharmD, BCPS, BCIDP  Clinical Pharmacist Specialist - Antimicrobial Stewardship  2024  12:43 PM  [facility] Extension: [phone]

## 2024-12-30 NOTE — ED QUICK NOTES
Orders for admission, patient is aware of plan and ready to go upstairs. Any questions, please call ED ALCON hawk at extension [phone].     Patient Covid vaccination status: Fully vaccinated     COVID Test Ordered in ED: None    COVID Suspicion at Admission: N/A    Running Infusions:    sodium chloride 125 mL/hr at 12/29/24 1904        Mental Status/LOC at time of transport: axo2    Other pertinent information:   CIWA score: N/A   NIH score:  N/A

## 2024-12-30 NOTE — PLAN OF CARE
NURSING ADMISSION NOTE    Patient admitted via Cart  Oriented to room.  Safety precautions initiated.  Bed in low position.  Call light in reach.    Assumed care of pt at 2130. Pt a/o x2. VSS. Afebrile. Reporting generalized pain. IVF infusing. Admission navigator competed with sonArron, via phone call. Medications reviewed. Colostomy changed. C diff sample set. Photo of sacrum in chart. Consult to  for POLST form. Call light within reach. Safety precautions in place.     Problem: Impaired Cognition  Goal: Patient will exhibit improved attention, thought processing and/or memory  Description: Interventions:  Outcome: Progressing     Problem: SAFETY ADULT - FALL  Goal: Free from fall injury  Description: INTERVENTIONS:  - Assess pt frequently for physical needs  - Identify cognitive and physical deficits and behaviors that affect risk of falls.  - [place] fall precautions as indicated by assessment.  - Educate pt/family on patient safety including physical limitations  - Instruct pt to call for assistance with activity based on assessment  - Modify environment to reduce risk of injury  - Provide assistive devices as appropriate  - Consider OT/PT consult to assist with strengthening/mobility  - Encourage toileting schedule  Outcome: Progressing

## 2024-12-30 NOTE — DIETARY NOTE
[facility]   part of [facility]    NUTRITION ASSESSMENT    Pt meets moderate malnutrition criteria at this time.    CRITERIA FOR MALNUTRITION DIAGNOSIS:  Criteria for non-severe malnutrition diagnosis: chronic illness related to wt loss 7.5% in 3 months and moderate fat and muscle depletion.      NUTRITION INTERVENTION:    RD nutrition Care Plan- Adjusted diet to least restrictive to maximize intake, Encouraged increased PO intake, Encouraged small frequent meals with emphasis on high calorie/high protein, and Initiated ONS (oral nutritional supplements)  Meal and Snacks - Continue renal Diet as tolerated; monitor patient po intake. Encourage adequate po of appropriate diet.  Medical Food Supplements - RD added Nepro BID. Rationale/use for oral supplements discussed.    PATIENT STATUS:     12/30/24 89 yo female. Admitted with ZOHREH superimposed with CKD4, poor PO intake over the last couple weeks and dehydrated.    MST and low BMI (15.90) triggered to be seen.  Visited patient in room, sons at bedside. Sons providing most information. Report patient's PO intake/appetite has decreased over the past month but has been poor over the past 4-5 days. Report she is asking for water but not eating much solids. Patient drinks ONS at home and sons in agreement to get it during admission.  Sons report that she has been having a hard time using her hands to eat solids and liquids but will be ordering soups and serving it in cup to sip and sandwiches that she has been preferring lately. Recommended ordering food 3 times per day to have available in case she wants to eat.  Moderate depletion of muscle and fat noted on NFPE. Weight loss significant for timeframe.   Of note - patient from [facility] from hospice full code.  Noted palliative on consult for GOC discussion.    Pmhx - afib, CKD stage 4, colon cancer s/p colostomy, endometrial cancer s/p hysterectomy, renal cell CA s/p nephrectomy, CHF, ORIF 6/2024, cdiff colitis.      ANTHROPOMETRICS:  Ht:  5'  Wt: 36.9 kg (81 lb 6.4 oz).   BMI: Body mass index is 15.9 kg/m².  IBW: 45.5 kg      WEIGHT HISTORY:   Weight loss: Yes, Non-severe Wt loss of 4.2 kg, 10%, over 3 months     Wt Readings from Last 10 Encounters:   12/29/24 36.9 kg (81 lb 6.4 oz)   12/15/24 37.4 kg (82 lb 7.2 oz)   12/15/24 37.4 kg (82 lb 7.2 oz)   11/29/24 37.4 kg (82 lb 7.2 oz)   11/13/24 38.7 kg (85 lb 4 oz)   10/31/24 39.1 kg (86 lb 4.8 oz)   10/24/24 45.4 kg (100 lb)   09/04/24 41.1 kg (90 lb 8 oz)   08/20/24 37.6 kg (83 lb)   07/30/24 41.3 kg (91 lb)      Wt Readings from Last 10 Encounters:   08/20/24 37.6 kg (83 lb)   07/30/24 41.3 kg (91 lb)   07/10/24 45.4 kg (100 lb)   06/24/24 45.8 kg (101 lb)   06/10/24 37.1 kg (81 lb 12.7 oz)   05/24/24 37.1 kg (81 lb 14.4 oz)   05/23/24 36.7 kg (81 lb)   04/23/24 42.1 kg (92 lb 14.4 oz)   04/04/24 39.7 kg (87 lb 8.4 oz)   03/26/24 37.7 kg (83 lb 1.6 oz)     NUTRITION:  Diet:       Procedures    Renal diet Renal; Is Patient on Accuchecks? No      Food Allergies: No  Cultural/Ethnic/Sikh Preferences Addressed: Yes    Percent Meals Eaten (last 3 days)       None            GI system review: WNL +  colostomy Last BM Date: 12/29/24  Skin and wounds: bruising on sacrum documented by RN.    NUTRITION RELATED PHYSICAL FINDINGS:     1. Body Fat/Muscle Mass: moderate depletion body fat Orbital fat pad, Buccal fat pad, and Midaxillary line and moderate muscle depletion Temple region and Shoulder/Acromion process     2. Fluid Accumulation:  right hand +2 edema , right upper extremity +1 edema documented by RN    NUTRITION PRESCRIPTION: 36.9 kg    Actual Body Weight  Calories: 1292 - 1476 calories/day (35-40 kcal/kg  Protein: 55 - 74 grams protein/day (1.5-2.0 grams protein per kg)  Fluid: ~1 ml/kcal or per MD discretion    NUTRITION DIAGNOSIS/PROBLEM:  Malnutrition related to insufficient appetite resulting in inadequate nutrition intake as evidenced by documented/reported  unintentional weight loss, loss of fat mass, and loss of muscle mass      MONITOR AND EVALUATE/NUTRITION GOALS:  PO intake of 75% of meals TID - New  PO intake of 75% of oral nutrition supplement/s - New  Weight stable within 1 to 2 lbs during admission - New      MEDICATIONS:  Zofran PRN    LABS:    K+:5.1,  BUN:102, Cr:5.3    Pt is at High nutrition risk    Sandra Muñoz RDN, LDN, [facility]  Clinical Dietitian   [id number]

## 2024-12-30 NOTE — CONSULTS
[facility]  Report of Consultation    Sunita Loza Patient Status:  Observation    1932 MRN [id number]   Location [facility] 4NW-A Attending Brenda Ocampo MD   Hosp Day # 0 PCP Janell Powell MD       Assessment / Plan:    1) ZOHREH- due to volume depletion with poor PO intake / ostomy losses / loop diuretic- no other clear insult. Expect gradual recovery- adjust IVF    2) CKD 4- multifactorial baseline Cr > 2 mg/dl; s/p nephrectomy > 20 yrs ago    3) R wrist / hand pain- exam c/w cellulitis; also consider septic wrist. Check uric acid; consider imaging / ortho eval     4) L hip fracture s/p IM nail     5) s/p exp lap / LOS / colon resection / ostomy takedown / creation of colostomy     6) h/o uterine CA s/p hysterectomy 20 yrs ago     7) Anemia- due to CKD / chronic disease / low Fe stores receiving EPO as outpt- transfuse    8) Chronic dependent edema- due to CKD / ? HFpEF- on torsemide 10 mg daily    9) Recent Cdif colitis- needs Cdif px with abx    D/W son at bedside.      Reason for Consultation:  ZOHREH / CKD 4 /     History of Present Illness:  Sunita Loza is a a(n) 89 year old female.  Very pleasant 89-year-old female well-known to me with a history of chronic kidney disease, chronic edema, chronic anemia who has been admitted multiple times over the past couple of years due to recurrent dehydration and infectious issues injury bowel obstruction who now presents with poor p.o. intake over the last couple of weeks and apparently passed out while trying to empty her ostomy yesterday.  Denies any nausea or vomiting but has been eating very little.  Meds are unchanged.  Has been receiving Epogen as an outpatient.  Unable to answer many questions as she is somewhat confused compared to baseline.    History:  Past Medical History:    ZOHREH (acute kidney injury) (HCC)    Arthritis    Atrial fibrillation (HCC)    Attention to colostomy (HCC)    Back pain    C. difficile colitis    CKD  (chronic kidney disease)    and S/P nephrectomy    CKD (chronic kidney disease) stage 4, GFR 15-29 ml/min (HCC)    Colon cancer (HCC)    Congestive heart disease (HCC)    Easy bruising    Endometrial cancer (HCC)    UTERINE, DX ABOUT 30 YEARS    Hearing impairment    AIDS    Heart palpitations    Afib    High blood pressure    Hip fracture (HCC)    left    Hyperkalemia    Kidney failure    Leg swelling    Bishop syndrome    hereditary colorectal cancer    Metabolic acidosis    Muscle weakness    WALKER    PAF (paroxysmal atrial fibrillation) (HCC)    Pain in joints    Wears glasses    Weight loss     Past Surgical History:   Procedure Laterality Date    Colectomy      Nephrectomy Left     Orif hip fracture Left 06/10/2024    Part removal colon w end colostomy       Family History   Problem Relation Age of Onset    Heart Disorder Father     Heart Disorder Mother     Colon Cancer Mother         70    Heart Disorder Sister     Breast Cancer Sister     Diabetes Brother     Breast Cancer Sister     Cancer Sister     Breast Cancer Sister     Breast Cancer Daughter      Denies family history of kidney disease.    reports that she has never smoked. She has never used smokeless tobacco. She reports that she does not currently use alcohol. She reports that she does not use drugs.    Allergies:  Allergies[1]    Medications:    Current Facility-Administered Medications:     traMADol (Ultram) tab 25 mg, 25 mg, Oral, BID    [COMPLETED] sodium chloride 0.9 % IV bolus 500 mL, 500 mL, Intravenous, Once **FOLLOWED BY** sodium chloride 0.9% infusion, , Intravenous, Continuous    sodium chloride 0.9% infusion, , Intravenous, Continuous    heparin (Porcine) 5000 UNIT/ML injection 5,000 Units, 5,000 Units, Subcutaneous, Q8H [facility]    acetaminophen (Tylenol Extra Strength) tab 500 mg, 500 mg, Oral, Q4H PRN    ondansetron (Zofran) 4 MG/2ML injection 4 mg, 4 mg, Intravenous, Q6H PRN    metoclopramide (Reglan) 5 mg/mL injection 10 mg, 10 mg,  Intravenous, Daily PRN    ceFAZolin (Ancef) 1 g in dextrose 5% 100mL IVPB-ADD, 1 g, Intravenous, Q24H  No current outpatient medications on file.       Review of Systems:  Please see HPI for pertinent positives. 10 point review of systems otherwise reviewed and negative.     Physical Exam:  BP 98/34 (BP Location: Left arm)   Pulse 71   Temp 98.3 °F (36.8 °C) (Oral)   Resp 16   Wt 81 lb 6.4 oz (36.9 kg)   SpO2 100%   BMI 15.90 kg/m²   Temp (24hrs), Av.9 °F (36.6 °C), Min:97.3 °F (36.3 °C), Max:98.3 °F (36.8 °C)       Intake/Output Summary (Last 24 hours) at 2024 0921  Last data filed at 2024 0828  Gross per 24 hour   Intake 677.3 ml   Output 150 ml   Net 527.3 ml     Wt Readings from Last 3 Encounters:   24 81 lb 6.4 oz (36.9 kg)   12/15/24 82 lb 7.2 oz (37.4 kg)   12/15/24 82 lb 7.2 oz (37.4 kg)     General: awake   HEENT: No scleral icterus, MMM  Neck: Supple, no MARIA FERNANDA or thyromegaly  Cardiac: Regular rate and rhythm, S1, S2 normal, no murmur, rub, or gallop  Lungs: Decreased breath sounds at the bases bilaterally.   Abdomen: Soft, non-tender. + bowel sounds, no palpable organomegaly  Extremities: Without clubbing, cyanosis; mild LE edema  Neurologic: Cranial nerves grossly intact, moving all extremities  Skin: Warm and dry, no rashes      Laboratory Data:  Lab Results   Component Value Date    WBC 6.7 2024    HGB 6.4 2024    HCT 18.6 2024    .0 2024    CREATSERUM 5.30 2024     2024     2024    K 5.1 2024     2024    CO2 21.0 2024    GLU 81 2024    CA 7.0 2024    ALB 2.8 2024    ALKPHO 113 2024    BILT 0.2 2024    TP 5.4 2024    AST 33 2024    ALT 16 2024    PHOS 8.1 2024    PGLU 245 2024       Imaging:  All imaging studies reviewed.      Thank you for allowing me to participate in the care of your patient.    Fabiana Miller MD  2024  9:21  AM         [1]   Allergies  Allergen Reactions    Ciprofloxacin RASH and HIVES    Penicillins RASH

## 2024-12-30 NOTE — CM/SW NOTE
12/30/24 1400   CM/SW Referral Data   Referral Source Social Work (self-referral)   Reason for Referral Discharge planning   Informant Patient;Son   Patient Info   Patient's Home Environment Assisted Living   Patient lives with Alone   Patient Status Prior to Admission   Independent with ADLs and Mobility No   Discharge Needs   Anticipated D/C needs Hospice     SW met with patient and her sons at bedside to complete assessment and discuss POLST form. Both sons confirmed that patient lives at [facility]. She has been living there for some time and was recently enrolled in hospice care with [facility]. [facility] delivered DME to patient's apartment and completed SOC yesterday. Hospice was revoked this morning in order for patient to receive treatment due to passing out when her ostomy care was provided. Patient's sons stated that [facility] should have been contacted but that she was brought here instead.     SW discussed hospice care with patient's sons and they are both in agreement that they plan to resign hospice consents with [facility] when patient is medically cleared to return to [facility]. SW provided them a copy of POLST form to review and complete for MD signature.     SW sent return referral to [facility] via [facility] and will coordinate BEBETO when patient is medically cleared for DC.     SW will continue to follow for plan of care changes and remain available for any additional DC needs or concerns.     Karey Merida MSW, LSW  Discharge Planner   [id number]

## 2024-12-30 NOTE — CONSULTS
[facility]   part of [facility]  Palliative Care Initial Consult Note    Sunita Loza Patient Status:  Observation    1932 MRN [id number]   Location [facility] 4NW-A Attending Yuki Iqbal*   Hosp Day # 0 PCP Janell Powell MD     Date of Consult: 2024  Patient seen at: [facility] Inpatient    Reason for Consultation: Consult ordered by:: Dr. Iqbal for evaluation of Palliative Care needs and Goals of care discussion.    Subjective     History of Present Illness: Sunita Loza is a 89 year old female with PMH: h/o Bishop syndrome colon CA s/p colostomy, ch anemia, Afib (Eliquis), CKD3, HTN, HFpEF, h/o GIB, remote h/o uterine CA who was admitted on 2024 for syncope. Work up in our hospital revealed anemia, likely hypovolemia, R wrist swelling - hematoma and concern for septic arthritis.  History was obtained from Epic and son.   Sunita lives at [facility], and recently had enrolled into hospice with [facility].  On 24, staff was assisting her in ostomy care when she passed out.  Per son's, the staff did not have the appropriate paperwork so they called 911 instead of hospice.     Today is day #0 of hospitalization.     When I entered the room, the patient was awake, alert, and sitting up in bed. Multiple family members present at bedside.      Review of Systems:   Deferred due to confusion.     Symptoms(s): Pain  Bowel Movement         2024  2140             Stool Count Calculated for I/O: 1    Stool (mL): 150 mL          Wt Readings from Last 6 Encounters:   24 81 lb 6.4 oz (36.9 kg)   12/15/24 82 lb 7.2 oz (37.4 kg)   12/15/24 82 lb 7.2 oz (37.4 kg)   24 82 lb 7.2 oz (37.4 kg)   24 85 lb 4 oz (38.7 kg)   10/31/24 86 lb 4.8 oz (39.1 kg)        Palliative Care Social History:   Marital Status:   Children: Yes  Living Situation Prior to Admit: [facility]  Is Patient Confused: Yes  Occupational History: Retired    Substance  History:   reports that she has never smoked. She has never used smokeless tobacco.  reports that she does not currently use alcohol.  reports no history of drug use.    Spiritual Assessment:   Holiness - Parish Not Listed    Past Medical History/Past Surgical History:   This is the 5th hospitalization in the past 6 months, multiple ER visits.    Medical History: obtained from [facility]  Past Medical History:    ZOHREH (acute kidney injury) (HCC)    Arthritis    Atrial fibrillation (HCC)    Attention to colostomy (HCC)    Back pain    C. difficile colitis    CKD (chronic kidney disease)    and S/P nephrectomy    CKD (chronic kidney disease) stage 4, GFR 15-29 ml/min (HCC)    Colon cancer (HCC)    Congestive heart disease (HCC)    Easy bruising    Endometrial cancer (HCC)    UTERINE, DX ABOUT 30 YEARS    Hearing impairment    AIDS    Heart palpitations    Afib    High blood pressure    Hip fracture (HCC)    left    Hyperkalemia    Kidney failure    Leg swelling    Bishop syndrome    hereditary colorectal cancer    Metabolic acidosis    Muscle weakness    WALKER    PAF (paroxysmal atrial fibrillation) (HCC)    Pain in joints    Wears glasses    Weight loss     Past Surgical History:   Procedure Laterality Date    Colectomy      Nephrectomy Left     Orif hip fracture Left 06/10/2024    Part removal colon w end colostomy         Family History: obtained from [facility]  Family History   Problem Relation Age of Onset    Heart Disorder Father     Heart Disorder Mother     Colon Cancer Mother         70    Heart Disorder Sister     Breast Cancer Sister     Diabetes Brother     Breast Cancer Sister     Cancer Sister     Breast Cancer Sister     Breast Cancer Daughter        Allergies:  Allergies[1]    Medications:     Current Facility-Administered Medications:     traMADol (Ultram) tab 25 mg, 25 mg, Oral, BID    sodium bicarbonate 50 mEq in sodium chloride 0.45% 1,050 mL infusion, 50 mEq, Intravenous, Continuous    ipratropium-albuterol  (Duoneb) 0.5-2.5 (3) MG/3ML inhalation solution 3 mL, 3 mL, Nebulization, Q6H WA    Vancomycin: PHARMACY DOSING, 1 each, Intravenous, See Admin Instructions (RX holding)    [START ON 12/31/2024] vancomycin (Vancocin) 1,000 mg in sodium chloride 0.9% 250 mL IVPB-ADDV, 25 mg/kg, Intravenous, Q24H    acetaminophen (Tylenol Extra Strength) tab 500 mg, 500 mg, Oral, Q4H PRN    ondansetron (Zofran) 4 MG/2ML injection 4 mg, 4 mg, Intravenous, Q6H PRN    metoclopramide (Reglan) 5 mg/mL injection 10 mg, 10 mg, Intravenous, Daily PRN    Functional Status History:  ADLs: bathing or showering, dressing, getting in and out of bed or a chair, walking, using the toilet, and eating  - HIGH  5+ performance deficits   IADLs: use the phone, shop for groceries, meal preparation, manage medicines, clean living area, use transportation by self, manage money  - HIGH  5+ performance deficits   Recurrent falls: Yes- a couple per son.    Palliative Performance Scale:   Prior to admission Palliative performance scale PPSv2 (%): 50 (pt/family reported)   Current Palliative performance scale PPSv2 (%): 40   % Ambulation Activity Level Self-Care Intake Consciousness   100 Full  Normal  No Disease Full Normal Full   90 Full  Normal  Some Disease Full Normal Full   80 Full  Normal w/effort  Some Disease Full Normal or reduced Full   70 Reduced  Can't Perform Job  Some Disease Full Normal or reduced Full   60 Reduced  Can't Perform Hobby   Significant Disease Occ Assist Normal or reduced Full or confused   50 Mainly sit/lie Can't do any work  Extensive Disease Partial Assist Normal or reduced Full or confused   40 Mainly in bed Can't do any work  Extensive Disease Mainly Assist Normal or reduced Full or confused   30 Bed Bound Can't do any work  Extensive Disease Max Assist  Total Care Reduced  Drowsy/confused   20 Bed Bound Can't do any work  Extensive Disease Max Assist  Total Care Minimal  Drowsy/confused   10 Bed Bound Can't do any  work  Extensive Disease Max Assist  Total Care Mouth Care  Drowsy/confused   0 Death        Objective      Vital Signs:  Blood pressure 111/43, pulse 73, temperature 98.5 °F (36.9 °C), temperature source Oral, resp. rate 16, weight 81 lb 6.4 oz (36.9 kg), SpO2 100%.  Body mass index is 15.9 kg/m².  Present Level of pain: mild.  Non-verbal signs of pain present: No    Physical Exam:  General: Alert & Awake. In no apparent respiratory distress. Body habitus Thin   HEENT: AT/NC. No gross focal deficits. MMM   Lungs: Normal effort on NC  Neurologic: Alert and oriented to person   Psychiatric: Mood pleasant mood   Skin: Warm and dry.    Hematology:  Lab Results   Component Value Date    WBC 9.2 12/30/2024    HGB 6.3 (LL) 12/30/2024    HCT 19.9 (L) 12/30/2024    .0 12/30/2024       Coags:  Lab Results   Component Value Date    INR 1.50 (H) 12/15/2024    PTT 36.5 (H) 12/15/2024       Chemistry:  Lab Results   Component Value Date    CREATSERUM 5.30 (H) 12/30/2024     (H) 12/30/2024     12/30/2024    K 5.1 12/30/2024     12/30/2024    CO2 21.0 12/30/2024    GLU 81 12/30/2024    CA 7.0 (L) 12/30/2024    ALB 2.8 (L) 12/29/2024    ALKPHO 113 12/29/2024    BILT 0.2 12/29/2024    TP 5.4 (L) 12/29/2024    AST 33 12/29/2024    ALT 16 12/29/2024    DDIMER 0.74 11/29/2024    MG 1.9 11/02/2024    PHOS 8.1 (H) 12/29/2024       Imaging:  CT BRAIN OR HEAD (CPT=70450)    Result Date: 12/29/2024  CONCLUSION:  No acute intracranial abnormality.    LOCATION:  [facility]   Dictated by (CST): Deepak Enrique MD on 12/29/2024 at 8:03 PM     Finalized by (CST): Deepak Enrique MD on 12/29/2024 at 8:04 PM       XR FOREARM (2 VIEWS), RIGHT (CPT=73090)    Result Date: 12/29/2024  CONCLUSION:  No acute fracture or dislocation.  LOCATION:  [facility]   Dictated by (CST): Deepak Enrique MD on 12/29/2024 at 6:44 PM     Finalized by (CST): Deepak Enrique MD on 12/29/2024 at 6:45 PM        Summary of Discussion    I discussed reason for  palliative care consultation with son Uday & son Arron, Uday was present & we called son Arron.     In brief, Sunita lives at [facility] with her disabled daughter Nathalie. She has 3 sons (Arron, Uday, Spencer) all involved    I differentiated the palliative treatment-focus model versus the hospice comfort-focused philosophy of care. I informed the patient/family that having palliative care support does not limit medical treatment options or decisions to those who wish to continue curative or restorative medical therapies. I discussed the benefits of palliative care to include assistance with arising symptom management needs, an extra layer of support, to ensure GOC are respected throughout healthcare continuum, and assist with transition to hospice care when appropriate.      Outpatient/Community Palliative Care Services:  Usually visit once per 4 weeks  Focus on GOC and symptom management   Palliative Care criteria:  Not altered by prognosis   Does not limit curative or restorative therapies      Outpatient Hospice services:  24/7 phone triage services   RN visit one or more times per week depending on need  Home health aide to assist in ADLs/hygiene   Hospice criteria:  Less than six-month prognosis   Must forego most life-prolonging  measures/treatments   Focus solely on comfort   Must sign onto hospice benefit with agency     I provided brief overview of Medicare hospice benefit along with hospice philosophy and answered all questions. At this time, Sunita elmore does qualify for hospice services , she had been on hospice and they are aware of the hospice benefit.     Prognostic awareness/understanding: Melvin Heller (POA) and Uday agree they want to focus on their mother's comfort, however since she was transferred to the hospital they would like to see if she is a candidate for any treatments before they transition back to hospice.   We discussed the disease trajectory of  advanced age with multiple co-morbid  conditions CKD5 & possible sepsis with associated symptoms and decline over time.   We discussed current clinical condition and overall Poor prognosis per clinical team.     Hopes/goals:   For Sunita to be comfortable, and not to suffer with pain or other symptoms.     Fears/concerns:   They hope she can return home to be with her daughter who is disabled with Down's syndrome, whom she lives with.  Provided emotional support to Son.    Advance Care Planning counseling and discussion:   HC POA Documentation Completed--Document in Epic. Healthcare Agent's Name: Arron Loza   We voluntarily discussed the risks vs benefits of life sustaining treatments in the setting of comorbid medical conditions with son. POLST completed today. DNAR box checked.  They left Section B blank, POLST completed with hospitalist. Form copied & sent to scanning. In discussion, both son's confirm they wish for DNAR, and agreed to meet for further discussion on 12/31.  POLST FORM Document received and will be scanned  DNAR/Comfort Care    Assessment and Recommendations        Principal Problem:    Acute renal failure superimposed on stage 4 chronic kidney disease, unspecified acute renal failure type (HCC)  Active Problems:    Dehydration    Hyponatremia    Arm swelling       Palliative Care encounter    Goals of care: ongoing - more comfort focused  Arron & Uday agree to meet on 12/31 for a care conference to discuss treatment options.  They are not opposed to resuming hospcie care once acute illness clears. They understand IV ABX can further deteriorate her renal function and have discussed with specialists.   Code Status: DNAR/Comfort Care  Healthcare Agent's Name: Arron Loza      Discussed today's visit with Family and RN.    Palliative Care Follow Up: Palliative care team will Continue to follow while inpatient.  Palliative care follow up outpatient: TBD, may resume hospice care.     Thank you for allowing Palliative Care services to participate  in the care of Sunita COPE Velias.    A total of 55 minutes were spent on this consult, which included all of the following: chart review, direct face to face contact, history taking, physical examination, counseling and coordinating care, and documentation.     BARBARA Santana, 12/30/24, 5:47 PM  Palliative Care Services    The 21st Century Cures Act makes medical notes like these available to patients in the interest of transparency. Please be advised this is a medical document. Medical documents are intended to carry relevant information, facts as evident, and the clinical opinion of the practitioner. The medical note is intended as peer to peer communication and may appear blunt or direct. It is written in medical language and may contain abbreviations or verbiage that are unfamiliar.          [1]   Allergies  Allergen Reactions    Ciprofloxacin RASH and HIVES    Penicillins RASH

## 2024-12-30 NOTE — H&P
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  She was recently admitted to hospice and apparently the staff was emptying the patient's ostomy when she passed out.  She did not fall as the staff caught her.  Given her symptoms she was brought to the ER. Also reporting some RUE pain and swelling. On last hospitalization (dc 12/2 from [facility]) she was noted to have RUE swelling/bruising for which a CT RUE found a large hematoma; this was suspected to be from phlebotomies. Now she is having pain, swelling and edema in her R wrist and this is preventing her ADLs.   In the ER heart rate was in the 50s and she had soft blood pressures.  Labs with a creatinine of 5.6 with BUN of 109 initial hemoglobin was 8.4.  Started on IV fluids, nephrology consulted and admitted for further evaluation and treatment.    Past Medical History:    ZOHREH (acute kidney injury) (HCC)    Arthritis    Atrial fibrillation (HCC)    Attention to colostomy (HCC)    Back pain    C. difficile colitis    CKD (chronic kidney disease)    and S/P nephrectomy    CKD (chronic kidney disease) stage 4, GFR 15-29 ml/min (HCC)    Colon cancer (HCC)    Congestive heart disease (HCC)    Easy bruising    Endometrial cancer (HCC)    UTERINE, DX ABOUT 30 YEARS    Hearing impairment    AIDS    Heart palpitations    Afib    High blood pressure    Hip fracture (HCC)    left    Hyperkalemia    Kidney failure    Leg swelling    Bishop syndrome    hereditary colorectal cancer    Metabolic acidosis    Muscle weakness    WALKER    PAF (paroxysmal atrial  fibrillation) (HCC)    Pain in joints    Wears glasses    Weight loss      Past Surgical History:   Procedure Laterality Date    Colectomy      Nephrectomy Left     Orif hip fracture Left 06/10/2024    Part removal colon w end colostomy          ALL:  Allergies[1]     No current outpatient medications on file.       Social History     Tobacco Use    Smoking status: Never    Smokeless tobacco: Never   Substance Use Topics    Alcohol use: Not Currently        Fam Hx  Family History   Problem Relation Age of Onset    Heart Disorder Father     Heart Disorder Mother     Colon Cancer Mother         70    Heart Disorder Sister     Breast Cancer Sister     Diabetes Brother     Breast Cancer Sister     Cancer Sister     Breast Cancer Sister     Breast Cancer Daughter        Review of Systems  Comprehensive ROS reviewed and negative except for what is stated in HPI.      OBJECTIVE:  BP 98/34 (BP Location: Left arm)   Pulse 71   Temp 98.3 °F (36.8 °C) (Oral)   Resp 16   Wt 81 lb 6.4 oz (36.9 kg)   SpO2 100%   BMI 15.90 kg/m²   General:  Alert, no distress, appears stated age.    Head:  Normocephalic, without obvious abnormality, atraumatic.   Eyes:  Sclera anicteric, No conjunctival pallor, EOMs intact.    Nose: Nares normal. Septum midline. Mucosa normal. No drainage.   Throat: Lips, mucosa, and tongue normal. Teeth and gums normal.   Neck: Supple, symmetrical, trachea midline, no cervical or supraclavicular lymph adenopathy, thyroid: no enlargment/tenderness/nodules appreciated   Lungs:   Clear to auscultation bilaterally. Normal effort   Chest wall:  No tenderness or deformity.   Heart:  Regular rate and rhythm, S1, S2 normal, no murmur, rub or gallop appreciated   Abdomen:   Soft, non-tender. Bowel sounds normal. No masses,  No organomegaly. Non distended   Extremities: Extremities normal, atraumatic, no cyanosis or edema. R wrist swelling, edema and ttp, cannot flex/extend wrist due to severe pain that is limiting  ROM actively/passively, very warm to touch, some black/bruising etween first and second digit webbing    Skin: Skin color, texture, turgor normal. No rashes or lesions.    Neurologic: Normal strength, no focal deficit appreciated     Data Review:    LABS:   Lab Results   Component Value Date    WBC 6.7 12/30/2024    HGB 6.4 12/30/2024    HCT 18.6 12/30/2024    .0 12/30/2024    CREATSERUM 5.30 12/30/2024     12/30/2024     12/30/2024    K 5.1 12/30/2024     12/30/2024    CO2 21.0 12/30/2024    GLU 81 12/30/2024    CA 7.0 12/30/2024    ALB 2.8 12/29/2024    ALKPHO 113 12/29/2024    BILT 0.2 12/29/2024    TP 5.4 12/29/2024    AST 33 12/29/2024    ALT 16 12/29/2024    PHOS 8.1 12/29/2024    PGLU 245 12/29/2024       CXR: All imaging personally reviewed.      Radiology: CT BRAIN OR HEAD (CPT=70450)    Result Date: 12/29/2024  PROCEDURE:  CT BRAIN OR HEAD (86178)  COMPARISON:  [facility] , CT, CT BRAIN OR HEAD ([id number]), 12/15/2024, 10:14 PM.  INDICATIONS:  syncope  TECHNIQUE:  Noncontrast CT scanning is performed through the brain. Dose reduction techniques were used. Dose information is transmitted to the ACR (American College of Radiology) NRDR (National Radiology Data Registry) which includes the Dose Index Registry.  PATIENT STATED HISTORY: (As transcribed by Technologist)  Syncope.    FINDINGS: Volume loss is noted.  Ventricles are within normal limits.  There is no midline shift or mass-effect.  The basal cisterns are patent.  The gray-white matter differentiation is intact.  There is no acute intracranial hemorrhage or extra-axial fluid collection.  Hypodensities in the periventricular and subcortical white matter is compatible with chronic small vessel disease.  There is no evident fracture.  The visualized paranasal sinuses and mastoid air cells are unremarkable.             CONCLUSION:  No acute intracranial abnormality.    LOCATION:  [facility]   Dictated by (CST): Deepak Enrique MD on  12/29/2024 at 8:03 PM     Finalized by (CST): Deepak Enrique MD on 12/29/2024 at 8:04 PM       XR FOREARM (2 VIEWS), RIGHT (CPT=73090)    Result Date: 12/29/2024  PROCEDURE:  XR FOREARM (2 VIEWS), RIGHT (CPT=73090)  TECHNIQUE:  Two views were obtained.  COMPARISON:  None.  INDICATIONS:  bruised and swollen- unknown injury  PATIENT STATED HISTORY: (As transcribed by Technologist)  Patient had synocopal episode today and is experiencing bruising to right forearm. Patient has severe pain with any movement and swelling to distal wrist area.   FINDINGS:  No acute fracture or dislocation.  Degenerative changes are noted in the wrist and elbow.  The bones appear demineralized.  No joint effusion.  Soft tissues are within normal limits.            CONCLUSION:  No acute fracture or dislocation.  LOCATION:  [facility]   Dictated by (CST): Deepak Enrique MD on 12/29/2024 at 6:44 PM     Finalized by (CST): Deepak Enrique MD on 12/29/2024 at 6:45 PM       CT SPINE CERVICAL (CPT=72125)    Result Date: 12/15/2024  PROCEDURE:  CT SPINE CERVICAL (CPT=72125)  COMPARISON:  None.  INDICATIONS:  fall on thinners  TECHNIQUE:  Noncontrast CT scanning of the cervical spine is performed from the skull base through C7.  Multiplanar reconstructions are generated.  Dose reduction techniques were used. Dose information is transmitted to the ACR (American College of Radiology) NRDR (National Radiology Data Registry) which includes the Dose Index Registry.  PATIENT STATED HISTORY: (As transcribed by Technologist)  fall on thinners    FINDINGS:  There is loss of the normal cervical lordosis.  There is slight reversal.  Multilevel degenerative changes are seen, most pronounced at C5-C6, severe.  Scattered up to moderate to severe degenerative changes are seen elsewhere with relative sparing at C2-C3.  There is a few mm anterolisthesis C3 on C4 and C6 on C7.  Vertebral body heights appear within the limits of normal.  No linear fracture.  No high-grade  central canal stenosis.  No prevertebral paraspinal hematoma.              CONCLUSION:  1. Multilevel degenerative changes with reversal of expected cervical lordosis and a few mm anterolisthesis C3 on C4 and C6-C7. 2. Negative for acute fracture.     LOCATION:  [facility]   Dictated by (CST): Jermain Garrison MD on 12/15/2024 at 10:29 PM     Finalized by (CST): Jermain Garrison MD on 12/15/2024 at 10:32 PM       CT BRAIN OR HEAD (CPT=70450)    Result Date: 12/15/2024  PROCEDURE:  CT BRAIN OR HEAD ([id number])  COMPARISON:  [facility] , CT, CT BRAIN OR HEAD ([id number]), 12/15/2024, 1:43 PM.  INDICATIONS:  fall on thinners  TECHNIQUE:  Noncontrast CT scanning is performed through the brain. Dose reduction techniques were used. Dose information is transmitted to the ACR (American College of Radiology) NRDR (National Radiology Data Registry) which includes the Dose Index Registry.  PATIENT STATED HISTORY: (As transcribed by Technologist)  fall on thinners    FINDINGS:  Ventricles and sulci are diffusely prominent in caliber, stable.  Allowing for age this is considered mild generalized atrophy.  No new mass effect or midline shift.  No findings of territorial infarction or acute intracranial hemorrhage.  White matter low attenuation is similar to the prior consistent with mild to moderate chronic small vessel disease.  The visualized paranasal sinuses and mastoid air cells are satisfactorily aerated.              CONCLUSION:  No acute intracranial abnormality.    LOCATION:  [facility]   Dictated by (CST): Jermain Garrison MD on 12/15/2024 at 10:26 PM     Finalized by (CST): Jermain Garrison MD on 12/15/2024 at 10:27 PM       CT ABDOMEN+PELVIS(CPT=74176)    Result Date: 12/15/2024  PROCEDURE:  CT ABDOMEN+PELVIS (CPT=74176)  COMPARISON:  [facility] , CT, CT ABDOMEN+PELVIS(CPT=74176), 10/31/2024, 5:21 PM.  [facility], CT, CT ABDOMEN+PELVIS(CPT=74176), 8/20/2024, 4:08 PM.  [facility] , CT, CT ABDOMEN+PELVIS(CPT=74176), 4/22/2024, 9:34 PM.  [facility] , CT, CT  ABDOMEN+PELVIS(CPT=74176), 3/26/2024, 6:58 PM.  INDICATIONS:  abnormal bruising to R inf ribs, low back, has Gi bleed from colostomy  TECHNIQUE:  Unenhanced multislice CT scanning was performed from the dome of the diaphragm to the pubic symphysis.  Dose reduction techniques were used. Dose information is transmitted to the ACR (American College of Radiology) NRDR (National Radiology Data Registry) which includes the Dose Index Registry.  PATIENT STATED HISTORY: (As transcribed by Technologist)   Large bruise to patient's left flank and coccyx area. Patient has a colostomy. Dark black output noted to be leaking out from bag.    FINDINGS:  LIVER:  No enlargement, atrophy, abnormal density, or significant focal lesion.  Low-attenuation in segment 4 measuring 8 mm most consistent with a cyst. BILIARY:  Status post cholecystectomy without ductal dilatation. PANCREAS:  Diffuse pancreatic atrophy. SPLEEN:  No enlargement or focal lesion.  KIDNEYS:  Status post left nephrectomy.  Prominence of the right collecting system due to the being a solitary kidney and some distension of the urinary bladder. ADRENALS:  No mass or enlargement.  AORTA/VASCULAR:    Unremarkable as seen on non-contrast imaging.  Stenting of the bilateral external iliac arteries.  There is mild atherosclerotic plaque of the abdominal aorta. RETROPERITONEUM:  No mass or adenopathy.  BOWEL/MESENTERY:  Patient is status post partial colectomy.  There is a left lower quadrant ostomy.  Adjacent to:  There is bowel extending to the region of the resected area of rectum is likely due to postsurgical change. ABDOMINAL WALL:  No mass or hernia.  URINARY BLADDER:  No visible focal wall thickening, lesion, or calculus.  Moderate distension of the urinary bladder. PELVIC NODES:  No adenopathy.  PELVIC ORGANS:  There is been hysterectomy. BONES:  No bony lesion or fracture.  Severe degenerative changes with grade 1 anterolisthesis at L3-4 and moderate to severe  degenerative changes at L5-S1. LUNG BASES:  No visible pulmonary or pleural disease.  OTHER:  Negative.             CONCLUSION:  There is no evidence of acute abdominal pelvic process.   LOCATION:  [facility]   Dictated by (CST): Troy Newsome MD on 12/15/2024 at 1:54 PM     Finalized by (CST): Troy Newsome MD on 12/15/2024 at 2:03 PM       CT BRAIN OR HEAD (CPT=70450)    Result Date: 12/15/2024  PROCEDURE:  CT BRAIN OR HEAD ([id number])  COMPARISON:  [facility] , CT, CT BRAIN OR HEAD ([id number]), 6/10/2024, 11:08 AM.  [facility] , CT, CT BRAIN OR HEAD ([id number]), 5/24/2024, 5:23 PM.  INDICATIONS:  left shoulder pain with movement, rash on left arm, hx of cellulitis in right arm  TECHNIQUE:  Noncontrast CT scanning is performed through the brain. Dose reduction techniques were used. Dose information is transmitted to the ACR (American College of Radiology) NRDR (National Radiology Data Registry) which includes the Dose Index Registry.  PATIENT STATED HISTORY: (As transcribed by Technologist)  Left arm weakness.    FINDINGS:  VENTRICLES/SULCI:  Ventricles and sulci are moderately prominent consistent with atrophy. INTRACRANIAL:  There is moderate low-attenuation the periventricular white matter consistent small vessel ischemic disease.  There are no abnormal extraaxial fluid collections.  There is no midline shift.  There are no intraparenchymal brain abnormalities.  There is nothing specific for acute infarct.  There is no hemorrhage or mass lesion.  SINUSES:           No sign of acute sinusitis.  MASTOIDS:          No sign of acute inflammation. SKULL:             No evidence for fracture or osseous abnormality. OTHER:             None.            CONCLUSION:  No evidence acute intracranial process.    LOCATION:  [facility]   Dictated by (CST): Troy Newsome MD on 12/15/2024 at 1:53 PM     Finalized by (CST): Troy Newsome MD on 12/15/2024 at 1:54 PM       XR HUMERUS (MIN 2 VIEWS), LEFT (CPT=73060)    Result Date: 12/15/2024  PROCEDURE:  XR  HUMERUS (MIN 2 VIEWS), LEFT (CPT=73060)  INDICATIONS:  left shoulder pain with movement, rash on left arm, hx of cellulitis in right arm  COMPARISON:  None.  PATIENT STATED HISTORY: (As transcribed by Technologist)  Patient offered no additional history at this time.               CONCLUSION:  No acute fracture or dislocation involving the left humerus.  There is cystic degenerative changes with hypertrophic spurring involving the greater tuberosity.  There is AC joint hypertrophy.  High-riding humeral head in relation to the glenoid suspicious for rotator cuff impingement.   LOCATION:  [id number]   Dictated by (CST): Kacey Bentley MD on 12/15/2024 at 1:02 PM     Finalized by (CST): Kacey Bentley MD on 12/15/2024 at 1:03 PM       CT UPPER EXT RIGHT (WO IV)(CPT=73200)    Result Date: 11/30/2024  PROCEDURE:  CT UPPER EXT RIGHT (WO IV) EM (CPT=73200)  COMPARISON:  [facility], US VENOUS DOPPLER ARM RIGHT - DIAG IMG (CPT=93971), 11/29/2024, 5:47 AM.  INDICATIONS:  Follow-up for history of right arm hematoma with clinical concern for interval increase in size.  TECHNIQUE:  Multi-planar CT images were created without intravenous contrast. Shaded surface renderings are generated on an independent CT scanner workstation.  Dose reduction techniques were used. Dose information is transmitted to the ACR (American College of Radiology) NRDR (National Radiology Data Registry) which includes the Dose Index Registry  PATIENT STATED HISTORY: (As transcribed by Technologist)  Patient having swelling to entire arm with redness.    FINDINGS:  No evidence of acute osseous injuries/fractures.  As noted on recent ultrasound imaging, there is evidence of an intramuscular hematoma within the mid distal aspect of the right arm, which appears to be involving the biceps and brachioradialis musculature measuring approximately 10.8 x 3.0 x 4.6 cm in maximal dimensions.  There is mild diffuse subcutaneous edema, swelling to the right arm and right  forearm.  No additional hematomas or mass lesions are noted.            CONCLUSION:  1. There is suggestion of an intramuscular hematoma within the mid to distal aspect of the right arm and appears to involve the biceps and brachioradialis musculature measuring 10.8 x 3.0 x 4.6 cm. 2. No acute osseous abnormalities.   LOCATION:  [facility]   Dictated by (CST): Kathleen Vora DO on 11/30/2024 at 9:17 PM     Finalized by (CST): Kathleen Vora DO on 11/30/2024 at 9:20 PM       XR PANORAMIC OF JAWS (CPT=70355)    Result Date: 11/30/2024  PROCEDURE:  XR PANORAMIC OF JAWS (CPT=70355)  TECHNIQUE:  Single panelipse view of the mandible was obtained.  COMPARISON:  None.  INDICATIONS:  Loose tooth prosthesis, oral bleeding  PATIENT STATED HISTORY: (As transcribed by Technologist)  Patient offered no additional history at this time               CONCLUSION:  Marked osteopenia.  No acute fracture.  No bone destruction.  Multiple crowns endplates noted.   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    Syncope, suspect hypovolemia  - poor po intake, anemia?   - repeat Hgb, will transfuse 1u PRBC  - establish GOC  - isotonic crystalloids    Acute on chronic anemia  - dx with RUE hematoma a month ago, will repeat imaging  - 1u PRBCs ordered  - hold PTA eliquis    RUE wrist edema, swelling, warmth - septic joint vs. Inflammatory arthritis or developing hematoma causing possible compartment syndrome  - need to r/o septic joint, pt/family unsure if they want arthrocentesis let alone a surgery  - ID  consulted, cont cefazolin for now  - will get CT RUE stat  - monitor     Acute Renal injury on CKD3  - prerenal from hypovolemia, anemia  - cont IVF per nephrology  - Cr typically around 2, now elevated with uremia    Essential HTN  - monitor soft BP's     PAF  - hold eliquis  - holding dilt/amio for low HR    Diastolic CHF  Chronic BLE edema  - holding diuretics for soft BP/ZOHREH    Bishop syndrome s/p colostomy  Hx GIB  Hx Uterine ca s/p hysterectomy    FEN: regular diet, PT/OT  Proph: SCDs  Code status: DNAR/comfort    12/30/24 - Advanced care planning - 16 minutes were spent discussing advanced care planning exclusive of the documented time for this visit. D/w son, Arron, she confirms DNAR/comfort. Still would like to pursue some treatment if within reason, does not want intensive interventions     Outpatient records or previous hospital records reviewed.   [facility] hospitalist to continue to follow patient while in house      Aggie Iqbal MD  [facility]  Hospitalist  Message over [place]/C2FO/Infer  Pager: [phone]        Addendum: Revisited options with patient and family in regards to septic joint diagnosis and also discussed with ID and ortho; family not interested in diagnostic arthrocentesis and would like to see if she improves on antibiotic therapy alone. If her kidneys don't improve and clinically she declines, they have a low threshold of initiating hospice/comfort cares.          [1]   Allergies  Allergen Reactions    Ciprofloxacin RASH and HIVES    Penicillins RASH

## 2024-12-31 LAB
ANION GAP SERPL CALC-SCNC: 15 MMOL/L (ref 0–18)
BLOOD TYPE BARCODE: 7300
BUN BLD-MCNC: 80 MG/DL (ref 9–23)
CALCIUM BLD-MCNC: 8 MG/DL (ref 8.7–10.4)
CHLORIDE SERPL-SCNC: 105 MMOL/L (ref 98–112)
CO2 SERPL-SCNC: 17 MMOL/L (ref 21–32)
CREAT BLD-MCNC: 4.1 MG/DL
CREAT BLD-MCNC: 4.37 MG/DL
EGFRCR SERPLBLD CKD-EPI 2021: 10 ML/MIN/1.73M2 (ref 60–?)
EGFRCR SERPLBLD CKD-EPI 2021: 9 ML/MIN/1.73M2 (ref 60–?)
GLUCOSE BLD-MCNC: 70 MG/DL (ref 70–99)
OSMOLALITY SERPL CALC.SUM OF ELEC: 306 MOSM/KG (ref 275–295)
POTASSIUM SERPL-SCNC: 4.8 MMOL/L (ref 3.5–5.1)
SODIUM SERPL-SCNC: 137 MMOL/L (ref 136–145)
UNIT VOLUME: 350 ML

## 2024-12-31 PROCEDURE — 99233 SBSQ HOSP IP/OBS HIGH 50: CPT | Performed by: INTERNAL MEDICINE

## 2024-12-31 PROCEDURE — 99233 SBSQ HOSP IP/OBS HIGH 50: CPT | Performed by: NURSE PRACTITIONER

## 2024-12-31 RX ORDER — VANCOMYCIN HYDROCHLORIDE 125 MG/1
125 CAPSULE ORAL DAILY
Status: DISCONTINUED | OUTPATIENT
Start: 2024-12-31 | End: 2025-01-04

## 2024-12-31 RX ORDER — IPRATROPIUM BROMIDE AND ALBUTEROL SULFATE 2.5; .5 MG/3ML; MG/3ML
3 SOLUTION RESPIRATORY (INHALATION)
Status: DISCONTINUED | OUTPATIENT
Start: 2024-12-31 | End: 2025-01-04

## 2024-12-31 NOTE — PROGRESS NOTES
[facility]  Nephrology Progress Note    Sunita Loza Attending:  Yuki Iqbal*       Assessment and Plan:    1) ZOHREH- due to volume depletion with poor PO intake / ostomy losses / loop diuretic- improving. Adjust IVF     2) CKD 4- multifactorial baseline Cr > 2 mg/dl; s/p nephrectomy > 20 yrs ago     3) MRSA bacteremia with R hand / wrist cellulitis and possible septic arthritis -> IV vanco per ID, ortho to evaluate     4) L hip fracture s/p IM nail      5) s/p exp lap / LOS / colon resection / ostomy takedown / creation of colostomy      6) h/o uterine CA s/p hysterectomy 20 yrs ago      7) Anemia- due to CKD / chronic disease / low Fe stores receiving EPO as outpt- s/p transfusion      8) Chronic dependent edema- due to CKD / ? HFpEF- on torsemide 10 mg daily     9) Recent Cdif colitis- needs Cdif px with abx     D/W son at bedside.      Subjective:  Awake notes reviewed    Physical Exam:   /51   Pulse 73   Temp 98 °F (36.7 °C) (Axillary)   Resp 16   Wt 81 lb 6.4 oz (36.9 kg)   SpO2 100%   BMI 15.90 kg/m²   Temp (24hrs), Av °F (36.7 °C), Min:97.4 °F (36.3 °C), Max:98.5 °F (36.9 °C)       Intake/Output Summary (Last 24 hours) at 2024 1016  Last data filed at 2024 0905  Gross per 24 hour   Intake 881.67 ml   Output 835 ml   Net 46.67 ml     Wt Readings from Last 3 Encounters:   24 81 lb 6.4 oz (36.9 kg)   12/15/24 82 lb 7.2 oz (37.4 kg)   12/15/24 82 lb 7.2 oz (37.4 kg)     General: awake   HEENT: No scleral icterus, MMM  Neck: Supple, no MARIA FERNANDA or thyromegaly  Cardiac: Regular rate and rhythm, S1, S2 normal, no murmur or tub  Lungs: Decreased BS at bases bilaterally   Abdomen: Soft, non-tender. + bowel sounds, no palpable organomegaly  Extremities: Without clubbing, cyanosis; no edema  Neurologic: Cranial nerves grossly intact, moving all extremities  Skin: Warm and dry, no rashes       Labs:   Lab Results   Component Value Date    HGB 8.4 2024     MAURICIO 4.37 12/31/2024       Imaging:  All imaging studies reviewed.    Meds:   Current Facility-Administered Medications   Medication Dose Route Frequency    ipratropium-albuterol (Duoneb) 0.5-2.5 (3) MG/3ML inhalation solution 3 mL  3 mL Nebulization 2 times daily    traMADol (Ultram) tab 25 mg  25 mg Oral BID    sodium bicarbonate 50 mEq in sodium chloride 0.45% 1,050 mL infusion  50 mEq Intravenous Continuous    Vancomycin: PHARMACY DOSING  1 each Intravenous See Admin Instructions (RX holding)    acetaminophen (Tylenol Extra Strength) tab 500 mg  500 mg Oral Q4H PRN    ondansetron (Zofran) 4 MG/2ML injection 4 mg  4 mg Intravenous Q6H PRN    metoclopramide (Reglan) 5 mg/mL injection 10 mg  10 mg Intravenous Daily PRN         Questions/concerns were discussed with patient and/or family by bedside.          Fabiana Miller MD  12/31/2024  10:16 AM

## 2024-12-31 NOTE — PROGRESS NOTES
Ortho    Consult noted.  Pt with elevated uric acid, most likely cause of wrist pain is gout.  X-rays show degenerative changes in the carpus, CT negative for wrist effusion.  Would recommend medical management of gout at this time.  If arthrocentesis is needed, would recommend ultrasound guided aspiration by IR service.  Full consult to follow.    Jeevan Kirby MD  [facility]

## 2024-12-31 NOTE — CM/SW NOTE
SW sent updated clinical information to [facility] for eventual patient admission. SW will continue to follow for medical clearance.     SW also met with patient's sons and provided them with copy of private duty caregiver and [facility] information.     SW will continue to follow for plan of care changes and remain available for any additional DC needs or concerns.     Karey Merida MSW, LSW  Discharge Planner   [id number]

## 2024-12-31 NOTE — PLAN OF CARE
A/o x2-3. [place] of hearing. O2 sat 95% on 2 LNC. CT upper extremity done. Right arm elevated. Patient needs to be fed. C/o thirsty. Frequent rounding made, fluid offered. IVF infusing. Bladder scan over 500 ml, then has incontinent episode of urine.  ml. Needs to be reminded to urinate. Repositioned in bed frequently. Pillows supported, waffle cushion to buttocks. Needs attended to.  Problem: Impaired Cognition  Goal: Patient will exhibit improved attention, thought processing and/or memory  Description: Interventions:  - Minimize distractions in the room when full attention is required  - Consider use of a daily journal to improve memory and reduce confusion related to daily events and orientation  - Allow additional time for processing after asking questions or providing instructions  - Provide stimulation to the neglected side by encouraging family to sit/stand on the inattentive side during conversation  - For patients with neglect, place hot drinks on the unaffected side  - Encourage use of hearing aids  - Encourage use of eye glasses  Outcome: Progressing

## 2024-12-31 NOTE — PROGRESS NOTES
[facility]   part of [facility] Infectious Disease  Progress Note    Sunita Loza Patient Status:  Inpatient    1932 MRN [id number]   Location [facility] 4NW-A Attending Yuki Iqbal*   Hosp Day # 1 PCP Janell Powell MD     Subjective:  Patient seen and examined resting in bed. Son present at the bedside. Tolerating IV antibiotics thus far. R hand/wrist pain controlled at the present. Denies F/C. Denies n/v/d. Otherwise no new complaints.      Objective:  Blood pressure 123/51, pulse 73, temperature 98 °F (36.7 °C), temperature source Axillary, resp. rate 16, weight 81 lb 6.4 oz (36.9 kg), SpO2 100%.    Intake/Output:    Intake/Output Summary (Last 24 hours) at 2024 0810  Last data filed at 2024 0326  Gross per 24 hour   Intake 881.67 ml   Output 710 ml   Net 171.67 ml       Physical Exam:  General: Awake, alert, non-tox, NAD.  HEENT:  Oropharynx clear, trachea ML.  Heart: RRR S1S2 no murmurs.  Lungs: Essentially CTA b/l, no rhonchi, rales, wheezes.  Abdomen: Soft, NT/ND.  BS present.  No guarding or rebound.  Extremity: R hand and wrist redness, swelling and TTP with limited ROM.   Neurological: No focal deficits.  Derm:  Warm and dry.    Lab Data Review:  Lab Results   Component Value Date    WBC 9.2 2024    HGB 8.4 2024    HCT 19.9 2024    .0 2024    CREATSERUM 4.37 2024        Cultures:  Hospital Encounter on 24   1. Blood Culture     Status: Abnormal (Preliminary result)    Collection Time: 24  6:21 PM    Specimen: Blood,peripheral   Result Value Ref Range    Blood Culture Result Positive N/A    Blood Culture Result Staphylococcus aureus (A) N/A    Blood Smear Gram positive cocci in pairs and clusters (A) N/A       Radiology:  CT UPPER EXT RIGHT (WO IV)(CPT=73200)    Result Date: 2024  CONCLUSION:   1. There is decreasing size and extent of a previously visualized suspected  intramuscular hematoma in the upper right arm that appears to involve the right biceps muscle and brachial radialis with reference measurement of approximately 11.3 x 2.2 x 3.4 cm, previously 13.0 x 3.0 x 4.6 cm.  Clinical correlation and continued follow-up is suggested.   2. There is nonspecific moderate soft tissue edema throughout the subcutaneous tissues of the right arm and hand with underlying cellulitis not excluded.  Clinical correlation and direct inspection is suggested.  No soft tissue abscess by noncontrast CT.  3. No specific findings to suggest septic joint by noncontrast CT.   Please see above for further details.  LOCATION:  [facility]   Dictated by (CST): Brandon Tobin MD on 12/30/2024 at 8:54 PM     Finalized by (CST): Brandon Tobin MD on 12/30/2024 at 9:00 PM       CT BRAIN OR HEAD (CPT=70450)    Result Date: 12/29/2024  CONCLUSION:  No acute intracranial abnormality.    LOCATION:  [facility]   Dictated by (CST): Deepak Enrique MD on 12/29/2024 at 8:03 PM     Finalized by (CST): Deepak Enrique MD on 12/29/2024 at 8:04 PM       XR FOREARM (2 VIEWS), RIGHT (CPT=73090)    Result Date: 12/29/2024  CONCLUSION:  No acute fracture or dislocation.  LOCATION:  [facility]   Dictated by (CST): Deepak Enrique MD on 12/29/2024 at 6:44 PM     Finalized by (CST): Deepak Enrique MD on 12/29/2024 at 6:45 PM          Assessment and Plan:  1.  MRSA sepsis/bacteremia with R wrist inflammatory arthritis concerning for septic arthritis with threat to life and threat to limb  - S/p 1 dose of IV cefazolin on 12/29.  - Blood cultures 2/2 isolating MRSA on 12/29.  - XR with no acute fracture or dislocation.  - CT RUE as noted above.  - IV vancomycin, ongoing.      2.  ZOHREH on CKD  - Antibiotics will be renally adjusted  - As per nephrology.    3.  Recs  - Continue IV vancomycin.  Pharmacy to dose.  - F/u WBC and fever curve.  - Will repeat blood cultures tomorrow.  - Recommend TTE given MRSA bacteremia.   - Recommend Ortho  consultation.  - Supportive care as per the primary team.  - GOC discussions ongoing.  - Discussed plan of care with nursing.  - Discussed plan of care with patient and son at the bedside.  All questions addressed and understanding verbalized.  - Further recommendations pending clinical course.     Discussed case with ID attending/collaborating physician, Dr. Guy Torrez, who is in agreement with the above plan of care    Please note that this report has been produced using speech recognition software and may contain errors related to that system including, but not limited to, errors in grammar, punctuation, and spelling, as well as words and phrases that possibly may have been recognized inappropriately.  If there are any questions or concerns, contact the dictating provider for clarification.     The 21st Century Cures Act makes medical notes like these available to patients in the interest of transparency. Please be advised this is a medical document. Medical documents are intended to carry relevant information, facts as evident, and the clinical opinion of the practitioner. The medical note is intended as peer to peer communication and may appear blunt or direct. It is written in medical language and may contain abbreviations or verbiage that are unfamiliar.     If you have any questions or concerns please call [facility] Infectious Disease at [phone].     BARBARA Ryder    12/31/2024  8:10 AM

## 2024-12-31 NOTE — PROGRESS NOTES
Patient is alert and oriented x2. 2L of oxygen. Medications given per MAR. ID consulted. MD notified of positive BC. MD notified of 6.3 Hgb. One unit of prbcs transfused. Patient tolerated. Awaiting hgb redraw. IVF infusing. Safety precautions in place. Call light within reach.

## 2024-12-31 NOTE — PROGRESS NOTES
[facility] Hospitalist Progress Note    Subjective:  No acute overnight events reported. Remains on supplemental oxygen, no new complaints. Engages in minimal conversation. Family at bedside, updated thoroughly.     Review of Systems  Comprehensive ROS reviewed and negative except for what is stated in HPI.      OBJECTIVE:  /46 (BP Location: Left arm)   Pulse 68   Temp 96.7 °F (35.9 °C) (Temporal)   Resp 16   Wt 81 lb 6.4 oz (36.9 kg)   SpO2 100%   BMI 15.90 kg/m²   General: No apparent distress.   HEENT:  EOMI, PERRLA.   Pulm: Decreased breath sounds, no active resp effort.   CV: Regular rate and rhythm, no murmur.   Abd: Soft, nontender, nondistended.   MSK: No obvious deformities.   Skin: No lesions or rashes.  Neuro: Awake, redirectable.     LABS:   Lab Results   Component Value Date    HGB 8.4 12/30/2024    CREATSERUM 4.10 12/31/2024    BUN 80 12/31/2024     12/31/2024    K 4.8 12/31/2024     12/31/2024    CO2 17.0 12/31/2024    GLU 70 12/31/2024    CA 8.0 12/31/2024       All lab work and imaging personally reviewed.    Assessment/ Plan:   Patient is a 89 year old female initially presented after a syncopal episode.    #Syncope, suspect hypovolemia  -PRBC transfusion as required (has received 1 unit), IV fluids per nephro.   -Encourage oral intake as tolerable    #Acute on chronic anemia  #RUE hematoma  -Imaging reviewed.  PRBC transfusion as above.  -Ortho to evaluate today. Discussed with family.     #ZOHREH on CKD stage III  -IV fluids per nephrology.  Trend renal panel.    #Essential hypertension  #PAF  #Diastolic CHF  #Chronic BLE edema  -Holding Eliquis, Dilt/amnio and diuretic for low HR  -Monitor hemodynamics closely    #Bishop syndrome s/p colostomy  #History of uterine CA s/p hysterectomy    Diet: regular   Code status:  DNAR comfort     #Advanced care planning- 16 minutes were spent discussing advanced care planning exclusive of the documented time for the visit.   12/31/24: Discussed  updated clinical condition and prognosis with patients family members at bedside who are understanding. Family would like to continue current treatment with conservative measures and pursue DNAR  comfort measures. Family continues to think through hospice, with eventual goal of taking patient home possibly. I did discuss that due to history of MRSA, SHIRA was recommended to rule out endocarditis in the setting of patients presentation and what that entails. Family would like hold off on this.     Syed Knight DO  [facility] Hospitalist

## 2024-12-31 NOTE — PROGRESS NOTES
[facility]   part of [facility]  Palliative Care Progress Note    Sunita Loza Patient Status:  Inpatient    1932 MRN [id number]   Location [facility] 4NW-A Attending Yuki Iqbal*   Hosp Day # 1 PCP Janell Powell MD     History/Other:  h/o Bishop syndrome colon CA s/p colostomy, ch anemia, Afib (Eliquis), CKD3, HTN, HFpEF, h/o GIB, remote h/o uterine CA, falls who was admitted on 2024 for syncope. Work up in our hospital revealed anemia, ZOHREH,  R wrist swelling and erythema - hematoma and concern for septic arthritis.    Sunita lives at [facility], and recently had enrolled into hospice with [facility].  On 24, staff was assisting her in ostomy care when she passed out. On  the family had taken pictures of her arms/ hands as the left hand appeared swollen. Then her right hand became swollen and red as it appears in the hospital for this admission.     History was obtained from Epic and sons.    Allergies:  Allergies[1]  Medications:     Current Facility-Administered Medications:     vancomycin (Vancocin) 1,000 mg in sodium chloride 0.9% 250 mL IVPB-ADDV, 25 mg/kg, Intravenous, Once    ipratropium-albuterol (Duoneb) 0.5-2.5 (3) MG/3ML inhalation solution 3 mL, 3 mL, Nebulization, 2 times daily    traMADol (Ultram) tab 25 mg, 25 mg, Oral, BID    sodium bicarbonate 50 mEq in sodium chloride 0.45% 1,050 mL infusion, 50 mEq, Intravenous, Continuous    Vancomycin: PHARMACY DOSING, 1 each, Intravenous, See Admin Instructions (RX holding)    acetaminophen (Tylenol Extra Strength) tab 500 mg, 500 mg, Oral, Q4H PRN    ondansetron (Zofran) 4 MG/2ML injection 4 mg, 4 mg, Intravenous, Q6H PRN    metoclopramide (Reglan) 5 mg/mL injection 10 mg, 10 mg, Intravenous, Daily PRN    Subjective      This is her  admission since 2023. The family recognizes it has been a difficult year for Sunita. They had meet with palliative care prior to this admission in the  community.     Interval events: Sunita remains fatigued, poor appetite. Her appetite has not been good prior to this admission. + Blood cultures MRSA.    When I entered the room, the patient was awake, she does not have her hearing aids in place so it is difficult for her join in the discussion. Her three sons are at the bedside. She currently lives with her daughter who has Downs Syndrome. Her daughter is not at functioning level to care for Sunita.     Symptom assessment:   Pain assessment: Bilateral upper arms with pain. RUE erythema, edema and pain. Left shoulder difficult to lift, has been experiencing pain in her left arm since her fall.         Review of Systems:  Dyspnea: she verbalized feeling some shortness of breath while on oxygen and saturating 100%  Cough: not observed  Nausea: denies  Appetite:  poor.    Bowel Movement         12/29/2024 2140             Stool Count Calculated for I/O: 1    Stool (mL): 150 mL            Objective:     Vital Signs:  Blood pressure 123/51, pulse 73, temperature 98 °F (36.7 °C), temperature source Axillary, resp. rate 16, weight 81 lb 6.4 oz (36.9 kg), SpO2 100%.  Body mass index is 15.9 kg/m².    Present Level of pain: moderate, using Tramadol as needed for pain.   Non-verbal signs of pain present: grimaces with movement.     Performance scale:35%  % Ambulation Activity Level Self-Care Intake Consciousness   100 Full  Normal  No Disease Full Normal Full   90 Full  Normal  Some Disease Full Normal Full   80 Full  Normal w/effort  Some Disease Full Normal or reduced Full   70 Reduced  Can't Perform Job  Some Disease Full Normal or reduced Full   60 Reduced  Can't Perform Hobby   Significant Disease Occ Assist Normal or reduced Full or confused   50 Mainly sit/lie Can't do any work  Extensive Disease Partial Assist Normal or reduced Full or confused   40 Mainly in bed Can't do any work  Extensive Disease Mainly Assist Normal or reduced Full or confused   30 Bed Bound Can't do  any work  Extensive Disease Max Assist  Total Care Reduced  Drowsy/confused   20 Bed Bound Can't do any work  Extensive Disease Max Assist  Total Care Minimal  Drowsy/confused   10 Bed Bound Can't do any work  Extensive Disease Max Assist  Total Care Mouth Care  Drowsy/confused   0 Death          Physical Exam:  General: drowsy, easily awakes. In no apparent respiratory distress. Body habitus Thin   HEENT: AT/NC. No gross focal deficits. Dry MM   Cardiac:  regular rate  Lungs: non labored with nasal canula.   Abdomen: Soft, ostomy  Extremities: no BLE edema present  Neurologic: Alert and oriented to person, place  Psychiatric: Mood pleasant mood   Skin: Warm and dry.        Results:     Hematology:  Lab Results   Component Value Date    WBC 9.2 12/30/2024    HGB 8.4 (L) 12/30/2024    HCT 19.9 (L) 12/30/2024    .0 12/30/2024     Coags:  Lab Results   Component Value Date    INR 1.50 (H) 12/15/2024    PTT 36.5 (H) 12/15/2024     Chemistry:  Lab Results   Component Value Date    CREATSERUM 4.37 (H) 12/31/2024     (H) 12/30/2024     12/30/2024    K 5.1 12/30/2024     12/30/2024    CO2 21.0 12/30/2024    GLU 81 12/30/2024    CA 7.0 (L) 12/30/2024    ALB 2.8 (L) 12/29/2024    ALKPHO 113 12/29/2024    BILT 0.2 12/29/2024    TP 5.4 (L) 12/29/2024    AST 33 12/29/2024    ALT 16 12/29/2024    DDIMER 0.74 11/29/2024    MG 1.9 11/02/2024    PHOS 8.1 (H) 12/29/2024     Imaging:  CT UPPER EXT RIGHT (WO IV)(CPT=73200)    Result Date: 12/30/2024  CONCLUSION:   1. There is decreasing size and extent of a previously visualized suspected intramuscular hematoma in the upper right arm that appears to involve the right biceps muscle and brachial radialis with reference measurement of approximately 11.3 x 2.2 x 3.4 cm, previously 13.0 x 3.0 x 4.6 cm.  Clinical correlation and continued follow-up is suggested.   2. There is nonspecific moderate soft tissue edema throughout the subcutaneous tissues of the right arm  and hand with underlying cellulitis not excluded.  Clinical correlation and direct inspection is suggested.  No soft tissue abscess by noncontrast CT.  3. No specific findings to suggest septic joint by noncontrast CT.   Please see above for further details.  LOCATION:  [facility]   Dictated by (CST): Brandon Tobin MD on 12/30/2024 at 8:54 PM     Finalized by (CST): Brandon Tobin MD on 12/30/2024 at 9:00 PM       CT BRAIN OR HEAD (CPT=70450)    Result Date: 12/29/2024  CONCLUSION:  No acute intracranial abnormality.    LOCATION:  [facility]   Dictated by (CST): Deepak Enrique MD on 12/29/2024 at 8:03 PM     Finalized by (CST): Deepak Enrique MD on 12/29/2024 at 8:04 PM       XR FOREARM (2 VIEWS), RIGHT (CPT=73090)    Result Date: 12/29/2024  CONCLUSION:  No acute fracture or dislocation.  LOCATION:  [facility]   Dictated by (CST): Deepak Enrique MD on 12/29/2024 at 6:44 PM     Finalized by (CST): Deepak Enrique MD on 12/29/2024 at 6:45 PM           Summary of Discussion : The sons at the bedside would like to gain an understanding of her acute illness and the treatment required to improve her acute illness. Pending the treatment options will determine long term goals of care.     They have good insight into her chronic health conditions, multiple admissions this year and seriousness of her acute illness. They recognize currently she can not feed herself, prior to this admission she was not eating well but able to care for herself with as needed help. She had been transferring herself to the  from a chair or bed prior to her acute infection. She was also able to do stairs with assist prior to this admission.     The family wants to know what treatment options are available and then will make decisions based on Sunita's condition if the treatment is reasonable for her.   This lead to a code status discussion.       Advance Care Planning counseling and discussion:   They voluntarily participated in a advanced care planning  discussion. We discussed the risks vs benefits of life sustaining treatments in the setting of her acute infection,  advanced age and co-morbidities.     We reviewed the POLST. I explained he had completed a POLST indicating only DNAR. That is important although section B is valuable related to her care outside of her heart stopping.     Section A:  A full code includes aggressive life saving measures with possible CPR, defibrillation/ shocking, intubation and placement on the ventilator with administration of emergent IV medications to assist in restoring circulation.    vs  DNAR ( do not attempt resuscitate) indicates if the heart stops and no spontaneous breaths end of life is present, \" allow natural death.\"    Section B:   DNAR with Full treatment includes ongoing medical management and treatment for new potential symptoms or complications including intubation for respiratory distress,  the exception is NO CPR.     DNAR with Selective treatment is appropriate for on going medical management and treatment for new potential symptoms or complications with the exception of CPR AND INTUBATION.  DNI ( do not intubate) indicates no breathing tube will be placed for respiratory distress or if no spontaneous breaths.    DNAR with Comfort care is appropriate when further readmissions, aggressive treatment and evaluation of potential new symptoms do not benefit the patient nor provide quality of life. Consideration for hospice support when comfort care is appropriate.     We reviewed each section. Arron/ REINIER and his brothers understand her code status as DNAR comfort care suggests no escalation of treatment. Thus far Sunita has been offered treatment for her acute infection with antibiotics and imaging as well fluids for ZOHREH. They are pleased with treatment focused care thus far. I explained thus far her care is more reflective of DNAR selective treatment.   When discussing escalating treatment if her symptoms declined such  as adding BIPAP, Vasopressors and intubation they do not want those measures for Sunita. They verbalized understanding between DNAR comfort and DNAR selective knowing DNAR comfort is truly supportive care not aggressive measures to improve a clinical scenario.   Continue treatment and evaluation with offering options and allowing the family to accept or decline. Arron wishes to leave her code status as DNAR comfort care.    HC POA  surrogate . Healthcare Agent's Name: Arron Loza   POLST FORM - in the chart although not complete. Arron is aware he needs to complete a new form prior to discharge.   DNAR/Comfort Care    Principal Problem:    Acute renal failure superimposed on stage 4 chronic kidney disease, unspecified acute renal failure type (HCC)  Active Problems:    Dehydration    Hyponatremia    Arm swelling    Palliative care by specialist    Goals of care, counseling/discussion      Assessment and Recommendations   Goals of care:    Continue evaluation of symptoms with offering treatment options and allowing the family to accept or decline. Arron wishes to leave her code status as DNAR comfort care.  Code Status: DNAR/Comfort Care. Arron is aware he will have to complete an updated POLST prior to discharge as the current POLST does not reflect Comfort care it only indicates DNAR.   Healthcare Agent's Name: Arron Loza      Discussed today's visit with Dr. Iqbal and Sarah HERNANDEZ with caregiver list.     Palliative Care Follow Up: Palliative care team will follow up on Thursday.      Thank you for allowing Palliative Care services to participate in the care of Sunita Loza.    A total of 50 minutes were spent on this follow up, which included all of the following: chart review, direct face to face contact,  physical examination, counseling, coordinating care and documentation.     Wen Bhagat, BARBARA  12/31/2024   9:31 AM   Palliative Care Services    The 21st Century Cures Act makes medical notes like these  available to patients in the interest of transparency. Please be advised this is a medical document. Medical documents are intended to carry relevant information, facts as evident, and the clinical opinion of the practitioner. The medical note is intended as peer to peer communication and may appear blunt or direct. It is written in medical language and may contain abbreviations or verbiage that are unfamiliar.          [1]   Allergies  Allergen Reactions    Ciprofloxacin RASH and HIVES    Penicillins RASH

## 2025-01-01 LAB
ANION GAP SERPL CALC-SCNC: 6 MMOL/L (ref 0–18)
BACTERIA BLD CULT: POSITIVE
BUN BLD-MCNC: 82 MG/DL (ref 9–23)
CALCIUM BLD-MCNC: 7.7 MG/DL (ref 8.7–10.4)
CHLORIDE SERPL-SCNC: 104 MMOL/L (ref 98–112)
CO2 SERPL-SCNC: 20 MMOL/L (ref 21–32)
CREAT BLD-MCNC: 3.31 MG/DL
EGFRCR SERPLBLD CKD-EPI 2021: 13 ML/MIN/1.73M2 (ref 60–?)
ERYTHROCYTE [DISTWIDTH] IN BLOOD BY AUTOMATED COUNT: 16.4 %
GLUCOSE BLD-MCNC: 120 MG/DL (ref 70–99)
HCT VFR BLD AUTO: 27.9 %
HGB BLD-MCNC: 9.4 G/DL
MCH RBC QN AUTO: 29.6 PG (ref 26–34)
MCHC RBC AUTO-ENTMCNC: 33.7 G/DL (ref 31–37)
MCV RBC AUTO: 87.7 FL
OSMOLALITY SERPL CALC.SUM OF ELEC: 296 MOSM/KG (ref 275–295)
PLATELET # BLD AUTO: 132 10(3)UL (ref 150–450)
POTASSIUM SERPL-SCNC: 4.2 MMOL/L (ref 3.5–5.1)
RBC # BLD AUTO: 3.18 X10(6)UL
SODIUM SERPL-SCNC: 130 MMOL/L (ref 136–145)
STAPHYLOCOCCUS AUREUS, MRSA BY PCR: DETECTED
VANCOMYCIN SERPL-MCNC: 11 UG/ML (ref ?–40)
WBC # BLD AUTO: 15.7 X10(3) UL (ref 4–11)

## 2025-01-01 PROCEDURE — 99233 SBSQ HOSP IP/OBS HIGH 50: CPT | Performed by: INTERNAL MEDICINE

## 2025-01-01 RX ORDER — ACETAMINOPHEN 500 MG
1000 TABLET ORAL EVERY 6 HOURS PRN
Status: DISCONTINUED | OUTPATIENT
Start: 2025-01-01 | End: 2025-01-04

## 2025-01-01 NOTE — CONSULTS
[facility]  Report of Consultation    Sunita Loza Patient Status:  Inpatient    1932 MRN [id number]   Location [facility] 4NW-A Attending Farida, Yuki Marcial*   Hosp Day # 1 PCP Janell Powell MD     Reason for Consultation:  R hand pain    History of Present Illness:  Sunita Loza is a a(n) 89 year old female. Pt with R hand lesion, has bloody drainage from a wound over the dorsum of the 2nd webspace.  She was admitted for bacteremia.  She had been on hospice, but was transferred to the ED due to a syncopal episode.  She notes that the hand is painful and she has difficulty moving the fingers on the right hand.    History:  Past Medical History:    ZOHREH (acute kidney injury) (HCC)    Arthritis    Atrial fibrillation (HCC)    Attention to colostomy (HCC)    Back pain    C. difficile colitis    CKD (chronic kidney disease)    and S/P nephrectomy    CKD (chronic kidney disease) stage 4, GFR 15-29 ml/min (HCC)    Colon cancer (HCC)    Congestive heart disease (HCC)    Easy bruising    Endometrial cancer (HCC)    UTERINE, DX ABOUT 30 YEARS    Hearing impairment    AIDS    Heart palpitations    Afib    High blood pressure    Hip fracture (HCC)    left    Hyperkalemia    Kidney failure    Leg swelling    Bishop syndrome    hereditary colorectal cancer    Metabolic acidosis    Muscle weakness    WALKER    PAF (paroxysmal atrial fibrillation) (HCC)    Pain in joints    Wears glasses    Weight loss     Past Surgical History:   Procedure Laterality Date    Colectomy      Nephrectomy Left     Orif hip fracture Left 06/10/2024    Part removal colon w end colostomy       Family History   Problem Relation Age of Onset    Heart Disorder Father     Heart Disorder Mother     Colon Cancer Mother         70    Heart Disorder Sister     Breast Cancer Sister     Diabetes Brother     Breast Cancer Sister     Cancer Sister     Breast Cancer Sister     Breast Cancer Daughter       reports that she has never smoked.  She has never used smokeless tobacco. She reports that she does not currently use alcohol. She reports that she does not use drugs.    Allergies:  Allergies[1]    Medications:    Current Facility-Administered Medications:     ipratropium-albuterol (Duoneb) 0.5-2.5 (3) MG/3ML inhalation solution 3 mL, 3 mL, Nebulization, 2 times daily    vancomycin (Vancocin) cap 125 mg, 125 mg, Oral, Daily    epoetin chris (Epogen, Procrit) 73795 UNIT/ML injection 10,000 Units, 10,000 Units, Subcutaneous, Weekly    traMADol (Ultram) tab 25 mg, 25 mg, Oral, BID    sodium bicarbonate 50 mEq in sodium chloride 0.45% 1,050 mL infusion, 50 mEq, Intravenous, Continuous    Vancomycin: PHARMACY DOSING, 1 each, Intravenous, See Admin Instructions (RX holding)    acetaminophen (Tylenol Extra Strength) tab 500 mg, 500 mg, Oral, Q4H PRN    ondansetron (Zofran) 4 MG/2ML injection 4 mg, 4 mg, Intravenous, Q6H PRN    metoclopramide (Reglan) 5 mg/mL injection 10 mg, 10 mg, Intravenous, Daily PRN    Review of Systems:  Pertinent items are noted in HPI.  10 point ROS performed and was otherwise negative    Physical Exam:    General: Alert, orientated x2.  Cooperative.  No apparent distress.  Vital Signs:  Blood pressure 120/46, pulse 72, temperature 96.7 °F (35.9 °C), temperature source Temporal, resp. rate 16, weight 81 lb 6.4 oz (36.9 kg), SpO2 100%.  HEENT: Exam is unremarkable.  Without scleral icterus.  Mucous membranes are moist. Pupils are equal and round, reactive to light and accommodate.  Pupils are approximately 3mm and react to 2mm with reaction to light.  Oropharynx is clear.  Normocephalic, atraumatic.  Neck: No tenderness to palpitation.  Full range of motion to flexion and extension, lateral rotation and lateral flexion of cervical spine.  No JVD. Supple.   Lungs: Clear to auscultation bilaterally.  Cardiac: Regular rate and rhythm. No murmur.  Abdomen:  Soft, non-distended, non-tender, with no rebound or guarding.  No peritoneal  signs. No ascites.  Liver is within normal limits.  Spleen is not palpable.    Extremities:  No lower extremity edema noted.  Without clubbing or cyanosis.  R UE - ulcer over the dorsum of the 2nd webspace, pain with motion of the IF and MF, especially at the MCP joints, bloody discharge from the ulcer, erythema extending throughout the hand and wrist, skin sloughing at the radial wrist  Skin: Normal texture and turgor.  Lymphatic:  No palpable cervical lymphadenopathy.  Neurologic: This patient is alert and orientated x 2.  Speech fluent. Able to follow simple commands.  Face is symmetrical.  No Drift.     Laboratory Data:  Lab Results   Component Value Date    HGB 8.4 12/30/2024    CREATSERUM 4.10 12/31/2024    BUN 80 12/31/2024     12/31/2024    K 4.8 12/31/2024     12/31/2024    CO2 17.0 12/31/2024    GLU 70 12/31/2024    CA 8.0 12/31/2024     CT scan with evidence of a resolving hematoma, no effusion at the wrist    Impression and Plan:  Patient Active Problem List   Diagnosis    DJD (degenerative joint disease), cervical    Asymptomatic postmenopausal status    Benign essential HTN    Chronic kidney disease, stage III (moderate) (HCC)    Chronic rhinitis    Disorder of bone and cartilage    Esophageal reflux    Generalized osteoarthrosis, involving multiple sites    Malignant neoplasm of uterus (HCC)    Other vitamin B12 deficiency anemia    Pure hypercholesterolemia    Renal failure    Acute pain of left knee    Vitamin D deficiency    Acidosis    Malignant neoplasm (HCC)    Hypokalemia    Hyperlipidemia, mixed    Endometrial carcinoma (HCC)    Anemia in chronic kidney disease    Osteopenia    Chronic kidney disease, stage 3b (HCC)    Otalgia    Sensorineural hearing loss, bilateral    ZOHREH (acute kidney injury) (HCC)    Hypernatremia    Hypomagnesemia    Dehydration    ATN (acute tubular necrosis) (HCC)    Atrial fibrillation (HCC)    Bilateral leg edema    Essential hypertension    Primary  osteoarthritis of left knee    Abdominal mass, unspecified abdominal location    Anemia    Acute kidney injury ([facility])    Azotemia    Hyperglycemia    Abdominal pain, generalized    CKD (chronic kidney disease) stage 4, GFR 15-29 ml/min ([facility])    Shock ([facility])    Decreased ferritin    Chronic edema    Cellulitis of abdominal wall    Acute renal injury ([facility])    Leukocytosis    Acute renal failure (ARF) ([facility])    Syncope, near    Heart failure with preserved ejection fraction ([facility])    Solitary kidney, acquired    Closed fracture of left hip, initial encounter ([facility])    Hyperkalemia    Clostridium difficile colitis    Metabolic acidosis    Leukocytosis, unspecified type    Chronic atrial fibrillation with RVR ([facility])    Gross hematuria    Acute on chronic anemia    Anemia, unspecified type    Other fatigue    Gastrointestinal hemorrhage, unspecified gastrointestinal hemorrhage type    Bleeding gums    Chest pain of uncertain etiology    Contusion of right upper extremity, initial encounter    Hyponatremia    Acute renal failure superimposed on stage 4 chronic kidney disease, unspecified acute renal failure type ([facility])    Arm swelling    Palliative care by specialist    Goals of care, counseling/discussion       R hand infected ulcer    Labs and imaging reviewed  This does appear to be an infection of the hand  Possible source of the bacteremia, but could also be a secondary site  Case discussed with the family  Options discussed including surgery, tissue biopsy, abx, and hospice care  Risk/benefit of each of the these options were discussed  They stated that they would like to avoid any surgery, hospice care is their preferred option at this time  No further orthopaedic intervention indicated at this time  Please re-consult if condition changes    Jeevan Kirby MD  12/31/2024  6:06 PM         [1]   Allergies  Allergen Reactions    Ciprofloxacin RASH and HIVES    Penicillins RASH

## 2025-01-01 NOTE — PLAN OF CARE
Patient is alert and oriented x2, forgetful at times. 2L of oxygen. Medications given per MAR. IVF infusing. Repositioned for comfort. Safety precautions in place. Call light within reach.    Problem: Impaired Cognition  Goal: Patient will exhibit improved attention, thought processing and/or memory  Description: Interventions:  Outcome: Progressing     Problem: SAFETY ADULT - FALL  Goal: Free from fall injury  Description: INTERVENTIONS:  - Assess pt frequently for physical needs  - Identify cognitive and physical deficits and behaviors that affect risk of falls.  - Shiro fall precautions as indicated by assessment.  - Educate pt/family on patient safety including physical limitations  - Instruct pt to call for assistance with activity based on assessment  - Modify environment to reduce risk of injury  - Provide assistive devices as appropriate  - Consider OT/PT consult to assist with strengthening/mobility  - Encourage toileting schedule  Outcome: Progressing

## 2025-01-01 NOTE — PROGRESS NOTES
No change. Repositioned frequently. Colostomy draining liquid  stool. Inco of urine, and bladder scan was 220. Tylenol and ultram for generalized pain.    Sats 100% , complaining of generalized pain. Turned q 2hours . Tylenol x 2 drinks given frequently

## 2025-01-01 NOTE — PROGRESS NOTES
[facility]  Nephrology Progress Note    Sunita Loza Attending:  Yuki Iqbal*       Assessment and Plan:    1) ZOHREH- due to volume depletion with poor PO intake / ostomy losses / loop diuretic- improving.      2) CKD 4- multifactorial baseline Cr > 2 mg/dl; s/p nephrectomy > 20 yrs ago     3) MRSA bacteremia with R hand / wrist cellulitis and possible septic arthritis -> IV vanco per ID, apprec ortho eval      4) L hip fracture s/p IM nail      5) s/p exp lap / LOS / colon resection / ostomy takedown / creation of colostomy      6) h/o uterine CA s/p hysterectomy 20 yrs ago      7) Anemia- due to CKD / chronic disease / low Fe stores receiving EPO as outpt- s/p transfusion      8) Chronic dependent edema- due to CKD / ? HFpEF- holding usual torsemide     9) Recent Cdif colitis- on PO prophylaxis      Subjective:  Awake notes reviewed thirsty, hand still sore    Physical Exam:   /60 (BP Location: Left arm)   Pulse 81   Temp 98.1 °F (36.7 °C) (Oral)   Resp 15   Wt 81 lb 6.4 oz (36.9 kg)   SpO2 99%   BMI 15.90 kg/m²   Temp (24hrs), Av.3 °F (36.3 °C), Min:96.7 °F (35.9 °C), Max:98.1 °F (36.7 °C)       Intake/Output Summary (Last 24 hours) at 2025 1027  Last data filed at 2025 0600  Gross per 24 hour   Intake 1020 ml   Output 110 ml   Net 910 ml     Wt Readings from Last 3 Encounters:   24 81 lb 6.4 oz (36.9 kg)   12/15/24 82 lb 7.2 oz (37.4 kg)   12/15/24 82 lb 7.2 oz (37.4 kg)     General: awake   HEENT: No scleral icterus, MMM  Neck: Supple, no MARIA FERNANDA or thyromegaly  Cardiac: Regular rate and rhythm, S1, S2 normal, no murmur or tub  Lungs: Decreased BS at bases bilaterally   Abdomen: Soft, non-tender. + bowel sounds, no palpable organomegaly  Extremities: Without clubbing, cyanosis; no edema  Neurologic: Cranial nerves grossly intact, moving all extremities  Skin: Warm and dry, no rashes       Labs:   Lab Results   Component Value Date    CREATSERUM 4.10 2024     BUN 80 12/31/2024     12/31/2024    K 4.8 12/31/2024     12/31/2024    CO2 17.0 12/31/2024    GLU 70 12/31/2024    CA 8.0 12/31/2024       Imaging:  All imaging studies reviewed.    Meds:   Current Facility-Administered Medications   Medication Dose Route Frequency    ipratropium-albuterol (Duoneb) 0.5-2.5 (3) MG/3ML inhalation solution 3 mL  3 mL Nebulization 2 times daily    vancomycin (Vancocin) cap 125 mg  125 mg Oral Daily    epoetin chris (Epogen, Procrit) 17936 UNIT/ML injection 10,000 Units  10,000 Units Subcutaneous Weekly    traMADol (Ultram) tab 25 mg  25 mg Oral BID    sodium bicarbonate 50 mEq in sodium chloride 0.45% 1,050 mL infusion  50 mEq Intravenous Continuous    Vancomycin: PHARMACY DOSING  1 each Intravenous See Admin Instructions (RX holding)    acetaminophen (Tylenol Extra Strength) tab 500 mg  500 mg Oral Q4H PRN    ondansetron (Zofran) 4 MG/2ML injection 4 mg  4 mg Intravenous Q6H PRN    metoclopramide (Reglan) 5 mg/mL injection 10 mg  10 mg Intravenous Daily PRN         Questions/concerns were discussed with patient and/or family by bedside.          Fabiana Miller MD  1/1/2025  10:16 AM

## 2025-01-01 NOTE — PROGRESS NOTES
Body Location Override (Optional): Right lower arm [facility]   part of [facility]  Progress Note    Sunita Loza Patient Status:  Inpatient    1932 MRN [id number]   Location [facility] 4NW-A Attending Yuki Iqbal*   Hosp Day # 2 PCP Janell Powell MD     Subjective:  Patient seen and examined sitting up in bed. Family present at the bedside. Feeling better today. Still with some R wrist/hand pain. No acute overnight events. Remains afebrile. Denies n/v/d. Otherwise no new complaints.      Objective:  Blood pressure 132/58, pulse 88, temperature 98 °F (36.7 °C), temperature source Oral, resp. rate 14, weight 81 lb 6.4 oz (36.9 kg), SpO2 100%.    Intake/Output:    Intake/Output Summary (Last 24 hours) at 2025 1242  Last data filed at 2025 1130  Gross per 24 hour   Intake 1020 ml   Output 810 ml   Net 210 ml       Physical Exam:  General: Awake, alert, non-tox, NAD.  HEENT:  Oropharynx clear, trachea ML.  Heart: RRR S1S2 no murmurs.  Lungs: Essentially CTA b/l, no rhonchi, rales, wheezes. Bases diminished.  Abdomen: Soft, NT/ND.  BS present.  No guarding or rebound.  Extremity: R hand/wrist wrapped with dressing c/d/i.  Neurological: No focal deficits.  Derm:  Warm and dry.    Lab Data Review:          Cultures:  Hospital Encounter on 24   1. Blood Culture     Status: Abnormal    Collection Time: 24  6:21 PM    Specimen: Blood,peripheral   Result Value Ref Range    Blood Culture Result Positive N/A    Blood Culture Result Staphylococcus aureus, MRSA (A) N/A    Blood Smear Gram positive cocci in pairs and clusters (A) N/A       Susceptibility    Staphylococcus aureus, MRSA -  (no method available)     Cefazolin  Resistant      Clindamycin <=0.25 Sensitive      Erythromycin <=0.25 Sensitive      Gentamicin <=0.5 Sensitive      Levofloxacin >=8 Resistant      Oxacillin >=4 Resistant      Tetracycline <=1 Sensitive      Trimethoprim/Sulfa <=10 Sensitive      Vancomycin 1  Sensitive        Radiology:  CT UPPER EXT RIGHT (WO IV)(CPT=73200)    Result Date: 12/30/2024  CONCLUSION:   1. There is decreasing size and extent of a previously visualized suspected intramuscular hematoma in the upper right arm that appears to involve the right biceps muscle and brachial radialis with reference measurement of approximately 11.3 x 2.2 x 3.4 cm, previously 13.0 x 3.0 x 4.6 cm.  Clinical correlation and continued follow-up is suggested.   2. There is nonspecific moderate soft tissue edema throughout the subcutaneous tissues of the right arm and hand with underlying cellulitis not excluded.  Clinical correlation and direct inspection is suggested.  No soft tissue abscess by noncontrast CT.  3. No specific findings to suggest septic joint by noncontrast CT.   Please see above for further details.  LOCATION:  [facility]   Dictated by (CST): Brandon Tobin MD on 12/30/2024 at 8:54 PM     Finalized by (CST): Brandon Tobin MD on 12/30/2024 at 9:00 PM       CT BRAIN OR HEAD (CPT=70450)    Result Date: 12/29/2024  CONCLUSION:  No acute intracranial abnormality.    LOCATION:  [facility]   Dictated by (CST): Deepak Enrique MD on 12/29/2024 at 8:03 PM     Finalized by (CST): Deepak Enrique MD on 12/29/2024 at 8:04 PM       XR FOREARM (2 VIEWS), RIGHT (CPT=73090)    Result Date: 12/29/2024  CONCLUSION:  No acute fracture or dislocation.  LOCATION:  [facility]   Dictated by (CST): Deepak Enrique MD on 12/29/2024 at 6:44 PM     Finalized by (CST): Deepak Enrique MD on 12/29/2024 at 6:45 PM          Assessment and Plan:  1.  MRSA sepsis/bacteremia with R wrist inflammatory arthritis concerning for septic arthritis with threat to life and threat to limb  - S/p 1 dose of IV cefazolin on 12/29.  - Blood cultures 2/2 isolating MRSA on 12/29.  - XR with no acute fracture or dislocation.  - CT RUE as noted above.  - IV vancomycin, ongoing.      2.  ZOHREH on CKD  - Antibiotics will be renally adjusted  - As per nephrology.    3.   Which Doctor Performed Mohs? (Optional): Dr. Denise Arreola Recs  - Continue IV vancomycin.  Pharmacy to dose.  - F/u WBC and fever curve.  - F/u blood cultures.  - Ortho input noted and appreciated.  - Recommend TTE given MRSA bacteremia; however, family would like to hold off at this time.   - Supportive care as per the primary team.  - GOC discussions ongoing.  - Discussed plan of care with nursing.  - Discussed plan of care with patient and son at the bedside.  All questions addressed and understanding verbalized.  - Further recommendations pending clinical course.     Discussed case with ID attending/collaborating physician, Dr. Guy Torrez, who is in agreement with the above plan of care    Please note that this report has been produced using speech recognition software and may contain errors related to that system including, but not limited to, errors in grammar, punctuation, and spelling, as well as words and phrases that possibly may have been recognized inappropriately.  If there are any questions or concerns, contact the dictating provider for clarification.     The 21st Century Cures Act makes medical notes like these available to patients in the interest of transparency. Please be advised this is a medical document. Medical documents are intended to carry relevant information, facts as evident, and the clinical opinion of the practitioner. The medical note is intended as peer to peer communication and may appear blunt or direct. It is written in medical language and may contain abbreviations or verbiage that are unfamiliar.     If you have any questions or concerns please call [facility] Infectious Disease at [phone].     Bhanu Grady, APRN    1/1/2025  7:55 AM

## 2025-01-01 NOTE — PROGRESS NOTES
[facility] Hospitalist Progress Note    Subjective:  No acute overnight events reported. Remains on supplemental oxygen.    Review of Systems  Comprehensive ROS reviewed and negative except for what is stated in HPI.      OBJECTIVE:  /58 (BP Location: Left arm)   Pulse 88   Temp 98 °F (36.7 °C) (Oral)   Resp 14   Wt 81 lb 6.4 oz (36.9 kg)   SpO2 100%   BMI 15.90 kg/m²   General: No apparent distress.   HEENT:  EOMI, PERRLA.   Pulm: Decreased breath sounds, no active resp effort.   CV: Regular rate and rhythm, no murmur.   Abd: Soft, nontender, nondistended.   MSK: No obvious deformities.   Skin: No lesions or rashes.  Neuro: Awake, redirectable.     LABS:          All lab work and imaging personally reviewed.    Assessment/ Plan:   Patient is a 89 year old female initially presented after a syncopal episode.    #Syncope, suspect hypovolemia  -PRBC transfusion as required (has received 1 unit), IV fluids per nephro.   -Encourage oral intake as tolerable    #Acute on chronic anemia  #RUE hematoma  -Imaging reviewed.  PRBC transfusion as above.  -Evaluated by ortho, discussed options with family.     #ZOHREH on CKD stage III  #Urinary retention   -IV fluids per nephrology.  Trend renal panel.  -Monitor UOP, has required straight cath.     #Essential hypertension  #PAF  #Diastolic CHF  #Chronic BLE edema  -Holding Eliquis, Dilt/amnio and diuretic for low HR  -Monitor hemodynamics closely    #Bishop syndrome s/p colostomy  #History of uterine CA s/p hysterectomy    Diet: regular   Code status:  DNAR comfort     #Advanced care planning- 16 minutes were spent discussing advanced care planning exclusive of the documented time for the visit.   12/31/24: Discussed updated clinical condition and prognosis with patients family members at bedside who are understanding. Family would like to continue current treatment with conservative measures and pursue DNAR  comfort measures. Family continues to think through hospice, with  eventual goal of taking patient home possibly. I did discuss that due to history of MRSA, SHIRA was recommended to rule out endocarditis in the setting of patients presentation and what that entails. Family would like hold off on this.     Syed Knight DO  [facility]

## 2025-01-02 ENCOUNTER — TELEPHONE (OUTPATIENT)
Dept: NEPHROLOGY | Facility: CLINIC | Age: OVER 89
End: 2025-01-02

## 2025-01-02 PROCEDURE — 99232 SBSQ HOSP IP/OBS MODERATE 35: CPT | Performed by: NURSE PRACTITIONER

## 2025-01-02 PROCEDURE — 99233 SBSQ HOSP IP/OBS HIGH 50: CPT | Performed by: INTERNAL MEDICINE

## 2025-01-02 RX ORDER — DILTIAZEM HYDROCHLORIDE 120 MG/1
240 CAPSULE, EXTENDED RELEASE ORAL DAILY
Status: DISCONTINUED | OUTPATIENT
Start: 2025-01-02 | End: 2025-01-03

## 2025-01-02 NOTE — PROGRESS NOTES
[facility]   part of [facility]  Palliative Care Progress Note    Sunita Loza Patient Status:  Inpatient    1932 MRN [id number]   Location [facility] 4NW-A Attending Yuki Iqbal*   Hosp Day # 3 PCP Janell Powell MD     History/Other:    Sunita Loza is a 89yo female with  h/o Bishop syndrome colon CA s/p colostomy, ch anemia, Afib (Eliquis), CKD3, HTN, HFpEF, h/o GIB, remote h/o uterine CA, falls who was admitted on 2024 for syncope. Work up in our hospital revealed anemia, ZOHREH,  R wrist swelling and erythema - hematoma and concern for septic arthritis.    Sunita lives at [facility], and recently had enrolled into hospice [facility].  On 24, staff was assisting her in ostomy care when she passed out. On  the family had taken pictures of her arms/ hands as the left hand appeared swollen. Then her right hand became swollen and red as it appears in the hospital for this admission.        Consult ordered by:: Dr. Iqbal for evaluation of Palliative Care needs and Goals of care discussion.    Allergies:  Allergies[1]  Medications:     Current Facility-Administered Medications:     acetaminophen (Tylenol Extra Strength) tab 1,000 mg, 1,000 mg, Oral, Q6H PRN    ipratropium-albuterol (Duoneb) 0.5-2.5 (3) MG/3ML inhalation solution 3 mL, 3 mL, Nebulization, 2 times daily    vancomycin (Vancocin) cap 125 mg, 125 mg, Oral, Daily    epoetin chris (Epogen, Procrit) 99403 UNIT/ML injection 10,000 Units, 10,000 Units, Subcutaneous, Weekly    traMADol (Ultram) tab 25 mg, 25 mg, Oral, BID    sodium bicarbonate 50 mEq in sodium chloride 0.45% 1,050 mL infusion, 50 mEq, Intravenous, Continuous    Vancomycin: PHARMACY DOSING, 1 each, Intravenous, See Admin Instructions (RX holding)    ondansetron (Zofran) 4 MG/2ML injection 4 mg, 4 mg, Intravenous, Q6H PRN    metoclopramide (Reglan) 5 mg/mL injection 10 mg, 10 mg, Intravenous, Daily PRN    Subjective      Interval  events:  Pain controlled on current medications. Son's Spencer & Uday are at bedside, they reported they have all discussed & want to transition Sunita back to [place] with hospice & caregivers.     When I entered the room, the patient was lying in bed, eyes closed aroused easily. She ate cream of wheat for breakfast, son reports this is the most she has eaten in a couple of days.     Difficulty drawing blood this AM, son's report they do not want further attempts.     Symptom assessment:   Pain assessment:   24 hour (3447-7113) OME total: 0  Current pain: 1-2/10, described as dull achy, located R wrist, made worse by movement, alleviated by pain medications, taking Tylenol PRN.Pain goal: 1-2/10      See summary of discussion below.     Review of Systems:     Bowel Movement         12/29/2024 2140             Stool Count Calculated for I/O: 1    Stool (mL): 150 mL            Objective:     Vital Signs:  Blood pressure 105/69, pulse 80, temperature 97.7 °F (36.5 °C), temperature source Oral, resp. rate 16, weight 81 lb 6.4 oz (36.9 kg), SpO2 100%.  Body mass index is 15.9 kg/m².  Present Level of pain: 1/10  Non-verbal signs of pain present: No  Current Palliative performance scale PPSv2 (%): 35    Physical Exam:  General: Sleeping. In no apparent respiratory distress. Body habitus Thin   HEENT: AT/NC. No gross focal deficits. MMM   Lungs: Normal effort on NC  Abdomen: Soft, non-tender, non-distended, normal bowel sounds X 4 quadrants   Extremities:  trace  BLE edema present, 1+ to BUE, R hand 2+ some weeping edema noted under L arm & R wrist.  Neurologic: Alert and oriented to person and place   Psychiatric: Mood pleasant mood   Skin: Warm and dry.        Results:     Hematology:  Lab Results   Component Value Date    WBC 15.7 (H) 01/01/2025    HGB 9.4 (L) 01/01/2025    HCT 27.9 (L) 01/01/2025    .0 (L) 01/01/2025     Coags:  Lab Results   Component Value Date    INR 1.50 (H) 12/15/2024    PTT 36.5 (H) 12/15/2024      Chemistry:  Lab Results   Component Value Date    CREATSERUM 3.31 (H) 01/01/2025    BUN 82 (H) 01/01/2025     (L) 01/01/2025    K 4.2 01/01/2025     01/01/2025    CO2 20.0 (L) 01/01/2025     (H) 01/01/2025    CA 7.7 (L) 01/01/2025    ALB 2.8 (L) 12/29/2024    ALKPHO 113 12/29/2024    BILT 0.2 12/29/2024    TP 5.4 (L) 12/29/2024    AST 33 12/29/2024    ALT 16 12/29/2024    DDIMER 0.74 11/29/2024    MG 1.9 11/02/2024    PHOS 8.1 (H) 12/29/2024     Imaging:  No results found.       Summary of Discussion   I met with son's Spencer & Uday at bedside, Arron (POA) not present but they brothers have all spoken and have d/w specialists.  They do not want aggressive treatment's or procedures, and are looking to transition back to hospice.  Prior to hospital stay [facility] was on [facility], and plan to return to their care. I informed the son's that I will notify SW of their decision. They also would like to set up caregivers.   Emotional support provided.  Family requesting anointing of the sick-  support requested.     Advance Care Planning counseling and discussion:   HC POA Documentation Completed--Document in [employer]. Healthcare Agent's Name: Arron Kingvilma   We voluntarily discussed the risks vs benefits of life sustaining treatments in the setting of comorbid medical conditions with son.  POLST FORM Completed--Document in [employer]-   DNAR/Comfort Care    Assessment and Recommendations       Principal Problem:    Acute renal failure superimposed on stage 4 chronic kidney disease, unspecified acute renal failure type (HCC)  Active Problems:    Dehydration    Hyponatremia    Arm swelling    Palliative care by specialist    Goals of care, counseling/discussion      Goals of care:  comfort focused    SW to notify [facility] of pt/ family wishes.  Code Status: DNAR/Comfort Care  Healthcare Agent's Name: Arron Dago    Symptoms  -Pain- Tylenol PRN.    Discussed today's visit with Family, RN, and  MARY/PORSHA.    Palliative Care Follow Up: Palliative care team will follow peripherally and see patient as needed. Please contact provider with any questions, concerns, or if a care conference is to be held.  Palliative care follow up outpatient: is Not indicated - transition to hospice.     Thank you for allowing Palliative Care services to participate in the care of Sunita Loza.    A total of 35 minutes were spent on this follow up, which included all of the following: chart review, direct face to face contact, history taking, physical examination, counseling and coordinating care, and documentation.     Anastasia Bradford, APRN  1/2/2025  11:22 AM  Palliative Care Services    The 21st Century Cures Act makes medical notes like these available to patients in the interest of transparency. Please be advised this is a medical document. Medical documents are intended to carry relevant information, facts as evident, and the clinical opinion of the practitioner. The medical note is intended as peer to peer communication and may appear blunt or direct. It is written in medical language and may contain abbreviations or verbiage that are unfamiliar.         [1]   Allergies  Allergen Reactions    Ciprofloxacin RASH and HIVES    Penicillins RASH

## 2025-01-02 NOTE — PROGRESS NOTES
[facility] Pharmacy Dosing Service      Follow Up Pharmacokinetic Consult for Vancomycin Dosing     Sunita Loza is a 89 year old female who is receiving vancomycin therapy for bacteremia. Patient is on day 2 of vancomycin and is currently receiving  vanco  per levels. The current treatment and monitoring approach is non-AUC strategy.        Weight and Temperature:    Wt Readings from Last 1 Encounters:   24 36.9 kg (81 lb 6.4 oz)         Temp Readings from Last 1 Encounters:   25 98.1 °F (36.7 °C) (Oral)      Labs:   Recent Labs   Lab 24  0537 24  1118 25  1741   CREATSERUM 4.37* 4.10* 3.31*      Estimated Creatinine Clearance: 6.3 mL/min (A) (based on SCr of 3.31 mg/dL (H)).     Recent Labs   Lab 24  0637 24  0938 25  2133   WBC 6.7 9.2 15.7*        Vancomycin Levels:  Lab Results   Component Value Date/Time    VANCR 11.0 2025 09:33 PM       Corresponding 24 h-AUC: N/A     The Pharmacokinetic Target is:    Trough/random 10-15 mg/L    Renal Dosing Considerations:    ZOHREH/ARF     Assessment/Plan:   Maintenance Regimen:  Give one dose of 500mg x 1 tonight for trough of 11.     Monitorin) Plan for vancomycin random level to be obtained in 36 hours.     2) Pharmacy will order SCr as clinically indicated to assess renal function.    3) Pharmacy will monitor for toxicity and efficacy, adjust vancomycin dose and/or frequency, and order vancomycin levels as appropriate per the Pharmacy and Therapeutics Committee approved protocol until discontinuation of the medication.       We appreciate the opportunity to assist in the care of this patient.     Sybil Bledsoe, PharmD  2025  11:01 PM  [facility]  Pharmacy Extension: [phone]

## 2025-01-02 NOTE — PLAN OF CARE
Pt received A&O x 2. C/o of discomfort. MD notified. PRN Tylenol given. All other meds given per MAR. IVF infusing. Straight cathed pt and drained 300 mL. 2 L O2 for comfort. Pt repositioned. Call light within reach. Safety precautions in place.     0635: Sepsis BPA fired, MD notified, no new orders. Unable to draw AM labs dt pt being a hard stick, MD aware.      Problem: SAFETY ADULT - FALL  Goal: Free from fall injury  Description: INTERVENTIONS:  - Assess pt frequently for physical needs  - Identify cognitive and physical deficits and behaviors that affect risk of falls.  - [place] fall precautions as indicated by assessment.  - Educate pt/family on patient safety including physical limitations  - Instruct pt to call for assistance with activity based on assessment  - Modify environment to reduce risk of injury  - Provide assistive devices as appropriate  - Consider OT/PT consult to assist with strengthening/mobility  - Encourage toileting schedule  Outcome: Progressing     Problem: Impaired Cognition  Goal: Patient will exhibit improved attention, thought processing and/or memory  Description: Interventions:  Outcome: Progressing

## 2025-01-02 NOTE — PROGRESS NOTES
Infectious Disease Progress Note      Date of admission: 12/29/2024  4:41 PM     Reason for consult: MRSA bacteremia    Referring physician: Syed Knight DO    Subjective: Patient is about the same.  Confused but does not appear to be in pain or respiratory distress.    The rest of the systems were reviewed and found to be negative except was mentioned above    Interval events: This is a 89-year-old female patient with history of Bishop syndrome with multiple cancers in the past, presenting here with MRSA bacteremia in the setting of possible septic arthritis of the right wrist.  Family was in the room when I saw the patient, currently planning to transition the patient to hospice.    Medications:    acetaminophen    ipratropium-albuterol    vancomycin    epoetin chris    traMADol    sodium bicarbonate in 0.45% NS infusion    Vancomycin IV    ondansetron    metoclopramide     Allergies:  Allergies[1]    Physical Exam:  Vitals:    01/02/25 0800   BP: 105/69   Pulse: 80   Resp: 16   Temp: 97.7 °F (36.5 °C)     Vitals signs and nursing note reviewed.   Constitutional:       Appearance: Normal appearance.   HENT:      Head: Normocephalic and atraumatic.      Mouth: Mucous membranes are moist.   Neck:      Musculoskeletal: Neck supple.   Cardiovascular:      Rate and Rhythm: Normal rate.   Pulmonary:      Effort: Pulmonary effort is normal. No respiratory distress.   Neurological:      General: No focal deficit present.      Mental Status: Alert and oriented to person, place, and time.     Laboratory data:  I have reviewed all the lab results independently.  Lab Results   Component Value Date    WBC 15.7 01/01/2025    HGB 9.4 01/01/2025    HCT 27.9 01/01/2025    .0 01/01/2025    CREATSERUM 3.31 01/01/2025    BUN 82 01/01/2025     01/01/2025    K 4.2 01/01/2025     01/01/2025    CO2 20.0 01/01/2025     01/01/2025    CA 7.7 01/01/2025      Recent Labs   Lab 12/29/24  1729 12/30/24  0637  01/01/25  2133   RBC 2.97*   < > 3.18*   HGB 8.4*   < > 9.4*   HCT 26.5*   < > 27.9*   MCV 89.2   < > 87.7   MCH 28.3   < > 29.6   MCHC 31.7   < > 33.7   RDW 17.4   < > 16.4   NEPRELIM 4.43  --   --    WBC 5.2   < > 15.7*   .0   < > 132.0*    < > = values in this interval not displayed.      Microbiology data:  Hospital Encounter on 12/29/24   1. Blood Culture     Status: Abnormal    Collection Time: 12/29/24  6:21 PM    Specimen: Blood,peripheral   Result Value Ref Range    Blood Culture Result Positive N/A    Blood Culture Result Staphylococcus aureus, MRSA (A) N/A    Blood Smear Gram positive cocci in pairs and clusters (A) N/A       Susceptibility    Staphylococcus aureus, MRSA -  (no method available)     Cefazolin  Resistant      Clindamycin <=0.25 Sensitive      Erythromycin <=0.25 Sensitive      Gentamicin <=0.5 Sensitive      Levofloxacin >=8 Resistant      Oxacillin >=4 Resistant      Tetracycline <=1 Sensitive      Trimethoprim/Sulfa <=10 Sensitive      Vancomycin 1 Sensitive       Impression:  Sunita Loza is a 89 year old female with    MRSA sepsis/bacteremia with right wrist inflammatory arthritis concerning for septic arthritis  Currently on IV vancomycin  TTE not done as the patient is currently transitioning to hospice  Clearance cultures from 1/1/2025 remain negative to date  ZOHREH on CKD  Renal medicine following  Antibiotics will be renally adjusted    Recommendations:    Continue IV vancomycin while inpatient  For now, as the patient is transitioning to hospice, antibiotics can be discontinued once that is all in place  ID will sign off at this point given plans for hospice.  If the family decides otherwise, please call us back as the patient will need 6 weeks of IV antibiotics    The plan of care was discussed with the primary hospital team, Syed Knight DO     Recommendations were also discussed with the patient; all questions were answered.     Thank you for this consultation. Please  don't hesitate to call the ID team for questions or any acute changes in patient's clinical condition.    Please note that this report has been produced using speech recognition software and may contain errors related to that system including, but not limited to, errors in grammar, punctuation, and spelling, as well as words and phrases that possibly may have been recognized inappropriately.  If there are any questions or concerns, contact the dictating provider for clarification.    The  Century Cures Act makes medical notes like these available to patients in the interest of transparency. Please be advised this is a medical document. Medical documents are intended to carry relevant information, facts as evident, and the clinical opinion of the practitioner. The medical note is intended as peer to peer communication and may appear blunt or direct. It is written in medical language and may contain abbreviations or verbiage that are unfamiliar.     Guy Torrez MD  [facility]. Tel: [phone]. Fax: [phone].     Sunita Loza : 1932 MRN: [id number] [facility]: [id number]          [1]   Allergies  Allergen Reactions    Ciprofloxacin RASH and HIVES    Penicillins RASH

## 2025-01-02 NOTE — SPIRITUAL CARE NOTE
Spiritual Care Visit Note    Patient Name: Sunita Loza Date of Spiritual Care Visit: 25   : 1932 Primary Dx: Acute renal failure superimposed on stage 4 chronic kidney disease, unspecified acute renal failure type (HCC)       Referred By:      Spiritual Care Taxonomy:    Intended Effects: Build relationship of care and support    Methods: Collaborate with care team member;Offer spiritual/Voodoo support    Interventions: Active listening;Ask guided questions;Ask guided questions about андрей;Identify supportive relationship(s);Explain  role    Visit Type/Summary:  Offered a supportive, compassionate presence.  Patient would like Hindu communion and  will provide.  Patient enjoying the presence of family members at this time.     - Spiritual Care: Responded to a request for spiritual care and assessed the patient for spiritual care needs. Consulted with RN prior to visit. Offered empathic listening and emotional support. Provided support for Patient's spiritual/Voodoo requests.  remains available for follow up.    Spiritual Care support can be requested via an Epic consult. For urgent/immediate needs, please contact the On Call  at: [facility]: ext [phone]

## 2025-01-02 NOTE — PROGRESS NOTES
[facility] Hospitalist Progress Note    Subjective:  No acute overnight events.  Difficulty with blood draws.  Palliative to speak with family today.    Review of Systems  Comprehensive ROS reviewed and negative except for what is stated in HPI.      OBJECTIVE:  /69 (BP Location: Left arm)   Pulse 80   Temp 97.7 °F (36.5 °C) (Oral)   Resp 16   Wt 81 lb 6.4 oz (36.9 kg)   SpO2 100%   BMI 15.90 kg/m²   General: No apparent distress.   HEENT:  EOMI, PERRLA.   Pulm: Decreased breath sounds, no active resp effort.   CV: Regular rate and rhythm, no murmur.   Abd: Soft, nontender, nondistended.   MSK: No obvious deformities.   Skin: No lesions or rashes.  Neuro: Awake, redirectable.     LABS:   Lab Results   Component Value Date    WBC 15.7 01/01/2025    HGB 9.4 01/01/2025    HCT 27.9 01/01/2025    .0 01/01/2025    CREATSERUM 3.31 01/01/2025    BUN 82 01/01/2025     01/01/2025    K 4.2 01/01/2025     01/01/2025    CO2 20.0 01/01/2025     01/01/2025    CA 7.7 01/01/2025         All lab work and imaging personally reviewed.    Assessment/ Plan:   Patient is a 89 year old female initially presented after a syncopal episode.    #Syncope, suspect hypovolemia  -PRBC transfusion as required (has received 1 unit), IV fluids per nephro.   -Encourage oral intake as tolerable    #Acute on chronic anemia  #RUE hematoma  -Imaging reviewed.  PRBC transfusion as above.  -Evaluated by ortho, discussed options with family.     #ZOHREH on CKD stage III  #Urinary retention   -IV fluids per nephrology.  Trend renal panel.  -Monitor UOP, has required straight cath.     #Essential hypertension  #PAF  #Diastolic CHF  #Chronic BLE edema  -Holding Eliquis, Dilt/amnio and diuretic for low HR  -Monitor hemodynamics closely    #Bishop syndrome s/p colostomy  #History of uterine CA s/p hysterectomy    Diet: regular   Code status:  DNAR comfort     #Advanced care planning- 16 minutes were spent discussing advanced care  planning exclusive of the documented time for the visit.   12/31/24: Discussed updated clinical condition and prognosis with patients family members at bedside who are understanding. Family would like to continue current treatment with conservative measures and pursue DNAR  comfort measures. Family continues to think through hospice, with eventual goal of taking patient home possibly. I did discuss that due to history of MRSA, SHIRA was recommended to rule out endocarditis in the setting of patients presentation and what that entails. Family would like hold off on this.     1/2/25: Patient has had difficulty with multiple lab draws, appears uncomfortable.  Palliative to meet today to discuss decision towards hospice.  Will continue current antibiotics and supportive measures.    Syed Knight DO  [facility]

## 2025-01-02 NOTE — DIETARY NOTE
[facility]   part of [facility]    NUTRITION ASSESSMENT    Pt meets moderate malnutrition criteria at this time.    CRITERIA FOR MALNUTRITION DIAGNOSIS:  Criteria for non-severe malnutrition diagnosis: chronic illness related to wt loss 7.5% in 3 months and moderate fat and muscle depletion.      NUTRITION INTERVENTION:    RD nutrition Care Plan- Adjusted diet to least restrictive to maximize intake, Encouraged increased PO intake, Encouraged small frequent meals with emphasis on high calorie/high protein, and Initiated ONS (oral nutritional supplements)  Meal and Snacks - Continue renal Diet as tolerated; monitor patient po intake. Encourage adequate po of appropriate diet.  Medical Food Supplements - RD added Nepro BID. Rationale/use for oral supplements discussed.    PATIENT STATUS:   1/2/25: Pt visited, son in room. Son reports pt's appetite is better today, had most of her breakfast this AM - cream of wheat and yogurt. Pt not drinking Nepro. Encouraged pt to take sips of Nepro to help meet nutritional needs when intake is low. Pt and son agreeable to try. No new weight recorded in EMR. Ongoing GOC discussion. Will continue to follow per protocol.    12/30/24 89 yo female. Admitted with ZOHREH superimposed with CKD4, poor PO intake over the last couple weeks and dehydrated.    MST and low BMI (15.90) triggered to be seen.  Visited patient in room, sons at bedside. Sons providing most information. Report patient's PO intake/appetite has decreased over the past month but has been poor over the past 4-5 days. Report she is asking for water but not eating much solids. Patient drinks ONS at home and sons in agreement to get it during admission.  Sons report that she has been having a hard time using her hands to eat solids and liquids but will be ordering soups and serving it in cup to sip and sandwiches that she has been preferring lately. Recommended ordering food 3 times per day to have available in case  she wants to eat.  Moderate depletion of muscle and fat noted on NFPE. Weight loss significant for timeframe.   [facility]  Noted palliative on consult for GOC discussion.    Pmhx - afib, CKD stage 4, colon cancer s/p colostomy, endometrial cancer s/p hysterectomy, renal cell CA s/p nephrectomy, CHF, ORIF 6/2024, cdiff colitis.     ANTHROPOMETRICS:  Ht:  5'  Wt: 36.9 kg (81 lb 6.4 oz).   BMI: Body mass index is 15.9 kg/m².  IBW: 45.5 kg      WEIGHT HISTORY:   Weight loss: Yes, Non-severe Wt loss of 4.2 kg, 10%, over 3 months     Wt Readings from Last 10 Encounters:   12/29/24 36.9 kg (81 lb 6.4 oz)   12/15/24 37.4 kg (82 lb 7.2 oz)   12/15/24 37.4 kg (82 lb 7.2 oz)   11/29/24 37.4 kg (82 lb 7.2 oz)   11/13/24 38.7 kg (85 lb 4 oz)   10/31/24 39.1 kg (86 lb 4.8 oz)   10/24/24 45.4 kg (100 lb)   09/04/24 41.1 kg (90 lb 8 oz)   08/20/24 37.6 kg (83 lb)   07/30/24 41.3 kg (91 lb)      Wt Readings from Last 10 Encounters:   08/20/24 37.6 kg (83 lb)   07/30/24 41.3 kg (91 lb)   07/10/24 45.4 kg (100 lb)   06/24/24 45.8 kg (101 lb)   06/10/24 37.1 kg (81 lb 12.7 oz)   05/24/24 37.1 kg (81 lb 14.4 oz)   05/23/24 36.7 kg (81 lb)   04/23/24 42.1 kg (92 lb 14.4 oz)   04/04/24 39.7 kg (87 lb 8.4 oz)   03/26/24 37.7 kg (83 lb 1.6 oz)     NUTRITION:  Diet:       Procedures    Renal diet Renal; Is Patient on Accuchecks? No      Food Allergies: No  Cultural/Ethnic/Latter day Preferences Addressed: Yes    Percent Meals Eaten (last 3 days)       Date/Time Percent Meals Eaten (%)    12/31/24 1743 0 %            GI system review: WNL +  colostomy Last BM Date: 12/29/24  Skin and wounds: bruising on sacrum documented by RN.    NUTRITION RELATED PHYSICAL FINDINGS:     1. Body Fat/Muscle Mass: moderate depletion body fat Orbital fat pad, Buccal fat pad, and Midaxillary line and moderate muscle depletion Temple region and Shoulder/Acromion process     2. Fluid Accumulation:  right hand +2 edema ,  right upper extremity +2 edema documented by RN    NUTRITION PRESCRIPTION: 36.9 kg    Actual Body Weight  Calories: 1292 - 1476 calories/day (35-40 kcal/kg  Protein: 55 - 74 grams protein/day (1.5-2.0 grams protein per kg)  Fluid: ~1 ml/kcal or per MD discretion    NUTRITION DIAGNOSIS/PROBLEM:  Malnutrition related to insufficient appetite resulting in inadequate nutrition intake as evidenced by documented/reported unintentional weight loss, loss of fat mass, and loss of muscle mass      MONITOR AND EVALUATE/NUTRITION GOALS:  PO intake of 75% of meals TID - Not met, Continues  PO intake of 75% of oral nutrition supplement/s - Not met, Continues  Weight stable within 1 to 2 lbs during admission - Ongoing      MEDICATIONS:  Zofran PRN    LABS:  Na+ 130,  BUN:82, Cr:3.31      Pt is at High nutrition risk    Susan Arambula, MS, RDN, LDN  Clinical Dietitian

## 2025-01-02 NOTE — PLAN OF CARE
More alert and appropriate this shift, renal functions are still elevated from yesterday's draw, unable to obtain blood specimen today despite multiple attempts by phlebotomist , MD was notified.  Seen by palliative, family and patient leaning towards hospice.  Seen by spiritual services. Patient is eating fairly, tolerated diet well.  Both arms are still swollen, less weeping edema to left arm noted, kept both arms elevated on pillows.   1700 Re dressed left arm, has more weeping edema this afternoon, kept arms elevated on pillows.    1800 small amount of urine output all day today, bladder scanned with 702 ml, straight cath with 550 RVR.   Problem: Impaired Cognition  Goal: Patient will exhibit improved attention, thought processing and/or memory  Description: Interventions:    1/2/2025 1509 by Jena Stinson, RN  Outcome: Progressing  1/2/2025 1509 by Jena Stinson, RN  Outcome: Progressing     Problem: METABOLIC/FLUID AND ELECTROLYTES - ADULT  Goal: Electrolytes maintained within normal limits  Description: INTERVENTIONS:  - Monitor labs and rhythm and assess patient for signs and symptoms of electrolyte imbalances  - Administer electrolyte replacement as ordered  - Monitor response to electrolyte replacements, including rhythm and repeat lab results as appropriate  - Fluid restriction as ordered  - Instruct patient on fluid and nutrition restrictions as appropriate  Outcome: Not Progressing

## 2025-01-02 NOTE — TELEPHONE ENCOUNTER
Received fax order from [facility] pharmacy, [facility] requesting refill for procrit 10,000 units/ml once a week for hgb <10. Ok to refill?

## 2025-01-02 NOTE — PLAN OF CARE
Patient is alert and oriented x2. 2L of oxygen for comfort. Medications given per MAR. IVF infusing. Bladder scanned and got 594 ml. Straight cathed patient and drained 700ml. Repositioning for comfort. Safety precautions in place. Call light within reach.      1830: Lab unable to draw venous blood due to patient being a hard stick. Only able to draw capillary blood.    Problem: Impaired Cognition  Goal: Patient will exhibit improved attention, thought processing and/or memory  Description: Interventions:  Outcome: Progressing     Problem: SAFETY ADULT - FALL  Goal: Free from fall injury  Description: INTERVENTIONS:  - Assess pt frequently for physical needs  - Identify cognitive and physical deficits and behaviors that affect risk of falls.  - Odon fall precautions as indicated by assessment.  - Educate pt/family on patient safety including physical limitations  - Instruct pt to call for assistance with activity based on assessment  - Modify environment to reduce risk of injury  - Provide assistive devices as appropriate  - Consider OT/PT consult to assist with strengthening/mobility  - Encourage toileting schedule  Outcome: Progressing

## 2025-01-02 NOTE — PROGRESS NOTES
[facility]  Nephrology Progress Note    Sunita Loza Attending:  Yuki Iqbal*       Assessment and Plan:    1) ZOHREH- due to volume depletion with poor PO intake / ostomy losses / loop diuretic- improving. HL IV     2) CKD 4- multifactorial baseline Cr > 2 mg/dl; s/p nephrectomy > 20 yrs ago     3) MRSA bacteremia with R hand / wrist cellulitis and possible septic arthritis -> IV vanco per ID, apprec ortho eval      4) L hip fracture s/p IM nail      5) s/p exp lap / LOS / colon resection / ostomy takedown / creation of colostomy      6) h/o uterine CA s/p hysterectomy 20 yrs ago      7) Anemia- due to CKD / chronic disease / low Fe stores receiving EPO as outpt- s/p transfusion      8) Chronic dependent edema- due to CKD / ? HFpEF- holding usual torsemide     9) Recent Cdif colitis- on PO prophylaxis    D/W family at length re: GOC, expectations, etc.       Subjective:  Awake notes reviewed thirsty, hand still sore    Physical Exam:   /70 (BP Location: Left arm)   Pulse (!) 121   Temp 98.1 °F (36.7 °C) (Oral)   Resp 14   Wt 81 lb 6.4 oz (36.9 kg)   SpO2 100%   BMI 15.90 kg/m²   Temp (24hrs), Av.1 °F (36.7 °C), Min:98 °F (36.7 °C), Max:98.1 °F (36.7 °C)       Intake/Output Summary (Last 24 hours) at 2025 0929  Last data filed at 2025 2345  Gross per 24 hour   Intake --   Output 1050 ml   Net -1050 ml     Wt Readings from Last 3 Encounters:   24 81 lb 6.4 oz (36.9 kg)   12/15/24 82 lb 7.2 oz (37.4 kg)   12/15/24 82 lb 7.2 oz (37.4 kg)     General: awake   HEENT: No scleral icterus, MMM  Neck: Supple, no MARIA FERNANDA or thyromegaly  Cardiac: Regular rate and rhythm, S1, S2 normal, no murmur or tub  Lungs: Decreased BS at bases bilaterally   Abdomen: Soft, non-tender. + bowel sounds, no palpable organomegaly  Extremities: Without clubbing, cyanosis; no edema  Neurologic: Cranial nerves grossly intact, moving all extremities  Skin: Warm and dry, no rashes       Labs:   Lab  Results   Component Value Date    WBC 15.7 01/01/2025    HGB 9.4 01/01/2025    HCT 27.9 01/01/2025    .0 01/01/2025    CREATSERUM 3.31 01/01/2025    BUN 82 01/01/2025     01/01/2025    K 4.2 01/01/2025     01/01/2025    CO2 20.0 01/01/2025     01/01/2025    CA 7.7 01/01/2025       Imaging:  All imaging studies reviewed.    Meds:   Current Facility-Administered Medications   Medication Dose Route Frequency    acetaminophen (Tylenol Extra Strength) tab 1,000 mg  1,000 mg Oral Q6H PRN    ipratropium-albuterol (Duoneb) 0.5-2.5 (3) MG/3ML inhalation solution 3 mL  3 mL Nebulization 2 times daily    vancomycin (Vancocin) cap 125 mg  125 mg Oral Daily    epoetin chris (Epogen, Procrit) 57105 UNIT/ML injection 10,000 Units  10,000 Units Subcutaneous Weekly    traMADol (Ultram) tab 25 mg  25 mg Oral BID    sodium bicarbonate 50 mEq in sodium chloride 0.45% 1,050 mL infusion  50 mEq Intravenous Continuous    Vancomycin: PHARMACY DOSING  1 each Intravenous See Admin Instructions (RX holding)    ondansetron (Zofran) 4 MG/2ML injection 4 mg  4 mg Intravenous Q6H PRN    metoclopramide (Reglan) 5 mg/mL injection 10 mg  10 mg Intravenous Daily PRN         Questions/concerns were discussed with patient and/or family by bedside.          Fabiana Miller MD  1/2/2025  10:16 AM

## 2025-01-03 LAB
ATRIAL RATE: 264 BPM
BACTERIA BLD CULT: POSITIVE
P AXIS: 70 DEGREES
Q-T INTERVAL: 382 MS
QRS DURATION: 86 MS
QTC CALCULATION (BEZET): 542 MS
R AXIS: 41 DEGREES
T AXIS: 97 DEGREES
VENTRICULAR RATE: 121 BPM

## 2025-01-03 PROCEDURE — 99233 SBSQ HOSP IP/OBS HIGH 50: CPT | Performed by: INTERNAL MEDICINE

## 2025-01-03 RX ORDER — DILTIAZEM HYDROCHLORIDE 30 MG/1
30 TABLET, FILM COATED ORAL EVERY 6 HOURS SCHEDULED
Status: DISCONTINUED | OUTPATIENT
Start: 2025-01-03 | End: 2025-01-03

## 2025-01-03 RX ORDER — AMIODARONE HYDROCHLORIDE 200 MG/1
200 TABLET ORAL DAILY
Status: DISCONTINUED | OUTPATIENT
Start: 2025-01-03 | End: 2025-01-04

## 2025-01-03 RX ORDER — DILTIAZEM HYDROCHLORIDE 30 MG/1
60 TABLET, FILM COATED ORAL EVERY 6 HOURS SCHEDULED
Status: DISCONTINUED | OUTPATIENT
Start: 2025-01-03 | End: 2025-01-04

## 2025-01-03 NOTE — CM/SW NOTE
Call placed to [facility] to request they call back to discuss start of care as a message was left earlier in the day by covering SW.  stated she would send over the message to their head nurse as soon as possible and they would call back this writer.    Updated covering SW.     1720: No return call received from [facility]. Call back placed to [facility] to inform that head nurse did not return this writer or SW's call back to discuss. Requested escalation.      states that Edwige, manager, will call this writer back momentarily.     Stacia Mercedes, TANIAN, RN-BC

## 2025-01-03 NOTE — PROGRESS NOTES
[facility]  Nephrology Progress Note    Sunita Loza Attending:  Yuki Iqbal*       Assessment and Plan:    1) ZOHREH- due to volume depletion with poor PO intake / ostomy losses / loop diuretic- improving.      2) CKD 4- multifactorial baseline Cr > 2 mg/dl; s/p nephrectomy > 20 yrs ago     3) MRSA bacteremia with R hand / wrist cellulitis and possible septic arthritis on IV vanco      4) L hip fracture s/p IM nail      5) s/p exp lap / LOS / colon resection / ostomy takedown / creation of colostomy      6) h/o uterine CA s/p hysterectomy 20 yrs ago      7) Anemia- due to CKD / chronic disease / low Fe stores receiving EPO as outpt- s/p transfusion      8) Recent Cdif colitis- on PO prophylaxis    No further blood draws, etc -> transition to hospice per family.       Subjective:  Awake notes looks better today less RUE discomfort    Physical Exam:   /44 (BP Location: Left arm)   Pulse 81   Temp 97.6 °F (36.4 °C) (Oral)   Resp 18   Wt 99 lb 12.8 oz (45.3 kg)   SpO2 95%   BMI 19.49 kg/m²   Temp (24hrs), Av.9 °F (36.6 °C), Min:97.6 °F (36.4 °C), Max:98.1 °F (36.7 °C)       Intake/Output Summary (Last 24 hours) at 1/3/2025 0921  Last data filed at 1/3/2025 0701  Gross per 24 hour   Intake 1969.5 ml   Output 700 ml   Net 1269.5 ml     Wt Readings from Last 3 Encounters:   25 99 lb 12.8 oz (45.3 kg)   12/15/24 82 lb 7.2 oz (37.4 kg)   12/15/24 82 lb 7.2 oz (37.4 kg)     General: awake   HEENT: No scleral icterus, MMM  Neck: Supple, no MARIA FERNANDA or thyromegaly  Cardiac: Regular rate and rhythm, S1, S2 normal, no murmur or tub  Lungs: Decreased BS at bases bilaterally   Abdomen: Soft, non-tender. + bowel sounds, no palpable organomegaly  Extremities: Without clubbing, cyanosis; no edema  Neurologic: Cranial nerves grossly intact, moving all extremities  Skin: Warm and dry, no rashes       Labs:          Imaging:  All imaging studies reviewed.    Meds:   Current Facility-Administered  Medications   Medication Dose Route Frequency    dilTIAZem (cardIZEM) 100 mg in sodium chloride 0.9% 100 mL IVPB-ADDV  2.5-20 mg/hr Intravenous Continuous    acetaminophen (Tylenol Extra Strength) tab 1,000 mg  1,000 mg Oral Q6H PRN    ipratropium-albuterol (Duoneb) 0.5-2.5 (3) MG/3ML inhalation solution 3 mL  3 mL Nebulization 2 times daily    vancomycin (Vancocin) cap 125 mg  125 mg Oral Daily    epoetin chris (Epogen, Procrit) 41547 UNIT/ML injection 10,000 Units  10,000 Units Subcutaneous Weekly    traMADol (Ultram) tab 25 mg  25 mg Oral BID    sodium bicarbonate 50 mEq in sodium chloride 0.45% 1,050 mL infusion  50 mEq Intravenous Continuous    Vancomycin: PHARMACY DOSING  1 each Intravenous See Admin Instructions (RX holding)    ondansetron (Zofran) 4 MG/2ML injection 4 mg  4 mg Intravenous Q6H PRN    metoclopramide (Reglan) 5 mg/mL injection 10 mg  10 mg Intravenous Daily PRN         Questions/concerns were discussed with patient and/or family by bedside.          Fabiana Miller MD  1/3/2025  10:16 AM

## 2025-01-03 NOTE — PROGRESS NOTES
Palliative care    I met with family, SW & Dr. Knight.  I confirmed GOC are established & remain comfort focused with intent to transition Sunita home on hospice care.  All questions answered & DC planning in progress per [facility] team.     Anastasia Bradford, BARBARA, 01/03/25, 4:29 PM

## 2025-01-03 NOTE — PLAN OF CARE
Alert and oriented, able to communicate appropriately, hard of hearing.  Had onset of A flutter  with RVR last night, was better this morning HR in the low 100's until later on when cardiologist was at the bedside.  HR were in the 130's and low 140's, patient was asymptomatic. MD spoke with the patient and patent's 2 son who were at the bedside. Discussed disease condition, complications and treatments.  Family understands implications of pt's disease condition. Will do oral medications, no IV drips for now, still leaning towards hospice care. Blood draws were already discontinued per family request. Has urinary retention, order for indwelling catheter was obtained, plascencia was inserted without any difficulties, Immediate return of > 500 ml.   1600 family inquired if there was any discharge plans, son states  patient will go back to [facility] and some equipment are available like bed and oxygen. Palliative was in earlier.  Discharge planning was started. Family not ready for discharge today, they will need some time to find care giver. Discharge cancelled.   1800 pt is stable, dressing changes to right arm was done, skin breakdown was noted on top of the right hand and on the side by her thumb, some yellowish secretions were present, cleansed with normal saline, applied xeroform dressing and secured with kerlix roll, elevated on pillows, swelling is improving especially the left arm, kerlix roll to left arm was removed. Son at bedside.   Problem: Impaired Cognition  Goal: Patient will exhibit improved attention, thought processing and/or memory  Description: Interventions:    Outcome: Progressing     Problem: SAFETY ADULT - FALL  Goal: Free from fall injury  Description: INTERVENTIONS:  - Assess pt frequently for physical needs  - Identify cognitive and physical deficits and behaviors that affect risk of falls.  - Cecilia fall precautions as indicated by assessment.  - Educate pt/family on patient safety  including physical limitations  - Instruct pt to call for assistance with activity based on assessment  - Modify environment to reduce risk of injury  - Provide assistive devices as appropriate  - Consider OT/PT consult to assist with strengthening/mobility  - Encourage toileting schedule  Outcome: Progressing     Problem: CARDIOVASCULAR - ADULT  Goal: Maintains optimal cardiac output and hemodynamic stability  Description: INTERVENTIONS:  - Monitor vital signs, rhythm, and trends  - Monitor for bleeding, hypotension and signs of decreased cardiac output  - Evaluate effectiveness of vasoactive medications to optimize hemodynamic stability  - Monitor arterial and/or venous puncture sites for bleeding and/or hematoma  - Assess quality of pulses, skin color and temperature  - Assess for signs of decreased coronary artery perfusion - ex. Angina  - Evaluate fluid balance, assess for edema, trend weights  Outcome: Not Progressing  Goal: Absence of cardiac arrhythmias or at baseline  Description: INTERVENTIONS:  - Continuous cardiac monitoring, monitor vital signs, obtain 12 lead EKG if indicated  - Evaluate effectiveness of antiarrhythmic and heart rate control medications as ordered  - Initiate emergency measures for life threatening arrhythmias  - Monitor electrolytes and administer replacement therapy as ordered  Outcome: Not Progressing

## 2025-01-03 NOTE — CM/SW NOTE
SW met with family to discuss medical clearance for patient today. Patient's family requested to speak with MD stating that there was not enough communication and that they do not have a caregiver lined up for patient to return to [facility]. SW encouraged them to call [facility] information that was provided to them a few days ago. Family became very agitated and agressive with SW. SW requested meeting at bedside with MD and palliative care. SW provided information for Medicare appeal and family stated that they should not have to appeal.     Ultimately, MD is allowing additional night in the hospital. Family was encouraged to call [facility] to locate an emergency caregiver.     SW will continue to follow for plan of care changes and remain available for any additional DC needs or concerns.     Karey Merida MSW, LSW  Discharge Planner   [id number]

## 2025-01-03 NOTE — CONSULTS
[facility] Cardiology Consultation    Sunita Loza Patient Status:  Inpatient    1932 MRN [id number]   Location [facility] 4NW-A Attending Syed Knight,    Hosp Day # 4 PCP Janell Powell MD     Reason for Consultation:  atrial flutter      History of Present Illness:  Sunita Loza is a a(n) 89 year old female.  She has CKD, dementia, AFl, Hypertension .  Lives in assisted living.  Demented, thus, limited data base.  She had a fall, possibly syncope.  Brought to ER.  Infections in R+L arms over last 4 weeks.  Blood cx pos for MRSA.  She has hx of Paroxysmal Atrial flutter in past and uses cardizem, amiodarone, and low dose eliquis.  Initial EKG showed NSR.  Atrial flutter with RVR developed last night.  Patient denies chest pain and dyspnea. Denies palpitations. Her po cardizem and amio have not been re-started in hospital.     History:  Past Medical History:    ZOHREH (acute kidney injury) (HCC)    Arthritis    Atrial fibrillation (HCC)    Attention to colostomy (HCC)    Back pain    C. difficile colitis    CKD (chronic kidney disease)    and S/P nephrectomy    CKD (chronic kidney disease) stage 4, GFR 15-29 ml/min (HCC)    Colon cancer (HCC)    Congestive heart disease (HCC)    Easy bruising    Endometrial cancer (HCC)    UTERINE, DX ABOUT 30 YEARS    Hearing impairment    AIDS    Heart palpitations    Afib    High blood pressure    Hip fracture (HCC)    left    Hyperkalemia    Kidney failure    Leg swelling    Bishop syndrome    hereditary colorectal cancer    Metabolic acidosis    Muscle weakness    WALKER    PAF (paroxysmal atrial fibrillation) (HCC)    Pain in joints    Wears glasses    Weight loss     Past Surgical History:   Procedure Laterality Date    Colectomy      Nephrectomy Left     Orif hip fracture Left 06/10/2024    Part removal colon w end colostomy       Family History   Problem Relation Age of Onset    Heart Disorder Father     Heart Disorder Mother     Colon Cancer Mother         70     Heart Disorder Sister     Breast Cancer Sister     Diabetes Brother     Breast Cancer Sister     Cancer Sister     Breast Cancer Sister     Breast Cancer Daughter          Allergies:  Allergies[1]    Medications:   ipratropium-albuterol  3 mL Nebulization 2 times daily    vancomycin  125 mg Oral Daily    epoetin chris  10,000 Units Subcutaneous Weekly    traMADol  25 mg Oral BID    Vancomycin IV  1 each Intravenous See Admin Instructions (RX holding)       Continuous Infusions:   dilTIAZem      sodium bicarbonate in 0.45% NS infusion 50 mEq (01/02/25 2301)       Social History:   reports that she has never smoked. She has never used smokeless tobacco. She reports that she does not currently use alcohol. She reports that she does not use drugs.    Review of Systems:  All systems were reviewed and are negative except as described above in HPI.    Physical Exam:      Wt Readings from Last 3 Encounters:   01/03/25 99 lb 12.8 oz (45.3 kg)   12/15/24 82 lb 7.2 oz (37.4 kg)   12/15/24 82 lb 7.2 oz (37.4 kg)       Vitals:    01/03/25 0144 01/03/25 0251 01/03/25 0530 01/03/25 0701   BP:    113/44   BP Location:    Left arm   Pulse: 105 101 110 81   Resp:    18   Temp:    97.6 °F (36.4 °C)   TempSrc:    Oral   SpO2:    95%   Weight:    99 lb 12.8 oz (45.3 kg)       Temp:  [97.6 °F (36.4 °C)-98.1 °F (36.7 °C)] 97.6 °F (36.4 °C)  Pulse:  [] 81  Resp:  [16-20] 18  BP: (113-135)/(44-83) 113/44  SpO2:  [84 %-100 %] 95 %    Temp: 97.6 °F (36.4 °C)  Pulse: 81  Resp: 18  BP: 113/44      General:  Appears comfortable  HEENT: No focal deficits.  Neck: No JVD, carotids 2+ no bruits.  Cardiac: Regular S1S2.  No S3, S4, rub, click.  No murmur.  Lungs: Clear to auscultation and percussion.  Abdomen: Soft, non-tender.   Extremities: 1-2+ bilateral  LE edema.  No clubbing or cyanosis. + edema, weeping, redness, R+L arms.   Neurologic: confused, normal affect.  Skin: Warm and dry.     Labs:      HEM:  Recent Labs   Lab 12/29/24  8725  12/30/24  0637 12/30/24  0938 12/30/24  1921 01/01/25  2133   WBC 5.2 6.7 9.2  --  15.7*   HGB 8.4* 6.4* 6.3* 8.4* 9.4*   HCT 26.5* 18.6* 19.9*  --  27.9*   .0 237.0 243.0  --  132.0*       Chem:  Recent Labs   Lab 12/29/24  1729 12/30/24  0637 12/31/24  0537 12/31/24  1118 01/01/25  1741   * 136  --  137 130*   K 5.7* 5.1  --  4.8 4.2    107  --  105 104   CO2 22.0 21.0  --  17.0* 20.0*   * 102*  --  80* 82*   CREATSERUM 5.63* 5.30* 4.37* 4.10* 3.31*   ALT 16  --   --   --   --    AST 33  --   --   --   --    ALB 2.8*  --   --   --   --        No results for input(s): \"INR\" in the last 168 hours.                  No results found for: \"TROP\", \"CKMB\"      Invalid input(s): \"PBNPML\"                 Telemetry: atrial flutter, 130's/min    Laboratories and Data:  Diagnostics:    EKG, 1/2/2025:  atrial flutter 121/min    CXR, 1/3/2025:      Echo, 6/2023:    CONCLUSIONS:   -The left ventricle is normal in size. There is no left ventricular hypertrophy. Left ventricular systolic function is normal. EF = 60% (2D biplane) Grade II diastolic dysfunction (pseudonormal) is present.   -The right ventricle is normal in size. Right ventricular systolic function is normal.   -The left atrial size is mildly enlarged.   -There is mild mitral valve regurgitation.   -There is mild to moderate tricuspid valve regurgitation.   -There is mild to moderate aortic valve regurgitation.   -There is mild pulmonic valve regurgitation.   -       Impression:    1.  Paroxysmal Atrial Flutter, RVR.  asymptomatic.   2. MRSA bacteremia with R hand / wrist cellulitis and possible septic arthritis on IV vanco   3. ZOHREH on CKD      Recommend:    1.  Telemetry  2. Resume po cardizem for HR control  3. Resume amiodarone  4. Check TFT's  5. Eliquis on hold due to UE hematoma    Sons indicate that pt may be entering hospice care soon.  Agree with limiting care to comfort measures          Ge Alexandre,  MD  1/3/2025  8:15 AM         [1]   Allergies  Allergen Reactions    Ciprofloxacin RASH and HIVES    Penicillins RASH

## 2025-01-03 NOTE — PROGRESS NOTES
[facility] Hospitalist Progress Note    Subjective:  Went into Afib w RVR overnight. No new complaints today.     Review of Systems  Comprehensive ROS reviewed and negative except for what is stated in HPI.      OBJECTIVE:  /62 (BP Location: Left arm)   Pulse (!) 138   Temp 97.7 °F (36.5 °C) (Oral)   Resp 18   Wt 99 lb 12.8 oz (45.3 kg)   SpO2 98%   BMI 19.49 kg/m²   General: No apparent distress.   HEENT:  EOMI, PERRLA.   Pulm: Decreased breath sounds, no active resp effort.   CV: Regular rate and rhythm, no murmur.   Abd: Soft, nontender, nondistended.   MSK: No obvious deformities.   Skin: No lesions or rashes.  Neuro: Awake, redirectable.     LABS:            All lab work and imaging personally reviewed.    Assessment/ Plan:   Patient is a 89 year old female initially presented after a syncopal episode.    #Syncope, suspect hypovolemia  -PRBC transfusion as required (has received 1 unit), IV fluids per nephro.   -Encourage oral intake as tolerable    #Acute on chronic anemia  #RUE hematoma  -Imaging reviewed.  PRBC transfusion as above.  -Evaluated by ortho, discussed options with family.     #ZOHREH on CKD stage III  #Urinary retention   -IV fluids per nephrology.  Trend renal panel.  -Monitor UOP, has required straight cath.     #Essential hypertension  #PAF  #Diastolic CHF  #Chronic BLE edema  -Holding Eliquis due to hematoma   -Resume home antiarrhythmics, monitoring BP   -Monitor hemodynamics closely    #Bishop syndrome s/p colostomy  #History of uterine CA s/p hysterectomy    Diet: regular   Code status:  DNAR comfort     #Advanced care planning- 16 minutes were spent discussing advanced care planning exclusive of the documented time for the visit.   12/31/24: Discussed updated clinical condition and prognosis with patients family members at bedside who are understanding. Family would like to continue current treatment with conservative measures and pursue DNAR  comfort measures. Family continues to think  through hospice, with eventual goal of taking patient home possibly. I did discuss that due to history of MRSA, SHIRA was recommended to rule out endocarditis in the setting of patients presentation and what that entails. Family would like hold off on this.     1/2/25: Patient has had difficulty with multiple lab draws, appears uncomfortable.  Palliative to meet today to discuss decision towards hospice.  Will continue current antibiotics and supportive measures.    1/3/25: Plan for Only hospice / and caregiver arrangement. Discussed with palliative. No further blood draws and cont PO medications, supportive care. Huizar inserted today due to continued urinary retention.     Syed Knight DO  [facility]

## 2025-01-03 NOTE — PLAN OF CARE
Pt A&O x 3. No SOB on RA. Edema on both arms, arms elevated. All meds given per MAR.     2230 Pt afib RVR, HR in 120s. MD notified. EKG ordered, MD notified of results. Home diltiazem restarted.  0000: Pt afib RVR, HR up to 140s. MD notified. Diltiazem drip ordered.   100: HR stabilized, low 100s. Diltiazem drip not given.    Per palliative care note, family does not want any more blood draw attempts. MD aware.      Problem: Impaired Cognition  Goal: Patient will exhibit improved attention, thought processing and/or memory  Description: Interventions:  Problem: CARDIOVASCULAR - ADULT  Goal: Maintains optimal cardiac output and hemodynamic stability  Description: INTERVENTIONS:  - Monitor vital signs, rhythm, and trends  - Monitor for bleeding, hypotension and signs of decreased cardiac output  - Evaluate effectiveness of vasoactive medications to optimize hemodynamic stability  - Monitor arterial and/or venous puncture sites for bleeding and/or hematoma  - Assess quality of pulses, skin color and temperature  - Assess for signs of decreased coronary artery perfusion - ex. Angina  - Evaluate fluid balance, assess for edema, trend weights  Outcome: Progressing  Goal: Absence of cardiac arrhythmias or at baseline  Description: INTERVENTIONS:  - Continuous cardiac monitoring, monitor vital signs, obtain 12 lead EKG if indicated  - Evaluate effectiveness of antiarrhythmic and heart rate control medications as ordered  - Initiate emergency measures for life threatening arrhythmias  - Monitor electrolytes and administer replacement therapy as ordered  Outcome: Progressing     Outcome: Progressing     Problem: METABOLIC/FLUID AND ELECTROLYTES - ADULT  Goal: Electrolytes maintained within normal limits  Description: INTERVENTIONS:  - Monitor labs and rhythm and assess patient for signs and symptoms of electrolyte imbalances  - Administer electrolyte replacement as ordered  - Monitor response to electrolyte replacements,  including rhythm and repeat lab results as appropriate  - Fluid restriction as ordered  - Instruct patient on fluid and nutrition restrictions as appropriate  Outcome: Progressing     Problem: SAFETY ADULT - FALL  Goal: Free from fall injury  Description: INTERVENTIONS:  - Assess pt frequently for physical needs  - Identify cognitive and physical deficits and behaviors that affect risk of falls.  - Paulina fall precautions as indicated by assessment.  - Educate pt/family on patient safety including physical limitations  - Instruct pt to call for assistance with activity based on assessment  - Modify environment to reduce risk of injury  - Provide assistive devices as appropriate  - Consider OT/PT consult to assist with strengthening/mobility  - Encourage toileting schedule  Outcome: Progressing

## 2025-01-04 VITALS
DIASTOLIC BLOOD PRESSURE: 56 MMHG | WEIGHT: 99.81 LBS | SYSTOLIC BLOOD PRESSURE: 81 MMHG | HEART RATE: 133 BPM | TEMPERATURE: 98 F | BODY MASS INDEX: 19 KG/M2 | RESPIRATION RATE: 16 BRPM | OXYGEN SATURATION: 98 %

## 2025-01-04 NOTE — CM/SW NOTE
Received call from Susan with [facility] ([phone]) who connected pt's son/VETO Heller via 3 way call.  Pt's son confirmed caregivers are in place for discharge today.  He requested 2pm discharge.  Hospice RN to see pt this evening around 8pm.  All needed DME in place.      Confirmed pt is medically cleared for discharge today.  Ambulance transport scheduled for 2pm.  PCS form completed and available for RN to print.  Updated RN Valerie at [facility].  RN will need to call for report prior to discharge.  Updated RN.  No further needs at this time.  / to remain available for support and/or discharge planning.     [facility]  [phone]    [facility]  [phone]    oRxanne Mina [facility]  Discharge Planner  [phone]

## 2025-01-04 NOTE — PROGRESS NOTES
Alert and orientated.  Can make her needs known.  Huizar intact with clear yellow urine.  Reaceived CHG bath.  Repositioned.  Had difficulty sleeping.  Gave tylenol for pain.  Heart rate 100-130.  On oral cardizem.

## 2025-01-04 NOTE — PLAN OF CARE
Problem: Impaired Cognition  Goal: Patient will exhibit improved attention, thought processing and/or memory  Description: Interventions:    Outcome: Progressing     Problem: METABOLIC/FLUID AND ELECTROLYTES - ADULT  Goal: Electrolytes maintained within normal limits  Description: INTERVENTIONS:  - Monitor labs and rhythm and assess patient for signs and symptoms of electrolyte imbalances  - Administer electrolyte replacement as ordered  - Monitor response to electrolyte replacements, including rhythm and repeat lab results as appropriate  - Fluid restriction as ordered  - Instruct patient on fluid and nutrition restrictions as appropriate  Outcome: Adequate for Discharge     Problem: CARDIOVASCULAR - ADULT  Goal: Maintains optimal cardiac output and hemodynamic stability  Description: INTERVENTIONS:  - Monitor vital signs, rhythm, and trends  - Monitor for bleeding, hypotension and signs of decreased cardiac output  - Evaluate effectiveness of vasoactive medications to optimize hemodynamic stability  - Monitor arterial and/or venous puncture sites for bleeding and/or hematoma  - Assess quality of pulses, skin color and temperature  - Assess for signs of decreased coronary artery perfusion - ex. Angina  - Evaluate fluid balance, assess for edema, trend weights  Outcome: Progressing  Goal: Absence of cardiac arrhythmias or at baseline  Description: INTERVENTIONS:  - Continuous cardiac monitoring, monitor vital signs, obtain 12 lead EKG if indicated  - Evaluate effectiveness of antiarrhythmic and heart rate control medications as ordered  - Initiate emergency measures for life threatening arrhythmias  - Monitor electrolytes and administer replacement therapy as ordered  Outcome: Adequate for Discharge   Sched meds given and tolerated well,Has poor appetite, Skin is so fragile, rt upper arm still edematous,Dressing changed on rt hand, Huizar cath and colostomy intact,Repositioned and made comfortable,Family at  bedside,For dc back to [facility]/ hospice at 1400,Assisted needs.

## 2025-01-04 NOTE — DISCHARGE SUMMARY
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  She was recently admitted to hospice and apparently the staff was emptying the patient's ostomy when she passed out.  She did not fall as the staff caught her.  Given her symptoms she was brought to the ER. Also reporting some RUE pain and swelling. On last hospitalization (dc 12/2 from [facility]) she was noted to have RUE swelling/bruising for which a CT RUE found a large hematoma; this was suspected to be from phlebotomies. Now she is having pain, swelling and edema in her R wrist and this is preventing her ADLs.   In the ER heart rate was in the 50s and she had soft blood pressures.  Labs with a creatinine of 5.6 with BUN of 109 initial hemoglobin was 8.4.  Started on IV fluids, nephrology consulted and admitted for further evaluation and treatment.    #Syncope, suspect hypovolemia  -PRBC transfusion as required (has received 1 unit), IV fluids per nephro.   -Encourage oral intake as tolerable     #Acute on chronic anemia  #RUE hematoma  -Imaging reviewed.  PRBC transfusion as above.  -Evaluated by ortho, discussed options with family.      #ZOHREH on CKD stage III  #Urinary retention   -IV fluids per nephrology.  Trend  renal panel.  -Monitor UOP, has required straight cath.      #Essential hypertension  #PAF  #Diastolic CHF  #Chronic BLE edema  -Holding Eliquis due to hematoma   -Resume home antiarrhythmics, monitoring BP   -Monitor hemodynamics closely     #Bishop syndrome s/p colostomy  #History of uterine CA s/p hysterectomy    Patient to be discharged to hospice today. Discussed with family members at bedside.     Exam on Day of DC:  BP 94/65 (BP Location: Left arm)   Pulse 118   Temp 98.8 °F (37.1 °C) (Oral)   Resp 16   Wt 99 lb 12.8 oz (45.3 kg)   SpO2 100%   BMI 19.49 kg/m²   General: No apparent distress.   HEENT:  EOMI, PERRLA.   Pulm: Decreased breath sounds, no active resp effort.   CV: Regular rate and rhythm, no murmur.   Abd: Soft, nontender, nondistended.   MSK: No obvious deformities.   Skin: No lesions or rashes.  Neuro: Awake, redirectable.     I as the attending physician reconciled the current and discharge medications on day of discharge.     Current Discharge Medication List        CONTINUE these medications which have NOT CHANGED    Details   amiodarone 200 MG Oral Tab Take 1 tablet (200 mg total) by mouth every morning.      ferrous sulfate 325 (65 FE) MG Oral Tab EC Take 1 tablet (325 mg total) by mouth 2 (two) times daily.      dilTIAZem HCl ER Beads 240 MG Oral Capsule SR 24 Hr Take 1 capsule (240 mg total) by mouth daily.      traMADol 50 MG Oral Tab Take 0.5 tablets (25 mg total) by mouth 2 (two) times daily.      acetaminophen 325 MG Oral Tab Take 1 tablet (325 mg total) by mouth every 6 (six) hours as needed (general discomfort).           STOP taking these medications       pantoprazole 40 MG Oral Tab EC        epoetin chris 45426 UNIT/ML Injection Solution        torsemide 10 MG Oral Tab        apixaban 2.5 MG Oral Tab        sodium bicarbonate 650 MG Oral Tab        aspirin 81 MG Oral Tab        Multiple Vitamin (MULTIVITAMINS OR)        vancomycin 125 MG Oral Cap        cephALEXin 500 MG Oral  Cap        SACCHAROMYCES BOULARDII OR              Activity: activity as tolerated  Diet: regular diet  Wound Care: as directed  Code Status: DNAR/Comfort Care    Total time coordinating care for discharge: Greater than 40 minutes    Syed Knight DO  [facility]

## 2025-01-04 NOTE — PROGRESS NOTES
NURSING DISCHARGE NOTE    Discharged Rehab facility via Ambulance.  Accompanied by Support staff  Belongings Taken by patient/family    Dc patient back to [place] in stable condition,Saline lock removed,Dc paper given to support staff, She will be Hospice w/ caregiver in [place],Family in the room..

## 2025-01-18 NOTE — PROGRESS NOTES
Provider Clarification     Additional information on the status of the infection.    Sepsis ruled in due to identified infection.     This note is part of the patient's medical record.

## 2025-01-18 NOTE — PROGRESS NOTES
Provider Clarification     Additional information on the patient's nutritional status.    Moderate malnutrition    This note is part of the patient's medical record.

## 2025-05-12 NOTE — PROGRESS NOTES
[facility] Orthopedics  Progress Note    Sunita Loza Patient Status:  Inpatient    1932 MRN [id number]   Location [facility] 3SW-A Attending Tree Li MD   Hosp Day # 5 PCP Janell Powell MD       Subjective:  POD #4 from left hip cephalomedullary nail with Dr. Whitehead on 24.      Patient sleeping comfortably. Eliquis is ordered.        Objective:  Vitals:    06/15/24 0441   BP: 122/63   Pulse: 85   Resp: 16   Temp: 98.2 °F (36.8 °C)       General: NAD, Alert.   Neuro: No focal deficits. EHL/DF/PF intact.   MSK: Examination of the left lower extremity demonstrates leg lengths are equal. New dressings which are c/d/I. No surrounding erythema. Minimal postoperative swelling. Thigh compartment is soft and compressible. Calf compartment soft compressible without tenderness. Examination neurovascular intact with 2+ PT and DP pulses.           Data Review  Lab Results   Component Value Date    WBC 9.0 06/15/2024    HGB 7.3 06/15/2024    HCT 22.9 06/15/2024    .0 06/15/2024    CREATSERUM 2.31 06/15/2024    BUN 41 06/15/2024     06/15/2024    K 3.9 06/15/2024     06/15/2024    CO2 26.0 06/15/2024     06/15/2024    CA 6.2 06/15/2024       Assessment/Plan:    POD #4 cephalomedullary nail.  Hgb 7.3 today, stable       1) Dressings replaced and are clean today  2) PT -weightbearing as tolerated left lower extremity.  3) continue pain control  4) remaining medical care per hospitalist service.  5) DVT prophylaxis with Eliquis.  6) antibiotics complete.  7) Discharge plan: Likely discharge to [facility].  8) okay for discharge from Ortho perspective once cleared by PT and other services. we will continue to follow while inpatient.  9) follow-up as outpatient in 2 weeks for suture removal and repeat x-rays.        Stephanie Amato MD     Physical Therapy    Patient not seen in therapy.     On hold due to medical condition    Re-attempt plan: per established plan of care    Plan to see POD#2       OBJECTIVE                          Therapy procedure time and total treatment time can be found documented on the Time Entry flowsheet

## (undated) DEVICE — ZZDISC - USE 405166-SUT COAT VCRL 3-0 27IN SH ABSRB UD 26MM 1/2

## (undated) DEVICE — STERILE SYNTHETIC POLYISOPRENE POWDER-FREE SURGICAL GLOVES WITH HYDROGEL COATING, SMOOTH FINISH, STRAIGHT FINGER: Brand: PROTEXIS

## (undated) DEVICE — GOWN,SIRUS,FABRIC-REINFORCED,X-LARGE: Brand: MEDLINE

## (undated) DEVICE — BIT DRL LG L500MM 6MM/9MM  CANN STP QC

## (undated) DEVICE — 3M™ TEGADERM™ TRANSPARENT FILM DRESSING FRAME STYLE, 1628, 6 IN X 8 IN (15 CM X 20 CM), 10/CT 8CT/CASE: Brand: 3M™ TEGADERM™

## (undated) DEVICE — CURVED, LARGE JAW, OPEN SEALER/DIVIDER NANO-COATED: Brand: LIGASURE IMPACT

## (undated) DEVICE — ADHESIVE SKIN TOP FOR WND CLSR DERMBND ADV

## (undated) DEVICE — PACK PBDS LAPAROTOMY GENERAL

## (undated) DEVICE — SUT COAT VCRL 0 27IN CP-1 ABSRB UD 36MM 1/2

## (undated) DEVICE — BIT DRL LG 10X300MM  CANN TAPR QC

## (undated) DEVICE — BANDAGE COHESIVE 4INX5YD TAN E POR SLF ADH

## (undated) DEVICE — ZIPWIRE ZIPWIRE GUIDEWIRE .038X150 STR

## (undated) DEVICE — SYRINGE MED 10ML LL TIP W/O SFTY DISP

## (undated) DEVICE — YANKAUER,POOLE TIP,STERILE,50/CS: Brand: MEDLINE

## (undated) DEVICE — LOADING UNIT WITH DST SERIES TECHNOLOGY: Brand: GIA

## (undated) DEVICE — STAPLER WITH DST SERIES TECHNOLOGY: Brand: GIA

## (undated) DEVICE — DRESSING SUR 3.5X6IN WTRPRF BACT BARR

## (undated) DEVICE — 450 ML BOTTLE OF 0.05% CHLORHEXIDINE GLUCONATE IN 99.95% STERILE WATER FOR IRRIGATION, USP AND APPLICATOR.: Brand: IRRISEPT ANTIMICROBIAL WOUND LAVAGE

## (undated) DEVICE — SOLUTION PREP 4OZ 10% POVIDONE IOD SCR TOP

## (undated) DEVICE — GLOVE SUR 8 SENSICARE PI PIP CRM PWD F

## (undated) DEVICE — LAPAROTOMY SPONGE - RF AND X-RAY DETECTABLE PRE-WASHED: Brand: SITUATE

## (undated) DEVICE — 20 ML SYRINGE LUER-LOCK TIP: Brand: MONOJECT

## (undated) DEVICE — STANDARD HYPODERMIC NEEDLE,POLYPROPYLENE HUB: Brand: MONOJECT

## (undated) DEVICE — GUIDEWIRE ORTH L400MM DIA3.2MM FOR TFN

## (undated) DEVICE — DRAPE FLD WRM W44XL66IN C6L FOR INTRATEMP SYS

## (undated) DEVICE — PACK PBDS HIP PINNING

## (undated) DEVICE — 3M™ IOBAN™ 2 ANTIMICROBIAL INCISE DRAPE 6651EZ: Brand: IOBAN™ 2

## (undated) DEVICE — CLOSING BUNDLE: Brand: MEDLINE INDUSTRIES, INC.

## (undated) DEVICE — SOLUTION IRRIG 3000ML 0.9% NACL FLX CONT

## (undated) DEVICE — PADDING CAST 4INX4YD 100% COT SFT SLF BOND

## (undated) DEVICE — SOLUTION IRRIG 1000ML 0.9% NACL USP BTL

## (undated) DEVICE — ROPE TRACTION 108IN STER F/MED

## (undated) DEVICE — SUT PDS II DA 5-0 C-1 30IN VLT ABSRB MFIL

## (undated) DEVICE — SUT PDS II 1 60IN TP-1 ABSRB VLT L65MM 1/2

## (undated) DEVICE — ADAPTER BX SEAL Y BIOPSY PORT ADJ FOR HYSTEROSCOPE

## (undated) DEVICE — Device

## (undated) DEVICE — 3M™ TEGADERM™ TRANSPARENT FILM DRESSING FRAME STYLE, 1626W, 4 IN X 4-3/4 IN (10 CM X 12 CM), 50/CT 4CT/CASE: Brand: 3M™ TEGADERM™

## (undated) DEVICE — SKN PREP SPNG STKS PVP PNT STR: Brand: MEDLINE INDUSTRIES, INC.

## (undated) DEVICE — SYRINGE MED 20ML STD CLR PLAS LL TIP N CTRL

## (undated) DEVICE — BIT DRL L120MM DIA4.2MM ST XLN CALIB

## (undated) DEVICE — PROXIMATE SKIN STAPLERS (35 WIDE) CONTAINS 35 STAINLESS STEEL STAPLES (FIXED HEAD): Brand: PROXIMATE

## (undated) DEVICE — DISPOSABLE TOURNIQUET CUFF SINGLE BLADDER, DUAL PORT AND QUICK CONNECT CONNECTOR: Brand: COLOR CUFF

## (undated) DEVICE — CATHETER URET 5FR L70CM FLX OPN TIP NONPORTED

## (undated) DEVICE — DRESSING ALG 3.5X10IN TAN CARBOXYMETHYL CELOS

## (undated) DEVICE — COVER LT HNDL RIG FOR SUR CAM DISP

## (undated) DEVICE — STOCKINETTE,IMPERVIOUS,12X48,STERILE: Brand: MEDLINE

## (undated) DEVICE — PACK PBDS CYSTOSCOPY

## (undated) DEVICE — CONE TIP URETERAL CATHETER: Brand: URETERAL CATHETER

## (undated) DEVICE — SLEEVE COMPR MD KNEE LEN SGL USE KENDALL SCD

## (undated) DEVICE — DRAPE,U/SHT,SPLIT,FILM,60X84,STERILE: Brand: MEDLINE

## (undated) DEVICE — GLOVE SUR 7.5 SENSICARE PI PIP CRM PWD F

## (undated) DEVICE — PACK PBDS ORTHO MODULE

## (undated) DEVICE — C-ARM: Brand: UNBRANDED

## (undated) NOTE — LETTER
Patient Name: Emmie Loza-Age / Sex: 1932-A: 89 y  female   Medical Records: [id number] CSN: [id number]    ABNORMAL VALUES  Surgeon(s):  Renny Gilbert MD  Anesthesia Type: General  Date of Surgery: 4/3/2024  Procedure Description: EXPLORATORY LAPAROTOMY SUBTOTAL COLECTOMY WITH ILEOSTOMY CREATION  Primary Surgeon:  Renny Gilbert MD  Phone Number: [phone]    PLEASE NOTE THE FOLLOWING ABNORMALITIES:   Chemistry: BUN 70    CR 3.32  ________________________________________________________

## (undated) NOTE — LETTER
[facility] 4NW-A  [address] [facility] [address]  [phone]    Blood Transfusion Consent    In the course of your treatment, it may become necessary to administer a transfusion of blood or blood components. This form provides basic information concerning this procedure and, if signed by you, authorizes its administration. By signing this form, you agree that all of your questions about the administration of blood or blood products have been answered by the ordering medical professional or designee.    Description of Procedure  Blood is introduced into one of your veins, commonly in the arm, using a sterilized disposable needle. The amount of blood transfused, and whether the transfusion will be of blood or blood components is a judgement the physician will make based on your particular needs.    Risks  The transfusion is a common procedure of low risk.  MINOR AND TEMPORARY REACTIONS ARE NOT UNCOMMON, including a slight bruise, swelling or local reaction in the area where the needle pierces your skin, or a nonserious reaction to the transfused material itself, including headache, fever or mild skin reaction, such as rash.  Serious reactions are possible, though very unlikely, and include severe allergic reaction (shock) and destruction (hemolysis) of transfused blood cells.  Infectious diseases which are known to be transmitted by blood transfusion include certain types of viral Hepatitis(liver infection from a virus), Human Immunodeficiency Virus (HIV-1,2) infection, a viral infection known to cause Acquired Immunodeficiency Syndrome (AIDS), as well as certain other bacterial, viral, and parasitic diseases. While a minimal risk of acquiring an infectious disease from transfused blood exists, in accordance with the Federal and State law, all due care has been taken in donor selection and testing to avoid transmission of disease.    Alternatives  If loss of blood poses serious threats during your  treatment, THERE IS NO EFFECTIVE ALTERNATIVE TO BLOOD TRANSFUSION. However, if you have any further questions on this matter, your provider will fully explain the alternatives to you if it has not already been done.    I, ______________________________, have read/had read to me the above. I understand the matters bearing on the decision whether or not to authorize a transfusion of blood or blood components. I have no questions which have not been answered to my full satisfaction. I hereby consent to such transfusion as my physician may deem necessary or advisable in the course of my treatment.    ______________________________________________                    ___________________________  (Signature of Patient or Responsible party in case of minor,                 (Printed Name of Patient or incompetent, or unconscious patient)              Responsible Party)    ___________________________               _____________________  (Relationship to Patient if not self)                                    (Date and Time)    __________________________                                                           ______________________              (Signature of Witness)               (Printed Name of Witness)     Language line ()    Telephone/Verbal/Video Consent    __________________________                     ____________________  (Signature of 2nd Witness           (Printed Name of 2nd  Telephone/Verbal/Video Consent)           Witness)    Patient Name: Sunita Loza     : 1932                 Printed: 2024     Medical Record #: [id number]      Rev: 2023

## (undated) NOTE — IP AVS SNAPSHOT
Patient Demographics     Address  [address]  [facility] [address] Phone  [phone] (Home) *Preferred*  [phone] (Mobile)      Patient Contacts     Name Relation Home Work Mobile    JOSE FERNÁNDEZ Daughter   [phone]    LUIGI FERNÁNDEZ Son [phone]    SARA FERNÁNDEZ Son [phone]      Allergies as of 4/8/2024  Review status set to Review Complete on 4/3/2024       Noted Reaction Type Reactions    Ciprofloxacin 11/14/2013    RASH, HIVES    Penicillins 06/12/2012    RASH      Code Status Information     Code Status    Full Code        Patient Instructions       Repeat bmp and hemoglobin in 2-3 days to monitor electrolytes and hemoglobin       Follow-up Information     Renny Gilbert MD Follow up in 1 week(s).    Specialty: SURGERY, GENERAL  Contact information:  [address]  [facility] [address]  [facility] [address] [phone]             Carmen Tapia MD Follow up in 1 week(s).    Specialty: Geriatrics  Contact information:  [address] [facility] [address] [phone]                        Your Home Meds List      TAKE these medications       Instructions Authorizing Provider Morning Afternoon Evening As Needed   acetaminophen 500 MG Tabs  Commonly known as: Tylenol Extra Strength  Next dose due: Tonight      Take 1 tablet (500 mg total) by mouth in the morning and 1 tablet (500 mg total) before bedtime.          amiodarone 100 MG Tabs  Commonly known as: Pacerone  Next dose due: Tomorrow morning      Take 1 tablet (100 mg total) by mouth daily.          apixaban 2.5 MG Tabs  Commonly known as: Eliquis      Take 1 tablet (2.5 mg total) by mouth 2 (two) times daily.          aspirin 81 MG Tabs      Take 1 tablet (81 mg total) by mouth daily.          cyanocobalamin 1000 MCG Tabs      Take 100 mcg by mouth daily.          dilTIAZem  MG Cp24  Commonly known as: Dilacor XR  Next dose due: Tomorrow morning      Take 1 capsule (120 mg total) by mouth daily.           ferrous sulfate 325 (65 FE) MG Tbec      Take 1 tablet (325 mg total) by mouth daily with breakfast.          GLUCOSAMINE CHONDROIT(BIOFLAV) OR      Take 1 tablet by mouth daily.          MULTIVITAMINS OR      Take 1 tablet by mouth daily.          Oyster Shell Calcium/D 500-200 MG-UNIT Tabs      Take 1 tablet by mouth daily.          potassium chloride 10 MEQ Tbcr  Commonly known as: K-Dur  Next dose due: Tomorrow morning      Take 2 tablets (20 mEq total) by mouth daily.          torsemide 20 MG Tabs  Commonly known as: Demadex      Take 1 tablet (20 mg total) by mouth daily.   JimRamsey Rodgers                  [phone] - MAR ACTION REPORT  (last 48 hrs)    ** SITE UNKNOWN **     Order ID Medication Name Action Time Action Reason Comments    [id number] amiodarone (Pacerone) tab 100 mg 04/07/24 1006 Given      [id number] amiodarone (Pacerone) tab 100 mg 04/08/24 0851 Given      [id number] dilTIAZem ER (Dilacor XR) 24 hr cap 120 mg 04/08/24 1139 Given      [id number] famotidine (Pepcid) tab 20 mg (Or Linked Group #1) 04/07/24 1005 Given      [id number] famotidine (Pepcid) tab 20 mg 04/08/24 0851 Given      [id number] iron sucrose (Venofer) 20 mg/mL injection 200 mg 04/07/24 1005 New Bag      [id number] potassium chloride (K-Dur) tab 40 mEq 04/06/24 1956 Given            LEFT LOWER ABDOMEN     Order ID Medication Name Action Time Action Reason Comments    [id number] heparin (Porcine) 5000 UNIT/ML injection 5,000 Units 04/06/24 1800 Given      [id number] heparin (Porcine) 5000 UNIT/ML injection 5,000 Units 04/07/24 0553 Given      [id number] heparin (Porcine) 5000 UNIT/ML injection 5,000 Units 04/07/24 1754 Given            RIGHT LOWER ABDOMEN     Order ID Medication Name Action Time Action Reason Comments    [id number] heparin (Porcine) 5000 UNIT/ML injection 5,000 Units 04/08/24 0517 Given              Recent Vital Signs    Flowsheet Row Most Recent Value   /57 Filed at 04/08/2024 1200   Pulse 83 Filed at 04/08/2024  1445   Resp 18 Filed at 04/08/2024 1200   Temp 97.9 °F (36.6 °C) Filed at 04/08/2024 1200   SpO2 98 % Filed at 04/08/2024 1200      Patient's Most Recent Weight    Flowsheet Row Most Recent Value   Patient Weight 39.7 kg (87 lb 8.4 oz)         Lab Results Last 24 Hours      Basic Metabolic Panel (8) [[id number]] (Abnormal)  Resulted: 04/08/24 0725, Result status: Final result   Ordering provider: Sosa Nair MD  04/07/24 2300 Resulting lab: [facility] ([facility])    Specimen Information    Type Source Collected On   Blood — 04/08/24 0607          Components    Component Value Reference Range Flag Lab   Glucose 87 70 - 99 mg/dL — [facility] ([facility])   Sodium 148 136 - 145 mmol/L [facility] ([facility])   Potassium 3.7 3.5 - 5.1 mmol/L — [facility] ([facility])   Chloride 113 98 - 112 mmol/L [facility] ([facility])   CO2 29.0 21.0 - 32.0 mmol/L — [facility] ([facility])   Anion Gap 6 0 - 18 mmol/L — [facility] ([facility])   BUN 25 9 - 23 mg/dL [facility] ([facility])   Creatinine 2.16 0.55 - 1.02 mg/dL [facility] ([facility])   Calcium, Total 8.4 8.5 - 10.1 mg/dL [facility] ([facility])   Calculated Osmolality 310 275 - 295 mOsm/kg [facility] ([facility])   eGFR-Cr 21 >=60 mL/min/1.73m2 [facility] ([facility])            Magnesium [[id number]] (Normal)  Resulted: 04/08/24 0725, Result status: Final result   Ordering provider: Sosa Nair MD  04/07/24 2300 Resulting lab: [facility] ([facility])    Specimen Information    Type Source Collected On   Blood — 04/08/24 0607          Components    Component Value Reference Range Flag Lab   Magnesium 2.0 1.6 - 2.6 mg/dL — [facility] ([facility])            CBC, Platelet; No Differential [[id number]] (Abnormal)  Resulted: 04/08/24 0705, Result status: Final result   Ordering provider: Sosa Nair MD  04/07/24 2300 Resulting lab: [facility] ([facility])    Specimen Information    Type Source Collected On   Blood — 04/08/24 0607           Components    Component Value Reference Range Flag Lab   WBC 6.0 4.0 - 11.0 x10(3) uL — [facility] ([facility])   RBC 3.13 3.80 - 5.30 x10(6)uL L [facility] ([facility])   HGB 8.9 12.0 - 16.0 g/dL L [facility] ([facility])   HCT 28.9 35.0 - 48.0 % L [facility] ([facility])   .0 150.0 - 450.0 10(3)uL L [facility])   MCV 92.3 80.0 - 100.0 fL — [facility])   MCH 28.4 26.0 - 34.0 pg — [facility])   MCHC 30.8 31.0 - 37.0 g/dL L [facility] ([facility])   RDW 18.4 % — [facility])            Testing Performed By     Lab - Abbreviation Name Director Address Valid Date Range    139 - [facility] ([facility]) [facility] ([facility]) Goldberg, Cathryn A. MD [address] [place] [address] 88284 20 1441 - Present            Microbiology Results (All)     Procedure Component Value Units Date/Time    Emergency MRSA Screen by PCR [[id number]]  (Normal) Collected: 24 1832    Order Status: Completed Lab Status: Final result Updated: 24    Specimen: Other from [facility]      MRSA Screen By PCR Negative      Pending Labs     Order Current Status    Specimen to Pathology Tissue In process      H&P Note    No notes of this type exist for this encounter.        Consults - MD Consult Notes      Consults signed by Fabiana Miller MD at 4/3/2024  7:18 PM     Author: Fabiana Miller MD Service: Nephrology Author Type: Physician    Filed: 4/3/2024  7:18 PM Date of Service: 4/3/2024  7:14 PM Status: Signed    : Fabiana Miller MD (Physician)       [facility]  Report of Consultation    Sunita Loza Patient Status:  Inpatient    1932 MRN [id number]   Location [facility] 4SW-A Attending Renny Gilbert MD   Hosp Day # 0 PCP Carmen Tapia MD       Assessment / Plan:    1) ZOHREH- in part due to recent diuresis + sean-op fluid shifts / hypotension. 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 EF 60% with mild-mod TR / AI + mild MR; RV WNL. Non-issue currently        Reason for Consultation:  ZOHREH / CKD 3 / met acidosis    History of Present Illness:  Sunita Loza is a a(n) 89 year old female. Dictation to follow    History:  Past Medical History:   Diagnosis Date    Arthritis     Atrial fibrillation (HCC)     Back pain     CKD (chronic kidney disease)     and S/P nephrectomy    Colon cancer (HCC) 2003    Congestive heart disease (HCC)     Easy bruising     Endometrial cancer (HCC)     UTERINE, DX ABOUT 30 YEARS    Hearing impairment     AIDS    Heart palpitations 6/23    Afib    High blood pressure     Kidney failure     Leg swelling Feb 2024    Bishop syndrome     hereditary colorectal cancer    Muscle weakness     WALKER    Pain in joints     Wears glasses     Weight loss 4/23     Past Surgical History:   Procedure Laterality Date    COLECTOMY      NEPHRECTOMY Left     PART REMOVAL COLON W END COLOSTOMY       Family History   Problem Relation Age of Onset    Heart Disorder Father     Heart Disorder Mother     Colon Cancer Mother         70    Heart Disorder Sister     Breast Cancer Sister     Diabetes Brother     Breast Cancer Sister     Cancer Sister     Breast Cancer Sister     Breast Cancer Daughter      Denies family history of kidney disease.    reports that she has never smoked. She has never used smokeless tobacco. She reports that she does not currently use alcohol. She reports that she does not use drugs.    Allergies:  Allergies   Allergen Reactions    Ciprofloxacin RASH and HIVES    Penicillins RASH       Medications:    Current Facility-Administered Medications:     cefOXitin (Mefoxin) 2 g in sodium chloride 0.9%  100 mL IVPB-MBP, 2 g, Intravenous, Q8H    oxyCODONE immediate release tab 2.5 mg, 2.5 mg, Oral, Q4H PRN **OR** oxyCODONE immediate release tab 5 mg, 5 mg, Oral, Q4H PRN    HYDROmorphone (Dilaudid) 1 MG/ML injection 0.2 mg, 0.2 mg, Intravenous, Q2H PRN **OR** HYDROmorphone (Dilaudid) 1 MG/ML injection 0.4 mg, 0.4 mg, Intravenous, Q2H PRN    polyethylene glycol (PEG 3350) (Miralax) 17 g oral packet 17 g, 17 g, Oral, Daily PRN    sennosides (Senokot) tab 17.2 mg, 17.2 mg, Oral, Nightly PRN    bisacodyl (Dulcolax) 10 MG rectal suppository 10 mg, 10 mg, Rectal, Daily PRN    fleet enema (Fleet) 7-19 GM/118ML rectal enema 133 mL, 1 enema, Rectal, Once PRN    ondansetron (Zofran) 4 MG/2ML injection 4 mg, 4 mg, Intravenous, Q6H PRN    metoclopramide (Reglan) 5 mg/mL injection 10 mg, 10 mg, Intravenous, Q8H PRN    famotidine (Pepcid) tab 20 mg, 20 mg, Oral, Daily **OR** famotidine (Pepcid) 20 mg/2mL injection 20 mg, 20 mg, Intravenous, Daily    meperidine (Demerol) 25 MG/ML injection 12.5 mg, 12.5 mg, Intravenous, PRN    norepinephrine (Levophed) 4 mg/250mL infusion premix, 0.5-30 mcg/min, Intravenous, Continuous    heparin (Porcine) 5000 UNIT/ML injection 5,000 Units, 5,000 Units, Subcutaneous, q12h    sodium bicarbonate 100 mEq in sterile water 1,100 mL infusion, 100 mEq, Intravenous, Continuous  No current outpatient medications on file.       Review of Systems:  Please see HPI for pertinent positives. 10 point review of systems otherwise reviewed and negative.     Physical Exam:  /50   Pulse 76   Temp 98.3 °F (36.8 °C) (Temporal)   Resp 17   Ht 5' (1.524 m)   Wt 82 lb 1.5 oz (37.2 kg)   SpO2 99%   BMI 16.03 kg/m²   Temp (24hrs), Av.9 °F (36.6 °C), Min:97.5 °F (36.4 °C), Max:98.3 °F (36.8 °C)       Intake/Output Summary (Last 24 hours) at 4/3/2024 1914  Last data filed at 4/3/2024 1847  Gross per 24 hour   Intake 2430 ml   Output 850 ml   Net 1580 ml     Wt Readings from Last 3 Encounters:   24 82 lb  1.5 oz (37.2 kg)   03/26/24 83 lb 1.6 oz (37.7 kg)   03/26/24 86 lb (39 kg)     General: awake mild confusion  HEENT: No scleral icterus, MMM  Neck: Supple, no MARIA FERNANDA or thyromegaly  Cardiac: Regular rate and rhythm, S1, S2 normal, no murmur, rub, or gallop  Lungs: Decreased breath sounds at the bases bilaterally.   Abdomen: Soft, non-tender. + bowel sounds, no palpable organomegaly  Extremities: Without clubbing, cyanosis; no edema  Neurologic: Cranial nerves grossly intact, moving all extremities  Skin: Warm and dry, no rashes      Laboratory Data:  Lab Results   Component Value Date    WBC 9.9 04/03/2024    HGB 7.6 04/03/2024    HCT 23.7 04/03/2024    .0 04/03/2024    CREATSERUM 4.31 04/03/2024    BUN 65 04/03/2024     04/03/2024    K 4.2 04/03/2024     04/03/2024    CO2 15.0 04/03/2024     04/03/2024    CA 7.2 04/03/2024    ALB 2.3 04/03/2024    ALKPHO 73 04/03/2024    BILT 0.1 04/03/2024    TP 5.1 04/03/2024    AST 20 04/03/2024    ALT 18 04/03/2024    INR 1.19 04/03/2024    PTP 15.1 04/03/2024       Imaging:  All imaging studies reviewed.      Thank you for allowing me to participate in the care of your patient.    Fabiana Miller MD  4/3/2024  7:14 PM      Electronically signed by Fabiana Miller MD on 4/3/2024  7:18 PM              Discharge Summary - D/C Summary      Discharge Summary signed by Praful Clarke DO at 4/8/2024  1:11 PM  Version 2 of 2    Author: Praful Clarke DO Service: Hospitalist Author Type: Physician    Filed: 4/8/2024  1:11 PM Date of Service: 4/8/2024 11:09 AM Status: Addendum    : Praful Clarke DO (Physician)    Related Notes: Original Note by Praful Clarke DO (Physician) filed at 4/8/2024 12:59 PM       [facility] Hospitalist Discharge Summary    Patient ID  Sunita Loza  [id number]  89 year old  6/19/1932    Admit date: 4/3/2024    Discharge date:  04/08/24    Attending: Renny Gilbert MD     Primary Care Physician: Carmen Tapia MD       Reason for admission: scheduled surgery    Discharge condition: stable    Disposition: home    Important follow up:  -PCP within 7 days  -specialists:               Discharge med list     Medication List        ASK your doctor about these medications      acetaminophen 500 MG Tabs  Commonly known as: Tylenol Extra Strength     amiodarone 100 MG Tabs  Commonly known as: Pacerone     apixaban 2.5 MG Tabs  Commonly known as: Eliquis     aspirin 81 MG Tabs     cyanocobalamin 1000 MCG Tabs     dilTIAZem  MG Cp24  Commonly known as: Dilacor XR     ferrous sulfate 325 (65 FE) MG Tbec     GLUCOSAMINE CHONDROIT(BIOFLAV) OR     MULTIVITAMINS OR     Oyster Shell Calcium/D 500-200 MG-UNIT Tabs     potassium chloride 10 MEQ Tbcr  Commonly known as: K-Dur     torsemide 20 MG Tabs  Commonly known as: Demadex  Take 1 tablet (20 mg total) by mouth daily.              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  INDICATIONS:  Ostomy issue- sent over from GI, ostomy mass, abdominal pain, h/o colon ca  TECHNIQUE:  Unenhanced multislice CT scanning was performed from the dome of the diaphragm to the pubic symphysis.  Dose reduction techniques were used. Dose  information is transmitted to the ACR (American College of Radiology) NRDR (National Radiology Data Registry) which includes the Dose Index Registry.  PATIENT STATED HISTORY: (As transcribed by Technologist)  History of colon cancer.    FINDINGS:  LIVER:  Stable 7 mm hypoattenuating lesion within the left lobe of the liver no new hepatic lesion identified.  No hepatic enlargement. BILIARY:  Status post cholecystectomy.  No ductal dilatation. PANCREAS:  No lesion, fluid collection, ductal dilatation, or atrophy.  SPLEEN:  No enlargement or focal lesion.  KIDNEYS:  Status post left nephrectomy.  Parenchymal calcifications seen in the midpole the right kidney.  No right hydronephrosis.  No obstructing ureteral calculi. ADRENALS:  No mass or enlargement.  AORTA/VASCULAR:    There is atherosclerotic calcified plaque within the abdominal aorta, bilateral common iliac, bilateral internal iliac, bilateral external iliac, bilateral common femoral, bilateral superficial femoral and bilateral profunda femoral artery branches.  There is no evidence for aortic aneurysm.  RETROPERITONEUM:  No mass or adenopathy.  BOWEL/MESENTERY:  /mesentery there are postoperative changes of abdominal perineal resection.  The cecum is positioned very low within the pelvis in the region previously occupied by the rectum.  There are few fluid-filled mildly distended loops of small  bowel seen in the left pelvis.  The focal mechanical obstruction is not identified.  There is a left lower quadrant colostomy.  There is a large peristomal hernia containing a nonobstructed segment of the distal transverse colon.  The hernia measures approximately 9.0 x 3.3 x 6.3 cm.  There is also a periumbilical hernia containing the anterior wall of the mid transverse colon.  There is no colonic obstruction at this site either.  There is also some omental fat herniated adjacent to the mid transverse colon. ABDOMINAL WALL:  Abdominal wall please see above URINARY  BLADDER:  No visible focal wall thickening, lesion, or calculus.  PELVIC NODES:  No adenopathy.  PELVIC ORGANS:  Status post hysterectomy. BONES:  There are acute appearing fractures involving the left transverse processes at L4 and L5.  The fractures were not present on the previous CT from 1/30/2024.  Osseous structures are demineralized.  Severe disc space narrowing noted at L3-4 and moderate disc space narrowing noted at L1-2, L4-5 and L5-S1.  There is facet arthropathy in the lumbar spine.  There is bilateral SI joint arthritic change. LUNG BASES:  Fibrotic changes are seen in the periphery of the lingula, right middle lobe and both lower lobes.  There is a 3 mm noncalcified left lower lung nodule which was present previously on the CT from 1/30/2024. OTHER:  Negative.             CONCLUSION:  1. Large peristomal hernia again identified containing nonobstructed loop of transverse colon. 2. Periumbilical ventral abdominal wall hernia is also again noted containing the anterior wall of the mid transverse colon as well as some omental fat.  3. New transverse process fracture deformities on the left at L4 and L5.  Correlate with history for any recent trauma.  The osseous structures are demineralized. 4. There is a 3 mm noncalcified subpleural left lower lobe lung nodule.  Surveillance imaging recommended given the patient's history of colon cancer. 5. There are other incidental findings noted as described above.    LOCATION:  [facility]   Dictated by (CST): Abhinav Bergeron MD on 3/26/2024 at 7:36 PM     Finalized by (CST): Abhinav Bergeron MD on 3/26/2024 at 7:59 PM         Operative reports:  Procedure(s) (LRB):  EXPLORATORY LAPAROTOMY, LYSIS OF ADHESIONS, PARTIAL COLON RESECTION WITH TAKEDOWN OF COLOSTOMY AND CREATION OF  COLOSTOMY (N/A)    Hospital course:    Patient is a 89 year old female with PMH sig for htn, pAF, teresa syndome with recurrent cancer, hx of colon resection, colostomy, recent GIB noted to  have growth around stoma concerning for maligancy, here for recurrent colon surgery     *shock -resolved  -post operative, briefly on pressors. Likely from fluid shifts, pain meds / anesthesia  -now htn     *HTN   -  resume cardizem     *pAF  -tele  -off eliquis due to recent gib  -resume amiodarone       *ZOHREH on CKD 3 --> improving  Metabolic acidosis  Hypernatremia--> resolved  -hx of CKD 3 with baseline Cr < 2 per prior notes but Cr recently has been in 3s   -  Renal saw - resume diuretics at home     Hypokalemia, hypomagnesemia- repletion per renal     Acute on chronic anemia- sp 1U PRBC with response           Day of discharge exam:  Vitals:    04/08/24 1044   BP:    Pulse: 55   Resp:    Temp:    Tolerated diet. No nv  Family at bs  Dw surgery - ok to resume aspirin / elqius    No acute distress, alert and oriented   Lungs clear  Heart regular  Abdomen benign    Total time coordinating care 32 min      Patient and/or family had opportunity to ask questions and expressed understanding and agreement with therapeutic plan as outlined         Praful Neves Hospitalist  [phone]  Answering Service: [phone]      Electronically signed by Praful Clarke DO on 4/8/2024  1:11 PM     Discharge Summary signed by Praful Clarke DO at 4/8/2024 12:59 PM  Version 1 of 2    Author: Praful Clarke DO Service: Hospitalist Author Type: Physician    Filed: 4/8/2024 12:59 PM Date of Service: 4/8/2024 11:09 AM Status: Signed    : Praful Clarke DO (Physician)    Related Notes: Addendum by Praful Clarke DO (Physician) filed at 4/8/2024  1:11 PM       [facility] Hospitalist Discharge Summary    Patient ID  Sunita Loza  [id number]  89 year old  6/19/1932    Admit date: 4/3/2024    Discharge date:  04/08/24    Attending: Renny Gilbert MD     Primary Care Physician: Carmen Tapia MD      Reason for admission: scheduled surgery    Discharge condition: stable    Disposition: home    Important  follow up:  -PCP within 7 days  -specialists:               Discharge med list     Medication List        ASK your doctor about these medications      acetaminophen 500 MG Tabs  Commonly known as: Tylenol Extra Strength     amiodarone 100 MG Tabs  Commonly known as: Pacerone     apixaban 2.5 MG Tabs  Commonly known as: Eliquis     aspirin 81 MG Tabs     cyanocobalamin 1000 MCG Tabs     dilTIAZem  MG Cp24  Commonly known as: Dilacor XR     ferrous sulfate 325 (65 FE) MG Tbec     GLUCOSAMINE CHONDROIT(BIOFLAV) OR     MULTIVITAMINS OR     Oyster Shell Calcium/D 500-200 MG-UNIT Tabs     potassium chloride 10 MEQ Tbcr  Commonly known as: K-Dur     torsemide 20 MG Tabs  Commonly known as: Demadex  Take 1 tablet (20 mg total) by mouth daily.              Discharge Diagnoses:    -colon cancer sp ex lap, TIFFANIE, partial colon resection, takedown of colostomy and creation of colostomy 04/03  -post op hypotension / shock-resolved  -post op pain     -metabolic acidosis - resolved  -ZOHREH on ckd 3-4 - resolved  -RCC s/p nephrectomy     -hx of Bishop syndrome with hx of renal /colon /uterine cancer      -htn hx  -hypernatremia  -pAF     -TCP    Consults:  IP CONSULT TO CRITICAL CARE INTENSIVIST  IP CONSULT TO RESPIRATORY CARE  IP CONSULT TO HOSPITALIST  IP CONSULT TO PULMONOLOGY  IP CONSULT TO NEPHROLOGY  IP CONSULT TO HOSPITALIST  CONSULT TO WOUND OSTOMY    Radiology:  CT ABDOMEN+PELVIS(CPT=74176)    Result Date: 3/26/2024  PROCEDURE:  CT ABDOMEN+PELVIS (CPT=74176)  COMPARISON:  [facility] , CT, CT ABDOMEN+PELVIS KIDNEYSTONE 2D RNDR(NO IV,NO ORAL)(CPT=74176), 1/30/2024, 12:17 PM.  INDICATIONS:  Ostomy issue- sent over from GI, ostomy mass, abdominal pain, h/o colon ca  TECHNIQUE:  Unenhanced multislice CT scanning was performed from the dome of the diaphragm to the pubic symphysis.  Dose reduction techniques were used. Dose information is transmitted to the ACR (American College of Radiology) NRDR (National Radiology Data Registry)  which includes the Dose Index Registry.  PATIENT STATED HISTORY: (As transcribed by Technologist)  History of colon cancer.    FINDINGS:  LIVER:  Stable 7 mm hypoattenuating lesion within the left lobe of the liver no new hepatic lesion identified.  No hepatic enlargement. BILIARY:  Status post cholecystectomy.  No ductal dilatation. PANCREAS:  No lesion, fluid collection, ductal dilatation, or atrophy.  SPLEEN:  No enlargement or focal lesion.  KIDNEYS:  Status post left nephrectomy.  Parenchymal calcifications seen in the midpole the right kidney.  No right hydronephrosis.  No obstructing ureteral calculi. ADRENALS:  No mass or enlargement.  AORTA/VASCULAR:    There is atherosclerotic calcified plaque within the abdominal aorta, bilateral common iliac, bilateral internal iliac, bilateral external iliac, bilateral common femoral, bilateral superficial femoral and bilateral profunda femoral artery branches.  There is no evidence for aortic aneurysm.  RETROPERITONEUM:  No mass or adenopathy.  BOWEL/MESENTERY:  /mesentery there are postoperative changes of abdominal perineal resection.  The cecum is positioned very low within the pelvis in the region previously occupied by the rectum.  There are few fluid-filled mildly distended loops of small  bowel seen in the left pelvis.  The focal mechanical obstruction is not identified.  There is a left lower quadrant colostomy.  There is a large peristomal hernia containing a nonobstructed segment of the distal transverse colon.  The hernia measures approximately 9.0 x 3.3 x 6.3 cm.  There is also a periumbilical hernia containing the anterior wall of the mid transverse colon.  There is no colonic obstruction at this site either.  There is also some omental fat herniated adjacent to the mid transverse colon. ABDOMINAL WALL:  Abdominal wall please see above URINARY BLADDER:  No visible focal wall thickening, lesion, or calculus.  PELVIC NODES:  No adenopathy.  PELVIC ORGANS:   Status post hysterectomy. BONES:  There are acute appearing fractures involving the left transverse processes at L4 and L5.  The fractures were not present on the previous CT from 1/30/2024.  Osseous structures are demineralized.  Severe disc space narrowing noted at L3-4 and moderate disc space narrowing noted at L1-2, L4-5 and L5-S1.  There is facet arthropathy in the lumbar spine.  There is bilateral SI joint arthritic change. LUNG BASES:  Fibrotic changes are seen in the periphery of the lingula, right middle lobe and both lower lobes.  There is a 3 mm noncalcified left lower lung nodule which was present previously on the CT from 1/30/2024. OTHER:  Negative.             CONCLUSION:  1. Large peristomal hernia again identified containing nonobstructed loop of transverse colon. 2. Periumbilical ventral abdominal wall hernia is also again noted containing the anterior wall of the mid transverse colon as well as some omental fat.  3. New transverse process fracture deformities on the left at L4 and L5.  Correlate with history for any recent trauma.  The osseous structures are demineralized. 4. There is a 3 mm noncalcified subpleural left lower lobe lung nodule.  Surveillance imaging recommended given the patient's history of colon cancer. 5. There are other incidental findings noted as described above.    LOCATION:  [facility]   Dictated by (CST): Abhinav Bergeron MD on 3/26/2024 at 7:36 PM     Finalized by (CST): Abhinav Bergeron MD on 3/26/2024 at 7:59 PM         Operative reports:  Procedure(s) (LRB):  EXPLORATORY LAPAROTOMY, LYSIS OF ADHESIONS, PARTIAL COLON RESECTION WITH TAKEDOWN OF COLOSTOMY AND CREATION OF  COLOSTOMY (N/A)    Hospital course:    Patient is a 89 year old female with PMH sig for htn, pAF, teresa syndome with recurrent cancer, hx of colon resection, colostomy, recent GIB noted to have growth around stoma concerning for maligancy, here for recurrent colon surgery     *shock -resolved  -post  operative, briefly on pressors. Likely from fluid shifts, pain meds / anesthesia  -now htn     *HTN   -  resume cardizem     *pAF  -tele  -off eliquis due to recent gib  -resume amiodarone       *ZOHREH on CKD 3 --> improving  Metabolic acidosis  Hypernatremia--> resolved  -hx of CKD 3 with baseline Cr < 2 per prior notes but Cr recently has been in 3s   -  Renal saw - resume diuretics at home     Hypokalemia, hypomagnesemia- repletion per renal     Acute on chronic anemia- sp 1U PRBC with response           Day of discharge exam:  Vitals:    04/08/24 1044   BP:    Pulse: 55   Resp:    Temp:    Tolerated diet. No nv  Family at bs    No acute distress, alert and oriented   Lungs clear  Heart regular  Abdomen benign    Total time coordinating care 32 min      Patient and/or family had opportunity to ask questions and expressed understanding and agreement with therapeutic plan as outlined         Praful Neves Hospitalist  [phone]  Answering Service: [phone]      Electronically signed by Praful Clarke DO on 4/8/2024 12:59 PM              Physical Therapy Notes (last 72 hours)      Physical Therapy Note signed by Stacia Almaguer PTA at 4/8/2024 10:38 AM  Version 1 of 1    Author: Stcaia Almaguer PTA Service: Rehab Author Type: Physical Therapy Assistant    Filed: 4/8/2024 10:38 AM Date of Service: 4/8/2024  9:20 AM Status: Signed    : Stacia Almaguer PTA (Physical Therapy Assistant)          PHYSICAL THERAPY TREATMENT NOTE - INPATIENT    Room Number: 314/314-A     Session: 1     Number of Visits to Meet Established Goals: 5    Presenting Problem: s/p partial colon resection, takedown colostomy/colostomy creation 4/3/24  Co-Morbidities : colon CA, Afib, back pain, CKD s/p nephrectomy, CHF, endometrial CA    ASSESSMENT   Patient demonstrates good  progress this session, goals  remain in progress.    Patient continues to function below baseline with gait.  Contributing factors to  remaining limitations include decreased functional strength and decreased endurance/aerobic capacity.  Next session anticipate patient to progress bed mobility, transfers, and gait.  Physical Therapy will continue to follow patient for duration of hospitalization.    Patient continues to benefit from continued skilled PT services: to promote return to prior level of function and safety with continuous assistance and gradual rehabilitative therapy .    PLAN  PT Treatment Plan: Bed mobility;Endurance;Energy conservation;Patient education;Gait training  Rehab Potential : Fair  Frequency (Obs): 3-5x/week    CURRENT GOALS       Goal #1 Patient is able to demonstrate supine - sit EOB @ level: supervision      Goal #2 Patient is able to demonstrate transfers EOB to/from [facility] at assistance level: minimum assistance      Goal #3 Patient is able to ambulate 75 feet with assist device: walker - rolling at assistance level: minimum assistance      Goal #4     Goal #5     Goal #6     Goal Comments: Goals established on 4/5/2024 4/8/2024 all goals ongoing     SUBJECTIVE  \"This is a different clip than I'm used to\" referring to colostomy bag       OBJECTIVE  Precautions: Bed/chair alarm;Hard of hearing;Abdominal protective strategies    WEIGHT BEARING RESTRICTION  Weight Bearing Restriction: None                PAIN ASSESSMENT   Rating: Unable to rate  Location: L knee, and  around colostomy  Management Techniques: Activity promotion;Body mechanics;Repositioning    BALANCE                                                                                                                       Static Sitting: Fair +  Dynamic Sitting: Fair +           Static Standing: Fair -  Dynamic Standing: Poor +    ACTIVITY TOLERANCE                         O2 WALK         AM-PAC '6-Clicks' INPATIENT SHORT FORM - BASIC MOBILITY  How much difficulty does the patient currently have...  Patient Difficulty: Turning over in bed (including adjusting  bedclothes, sheets and blankets)?: A Little   Patient Difficulty: Sitting down on and standing up from a chair with arms (e.g., wheelchair, bedside commode, etc.): A Little   Patient Difficulty: Moving from lying on back to sitting on the side of the bed?: A Little   How much help from another person does the patient currently need...   Help from Another: Moving to and from a bed to a chair (including a wheelchair)?: A Little   Help from Another: Need to walk in hospital room?: A Little   Help from Another: Climbing 3-5 steps with a railing?: A Lot       AM-PAC Score:  Raw Score: 17   Approx Degree of Impairment: 50.57%   Standardized Score (AM-PAC Scale): 42.13   CMS Modifier (G-Code): CK    FUNCTIONAL ABILITY STATUS  Gait Assessment   Functional Mobility/Gait Assessment  Gait Assistance: Minimum assistance  Distance (ft): 15,100  Assistive Device: Rolling walker  Pattern: Shuffle    Skilled Therapy Provided  RN consulted prior to session   Pt presents in semi sup  Therapist cued pt for sequencing for t/f to EOB   Toilet t/f CGA to low toilet, pt CGA for sean care- increased time for PCT to assist c emptying colostomy bag. Pt able to endorse that she is able to complete task, however, clip on bag is different from pt's norm.   SBA at sink for hand hygiene   Pt gait trained as noted.   Pt left in chair, needs   Bed Mobility:  Rolling: nt   Supine<>Sit: CGA    Sit<>Supine: nt      Transfer Mobility:  Sit<>Stand: CGA    Stand<>Sit: CGA    Gait: min A     Therapist's Comments: pt reports mild LH c mobility. Pt wheeled back to room.         THERAPEUTIC EXERCISES  Lower Extremity Alternating marching  Ankle pumps  Knee extension  LAQ     Upper Extremity      Position Sitting     Repetitions   10-20   Sets   1     Patient End of Session: Up in chair;Needs met;Call light within reach;RN aware of session/findings;All patient questions and concerns addressed;Alarm set    PT Session Time: 30 minutes  Gait Training: 10  minutes  Therapeutic Activity: 10 minutes  Therapeutic Exercise: 10 minutes   Neuromuscular Re-education:  minutes                        Occupational Therapy Notes (last 72 hours)      Occupational Therapy Note signed by Leon Novoa OT at 4/5/2024  4:23 PM  Version 1 of 1    Author: Leon Novoa OT Service: Rehab Author Type: Occupational Therapist    Filed: 4/5/2024  4:23 PM Date of Service: 4/5/2024 11:05 AM Status: Signed    : Leon Novoa OT (Occupational Therapist)       OCCUPATIONAL THERAPY EVALUATION - INPATIENT     Room Number: 314/314-A  Evaluation Date: 4/5/2024  Type of Evaluation: Initial  Presenting Problem: s/p partial colon resection, takedown colostomy/colostomy creation 4/3/24    Physician Order: IP Consult to Occupational Therapy  Reason for Therapy: ADL/IADL Dysfunction and Discharge Planning    OCCUPATIONAL THERAPY ASSESSMENT   Patient is currently functioning below baseline with lower body dressing, bed mobility, transfers, stating sitting balance, dynamic sitting balance, maintaining seated position, and energy conservation strategies. Prior to admission, patient's baseline is MOD I/Supervision with BADLs and functional mobility.  Patient is requiring moderate assist as a result of the following impairments: decreased functional reach, decreased endurance, impaired standing balance, and cognitive deficits (orientation, problem solving). Occupational Therapy will continue to follow for duration of hospitalization.    Patient will benefit from continued skilled OT Services to promote return to prior level of function and safety with continuous assistance and gradual rehabilitative therapy       History Related to Current Admission: Patient is a 89 year old female admitted on 4/3/2024 with Presenting Problem: s/p partial colon resection, takedown colostomy/colostomy creation 4/3/24. Co-Morbidities : colon CA, Afib, back pain, CKD s/p nephrectomy, CHF, endometrial  CA    WEIGHT BEARING RESTRICTION  Weight Bearing Restriction: None                Recommendations for nursing staff:   Transfers: one person with RW  Toileting location: bedside commode or bed level    EVALUATION SESSION:  Patient Start of Session: Semi-supine in bed  FUNCTIONAL TRANSFER ASSESSMENT  Sit to Stand: Edge of Bed  Edge of Bed: Moderate Assist    BED MOBILITY  Supine to Sit : Minimal Assist  Sit to Supine (OT): Not Tested  Scooting: MAX A    BALANCE ASSESSMENT  Static Sitting: Supervision  Sitting Unilateral: Contact Guard Assist  Static Standing: Minimal Assist  Standing Unilateral: Minimal Assist    FUNCTIONAL ADL ASSESSMENT       ACTIVITY TOLERANCE: sitting EOB approx 4 minutes in preparation for functional transfer training.                         O2 SATURATIONS       COGNITION  Orientation Level:  oriented to person, disoriented to place, and disoriented to time  Following Commands:  follows one step commands with increased time and follows one step commands with repetition    Upper Extremity   ROM: within functional limits   Strength: within functional limits   Coordination  Gross motor: WFL  Fine motor: WFL  Sensation: Light touch:  intact    EDUCATION PROVIDED  Patient: Role of Occupational Therapy; Plan of Care; Posture/Positioning; Proper Body Mechanics  Patient's Response to Education: Verbalized Understanding; Requires Further Education    Equipment used: RW  Demonstrates functional use, Would benefit from additional trial      Therapist comments: Pt is pleasant and cooperative throughout session.     Patient End of Session: In bed;Needs met;Call light within reach;RN aware of session/findings;All patient questions and concerns addressed;Family present    OCCUPATIONAL PROFILE    HOME SITUATION  Type of Home: Independent living facility  Home Layout: One level  Lives With: Daughter (daughter has Gregorio syndrome)    Toilet and Equipment: Standard height toilet;Comfort height toilet  Shower/Tub  and Equipment: Walk-in shower;Shower chair;Grab bar       Occupation/Status: reads, puzzles, TV  Hand Dominance: Right  Drives: No  Patient Regularly Uses: Glasses    Prior Level of Function: Per PT Eval:  Pt lives with daughter at [facility]. Pts daughter has Downs syndrome and patient provides supervision for daughter. Pt ambulates with RW occasionally when outside her apartment. Pt independent with ADL and mobility. Pts family notes patient has been more forgetful and have been talking about transition to intermediate.     SUBJECTIVE   \"My home.\" Re; where are we currently?    PAIN ASSESSMENT  Ratin  Location: denies       OBJECTIVE  Precautions: Bed/chair alarm;Hard of hearing;Abdominal protective strategies  Fall Risk: High fall risk      ASSESSMENTS    AM-PAC ‘6-Clicks’ Inpatient Daily Activity Short Form  -   Putting on and taking off regular lower body clothing?: A Lot  -   Bathing (including washing, rinsing, drying)?: A Lot  -   Toileting, which includes using toilet, bedpan or urinal? : A Lot  -   Putting on and taking off regular upper body clothing?: A Little  -   Taking care of personal grooming such as brushing teeth?: None  -   Eating meals?: None    AM-PAC Score:  Score: 17  Approx Degree of Impairment: 50.11%  Standardized Score (AM-PAC Scale): 37.26    ADDITIONAL TESTS     NEUROLOGICAL FINDINGS      COGNITION ASSESSMENTS       PLAN  OT Treatment Plan: Balance activities;Energy conservation/work simplification techniques;ADL training;IADL training;Functional transfer training;UE strengthening/ROM;Endurance training;Patient/Family education;Patient/Family training;Equipment eval/education;Compensatory technique education;Continued evaluation  Rehab Potential : Good  Frequency: 3x/week  Number of Visits to Meet Established Goals: 3    ADL Goals   Patient will perform grooming: with supervision  Patient will perform upper body dressing:  with supervision  Patient will perform lower body dressing:  with  supervision  Patient will perform toileting: with supervision    Functional Transfer Goals  Patient will transfer from supine to sit:  with supervision  Patient will transfer from sit to stand:  with supervision  Patient will transfer to bedside commode:  with supervision  Patient will transfer to toilet:  with supervision    UE Exercise Program Goal  Patient will be supervision with bilateral AROM HEP (home exercise program).    Additional Goals  Pt will tolerate standing for 3 minutes utilizing AD as needed in preparation to perform grooming ADLs at sink level.    Patient Evaluation Complexity Level:   Occupational Profile/Medical History LOW - Brief history including review of medical or therapy records    Specific performance deficits impacting engagement in ADL/IADL LOW  1 - 3 performance deficits    Client Assessment/Performance Deficits MODERATE - Comorbidities and min to mod modifications of tasks    Clinical Decision Making LOW - Analysis of occupational profile, problem-focused assessments, limited treatment options    Overall Complexity LOW     OT Session Time: 15 minutes  Therapeutic Activity: 12 minutes                                                         Video Swallow Study Notes    No notes of this type exist for this encounter.     SLP Notes    No notes of this type exist for this encounter.     Immunizations     Name Date      Covid-19 Moderna 11/01/21     Covid-19 Moderna 03/25/21     Covid-19 Moderna 02/26/21     Gel-one, Intra-articular 05/06/21     Gel-one, Intra-articular 09/02/20     Gel-one, Intra-articular 02/05/20     Gel-one, Intra-articular 01/15/20     INFLUENZA 12/30/22     INFLUENZA 03/02/22     INFLUENZA 08/28/20     INFLUENZA 10/12/19     INFLUENZA 10/14/18     INFLUENZA 09/26/17     INFLUENZA 08/14/17     INFLUENZA 10/08/16     INFLUENZA 09/22/15     INFLUENZA 09/29/14     INFLUENZA 09/29/14     INFLUENZA 08/26/13     INFLUENZA 08/26/13     Influenza Virus Vaccine, H1N1 09/14/12      Influenza Virus Vaccine, H1N1 09/12/11     Methylprednisolone 80 mg 04/29/21     Methylprednisolone 80 mg 08/03/20     Methylprednisolone 80 mg 12/11/19     Pneumococcal (Prevnar 13) 05/16/15     Pneumovax 23 08/15/17     Pneumovax 23 11/30/16     Pneumovax 23 09/25/02     TDAP 07/19/18     Zoster Vaccine Live (Zostavax) 12/04/12     Zoster Vaccine Recombinant Adjuvanted (Shingrix) 01/11/19     Zoster Vaccine Recombinant Adjuvanted (Shingrix) 01/09/19     Zoster Vaccine Recombinant Adjuvanted (Shingrix) 08/23/18       Future Appointments        Provider Department [place]    7/8/2024 1:00 PM Fabiana Miller MD [facility]      Multidisciplinary Problems     Active Goals        Problem: Patient/Family Goals    Goal Priority Disciplines Outcome Interventions   Patient/Family Long Term Goal     Interdisciplinary Progressing    Description: Patient's Long Term Goal: ***    Interventions:  - ***  - See additional Care Plan goals for specific interventions   Patient/Family Short Term Goal     Interdisciplinary Progressing    Description: Patient's Short Term Goal: ***    Interventions:   - ***  - See additional Care Plan goals for specific interventions               Discharge Treatment Preferences    Flowsheet Row Most Recent Value   Preferences    Submitted to [facility] Yes   PASRR Level 1 Submitted Yes

## (undated) NOTE — LETTER
[facility]  [address] [address]  [phone]    Blood Transfusion Consent    In the course of your treatment, it may become necessary to administer a transfusion of blood or blood components. This form provides basic information concerning this procedure and, if signed by you, authorizes its administration. By signing this form, you agree that all of your questions about the administration of blood or blood products have been answered by the ordering medical professional or designee.    Description of Procedure  Blood is introduced into one of your veins, commonly in the arm, using a sterilized disposable needle. The amount of blood transfused, and whether the transfusion will be of blood or blood components is a judgement the physician will make based on your particular needs.    Risks  The transfusion is a common procedure of low risk.  MINOR AND TEMPORARY REACTIONS ARE NOT UNCOMMON, including a slight bruise, swelling or local reaction in the area where the needle pierces your skin, or a nonserious reaction to the transfused material itself, including headache, fever or mild skin reaction, such as rash.  Serious reactions are possible, though very unlikely, and include severe allergic reaction (shock) and destruction (hemolysis) of transfused blood cells.  Infectious diseases which are known to be transmitted by blood transfusion include certain types of viral Hepatitis(liver infection from a virus), Human Immunodeficiency Virus (HIV-1,2) infection, a viral infection known to cause Acquired Immunodeficiency Syndrome (AIDS), as well as certain other bacterial, viral, and parasitic diseases. While a minimal risk of acquiring an infectious disease from transfused blood exists, in accordance with the Federal and State law, all due care has been taken in donor selection and testing to avoid transmission of disease.    Alternatives  If loss of blood poses serious threats during your  treatment, THERE IS NO EFFECTIVE ALTERNATIVE TO BLOOD TRANSFUSION. However, if you have any further questions on this matter, your provider will fully explain the alternatives to you if it has not already been done.    I, ______________________________, have read/had read to me the above. I understand the matters bearing on the decision whether or not to authorize a transfusion of blood or blood components. I have no questions which have not been answered to my full satisfaction. I hereby consent to such transfusion as my physician may deem necessary or advisable in the course of my treatment.    ______________________________________________                    ___________________________  (Signature of Patient or Responsible party in case of minor,                 (Printed Name of Patient or incompetent, or unconscious patient)              Responsible Party)    ___________________________               _____________________  (Relationship to Patient if not self)                                    (Date and Time)    __________________________                                                           ______________________              (Signature of Witness)               (Printed Name of Witness)     Language line ()    Telephone/Verbal/Video Consent    __________________________                     ____________________  (Signature of 2nd Witness           (Printed Name of 2nd  Telephone/Verbal/Video Consent)           Witness)    Patient Name: Sunita Loza     : 1932                 Printed: 2024     Medical Record #: [id number]      Rev: 2023

## (undated) NOTE — LETTER
[address]  [address]  Authorization for Surgical Operation and Procedure     Date:___________                                                                                                         Time:__________  I hereby authorize Surgeon(s):  Norm House MD, my physician and his/her assistants (if applicable), which may include medical students, residents, and/or fellows, to perform the following surgical operation/ procedure and administer such anesthesia as may be determined necessary by my physician:  Operation/Procedure name (s) Procedure(s):  CYSTOSCOPY, CLOT EVACUATION, POSSIBLE BLADDER BIOPSY, RIGHT RETROGRADE PYELOGRAM, POSSIBLE RIGHT URETEROSCOPY, POSSIBLE RIGHT URETERAL STENT on Sunita Loza   2.   I recognize that during the surgical operation/procedure, unforeseen conditions may necessitate additional or different procedures than those listed above.  I, therefore, further authorize and request that the above-named surgeon, assistants, or designees perform such procedures as are, in their judgment, necessary and desirable.    3.   My surgeon/physician has discussed prior to my surgery the potential benefits, risks and side effects of this procedure; the likelihood of achieving goals; and potential problems that might occur during recuperation.  They also discussed reasonable alternatives to the procedure, including risks, benefits, and side effects related to the alternatives and risks related to not receiving this procedure.  I have had all my questions answered and I acknowledge that no guarantee has been made as to the result that may be obtained.    4.   Should the need arise during my operation/procedure, which includes change of level of care prior to discharge, I also consent to the administration of blood and/or blood products.  Further, I understand that despite careful testing and screening of blood or blood products by collecting agencies, I may  still be subject to ill effects as a result of receiving a blood transfusion and/or blood products.  The following are some, but not all, of the potential risks that can occur: fever and allergic reactions, hemolytic reactions, transmission of diseases such as Hepatitis, AIDS and Cytomegalovirus (CMV) and fluid overload.  In the event that I wish to have an autologous transfusion of my own blood, or a directed donor transfusion, I will discuss this with my physician.  Check only if Refusing Blood or Blood Products  I understand refusal of blood or blood products as deemed necessary by my physician may have serious consequences to my condition to include possible death. I hereby assume responsibility for my refusal and release the hospital, its personnel, and my physicians from any responsibility for the consequences of my refusal.          o  Refuse      5.   I authorize the use of any specimen, organs, tissues, body parts or foreign objects that may be removed from my body during the operation/procedure for diagnosis, research or teaching purposes and their subsequent disposal by hospital authorities.  I also authorize the release of specimen test results and/or written reports to my treating physician on the hospital medical staff or other referring or consulting physicians involved in my care, at the discretion of the Pathologist or my treating physician.    6.   I consent to the photographing or videotaping of the operations or procedures to be performed, including appropriate portions of my body for medical, scientific, or educational purposes, provided my identity is not revealed by the pictures or by descriptive texts accompanying them.  If the procedure has been photographed/videotaped, the surgeon will obtain the original picture, image, videotape or CD.  The hospital will not be responsible for storage, release or maintenance of the picture, image, tape or CD.    7.   I consent to the presence of a product  specialist or observers in the operating room as deemed necessary by my physician or their designees.    8.   I recognize that in the event my procedure results in extended X-Ray/fluoroscopy time, I may develop a skin reaction.    9. If I have a Do Not Attempt Resuscitation (DNAR) order in place, that status will be suspended while in the operating room, procedural suite, and during the recovery period unless otherwise explicitly stated by me (or a person authorized to consent on my behalf). The surgeon or my attending physician will determine when the applicable recovery period ends for purposes of reinstating the DNAR order.  10. Patients having a sterilization procedure: I understand that if the procedure is successful the results will be permanent and it will therefore be impossible for me to inseminate, conceive, or bear children.  I also understand that the procedure is intended to result in sterility, although the result has not been guaranteed.   11. I acknowledge that my physician has explained sedation/analgesia administration to me including the risk and benefits I consent to the administration of sedation/analgesia as may be necessary or desirable in the judgment of my physician.    I CERTIFY THAT I HAVE READ AND FULLY UNDERSTAND THE ABOVE CONSENT TO OPERATION and/or OTHER PROCEDURE.    _________________________________________  __________________________________  Signature of Patient     Signature of Responsible Person         ___________________________________         Printed Name of Responsible Person           _________________________________                 Relationship to Patient  _________________________________________  ______________________________  Signature of Witness          Date  Time      Patient Name: Sunita COPE Dago     : 1932                 Printed: 2024     Medical Record #: [id number]                     Page 2 of 3                                    [place]  [address]  [address]    Consent for Anesthesia    I Sunita Loza agree to be cared for by an anesthesiologist, who is specially trained to monitor me and give me medicine to put me to sleep or keep me comfortable during my procedure    I understand that my anesthesiologist is not an employee or agent of [facility] or [employer]. He or she works for [employer].    As the patient asking for anesthesia services, I agree to:  Allow the anesthesiologist (anesthesia doctor) to give me medicine and do additional procedures as necessary. Some examples are: Starting or using an “IV” to give me medicine, fluids or blood during my procedure, and having a breathing tube placed to help me breathe when I’m asleep (intubation). In the event that my heart stops working properly, I understand that my anesthesiologist will make every effort to sustain my life, unless otherwise directed by [facility] Do Not Resuscitate documents.  Tell my anesthesia doctor before my procedure:  If I am pregnant.  The last time that I ate or drank.  All of the medicines I take (including prescriptions, herbal supplements, and pills I can buy without a prescription (including street drugs/illegal medications). Failure to inform my anesthesiologist about these medicines may increase my risk of anesthetic complications.  If I am allergic to anything or have had a reaction to anesthesia before.  I understand how the anesthesia medicine will help me (benefits).  I understand that with any type of anesthesia medicine there are risks:  The most common risks are: nausea, vomiting, sore throat, muscle soreness, damage to my eyes, mouth, or teeth (from breathing tube placement).  Rare risks include: remembering what happened during my procedure, allergic reactions to medications, injury to my airway, heart, lungs, vision, nerves, or muscles and in extremely rare instances death.  My doctor  has explained to me other choices available to me for my care (alternatives).  Pregnant Patients (“epidural”):  I understand that the risks of having an epidural (medicine given into my back to help control pain during labor), include itching, low blood pressure, difficulty urinating, headache or slowing of the baby’s heart. Very rare risks include infection, bleeding, seizure, irregular heart rhythms and nerve injury.  Regional Anesthesia (“spinal”, “epidural”, & “nerve blocks”):  I understand that rare but potential complications include headache, bleeding, infection, seizure, irregular heart rhythms, and nerve injury.    I can change my mind about having anesthesia services at any time before I get the medicine.    _____________________________________________________________________________  Patient (or Representative) Signature/Relationship to Patient  Date   Time    _____________________________________________________________________________   Name (if used)    Language/Organization   Time    _____________________________________________________________________________  Anesthesiologist Signature     Date   Time  I have discussed the procedure and information above with the patient (or patient’s representative) and answered their questions. The patient or their representative has agreed to have anesthesia services.    _____________________________________________________________________________  Witness        Date   Time  I have verified that the signature is that of the patient or patient’s representative, and that it was signed before the procedure  Patient Name: Sunita Loza     : 1932                 Printed: 2024     Medical Record #: [id number]                     Page 3 of 3

## (undated) NOTE — LETTER
[address]  [address]  Authorization for Surgical Operation and Procedure     Date:___________                                                                                                         Time:__________  I hereby authorize Surgeon(s):  Lokesh Whitehead MD, my physician and his/her assistants (if applicable), which may include medical students, residents, and/or fellows, to perform the following surgical operation/ procedure and administer such anesthesia as may be determined necessary by my physician:  Operation/Procedure name (s) Procedure(s):  LEFT HIP CEPHALOMEDULLARY NAIL on Sunita Loza   2.   I recognize that during the surgical operation/procedure, unforeseen conditions may necessitate additional or different procedures than those listed above.  I, therefore, further authorize and request that the above-named surgeon, assistants, or designees perform such procedures as are, in their judgment, necessary and desirable.    3.   My surgeon/physician has discussed prior to my surgery the potential benefits, risks and side effects of this procedure; the likelihood of achieving goals; and potential problems that might occur during recuperation.  They also discussed reasonable alternatives to the procedure, including risks, benefits, and side effects related to the alternatives and risks related to not receiving this procedure.  I have had all my questions answered and I acknowledge that no guarantee has been made as to the result that may be obtained.    4.   Should the need arise during my operation/procedure, which includes change of level of care prior to discharge, I also consent to the administration of blood and/or blood products.  Further, I understand that despite careful testing and screening of blood or blood products by collecting agencies, I may still be subject to ill effects as a result of receiving a blood transfusion and/or blood products.  The  following are some, but not all, of the potential risks that can occur: fever and allergic reactions, hemolytic reactions, transmission of diseases such as Hepatitis, AIDS and Cytomegalovirus (CMV) and fluid overload.  In the event that I wish to have an autologous transfusion of my own blood, or a directed donor transfusion, I will discuss this with my physician.  Check only if Refusing Blood or Blood Products  I understand refusal of blood or blood products as deemed necessary by my physician may have serious consequences to my condition to include possible death. I hereby assume responsibility for my refusal and release the hospital, its personnel, and my physicians from any responsibility for the consequences of my refusal.          o  Refuse      5.   I authorize the use of any specimen, organs, tissues, body parts or foreign objects that may be removed from my body during the operation/procedure for diagnosis, research or teaching purposes and their subsequent disposal by hospital authorities.  I also authorize the release of specimen test results and/or written reports to my treating physician on the hospital medical staff or other referring or consulting physicians involved in my care, at the discretion of the Pathologist or my treating physician.    6.   I consent to the photographing or videotaping of the operations or procedures to be performed, including appropriate portions of my body for medical, scientific, or educational purposes, provided my identity is not revealed by the pictures or by descriptive texts accompanying them.  If the procedure has been photographed/videotaped, the surgeon will obtain the original picture, image, videotape or CD.  The hospital will not be responsible for storage, release or maintenance of the picture, image, tape or CD.    7.   I consent to the presence of a  or observers in the operating room as deemed necessary by my physician or their designees.     8.   I recognize that in the event my procedure results in extended X-Ray/fluoroscopy time, I may develop a skin reaction.    9. If I have a Do Not Attempt Resuscitation (DNAR) order in place, that status will be suspended while in the operating room, procedural suite, and during the recovery period unless otherwise explicitly stated by me (or a person authorized to consent on my behalf). The surgeon or my attending physician will determine when the applicable recovery period ends for purposes of reinstating the DNAR order.  10. Patients having a sterilization procedure: I understand that if the procedure is successful the results will be permanent and it will therefore be impossible for me to inseminate, conceive, or bear children.  I also understand that the procedure is intended to result in sterility, although the result has not been guaranteed.   11. I acknowledge that my physician has explained sedation/analgesia administration to me including the risk and benefits I consent to the administration of sedation/analgesia as may be necessary or desirable in the judgment of my physician.    I CERTIFY THAT I HAVE READ AND FULLY UNDERSTAND THE ABOVE CONSENT TO OPERATION and/or OTHER PROCEDURE.    _________________________________________  __________________________________  Signature of Patient     Signature of Responsible Person         ___________________________________         Printed Name of Responsible Person           _________________________________                 Relationship to Patient  _________________________________________  ______________________________  Signature of Witness          Date  Time      Patient Name: Sunita COPE Dago     : 1932                 Printed: 2024     Medical Record #: [id number]                     Page 1 of [address] [place] [address] [place]  [address]    Consent for Anesthesia    I, Sunita Loza agree to be  cared for by an anesthesiologist, who is specially trained to monitor me and give me medicine to put me to sleep or keep me comfortable during my procedure    I understand that my anesthesiologist is not an employee or agent of [facility] or [employer]. He or she works for [employer].    As the patient asking for anesthesia services, I agree to:  Allow the anesthesiologist (anesthesia doctor) to give me medicine and do additional procedures as necessary. Some examples are: Starting or using an “IV” to give me medicine, fluids or blood during my procedure, and having a breathing tube placed to help me breathe when I’m asleep (intubation). In the event that my heart stops working properly, I understand that my anesthesiologist will make every effort to sustain my life, unless otherwise directed by [facility] Do Not Resuscitate documents.  Tell my anesthesia doctor before my procedure:  If I am pregnant.  The last time that I ate or drank.  All of the medicines I take (including prescriptions, herbal supplements, and pills I can buy without a prescription (including street drugs/illegal medications). Failure to inform my anesthesiologist about these medicines may increase my risk of anesthetic complications.  If I am allergic to anything or have had a reaction to anesthesia before.  I understand how the anesthesia medicine will help me (benefits).  I understand that with any type of anesthesia medicine there are risks:  The most common risks are: nausea, vomiting, sore throat, muscle soreness, damage to my eyes, mouth, or teeth (from breathing tube placement).  Rare risks include: remembering what happened during my procedure, allergic reactions to medications, injury to my airway, heart, lungs, vision, nerves, or muscles and in extremely rare instances death.  My doctor has explained to me other choices available to me for my care (alternatives).  Pregnant Patients  (“epidural”):  I understand that the risks of having an epidural (medicine given into my back to help control pain during labor), include itching, low blood pressure, difficulty urinating, headache or slowing of the baby’s heart. Very rare risks include infection, bleeding, seizure, irregular heart rhythms and nerve injury.  Regional Anesthesia (“spinal”, “epidural”, & “nerve blocks”):  I understand that rare but potential complications include headache, bleeding, infection, seizure, irregular heart rhythms, and nerve injury.    I can change my mind about having anesthesia services at any time before I get the medicine.    _____________________________________________________________________________  Patient (or Representative) Signature/Relationship to Patient  Date   Time    _____________________________________________________________________________   Name (if used)    Language/Organization   Time    _____________________________________________________________________________  Anesthesiologist Signature     Date   Time  I have discussed the procedure and information above with the patient (or patient’s representative) and answered their questions. The patient or their representative has agreed to have anesthesia services.    _____________________________________________________________________________  Witness        Date   Time  I have verified that the signature is that of the patient or patient’s representative, and that it was signed before the procedure  Patient Name: Sunita Loza     : 1932                 Printed: 2024     Medical Record #: [id number]                     Page 2 of 2

## (undated) NOTE — LETTER
[address]  [id number]  Authorization for Surgical Operation and Procedure     Date:___________                                                                                                         Time:__________  I hereby authorize * Surgery not found *, my physician and his/her assistants (if applicable), which may include medical students, residents, and/or fellows, to perform the following surgical operation/ procedure and administer such anesthesia as may be determined necessary by my physician:  Operation/Procedure name (s)  on Sunita Loza   2.   I recognize that during the surgical operation/procedure, unforeseen conditions may necessitate additional or different procedures than those listed above.  I, therefore, further authorize and request that the above-named surgeon, assistants, or designees perform such procedures as are, in their judgment, necessary and desirable.    3.   My surgeon/physician has discussed prior to my surgery the potential benefits, risks and side effects of this procedure; the likelihood of achieving goals; and potential problems that might occur during recuperation.  They also discussed reasonable alternatives to the procedure, including risks, benefits, and side effects related to the alternatives and risks related to not receiving this procedure.  I have had all my questions answered and I acknowledge that no guarantee has been made as to the result that may be obtained.    4.   Should the need arise during my operation/procedure, which includes change of level of care prior to discharge, I also consent to the administration of blood and/or blood products.  Further, I understand that despite careful testing and screening of blood or blood products by collecting agencies, I may still be subject to ill effects as a result of receiving a blood transfusion and/or blood products.  The following are some, but not all, of the potential risks that  can occur: fever and allergic reactions, hemolytic reactions, transmission of diseases such as Hepatitis, AIDS and Cytomegalovirus (CMV) and fluid overload.  In the event that I wish to have an autologous transfusion of my own blood, or a directed donor transfusion, I will discuss this with my physician.  Check only if Refusing Blood or Blood Products  I understand refusal of blood or blood products as deemed necessary by my physician may have serious consequences to my condition to include possible death. I hereby assume responsibility for my refusal and release the hospital, its personnel, and my physicians from any responsibility for the consequences of my refusal.          o  Refuse      5.   I authorize the use of any specimen, organs, tissues, body parts or foreign objects that may be removed from my body during the operation/procedure for diagnosis, research or teaching purposes and their subsequent disposal by hospital authorities.  I also authorize the release of specimen test results and/or written reports to my treating physician on the hospital medical staff or other referring or consulting physicians involved in my care, at the discretion of the Pathologist or my treating physician.    6.   I consent to the photographing or videotaping of the operations or procedures to be performed, including appropriate portions of my body for medical, scientific, or educational purposes, provided my identity is not revealed by the pictures or by descriptive texts accompanying them.  If the procedure has been photographed/videotaped, the surgeon will obtain the original picture, image, videotape or CD.  The hospital will not be responsible for storage, release or maintenance of the picture, image, tape or CD.    7.   I consent to the presence of a  or observers in the operating room as deemed necessary by my physician or their designees.    8.   I recognize that in the event my procedure results in  extended X-Ray/fluoroscopy time, I may develop a skin reaction.    9. If I have a Do Not Attempt Resuscitation (DNAR) order in place, that status will be suspended while in the operating room, procedural suite, and during the recovery period unless otherwise explicitly stated by me (or a person authorized to consent on my behalf). The surgeon or my attending physician will determine when the applicable recovery period ends for purposes of reinstating the DNAR order.  10. Patients having a sterilization procedure: I understand that if the procedure is successful the results will be permanent and it will therefore be impossible for me to inseminate, conceive, or bear children.  I also understand that the procedure is intended to result in sterility, although the result has not been guaranteed.   11. I acknowledge that my physician has explained sedation/analgesia administration to me including the risk and benefits I consent to the administration of sedation/analgesia as may be necessary or desirable in the judgment of my physician.    I CERTIFY THAT I HAVE READ AND FULLY UNDERSTAND THE ABOVE CONSENT TO OPERATION and/or OTHER PROCEDURE.    _________________________________________  __________________________________  Signature of Patient     Signature of Responsible Person         ___________________________________         Printed Name of Responsible Person           _________________________________                 Relationship to Patient  _________________________________________  ______________________________  Signature of Witness          Date  Time      Patient Name: Sunita Loza     : 1932                 Printed: 2024     Medical Record #: [id number]                     Page 1 [address]  [id number]    Consent for Anesthesia    I, Sunita Loza agree to be cared for by an anesthesiologist, who is specially trained  to monitor me and give me medicine to put me to sleep or keep me comfortable during my procedure    I understand that my anesthesiologist is not an employee or agent of [facility] or [employer]. He or she works for [employer].    As the patient asking for anesthesia services, I agree to:  Allow the anesthesiologist (anesthesia doctor) to give me medicine and do additional procedures as necessary. Some examples are: Starting or using an “IV” to give me medicine, fluids or blood during my procedure, and having a breathing tube placed to help me breathe when I’m asleep (intubation). In the event that my heart stops working properly, I understand that my anesthesiologist will make every effort to sustain my life, unless otherwise directed by [facility] Do Not Resuscitate documents.  Tell my anesthesia doctor before my procedure:  If I am pregnant.  The last time that I ate or drank.  All of the medicines I take (including prescriptions, herbal supplements, and pills I can buy without a prescription (including street drugs/illegal medications). Failure to inform my anesthesiologist about these medicines may increase my risk of anesthetic complications.  If I am allergic to anything or have had a reaction to anesthesia before.  I understand how the anesthesia medicine will help me (benefits).  I understand that with any type of anesthesia medicine there are risks:  The most common risks are: nausea, vomiting, sore throat, muscle soreness, damage to my eyes, mouth, or teeth (from breathing tube placement).  Rare risks include: remembering what happened during my procedure, allergic reactions to medications, injury to my airway, heart, lungs, vision, nerves, or muscles and in extremely rare instances death.  My doctor has explained to me other choices available to me for my care (alternatives).  Pregnant Patients (“epidural”):  I understand that the risks of having an epidural  (medicine given into my back to help control pain during labor), include itching, low blood pressure, difficulty urinating, headache or slowing of the baby’s heart. Very rare risks include infection, bleeding, seizure, irregular heart rhythms and nerve injury.  Regional Anesthesia (“spinal”, “epidural”, & “nerve blocks”):  I understand that rare but potential complications include headache, bleeding, infection, seizure, irregular heart rhythms, and nerve injury.    I can change my mind about having anesthesia services at any time before I get the medicine.    _____________________________________________________________________________  Patient (or Representative) Signature/Relationship to Patient  Date   Time    _____________________________________________________________________________   Name (if used)    Language/Organization   Time    _____________________________________________________________________________  Anesthesiologist Signature     Date   Time  I have discussed the procedure and information above with the patient (or patient’s representative) and answered their questions. The patient or their representative has agreed to have anesthesia services.    _____________________________________________________________________________  Witness        Date   Time  I have verified that the signature is that of the patient or patient’s representative, and that it was signed before the procedure  Patient Name: Sunita Loza     : 1932                 Printed: 2024     Medical Record #: [id number]                     Page 2 of 2

## (undated) NOTE — IP AVS SNAPSHOT
Patient Demographics     Address  [address]  [place] [address] Phone  [phone] (Home) *Preferred*  [phone] (Mobile)      Patient Contacts     Name Relation Home Work Mobile    LUIGI FERNÁNDEZ *CALL FIRST* Son [phone]    SARA FERNÁNDEZ Son [phone]    JOSE FERNÁNDEZ Daughter   [phone]      Allergies as of 6/15/2024  Review status set to In Progress on 6/11/2024       Noted Reaction Type Reactions    Ciprofloxacin 11/14/2013    RASH, HIVES    Penicillins 06/12/2012    RASH      Code Status Information     Code Status    Full Code        Patient Instructions       Hip Fracture Discharge Instructions    Activity    Weight bearing restrictions:    Weight bearing as tolerated (WBAT) ? you may put as much of your weight on your leg as you can tolerate. This means you may use a walker, crutches, cane- or no device at all to get around.  Full weight bearing (FWB) ? you may put full weight on your leg. You may not need any device at all to help with walking.     Bathing  No tub baths, pools, or saunas until cleared by surgeon (about 4-6 weeks because it takes that long for the incision(s) on the skin to heal and be a barrier to prevent infection.)    When allowed to shower:  AQUACEL dressing is waterproof and does not require being covered before showering.  Pat dressing(s) and surrounding skin dry after shower.   There may be one incision or a few that have a dressing.                                      AQUACEL           MEDIPORE/COVERLET           DRY STERILE GAUZE                                                                                                                         MEDIPORE/COVERLET dressing and dry sterile gauze are NOT waterproof and REQUIRE being covered with a waterproof barrier to keep the dressing and incision dry.  SARAN WRAP, GLAD WRAP, PRESS N SEAL WORK REALLY WELL BUT ANY PLASTIC WRAP WILL DO.  Do not wash incision.   Remove entire wrapping and  old dressing (if Medipore/coverlet or gauze) after showering. Pat dry with a CLEAN TOWEL if necessary and cover incision with new Medipore/coverlet or gauze.    Incision care/Dressing changes  Wash hands before and after dressing changes.  There may be one or a few dressings on the hip/leg    FOR MEDIPORE/COVERLET DRESSINGS:  Change dressing daily using Medipore/coverlet once Aquacel (waterproof) dressing is removed (which is about 7 days after surgery). Patient should be standing or lying flat so dressing goes on smoothly.  (This dressing needed for hip patients because of location of incision-don’t want contamination from bathroom use)  FOR DRY STERILE GAUZE DRESSINGS:   Change dressing daily using dry sterile gauze and paper tape.  Watch ALL incision for any redness, drainage, increased warmth or opening of the incision.   Call surgeon if you notice any of these.  No lotions or ointments on or near incision(s).                             DRY STERILE GAUZE                         MEDIPORE /COVERLET    Continue dressing changes until your first visit with your surgeon’s office.  There could be a small amount of redness around the staples or incision; this is normal.  Watch for increased redness, warmth, any odor, increased drainage or opening of the incision. A little clear yellow or blood tinged drainage is normal up to 2 weeks after surgery but it should be less every day until it stops.  Call physician if you notice any concerning changes.  Sutures/staples will be removed at first office visit (10 days- 3 weeks).       Driving  Do not drive until cleared by surgeon. Possibly six weeks after surgery. Discuss this at follow-up office visit.   Not allowed while taking narcotic pain medication or muscle relaxants.    Sex  Usually allowed after six weeks - check with surgeon at your office visit.    Return to work  Usually allowed after six weeks. Discuss specific work activities with your  surgeon.    Restrictions  Follow instructions provided by physical therapy    No smoking  Avoid smoking. It is known to cause breathing problems and can decrease the rate of healing.                      Medication  Anticoagulants = blood thinners (Xarelto, Eliquis, Lovenox, Coumadin or Aspirin)  Pill or shot form depending on what your physician orders.   IF placed on Coumadin, you may also need lab work done for monitoring.  You will bleed easier and bruise easier while on these medications.   Usually you will be on a blood thinner for about 4-5 weeks after surgery.  Contact your physician if you have signs of bruising, nose bleeds or blood in your urine. Use electric razors and soft toothbrushes only.  Do not take NSAIDS (non-steroidal anti-inflammatory medications) while taking blood thinners unless ordered by your surgeon.  Review anticoagulant education information sheet provided.    Discomfort  Surgical discomfort is normal for one to two months.  Have realistic goals and keep a positive outlook.  Keep pain manageable; pain should not disrupt your sleep or activities like getting out of bed or walking.  You may need pain medication regularly (every 4-6 hours) the first 2 weeks and then begin to decrease how often you are taking it.  Take pain medication as prescribed with food, especially before therapy, allowing 30-60 minutes to take effect.  Do not drink alcohol while on pain medication.  As you have less discomfort, decrease the amount of pain medication you take. Use plain Tylenol (acetaminophen) for less severe pain.  Some pain medications have Tylenol (acetaminophen) in them such as Norco and Percocet. Do NOT take Tylenol (acetaminophen) within 4 hours of a dose of these medications.  Apply ice or cold therapy to surgical site for 20 minutes at least four times a day, especially after therapy. Be sure there is a thin cloth barrier between skin and ice or cold therapy.  Change position at least every 45  minutes while awake to avoid stiffness or increased discomfort.  Deep breathing and relaxation techniques and distractions can help!  If you focus on something else, you do not experience the pain the same. Take advantage of everything available to your to help control you discomfort.  Contact physician if discomfort does not respond to pain medication.    Body changes  Constipation is common with the use of narcotics.  Eat fiber rich foods and drink plenty of fluids, at least 4-6 glasses of water a day, unless restricted.  To prevent constipation, use stool softeners such as Colace and laxatives such as Miralax, Senakot or Milk of Magnesia while taking laxatives.   An enema or suppository may be needed if above measures do not work.    Prevention of infection and promotion of healing  Good hand washing is important. Everyone should wash their hands or use hand  as soon as they walk in your house-whether they live there or are visiting.  Keep bed linen/clothing freshly laundered.  Do not allow others or pets to touch your incision.  Avoid people that have colds or the flu.  Your surgeon may recommend that you take antibiotics before you undergo any dental or other invasive surgical procedures after your hip fracture surgery. Speak with your surgeon about this at your post-op office visit.  Continue using incentive spirometry because narcotics make you sleepy so you may not take good deep breaths. We do not want you to get pneumonia.     Post op Office visits  Schedule 10 days to 3 weeks after surgery WITH SURGEON’s office.  Additional visits may need to be scheduled. Your physician will discuss this at first post-op office visit.  Schedule outpatient physical therapy per your surgeon’s orders.  Schedule one week follow up after surgery WITH PRIMARY CARE PHYSICIAN; review your medications over last 6 months.  Your body gets stressed by surgery and that stress can affect all your other health issues (such as  high blood pressure, diabetes, CHF, afib, and asthma just to name a few).  It is much better to catch developing problems and prevent them from becoming larger ones.  ANA MARIA HOSE - IF ordered by your surgeon, wear these during the day and off at night.  Surgeon will tell you when you don’t need them anymore.    Notify your surgeon if you notice any of the following signs  Separation of incision line.  Increased redness, swelling, or warmth of skin around incision.  Increased or foul smelling drainage from incision  Red streaks on skin near incision.  Temperature >100.4F.  Increased pain at incision not relieved by pain medication.    Signs of Possible Hip Dislocation IF Total Hip Replacement or Eduardo-arthroplasty Done  Increased severe leg or groin pain  Turning in or out of surgical leg that is new  Increased numbness, tingling to leg  Inability to walk or put weight on your surgical leg  Signs of blood clot  Pain, excessive tenderness, redness, or swelling in leg or calf (other than incision site).  Call physician and await their directions.    Go directly to the ER or CALL 911 if you:  become short of breath  have chest pain  cough up blood  have unexplained anxiety with breathing     Traveling and Handicapped parking  Check with you surgeon when you are allowed to travel so you don’t set yourself up for greater chance of complications.  If traveling by car, get out to stretch every 2 hours.  This helps prevent stiffness.  You may need to do this any time you travel for the first year after surgery.  If traveling by plane, BEFORE you get into a security line, let them know that you had your hip replaced, as you will most likely set off the metal detector. The doctors no longer provide an identification card for this as they are easily copied. ALSO request a wheelchair the first year to board and get off a plane…this aids in priority seating and you should sit on the aisle or at the bulkhead where you can easily  stretch your legs and get up to walk up and down the aisles…this helps prevent blood clots and stiffness.  TEMPORARY HANDICAP PARKING APPLICATION  (good for 3-6 months)  - At Surgeon or PCP visit, request they fill out their part of the form. You fill our your portion, then go to Department of Motor Vehicles. Some [facility] offices provide the same service. ([place] and [place] have this service; if you live in another [facility], you may check with them as well). You need space to open car doors to position yourself properly with walker to get in and out of your car safely; some parking spaces are  practically on top of each other and do not give you enough room.     SPECIAL  INSTRUCTIONS:   maintain Aquacel dressings. may change if dressings become over 50% saturated.  PT -weightbearing as tolerated left lower extremity.  continue pain control   DVT prophylaxis with Eliquis.  follow-up as outpatient in 2 weeks for suture removal and repeat x-rays.                Patient Isolation Status     Isolation Added    [facility]/Contact PLUS 06/10/24      Microbiology Results (All)     Procedure Component Value Units Date/Time    Blood Culture [[id number]] Collected: 06/10/24 1504    Order Status: Completed Lab Status: Preliminary result Updated: 06/14/24 1901    Specimen: Blood,peripheral      Blood Culture Result No Growth 4 Days    Narrative:      Aerobic Bottle Volume - 9 mL    Blood Culture [[id number]] Collected: 06/10/24 1505    Order Status: Completed Lab Status: Preliminary result Updated: 06/14/24 1901    Specimen: Blood,peripheral      Blood Culture Result No Growth 4 Days    Narrative:      Aerobic Bottle Volume - 8 mL    Rapid SARS-CoV-2 by PCR [[id number]]  (Normal) Collected: 06/11/24 0828    Order Status: Completed Lab Status: Final result Updated: 06/11/24 0853    Specimen: Other from Nares      Rapid SARS-CoV-2 by PCR Not Detected    MRSA/SA Scrn by PCR:[facility] only [[id number]]  (Abnormal)  Collected: 06/10/24 1132    Order Status: Completed Lab Status: Final result Updated: 06/10/24 1322    Specimen: Other from Nares      Staph Aureus Screen By PCR Positive     MRSA Screen By PCR Positive    Narrative:      A positive result does not necessarily indicate the presence of viable organisms. For confirmation, epidemiological typing and susceptibility testing, appropriate culture is needed.    PLEASE REFER TO MRSA SCREENING PROTOCOL FOR TREATMENT.        Immunizations     Name Date      Covid-19 Moderna 11/01/21     Covid-19 Moderna 03/25/21     Covid-19 Moderna 02/26/21     Gel-one, Intra-articular 05/06/21     Gel-one, Intra-articular 09/02/20     Gel-one, Intra-articular 02/05/20     Gel-one, Intra-articular 01/15/20     INFLUENZA 12/30/22     INFLUENZA 03/02/22     INFLUENZA 08/28/20     INFLUENZA 10/12/19     INFLUENZA 10/14/18     INFLUENZA 09/26/17     INFLUENZA 08/14/17     INFLUENZA 10/08/16     INFLUENZA 09/22/15     INFLUENZA 09/29/14     INFLUENZA 09/29/14     INFLUENZA 08/26/13     INFLUENZA 08/26/13     Influenza Virus Vaccine, H1N1 09/14/12     Influenza Virus Vaccine, H1N1 09/12/11     Methylprednisolone 80 mg 04/29/21     Methylprednisolone 80 mg 08/03/20     Methylprednisolone 80 mg 12/11/19     Pneumococcal (Prevnar 13) 05/16/15     Pneumovax 23 08/15/17     Pneumovax 23 11/30/16     Pneumovax 23 09/25/02     TDAP 07/19/18     Zoster Vaccine Live (Zostavax) 12/04/12     Zoster Vaccine Recombinant Adjuvanted (Shingrix) 01/11/19     Zoster Vaccine Recombinant Adjuvanted (Shingrix) 01/09/19     Zoster Vaccine Recombinant Adjuvanted (Shingrix) 08/23/18        Follow-up Information     Nayeli Cisneros MD Follow up in 2 week(s).    Specialties: Clinical Cardiac Electrophysiology, Cardiovascular Diseases  Contact information:  [address] [place] [address] [phone]             Clive Brooks PA-C Follow up in 2 week(s).    Specialty: Physician  Assistant  Contact information:  [address]  [address]  [facility] [address] [phone]             Janell Powell MD. Schedule an appointment as soon as possible for a visit in 1 week(s).    Specialty: Family Medicine  Contact information:  [address] [facility] [address] [phone]                        Your Home Meds List      TAKE these medications       Instructions Authorizing Provider Morning Afternoon Evening As Needed   acetaminophen 500 MG Tabs  Commonly known as: Tylenol Extra Strength      Take 1 tablet (500 mg total) by mouth in the morning and 1 tablet (500 mg total) before bedtime.          amiodarone 100 MG Tabs  Commonly known as: Pacerone      Take 1 tablet (100 mg total) by mouth daily.          amLODIPine 5 MG Tabs  Commonly known as: Norvasc      Take 1 tablet (5 mg total) by mouth daily.   Talha Hernandez         apixaban 2.5 MG Tabs  Commonly known as: Eliquis      Take 1 tablet (2.5 mg total) by mouth 2 (two) times daily.          aspirin 81 MG Tabs      Take 1 tablet (81 mg total) by mouth daily.          cyanocobalamin 1000 MCG Tabs      Take 100 mcg by mouth daily.          dilTIAZem  MG Cp24  Commonly known as: Dilacor XR      Take 1 capsule (120 mg total) by mouth daily.          ferrous sulfate 325 (65 FE) MG Tbec      Take 1 tablet (325 mg total) by mouth daily with breakfast.          GLUCOSAMINE CHONDROIT(BIOFLAV) OR      Take 1 tablet by mouth daily.          MULTIVITAMINS OR      Take 1 tablet by mouth daily.          omeprazole 20 MG Cpdr  Commonly known as: PriLOSEC      Take 1 capsule (20 mg total) by mouth every morning before breakfast.          Oyster Shell Calcium/D 500-200 MG-UNIT Tabs      Take 1 tablet by mouth daily.          sodium bicarbonate 650 MG Tabs      Take 1 tablet (650 mg total) by mouth 2 (two) times daily.   Dante Marques         vancomycin 125 MG Caps  Commonly known as: Vancocin  Start taking on: June 2, 2024      Take 1 capsule  (125 mg total) by mouth 4 (four) times daily for 7 days, THEN 1 capsule (125 mg total) 2 (two) times a day for 7 days, THEN 1 capsule (125 mg total) daily for 7 days, THEN 1 capsule (125 mg total) Every Monday, Wednesday, and Friday for 14 days.  Stop taking on: July 7, 2024   Can Watson                  [phone] - MAR ACTION REPORT  (last 48 hrs)    ** SITE UNKNOWN **     Order ID Medication Name Action Time Action Reason Comments    [id number] acetaminophen (Tylenol) tab 650 mg 06/15/24 0238 Given      [id number] amLODIPine (Norvasc) tab 10 mg 06/14/24 0822 Given      [id number] amLODIPine (Norvasc) tab 10 mg 06/15/24 0858 Given      [id number] amiodarone (Pacerone) tab 100 mg 06/14/24 0823 Given      [id number] amiodarone (Pacerone) tab 100 mg 06/15/24 0859 Given      [id number] apixaban (Eliquis) tab 2.5 mg 06/13/24 2011 Given      [id number] apixaban (Eliquis) tab 2.5 mg 06/14/24 0823 Given      [id number] apixaban (Eliquis) tab 2.5 mg 06/14/24 2101 Given      [id number] apixaban (Eliquis) tab 2.5 mg 06/15/24 0859 Given      [id number] aspirin DR tab 81 mg 06/14/24 0823 Given      [id number] aspirin DR tab 81 mg 06/15/24 0858 Given      [id number] dilTIAZem ER (Dilacor XR) 24 hr cap 120 mg 06/14/24 0823 Given      [id number] dilTIAZem ER (Dilacor XR) 24 hr cap 120 mg 06/15/24 0859 Given      [id number] ferrous sulfate DR tab 325 mg 06/14/24 0823 Given      [id number] ferrous sulfate DR tab 325 mg 06/15/24 0859 Given      [id number] multivitamin (Tab-A-Juanjo/Beta Carotene) tab 1 tablet 06/14/24 0822 Given      [id number] multivitamin (Tab-A-Juanjo/Beta Carotene) tab 1 tablet 06/15/24 0859 Given      [id number] pantoprazole (Protonix) DR tab 40 mg 06/14/24 0548 Given      [id number] pantoprazole (Protonix) DR tab 40 mg 06/15/24 0514 Given      [id number] sennosides (Senokot) tab 17.2 mg 06/13/24 2011 Given      [id number] vancomycin (Vancocin) 500 mg in sodium chloride 0.9% 150 mL IVPB 06/13/24 2213 New Bag      [id number] vancomycin  (Vancocin) cap 125 mg (Followed by Linked Group #1) 06/13/24 2011 Given      [id number] vancomycin (Vancocin) cap 125 mg 06/14/24 0822 Given      [id number] vancomycin (Vancocin) cap 125 mg 06/14/24 2101 Given      [id number] vancomycin (Vancocin) cap 125 mg 06/15/24 0859 Given            BILATERAL NARES     Order ID Medication Name Action Time Action Reason Comments    [id number] mupirocin (Bactroban) 2% nasal ointment 1 Application 06/14/24 0330 Given      [id number] mupirocin (Bactroban) 2% nasal ointment 1 Application 06/14/24 1431 Given              Recent Vital Signs    Flowsheet Row Most Recent Value   /57 Filed at 06/15/2024 1411   Pulse 81 Filed at 06/15/2024 1045   Resp 18 Filed at 06/15/2024 1045   Temp 98.2 °F (36.8 °C) Filed at 06/15/2024 1411   SpO2 90 % Filed at 06/15/2024 1045      Patient's Most Recent Weight    Flowsheet Row Most Recent Value   Patient Weight 37.1 kg (81 lb 12.7 oz)         Lab Results Last 24 Hours      Iron And Tibc [[id number]] (Abnormal)  Resulted: 06/15/24 1142, Result status: Final result   Ordering provider: Annette Walker MD  06/15/24 1115 Resulting lab: [facility] ([facility])    Specimen Information    Type Source Collected On   Blood — 06/15/24 0513          Components    Component Value Reference Range Flag Lab   Iron 8 50 - 170 ug/dL [facility] ([facility])   Transferrin 100 200 - 360 mg/dL [facility] ([facility])   Total Iron Binding Capacity 149 240 - 450 ug/dL [facility] ([facility])   % Saturation 5 15 - 50 % [facility] ([facility])            Ferritin [[id number]] (Normal)  Resulted: 06/15/24 1142, Result status: Final result   Ordering provider: Annette Walker MD  06/15/24 1115 Resulting lab: [facility] ([facility])    Specimen Information    Type Source Collected On   Blood — 06/15/24 0513          Components    Component Value Reference Range Flag Lab   Ferritin 247.5 18.0 - 340.0 ng/mL — [facility] ([facility])   Comment:        This test may  exhibit interference when a sample is collected from a person who is consuming high dose of biotin (a.k.a., vitamin B7, vitamin H, coenzyme R) supplements resulting in serum concentrations >100 ng/mL.  Intake of the recommended daily allowance (RDA) for biotin (0.03 mg) has not been shown to typically cause significant interference; however, high dose daily dietary supplements may contain biotin concentrations greater than 150 times (5-10 mg) the RDA.  It is recommended that physicians ask all patients who may be on biotin supplementation to stop biotin consumption at least 72 hours prior to collection of a new sample.              Basic Metabolic Panel (8) [[id number]] (Abnormal)  Resulted: 06/15/24 0618, Result status: Final result   Ordering provider: Praful Clarke DO  06/14/24 2300 Resulting lab: [facility] ([facility])    Specimen Information    Type Source Collected On   Blood — 06/15/24 0513          Components    Component Value Reference Range Flag Lab   Glucose 103 70 - 99 mg/dL [facility] ([facility])   Sodium 140 136 - 145 mmol/L — [facility] ([facility])   Potassium 3.9 3.5 - 5.1 mmol/L — [facility] ([facility])   Chloride 107 98 - 112 mmol/L — [facility] ([facility])   CO2 26.0 21.0 - 32.0 mmol/L — [facility] ([facility])   Anion Gap 7 0 - 18 mmol/L — [facility] ([facility])   BUN 41 9 - 23 mg/dL [facility] ([facility])   Creatinine 2.31 0.55 - 1.02 mg/dL [facility] ([facility])   Calcium, Total 6.2 8.5 - 10.1 mg/dL [facility] ([facility])   Calculated Osmolality 300 275 - 295 mOsm/kg [facility] ([facility])   eGFR-Cr 19 >=60 mL/min/1.73m2 [facility] ([facility])            CBC, Platelet; No Differential [[id number]] (Abnormal)  Resulted: 06/15/24 0603, Result status: Final result   Ordering provider: Lokesh Whitehead MD  06/14/24 2300 Resulting lab: [facility] ([facility])    Specimen Information    Type Source Collected On   Blood — 06/15/24 0513          Components    Component Value Reference Range Flag Lab   WBC 9.0  4.0 - 11.0 x10(3) uL — [facility] ([facility])   RBC 2.43 3.80 - 5.30 x10(6)uL [facility] ([facility])   HGB 7.3 12.0 - 16.0 g/dL [facility] ([facility])   HCT 22.9 35.0 - 48.0 % [facility] ([facility])   .0 150.0 - 450.0 10(3)uL — [facility] ([facility])   MCV 94.2 80.0 - 100.0 fL — [facility])   MCH 30.0 26.0 - 34.0 pg — [facility] ([facility])   MCHC 31.9 31.0 - 37.0 g/dL — [facility])   RDW 18.2 % — [facility] ([facility])            Testing Performed By     Lab - Abbreviation Name Director Address Valid Date Range    139 - [facility] ([facility]) [facility] ([facility]) Goldberg, Cathryn A. MD [address] [place] [id number] 03/19/20 1441 - Present            Pending Labs     Order Current Status    Blood Culture Preliminary result    Blood Culture Preliminary result         H&P - H&P Note      H&P signed by Praful Clarke DO at 6/10/2024  7:42 PM  Version 1 of 1    Author: Praful Clarke DO Service: Hospitalist Author Type: Physician    Filed: 6/10/2024  7:42 PM Date of Service: 6/10/2024 11:48 AM Status: Signed    : Praful Clarke DO (Physician)       [facility] Hospitalist H&P/Consult note       CC:   Chief Complaint   Patient presents with    Fall    Hip Pain        PCP: Janell Powell MD    History of Present Illness: Patient is a 89 year old female with PMH sig for CKD, colon cancer, RCC sp nephrectomy, HTN, PAF, recent admission for cdiff here for fall and hip fx    Unclear circumstances of the fall. Per fmaily has had another fall where she just tripped but otherwise no fall hx. Unclear if passed out. Complains of pian in her left hip and left elbow.   No recent illness aside from the hospitalization. No f/c reported at home but currnetly she is having tremors but also states she feels warm      PMH  Past Medical History:    Arthritis    Atrial fibrillation (HCC)    Back pain    C. difficile colitis    CKD (chronic kidney disease)    and S/P nephrectomy    Colon cancer (HCC)    Congestive  heart disease (HCC)    Easy bruising    Endometrial cancer (HCC)    UTERINE, DX ABOUT 30 YEARS    Hearing impairment    AIDS    Heart palpitations    Afib    High blood pressure    Kidney failure    Leg swelling    Bishop syndrome    hereditary colorectal cancer    Muscle weakness    WALKER    Pain in joints    Wears glasses    Weight loss        PSH  Past Surgical History:   Procedure Laterality Date    Colectomy      Nephrectomy Left     Part removal colon w end colostomy          ALL:  Allergies   Allergen Reactions    Ciprofloxacin RASH and HIVES    Penicillins RASH        Home Medications:  Outpatient Medications Marked as Taking for the 6/10/24 encounter (Hospital Encounter)   Medication Sig Dispense Refill    vancomycin 125 MG Oral Cap Take 1 capsule (125 mg total) by mouth 4 (four) times daily for 7 days, THEN 1 capsule (125 mg total) 2 (two) times a day for 7 days, THEN 1 capsule (125 mg total) daily for 7 days, THEN 1 capsule (125 mg total) Every Monday, Wednesday, and Friday for 14 days. 55 capsule 0    amLODIPine 5 MG Oral Tab Take 1 tablet (5 mg total) by mouth daily. 30 tablet 2    sodium bicarbonate 650 MG Oral Tab Take 1 tablet (650 mg total) by mouth 2 (two) times daily. 60 tablet 0    omeprazole 20 MG Oral Capsule Delayed Release Take 1 capsule (20 mg total) by mouth every morning before breakfast.      potassium chloride 10 MEQ Oral Tab CR Take 2 tablets (20 mEq total) by mouth daily.      Glucosamine-Chondroit-Biofl-Mn (GLUCOSAMINE CHONDROIT,BIOFLAV, OR) Take 1 tablet by mouth daily.      ferrous sulfate 325 (65 FE) MG Oral Tab EC Take 1 tablet (325 mg total) by mouth daily with breakfast.      amiodarone 100 MG Oral Tab Take 1 tablet (100 mg total) by mouth daily.      apixaban 2.5 MG Oral Tab Take 1 tablet (2.5 mg total) by mouth 2 (two) times daily.      dilTIAZem  MG Oral Capsule SR 24 Hr Take 1 capsule (120 mg total) by mouth daily.      Calcium Carbonate-Vitamin D (OYSTER SHELL  CALCIUM/D) 500-200 MG-UNIT Oral Tab Take 1 tablet by mouth daily.      aspirin 81 MG Oral Tab Take 1 tablet (81 mg total) by mouth daily.      cyanocobalamin 1000 MCG Oral Tab Take 100 mcg by mouth daily.      Multiple Vitamin (MULTIVITAMINS OR) Take 1 tablet by mouth daily.      acetaminophen 500 MG Oral Tab Take 1 tablet (500 mg total) by mouth in the morning and 1 tablet (500 mg total) before bedtime.           Soc Hx  Social History     Tobacco Use    Smoking status: Never    Smokeless tobacco: Never   Substance Use Topics    Alcohol use: Not Currently        Fam Hx  Family History   Problem Relation Age of Onset    Heart Disorder Father     Heart Disorder Mother     Colon Cancer Mother         70    Heart Disorder Sister     Breast Cancer Sister     Diabetes Brother     Breast Cancer Sister     Cancer Sister     Breast Cancer Sister     Breast Cancer Daughter        Review of Systems  Comprehensive ROS reviewed and negative except for what's stated above.        OBJECTIVE:  BP (!) 188/74   Pulse 89   Temp 97.9 °F (36.6 °C) (Oral)   Resp 14   Ht 5' 1\" (1.549 m)   Wt 81 lb 12.7 oz (37.1 kg)   SpO2 97%   BMI 15.45 kg/m²   General:  Alert, no distress, weak frail appearing   Head:  Normocephalic, without obvious abnormality, atraumatic.   Eyes:  Sclera anicteric, No conjunctival pallor, EOMs intact.    Throat: dry   Neck: Supple    Lungs:   Clear to auscultation bilaterally    Chest wall:  No tenderness or deformity.   Heart:  Regular rate and rhythm    Abdomen:   Soft, NT/ND    Extremities: Mild edema bl   Skin: No visible rashes or lesions.    Neurologic: Left sided nottested, pain with movement. No facial asymmetry, no dysarhtira     Diagnostic Data:    CBC/Chem  Recent Labs   Lab 06/10/24  0810   WBC 10.4   HGB 9.8*   .5*   .0   INR 0.99       Recent Labs   Lab 06/10/24  0810   *   K 6.1*   *   CO2 16.0*   BUN 37*   CREATSERUM 2.25*   GLU 91   CA 8.7       Recent Labs   Lab  06/10/24  0810   ALT 19   AST 20   ALB 2.7*       No results for input(s): \"TROP\" in the last 168 hours.         Radiology: CT BRAIN OR HEAD (42684)    Result Date: 6/10/2024  PROCEDURE:  CT BRAIN OR HEAD (76202)  COMPARISON:  [facility] , CT, CT BRAIN OR HEAD ([id number]), 5/24/2024, 5:23 PM.  INDICATIONS:  fall/hip shortening, pain  TECHNIQUE:  Noncontrast CT scanning is performed through the brain. Dose reduction techniques were used. Dose information is transmitted to the ACR (American College of Radiology) NRDR (National Radiology Data Registry) which includes the Dose Index Registry.  PATIENT STATED HISTORY: (As transcribed by Technologist)  Fall    FINDINGS:  VENTRICLES/SULCI:  Ventricles and sulci are normal in size.  INTRACRANIAL:  There are no abnormal extraaxial fluid collections.  There is no midline shift.  Mild to moderate chronic microvascular ischemic changes are present within the subcortical, deep and periventricular white matter.  There is nothing specific for acute infarct.  There is no hemorrhage or mass lesion.  SINUSES:           No sign of acute sinusitis.  MASTOIDS:          No sign of acute inflammation. SKULL:             No evidence for fracture or osseous abnormality. OTHER:             None.            CONCLUSION:  No significant midline shift or mass effect.  No acute intracranial hemorrhage.  Mild to moderate chronic microvascular ischemic changes are present within the white matter.  If there is persistent clinical concern then consider MRI.     LOCATION:  [id number]   Dictated by (CST): Uday Jackson MD on 6/10/2024 at 11:19 AM     Finalized by (CST): Uday Jackson MD on 6/10/2024 at 11:22 AM       XR HIP W OR WO PELVIS 2 OR 3 VIEWS, LEFT (CPT=73502)    Result Date: 6/10/2024  PROCEDURE:  XR HIP W OR WO PELVIS 2 OR 3 VIEWS, LEFT (CPT=73502)  TECHNIQUE:  Unilateral 2 to 3 views of the hip and pelvis if performed.  COMPARISON:  None.  INDICATIONS:  fall/hip shortening  PATIENT STATED HISTORY: (As  transcribed by Technologist)  patient had a fall this morning and has left hip pain.               CONCLUSION:   There is an acute, mildly displaced intertrochanteric left femoral neck fracture with superior migration of the femoral shaft resulting in varus angulation of the hip.  No traumatic dislocation.  Moderate osteoarthritis of bilateral hips.  Obturator rings are intact.  Normal sacroiliac joints and pubic symphysis.  Lower lumbar spondylosis.  Extensive regional vascular calcification.   LOCATION:  [facility]   Dictated by (CST): Davis Estrada MD on 6/10/2024 at 10:04 AM     Finalized by (CST): Davis Estrada MD on 6/10/2024 at 10:05 AM       XR CHEST AP PORTABLE  (CPT=71045)    Result Date: 6/10/2024  PROCEDURE:  XR CHEST AP PORTABLE  (CPT=71045)  TECHNIQUE:  AP chest radiograph was obtained.  COMPARISON:  [facility] , XR, XR CHEST AP PORTABLE  (CPT=71045), 4/22/2024, 8:43 PM.  INDICATIONS:  fall/hip shortening  PATIENT STATED HISTORY: (As transcribed by Technologist)  patient had a fall this morning.               CONCLUSION:   Stable cardiac and mediastinal contours.  Chronic interstitial thickening without pulmonary edema or focal airspace consolidation.  No significant pleural effusion or appreciable pneumothorax.   LOCATION:  [facility]      Dictated by (CST): Davis Estrada MD on 6/10/2024 at 10:04 AM     Finalized by (CST): Davis Estrada MD on 6/10/2024 at 10:04 AM       CT BRAIN OR HEAD ([id number])    Result Date: 5/24/2024  PROCEDURE:  CT BRAIN OR HEAD (12497)  COMPARISON:  None.  INDICATIONS:  Pt from nursing home c/o dizziness/lightheaded x 2 days  TECHNIQUE:  Noncontrast CT scanning is performed through the brain. Dose reduction techniques were used. Dose information is transmitted to the ACR (American College of Radiology) NRDR (National Radiology Data Registry) which includes the Dose Index Registry.  PATIENT STATED HISTORY: (As transcribed by Technologist)  Dizziness, lightheadedness    FINDINGS:  There is cerebral  atrophy with symmetric prominence of the ventricles. There are patchy areas of low attenuation in the periventricular and deep white matter which are nonspecific but most consistent with small vessel ischemic changes. There is no evidence of hemorrhage, mass, midline shift, or extra-axial fluid collection.  The visualized paranasal sinuses show no significant findings. No evidence of depressed skull fracture.            CONCLUSION: 1. No acute intracranial findings 2. Cerebral atrophy with chronic microvascular ischemic changes.    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 Mechanical vs syncope  -plan for OR tomorrow- would also rec vacno for periop surg ppx given mrsa hx   -cards consult    -she also has left arm / knee pain - will get xrays too    *zohreh  *hyper K - likely from supplement  -dw renal,will see  -fluids / K management per renal    *Cdfiff  -cont vancomycin  -has generalized shaking will check blood cx but otherwise no other infectious symptoms    Chronic conditions  Home meds as able          Further recommendations pending patient's clinical course. [facility] hospitalist to continue to follow patient while in house    Patient and/or patient's family  given opportunity to ask questions and note understanding and agreeing with therapeutic plan as outlined    Praful Neves Hospitalist  [phone]  Answering Service: [phone]        Electronically signed by Praful Clarke DO on 6/10/2024  7:42 PM              Consults - MD Consult Notes      Consults signed by Lokesh Whitehead MD at 2024  7:27 AM     Author: Lokesh Whitehead MD Service: Orthopedics Author Type: Physician    Filed: 2024  7:27 AM Date of Service: 6/10/2024  7:25 PM Status: Signed    : Lokesh Whitehead MD (Physician)     Consult Orders    1. ED Consult to Orthopedic Surgery [[id number]] ordered by Emily Hernandez MD at 06/10/24 [address] [place] [address]   part of [facility]    Report of Consultation    Sunita Loza Patient Status:  Inpatient    1932 MRN [id number]   Location [facility] 3SW-A Attending Tree Li MD   Hosp Day # 1 PCP Janell Powell MD     Date of Admission:  6/10/2024  Date of Consult:  6/10/24    SUBJECTIVE:    Reason for Consultation: Fall with left hip pain  Consult Requested by: ED    History of Present Illness:  Patient is a(n) 89 year old female who presents with left hip pain. Patient brought in to the ER after a fall. Patient was unable to ambulate after the fall and had pain in the left hip. Patient denies hitting head, LOC, nausea or vommitting. Patient denies any back/neck or opposite leg pain.      Past Medical History:    Arthritis    Atrial fibrillation (HCC)    Back pain    C. difficile colitis    CKD (chronic kidney disease)    and S/P nephrectomy    Colon cancer (HCC)    Congestive heart disease (HCC)    Easy bruising    Endometrial cancer (HCC)    UTERINE, DX ABOUT 30 YEARS    Hearing impairment    AIDS    Heart palpitations    Afib    High blood pressure    Kidney failure    Leg swelling    Bishop syndrome    hereditary colorectal cancer    Muscle weakness    WALKER    Pain in joints    Wears  glasses    Weight loss     Past Surgical History:   Procedure Laterality Date    Colectomy      Nephrectomy Left     Part removal colon w end colostomy       Social History     Tobacco Use    Smoking status: Never    Smokeless tobacco: Never   Substance Use Topics    Alcohol use: Not Currently     Family History   Problem Relation Age of Onset    Heart Disorder Father     Heart Disorder Mother     Colon Cancer Mother         70    Heart Disorder Sister     Breast Cancer Sister     Diabetes Brother     Breast Cancer Sister     Cancer Sister     Breast Cancer Sister     Breast Cancer Daughter        Allergies:  Allergies   Allergen Reactions    Ciprofloxacin RASH and HIVES    Penicillins RASH       Review of Systems:  See H&P    OBJECTIVE:    Blood pressure (!) 164/64, pulse 97, temperature 98.9 °F (37.2 °C), temperature source Oral, resp. rate 16, height 5' 1\" (1.549 m), weight 81 lb 12.7 oz (37.1 kg), SpO2 94%.    Intake/Output Summary (Last 24 hours) at 6/11/2024 0717  Last data filed at 6/11/2024 0500  Gross per 24 hour   Intake 250 ml   Output 200 ml   Net 50 ml       Physical Exam:  General: Alert, cooperative, no distress, appears stated age  Extremities: Bilateral lower extremities neurovascularly intact. Bilateral calf soft, non-tender. left lower extremity externally rotated and shortened. Pain any movement and to palpation of left hip. No pain to knee or ankle of left leg. right lower extremity with full ROM and no pain.   Pulses: 2+ and symmetric all extremities.  Skin: Intact over left hip with no ecchymosis or abrasions noted    Diagnostics: Xray of left hip/pelvis demonstrate a displaced intertrochanteric femur fracture.    ASSESSMENT:    left Femoral intertrochanteric fracture, displaced    PLAN:    Patients diagnosis and treatment options are discussed today. Patient will need a left hip CMN. Risks and benefits of the surgery were discussed today and the patient agreed with the plan. Please consent  the patient for left hip cephallomedullary nail. Antibiotics have been ordered and are on call to OR. DVT prophylaxis with SCD are in place. Patient should remain NPO. Patient will need medical clearance for surgery. NWB LLE, bedrest.     Lokesh Whitehead MD  6/10/2024  7:25 PM    Electronically signed by Lokesh Whitehead MD on 6/11/2024  7:27 AM           D/C Summary    No notes of this type exist for this encounter.     Imaging Results (HF patients)    Chest X-Ray Results (HF patients only)    No exam resulted this encounter.      2D Echocardiogram Results (HF patients only)    No exam resulted this encounter.      Cath Angiogram Results (HF Patients only)    No exam resulted this encounter.          Physical Therapy Notes (last 72 hours)      Physical Therapy Note signed by Adriana Griffin PT at 6/14/2024  3:07 PM  Version 1 of 1    Author: Adriana Griffin PT Service: Rehab Author Type: Physical Therapist    Filed: 6/14/2024  3:07 PM Date of Service: 6/14/2024 10:00 AM Status: Signed    : Adriana Griffin PT (Physical Therapist)          PHYSICAL THERAPY TREATMENT NOTE - INPATIENT    Room Number: 352/352-A     Session: 1     Number of Visits to Meet Established Goals: 6    Presenting Problem: L hip fracture s/p cephalomedullary nail 6/11/24  Co-Morbidities : CKD, HTN, colon and endometrial cancer    PHYSICAL THERAPY MEDICAL/SOCIAL HISTORY  History related to current admission: Patient is a 89 year old female admitted on 6/10/2024 from home for fall.  Pt diagnosed with L hip fracture s/p L hip cephalomedullary nail 6/11/24.     ASSESSMENT   Patient demonstrates fair progress this session, goals  remain in progress.    Patient continues to function below baseline with bed mobility, transfers, gait, and standing prolonged periods.  Contributing factors to remaining limitations include decreased functional strength, pain, impaired standing balance, impaired motor planning, and decreased muscular  endurance.  Next session anticipate patient to progress transfers and gait.  Physical Therapy will continue to follow patient for duration of hospitalization.    Patient continues to benefit from continued skilled PT services: to promote return to prior level of function and safety with continuous assistance and gradual rehabilitative therapy .    PLAN  PT Treatment Plan: Bed mobility;Endurance;Energy conservation;Patient education;Gait training;Neuromuscular re-educate;Range of motion;Strengthening;Transfer training  Rehab Potential : Fair  Frequency (Obs): 3-5x/week    CURRENT GOALS     Goal #1 Patient is able to demonstrate supine - sit EOB @ level: moderate assistance      Goal #2 Patient is able to demonstrate transfers Sit to/from Stand at assistance level: moderate assistance      Goal #3 Patient is able to transfer to bedside chair with MOD assist      Goal #4     Goal #5     Goal #6     Goal Comments: Goals established on 6/12/2024 6/14/2024 all goals ongoing    SUBJECTIVE  \"It really hurts when I try to stand.\"     OBJECTIVE  Precautions: Hard of hearing;Bed/chair alarm    WEIGHT BEARING RESTRICTION  Weight Bearing Restriction: L lower extremity           L Lower Extremity: Weight Bearing as Tolerated    PAIN ASSESSMENT   Rating: Unable to rate  Location: L hip  Management Techniques: Activity promotion;Repositioning    BALANCE                                                                                                                       Static Sitting: Fair  Dynamic Sitting: Fair -           Static Standing: Poor  Dynamic Standing: Poor -    ACTIVITY TOLERANCE                         O2 WALK         AM-PAC '6-Clicks' INPATIENT SHORT FORM - BASIC MOBILITY  How much difficulty does the patient currently have...  Patient Difficulty: Turning over in bed (including adjusting bedclothes, sheets and blankets)?: A Lot   Patient Difficulty: Sitting down on and standing up from a chair with arms (e.g.,  wheelchair, bedside commode, etc.): A Lot   Patient Difficulty: Moving from lying on back to sitting on the side of the bed?: A Lot   How much help from another person does the patient currently need...   Help from Another: Moving to and from a bed to a chair (including a wheelchair)?: A Lot   Help from Another: Need to walk in hospital room?: A Lot   Help from Another: Climbing 3-5 steps with a railing?: Total       AM-PAC Score:  Raw Score: 11   Approx Degree of Impairment: 72.57%   Standardized Score (AM-PAC Scale): 33.86   CMS Modifier (G-Code): CL    FUNCTIONAL ABILITY STATUS  Gait Assessment   Functional Mobility/Gait Assessment  Gait Assistance: Maximum assistance  Distance (ft): 3, 10  Assistive Device: Rolling walker    Skilled Therapy Provided  VC for transfer set up.   MOD assist x 2P for LE/trunk support with supine-sit.   Assisted with scooting to EOB.   VC for midline positioning.   VC for transfer set up.   MOD assist x 2P for force generation and balance to sit-stand to RW.   VC for sequencing side steps towards bedside chair.   MOD assist x 2P for balance, walker management, and guidance to chair.   Returned to bedside chair.   Chair wheeled into hallway.   VC for transfer setup.   MOD assist x 2P to sit-stand to RW.   Ambulated 10ft with RW and chair follow.   MOD assist x 2P for balance, walker management, and weightshifting.  Ambulates with short stride length, excessive hip flexion, and decreased stance time on L LE.  Constant VC for gait sequencing and limb advancement.   Returned to sitting up in bedside chair in room.     Bed Mobility:  Rolling: MOD   Supine<>Sit: MOD x 2   Sit<>Supine: NT     Transfer Mobility:  Sit<>Stand: MOD x 2  Stand<>Sit: MOD x 2  Gait: MAX x 2    Therapist's Comments:  Reviewed activity recommendations. Recommend Joan Charles for OOB mobility.       THERAPEUTIC EXERCISES  Lower Extremity Ankle pumps  LAQ     Upper Extremity      Position Sitting     Repetitions   10    Sets   1     Patient End of Session: Up in chair;Needs met;Call light within reach;RN aware of session/findings;All patient questions and concerns addressed;SCDs in place;Alarm set;Family present;Discussed recommendations with /    PT Session Time: 25 minutes  Gait Training: 15 minutes  Therapeutic Activity: 10 minutes  Therapeutic Exercise:  minutes   Neuromuscular Re-education:  minutes                          Occupational Therapy Notes (last 72 hours)      Occupational Therapy Note signed by Sonido Meade OT at 6/14/2024 11:07 AM  Version 1 of 1    Author: Sonido Meade OT Service: Rehab Author Type: Occupational Therapist    Filed: 6/14/2024 11:07 AM Date of Service: 6/14/2024  9:40 AM Status: Signed    : Sonido Meade OT (Occupational Therapist)       OCCUPATIONAL THERAPY TREATMENT NOTE - INPATIENT     Room Number: 352/352-A  Session: 1   Number of Visits to Meet Established Goals: 5    Presenting Problem: L hip fx s/p IMN of L hip on 6/11  Prior Level of Function: Pt lives with daughter at [facility]. Pts daughter has Down syndrome and patient provides supervision for daughter. Pt ambulates with RW occasionally when outside her apartment. Pt independent with ADL and mobility. Pts family notes patient has been more forgetful and have been talking about transition to [facility] and is on waitlist.     ASSESSMENT   Patient demonstrates good  progress this session, goals remain in progress.    Patient continues to function below baseline with toileting, lower body dressing, bed mobility, transfers, static standing balance, dynamic standing balance, and functional standing tolerance.   Contributing factors to remaining limitations include decreased functional strength, decreased endurance, pain, impaired standing balance, impaired coordination, decreased muscular endurance, and cognitive deficits (memory impairment).  Next session anticipate patient to progress toileting, lower body  dressing, bed mobility, transfers, static standing balance, dynamic standing balance, and functional standing tolerance.  Occupational Therapy will continue to follow patient for duration of hospitalization.    Patient continues to benefit from continued skilled OT services: to promote return to prior level of function and safety with continuous assistance and gradual rehabilitative therapy .          OT Device Recommendations: TBD    History: Patient is a 89 year old female admitted on 6/10/2024 with Presenting Problem: L hip fx s/p IMN of L hip on 6/11.      Co-Morbidities : CKD, HTN, colon and endometrial cancer, s/p partial colon resection, takedown colostomy/colostomy creation 4/3/24    WEIGHT BEARING RESTRICTION  Weight Bearing Restriction: L lower extremity           L Lower Extremity: Weight Bearing as Tolerated    Recommendations for nursing staff:   Transfers: alexandra morrison  [place] location: bedside commode    TREATMENT SESSION:  Patient Start of Session: semi supine in bed; son present  FUNCTIONAL TRANSFER ASSESSMENT  Sit to Stand: Edge of Bed; Chair  Edge of Bed: Maximum Assist (mod A x 2)  Chair: Maximum Assist (mod A x 2)    BED MOBILITY  Supine to Sit : Maximum Assist (mod A x 2)  Scooting: max A    BALANCE ASSESSMENT     FUNCTIONAL ADL ASSESSMENT       ACTIVITY TOLERANCE: fair-WFL                         O2 SATURATIONS       EDUCATION PROVIDED  Patient: Role of Occupational Therapy; Plan of Care; Discharge Recommendations; Functional Transfer Techniques; Fall Prevention; Posture/Positioning; Energy Conservation; Proper Body Mechanics  Patient's Response to Education: Verbalized Understanding; Returned Demonstration; Requires Further Education  Family/Caregiver: Role of Occupational Therapy; Plan of Care; Discharge Recommendations  Family/Caregiver's Response to Education: Verbalized Understanding      Equipment used: RW  Demonstrates functional use, Would benefit from additional trial      Therapist  comments: Pt received semi supine in bed, pleasant and cooperative for OT session. Pt agreeable for OOB activity/mobility in preparation for bedside commode transfer and improved ADLs. Pt performs bed mobility at mod A x 2 with cues provided for hand placement and sequencing of task. Sit to stand transfer from EOB to RW performed at mod A x 2 with cues provided for hand placement. In standing, pt demos side step transfer to bedside chair with RW requiring mod A x 2. Cues provided for sequencing and RW management. Pt then engages in sit to stand transfer from bedside chair to RW requiring mod A x 2. Pt encouraged to engage in short distance functional mobility with RW in preparation for improved standing based ADLs/functional transfers. Pt requires mod A x 2 and close chair follow. Pt left in chair with all needs. RN made aware.   Patient End of Session: Up in chair;Needs met;Call light within reach;RN aware of session/findings;All patient questions and concerns addressed;Alarm set;Family present    SUBJECTIVE  Pt pleasant and cooperative.    PAIN ASSESSMENT  Ratin  Location: L hip/knee (increased with activity/mobility)  Management Techniques: Ice;Repositioning;Relaxation     OBJECTIVE  Precautions: Hard of hearing;Bed/chair alarm    AM-PAC ‘6-Clicks’ Inpatient Daily Activity Short Form  -   Putting on and taking off regular lower body clothing?: A Lot  -   Bathing (including washing, rinsing, drying)?: A Lot  -   Toileting, which includes using toilet, bedpan or urinal? : A Lot  -   Putting on and taking off regular upper body clothing?: A Lot  -   Taking care of personal grooming such as brushing teeth?: A Little  -   Eating meals?: A Little    AM-PAC Score:  Score: 14  Approx Degree of Impairment: 59.67%  Standardized Score (AM-PAC Scale): 33.39    PLAN  OT Treatment Plan: Balance activities;Energy conservation/work simplification techniques;ADL training;Functional transfer training;Endurance  training;Patient/Family training;Patient/Family education;Equipment eval/education;Compensatory technique education;Continued evaluation  Rehab Potential : Fair  Frequency: 5x/week    OT Goals:     All goals ongoing 06/14    ADL Goals  Patient will perform toileting with mod A and AE PRN  Patient will perform LB dressing with mod A and AE PRN     Functional Transfer Goals  Patient will perform bed mobility supine to sit with mod A  Patient will perform bed mobility sit to supine with mod A  Patient will perform toilet transfer with mod A    OT Session Time: 25 minutes  Therapeutic Activity: 25 minutes                   Video Swallow Study Notes    No notes of this type exist for this encounter.     SLP Notes    No notes of this type exist for this encounter.     Future Appointments        Provider Department Center    6/24/2024 10:30 AM [facility] NURSE [facility] [place]    7/8/2024 1:00 PM Fabiana Miller MD [facility] [place]      Multidisciplinary Problems     Active Goals        Problem: Patient/Family Goals    Goal Priority Disciplines Outcome Interventions   Patient/Family Long Term Goal     Interdisciplinary Progressing    Description: Patient's Long Term Goal: \"To dc back to independent living\"    Interventions:  - Surgery tomorrow, PT/OT eval  - See additional Care Plan goals for specific interventions   Patient/Family Short Term Goal     Interdisciplinary Progressing    Description: Patient's Short Term Goal: \"To not have pain\"    Interventions:   - Follow pain management plan, surgery tomorrow  - See additional Care Plan goals for specific interventions               Discharge Treatment Preferences    Flowsheet Row Most Recent Value   Preferences    PASRR Level 1 Submitted Yes